# Patient Record
Sex: MALE | Race: BLACK OR AFRICAN AMERICAN | NOT HISPANIC OR LATINO | Employment: OTHER | URBAN - METROPOLITAN AREA
[De-identification: names, ages, dates, MRNs, and addresses within clinical notes are randomized per-mention and may not be internally consistent; named-entity substitution may affect disease eponyms.]

---

## 2017-05-10 ENCOUNTER — OFFICE VISIT (OUTPATIENT)
Dept: FAMILY MEDICINE | Facility: CLINIC | Age: 52
End: 2017-05-10
Payer: MEDICARE

## 2017-05-10 ENCOUNTER — LAB VISIT (OUTPATIENT)
Dept: LAB | Facility: HOSPITAL | Age: 52
End: 2017-05-10
Attending: FAMILY MEDICINE
Payer: MEDICARE

## 2017-05-10 VITALS
DIASTOLIC BLOOD PRESSURE: 74 MMHG | BODY MASS INDEX: 39.17 KG/M2 | HEIGHT: 75 IN | WEIGHT: 315 LBS | SYSTOLIC BLOOD PRESSURE: 108 MMHG | HEART RATE: 65 BPM

## 2017-05-10 DIAGNOSIS — I27.20 PULMONARY HYPERTENSION: ICD-10-CM

## 2017-05-10 DIAGNOSIS — E66.01 MORBID OBESITY DUE TO EXCESS CALORIES: ICD-10-CM

## 2017-05-10 DIAGNOSIS — M75.102 ROTATOR CUFF SYNDROME, LEFT: ICD-10-CM

## 2017-05-10 DIAGNOSIS — G47.30 SLEEP APNEA, UNSPECIFIED TYPE: ICD-10-CM

## 2017-05-10 DIAGNOSIS — I50.42 CHRONIC COMBINED SYSTOLIC AND DIASTOLIC CONGESTIVE HEART FAILURE: ICD-10-CM

## 2017-05-10 DIAGNOSIS — Z00.00 ROUTINE GENERAL MEDICAL EXAMINATION AT A HEALTH CARE FACILITY: ICD-10-CM

## 2017-05-10 DIAGNOSIS — Z12.5 SPECIAL SCREENING, PROSTATE CANCER: ICD-10-CM

## 2017-05-10 DIAGNOSIS — Z11.59 NEED FOR HEPATITIS C SCREENING TEST: ICD-10-CM

## 2017-05-10 DIAGNOSIS — I48.0 PAROXYSMAL ATRIAL FIBRILLATION: ICD-10-CM

## 2017-05-10 DIAGNOSIS — I10 ESSENTIAL HYPERTENSION: ICD-10-CM

## 2017-05-10 DIAGNOSIS — B35.9 TINEA: ICD-10-CM

## 2017-05-10 DIAGNOSIS — Z00.00 ROUTINE GENERAL MEDICAL EXAMINATION AT A HEALTH CARE FACILITY: Primary | ICD-10-CM

## 2017-05-10 LAB
ALBUMIN SERPL BCP-MCNC: 3.8 G/DL
ALP SERPL-CCNC: 46 U/L
ALT SERPL W/O P-5'-P-CCNC: 29 U/L
ANION GAP SERPL CALC-SCNC: 7 MMOL/L
AST SERPL-CCNC: 36 U/L
BASOPHILS # BLD AUTO: 0.03 K/UL
BASOPHILS NFR BLD: 1 %
BILIRUB SERPL-MCNC: 0.6 MG/DL
BNP SERPL-MCNC: 48 PG/ML
BUN SERPL-MCNC: 22 MG/DL
CALCIUM SERPL-MCNC: 9.5 MG/DL
CHLORIDE SERPL-SCNC: 99 MMOL/L
CHOLEST/HDLC SERPL: 5.1 {RATIO}
CO2 SERPL-SCNC: 35 MMOL/L
COMPLEXED PSA SERPL-MCNC: 0.58 NG/ML
CREAT SERPL-MCNC: 1.2 MG/DL
DIFFERENTIAL METHOD: ABNORMAL
EOSINOPHIL # BLD AUTO: 0.1 K/UL
EOSINOPHIL NFR BLD: 1.9 %
ERYTHROCYTE [DISTWIDTH] IN BLOOD BY AUTOMATED COUNT: 13.9 %
EST. GFR  (AFRICAN AMERICAN): >60 ML/MIN/1.73 M^2
EST. GFR  (NON AFRICAN AMERICAN): >60 ML/MIN/1.73 M^2
GLUCOSE SERPL-MCNC: 92 MG/DL
HCT VFR BLD AUTO: 39.7 %
HDL/CHOLESTEROL RATIO: 19.6 %
HDLC SERPL-MCNC: 168 MG/DL
HDLC SERPL-MCNC: 33 MG/DL
HGB BLD-MCNC: 13.4 G/DL
LDLC SERPL CALC-MCNC: 114.6 MG/DL
LYMPHOCYTES # BLD AUTO: 1.3 K/UL
LYMPHOCYTES NFR BLD: 42.4 %
MCH RBC QN AUTO: 29.6 PG
MCHC RBC AUTO-ENTMCNC: 33.8 %
MCV RBC AUTO: 88 FL
MONOCYTES # BLD AUTO: 0.6 K/UL
MONOCYTES NFR BLD: 20.1 %
NEUTROPHILS # BLD AUTO: 1.1 K/UL
NEUTROPHILS NFR BLD: 34.6 %
NONHDLC SERPL-MCNC: 135 MG/DL
PLATELET # BLD AUTO: 111 K/UL
PMV BLD AUTO: 12.6 FL
POTASSIUM SERPL-SCNC: 3.9 MMOL/L
PROT SERPL-MCNC: 8.3 G/DL
RBC # BLD AUTO: 4.53 M/UL
SODIUM SERPL-SCNC: 141 MMOL/L
TRIGL SERPL-MCNC: 102 MG/DL
WBC # BLD AUTO: 3.09 K/UL

## 2017-05-10 PROCEDURE — 3078F DIAST BP <80 MM HG: CPT | Mod: S$GLB,,, | Performed by: FAMILY MEDICINE

## 2017-05-10 PROCEDURE — 99499 UNLISTED E&M SERVICE: CPT | Mod: S$PBB,,, | Performed by: FAMILY MEDICINE

## 2017-05-10 PROCEDURE — 85025 COMPLETE CBC W/AUTO DIFF WBC: CPT

## 2017-05-10 PROCEDURE — 83880 ASSAY OF NATRIURETIC PEPTIDE: CPT

## 2017-05-10 PROCEDURE — 80061 LIPID PANEL: CPT

## 2017-05-10 PROCEDURE — 3074F SYST BP LT 130 MM HG: CPT | Mod: S$GLB,,, | Performed by: FAMILY MEDICINE

## 2017-05-10 PROCEDURE — 84153 ASSAY OF PSA TOTAL: CPT

## 2017-05-10 PROCEDURE — 80053 COMPREHEN METABOLIC PANEL: CPT

## 2017-05-10 PROCEDURE — 36415 COLL VENOUS BLD VENIPUNCTURE: CPT

## 2017-05-10 PROCEDURE — 86803 HEPATITIS C AB TEST: CPT

## 2017-05-10 PROCEDURE — 99386 PREV VISIT NEW AGE 40-64: CPT | Mod: S$GLB,,, | Performed by: FAMILY MEDICINE

## 2017-05-10 PROCEDURE — 99999 PR PBB SHADOW E&M-EST. PATIENT-LVL III: CPT | Mod: PBBFAC,,, | Performed by: FAMILY MEDICINE

## 2017-05-10 RX ORDER — LOSARTAN POTASSIUM AND HYDROCHLOROTHIAZIDE 25; 100 MG/1; MG/1
1 TABLET ORAL DAILY
COMMUNITY
End: 2017-09-27

## 2017-05-10 RX ORDER — CLOTRIMAZOLE AND BETAMETHASONE DIPROPIONATE 10; .5 MG/ML; MG/ML
LOTION TOPICAL 2 TIMES DAILY
Qty: 30 ML | Refills: 2 | Status: SHIPPED | OUTPATIENT
Start: 2017-05-10 | End: 2021-01-27

## 2017-05-10 RX ORDER — AMLODIPINE BESYLATE 5 MG/1
5 TABLET ORAL DAILY
COMMUNITY
End: 2017-12-18 | Stop reason: SDUPTHER

## 2017-05-10 NOTE — PROGRESS NOTES
Subjective:       Patient ID: Caio Ontiveros is a 51 y.o. male.    Chief Complaint: Annual Exam; Cough; Nausea; and Sinusitis    HPI Comments: 51 yr old pleasant black male with AFIB paroxysmal, CHF w/unknown EF, HTN, pulmonary HTN, varicose veins, EVANGELIST, presents today as new patient to me and for establishing primary care and annual wellness check and lab work. He has multiple complaints as well.      AFIB and CHF - diagnosed 3 years ago and placed on medications - follows Cardiology in [place] and he do not remember his last EF - last echo year ago - he is anti coagulated with xarelto for which he is non compliant     HTN - controlled - on diuretics, BB and CCB - c/o leg edema and varicose veins      Allergies and sinus congestion - onset 5 days ago - has not tried any OTC med yet -no fever/chest pain/SOB      Rash and skin discoloration - scalp and groin area - reports started 3 months ago - associated with itching       Arthritis knee - chronic - has not tried OTC med yet    Left shoulder weakness and pain - chronic - onset 3 years ago - sen Ortho in the past and was advised therapy which never helped his mobility - want second opinion      History as below - reviewed         Cough   This is a new problem. The current episode started in the past 7 days. The problem has been unchanged. The problem occurs constantly. The cough is non-productive. Associated symptoms include postnasal drip and a rash. Pertinent negatives include no chest pain, myalgias, rhinorrhea or wheezing. Nothing aggravates the symptoms. The treatment provided no relief. His past medical history is significant for environmental allergies. There is no history of asthma or bronchitis.   Nausea   This is a new problem. The current episode started today. The problem occurs constantly. The problem has been unchanged. Associated symptoms include nausea and a rash. Pertinent negatives include no chest pain, myalgias, vomiting or weakness. Nothing  aggravates the symptoms. He has tried nothing for the symptoms. The treatment provided no relief.   Hypertension   This is a chronic problem. The current episode started more than 1 year ago. The problem has been gradually improving since onset. The problem is controlled. Pertinent negatives include no chest pain or palpitations. There are no associated agents to hypertension. Past treatments include angiotensin blockers, diuretics, beta blockers and calcium channel blockers. The current treatment provides moderate improvement. There are no compliance problems.  Hypertensive end-organ damage includes heart failure and left ventricular hypertrophy. There is no history of angina, CAD/MI, CVA, renovascular disease or a thyroid problem. Identifiable causes of hypertension include sleep apnea. There is no history of coarctation of the aorta, hypercortisolism, hyperparathyroidism or pheochromocytoma.   Congestive Heart Failure   Presents for initial visit. The disease course has been stable. Pertinent negatives include no chest pain, palpitations or unexpected weight change. The symptoms have been stable. Past treatments include angiotensin receptor blockers, beta blockers and salt and fluid restriction (diuretics). The treatment provided moderate relief. Compliance with prior treatments has been good. There is no history of anemia, CAD, CVA, DM, DVT, hyperthyroidism, myocarditis or PE.     Review of Systems   Constitutional: Negative.  Negative for activity change and unexpected weight change.   HENT: Positive for postnasal drip. Negative for mouth sores, rhinorrhea and voice change.    Eyes: Negative.  Negative for pain, discharge and visual disturbance.   Respiratory: Negative.  Negative for apnea and wheezing.    Cardiovascular: Negative.  Negative for chest pain and palpitations.   Gastrointestinal: Positive for nausea. Negative for abdominal distention, anal bleeding, diarrhea and vomiting.   Endocrine: Negative.   Negative for cold intolerance and polyuria.   Genitourinary: Negative.  Negative for decreased urine volume, difficulty urinating, discharge, frequency and scrotal swelling.   Musculoskeletal: Negative.  Negative for back pain, myalgias and neck stiffness.   Skin: Positive for color change and rash.   Allergic/Immunologic: Positive for environmental allergies. Negative for immunocompromised state.   Neurological: Negative.  Negative for dizziness, speech difficulty, weakness and light-headedness.   Hematological: Negative.    Psychiatric/Behavioral: Negative.  Negative for agitation, dysphoric mood and suicidal ideas. The patient is not nervous/anxious.        Active Ambulatory Problems     Diagnosis Date Noted    CHF exacerbation 12/19/2012    Diastolic CHF, acute on chronic 12/20/2012    Sleep apnea 12/20/2012    Morbid obesity 12/20/2012    Obesity hypoventilation syndrome 12/20/2012    Hypertension 12/20/2012    Pulmonary hypertension 12/20/2012    A-fib 12/20/2012    Chronic combined systolic and diastolic congestive heart failure 05/10/2017     Resolved Ambulatory Problems     Diagnosis Date Noted    No Resolved Ambulatory Problems     Past Medical History:   Diagnosis Date    A-fib     CHF (congestive heart failure)     Gout, chronic     Hypertension      Past Surgical History:   Procedure Laterality Date    CHOLECYSTECTOMY      GASTRIC BYPASS  2014     History reviewed. No pertinent family history.  Social History     Social History    Marital status:      Spouse name: N/A    Number of children: 1    Years of education: N/A     Occupational History    Not on file.     Social History Main Topics    Smoking status: Never Smoker    Smokeless tobacco: Not on file    Alcohol use Yes      Comment: sometimes    Drug use: No    Sexual activity: Not Currently     Other Topics Concern    Not on file     Social History Narrative    No narrative on file     Review of patient's allergies  indicates:  No Known Allergies  Current Outpatient Prescriptions on File Prior to Visit   Medication Sig Dispense Refill    aspirin 81 MG Chew Take 1 tablet (81 mg total) by mouth once daily. 30 tablet 0    furosemide (LASIX) 40 MG tablet TAKE 2 TABLETS DAILY AT MID-AFTERNOON 180 tablet 2    metolazone (ZAROXOLYN) 5 MG tablet TAKE 1 TABLET EVERY MORNING 90 tablet 2    metoprolol succinate (TOPROL-XL) 25 MG 24 hr tablet Take 25 mg by mouth once daily.      potassium chloride SA (K-DUR,KLOR-CON) 20 MEQ tablet TAKE 1 TABLET DAILY 90 tablet 2    colchicine 0.6 mg tablet Take 1 tablet (0.6 mg total) by mouth once daily. 1 tablet 0    [DISCONTINUED] allopurinol (ZYLOPRIM) 100 MG tablet TAKE 1 TABLET DAILY 90 tablet 2    [DISCONTINUED] amlodipine (NORVASC) 10 MG tablet TAKE 1 TABLET DAILY 90 tablet 2    [DISCONTINUED] hydrALAZINE (APRESOLINE) 10 MG tablet TAKE 1 TABLET 3 TIMES A  tablet 2    [DISCONTINUED] lisinopril (PRINIVIL,ZESTRIL) 40 MG tablet TAKE 1 TABLET DAILY 90 tablet 2    [DISCONTINUED] warfarin (COUMADIN) 5 MG tablet Take 5 mg by mouth once daily.       No current facility-administered medications on file prior to visit.        Objective:       Vitals:    05/10/17 0840   BP: 108/74   Pulse: 65       Physical Exam   Constitutional: He is oriented to person, place, and time. He appears well-developed and well-nourished.   HENT:   Head: Normocephalic and atraumatic.   Right Ear: External ear normal.   Left Ear: External ear normal.   Nose: Nose normal.   Mouth/Throat: Oropharynx is clear and moist. No oropharyngeal exudate.   Eyes: Conjunctivae and EOM are normal. Pupils are equal, round, and reactive to light. Right eye exhibits no discharge. Left eye exhibits no discharge. No scleral icterus.   Neck: Normal range of motion. Neck supple. No JVD present. No tracheal deviation present. No thyromegaly present.   Cardiovascular: Normal rate, regular rhythm, normal heart sounds and intact distal  pulses.  Exam reveals no gallop and no friction rub.    No murmur heard.  Pulmonary/Chest: Effort normal and breath sounds normal. No stridor. No respiratory distress. He has no wheezes. He has no rales. He exhibits no tenderness.   Abdominal: Soft. Bowel sounds are normal. He exhibits no distension and no mass. There is no tenderness. There is no rebound and no guarding. No hernia.   Musculoskeletal: Normal range of motion. He exhibits edema and tenderness (TTP left shoulder with rotator cuff impingement and neer and alberts+).   Varicose veins B/L LE   Lymphadenopathy:     He has no cervical adenopathy.   Neurological: He is alert and oriented to person, place, and time. He has normal reflexes. He displays normal reflexes. No cranial nerve deficit. He exhibits normal muscle tone. Coordination normal.   Skin: Skin is warm and dry. No rash noted. No erythema. No pallor.   Psychiatric: He has a normal mood and affect. His behavior is normal. Judgment and thought content normal.       Assessment:       1. Routine general medical examination at a health care facility    2. Pulmonary hypertension    3. Sleep apnea, unspecified type    4. Essential hypertension    5. Morbid obesity due to excess calories    6. Paroxysmal atrial fibrillation    7. Chronic combined systolic and diastolic congestive heart failure    8. Need for hepatitis C screening test    9. Special screening, prostate cancer    10. Rotator cuff syndrome, left    11. Tinea        Plan:       Caio was seen today for annual exam, cough, nausea and sinusitis.    Diagnoses and all orders for this visit:    Routine general medical examination at a health care facility  -     CBC auto differential; Future  -     Comprehensive metabolic panel; Future  -     Lipid panel; Future  -     Urinalysis; Future    Pulmonary hypertension  -     2D Echo w/ Color Flow Doppler; Future    Sleep apnea, unspecified type    Essential hypertension  -     CBC auto differential;  Future  -     Comprehensive metabolic panel; Future  -     Lipid panel; Future  -     Urinalysis; Future    Morbid obesity due to excess calories    Paroxysmal atrial fibrillation  -     2D Echo w/ Color Flow Doppler; Future    Chronic combined systolic and diastolic congestive heart failure  -     2D Echo w/ Color Flow Doppler; Future  -     CBC auto differential; Future  -     Comprehensive metabolic panel; Future  -     Lipid panel; Future  -     Brain natriuretic peptide; Future    Need for hepatitis C screening test  -     Hepatitis C antibody; Future    Special screening, prostate cancer  -     PSA, Screening; Future    Rotator cuff syndrome, left  -     Ambulatory referral to Orthopedics    Tinea  -     clotrimazole-betamethasone (LOTRISONE) lotion; Apply topically 2 (two) times daily.      Wellness check  -normal exam  -labs    AFIB  -paroxysmal  -on rate and rhythm control - on BB    CHF  -unknown EF  -echo for surveillance  -follows cardiology at [place]    Left shoulder pain/rotator cuff syndrome  -refer Ortho    HTN  -controlled    Obesity  -diet/exercise  -weight loss    Spent adequate time in obtaining history and explaining differentials    40 minutes spent during this visit of which greater than 50% devoted to face-face counseling and coordination of care regarding diagnosis and management plan    Return in about 3 months (around 8/10/2017), or if symptoms worsen or fail to improve.

## 2017-05-10 NOTE — MR AVS SNAPSHOT
[facility]  [address]  [place] [address]  Phone: [phone]  Fax: [phone]                  Caio Ontiveros   5/10/2017 8:40 AM   Office Visit    Description:  Male : 1965   Provider:  Scottie Alcantar MD   Department:  [facility]           Reason for Visit     Annual Exam     Cough     Nausea     Sinusitis           Diagnoses this Visit        Comments    Routine general medical examination at a health care facility    -  Primary     Pulmonary hypertension         Sleep apnea, unspecified type         Essential hypertension         Morbid obesity due to excess calories         Paroxysmal atrial fibrillation         Chronic combined systolic and diastolic congestive heart failure         Need for hepatitis C screening test         Special screening, prostate cancer         Rotator cuff syndrome, left                To Do List           Future Appointments        Provider Department Dept Phone    5/10/2017 9:30 AM APPOINTMENT LAB, [facility] [phone]    5/10/2017 9:40 AM SPECIMEN, [facility] [phone]    2017 8:00 AM CARDIOLOGY, ECHO [facility] [phone]    2017 9:00 AM José Rodriguez Jr., MD [facility] [phone]      Goals (5 Years of Data)     None      Follow-Up and Disposition     Return in about 3 months (around 8/10/2017), or if symptoms worsen or fail to improve.      [facility]     [facility] Nurse Care Line - 24/7 Assistance  Unless otherwise directed by your provider, please contact [facility], our nurse care line that is available for 24/7 assistance.     Registered nurses in the [facility] provide: appointment scheduling, clinical advisement, health education, and other advisory services.  Call: [phone] (toll free)               Medications           Message regarding Medications     Verify the  "changes and/or additions to your medication regime listed below are the same as discussed with your clinician today.  If any of these changes or additions are incorrect, please notify your healthcare provider.        STOP taking these medications     warfarin (COUMADIN) 5 MG tablet Take 5 mg by mouth once daily.    lisinopril (PRINIVIL,ZESTRIL) 40 MG tablet TAKE 1 TABLET DAILY    hydrALAZINE (APRESOLINE) 10 MG tablet TAKE 1 TABLET 3 TIMES A DAY    allopurinol (ZYLOPRIM) 100 MG tablet TAKE 1 TABLET DAILY           Verify that the below list of medications is an accurate representation of the medications you are currently taking.  If none reported, the list may be blank. If incorrect, please contact your healthcare provider. Carry this list with you in case of emergency.           Current Medications     amlodipine (NORVASC) 5 MG tablet Take 5 mg by mouth once daily.    aspirin 81 MG Chew Take 1 tablet (81 mg total) by mouth once daily.    furosemide (LASIX) 40 MG tablet TAKE 2 TABLETS DAILY AT MID-AFTERNOON    losartan-hydrochlorothiazide 100-25 mg (HYZAAR) 100-25 mg per tablet Take 1 tablet by mouth once daily.    metolazone (ZAROXOLYN) 5 MG tablet TAKE 1 TABLET EVERY MORNING    metoprolol succinate (TOPROL-XL) 25 MG 24 hr tablet Take 25 mg by mouth once daily.    potassium chloride SA (K-DUR,KLOR-CON) 20 MEQ tablet TAKE 1 TABLET DAILY    colchicine 0.6 mg tablet Take 1 tablet (0.6 mg total) by mouth once daily.           Clinical Reference Information           Your Vitals Were     BP Pulse Height Weight BMI    108/74 (BP Location: Left arm, Patient Position: Sitting, BP Method: Automatic) 65 6' 3" (1.905 m) 156.9 kg (345 lb 14.4 oz) 43.23 kg/m2      Blood Pressure          Most Recent Value    BP  108/74      Allergies as of 5/10/2017     No Known Allergies      Immunizations Administered on Date of Encounter - 5/10/2017     None      Orders Placed During Today's Visit      Normal Orders This Visit    Ambulatory " referral to Orthopedics     Future Labs/Procedures Expected by Expires    Brain natriuretic peptide  5/10/2017 7/9/2018    CBC auto differential  5/10/2017 7/9/2018    Comprehensive metabolic panel  5/10/2017 7/9/2018    Hepatitis C antibody  5/10/2017 7/9/2018    Lipid panel  5/10/2017 7/9/2018    PSA, Screening  5/10/2017 7/9/2018    Urinalysis  5/10/2017 7/9/2018    2D Echo w/ Color Flow Doppler  As directed 5/10/2018      [facility] Sign-Up     Activating your [facility] account is as easy as 1-2-3!     1) Visit [email or web address], select Sign Up Now, enter this activation code and your date of birth, then select Next.  [email or web address]  Expires: 6/17/2017  1:05 PM      2) Create a username and password to use when you visit [facility] in the future and select a security question in case you lose your password and select Next.    3) Enter your e-mail address and click Sign Up!    Additional Information  If you have questions, please e-mail [email or web address] or call [phone] to talk to our [facility] staff. Remember, [facility] is NOT to be used for urgent needs. For medical emergencies, dial 911.         Language Assistance Services     ATTENTION: Language assistance services are available, free of charge. Please call [phone].      ATENCIÓN: Si habla español, tiene a chacon disposición servicios gratuitos de asistencia lingüística. Llame al [phone].     CHÚ Ý: N?u b?n nói Ti?ng Vi?t, có các d?ch v? h? tr? ngôn ng? mi?n phí dành cho b?n. G?i s? [phone].         [facility] complies with applicable Federal civil rights laws and does not discriminate on the basis of race, color, national origin, age, disability, or sex.

## 2017-05-11 LAB — HCV AB SERPL QL IA: NEGATIVE

## 2017-05-16 ENCOUNTER — HOSPITAL ENCOUNTER (OUTPATIENT)
Dept: CARDIOLOGY | Facility: HOSPITAL | Age: 52
Discharge: HOME | End: 2017-05-16
Attending: FAMILY MEDICINE
Payer: MEDICARE

## 2017-05-16 DIAGNOSIS — I48.0 PAROXYSMAL ATRIAL FIBRILLATION: ICD-10-CM

## 2017-05-16 DIAGNOSIS — I50.42 CHRONIC COMBINED SYSTOLIC AND DIASTOLIC CONGESTIVE HEART FAILURE: ICD-10-CM

## 2017-05-16 DIAGNOSIS — I27.20 PULMONARY HYPERTENSION: ICD-10-CM

## 2017-05-16 LAB
DIASTOLIC DYSFUNCTION: NO
ESTIMATED PA SYSTOLIC PRESSURE: 23.98
MITRAL VALVE MOBILITY: NORMAL
RETIRED EF AND QEF - SEE NOTES: 55 (ref 55–65)
TRICUSPID VALVE REGURGITATION: NORMAL

## 2017-05-16 PROCEDURE — 93306 TTE W/DOPPLER COMPLETE: CPT

## 2017-05-16 PROCEDURE — 93306 TTE W/DOPPLER COMPLETE: CPT | Mod: 26,,, | Performed by: INTERNAL MEDICINE

## 2017-05-29 ENCOUNTER — PATIENT MESSAGE (OUTPATIENT)
Dept: FAMILY MEDICINE | Facility: CLINIC | Age: 52
End: 2017-05-29

## 2017-05-29 DIAGNOSIS — R60.0 PEDAL EDEMA: ICD-10-CM

## 2017-05-29 DIAGNOSIS — I10 HTN, GOAL BELOW 140/90: Primary | ICD-10-CM

## 2017-05-30 ENCOUNTER — PATIENT MESSAGE (OUTPATIENT)
Dept: FAMILY MEDICINE | Facility: CLINIC | Age: 52
End: 2017-05-30

## 2017-05-30 DIAGNOSIS — I10 HTN, GOAL BELOW 140/90: ICD-10-CM

## 2017-05-30 DIAGNOSIS — R60.0 PEDAL EDEMA: ICD-10-CM

## 2017-05-30 RX ORDER — FUROSEMIDE 80 MG/1
80 TABLET ORAL 2 TIMES DAILY
Qty: 180 TABLET | Refills: 3 | Status: SHIPPED | OUTPATIENT
Start: 2017-05-30 | End: 2017-09-27

## 2017-05-30 RX ORDER — POTASSIUM CHLORIDE 20 MEQ/1
20 TABLET, EXTENDED RELEASE ORAL DAILY
Qty: 90 TABLET | Refills: 1 | Status: SHIPPED | OUTPATIENT
Start: 2017-05-30 | End: 2017-05-30 | Stop reason: SDUPTHER

## 2017-05-30 RX ORDER — FUROSEMIDE 40 MG/1
40 TABLET ORAL 2 TIMES DAILY
Qty: 120 TABLET | Refills: 3 | Status: SHIPPED | OUTPATIENT
Start: 2017-05-30 | End: 2017-05-30 | Stop reason: SDUPTHER

## 2017-05-30 RX ORDER — POTASSIUM CHLORIDE 20 MEQ/1
20 TABLET, EXTENDED RELEASE ORAL DAILY
Qty: 90 TABLET | Refills: 1 | Status: SHIPPED | OUTPATIENT
Start: 2017-05-30 | End: 2017-09-27

## 2017-05-30 NOTE — TELEPHONE ENCOUNTER
Spoke to pt and states that he takes Lasix 40mg BID. Pt states that the 80mg QD isn't working and requesting an increase. Pls advise.

## 2017-06-05 ENCOUNTER — PATIENT MESSAGE (OUTPATIENT)
Dept: ORTHOPEDICS | Facility: CLINIC | Age: 52
End: 2017-06-05

## 2017-07-19 ENCOUNTER — PATIENT MESSAGE (OUTPATIENT)
Dept: FAMILY MEDICINE | Facility: CLINIC | Age: 52
End: 2017-07-19

## 2017-08-22 ENCOUNTER — PATIENT MESSAGE (OUTPATIENT)
Dept: FAMILY MEDICINE | Facility: CLINIC | Age: 52
End: 2017-08-22

## 2017-08-22 ENCOUNTER — HOSPITAL ENCOUNTER (OUTPATIENT)
Dept: RADIOLOGY | Facility: HOSPITAL | Age: 52
Discharge: HOME | End: 2017-08-22
Attending: FAMILY MEDICINE
Payer: MEDICARE

## 2017-08-22 ENCOUNTER — OFFICE VISIT (OUTPATIENT)
Dept: FAMILY MEDICINE | Facility: CLINIC | Age: 52
End: 2017-08-22
Attending: FAMILY MEDICINE
Payer: MEDICARE

## 2017-08-22 VITALS
HEIGHT: 75 IN | WEIGHT: 315 LBS | OXYGEN SATURATION: 94 % | SYSTOLIC BLOOD PRESSURE: 125 MMHG | BODY MASS INDEX: 39.17 KG/M2 | HEART RATE: 75 BPM | DIASTOLIC BLOOD PRESSURE: 83 MMHG

## 2017-08-22 DIAGNOSIS — E66.2 OBESITY HYPOVENTILATION SYNDROME: ICD-10-CM

## 2017-08-22 DIAGNOSIS — I27.20 PULMONARY HYPERTENSION: ICD-10-CM

## 2017-08-22 DIAGNOSIS — M70.61 TROCHANTERIC BURSITIS OF RIGHT HIP: Primary | ICD-10-CM

## 2017-08-22 DIAGNOSIS — M25.551 RIGHT HIP PAIN: ICD-10-CM

## 2017-08-22 DIAGNOSIS — Z12.11 COLON CANCER SCREENING: ICD-10-CM

## 2017-08-22 DIAGNOSIS — I10 ESSENTIAL HYPERTENSION: ICD-10-CM

## 2017-08-22 DIAGNOSIS — E66.01 MORBID OBESITY DUE TO EXCESS CALORIES: ICD-10-CM

## 2017-08-22 DIAGNOSIS — I48.0 PAROXYSMAL ATRIAL FIBRILLATION: ICD-10-CM

## 2017-08-22 DIAGNOSIS — I50.42 CHRONIC COMBINED SYSTOLIC AND DIASTOLIC CONGESTIVE HEART FAILURE: ICD-10-CM

## 2017-08-22 DIAGNOSIS — G47.30 SLEEP APNEA, UNSPECIFIED TYPE: ICD-10-CM

## 2017-08-22 PROCEDURE — 3074F SYST BP LT 130 MM HG: CPT | Mod: S$GLB,,, | Performed by: FAMILY MEDICINE

## 2017-08-22 PROCEDURE — 73502 X-RAY EXAM HIP UNI 2-3 VIEWS: CPT | Mod: TC,RT

## 2017-08-22 PROCEDURE — 99214 OFFICE O/P EST MOD 30 MIN: CPT | Mod: 25,S$GLB,, | Performed by: FAMILY MEDICINE

## 2017-08-22 PROCEDURE — 99499 UNLISTED E&M SERVICE: CPT | Mod: S$PBB,,, | Performed by: FAMILY MEDICINE

## 2017-08-22 PROCEDURE — 3079F DIAST BP 80-89 MM HG: CPT | Mod: S$GLB,,, | Performed by: FAMILY MEDICINE

## 2017-08-22 PROCEDURE — 73502 X-RAY EXAM HIP UNI 2-3 VIEWS: CPT | Mod: 26,RT,, | Performed by: RADIOLOGY

## 2017-08-22 PROCEDURE — 3008F BODY MASS INDEX DOCD: CPT | Mod: S$GLB,,, | Performed by: FAMILY MEDICINE

## 2017-08-22 PROCEDURE — 20610 DRAIN/INJ JOINT/BURSA W/O US: CPT | Mod: RT,S$GLB,, | Performed by: FAMILY MEDICINE

## 2017-08-22 PROCEDURE — 99999 PR PBB SHADOW E&M-EST. PATIENT-LVL III: CPT | Mod: PBBFAC,,, | Performed by: FAMILY MEDICINE

## 2017-08-22 RX ORDER — TRIAMCINOLONE ACETONIDE 40 MG/ML
40 INJECTION, SUSPENSION INTRA-ARTICULAR; INTRAMUSCULAR
Status: COMPLETED | OUTPATIENT
Start: 2017-08-22 | End: 2017-08-22

## 2017-08-22 RX ADMIN — TRIAMCINOLONE ACETONIDE 40 MG: 40 INJECTION, SUSPENSION INTRA-ARTICULAR; INTRAMUSCULAR at 01:08

## 2017-08-22 NOTE — PROGRESS NOTES
Subjective:       Patient ID: Caio Ontiveros is a 51 y.o. male.    Chief Complaint: Hip Pain and Other (Injection for Pain and Hip X-ray)    51 yr old pleasant black male with AFIB paroxysmal, CHF w/unknown EF, HTN, pulmonary HTN, varicose veins, EVANGELIST, presents today for his 3 month follow up and c/o right hip pain. Onset 2 months ago and gradually worsening. No injury or trauma. It is 9/10 ins everity and aggravated by movements/walking and putting pressure on it - detailas as follows -    AFIB and CHF - diagnosed 3 years ago and placed on medications - follows Cardiology in [place] and he do not remember his last EF - last echo year ago - he is anti coagulated with xarelto for which he is non compliant     HTN - controlled - on diuretics, BB and CCB - c/o leg edema and varicose veins      Allergies and sinus congestion - onset 5 days ago - has not tried any OTC med yet -no fever/chest pain/SOB      Rash and skin discoloration - scalp and groin area - reports started 3 months ago - associated with itching       Arthritis knee - chronic - has not tried OTC med yet    Left shoulder weakness and pain - chronic - onset 3 years ago - sen Ortho in the past and was advised therapy which never helped his mobility - want second opinion      History as below - reviewed           Nausea   This is a new problem. The current episode started today. The problem occurs constantly. The problem has been unchanged. Associated symptoms include arthralgias, myalgias and nausea. Pertinent negatives include no chest pain, congestion, coughing, diaphoresis, rash, vomiting or weakness. Nothing aggravates the symptoms. He has tried nothing for the symptoms. The treatment provided no relief.   Hypertension   This is a chronic problem. The current episode started more than 1 year ago. The problem has been gradually improving since onset. The problem is controlled. Pertinent negatives include no chest pain or palpitations. There are no  associated agents to hypertension. Past treatments include angiotensin blockers, diuretics, beta blockers and calcium channel blockers. The current treatment provides moderate improvement. There are no compliance problems.  Hypertensive end-organ damage includes heart failure and left ventricular hypertrophy. There is no history of angina, CAD/MI, CVA, renovascular disease or a thyroid problem. Identifiable causes of hypertension include sleep apnea. There is no history of coarctation of the aorta, hypercortisolism, hyperparathyroidism or pheochromocytoma.   Congestive Heart Failure   Presents for initial visit. The disease course has been stable. Pertinent negatives include no chest pain, palpitations or unexpected weight change. The symptoms have been stable. Past treatments include angiotensin receptor blockers, beta blockers and salt and fluid restriction (diuretics). The treatment provided moderate relief. Compliance with prior treatments has been good. There is no history of anemia, CAD, CVA, DM, DVT, hyperthyroidism, myocarditis or PE.   Hip Pain    There was no injury mechanism. The pain is present in the right hip. The quality of the pain is described as aching. The pain is at a severity of 9/10. The pain is severe. The pain has been constant since onset. Associated symptoms include an inability to bear weight. The symptoms are aggravated by movement, palpation and weight bearing. He has tried NSAIDs, heat and elevation for the symptoms. The treatment provided no relief.     Review of Systems   Constitutional: Negative.  Negative for activity change, diaphoresis and unexpected weight change.   HENT: Negative.  Negative for congestion, ear pain, mouth sores, rhinorrhea and voice change.    Eyes: Negative.  Negative for pain, discharge and visual disturbance.   Respiratory: Negative.  Negative for apnea, cough and wheezing.    Cardiovascular: Negative.  Negative for chest pain and palpitations.    Gastrointestinal: Positive for nausea. Negative for abdominal distention, anal bleeding, diarrhea and vomiting.   Endocrine: Negative.  Negative for cold intolerance and polyuria.   Genitourinary: Negative.  Negative for decreased urine volume, difficulty urinating, discharge, frequency and scrotal swelling.   Musculoskeletal: Positive for arthralgias, back pain and myalgias. Negative for neck stiffness.   Skin: Negative.  Negative for color change and rash.   Allergic/Immunologic: Negative.  Negative for environmental allergies and immunocompromised state.   Neurological: Negative.  Negative for dizziness, speech difficulty, weakness and light-headedness.   Hematological: Negative.    Psychiatric/Behavioral: Negative.  Negative for agitation, dysphoric mood and suicidal ideas. The patient is not nervous/anxious.        PMH/PSH/FH/SH/MED/ALLERGY reviewed    Objective:       Vitals:    08/22/17 1305   BP: 125/83   Pulse: 75       Physical Exam   Constitutional: He is oriented to person, place, and time. He appears well-developed and well-nourished.   HENT:   Head: Normocephalic and atraumatic.   Right Ear: External ear normal.   Left Ear: External ear normal.   Nose: Nose normal.   Mouth/Throat: Oropharynx is clear and moist. No oropharyngeal exudate.   Eyes: Conjunctivae and EOM are normal. Pupils are equal, round, and reactive to light. Right eye exhibits no discharge. Left eye exhibits no discharge. No scleral icterus.   Neck: Normal range of motion. Neck supple. No JVD present. No tracheal deviation present. No thyromegaly present.   Cardiovascular: Normal rate, regular rhythm, normal heart sounds and intact distal pulses.  Exam reveals no gallop and no friction rub.    No murmur heard.  Pulmonary/Chest: Effort normal and breath sounds normal. No stridor. No respiratory distress. He has no wheezes. He has no rales. He exhibits no tenderness.   Abdominal: Soft. Bowel sounds are normal. He exhibits no distension  and no mass. There is no tenderness. There is no rebound and no guarding. No hernia.   Musculoskeletal: He exhibits edema and tenderness (TTP left shoulder with rotator cuff impingement and neer and alberts+).        Right hip: He exhibits decreased range of motion, decreased strength and tenderness.        Legs:  Varicose veins B/L LE   Lymphadenopathy:     He has no cervical adenopathy.   Neurological: He is alert and oriented to person, place, and time. He has normal reflexes. He displays normal reflexes. No cranial nerve deficit. He exhibits normal muscle tone. Coordination normal.   Skin: Skin is warm and dry. No rash noted. No erythema. No pallor.   Psychiatric: He has a normal mood and affect. His behavior is normal. Judgment and thought content normal.       Assessment:       1. Trochanteric bursitis of right hip    2. Right hip pain    3. Chronic combined systolic and diastolic congestive heart failure    4. Morbid obesity due to excess calories    5. Paroxysmal atrial fibrillation    6. Essential hypertension    7. Pulmonary hypertension    8. Obesity hypoventilation syndrome    9. Sleep apnea, unspecified type        Plan:       Caio was seen today for hip pain and other.    Diagnoses and all orders for this visit:    Trochanteric bursitis of right hip  -     triamcinolone acetonide injection 40 mg; 1 mL (40 mg total) by INTRABURSAL route one time.    Right hip pain  -     X-Ray Hip 2 View Right; Future  -     triamcinolone acetonide injection 40 mg; 1 mL (40 mg total) by INTRABURSAL route one time.    Chronic combined systolic and diastolic congestive heart failure    Morbid obesity due to excess calories    Paroxysmal atrial fibrillation    Essential hypertension    Pulmonary hypertension    Obesity hypoventilation syndrome    Sleep apnea, unspecified type      Right trochanteric bursitis/right hip pain  -rest, ice application  -kenalog BURSA injection - after informed consent, area prepped and draped.  After obtaining adequate topical anesthesia, 1 cc kenalog mixed with lidocaine 2% 3 cc injected into right trochanteric bursa. No bleeding or immediate complication. Post procedure precautions given.       AFIB  -paroxysmal  -on rate and rhythm control - on BB    CHF  -EF normal  -follows cardiology at [place]    Left shoulder pain/rotator cuff syndrome  -follows Ortho    HTN  -controlled    Obesity  -diet/exercise  -weight loss    Spent adequate time in obtaining history and explaining differentials    40 minutes spent during this visit of which greater than 50% devoted to face-face counseling and coordination of care regarding diagnosis and management plan        Return if symptoms worsen or fail to improve.

## 2017-08-31 ENCOUNTER — PATIENT MESSAGE (OUTPATIENT)
Dept: FAMILY MEDICINE | Facility: CLINIC | Age: 52
End: 2017-08-31

## 2017-09-05 ENCOUNTER — OFFICE VISIT (OUTPATIENT)
Dept: ORTHOPEDICS | Facility: CLINIC | Age: 52
End: 2017-09-05
Payer: MEDICARE

## 2017-09-05 VITALS — WEIGHT: 315 LBS | HEIGHT: 75 IN | RESPIRATION RATE: 16 BRPM | BODY MASS INDEX: 39.17 KG/M2

## 2017-09-05 DIAGNOSIS — G89.29 CHRONIC LEFT SHOULDER PAIN: Primary | ICD-10-CM

## 2017-09-05 DIAGNOSIS — M16.11 PRIMARY OSTEOARTHRITIS OF RIGHT HIP: ICD-10-CM

## 2017-09-05 DIAGNOSIS — M25.612 DECREASED RANGE OF MOTION OF LEFT SHOULDER: ICD-10-CM

## 2017-09-05 DIAGNOSIS — M25.512 CHRONIC LEFT SHOULDER PAIN: Primary | ICD-10-CM

## 2017-09-05 DIAGNOSIS — M25.551 RIGHT HIP PAIN: ICD-10-CM

## 2017-09-05 PROCEDURE — 99203 OFFICE O/P NEW LOW 30 MIN: CPT | Mod: S$GLB,,, | Performed by: PHYSICIAN ASSISTANT

## 2017-09-05 PROCEDURE — 99499 UNLISTED E&M SERVICE: CPT | Mod: S$PBB,,, | Performed by: PHYSICIAN ASSISTANT

## 2017-09-05 PROCEDURE — 99999 PR PBB SHADOW E&M-EST. PATIENT-LVL III: CPT | Mod: PBBFAC,,, | Performed by: PHYSICIAN ASSISTANT

## 2017-09-05 PROCEDURE — 3008F BODY MASS INDEX DOCD: CPT | Mod: S$GLB,,, | Performed by: PHYSICIAN ASSISTANT

## 2017-09-06 NOTE — PROGRESS NOTES
"  SUBJECTIVE:     Chief Complaint : right hip pain    History of Present Illness:  Caio Ontiveros is a 51 y.o. male on disability seen in clinic today with a chief complaint of right hip pain. Pain has been present for many years.  Pain is in groin and deep in right buttock. He has severe pain with first few steps and pain after walking. Pain is worsening. He has limited ROM and has difficulty lifting leg to get in and out of vehicle as well as when putting on shoes and socks. He slipped several weeks ago and did a split which worsened the pain significantly.  He was seen by his PCP and was given a GT bursa injection which decreased the pain for a day or so. He would like to exercise but cannot walk for more than a block without severe hip pain. Has had occasional back pain. Patient had gastric bypass in 2014; he was > 500 pounds prior to bypass. He has CHF and afib and is on Eliquis. His cardiologist is in [place].     He also complains of left shoulder pain. He is RHD. He was in an accident many years ago and shoulder dislocated. Shoulder was reduced and he never regained motion. He has numbness of deltoid and ROM limited by strength, not by pain. No radiation of pain. No numbness/tingling of arms.     Past Medical History:   Diagnosis Date    A-fib     CHF (congestive heart failure)     Gout, chronic     Hypertension        Review of Systems:  Constitutional: no fever or chills  ENT: no nasal congestion or sore throat  Respiratory: negative for cough  Cardiovascular: no chest pain   Gastrointestinal: no nausea or vomiting, tolerating diet  Genitourinary: no hematuria or dysuria  Integument/Breast: no rash or pruritis  Hematologic/Lymphatic: positive for easy bruising  Musculoskeletal: see HPI  Neurological: no seizures or tremors  Behavioral/Psych: no auditory or visual hallucinations    OBJECTIVE:     PHYSICAL EXAM:  Resp. rate 16, height 6' 3" (1.905 m), weight (!) 157.1 kg (346 lb 5.5 oz).   General " Appearance: WDWN, NAD  Gait: Abnormal  Neuro/Psych: Mood & affect appropriate  Lungs: Respirations equal and unlabored.   CV: 2+ bilateral upper and lower extremity pulses.   Skin: Intact throughout LE  Extremities: bilateral LE edema with significant varicosities     Right Hip Examination:  Skin: intact with no abnormality  Tenderness:None  ROM: severe pain to groin with passive ROM    Flexion:80   Internal Rotation:10    External Rotation:20   Stinchfield positive to groin    Left Hip Examination:  Skin: intact with no abnormality  Tenderness:None  ROM: mild pain to groin with PROM   Flexion:90   Internal Rotation:15    External Rotation:30   Stinchfield negative     Back Exam:   Tenderness:None  Straight Leg Raise:    Right:Negative   Left:Negative    Left Shoulder Exam:   No TTP of shoulder   AROM in FF to 70 degrees   AROM in abduction to 60 degrees   PROM nearly full and with no pain  Lack of sensation to deltoid    RADIOGRAPHS: AP, lateral hip x-rays from 8/22 reviewed revealing advanced degenerative changes of right hip; no fracture     ASSESSMENT/PLAN:   Osteoarthritis right hip  - Discussed degenerative hip and total hip replacement  - Pt would benefit from losing weight. Will try walking in swimming pool and dietary changes  - Tylenol prn  - Patient has upcoming appointment with his cardiologist. He will discuss possibility of total hip surgery  - MRI left shoulder. Patient has severe limitation in ROM that is not due to pain. He likely has large rotator cuff tear. I will call him with results.   - Patient will contact me if he has questions or if pain worsens.

## 2017-09-12 ENCOUNTER — PATIENT MESSAGE (OUTPATIENT)
Dept: FAMILY MEDICINE | Facility: CLINIC | Age: 52
End: 2017-09-12

## 2017-09-12 DIAGNOSIS — M25.559 ARTHRALGIA OF HIP, UNSPECIFIED LATERALITY: Primary | ICD-10-CM

## 2017-09-12 RX ORDER — TRAMADOL HYDROCHLORIDE 50 MG/1
50 TABLET ORAL EVERY 12 HOURS PRN
Qty: 20 TABLET | Refills: 0 | Status: SHIPPED | OUTPATIENT
Start: 2017-09-12 | End: 2017-09-22

## 2017-09-13 ENCOUNTER — PATIENT MESSAGE (OUTPATIENT)
Dept: FAMILY MEDICINE | Facility: CLINIC | Age: 52
End: 2017-09-13

## 2017-09-15 ENCOUNTER — HOSPITAL ENCOUNTER (EMERGENCY)
Facility: HOSPITAL | Age: 52
Discharge: HOME | End: 2017-09-15
Attending: EMERGENCY MEDICINE
Payer: MEDICARE

## 2017-09-15 VITALS
BODY MASS INDEX: 39.17 KG/M2 | SYSTOLIC BLOOD PRESSURE: 128 MMHG | WEIGHT: 315 LBS | HEIGHT: 75 IN | HEART RATE: 81 BPM | OXYGEN SATURATION: 99 % | DIASTOLIC BLOOD PRESSURE: 79 MMHG | RESPIRATION RATE: 15 BRPM

## 2017-09-15 DIAGNOSIS — R00.2 PALPITATIONS: Primary | ICD-10-CM

## 2017-09-15 DIAGNOSIS — M25.551 ACUTE HIP PAIN, RIGHT: ICD-10-CM

## 2017-09-15 LAB
ALBUMIN SERPL BCP-MCNC: 3.7 G/DL
ALP SERPL-CCNC: 57 U/L
ALT SERPL W/O P-5'-P-CCNC: 14 U/L
ANION GAP SERPL CALC-SCNC: 8 MMOL/L
AST SERPL-CCNC: 20 U/L
BASOPHILS # BLD AUTO: 0.02 K/UL
BASOPHILS NFR BLD: 0.4 %
BILIRUB SERPL-MCNC: 0.3 MG/DL
BUN SERPL-MCNC: 22 MG/DL
CALCIUM SERPL-MCNC: 9 MG/DL
CHLORIDE SERPL-SCNC: 102 MMOL/L
CO2 SERPL-SCNC: 30 MMOL/L
CREAT SERPL-MCNC: 1.2 MG/DL
D DIMER PPP IA.FEU-MCNC: 0.25 MG/L FEU
DIFFERENTIAL METHOD: ABNORMAL
EOSINOPHIL # BLD AUTO: 0.1 K/UL
EOSINOPHIL NFR BLD: 2.4 %
ERYTHROCYTE [DISTWIDTH] IN BLOOD BY AUTOMATED COUNT: 14.2 %
EST. GFR  (AFRICAN AMERICAN): >60 ML/MIN/1.73 M^2
EST. GFR  (NON AFRICAN AMERICAN): >60 ML/MIN/1.73 M^2
GLUCOSE SERPL-MCNC: 83 MG/DL
HCT VFR BLD AUTO: 40.3 %
HGB BLD-MCNC: 13.4 G/DL
LYMPHOCYTES # BLD AUTO: 1.5 K/UL
LYMPHOCYTES NFR BLD: 34.1 %
MCH RBC QN AUTO: 29.3 PG
MCHC RBC AUTO-ENTMCNC: 33.3 G/DL
MCV RBC AUTO: 88 FL
MONOCYTES # BLD AUTO: 0.5 K/UL
MONOCYTES NFR BLD: 10.9 %
NEUTROPHILS # BLD AUTO: 2.3 K/UL
NEUTROPHILS NFR BLD: 52 %
PLATELET # BLD AUTO: 137 K/UL
PMV BLD AUTO: 12 FL
POTASSIUM SERPL-SCNC: 3.6 MMOL/L
PROT SERPL-MCNC: 7.9 G/DL
RBC # BLD AUTO: 4.58 M/UL
SODIUM SERPL-SCNC: 140 MMOL/L
TROPONIN I SERPL DL<=0.01 NG/ML-MCNC: 0.02 NG/ML
TSH SERPL DL<=0.005 MIU/L-ACNC: 3.1 UIU/ML
WBC # BLD AUTO: 4.51 K/UL

## 2017-09-15 PROCEDURE — 25000003 PHARM REV CODE 250: Performed by: EMERGENCY MEDICINE

## 2017-09-15 PROCEDURE — 93005 ELECTROCARDIOGRAM TRACING: CPT

## 2017-09-15 PROCEDURE — 63600175 PHARM REV CODE 636 W HCPCS: Performed by: EMERGENCY MEDICINE

## 2017-09-15 PROCEDURE — 96375 TX/PRO/DX INJ NEW DRUG ADDON: CPT

## 2017-09-15 PROCEDURE — 85025 COMPLETE CBC W/AUTO DIFF WBC: CPT

## 2017-09-15 PROCEDURE — 99284 EMERGENCY DEPT VISIT MOD MDM: CPT | Mod: 25

## 2017-09-15 PROCEDURE — 84484 ASSAY OF TROPONIN QUANT: CPT

## 2017-09-15 PROCEDURE — 85379 FIBRIN DEGRADATION QUANT: CPT

## 2017-09-15 PROCEDURE — 80053 COMPREHEN METABOLIC PANEL: CPT

## 2017-09-15 PROCEDURE — 84443 ASSAY THYROID STIM HORMONE: CPT

## 2017-09-15 PROCEDURE — 96374 THER/PROPH/DIAG INJ IV PUSH: CPT

## 2017-09-15 RX ORDER — ORPHENADRINE CITRATE 30 MG/ML
60 INJECTION INTRAMUSCULAR; INTRAVENOUS
Status: COMPLETED | OUTPATIENT
Start: 2017-09-15 | End: 2017-09-15

## 2017-09-15 RX ORDER — CYCLOBENZAPRINE HCL 10 MG
10 TABLET ORAL 3 TIMES DAILY PRN
Qty: 15 TABLET | Refills: 0 | Status: SHIPPED | OUTPATIENT
Start: 2017-09-15 | End: 2017-09-20

## 2017-09-15 RX ORDER — HYDROCODONE BITARTRATE AND ACETAMINOPHEN 5; 325 MG/1; MG/1
1 TABLET ORAL EVERY 6 HOURS PRN
Qty: 12 TABLET | Refills: 0 | Status: SHIPPED | OUTPATIENT
Start: 2017-09-15 | End: 2017-09-28 | Stop reason: SDUPTHER

## 2017-09-15 RX ORDER — CYCLOBENZAPRINE HCL 10 MG
10 TABLET ORAL 3 TIMES DAILY PRN
Qty: 15 TABLET | Refills: 0 | Status: SHIPPED | OUTPATIENT
Start: 2017-09-15 | End: 2017-09-15

## 2017-09-15 RX ORDER — KETOROLAC TROMETHAMINE 30 MG/ML
15 INJECTION, SOLUTION INTRAMUSCULAR; INTRAVENOUS
Status: COMPLETED | OUTPATIENT
Start: 2017-09-15 | End: 2017-09-15

## 2017-09-15 RX ADMIN — ORPHENADRINE CITRATE 60 MG: 30 INJECTION INTRAMUSCULAR; INTRAVENOUS at 10:09

## 2017-09-15 RX ADMIN — KETOROLAC TROMETHAMINE 15 MG: 30 INJECTION, SOLUTION INTRAMUSCULAR at 10:09

## 2017-09-15 RX ADMIN — SODIUM CHLORIDE 1000 ML: 0.9 INJECTION, SOLUTION INTRAVENOUS at 10:09

## 2017-09-16 DIAGNOSIS — R00.2 PALPITATIONS: Primary | ICD-10-CM

## 2017-09-16 NOTE — ED PROVIDER NOTES
Encounter Date: 9/15/2017       History     Chief Complaint   Patient presents with    Palpitations     reports palpitations x several days. denies CP. denies SOB. hx of afib. pt. reports recent emotional stress. denies dizziness and light headedness.     CHIEF COMPLAINT: Patient presents with: palpitations and right hip pain   Palpitations: reports palpitations x several days. denies CP. denies SOB. hx of afib. pt. reports recent emotional stress. denies dizziness and light headedness.    HISTORY OF PRESENT ILLNESS: Caio Ontiveros who is a 51 y.o. presents to the emergency department today with complaint of palpitations and left hip pain.  He reports that he started to experience some right-sided hip pain recently.  He was seen by orthopedic surgery and told that he needed a hip replacement.  He was prescribed Ultram for his pain but he reports that it is not working.  When he has increased pain episodes with his right hip he ends up having sensation of palpitations and fast heart rate.  The pain he reports is a sharp pain.  She stormed different than the pain that he has been experiencing.  He has been seen by orthopedic surgery and received an x-ray which showed no signs of fracture or dislocation but chronic arthritic changes requiring likely hip replacement.  His fast heart rate comes on when his pain is present.  He has no chest pain, no shortness of breath, no nausea and no diaphoresis.  He has a history of atrial fibrillation and is currently taking beta blocker and Eliquis for his atrial fibrillation.  He was worried that he might have a repeat episode of his A. Fib.  No recent long travel.  No unilateral leg swelling.  No hemoptysis.    REVIEW OF SYSTEMS:  Constitutional: No fever, no chills.  Eyes: No discharge. No pain.  HENT: No nasal drainage. No ear ache. No sore throat.   Cardiovascular: No chest pain.  Respiratory: No cough, no shortness of breath.  Gastrointestinal: No abdominal pain, no vomiting. No  diarrhea  Genitourinary: No hematuria, dysuria, urgency.  Musculoskeletal: No back pain.  Skin: No rashes, no lesions.  Neurological: No headache, no focal weakness.    ALLERGIES REVIEWED  MEDICATIONS REVIEWED  PMH/PSH/SOC/FH REVIEWED     The history is provided by the patient.    Nursing/Ancillary staff note reviewed.                  Review of patient's allergies indicates:  No Known Allergies  Past Medical History:   Diagnosis Date    A-fib     CHF (congestive heart failure)     Gout, chronic     Hypertension      Past Surgical History:   Procedure Laterality Date    CHOLECYSTECTOMY      GASTRIC BYPASS  2014     No family history on file.  Social History   Substance Use Topics    Smoking status: Never Smoker    Smokeless tobacco: Not on file    Alcohol use Yes      Comment: sometimes     Review of Systems   All other systems reviewed and are negative.      Physical Exam     Initial Vitals [09/15/17 2056]   BP Pulse Resp Temp SpO2   (!) 145/101 105 20 -- 99 %      MAP       115.67         Physical Exam    Nursing note and vitals reviewed.  Constitutional: He appears well-developed and well-nourished. He is not diaphoretic. No distress.   HENT:   Head: Normocephalic and atraumatic.   Nose: Nose normal.   Mouth/Throat: Oropharynx is clear and moist.   Eyes: Conjunctivae and EOM are normal. Pupils are equal, round, and reactive to light. No scleral icterus.   Neck: Normal range of motion. Neck supple. No JVD present.   Cardiovascular: Normal rate, regular rhythm and normal heart sounds. Exam reveals no gallop and no friction rub.    No murmur heard.  Pulmonary/Chest: Breath sounds normal. No stridor. No respiratory distress. He has no wheezes. He exhibits no tenderness.   Abdominal: Soft. Bowel sounds are normal. He exhibits no distension and no mass. There is no tenderness. There is no rebound and no guarding.   Musculoskeletal: Normal range of motion. He exhibits no edema or tenderness.   No deformity  noted to the hip.  Nontender to palpation.  Full range of motion.   Lymphadenopathy:     He has no cervical adenopathy.   Neurological: He is alert and oriented to person, place, and time. He has normal strength. No cranial nerve deficit.   Skin: Skin is warm and dry. No rash noted. No pallor.   Psychiatric: He has a normal mood and affect. Thought content normal.         ED Course   Procedures  Labs Reviewed   CBC W/ AUTO DIFFERENTIAL - Abnormal; Notable for the following:        Result Value    RBC 4.58 (*)     Hemoglobin 13.4 (*)     Platelets 137 (*)     All other components within normal limits   COMPREHENSIVE METABOLIC PANEL - Abnormal; Notable for the following:     CO2 30 (*)     BUN, Bld 22 (*)     All other components within normal limits   TSH   TROPONIN I   D DIMER, QUANTITATIVE   D DIMER, QUANTITATIVE     EKG Readings: (Independently Interpreted)   Previous EKG: Compared with most recent EKG Previous EKG Date: 12/19/2012.   98 bpm, normal sinus rhythm with sinus arrhythmia, nonspecific ST changes, prolonged QT.        X-Rays: Other Radiology Reports: Images reviewed by myself, read by radiology,     Imaging Results          X-Ray Chest 1 View (Final result)  Result time 09/15/17 22:15:46    Final result by Martita Meier MD (09/15/17 22:15:46)                 Impression:      Mild cardiomegaly with no acute cardiopulmonary process identified.      Electronically signed by: MARTITA MEIER MD  Date:     09/15/17  Time:    22:15              Narrative:    Chest AP single view.  Comparison: 12/2012.    Examination limited by body habitus.  There is mild cardiomegaly.  Lungs are hypoinflated which accentuates pulmonary vascular markings.  No evidence of focal consolidative process, pneumothorax, or large effusion.  Bones appear intact.                              Medical Decision Making:   History:   Old Records Summarized: records from clinic visits.       <> Summary of Records: The patient has been in  contact with his primary care physician regarding the pain in his hip.  He did have some pain medication called in but it is not working.  Differential Diagnosis:   SVT, A. fib, a flutter, electrolyte abnormalities, hyperthyroidism, anxiety.      Independently Interpreted Test(s):   I have ordered and independently interpreted EKG Reading(s) - see prior notes  Clinical Tests:   Lab Tests: Ordered and Reviewed  Medical Tests: Ordered and Reviewed  ED Management:  This is a 51-year-old gentleman who presents to the emergency department stay with complaint of hip pain and palpitations.  He has received a workup today that does not show any signs of arrhythmia, no signs of electrolyte abnormality, d-dimer normal unlikely to be caused by PE.  Likely is experiencing these episodes of tachycardia secondary to pain.  We'll go ahead and write a prescription for some aching control form Norco and he will follow-up with his PCP for further medication and management of this pain.  The patient is comfortable with this plan and comfortable going home at this time. After taking into careful account the historical factors and physical exam findings of the patient's presentation today, in conjunction with the empirical and objective data obtained on ED workup, no acute emergent medical condition has been identified. The patient appears to be low risk for an emergent medical condition and I feel it is safe and appropriate at this time for the patient to be discharged to follow-up as detailed in their discharge instructions for reevaluation and possible continued outpatient workup and management. I have discussed the specifics of the workup with the patient and the patient has verbalized understanding of the details of the workup, the diagnosis, the treatment plan, and the need for outpatient follow-up.  Although the patient has no emergent etiology today this does not preclude the development of an emergent condition so in addition,  I have advised the patient that they can return to the ED and/or activate EMS at any time with worsening of their symptoms, change of their symptoms, or with any other medical complaint.  The patient remained comfortable and stable during their visit in the ED.  Discharge and follow-up instructions discussed with the patient who expressed understanding and willingness to comply with my recommendations.     This medical record was prepared using voice dictation software. There may be phonetic errors.                   ED Course    2300 the patient is feeling improved following his medications.  I discussed results of the workup with the patient.  At this time he does not have any signs of arrhythmia.  Likely his palpitations are occurring due to his pain.  We'll go ahead and treat him with a small amount of pain medication and have him follow-up with his orthopedic surgeon or primary care physician if he needs more.  The patient is comfortable with this plan and comfortable going home at this time. Patient improved with treatment in the emergency department and comfortable going home. Discussed reasons to return and importance of followup.  Patient understands that the emergency visit today is primarily to address immediate concerns and to rule out emergent cause of symptoms and that they may require further workup and evaluation as an outpatient. All questions addressed and patient given discharge instructions and followup information.         Clinical Impression:   The primary encounter diagnosis was Palpitations. A diagnosis of Acute hip pain, right was also pertinent to this visit.                           Juli Carreno MD  09/15/17 1055

## 2017-09-16 NOTE — ED NOTES
Patient has verified the spelling of their name and  on armband  LOC: The patient is awake, alert, and aware of environment with an appropriate affect, the patient is oriented x 4 and speaking appropriately,dizzines in am when he wakes up per pt.   APPEARANCE: Patient resting comfortably and in no acute distress, patient is clean and well groomed, patient's clothing is properly fastened.   SKIN: The skin is warm and dry, color consistent with ethnicity, BLE with old scarring noted  : Voids without difficulty  MUSCULOSKELETAL: Patient moving all extremities spontaneously, no obvious swelling or deformities noted, BLE edema.   RESPIRATORY: Airway is open and patent, respirations are spontaneous, patient has a normal effort and rate, no accessory muscle use noted, bilateral breath sounds clear, denies SOB   ABDOMEN: Soft and non tender to palpation, no distention noted, normoactive bowel sounds present in all four quadrants, constipation pt takes over the counter stool softner, denies nausea or vomiting at this time.   CARDIAC: Normal rate and rhythm, no peripheral edema noted, less then 3 second capillary refill, denies chest pain  COMPLAINT:heart racing

## 2017-09-16 NOTE — DISCHARGE INSTRUCTIONS
Take your medications as prescribed.  Follow up with your primary care physician for further management of your pain. If you continue to have palpitations please follow up with your cardiologist. Please refer to the additional material providing further information including when return to the emergency department.

## 2017-09-26 ENCOUNTER — PATIENT MESSAGE (OUTPATIENT)
Dept: FAMILY MEDICINE | Facility: CLINIC | Age: 52
End: 2017-09-26

## 2017-09-26 NOTE — TELEPHONE ENCOUNTER
Spoke to pt and was requesting Perez to call his pharmacy to authorize a refill on his Colchicine that was prescribed by another physician. Informed pt that he has to call that doctor office to authorize a refill on his medication.

## 2017-09-27 RX ORDER — LOSARTAN POTASSIUM AND HYDROCHLOROTHIAZIDE 25; 100 MG/1; MG/1
1 TABLET ORAL DAILY
COMMUNITY
Start: 2015-09-24 | End: 2020-01-27

## 2017-09-27 RX ORDER — POTASSIUM CHLORIDE 20 MEQ/1
20 TABLET, EXTENDED RELEASE ORAL DAILY
Status: ON HOLD | COMMUNITY
End: 2021-11-25 | Stop reason: HOSPADM

## 2017-09-27 RX ORDER — COLCHICINE 0.6 MG/1
0.6 TABLET ORAL
COMMUNITY
Start: 2016-11-09 | End: 2018-04-26 | Stop reason: SDUPTHER

## 2017-09-27 RX ORDER — FUROSEMIDE 40 MG/1
80 TABLET ORAL 2 TIMES DAILY
COMMUNITY
End: 2018-03-26 | Stop reason: SDUPTHER

## 2017-09-28 ENCOUNTER — HOSPITAL ENCOUNTER (OUTPATIENT)
Dept: RADIOLOGY | Facility: HOSPITAL | Age: 52
Discharge: HOME | End: 2017-09-28
Attending: FAMILY MEDICINE
Payer: MEDICARE

## 2017-09-28 ENCOUNTER — OFFICE VISIT (OUTPATIENT)
Dept: FAMILY MEDICINE | Facility: CLINIC | Age: 52
End: 2017-09-28
Attending: FAMILY MEDICINE
Payer: MEDICARE

## 2017-09-28 VITALS
HEART RATE: 76 BPM | WEIGHT: 315 LBS | BODY MASS INDEX: 39.17 KG/M2 | OXYGEN SATURATION: 98 % | DIASTOLIC BLOOD PRESSURE: 86 MMHG | HEIGHT: 75 IN | SYSTOLIC BLOOD PRESSURE: 126 MMHG

## 2017-09-28 DIAGNOSIS — M79.674 GREAT TOE PAIN, RIGHT: ICD-10-CM

## 2017-09-28 DIAGNOSIS — M10.9 ACUTE GOUT INVOLVING TOE OF RIGHT FOOT, UNSPECIFIED CAUSE: Primary | ICD-10-CM

## 2017-09-28 DIAGNOSIS — M10.9 ACUTE GOUT INVOLVING TOE OF RIGHT FOOT, UNSPECIFIED CAUSE: ICD-10-CM

## 2017-09-28 PROCEDURE — 99214 OFFICE O/P EST MOD 30 MIN: CPT | Mod: S$GLB,,, | Performed by: FAMILY MEDICINE

## 2017-09-28 PROCEDURE — 73630 X-RAY EXAM OF FOOT: CPT | Mod: TC,RT

## 2017-09-28 PROCEDURE — 73630 X-RAY EXAM OF FOOT: CPT | Mod: 26,RT,, | Performed by: RADIOLOGY

## 2017-09-28 PROCEDURE — 3074F SYST BP LT 130 MM HG: CPT | Mod: S$GLB,,, | Performed by: FAMILY MEDICINE

## 2017-09-28 PROCEDURE — 3008F BODY MASS INDEX DOCD: CPT | Mod: S$GLB,,, | Performed by: FAMILY MEDICINE

## 2017-09-28 PROCEDURE — 99999 PR PBB SHADOW E&M-EST. PATIENT-LVL III: CPT | Mod: PBBFAC,,, | Performed by: FAMILY MEDICINE

## 2017-09-28 PROCEDURE — 3079F DIAST BP 80-89 MM HG: CPT | Mod: S$GLB,,, | Performed by: FAMILY MEDICINE

## 2017-09-28 RX ORDER — HYDROCODONE BITARTRATE AND ACETAMINOPHEN 5; 325 MG/1; MG/1
1 TABLET ORAL EVERY 8 HOURS PRN
Qty: 15 TABLET | Refills: 0 | Status: SHIPPED | OUTPATIENT
Start: 2017-09-28 | End: 2018-01-17 | Stop reason: SDUPTHER

## 2017-09-28 RX ORDER — PREDNISONE 50 MG/1
50 TABLET ORAL DAILY
Qty: 5 TABLET | Refills: 0 | Status: SHIPPED | OUTPATIENT
Start: 2017-09-28 | End: 2017-10-03

## 2017-09-28 NOTE — PROGRESS NOTES
Subjective:       Patient ID: Caio Ontiveros is a 51 y.o. male.    Chief Complaint: Gout and Hip Pain (Reoccuring)    51 yr old pleasant black male with AFIB paroxysmal, CHF w/unknown EF, HTN, pulmonary HTN, varicose veins, EVANGELIST, presents today for evaluation of right great toe and right foot pain. Onset 2-3 days ago and rapidly worsening. He has history of gout and had flare ups and usually treats with colchicine however this time it did not get any better. He reports eating pork ribs right before the onset of symptoms. He denies drinking excess alcohol or eating sea food. His last uric acid levels unknown. Details as follows -      History as below - reviewed       Toe Pain    There was no injury mechanism. The pain is present in the right toes. The quality of the pain is described as aching. The pain is at a severity of 9/10. The pain is severe. The pain has been constant since onset. Associated symptoms include an inability to bear weight. The symptoms are aggravated by palpation, movement and weight bearing. He has tried heat and NSAIDs for the symptoms. The treatment provided no relief.     Review of Systems   Constitutional: Positive for activity change. Negative for diaphoresis and unexpected weight change.   HENT: Negative.  Negative for congestion, ear pain, hearing loss, mouth sores, rhinorrhea, trouble swallowing and voice change.    Eyes: Negative.  Negative for pain, discharge and visual disturbance.   Respiratory: Positive for chest tightness. Negative for apnea, cough and wheezing.    Cardiovascular: Positive for palpitations. Negative for chest pain.   Gastrointestinal: Positive for constipation. Negative for abdominal distention, anal bleeding, blood in stool, diarrhea and vomiting.   Endocrine: Negative.  Negative for cold intolerance, polydipsia and polyuria.   Genitourinary: Negative.  Negative for decreased urine volume, difficulty urinating, discharge, frequency, hematuria, scrotal swelling and  urgency.   Musculoskeletal: Positive for arthralgias and joint swelling. Negative for back pain, myalgias, neck pain and neck stiffness.   Skin: Negative.  Negative for color change and rash.   Allergic/Immunologic: Negative.  Negative for environmental allergies and immunocompromised state.   Neurological: Positive for weakness. Negative for dizziness, speech difficulty, light-headedness and headaches.   Hematological: Negative.    Psychiatric/Behavioral: Positive for dysphoric mood. Negative for agitation, confusion and suicidal ideas. The patient is not nervous/anxious.        PMH/PSH/FH/SH/MED/ALLERGY reviewed    Objective:       Vitals:    09/28/17 0954   BP: 126/86   Pulse: 76       Physical Exam   Constitutional: He is oriented to person, place, and time. He appears well-developed and well-nourished.   HENT:   Head: Normocephalic and atraumatic.   Right Ear: External ear normal.   Left Ear: External ear normal.   Nose: Nose normal.   Mouth/Throat: Oropharynx is clear and moist. No oropharyngeal exudate.   Eyes: Conjunctivae and EOM are normal. Pupils are equal, round, and reactive to light. Right eye exhibits no discharge. Left eye exhibits no discharge. No scleral icterus.   Neck: Normal range of motion. Neck supple. No JVD present. No tracheal deviation present. No thyromegaly present.   Cardiovascular: Normal rate, regular rhythm, normal heart sounds and intact distal pulses.  Exam reveals no gallop and no friction rub.    No murmur heard.  Pulmonary/Chest: Effort normal and breath sounds normal. No stridor. No respiratory distress. He has no wheezes. He has no rales. He exhibits no tenderness.   Abdominal: Soft. Bowel sounds are normal. He exhibits no distension and no mass. There is no tenderness. There is no rebound and no guarding. No hernia.   Musculoskeletal: Normal range of motion. He exhibits tenderness (right great toe and 1st MTP is severely tender, swollen and walks with pain). He exhibits no  edema.   Lymphadenopathy:     He has no cervical adenopathy.   Neurological: He is alert and oriented to person, place, and time. He has normal reflexes. He displays normal reflexes. No cranial nerve deficit. He exhibits normal muscle tone. Coordination normal.   Skin: Skin is warm and dry. No rash noted. No erythema. No pallor.   Psychiatric: He has a normal mood and affect. His behavior is normal. Judgment and thought content normal.       Assessment:       1. Acute gout involving toe of right foot, unspecified cause    2. Great toe pain, right        Plan:       Caio was seen today for gout and hip pain.    Diagnoses and all orders for this visit:    Acute gout involving toe of right foot, unspecified cause  -     Uric acid; Future  -     X-Ray Foot Complete Right; Future  -     predniSONE (DELTASONE) 50 MG Tab; Take 1 tablet (50 mg total) by mouth once daily.  -     hydrocodone-acetaminophen 5-325mg (NORCO) 5-325 mg per tablet; Take 1 tablet by mouth every 8 (eight) hours as needed for Pain.    Great toe pain, right  -     Uric acid; Future  -     X-Ray Foot Complete Right; Future  -     predniSONE (DELTASONE) 50 MG Tab; Take 1 tablet (50 mg total) by mouth once daily.  -     hydrocodone-acetaminophen 5-325mg (NORCO) 5-325 mg per tablet; Take 1 tablet by mouth every 8 (eight) hours as needed for Pain.      Acute gout/great toe pain right  -likely acute gout  -ice application, low purine diet  -x ray and uric acid levels  -prednisone x 5 days    Spent adequate time in obtaining history and explaining differentials    40 minutes spent during this visit of which greater than 50% devoted to face-face counseling and coordination of care regarding diagnosis and management plan    Return in about 3 months (around 12/28/2017), or if symptoms worsen or fail to improve.

## 2017-10-04 ENCOUNTER — PATIENT MESSAGE (OUTPATIENT)
Dept: FAMILY MEDICINE | Facility: CLINIC | Age: 52
End: 2017-10-04

## 2017-10-21 ENCOUNTER — PATIENT MESSAGE (OUTPATIENT)
Dept: FAMILY MEDICINE | Facility: CLINIC | Age: 52
End: 2017-10-21

## 2017-10-23 ENCOUNTER — PATIENT MESSAGE (OUTPATIENT)
Dept: FAMILY MEDICINE | Facility: CLINIC | Age: 52
End: 2017-10-23

## 2017-10-24 ENCOUNTER — OFFICE VISIT (OUTPATIENT)
Dept: FAMILY MEDICINE | Facility: CLINIC | Age: 52
End: 2017-10-24
Attending: FAMILY MEDICINE
Payer: MEDICARE

## 2017-10-24 VITALS
OXYGEN SATURATION: 97 % | SYSTOLIC BLOOD PRESSURE: 117 MMHG | HEART RATE: 100 BPM | WEIGHT: 315 LBS | BODY MASS INDEX: 39.17 KG/M2 | HEIGHT: 75 IN | DIASTOLIC BLOOD PRESSURE: 80 MMHG

## 2017-10-24 DIAGNOSIS — M1A.00X0 IDIOPATHIC CHRONIC GOUT WITHOUT TOPHUS, UNSPECIFIED SITE: ICD-10-CM

## 2017-10-24 DIAGNOSIS — I10 ESSENTIAL HYPERTENSION: ICD-10-CM

## 2017-10-24 DIAGNOSIS — E66.01 MORBID OBESITY: ICD-10-CM

## 2017-10-24 DIAGNOSIS — G47.30 SLEEP APNEA, UNSPECIFIED TYPE: ICD-10-CM

## 2017-10-24 DIAGNOSIS — E66.2 OBESITY HYPOVENTILATION SYNDROME: ICD-10-CM

## 2017-10-24 DIAGNOSIS — F33.1 MODERATE EPISODE OF RECURRENT MAJOR DEPRESSIVE DISORDER: Primary | ICD-10-CM

## 2017-10-24 DIAGNOSIS — E79.0 HYPERURICEMIA: ICD-10-CM

## 2017-10-24 DIAGNOSIS — I27.20 PULMONARY HYPERTENSION: ICD-10-CM

## 2017-10-24 DIAGNOSIS — I48.0 PAROXYSMAL ATRIAL FIBRILLATION: ICD-10-CM

## 2017-10-24 PROBLEM — F33.9 DEPRESSION, MAJOR, RECURRENT: Status: ACTIVE | Noted: 2017-10-24

## 2017-10-24 PROBLEM — M1A.9XX0 CHRONIC GOUT: Status: ACTIVE | Noted: 2017-10-24

## 2017-10-24 PROCEDURE — 99499 UNLISTED E&M SERVICE: CPT | Mod: S$PBB,,, | Performed by: FAMILY MEDICINE

## 2017-10-24 PROCEDURE — 99214 OFFICE O/P EST MOD 30 MIN: CPT | Mod: S$GLB,,, | Performed by: FAMILY MEDICINE

## 2017-10-24 PROCEDURE — 99999 PR PBB SHADOW E&M-EST. PATIENT-LVL III: CPT | Mod: PBBFAC,,, | Performed by: FAMILY MEDICINE

## 2017-10-24 RX ORDER — VENLAFAXINE HYDROCHLORIDE 37.5 MG/1
37.5 CAPSULE, EXTENDED RELEASE ORAL DAILY
Qty: 30 CAPSULE | Refills: 2 | Status: SHIPPED | OUTPATIENT
Start: 2017-10-24 | End: 2017-11-20

## 2017-10-24 NOTE — PROGRESS NOTES
Subjective:       Patient ID: Caio Ontiveros is a 52 y.o. male.    Chief Complaint: Gout and Leg Pain (Right leg)    52 yr old pleasant black male with AFIB paroxysmal, CHF w/unknown EF, HTN, pulmonary HTN, varicose veins, EVANGELIST, presents today for evaluation of right great toe and right foot pain and depression.       Depression - worsening - he is angry and depressed and don't want to talk nobody - sleep issues and anhedonia - no SI/HI - want to be on some medicine       Right great toe pain: Onset 2-3 days ago and rapidly worsening. He has history of gout and had flare ups and usually treats with colchicine however this time it did not get any better. He reports eating pork ribs right before the onset of symptoms. He denies drinking excess alcohol or eating sea food. His last uric acid levels unknown. Details as follows -      History as below - reviewed       Toe Pain    There was no injury mechanism. The pain is present in the right toes. The quality of the pain is described as aching. The pain is at a severity of 9/10. The pain is severe. The pain has been constant since onset. Associated symptoms include an inability to bear weight. The symptoms are aggravated by palpation, movement and weight bearing. He has tried heat and NSAIDs for the symptoms. The treatment provided no relief.   Nausea   This is a new problem. The current episode started today. The problem occurs constantly. The problem has been unchanged. Associated symptoms include arthralgias and myalgias. Pertinent negatives include no chest pain, congestion, coughing, diaphoresis, nausea, rash, vomiting or weakness. Nothing aggravates the symptoms. He has tried nothing for the symptoms. The treatment provided no relief.   Hypertension   This is a chronic problem. The current episode started more than 1 year ago. The problem has been gradually improving since onset. The problem is controlled. Pertinent negatives include no chest pain or palpitations. There  are no associated agents to hypertension. Past treatments include angiotensin blockers, diuretics, beta blockers and calcium channel blockers. The current treatment provides moderate improvement. There are no compliance problems.  Hypertensive end-organ damage includes heart failure and left ventricular hypertrophy. There is no history of angina, CAD/MI, CVA, renovascular disease or a thyroid problem. Identifiable causes of hypertension include sleep apnea. There is no history of coarctation of the aorta, hypercortisolism, hyperparathyroidism or pheochromocytoma.   Congestive Heart Failure   Presents for initial visit. The disease course has been stable. Pertinent negatives include no chest pain, palpitations or unexpected weight change. The symptoms have been stable. Past treatments include angiotensin receptor blockers, beta blockers and salt and fluid restriction (diuretics). The treatment provided moderate relief. Compliance with prior treatments has been good. There is no history of anemia, CAD, CVA, DM, DVT, hyperthyroidism, myocarditis or PE.     Review of Systems   Constitutional: Negative.  Negative for activity change, diaphoresis and unexpected weight change.   HENT: Negative.  Negative for congestion, ear pain, mouth sores, rhinorrhea and voice change.    Eyes: Negative.  Negative for pain, discharge and visual disturbance.   Respiratory: Negative.  Negative for apnea, cough and wheezing.    Cardiovascular: Negative.  Negative for chest pain and palpitations.   Gastrointestinal: Negative.  Negative for abdominal distention, anal bleeding, diarrhea, nausea and vomiting.   Endocrine: Negative.  Negative for cold intolerance and polyuria.   Genitourinary: Negative.  Negative for decreased urine volume, difficulty urinating, discharge, frequency and scrotal swelling.   Musculoskeletal: Positive for arthralgias and myalgias. Negative for back pain and neck stiffness.   Skin: Negative.  Negative for color  change and rash.   Allergic/Immunologic: Negative.  Negative for environmental allergies and immunocompromised state.   Neurological: Negative.  Negative for dizziness, speech difficulty, weakness and light-headedness.   Hematological: Negative.    Psychiatric/Behavioral: Positive for decreased concentration, dysphoric mood and sleep disturbance. Negative for agitation and suicidal ideas. The patient is not nervous/anxious.        PMH/PSH/FH/SH/MED/ALLERGY reviewed    Objective:       Vitals:    10/24/17 1118   BP: 117/80   Pulse: 100       Physical Exam   Constitutional: He is oriented to person, place, and time. He appears well-developed and well-nourished.   HENT:   Head: Normocephalic and atraumatic.   Right Ear: External ear normal.   Left Ear: External ear normal.   Nose: Nose normal.   Mouth/Throat: Oropharynx is clear and moist. No oropharyngeal exudate.   Eyes: Conjunctivae and EOM are normal. Pupils are equal, round, and reactive to light. Right eye exhibits no discharge. Left eye exhibits no discharge. No scleral icterus.   Neck: Normal range of motion. Neck supple. No JVD present. No tracheal deviation present. No thyromegaly present.   Cardiovascular: Normal rate, regular rhythm, normal heart sounds and intact distal pulses.  Exam reveals no gallop and no friction rub.    No murmur heard.  Pulmonary/Chest: Effort normal and breath sounds normal. No stridor. No respiratory distress. He has no wheezes. He has no rales. He exhibits no tenderness.   Abdominal: Soft. Bowel sounds are normal. He exhibits no distension and no mass. There is no tenderness. There is no rebound and no guarding. No hernia.   Musculoskeletal: Normal range of motion. He exhibits tenderness (right great toe and 1st MTP is severely tender, swollen and walks with pain). He exhibits no edema.   Lymphadenopathy:     He has no cervical adenopathy.   Neurological: He is alert and oriented to person, place, and time. He has normal reflexes.  He displays normal reflexes. No cranial nerve deficit. He exhibits normal muscle tone. Coordination normal.   Skin: Skin is warm and dry. No rash noted. No erythema. No pallor.   Psychiatric: His speech is normal and behavior is normal. Judgment and thought content normal. Cognition and memory are normal. He exhibits a depressed mood.       Assessment:       1. Moderate episode of recurrent major depressive disorder    2. Paroxysmal atrial fibrillation    3. Essential hypertension    4. Pulmonary hypertension    5. Morbid obesity    6. Sleep apnea, unspecified type    7. Obesity hypoventilation syndrome    8. Idiopathic chronic gout without tophus, unspecified site    9. Hyperuricemia        Plan:       Caio was seen today for gout and leg pain.    Diagnoses and all orders for this visit:    Moderate episode of recurrent major depressive disorder  -     venlafaxine (EFFEXOR-XR) 37.5 MG 24 hr capsule; Take 1 capsule (37.5 mg total) by mouth once daily.    Paroxysmal atrial fibrillation    Essential hypertension    Pulmonary hypertension    Morbid obesity    Sleep apnea, unspecified type    Obesity hypoventilation syndrome    Idiopathic chronic gout without tophus, unspecified site    Hyperuricemia      Major depression  -lifestyle and behavior modification  -psychology and psychiatry options discussed  -trial of Effexor daily. Side effects of medications have been discussed and patient agreed to proceed with treatment and understands the risks and benefits.  -SI/ER precautions given    Acute gout/great toe pain right/hyperuricemia  -likely acute gout  -ice application, low purine diet    AFIB  -paroxysmal  -on rate and rhythm control - on BB    CHF  -EF normal  -follows cardiology at [place]    Left shoulder pain/rotator cuff syndrome  -follows Ortho    HTN  -controlled    Obesity  -diet/exercise  -weight loss    Spent adequate time in obtaining history and explaining differentials    40 minutes spent during this  visit of which greater than 50% devoted to face-face counseling and coordination of care regarding diagnosis and management plan    Return in about 4 weeks (around 11/21/2017), or if symptoms worsen or fail to improve.

## 2017-11-06 ENCOUNTER — PATIENT MESSAGE (OUTPATIENT)
Dept: FAMILY MEDICINE | Facility: CLINIC | Age: 52
End: 2017-11-06

## 2017-11-07 ENCOUNTER — PATIENT MESSAGE (OUTPATIENT)
Dept: FAMILY MEDICINE | Facility: CLINIC | Age: 52
End: 2017-11-07

## 2017-11-08 ENCOUNTER — TELEPHONE (OUTPATIENT)
Dept: FAMILY MEDICINE | Facility: CLINIC | Age: 52
End: 2017-11-08

## 2017-11-08 NOTE — TELEPHONE ENCOUNTER
For back pain - there was no documentation coz he was seen mostly for gout and toe pain - may need a visit for that

## 2017-11-08 NOTE — TELEPHONE ENCOUNTER
----- Message from Leonora Bonilla sent at 11/8/2017  8:33 AM CST -----  José Miguel with [facility] [employer] called.   No. [phone]    Please fax order for back brace and clinical notes.   Fax no. [phone]

## 2017-11-08 NOTE — TELEPHONE ENCOUNTER
Informed pt and José Miguel at [facility] that there is no documentation for back pain and pt needs to schedule an appt with Dr. Alcantar.

## 2017-11-20 ENCOUNTER — OFFICE VISIT (OUTPATIENT)
Dept: FAMILY MEDICINE | Facility: CLINIC | Age: 52
End: 2017-11-20
Attending: FAMILY MEDICINE
Payer: MEDICARE

## 2017-11-20 VITALS
WEIGHT: 315 LBS | SYSTOLIC BLOOD PRESSURE: 116 MMHG | DIASTOLIC BLOOD PRESSURE: 83 MMHG | BODY MASS INDEX: 39.17 KG/M2 | HEIGHT: 75 IN | OXYGEN SATURATION: 98 % | HEART RATE: 78 BPM

## 2017-11-20 DIAGNOSIS — I10 ESSENTIAL HYPERTENSION: ICD-10-CM

## 2017-11-20 DIAGNOSIS — E66.01 MORBID OBESITY: ICD-10-CM

## 2017-11-20 DIAGNOSIS — I48.0 PAROXYSMAL ATRIAL FIBRILLATION: ICD-10-CM

## 2017-11-20 DIAGNOSIS — F33.1 MODERATE EPISODE OF RECURRENT MAJOR DEPRESSIVE DISORDER: Primary | ICD-10-CM

## 2017-11-20 DIAGNOSIS — I50.42 CHRONIC COMBINED SYSTOLIC AND DIASTOLIC CONGESTIVE HEART FAILURE: ICD-10-CM

## 2017-11-20 DIAGNOSIS — I50.33 DIASTOLIC CHF, ACUTE ON CHRONIC: ICD-10-CM

## 2017-11-20 DIAGNOSIS — G47.30 SLEEP APNEA, UNSPECIFIED TYPE: ICD-10-CM

## 2017-11-20 DIAGNOSIS — E79.0 HYPERURICEMIA: ICD-10-CM

## 2017-11-20 DIAGNOSIS — I27.20 PULMONARY HYPERTENSION: ICD-10-CM

## 2017-11-20 PROCEDURE — 99214 OFFICE O/P EST MOD 30 MIN: CPT | Mod: S$GLB,,, | Performed by: FAMILY MEDICINE

## 2017-11-20 PROCEDURE — 99999 PR PBB SHADOW E&M-EST. PATIENT-LVL III: CPT | Mod: PBBFAC,,, | Performed by: FAMILY MEDICINE

## 2017-11-20 PROCEDURE — 99499 UNLISTED E&M SERVICE: CPT | Mod: S$PBB,,, | Performed by: FAMILY MEDICINE

## 2017-11-20 RX ORDER — DULOXETIN HYDROCHLORIDE 60 MG/1
60 CAPSULE, DELAYED RELEASE ORAL DAILY
Qty: 30 CAPSULE | Refills: 2 | Status: SHIPPED | OUTPATIENT
Start: 2017-11-20 | End: 2017-12-18

## 2017-11-20 RX ORDER — CLONAZEPAM 0.5 MG/1
0.5 TABLET ORAL NIGHTLY
Qty: 30 TABLET | Refills: 0 | Status: SHIPPED | OUTPATIENT
Start: 2017-11-20 | End: 2019-04-05

## 2017-11-20 NOTE — PROGRESS NOTES
Subjective:       Patient ID: Caio Ontiveros is a 52 y.o. male.    Chief Complaint: Low-back Pain and Eye Redness (Left Eye)    52 yr old pleasant black male with AFIB paroxysmal, CHF w/unknown EF, HTN, pulmonary HTN, varicose veins, EVANGELIST, presents today for evaluation of LBP, right hip pain and depression.      Depression - worsening - he is angry and depressed and don't want to talk nobody - sleep issues and anhedonia - no SI/HI - started on effexor 75 mg and it did not help    LBP and right hip pain - seen Ortho and they recommend hip replacement - depression is worse and also has low back pain - no neurological symptoms, salddle anesthesia, bladder or bowel problems -       Right great toe pain: Onset 2-3 days ago and rapidly worsening. He has history of gout and had flare ups and usually treats with colchicine however this time it did not get any better. He reports eating pork ribs right before the onset of symptoms. He denies drinking excess alcohol or eating sea food. His last uric acid levels unknown. Details as follows -      History as below - reviewed       Low-back Pain   This is a chronic problem. The current episode started more than 1 month ago. The problem occurs intermittently. The problem has been unchanged. Associated symptoms include arthralgias and myalgias. Pertinent negatives include no chest pain, congestion, coughing, diaphoresis, nausea, rash, vomiting or weakness. Nothing aggravates the symptoms. He has tried nothing for the symptoms. The treatment provided no relief.   Toe Pain    There was no injury mechanism. The pain is present in the right toes. The quality of the pain is described as aching. The pain is at a severity of 9/10. The pain is severe. The pain has been constant since onset. Associated symptoms include an inability to bear weight. The symptoms are aggravated by palpation, movement and weight bearing. He has tried heat and NSAIDs for the symptoms. The treatment provided no  relief.   Nausea   This is a new problem. The current episode started today. The problem occurs constantly. The problem has been unchanged. Associated symptoms include arthralgias and myalgias. Pertinent negatives include no chest pain, congestion, coughing, diaphoresis, nausea, rash, vomiting or weakness. Nothing aggravates the symptoms. He has tried nothing for the symptoms. The treatment provided no relief.   Hypertension   This is a chronic problem. The current episode started more than 1 year ago. The problem has been gradually improving since onset. The problem is controlled. Pertinent negatives include no chest pain or palpitations. There are no associated agents to hypertension. Past treatments include angiotensin blockers, diuretics, beta blockers and calcium channel blockers. The current treatment provides moderate improvement. There are no compliance problems.  Hypertensive end-organ damage includes heart failure and left ventricular hypertrophy. There is no history of angina, CAD/MI, CVA, renovascular disease or a thyroid problem. Identifiable causes of hypertension include sleep apnea. There is no history of coarctation of the aorta, hypercortisolism, hyperparathyroidism or pheochromocytoma.   Congestive Heart Failure   Presents for initial visit. The disease course has been stable. Pertinent negatives include no chest pain, palpitations or unexpected weight change. The symptoms have been stable. Past treatments include angiotensin receptor blockers, beta blockers and salt and fluid restriction (diuretics). The treatment provided moderate relief. Compliance with prior treatments has been good. There is no history of anemia, CAD, CVA, DM, DVT, hyperthyroidism, myocarditis or PE.     Review of Systems   Constitutional: Negative.  Negative for activity change, diaphoresis and unexpected weight change.   HENT: Negative.  Negative for congestion, ear pain, mouth sores, rhinorrhea and voice change.    Eyes:  Negative.  Negative for pain, discharge and visual disturbance.   Respiratory: Negative.  Negative for apnea, cough and wheezing.    Cardiovascular: Negative.  Negative for chest pain and palpitations.   Gastrointestinal: Negative.  Negative for abdominal distention, anal bleeding, diarrhea, nausea and vomiting.   Endocrine: Negative.  Negative for cold intolerance and polyuria.   Genitourinary: Negative.  Negative for decreased urine volume, difficulty urinating, discharge, frequency and scrotal swelling.   Musculoskeletal: Positive for arthralgias, back pain and myalgias. Negative for neck stiffness.   Skin: Negative.  Negative for color change and rash.   Allergic/Immunologic: Negative.  Negative for environmental allergies and immunocompromised state.   Neurological: Negative.  Negative for dizziness, speech difficulty, weakness and light-headedness.   Hematological: Negative.    Psychiatric/Behavioral: Positive for decreased concentration, dysphoric mood and sleep disturbance. Negative for agitation and suicidal ideas. The patient is not nervous/anxious.        PMH/PSH/FH/SH/MED/ALLERGY reviewed    Objective:       Vitals:    11/20/17 1050   BP: 116/83   Pulse: 78       Physical Exam   Constitutional: He is oriented to person, place, and time. He appears well-developed and well-nourished.   HENT:   Head: Normocephalic and atraumatic.   Right Ear: External ear normal.   Left Ear: External ear normal.   Nose: Nose normal.   Mouth/Throat: Oropharynx is clear and moist. No oropharyngeal exudate.   Eyes: Conjunctivae and EOM are normal. Pupils are equal, round, and reactive to light. Right eye exhibits no discharge. Left eye exhibits no discharge. No scleral icterus.   Neck: Normal range of motion. Neck supple. No JVD present. No tracheal deviation present. No thyromegaly present.   Cardiovascular: Normal rate, regular rhythm, normal heart sounds and intact distal pulses.  Exam reveals no gallop and no friction rub.     No murmur heard.  Pulmonary/Chest: Effort normal and breath sounds normal. No stridor. No respiratory distress. He has no wheezes. He has no rales. He exhibits no tenderness.   Abdominal: Soft. Bowel sounds are normal. He exhibits no distension and no mass. There is no tenderness. There is no rebound and no guarding. No hernia.   Musculoskeletal: Normal range of motion. He exhibits tenderness (right great toe and 1st MTP is severely tender, swollen and walks with pain). He exhibits no edema.   TTP L3-L5 and para lumbar area   Lymphadenopathy:     He has no cervical adenopathy.   Neurological: He is alert and oriented to person, place, and time. He has normal reflexes. He displays normal reflexes. No cranial nerve deficit. He exhibits normal muscle tone. Coordination normal.   Skin: Skin is warm and dry. No rash noted. No erythema. No pallor.   Psychiatric: His speech is normal and behavior is normal. Judgment and thought content normal. Cognition and memory are normal. He exhibits a depressed mood.       Assessment:       1. Moderate episode of recurrent major depressive disorder    2. Pulmonary hypertension    3. Diastolic CHF, acute on chronic    4. Essential hypertension    5. Paroxysmal atrial fibrillation    6. Chronic combined systolic and diastolic congestive heart failure    7. Morbid obesity    8. Hyperuricemia    9. Sleep apnea, unspecified type        Plan:       Caio was seen today for low-back pain and eye redness.    Diagnoses and all orders for this visit:    Moderate episode of recurrent major depressive disorder  -     DULoxetine (CYMBALTA) 60 MG capsule; Take 1 capsule (60 mg total) by mouth once daily.    Pulmonary hypertension    Diastolic CHF, acute on chronic    Essential hypertension    Paroxysmal atrial fibrillation    Chronic combined systolic and diastolic congestive heart failure    Morbid obesity    Hyperuricemia    Sleep apnea, unspecified type  -     clonazePAM (KLONOPIN) 0.5 MG  tablet; Take 1 tablet (0.5 mg total) by mouth every evening.    Major depression  -lifestyle and behavior modification  -psychology and psychiatry options discussed  -trial of Cymbalta 60 mg daily. Side effects of medications have been discussed and patient agreed to proceed with treatment and understands the risks and benefits.  -SI/ER precautions given    Great toe pain right/hyperuricemia  -likely acute gout  -ice application, low purine diet    AFIB  -paroxysmal  -on rate and rhythm control - on BB    CHF  -EF normal  -follows cardiology at [place]    Left shoulder pain/rotator cuff syndrome  -follows Ortho    HTN  -controlled    Obesity  -diet/exercise  -weight loss    Spent adequate time in obtaining history and explaining differentials    40 minutes spent during this visit of which greater than 50% devoted to face-face counseling and coordination of care regarding diagnosis and management plan    Return in about 4 weeks (around 12/18/2017), or if symptoms worsen or fail to improve.

## 2017-12-18 ENCOUNTER — OFFICE VISIT (OUTPATIENT)
Dept: FAMILY MEDICINE | Facility: CLINIC | Age: 52
End: 2017-12-18
Attending: FAMILY MEDICINE
Payer: MEDICARE

## 2017-12-18 VITALS
SYSTOLIC BLOOD PRESSURE: 133 MMHG | HEART RATE: 72 BPM | DIASTOLIC BLOOD PRESSURE: 87 MMHG | BODY MASS INDEX: 44.23 KG/M2 | WEIGHT: 315 LBS | OXYGEN SATURATION: 97 %

## 2017-12-18 DIAGNOSIS — I50.33 DIASTOLIC CHF, ACUTE ON CHRONIC: ICD-10-CM

## 2017-12-18 DIAGNOSIS — I27.20 PULMONARY HYPERTENSION: ICD-10-CM

## 2017-12-18 DIAGNOSIS — I10 ESSENTIAL HYPERTENSION: ICD-10-CM

## 2017-12-18 DIAGNOSIS — E79.0 HYPERURICEMIA: ICD-10-CM

## 2017-12-18 DIAGNOSIS — E66.2 OBESITY HYPOVENTILATION SYNDROME: ICD-10-CM

## 2017-12-18 DIAGNOSIS — F33.1 MODERATE EPISODE OF RECURRENT MAJOR DEPRESSIVE DISORDER: Primary | ICD-10-CM

## 2017-12-18 DIAGNOSIS — M1A.00X0 IDIOPATHIC CHRONIC GOUT WITHOUT TOPHUS, UNSPECIFIED SITE: ICD-10-CM

## 2017-12-18 DIAGNOSIS — I50.42 CHRONIC COMBINED SYSTOLIC AND DIASTOLIC CONGESTIVE HEART FAILURE: ICD-10-CM

## 2017-12-18 DIAGNOSIS — G47.30 SLEEP APNEA, UNSPECIFIED TYPE: ICD-10-CM

## 2017-12-18 DIAGNOSIS — I48.0 PAROXYSMAL ATRIAL FIBRILLATION: ICD-10-CM

## 2017-12-18 PROCEDURE — 99499 UNLISTED E&M SERVICE: CPT | Mod: S$PBB,,, | Performed by: FAMILY MEDICINE

## 2017-12-18 PROCEDURE — 99999 PR PBB SHADOW E&M-EST. PATIENT-LVL III: CPT | Mod: PBBFAC,,, | Performed by: FAMILY MEDICINE

## 2017-12-18 PROCEDURE — 99214 OFFICE O/P EST MOD 30 MIN: CPT | Mod: S$GLB,,, | Performed by: FAMILY MEDICINE

## 2017-12-18 RX ORDER — AMLODIPINE BESYLATE 5 MG/1
5 TABLET ORAL DAILY
Qty: 90 TABLET | Refills: 3 | Status: SHIPPED | OUTPATIENT
Start: 2017-12-18 | End: 2019-01-09 | Stop reason: SDUPTHER

## 2017-12-18 RX ORDER — VENLAFAXINE HYDROCHLORIDE 75 MG/1
75 CAPSULE, EXTENDED RELEASE ORAL DAILY
Qty: 30 CAPSULE | Refills: 2 | Status: SHIPPED | OUTPATIENT
Start: 2017-12-18 | End: 2018-01-17 | Stop reason: SDUPTHER

## 2017-12-18 RX ORDER — DICLOFENAC SODIUM 75 MG/1
75 TABLET, DELAYED RELEASE ORAL 3 TIMES DAILY PRN
Qty: 30 TABLET | Refills: 2 | Status: SHIPPED | OUTPATIENT
Start: 2017-12-18 | End: 2018-06-25

## 2017-12-18 RX ORDER — QUETIAPINE FUMARATE 50 MG/1
50 TABLET, FILM COATED ORAL NIGHTLY
Qty: 30 TABLET | Refills: 2 | Status: SHIPPED | OUTPATIENT
Start: 2017-12-18 | End: 2018-01-17 | Stop reason: SDUPTHER

## 2017-12-18 NOTE — PROGRESS NOTES
Subjective:       Patient ID: Caio Ontiveros is a 52 y.o. male.    Chief Complaint: Follow-up (follow up for cymbalta) and Gout (flare up on right foot)    52 yr old pleasant black male with AFIB paroxysmal, CHF w/unknown EF, HTN, pulmonary HTN, varicose veins, EVANGELIST, presents today for evaluation of gout, LBP, right hip pain and depression.      Depression - worsening - he is angry and depressed and don't want to talk nobody - sleep issues and anhedonia - no SI/HI - started on cymbalta 60 and it did not help    LBP and right hip pain - seen Ortho and they recommend hip replacement - depression is worse and also has low back pain - no neurological symptoms, salddle anesthesia, bladder or bowel problems -       Right great toe pain: Onset 2-3 days ago and rapidly worsening. He has history of gout and had flare ups and usually treats with colchicine however this time it did not get any better. He reports eating pork ribs right before the onset of symptoms. He denies drinking excess alcohol or eating sea food. His last uric acid levels unknown. Details as follows -    Gout chronic - gets flares every now and then     History as below - reviewed       Low-back Pain   This is a chronic problem. The current episode started more than 1 month ago. The problem occurs intermittently. The problem has been unchanged. Associated symptoms include arthralgias and myalgias. Pertinent negatives include no chest pain, congestion, coughing, diaphoresis, nausea, rash, vomiting or weakness. Nothing aggravates the symptoms. He has tried nothing for the symptoms. The treatment provided no relief.   Toe Pain    There was no injury mechanism. The pain is present in the right toes. The quality of the pain is described as aching. The pain is at a severity of 9/10. The pain is severe. The pain has been constant since onset. Associated symptoms include an inability to bear weight. The symptoms are aggravated by palpation, movement and weight bearing.  He has tried heat and NSAIDs for the symptoms. The treatment provided no relief.   Nausea   This is a new problem. The current episode started today. The problem occurs constantly. The problem has been unchanged. Associated symptoms include arthralgias and myalgias. Pertinent negatives include no chest pain, congestion, coughing, diaphoresis, nausea, rash, vomiting or weakness. Nothing aggravates the symptoms. He has tried nothing for the symptoms. The treatment provided no relief.   Hypertension   This is a chronic problem. The current episode started more than 1 year ago. The problem has been gradually improving since onset. The problem is controlled. Pertinent negatives include no chest pain or palpitations. There are no associated agents to hypertension. Past treatments include angiotensin blockers, diuretics, beta blockers and calcium channel blockers. The current treatment provides moderate improvement. There are no compliance problems.  Hypertensive end-organ damage includes heart failure and left ventricular hypertrophy. There is no history of angina, CAD/MI, CVA or renovascular disease. Identifiable causes of hypertension include sleep apnea. There is no history of coarctation of the aorta, hypercortisolism, hyperparathyroidism, pheochromocytoma or a thyroid problem.   Congestive Heart Failure   Presents for initial visit. The disease course has been stable. Pertinent negatives include no chest pain, palpitations or unexpected weight change. The symptoms have been stable. Past treatments include angiotensin receptor blockers, beta blockers and salt and fluid restriction (diuretics). The treatment provided moderate relief. Compliance with prior treatments has been good. There is no history of anemia, CAD, CVA, DM, DVT, hyperthyroidism, myocarditis or PE.     Review of Systems   Constitutional: Negative.  Negative for activity change, diaphoresis and unexpected weight change.   HENT: Negative.  Negative for  congestion, ear pain, mouth sores, rhinorrhea and voice change.    Eyes: Negative.  Negative for pain, discharge and visual disturbance.   Respiratory: Negative.  Negative for apnea, cough and wheezing.    Cardiovascular: Negative.  Negative for chest pain and palpitations.   Gastrointestinal: Negative.  Negative for abdominal distention, anal bleeding, diarrhea, nausea and vomiting.   Endocrine: Negative.  Negative for cold intolerance and polyuria.   Genitourinary: Negative.  Negative for decreased urine volume, difficulty urinating, discharge, frequency and scrotal swelling.   Musculoskeletal: Positive for arthralgias, back pain and myalgias. Negative for neck stiffness.   Skin: Negative.  Negative for color change and rash.   Allergic/Immunologic: Negative.  Negative for environmental allergies and immunocompromised state.   Neurological: Negative.  Negative for dizziness, speech difficulty, weakness and light-headedness.   Hematological: Negative.    Psychiatric/Behavioral: Positive for decreased concentration, dysphoric mood and sleep disturbance. Negative for agitation and suicidal ideas. The patient is not nervous/anxious.        PMH/PSH/FH/SH/MED/ALLERGY reviewed    Objective:       Vitals:    12/18/17 1036   BP: 133/87   Pulse: 72       Physical Exam   Constitutional: He is oriented to person, place, and time. He appears well-developed and well-nourished.   HENT:   Head: Normocephalic and atraumatic.   Right Ear: External ear normal.   Left Ear: External ear normal.   Nose: Nose normal.   Mouth/Throat: Oropharynx is clear and moist. No oropharyngeal exudate.   Eyes: Conjunctivae and EOM are normal. Pupils are equal, round, and reactive to light. Right eye exhibits no discharge. Left eye exhibits no discharge. No scleral icterus.   Neck: Normal range of motion. Neck supple. No JVD present. No tracheal deviation present. No thyromegaly present.   Cardiovascular: Normal rate, regular rhythm, normal heart  sounds and intact distal pulses.  Exam reveals no gallop and no friction rub.    No murmur heard.  Pulmonary/Chest: Effort normal and breath sounds normal. No stridor. No respiratory distress. He has no wheezes. He has no rales. He exhibits no tenderness.   Abdominal: Soft. Bowel sounds are normal. He exhibits no distension and no mass. There is no tenderness. There is no rebound and no guarding. No hernia.   Musculoskeletal: Normal range of motion. He exhibits tenderness (right great toe and 1st MTP is severely tender, swollen and walks with pain). He exhibits no edema.   TTP L3-L5 and para lumbar area   Lymphadenopathy:     He has no cervical adenopathy.   Neurological: He is alert and oriented to person, place, and time. He has normal reflexes. He displays normal reflexes. No cranial nerve deficit. He exhibits normal muscle tone. Coordination normal.   Skin: Skin is warm and dry. No rash noted. No erythema. No pallor.   Psychiatric: His speech is normal and behavior is normal. Judgment and thought content normal. Cognition and memory are normal. He exhibits a depressed mood.       Assessment:       1. Moderate episode of recurrent major depressive disorder    2. Essential hypertension    3. Idiopathic chronic gout without tophus, unspecified site    4. Hyperuricemia    5. Chronic combined systolic and diastolic congestive heart failure    6. Paroxysmal atrial fibrillation    7. Diastolic CHF, acute on chronic    8. Pulmonary hypertension    9. Obesity hypoventilation syndrome    10. Sleep apnea, unspecified type        Plan:       Caio was seen today for follow-up and gout.    Diagnoses and all orders for this visit:    Moderate episode of recurrent major depressive disorder  -     venlafaxine (EFFEXOR-XR) 75 MG 24 hr capsule; Take 1 capsule (75 mg total) by mouth once daily.  -     QUEtiapine (SEROQUEL) 50 MG tablet; Take 1 tablet (50 mg total) by mouth every evening.    Essential hypertension  -     amLODIPine  (NORVASC) 5 MG tablet; Take 1 tablet (5 mg total) by mouth once daily.    Idiopathic chronic gout without tophus, unspecified site  -     diclofenac (VOLTAREN) 75 MG EC tablet; Take 1 tablet (75 mg total) by mouth 3 (three) times daily as needed.    Hyperuricemia    Chronic combined systolic and diastolic congestive heart failure    Paroxysmal atrial fibrillation    Diastolic CHF, acute on chronic    Pulmonary hypertension    Obesity hypoventilation syndrome    Sleep apnea, unspecified type       Major depression  -lifestyle and behavior modification  -psychology and psychiatry options discussed  -trial of EFFEXOR 75 with Seroquel nightly. Side effects of medications have been discussed and patient agreed to proceed with treatment and understands the risks and benefits.  -SI/ER precautions given    Great toe pain right/hyperuricemia  -likely acute gout  -ice application, low purine diet    AFIB  -paroxysmal  -on rate and rhythm control - on BB    CHF  -EF normal  -follows cardiology at [place]    Left shoulder pain/rotator cuff syndrome  -follows Ortho    HTN  -controlled    Obesity  -diet/exercise  -weight loss    Gout  -diclofenac for flare up as needed with colchicine    Spent adequate time in obtaining history and explaining differentials    40 minutes spent during this visit of which greater than 50% devoted to face-face counseling and coordination of care regarding diagnosis and management plan    Return in about 4 weeks (around 1/15/2018), or if symptoms worsen or fail to improve.

## 2017-12-26 ENCOUNTER — LAB VISIT (OUTPATIENT)
Dept: LAB | Facility: HOSPITAL | Age: 52
End: 2017-12-26
Attending: FAMILY MEDICINE
Payer: MEDICARE

## 2017-12-26 DIAGNOSIS — Z12.11 COLON CANCER SCREENING: ICD-10-CM

## 2017-12-26 PROCEDURE — 82274 ASSAY TEST FOR BLOOD FECAL: CPT

## 2017-12-27 LAB — HEMOCCULT STL QL IA: NEGATIVE

## 2018-01-09 ENCOUNTER — PES CALL (OUTPATIENT)
Dept: ADMINISTRATIVE | Facility: CLINIC | Age: 53
End: 2018-01-09

## 2018-01-16 ENCOUNTER — PATIENT MESSAGE (OUTPATIENT)
Dept: FAMILY MEDICINE | Facility: CLINIC | Age: 53
End: 2018-01-16

## 2018-01-17 ENCOUNTER — OFFICE VISIT (OUTPATIENT)
Dept: FAMILY MEDICINE | Facility: CLINIC | Age: 53
End: 2018-01-17
Attending: FAMILY MEDICINE
Payer: MEDICARE

## 2018-01-17 VITALS
HEIGHT: 75 IN | OXYGEN SATURATION: 98 % | BODY MASS INDEX: 39.17 KG/M2 | HEART RATE: 65 BPM | DIASTOLIC BLOOD PRESSURE: 89 MMHG | TEMPERATURE: 98 F | SYSTOLIC BLOOD PRESSURE: 137 MMHG | WEIGHT: 315 LBS

## 2018-01-17 DIAGNOSIS — G47.30 SLEEP APNEA, UNSPECIFIED TYPE: ICD-10-CM

## 2018-01-17 DIAGNOSIS — I48.0 PAROXYSMAL ATRIAL FIBRILLATION: ICD-10-CM

## 2018-01-17 DIAGNOSIS — M79.674 GREAT TOE PAIN, RIGHT: ICD-10-CM

## 2018-01-17 DIAGNOSIS — F33.1 MODERATE EPISODE OF RECURRENT MAJOR DEPRESSIVE DISORDER: Primary | ICD-10-CM

## 2018-01-17 DIAGNOSIS — I27.20 PULMONARY HYPERTENSION: ICD-10-CM

## 2018-01-17 DIAGNOSIS — E66.2 OBESITY HYPOVENTILATION SYNDROME: ICD-10-CM

## 2018-01-17 DIAGNOSIS — M10.9 ACUTE GOUT INVOLVING TOE OF RIGHT FOOT, UNSPECIFIED CAUSE: ICD-10-CM

## 2018-01-17 DIAGNOSIS — E66.01 MORBID OBESITY WITH BMI OF 45.0-49.9, ADULT: ICD-10-CM

## 2018-01-17 PROBLEM — I50.42 CHRONIC COMBINED SYSTOLIC AND DIASTOLIC CONGESTIVE HEART FAILURE: Status: RESOLVED | Noted: 2017-05-10 | Resolved: 2018-01-17

## 2018-01-17 PROCEDURE — 99214 OFFICE O/P EST MOD 30 MIN: CPT | Mod: S$GLB,,, | Performed by: FAMILY MEDICINE

## 2018-01-17 PROCEDURE — 99999 PR PBB SHADOW E&M-EST. PATIENT-LVL III: CPT | Mod: PBBFAC,,, | Performed by: FAMILY MEDICINE

## 2018-01-17 RX ORDER — HYDROCODONE BITARTRATE AND ACETAMINOPHEN 5; 325 MG/1; MG/1
1 TABLET ORAL EVERY 8 HOURS PRN
Qty: 15 TABLET | Refills: 0 | Status: SHIPPED | OUTPATIENT
Start: 2018-01-17 | End: 2019-04-05

## 2018-01-17 RX ORDER — QUETIAPINE FUMARATE 100 MG/1
100 TABLET, FILM COATED ORAL NIGHTLY
Qty: 30 TABLET | Refills: 2 | Status: SHIPPED | OUTPATIENT
Start: 2018-01-17 | End: 2018-02-16 | Stop reason: SDUPTHER

## 2018-01-17 RX ORDER — VENLAFAXINE HYDROCHLORIDE 150 MG/1
150 CAPSULE, EXTENDED RELEASE ORAL DAILY
Qty: 30 CAPSULE | Refills: 2 | Status: SHIPPED | OUTPATIENT
Start: 2018-01-17 | End: 2018-02-16 | Stop reason: SDUPTHER

## 2018-01-17 RX ORDER — PHENTERMINE HYDROCHLORIDE 37.5 MG/1
37.5 TABLET ORAL
Qty: 30 TABLET | Refills: 0 | Status: SHIPPED | OUTPATIENT
Start: 2018-01-17 | End: 2018-02-16 | Stop reason: SDUPTHER

## 2018-01-17 NOTE — PROGRESS NOTES
Subjective:       Patient ID: Caio Ontiveros is a 52 y.o. male.    Chief Complaint: Follow-up (4wk f/u, hip pain)    52 yr old pleasant black male with AFIB paroxysmal, HTN, pulmonary HTN, varicose veins, EVANGELIST, presents today for evaluation of gout, LBP, right hip pain and depression. He also gained a lot of weight an need help in losing it.      Depression - worsening - he is angry and depressed and don't want to talk nobody - sleep issues and anhedonia - no SI/HI - on effexor 75 and seroquel 50 - sleep somewhat better however getting up after 3 hr sleep    LBP and right hip pain - seen Ortho and they recommend hip replacement - depression is worse and also has low back pain - no neurological symptoms, salddle anesthesia, bladder or bowel problems -       Right great toe pain: Onset 2-3 days ago and rapidly worsening. He has history of gout and had flare ups and usually treats with colchicine however this time it did not get any better. He reports eating pork ribs right before the onset of symptoms. He denies drinking excess alcohol or eating sea food. His last uric acid levels unknown. Details as follows -    Gout chronic - gets flares every now and then     History as below - reviewed       Low-back Pain   This is a chronic problem. The current episode started more than 1 month ago. The problem occurs intermittently. The problem has been unchanged. Associated symptoms include arthralgias, headaches, myalgias and neck pain. Pertinent negatives include no chest pain, congestion, coughing, diaphoresis, joint swelling, nausea, rash, vomiting or weakness. Nothing aggravates the symptoms. He has tried nothing for the symptoms. The treatment provided no relief.   Toe Pain    There was no injury mechanism. The pain is present in the right toes. The quality of the pain is described as aching. The pain is at a severity of 9/10. The pain is severe. The pain has been constant since onset. Associated symptoms include an inability  to bear weight. The symptoms are aggravated by palpation, movement and weight bearing. He has tried heat and NSAIDs for the symptoms. The treatment provided no relief.   Nausea   This is a new problem. The current episode started today. The problem occurs constantly. The problem has been unchanged. Associated symptoms include arthralgias, headaches, myalgias and neck pain. Pertinent negatives include no chest pain, congestion, coughing, diaphoresis, joint swelling, nausea, rash, vomiting or weakness. Nothing aggravates the symptoms. He has tried nothing for the symptoms. The treatment provided no relief.   Hypertension   This is a chronic problem. The current episode started more than 1 year ago. The problem has been gradually improving since onset. The problem is controlled. Associated symptoms include headaches and neck pain. Pertinent negatives include no chest pain or palpitations. There are no associated agents to hypertension. Past treatments include angiotensin blockers, diuretics, beta blockers and calcium channel blockers. The current treatment provides moderate improvement. There are no compliance problems.  Hypertensive end-organ damage includes heart failure and left ventricular hypertrophy. There is no history of angina, CAD/MI, CVA or renovascular disease. Identifiable causes of hypertension include sleep apnea. There is no history of coarctation of the aorta, hypercortisolism, hyperparathyroidism, pheochromocytoma or a thyroid problem.   Congestive Heart Failure   Presents for initial visit. The disease course has been stable. Pertinent negatives include no chest pain, palpitations or unexpected weight change. The symptoms have been stable. Past treatments include angiotensin receptor blockers, beta blockers and salt and fluid restriction (diuretics). The treatment provided moderate relief. Compliance with prior treatments has been good. There is no history of anemia, CAD, CVA, DM, DVT,  hyperthyroidism, myocarditis or PE.     Review of Systems   Constitutional: Positive for activity change. Negative for diaphoresis and unexpected weight change.   HENT: Negative.  Negative for congestion, ear pain, hearing loss, mouth sores, rhinorrhea, trouble swallowing and voice change.    Eyes: Negative.  Negative for pain, discharge and visual disturbance.   Respiratory: Negative for apnea, cough, chest tightness and wheezing.    Cardiovascular: Negative.  Negative for chest pain and palpitations.   Gastrointestinal: Negative.  Negative for abdominal distention, anal bleeding, blood in stool, constipation, diarrhea, nausea and vomiting.   Endocrine: Negative.  Negative for cold intolerance, polydipsia and polyuria.   Genitourinary: Negative.  Negative for decreased urine volume, difficulty urinating, discharge, frequency, hematuria, scrotal swelling and urgency.   Musculoskeletal: Positive for arthralgias, back pain, myalgias and neck pain. Negative for joint swelling and neck stiffness.   Skin: Negative.  Negative for color change and rash.   Allergic/Immunologic: Negative.  Negative for environmental allergies and immunocompromised state.   Neurological: Positive for headaches. Negative for dizziness, speech difficulty, weakness and light-headedness.   Hematological: Negative.    Psychiatric/Behavioral: Positive for confusion, dysphoric mood and sleep disturbance. Negative for agitation and suicidal ideas. The patient is not nervous/anxious.        PMH/PSH/FH/SH/MED/ALLERGY reviewed    Objective:       Vitals:    01/17/18 1154   BP: 137/89   Pulse: 65   Temp: 98.2 °F (36.8 °C)       Physical Exam   Constitutional: He is oriented to person, place, and time. He appears well-developed and well-nourished.   HENT:   Head: Normocephalic and atraumatic.   Right Ear: External ear normal.   Left Ear: External ear normal.   Nose: Nose normal.   Mouth/Throat: Oropharynx is clear and moist. No oropharyngeal exudate.    Eyes: Conjunctivae and EOM are normal. Pupils are equal, round, and reactive to light. Right eye exhibits no discharge. Left eye exhibits no discharge. No scleral icterus.   Neck: Normal range of motion. Neck supple. No JVD present. No tracheal deviation present. No thyromegaly present.   Cardiovascular: Normal rate, regular rhythm, normal heart sounds and intact distal pulses.  Exam reveals no gallop and no friction rub.    No murmur heard.  Pulmonary/Chest: Effort normal and breath sounds normal. No stridor. No respiratory distress. He has no wheezes. He has no rales. He exhibits no tenderness.   Abdominal: Soft. Bowel sounds are normal. He exhibits no distension and no mass. There is no tenderness. There is no rebound and no guarding. No hernia.   Musculoskeletal: Normal range of motion. He exhibits tenderness (right great toe and 1st MTP is severely tender, swollen and walks with pain). He exhibits no edema.   TTP L3-L5 and para lumbar area   Lymphadenopathy:     He has no cervical adenopathy.   Neurological: He is alert and oriented to person, place, and time. He has normal reflexes. He displays normal reflexes. No cranial nerve deficit. He exhibits normal muscle tone. Coordination normal.   Skin: Skin is warm and dry. No rash noted. No erythema. No pallor.   Psychiatric: His speech is normal and behavior is normal. Judgment and thought content normal. Cognition and memory are normal. He exhibits a depressed mood.       Assessment:       1. Moderate episode of recurrent major depressive disorder    2. Obesity hypoventilation syndrome    3. Pulmonary hypertension    4. Sleep apnea, unspecified type    5. Paroxysmal atrial fibrillation    6. Great toe pain, right    7. Acute gout involving toe of right foot, unspecified cause    8. Morbid obesity with BMI of 45.0-49.9, adult        Plan:       Caio was seen today for follow-up.    Diagnoses and all orders for this visit:    Moderate episode of recurrent major  depressive disorder  -     venlafaxine (EFFEXOR-XR) 150 MG Cp24; Take 1 capsule (150 mg total) by mouth once daily.  -     QUEtiapine (SEROQUEL) 100 MG Tab; Take 1 tablet (100 mg total) by mouth every evening.    Obesity hypoventilation syndrome    Pulmonary hypertension    Sleep apnea, unspecified type    Paroxysmal atrial fibrillation    Great toe pain, right  -     hydrocodone-acetaminophen 5-325mg (NORCO) 5-325 mg per tablet; Take 1 tablet by mouth every 8 (eight) hours as needed for Pain.    Acute gout involving toe of right foot, unspecified cause  -     hydrocodone-acetaminophen 5-325mg (NORCO) 5-325 mg per tablet; Take 1 tablet by mouth every 8 (eight) hours as needed for Pain.    Morbid obesity with BMI of 45.0-49.9, adult  -     phentermine (ADIPEX-P) 37.5 mg tablet; Take 1 tablet (37.5 mg total) by mouth before breakfast.         Major depression  -lifestyle and behavior modification  -psychology and psychiatry options discussed  -increase EFFEXOR 150 with Seroquel to 100 nightly. Side effects of medications have been discussed and patient agreed to proceed with treatment and understands the risks and benefits.  -SI/ER precautions given    Great toe pain right/hyperuricemia  -likely acute gout  -ice application, low purine diet    AFIB  -paroxysmal  -on rate and rhythm control - on BB    CHF  -EF normal  -follows cardiology at [place]    Left shoulder pain/rotator cuff syndrome  -follows Ortho    HTN  -controlled    Obesity  -diet/exercise  -weight loss  -trial of adipex x 1 month    Gout  -diclofenac for flare up as needed with colchicine    Spent adequate time in obtaining history and explaining differentials    40 minutes spent during this visit of which greater than 50% devoted to face-face counseling and coordination of care regarding diagnosis and management plan    Follow-up in about 4 weeks (around 2/14/2018), or if symptoms worsen or fail to improve.

## 2018-02-16 ENCOUNTER — OFFICE VISIT (OUTPATIENT)
Dept: FAMILY MEDICINE | Facility: CLINIC | Age: 53
End: 2018-02-16
Attending: FAMILY MEDICINE
Payer: MEDICARE

## 2018-02-16 VITALS
HEART RATE: 74 BPM | HEIGHT: 75 IN | TEMPERATURE: 98 F | OXYGEN SATURATION: 98 % | SYSTOLIC BLOOD PRESSURE: 130 MMHG | WEIGHT: 315 LBS | DIASTOLIC BLOOD PRESSURE: 80 MMHG | BODY MASS INDEX: 39.17 KG/M2

## 2018-02-16 DIAGNOSIS — I10 ESSENTIAL HYPERTENSION: Primary | ICD-10-CM

## 2018-02-16 DIAGNOSIS — E66.01 MORBID OBESITY: ICD-10-CM

## 2018-02-16 DIAGNOSIS — G47.33 OBSTRUCTIVE SLEEP APNEA SYNDROME: ICD-10-CM

## 2018-02-16 DIAGNOSIS — E79.0 HYPERURICEMIA: ICD-10-CM

## 2018-02-16 DIAGNOSIS — E66.01 MORBID OBESITY WITH BMI OF 45.0-49.9, ADULT: ICD-10-CM

## 2018-02-16 DIAGNOSIS — M1A.00X0 IDIOPATHIC CHRONIC GOUT WITHOUT TOPHUS, UNSPECIFIED SITE: ICD-10-CM

## 2018-02-16 DIAGNOSIS — I48.0 PAROXYSMAL ATRIAL FIBRILLATION: ICD-10-CM

## 2018-02-16 DIAGNOSIS — E66.2 OBESITY HYPOVENTILATION SYNDROME: ICD-10-CM

## 2018-02-16 DIAGNOSIS — I27.20 PULMONARY HYPERTENSION: ICD-10-CM

## 2018-02-16 DIAGNOSIS — F33.1 MODERATE EPISODE OF RECURRENT MAJOR DEPRESSIVE DISORDER: ICD-10-CM

## 2018-02-16 PROCEDURE — 3008F BODY MASS INDEX DOCD: CPT | Mod: S$GLB,,, | Performed by: FAMILY MEDICINE

## 2018-02-16 PROCEDURE — 99214 OFFICE O/P EST MOD 30 MIN: CPT | Mod: S$GLB,,, | Performed by: FAMILY MEDICINE

## 2018-02-16 PROCEDURE — 99999 PR PBB SHADOW E&M-EST. PATIENT-LVL IV: CPT | Mod: PBBFAC,,, | Performed by: FAMILY MEDICINE

## 2018-02-16 RX ORDER — QUETIAPINE FUMARATE 100 MG/1
100 TABLET, FILM COATED ORAL NIGHTLY
Qty: 30 TABLET | Refills: 2 | Status: SHIPPED | OUTPATIENT
Start: 2018-02-16 | End: 2018-03-01 | Stop reason: SDUPTHER

## 2018-02-16 RX ORDER — VENLAFAXINE HYDROCHLORIDE 150 MG/1
150 CAPSULE, EXTENDED RELEASE ORAL DAILY
Qty: 30 CAPSULE | Refills: 2 | Status: SHIPPED | OUTPATIENT
Start: 2018-02-16 | End: 2018-03-01 | Stop reason: SDUPTHER

## 2018-02-16 RX ORDER — PHENTERMINE HYDROCHLORIDE 37.5 MG/1
37.5 TABLET ORAL
Qty: 30 TABLET | Refills: 2 | Status: SHIPPED | OUTPATIENT
Start: 2018-02-16 | End: 2018-03-18

## 2018-02-16 NOTE — PROGRESS NOTES
Subjective:       Patient ID: Caio Ontiveros is a 52 y.o. male.    Chief Complaint: Depression and Hypertension    52 yr old pleasant black male with AFIB paroxysmal, HTN, pulmonary HTN, varicose veins, EVANGELIST, presents today for evaluation of HTN,  gout, LBP, right hip pain and depression. He also gained a lot of weight an need help in losing it.    HTN - much better - on norvasc, lasix, HYZAAR and metoprolol - compliant - no side effects       Depression - worsening - he is angry and depressed and don't want to talk nobody - sleep issues and anhedonia - no SI/HI - on effexor 75 and seroquel 50 - sleep somewhat better however getting up after 3 hr sleep    LBP and right hip pain - seen Ortho and they recommend hip replacement - depression is worse and also has low back pain - no neurological symptoms, salddle anesthesia, bladder or bowel problems -       Right great toe pain: Onset 2-3 days ago and rapidly worsening. He has history of gout and had flare ups and usually treats with colchicine however this time it did not get any better. He reports eating pork ribs right before the onset of symptoms. He denies drinking excess alcohol or eating sea food. His last uric acid levels unknown. Details as follows -    Gout chronic - gets flares every now and then     History as below - reviewed       Hypertension   This is a chronic problem. The current episode started more than 1 year ago. The problem has been gradually improving since onset. The problem is controlled. Associated symptoms include headaches and neck pain. Pertinent negatives include no chest pain or palpitations. There are no associated agents to hypertension. Past treatments include angiotensin blockers, diuretics, beta blockers and calcium channel blockers. The current treatment provides moderate improvement. There are no compliance problems.  Hypertensive end-organ damage includes heart failure and left ventricular hypertrophy. There is no history of angina,  CAD/MI, CVA or renovascular disease. Identifiable causes of hypertension include sleep apnea. There is no history of coarctation of the aorta, hypercortisolism, hyperparathyroidism, pheochromocytoma or a thyroid problem.   Low-back Pain   This is a chronic problem. The current episode started more than 1 month ago. The problem occurs intermittently. The problem has been unchanged. Associated symptoms include arthralgias, headaches, myalgias and neck pain. Pertinent negatives include no chest pain, congestion, coughing, diaphoresis, joint swelling, nausea, rash, vomiting or weakness. Nothing aggravates the symptoms. He has tried nothing for the symptoms. The treatment provided no relief.   Toe Pain    There was no injury mechanism. The pain is present in the right toes. The quality of the pain is described as aching. The pain is at a severity of 9/10. The pain is severe. The pain has been constant since onset. Associated symptoms include an inability to bear weight. The symptoms are aggravated by palpation, movement and weight bearing. He has tried heat and NSAIDs for the symptoms. The treatment provided no relief.   Nausea   This is a new problem. The current episode started today. The problem occurs constantly. The problem has been unchanged. Associated symptoms include arthralgias, headaches, myalgias and neck pain. Pertinent negatives include no chest pain, congestion, coughing, diaphoresis, joint swelling, nausea, rash, vomiting or weakness. Nothing aggravates the symptoms. He has tried nothing for the symptoms. The treatment provided no relief.   Congestive Heart Failure   Presents for initial visit. The disease course has been stable. Pertinent negatives include no chest pain, palpitations or unexpected weight change. The symptoms have been stable. Past treatments include angiotensin receptor blockers, beta blockers and salt and fluid restriction (diuretics). The treatment provided moderate relief. Compliance  with prior treatments has been good. There is no history of anemia, CAD, CVA, DM, DVT, hyperthyroidism, myocarditis or PE.     Review of Systems   Constitutional: Negative.  Negative for activity change, diaphoresis and unexpected weight change.   HENT: Negative.  Negative for congestion, ear pain, mouth sores, rhinorrhea and voice change.    Eyes: Negative.  Negative for pain, discharge and visual disturbance.   Respiratory: Negative.  Negative for apnea, cough and wheezing.    Cardiovascular: Negative.  Negative for chest pain and palpitations.   Gastrointestinal: Negative.  Negative for abdominal distention, anal bleeding, diarrhea, nausea and vomiting.   Endocrine: Negative.  Negative for cold intolerance and polyuria.   Genitourinary: Negative.  Negative for decreased urine volume, difficulty urinating, discharge, frequency and scrotal swelling.   Musculoskeletal: Positive for arthralgias, myalgias and neck pain. Negative for back pain, joint swelling and neck stiffness.   Skin: Negative.  Negative for color change and rash.   Allergic/Immunologic: Negative.  Negative for environmental allergies and immunocompromised state.   Neurological: Positive for headaches. Negative for dizziness, speech difficulty, weakness and light-headedness.   Hematological: Negative.    Psychiatric/Behavioral: Negative.  Negative for agitation, dysphoric mood and suicidal ideas. The patient is not nervous/anxious.        PMH/PSH/FH/SH/MED/ALLERGY reviewed    Objective:       Vitals:    02/16/18 1059   BP: 130/80   Pulse: 74   Temp: 97.7 °F (36.5 °C)       Physical Exam   Constitutional: He is oriented to person, place, and time. He appears well-developed and well-nourished.   HENT:   Head: Normocephalic and atraumatic.   Right Ear: External ear normal.   Left Ear: External ear normal.   Nose: Nose normal.   Mouth/Throat: Oropharynx is clear and moist. No oropharyngeal exudate.   Eyes: Conjunctivae and EOM are normal. Pupils are  equal, round, and reactive to light. Right eye exhibits no discharge. Left eye exhibits no discharge. No scleral icterus.   Neck: Normal range of motion. Neck supple. No JVD present. No tracheal deviation present. No thyromegaly present.   Cardiovascular: Normal rate, regular rhythm, normal heart sounds and intact distal pulses.  Exam reveals no gallop and no friction rub.    No murmur heard.  Pulmonary/Chest: Effort normal and breath sounds normal. No stridor. No respiratory distress. He has no wheezes. He has no rales. He exhibits no tenderness.   Abdominal: Soft. Bowel sounds are normal. He exhibits no distension and no mass. There is no tenderness. There is no rebound and no guarding. No hernia.   Musculoskeletal: Normal range of motion. He exhibits tenderness (right great toe and 1st MTP is severely tender, swollen and walks with pain). He exhibits no edema.   TTP L3-L5 and para lumbar area   Lymphadenopathy:     He has no cervical adenopathy.   Neurological: He is alert and oriented to person, place, and time. He has normal reflexes. He displays normal reflexes. No cranial nerve deficit. He exhibits normal muscle tone. Coordination normal.   Skin: Skin is warm and dry. No rash noted. No erythema. No pallor.   Psychiatric: His speech is normal and behavior is normal. Judgment and thought content normal. Cognition and memory are normal. He exhibits a depressed mood.       Assessment:       1. Essential hypertension    2. Moderate episode of recurrent major depressive disorder    3. Paroxysmal atrial fibrillation    4. Idiopathic chronic gout without tophus, unspecified site    5. Obesity hypoventilation syndrome    6. Pulmonary hypertension    7. Obstructive sleep apnea syndrome    8. Morbid obesity    9. Hyperuricemia    10. Morbid obesity with BMI of 45.0-49.9, adult        Plan:       Caio was seen today for depression and hypertension.    Diagnoses and all orders for this visit:    Essential hypertension  -      Ambulatory Referral to [employer] ([facility])  -     [facility] SERIES ([place])  -     [facility] Patient Entered [employer] game platform ([place])    Moderate episode of recurrent major depressive disorder  -     venlafaxine (EFFEXOR-XR) 150 MG Cp24; Take 1 capsule (150 mg total) by mouth once daily.  -     QUEtiapine (SEROQUEL) 100 MG Tab; Take 1 tablet (100 mg total) by mouth every evening.    Paroxysmal atrial fibrillation    Idiopathic chronic gout without tophus, unspecified site    Obesity hypoventilation syndrome    Pulmonary hypertension    Obstructive sleep apnea syndrome    Morbid obesity  -     phentermine (ADIPEX-P) 37.5 mg tablet; Take 1 tablet (37.5 mg total) by mouth before breakfast.  -     Ambulatory Referral to [facility] ([facility])  -     [facility] ASSIGN QUESTIONNAIRE SERIES ([place])  -     [facility] Patient Entered [employer] game platform ([place])    Hyperuricemia    Morbid obesity with BMI of 45.0-49.9, adult  -     phentermine (ADIPEX-P) 37.5 mg tablet; Take 1 tablet (37.5 mg total) by mouth before breakfast.  -     Ambulatory Referral to [facility] ([facility])  -     [facility] ASSIGN QUESTIONNAIRE SERIES ([place])  -     [facility] Patient Entered [employer] game platform ([place])      Major depression  -lifestyle and behavior modification  -psychology and psychiatry options discussed  -increase EFFEXOR 150 with Seroquel to 100 nightly. Side effects of medications have been discussed and patient agreed to proceed with treatment and understands the risks and benefits.  -SI/ER precautions given    Great toe pain right/hyperuricemia  -likely acute gout  -ice application, low purine diet    AFIB  -paroxysmal  -on rate and rhythm control - on BB    CHF  -EF normal  -follows cardiology at [place]    Left shoulder pain/rotator cuff syndrome  -follows Ortho    HTN  -controlled    Obesity  -diet/exercise  -weight loss  -trial of adipex x 3 month  -refer fitness    Gout  -diclofenac for flare up  as needed with colchicine    Spent adequate time in obtaining history and explaining differentials    40 minutes spent during this visit of which greater than 50% devoted to face-face counseling and coordination of care regarding diagnosis and management plan    Follow-up in about 3 months (around 5/16/2018), or if symptoms worsen or fail to improve.

## 2018-02-28 ENCOUNTER — PATIENT MESSAGE (OUTPATIENT)
Dept: FAMILY MEDICINE | Facility: CLINIC | Age: 53
End: 2018-02-28

## 2018-02-28 RX ORDER — METOPROLOL SUCCINATE 25 MG/1
25 TABLET, EXTENDED RELEASE ORAL 2 TIMES DAILY
Qty: 90 TABLET | Refills: 5 | Status: SHIPPED | OUTPATIENT
Start: 2018-02-28 | End: 2018-11-05 | Stop reason: SDUPTHER

## 2018-03-01 DIAGNOSIS — F33.1 MODERATE EPISODE OF RECURRENT MAJOR DEPRESSIVE DISORDER: ICD-10-CM

## 2018-03-01 RX ORDER — QUETIAPINE FUMARATE 100 MG/1
100 TABLET, FILM COATED ORAL NIGHTLY
Qty: 90 TABLET | Refills: 5 | Status: SHIPPED | OUTPATIENT
Start: 2018-03-01 | End: 2019-03-12 | Stop reason: SDUPTHER

## 2018-03-01 RX ORDER — VENLAFAXINE HYDROCHLORIDE 150 MG/1
150 CAPSULE, EXTENDED RELEASE ORAL DAILY
Qty: 90 CAPSULE | Refills: 5 | Status: SHIPPED | OUTPATIENT
Start: 2018-03-01 | End: 2019-03-12 | Stop reason: SDUPTHER

## 2018-03-26 ENCOUNTER — PATIENT MESSAGE (OUTPATIENT)
Dept: FAMILY MEDICINE | Facility: CLINIC | Age: 53
End: 2018-03-26

## 2018-03-26 RX ORDER — FUROSEMIDE 40 MG/1
80 TABLET ORAL 2 TIMES DAILY
Qty: 180 TABLET | Refills: 3 | Status: SHIPPED | OUTPATIENT
Start: 2018-03-26 | End: 2018-03-27 | Stop reason: SDUPTHER

## 2018-03-26 NOTE — TELEPHONE ENCOUNTER
Spoke to pt and states that he's having a flare-up with his Gout and requesting a refill on his Allopurinol 100mg QD. Pt last fill was 12/2012. Pls advise.

## 2018-03-27 ENCOUNTER — TELEPHONE (OUTPATIENT)
Dept: FAMILY MEDICINE | Facility: CLINIC | Age: 53
End: 2018-03-27

## 2018-03-27 RX ORDER — FUROSEMIDE 80 MG/1
80 TABLET ORAL 2 TIMES DAILY
Qty: 180 TABLET | Refills: 3 | Status: SHIPPED | OUTPATIENT
Start: 2018-03-27 | End: 2019-01-09 | Stop reason: SDUPTHER

## 2018-03-27 NOTE — TELEPHONE ENCOUNTER
----- Message from Cee Lundberg sent at 3/27/2018  3:36 PM CDT -----  Contact: Sallie [phone]  ext [phone] with [facility]  Can he have the 80 mg tablets instead of the 40 mg. Please advise     furosemide (LASIX) 40 MG tablet

## 2018-04-11 ENCOUNTER — PATIENT MESSAGE (OUTPATIENT)
Dept: FAMILY MEDICINE | Facility: CLINIC | Age: 53
End: 2018-04-11

## 2018-04-11 RX ORDER — ALLOPURINOL 100 MG/1
100 TABLET ORAL DAILY
Qty: 90 TABLET | Refills: 3 | Status: SHIPPED | OUTPATIENT
Start: 2018-04-11 | End: 2019-04-05 | Stop reason: SDUPTHER

## 2018-04-23 ENCOUNTER — PATIENT MESSAGE (OUTPATIENT)
Dept: ADMINISTRATIVE | Facility: OTHER | Age: 53
End: 2018-04-23

## 2018-04-23 ENCOUNTER — PATIENT MESSAGE (OUTPATIENT)
Dept: FAMILY MEDICINE | Facility: CLINIC | Age: 53
End: 2018-04-23

## 2018-04-26 ENCOUNTER — TELEPHONE (OUTPATIENT)
Dept: FAMILY MEDICINE | Facility: CLINIC | Age: 53
End: 2018-04-26

## 2018-04-26 RX ORDER — COLCHICINE 0.6 MG/1
0.6 TABLET ORAL DAILY
Qty: 30 TABLET | Refills: 2 | Status: SHIPPED | OUTPATIENT
Start: 2018-04-26 | End: 2018-09-18 | Stop reason: SDUPTHER

## 2018-04-26 NOTE — TELEPHONE ENCOUNTER
----- Message from Cherise Hyman sent at 4/26/2018 10:56 AM CDT -----  Contact: [phone]/ [facility]  Pharmacy would like to speak with you about changing patient Rx to another medication. colchicine 0.6 mg tablet is not covered. Please advise.

## 2018-04-26 NOTE — TELEPHONE ENCOUNTER
Spoke to PharmacistPresley and stated that pt's insurance will cover brand only. Authorized the change to brand. Pls advise.

## 2018-05-14 ENCOUNTER — PATIENT MESSAGE (OUTPATIENT)
Dept: FAMILY MEDICINE | Facility: CLINIC | Age: 53
End: 2018-05-14

## 2018-05-18 ENCOUNTER — OFFICE VISIT (OUTPATIENT)
Dept: ORTHOPEDICS | Facility: CLINIC | Age: 53
End: 2018-05-18
Payer: MEDICARE

## 2018-05-18 VITALS
WEIGHT: 315 LBS | BODY MASS INDEX: 39.17 KG/M2 | SYSTOLIC BLOOD PRESSURE: 138 MMHG | HEIGHT: 75 IN | DIASTOLIC BLOOD PRESSURE: 84 MMHG

## 2018-05-18 DIAGNOSIS — M16.11 PRIMARY OSTEOARTHRITIS OF RIGHT HIP: Primary | ICD-10-CM

## 2018-05-18 DIAGNOSIS — M77.11 LATERAL EPICONDYLITIS OF RIGHT ELBOW: ICD-10-CM

## 2018-05-18 PROCEDURE — 3079F DIAST BP 80-89 MM HG: CPT | Mod: CPTII,S$GLB,, | Performed by: ORTHOPAEDIC SURGERY

## 2018-05-18 PROCEDURE — 3008F BODY MASS INDEX DOCD: CPT | Mod: CPTII,S$GLB,, | Performed by: ORTHOPAEDIC SURGERY

## 2018-05-18 PROCEDURE — 3075F SYST BP GE 130 - 139MM HG: CPT | Mod: CPTII,S$GLB,, | Performed by: ORTHOPAEDIC SURGERY

## 2018-05-18 PROCEDURE — 99204 OFFICE O/P NEW MOD 45 MIN: CPT | Mod: 25,S$GLB,, | Performed by: ORTHOPAEDIC SURGERY

## 2018-05-18 PROCEDURE — 99999 PR PBB SHADOW E&M-EST. PATIENT-LVL III: CPT | Mod: PBBFAC,,, | Performed by: ORTHOPAEDIC SURGERY

## 2018-05-18 PROCEDURE — 20551 NJX 1 TENDON ORIGIN/INSJ: CPT | Mod: RT,S$GLB,, | Performed by: ORTHOPAEDIC SURGERY

## 2018-05-18 RX ORDER — TRIAMCINOLONE ACETONIDE 40 MG/ML
40 INJECTION, SUSPENSION INTRA-ARTICULAR; INTRAMUSCULAR
Status: COMPLETED | OUTPATIENT
Start: 2018-05-18 | End: 2018-05-18

## 2018-05-18 RX ADMIN — TRIAMCINOLONE ACETONIDE 40 MG: 40 INJECTION, SUSPENSION INTRA-ARTICULAR; INTRAMUSCULAR at 11:05

## 2018-05-18 NOTE — PROCEDURES
Procedures     After consent, using aseptic technique, the right elbow common extensor origin was injected with 40 mg of triamcinolone and 1 cc of plain xylocaine

## 2018-05-18 NOTE — PROGRESS NOTES
Subjective:      Patient ID: Caio Ontiveros is a 52 y.o. male.    Chief Complaint: Pain of the Right Hip   And right elbow  HPI  Patient complains of years of progressive right groin pain. No specific injury.  NSAIDs and hydrocodone have not control symptoms. All walking is limited.    Also complains of several weeks right lateral elbow pain without specific injury or recent treatment.  Review of Systems   Constitution: Negative for fever and weight loss.   HENT: Negative for congestion.    Eyes: Negative for visual disturbance.   Cardiovascular: Negative for chest pain.   Respiratory: Negative for shortness of breath.    Hematologic/Lymphatic: Negative for bleeding problem. Does not bruise/bleed easily.   Skin: Negative for poor wound healing.   Musculoskeletal: Positive for gout and joint pain.   Gastrointestinal: Negative for abdominal pain.   Genitourinary: Negative for dysuria.   Neurological: Negative for seizures.   Psychiatric/Behavioral: Negative for altered mental status.   Allergic/Immunologic: Negative for persistent infections.         Objective:            Ortho/SPM Exam  No acute distress  Appears overweight  Walks with a limp  Right upper extremity is normal skin. Tender right common extensor origin, full elbow range of motion, provocative testing for epicondylitis is positive laterally.  Distal neurovascular exam is normal    Right hip is normal skin. No direct tenderness. Range of motion is moderately limited and painful.  Neurovascular exam is normal    I reviewed the relevant radiographic images for the patient's condition:  There is moderate to advanced loss of joint space in the right hip with secondary degenerative changes          Assessment:       Encounter Diagnoses   Name Primary?    Primary osteoarthritis of right hip Yes    Lateral epicondylitis of right elbow           Plan:       Caio was seen today for pain.    Diagnoses and all orders for this visit:    Primary osteoarthritis of right  hip    Lateral epicondylitis of right elbow      I explained my diagnostic impression and the reasoning behind it in detail, using layman's terms.  Models and/or pictures were used to help in the explanation.    With regard to the right hip:  Pain clinic injection recommended for palliation at patient's request.  Weight loss is necessary prior to considering surgical intervention. I explained the potential role of surgery in the treatment of this condition to the patient.  They understand that if nonsurgical measures do not adequately control symptoms, surgery will be considered in the future.    With regard to the elbow:  I explained the nature of the problem.  Injection recommended and consent given.  Activity modification explained

## 2018-06-14 ENCOUNTER — PATIENT MESSAGE (OUTPATIENT)
Dept: FAMILY MEDICINE | Facility: CLINIC | Age: 53
End: 2018-06-14

## 2018-06-14 NOTE — TELEPHONE ENCOUNTER
Informed pt that we have that form already. Just remind Dr. Alcantar to fill out at the time of his appt.

## 2018-06-19 ENCOUNTER — PATIENT MESSAGE (OUTPATIENT)
Dept: FAMILY MEDICINE | Facility: CLINIC | Age: 53
End: 2018-06-19

## 2018-06-25 ENCOUNTER — OFFICE VISIT (OUTPATIENT)
Dept: CARDIOLOGY | Facility: CLINIC | Age: 53
End: 2018-06-25
Payer: MEDICARE

## 2018-06-25 VITALS
WEIGHT: 315 LBS | HEIGHT: 75 IN | BODY MASS INDEX: 39.17 KG/M2 | HEART RATE: 97 BPM | DIASTOLIC BLOOD PRESSURE: 76 MMHG | SYSTOLIC BLOOD PRESSURE: 117 MMHG

## 2018-06-25 DIAGNOSIS — G47.33 OBSTRUCTIVE SLEEP APNEA SYNDROME: ICD-10-CM

## 2018-06-25 DIAGNOSIS — I10 ESSENTIAL HYPERTENSION: ICD-10-CM

## 2018-06-25 DIAGNOSIS — M1A.00X0 IDIOPATHIC CHRONIC GOUT WITHOUT TOPHUS, UNSPECIFIED SITE: ICD-10-CM

## 2018-06-25 DIAGNOSIS — E66.01 MORBID OBESITY: ICD-10-CM

## 2018-06-25 DIAGNOSIS — I48.19 PERSISTENT ATRIAL FIBRILLATION: Primary | ICD-10-CM

## 2018-06-25 PROCEDURE — 3074F SYST BP LT 130 MM HG: CPT | Mod: CPTII,S$GLB,, | Performed by: INTERNAL MEDICINE

## 2018-06-25 PROCEDURE — 99205 OFFICE O/P NEW HI 60 MIN: CPT | Mod: S$GLB,,, | Performed by: INTERNAL MEDICINE

## 2018-06-25 PROCEDURE — 3008F BODY MASS INDEX DOCD: CPT | Mod: CPTII,S$GLB,, | Performed by: INTERNAL MEDICINE

## 2018-06-25 PROCEDURE — 99999 PR PBB SHADOW E&M-EST. PATIENT-LVL II: CPT | Mod: PBBFAC,,, | Performed by: INTERNAL MEDICINE

## 2018-06-25 PROCEDURE — 93000 ELECTROCARDIOGRAM COMPLETE: CPT | Mod: S$GLB,,, | Performed by: INTERNAL MEDICINE

## 2018-06-25 PROCEDURE — 3078F DIAST BP <80 MM HG: CPT | Mod: CPTII,S$GLB,, | Performed by: INTERNAL MEDICINE

## 2018-06-25 NOTE — PROGRESS NOTES
Subjective:      Patient ID: Caio Ontiveros is a 52 y.o. male.    Chief Complaint: Hypertension (Relocating from [place].  Needs to establish care with a new cardiologist.); Atrial Fibrillation; and Pulmonary Hypertension    HPI:  Pt recently moved to [place] from [place]. Pt was first diagnosed with atrial fibrillation about 5 years ago.  Pt has been cardioverted twice. Pt once took sotalol after the first cardioversion but it did not work in maintaining sinus rhythm. The last electrical cardioversion was approximately 2 years ago and pt stayed in sinus rhythm for at least 6 months afterwards.    Review of Systems   Cardiovascular: Positive for chest pain (Occasional sharp pains in chest at rest which last a couple of seconds.), dyspnea on exertion (chronic, mild) and leg swelling. Negative for claudication, irregular heartbeat, near-syncope, orthopnea, palpitations and syncope.      Pt has chronic pain in right hip and knees and chronic gout in ankles and right first toe.    When I am still and quiet I can feel my heart beating irregularly.    Pt can only walk short distances and is primarily limited by pain in his right hip and in his knees.     Pt had gastric bypass in 2014.  Pt went from 547 to 358 lbs.     Pt wears CPAP for [facility]ANGELIST    Pt used to work offshore on oil rigs.    Pt states he had a procedure to extract a vein in the past but that his circulation was Ok.  Past Medical History:   Diagnosis Date    A-fib     CHF (congestive heart failure)     Gout, chronic     Hypertension         Past Surgical History:   Procedure Laterality Date    CHOLECYSTECTOMY      GASTRIC BYPASS  2014       Family History   Problem Relation Age of Onset    Hypertension Mother     Stroke Father     Diabetes Father     Hypertension Father     Diabetes Sister     Diabetes Brother        Social History     Social History    Marital status:      Spouse name: N/A    Number of children: 1    Years of education:  N/A     Social History Main Topics    Smoking status: Never Smoker    Smokeless tobacco: Never Used    Alcohol use Yes      Comment: sometimes    Drug use: No    Sexual activity: Not Currently     Other Topics Concern    None     Social History Narrative    None       Current Outpatient Prescriptions on File Prior to Visit   Medication Sig Dispense Refill    allopurinol (ZYLOPRIM) 100 MG tablet Take 1 tablet (100 mg total) by mouth once daily. 90 tablet 3    amLODIPine (NORVASC) 5 MG tablet Take 1 tablet (5 mg total) by mouth once daily. 90 tablet 3    apixaban 5 mg Tab Take 5 mg by mouth 2 (two) times daily.       aspirin 81 MG Chew Take 1 tablet (81 mg total) by mouth once daily. 30 tablet 0    clonazePAM (KLONOPIN) 0.5 MG tablet Take 1 tablet (0.5 mg total) by mouth every evening. 30 tablet 0    clotrimazole-betamethasone (LOTRISONE) lotion Apply topically 2 (two) times daily. 30 mL 2    colchicine 0.6 mg tablet Take 1 tablet (0.6 mg total) by mouth once daily. 30 tablet 2    furosemide (LASIX) 80 MG tablet Take 1 tablet (80 mg total) by mouth 2 (two) times daily. 180 tablet 3    hydrocodone-acetaminophen 5-325mg (NORCO) 5-325 mg per tablet Take 1 tablet by mouth every 8 (eight) hours as needed for Pain. 15 tablet 0    losartan-hydrochlorothiazide 100-25 mg (HYZAAR) 100-25 mg per tablet Take 1 tablet by mouth once daily.       metoprolol succinate (TOPROL-XL) 25 MG 24 hr tablet Take 1 tablet (25 mg total) by mouth 2 (two) times daily. 90 tablet 5    potassium chloride SA (K-DUR,KLOR-CON) 20 MEQ tablet Take 20 mEq by mouth.      QUEtiapine (SEROQUEL) 100 MG Tab Take 1 tablet (100 mg total) by mouth every evening. 90 tablet 5    venlafaxine (EFFEXOR-XR) 150 MG Cp24 Take 1 capsule (150 mg total) by mouth once daily. 90 capsule 5    [DISCONTINUED] diclofenac (VOLTAREN) 75 MG EC tablet Take 1 tablet (75 mg total) by mouth 3 (three) times daily as needed. 30 tablet 2     No current  "facility-administered medications on file prior to visit.        Review of patient's allergies indicates:  No Known Allergies  Objective:     Vitals:    06/25/18 1509   BP: 117/76   BP Location: Left arm   Patient Position: Sitting   BP Method: Large (Automatic)   Pulse: 97   Weight: (!) 162.5 kg (358 lb 4.8 oz)   Height: 6' 3" (1.905 m)        Physical Exam   Constitutional: He is oriented to person, place, and time. He appears well-developed and well-nourished. No distress.   Eyes: No scleral icterus.   Neck: No JVD present. Carotid bruit is not present.   Cardiovascular: Normal heart sounds.  An irregularly irregular rhythm present. Exam reveals no gallop and no friction rub.    No murmur heard.  Pulmonary/Chest: Effort normal and breath sounds normal. No respiratory distress.   Abdominal: Soft. There is no hepatosplenomegaly. There is no tenderness.   Musculoskeletal: He exhibits edema (trivial; support stockings in place).   Neurological: He is alert and oriented to person, place, and time.   Skin: Skin is warm and dry. He is not diaphoretic.   Psychiatric: He has a normal mood and affect. His behavior is normal. Judgment and thought content normal.   Vitals reviewed.     Note echocardiogram last year was normal except for mildly dilated LA.  ECG last September showed atrial flutter.      ECG today--atrial fibrillation with controlled ventricular response. Otherwise WNL  Assessment:     1. Persistent atrial fibrillation    2. Morbid obesity    3. Essential hypertension    4. Idiopathic chronic gout without tophus, unspecified site    5. Obstructive sleep apnea syndrome      Plan:   Caio was seen today for hypertension, atrial fibrillation and pulmonary hypertension.    Diagnoses and all orders for this visit:    Persistent atrial fibrillation    Morbid obesity    Essential hypertension    Idiopathic chronic gout without tophus, unspecified site    Obstructive sleep apnea syndrome    Pt feels his hear races with " the slightest activity.    Pt's wife reports to pt that his heart beat is fast and irregular    I have explained to pt that rate control with metoprolol and anticoagulation with Eliquis is excellent long-term treatment for atrial fibrillation    I have offered to change the Eliquis to warfarin due to expense albeit with the need for dietary restrictions and frequent lab tests. Pt prefers to stay on Eliquis.    Consider elective Lexiscan Cardiolite stress test    I offered consultation to electrophysiologist to discuss rhythm control and possible RFA but pt declines.    Follow-up in about 3 months (around 9/25/2018).

## 2018-06-27 ENCOUNTER — OFFICE VISIT (OUTPATIENT)
Dept: FAMILY MEDICINE | Facility: CLINIC | Age: 53
End: 2018-06-27
Attending: FAMILY MEDICINE
Payer: MEDICARE

## 2018-06-27 VITALS
BODY MASS INDEX: 39.17 KG/M2 | TEMPERATURE: 99 F | SYSTOLIC BLOOD PRESSURE: 115 MMHG | HEIGHT: 75 IN | WEIGHT: 315 LBS | DIASTOLIC BLOOD PRESSURE: 81 MMHG | OXYGEN SATURATION: 97 % | HEART RATE: 76 BPM

## 2018-06-27 DIAGNOSIS — E66.2 OBESITY HYPOVENTILATION SYNDROME: ICD-10-CM

## 2018-06-27 DIAGNOSIS — E66.01 MORBID OBESITY: ICD-10-CM

## 2018-06-27 DIAGNOSIS — I10 ESSENTIAL HYPERTENSION: ICD-10-CM

## 2018-06-27 DIAGNOSIS — F33.1 MODERATE EPISODE OF RECURRENT MAJOR DEPRESSIVE DISORDER: ICD-10-CM

## 2018-06-27 DIAGNOSIS — I83.93 VARICOSE VEINS OF BOTH LOWER EXTREMITIES: ICD-10-CM

## 2018-06-27 DIAGNOSIS — E79.0 HYPERURICEMIA: ICD-10-CM

## 2018-06-27 DIAGNOSIS — Z00.00 ROUTINE GENERAL MEDICAL EXAMINATION AT A HEALTH CARE FACILITY: Primary | ICD-10-CM

## 2018-06-27 DIAGNOSIS — I48.19 PERSISTENT ATRIAL FIBRILLATION: ICD-10-CM

## 2018-06-27 DIAGNOSIS — Z12.5 ENCOUNTER FOR SCREENING FOR MALIGNANT NEOPLASM OF PROSTATE: ICD-10-CM

## 2018-06-27 DIAGNOSIS — M1A.00X0 IDIOPATHIC CHRONIC GOUT WITHOUT TOPHUS, UNSPECIFIED SITE: ICD-10-CM

## 2018-06-27 DIAGNOSIS — I27.20 PULMONARY HYPERTENSION: ICD-10-CM

## 2018-06-27 DIAGNOSIS — G47.33 OBSTRUCTIVE SLEEP APNEA SYNDROME: ICD-10-CM

## 2018-06-27 PROCEDURE — 3079F DIAST BP 80-89 MM HG: CPT | Mod: CPTII,S$GLB,, | Performed by: FAMILY MEDICINE

## 2018-06-27 PROCEDURE — 99999 PR PBB SHADOW E&M-EST. PATIENT-LVL III: CPT | Mod: PBBFAC,,, | Performed by: FAMILY MEDICINE

## 2018-06-27 PROCEDURE — 99396 PREV VISIT EST AGE 40-64: CPT | Mod: S$GLB,,, | Performed by: FAMILY MEDICINE

## 2018-06-27 PROCEDURE — 3074F SYST BP LT 130 MM HG: CPT | Mod: CPTII,S$GLB,, | Performed by: FAMILY MEDICINE

## 2018-06-27 NOTE — PROGRESS NOTES
Subjective:       Patient ID: Caio Ontiveros is a 52 y.o. male.    Chief Complaint: Annual Exam    52 yr old pleasant black male with AFIB persistent, HTN, pulmonary HTN, varicose veins, EVANGELIST, presents today for his annual wellness check, lab work and  evaluation of HTN,  gout, LBP, right hip pain and depression. He also gained a lot of weight an need help in losing it.    HTN - much better - on norvasc, lasix, HYZAAR and metoprolol - compliant - no side effects       Depression - stable - -  - no SI/HI - on effexor 150 and seroquel 100 - sleep somewhat better however getting up after 3 hr sleep    LBP and right hip pain - seen Ortho and they recommend hip replacement - depression is worse and also has low back pain - no neurological symptoms, salddle anesthesia, bladder or bowel problems -       Right great toe pain: Onset 2-3 days ago and rapidly worsening. He has history of gout and had flare ups and usually treats with colchicine however this time it did not get any better. He reports eating pork ribs right before the onset of symptoms. He denies drinking excess alcohol or eating sea food. His last uric acid levels unknown. Details as follows -    Gout chronic - gets flares every now and then     History as below - reviewed       Hypertension   This is a chronic problem. The current episode started more than 1 year ago. The problem has been gradually improving since onset. The problem is controlled. Associated symptoms include headaches and neck pain. Pertinent negatives include no chest pain or palpitations. There are no associated agents to hypertension. Past treatments include angiotensin blockers, diuretics, beta blockers and calcium channel blockers. The current treatment provides moderate improvement. There are no compliance problems.  Hypertensive end-organ damage includes heart failure and left ventricular hypertrophy. There is no history of angina, CAD/MI or CVA. Identifiable causes of hypertension include  sleep apnea. There is no history of coarctation of the aorta, hypercortisolism, hyperparathyroidism, pheochromocytoma, renovascular disease or a thyroid problem.   Low-back Pain   This is a chronic problem. The current episode started more than 1 month ago. The problem occurs intermittently. The problem has been unchanged. Associated symptoms include arthralgias, headaches, myalgias and neck pain. Pertinent negatives include no chest pain, congestion, coughing, diaphoresis, joint swelling, nausea, rash, vomiting or weakness. Nothing aggravates the symptoms. He has tried nothing for the symptoms. The treatment provided no relief.   Toe Pain    There was no injury mechanism. The pain is present in the right toes. The quality of the pain is described as aching. The pain is at a severity of 9/10. The pain is severe. The pain has been constant since onset. Associated symptoms include an inability to bear weight. The symptoms are aggravated by palpation, movement and weight bearing. He has tried heat and NSAIDs for the symptoms. The treatment provided no relief.   Nausea   This is a new problem. The current episode started today. The problem occurs constantly. The problem has been unchanged. Associated symptoms include arthralgias, headaches, myalgias and neck pain. Pertinent negatives include no chest pain, congestion, coughing, diaphoresis, joint swelling, nausea, rash, vomiting or weakness. Nothing aggravates the symptoms. He has tried nothing for the symptoms. The treatment provided no relief.   Congestive Heart Failure   Presents for follow-up visit. The disease course has been stable. Pertinent negatives include no chest pain, palpitations or unexpected weight change. The symptoms have been stable. Past treatments include angiotensin receptor blockers, beta blockers and salt and fluid restriction (diuretics). The treatment provided moderate relief. Compliance with prior treatments has been good. There is no history  of anemia, CAD, CVA, DM, DVT, hyperthyroidism, myocarditis or PE. Compliance with total regimen is %. Compliance problems include adherence to exercise.  Compliance with diet is %. Compliance with exercise is %. Compliance with medications is %.     Review of Systems   Constitutional: Positive for activity change. Negative for diaphoresis and unexpected weight change.   HENT: Negative.  Negative for congestion, ear pain, hearing loss, mouth sores, rhinorrhea, trouble swallowing and voice change.    Eyes: Negative.  Negative for pain, discharge and visual disturbance.   Respiratory: Negative.  Negative for apnea, cough, chest tightness and wheezing.    Cardiovascular: Negative.  Negative for chest pain and palpitations.   Gastrointestinal: Negative.  Negative for abdominal distention, anal bleeding, blood in stool, constipation, diarrhea, nausea and vomiting.   Endocrine: Negative.  Negative for cold intolerance, polydipsia and polyuria.   Genitourinary: Negative.  Negative for decreased urine volume, difficulty urinating, discharge, frequency, hematuria, scrotal swelling and urgency.   Musculoskeletal: Positive for arthralgias, myalgias and neck pain. Negative for back pain, joint swelling and neck stiffness.   Skin: Negative.  Negative for color change and rash.   Allergic/Immunologic: Negative.  Negative for environmental allergies and immunocompromised state.   Neurological: Positive for headaches. Negative for dizziness, speech difficulty, weakness and light-headedness.   Hematological: Negative.    Psychiatric/Behavioral: Positive for dysphoric mood. Negative for agitation, confusion and suicidal ideas. The patient is not nervous/anxious.        PMH/PSH/FH/SH/MED/ALLERGY reviewed    Objective:       Vitals:    06/27/18 1350   BP: 115/81   Pulse: 76   Temp: 98.6 °F (37 °C)       Physical Exam   Constitutional: He is oriented to person, place, and time. He appears well-developed and  well-nourished.   HENT:   Head: Normocephalic and atraumatic.   Right Ear: External ear normal.   Left Ear: External ear normal.   Nose: Nose normal.   Mouth/Throat: Oropharynx is clear and moist. No oropharyngeal exudate.   Eyes: Conjunctivae and EOM are normal. Pupils are equal, round, and reactive to light. Right eye exhibits no discharge. Left eye exhibits no discharge. No scleral icterus.   Neck: Normal range of motion. Neck supple. No JVD present. No tracheal deviation present. No thyromegaly present.   Cardiovascular: Normal rate, regular rhythm, normal heart sounds and intact distal pulses.  Exam reveals no gallop and no friction rub.    No murmur heard.  Pulmonary/Chest: Effort normal and breath sounds normal. No stridor. No respiratory distress. He has no wheezes. He has no rales. He exhibits no tenderness.   Abdominal: Soft. Bowel sounds are normal. He exhibits no distension and no mass. There is no tenderness. There is no rebound and no guarding. No hernia.   Musculoskeletal: Normal range of motion. He exhibits tenderness (right great toe and 1st MTP is severely tender, swollen and walks with pain). He exhibits no edema.   TTP L3-L5 and para lumbar area   Lymphadenopathy:     He has no cervical adenopathy.   Neurological: He is alert and oriented to person, place, and time. He has normal reflexes. He displays normal reflexes. No cranial nerve deficit. He exhibits normal muscle tone. Coordination normal.   Skin: Skin is warm and dry. No rash noted. No erythema. No pallor.   Psychiatric: His speech is normal and behavior is normal. Judgment and thought content normal. Cognition and memory are normal. He exhibits a depressed mood.       Assessment:       1. Routine general medical examination at a health care facility    2. Persistent atrial fibrillation    3. Idiopathic chronic gout without tophus, unspecified site    4. Moderate episode of recurrent major depressive disorder    5. Hyperuricemia    6.  Essential hypertension    7. Morbid obesity    8. Pulmonary hypertension    9. Obesity hypoventilation syndrome    10. Obstructive sleep apnea syndrome    11. Encounter for screening for malignant neoplasm of prostate        Plan:       Caio was seen today for annual exam.    Diagnoses and all orders for this visit:    Routine general medical examination at a health care facility  -     CBC auto differential; Future  -     Comprehensive metabolic panel; Future  -     Lipid panel; Future  -     PSA, Screening; Future    Persistent atrial fibrillation  -     CBC auto differential; Future  -     Comprehensive metabolic panel; Future  -     Lipid panel; Future    Idiopathic chronic gout without tophus, unspecified site    Moderate episode of recurrent major depressive disorder    Hyperuricemia  -     Uric acid; Future    Essential hypertension  -     CBC auto differential; Future  -     Comprehensive metabolic panel; Future  -     Lipid panel; Future    Morbid obesity    Pulmonary hypertension  -     CBC auto differential; Future  -     Comprehensive metabolic panel; Future  -     Lipid panel; Future    Obesity hypoventilation syndrome    Obstructive sleep apnea syndrome    Encounter for screening for malignant neoplasm of prostate  -     PSA, Screening; Future    Varicose veins of both lower extremities  -     Ambulatory referral to Vascular Surgery      Wellness check  -normal exam  -labs     Major depression  -lifestyle and behavior modification  -psychology and psychiatry options discussed  -continue EFFEXOR 150 with Seroquel to 100 nightly. Side effects of medications have been discussed and patient agreed to proceed with treatment and understands the risks and benefits.  -SI/ER precautions given    Great toe pain right/hyperuricemia  -likely acute gout  -ice application, low purine diet    AFIB  -persistent  -on rate control - on BB  -follows cardiology    CHF  -EF normal  -follows cardiology at [facility]  [place]    Left shoulder pain/rotator cuff syndrome  -follows Ortho    HTN  -controlled    Obesity  -diet/exercise  -weight loss  -refer fitness    Gout  -diclofenac for flare up as needed with colchicine    Spent adequate time in obtaining history and explaining differentials    40 minutes spent during this visit of which greater than 50% devoted to face-face counseling and coordination of care regarding diagnosis and management plan    Follow-up in about 6 months (around 12/27/2018), or if symptoms worsen or fail to improve.

## 2018-06-28 ENCOUNTER — LAB VISIT (OUTPATIENT)
Dept: LAB | Facility: HOSPITAL | Age: 53
End: 2018-06-28
Attending: FAMILY MEDICINE
Payer: MEDICARE

## 2018-06-28 DIAGNOSIS — I10 ESSENTIAL HYPERTENSION: ICD-10-CM

## 2018-06-28 DIAGNOSIS — Z12.5 ENCOUNTER FOR SCREENING FOR MALIGNANT NEOPLASM OF PROSTATE: ICD-10-CM

## 2018-06-28 DIAGNOSIS — E79.0 HYPERURICEMIA: ICD-10-CM

## 2018-06-28 DIAGNOSIS — I48.19 PERSISTENT ATRIAL FIBRILLATION: ICD-10-CM

## 2018-06-28 DIAGNOSIS — I27.20 PULMONARY HYPERTENSION: ICD-10-CM

## 2018-06-28 DIAGNOSIS — Z00.00 ROUTINE GENERAL MEDICAL EXAMINATION AT A HEALTH CARE FACILITY: ICD-10-CM

## 2018-06-28 LAB
ALBUMIN SERPL BCP-MCNC: 3.6 G/DL
ALP SERPL-CCNC: 55 U/L
ALT SERPL W/O P-5'-P-CCNC: 16 U/L
ANION GAP SERPL CALC-SCNC: 8 MMOL/L
AST SERPL-CCNC: 18 U/L
BASOPHILS # BLD AUTO: 0.02 K/UL
BASOPHILS NFR BLD: 0.5 %
BILIRUB SERPL-MCNC: 0.7 MG/DL
BUN SERPL-MCNC: 16 MG/DL
CALCIUM SERPL-MCNC: 9.5 MG/DL
CHLORIDE SERPL-SCNC: 104 MMOL/L
CHOLEST SERPL-MCNC: 178 MG/DL
CHOLEST/HDLC SERPL: 3.9 {RATIO}
CO2 SERPL-SCNC: 30 MMOL/L
COMPLEXED PSA SERPL-MCNC: 0.42 NG/ML
CREAT SERPL-MCNC: 1.1 MG/DL
DIFFERENTIAL METHOD: ABNORMAL
EOSINOPHIL # BLD AUTO: 0.1 K/UL
EOSINOPHIL NFR BLD: 2.3 %
ERYTHROCYTE [DISTWIDTH] IN BLOOD BY AUTOMATED COUNT: 13.7 %
EST. GFR  (AFRICAN AMERICAN): >60 ML/MIN/1.73 M^2
EST. GFR  (NON AFRICAN AMERICAN): >60 ML/MIN/1.73 M^2
GLUCOSE SERPL-MCNC: 90 MG/DL
HCT VFR BLD AUTO: 40.5 %
HDLC SERPL-MCNC: 46 MG/DL
HDLC SERPL: 25.8 %
HGB BLD-MCNC: 13.2 G/DL
LDLC SERPL CALC-MCNC: 117.8 MG/DL
LYMPHOCYTES # BLD AUTO: 1.4 K/UL
LYMPHOCYTES NFR BLD: 34.2 %
MCH RBC QN AUTO: 28.4 PG
MCHC RBC AUTO-ENTMCNC: 32.6 G/DL
MCV RBC AUTO: 87 FL
MONOCYTES # BLD AUTO: 0.6 K/UL
MONOCYTES NFR BLD: 15.2 %
NEUTROPHILS # BLD AUTO: 1.9 K/UL
NEUTROPHILS NFR BLD: 47.5 %
NONHDLC SERPL-MCNC: 132 MG/DL
PLATELET # BLD AUTO: 148 K/UL
PMV BLD AUTO: 12.5 FL
POTASSIUM SERPL-SCNC: 3.4 MMOL/L
PROT SERPL-MCNC: 7.6 G/DL
RBC # BLD AUTO: 4.65 M/UL
SODIUM SERPL-SCNC: 142 MMOL/L
TRIGL SERPL-MCNC: 71 MG/DL
URATE SERPL-MCNC: 7.8 MG/DL
WBC # BLD AUTO: 3.95 K/UL

## 2018-06-28 PROCEDURE — 80053 COMPREHEN METABOLIC PANEL: CPT

## 2018-06-28 PROCEDURE — 85025 COMPLETE CBC W/AUTO DIFF WBC: CPT

## 2018-06-28 PROCEDURE — 80061 LIPID PANEL: CPT

## 2018-06-28 PROCEDURE — 36415 COLL VENOUS BLD VENIPUNCTURE: CPT

## 2018-06-28 PROCEDURE — 84153 ASSAY OF PSA TOTAL: CPT

## 2018-06-28 PROCEDURE — 84550 ASSAY OF BLOOD/URIC ACID: CPT

## 2018-07-03 ENCOUNTER — PATIENT MESSAGE (OUTPATIENT)
Dept: FAMILY MEDICINE | Facility: CLINIC | Age: 53
End: 2018-07-03

## 2018-07-05 DIAGNOSIS — I87.2 VENOUS INSUFFICIENCY: Primary | ICD-10-CM

## 2018-07-12 ENCOUNTER — INITIAL CONSULT (OUTPATIENT)
Dept: VASCULAR SURGERY | Facility: CLINIC | Age: 53
End: 2018-07-12
Payer: MEDICARE

## 2018-07-12 ENCOUNTER — HOSPITAL ENCOUNTER (OUTPATIENT)
Dept: RADIOLOGY | Facility: HOSPITAL | Age: 53
Discharge: HOME | End: 2018-07-12
Attending: SURGERY
Payer: MEDICARE

## 2018-07-12 VITALS
HEIGHT: 75 IN | HEART RATE: 70 BPM | SYSTOLIC BLOOD PRESSURE: 117 MMHG | BODY MASS INDEX: 39.17 KG/M2 | TEMPERATURE: 97 F | WEIGHT: 315 LBS | DIASTOLIC BLOOD PRESSURE: 82 MMHG

## 2018-07-12 DIAGNOSIS — I87.2 VENOUS INSUFFICIENCY: ICD-10-CM

## 2018-07-12 DIAGNOSIS — I48.19 PERSISTENT ATRIAL FIBRILLATION: ICD-10-CM

## 2018-07-12 DIAGNOSIS — I83.11 VARICOSE VEINS OF BOTH LOWER EXTREMITIES WITH INFLAMMATION: ICD-10-CM

## 2018-07-12 DIAGNOSIS — I83.12 VARICOSE VEINS OF BOTH LOWER EXTREMITIES WITH INFLAMMATION: ICD-10-CM

## 2018-07-12 DIAGNOSIS — E66.01 MORBID OBESITY: Primary | ICD-10-CM

## 2018-07-12 PROBLEM — I83.10 VARICOSE VEINS OF LOWER EXTREMITIES WITH INFLAMMATION: Status: ACTIVE | Noted: 2018-07-12

## 2018-07-12 PROCEDURE — 3079F DIAST BP 80-89 MM HG: CPT | Mod: CPTII,S$GLB,, | Performed by: SURGERY

## 2018-07-12 PROCEDURE — 3008F BODY MASS INDEX DOCD: CPT | Mod: CPTII,S$GLB,, | Performed by: SURGERY

## 2018-07-12 PROCEDURE — 99203 OFFICE O/P NEW LOW 30 MIN: CPT | Mod: S$GLB,,, | Performed by: SURGERY

## 2018-07-12 PROCEDURE — 93970 EXTREMITY STUDY: CPT | Mod: TC

## 2018-07-12 PROCEDURE — 3074F SYST BP LT 130 MM HG: CPT | Mod: CPTII,S$GLB,, | Performed by: SURGERY

## 2018-07-12 PROCEDURE — 93970 EXTREMITY STUDY: CPT | Mod: 26,,, | Performed by: RADIOLOGY

## 2018-07-12 PROCEDURE — 99999 PR PBB SHADOW E&M-EST. PATIENT-LVL III: CPT | Mod: PBBFAC,,, | Performed by: SURGERY

## 2018-07-12 RX ORDER — CARTILAGE/COLLAGEN/BOR/HYALUR 40-10-5 MG
TABLET ORAL
COMMUNITY
Start: 2018-06-28 | End: 2022-12-13

## 2018-07-12 RX ORDER — DICLOFENAC SODIUM 75 MG/1
TABLET, DELAYED RELEASE ORAL
COMMUNITY
Start: 2018-06-29 | End: 2018-11-27

## 2018-07-12 NOTE — LETTER
July 12, 2018      Scottie Alcantar MD  [address]  [address]  [place] [address]           [place] - Vascular Surgery  [address]  [facility] [address]  Phone: [phone]  Fax: [phone]          Patient: Caio Ontiveros   MR Number: [id number]   YOB: 1965   Date of Visit: 7/12/2018       Dear Dr. Scottie Alcantar:    Thank you for referring Caio Ontiveros to me for evaluation. Attached you will find relevant portions of my assessment and plan of care.    If you have questions, please do not hesitate to call me. I look forward to following Caio Ontiveros along with you.    Sincerely,    Melania German MD    Enclosure  CC:  No Recipients    If you would like to receive this communication electronically, please contact [email or web address] or [phone] to request more information on [employer] access.    For providers and/or their staff who would like to refer a patient to [facility], please contact us through our one-stop-shop provider referral line, [facility], at [phone].    If you feel you have received this communication in error or would no longer like to receive these types of communications, please e-mail [email or web address]

## 2018-07-12 NOTE — PROGRESS NOTES
Patient ID: Caio Ontiveros is a 52 y.o. male.    I. HISTORY     Chief Complaint: No chief complaint on file.      HPI Caio Ontiveros is a 52 y.o. male, referred from Dr Alcantar, for evaluation of persistent bilateral leg discomfort, swelling and bleeding varicose veins secondary to superficial venous insufficiency. Symptoms interfere with daily activities including exercise; has attempted elevation and analgesics with minimal relief a persists. No history of DVT or prior saphenous vein ablation procedures. He has undergone sclerotherapy in the past which was not successful. Wears knee high compression socks daily.    He has had recurrent bleeding from his left lower leg and one episode of bleeding from the right. Controled with pressure.    No history of venous ulceration. Denies claudication or rest pain.     Review of Systems   Constitution: Negative.   Cardiovascular: Positive for leg swelling. Negative for chest pain and claudication.   Respiratory: Negative.    Endocrine: Negative.    Hematologic/Lymphatic: Positive for bleeding problem.   Skin: Positive for color change, itching and poor wound healing.   Musculoskeletal: Positive for arthritis and stiffness.   Gastrointestinal: Negative.    Neurological: Negative.        II. PHYSICAL EXAM       Vitals:    07/12/18 1151   BP: 117/82   Pulse:    Temp:        Pulse: 70  Temp: 97 °F (36.1 °C)  Body mass index is 45.54 kg/m².      Physical Exam   Constitutional: He is oriented to person, place, and time. He appears well-developed and well-nourished.   HENT:   Head: Normocephalic and atraumatic.   Cardiovascular: Normal rate.  Exam reveals no decreased pulses.    Pulses:       Posterior tibial pulses are 2+ on the right side, and 2+ on the left side.   Pulmonary/Chest: Effort normal. No respiratory distress.   Musculoskeletal: Normal range of motion. He exhibits edema. He exhibits no tenderness.   Neurological: He is alert and oriented to person, place, and time.   Skin: Skin  is warm and dry. No erythema.   Psychiatric: He has a normal mood and affect.   Vitals reviewed.    Diffuse superficial varicose veins on bilateral lower legs and ankles  Skin inflammation and lipodermatosclerosis of bilateral gaitor region    III. ASSESSMENT & PLAN (MEDICAL DECISION MAKING)     1. Morbid obesity    2. Persistent atrial fibrillation    3. Varicose veins of both lower extremities with inflammation    4. Venous insufficiency        Imaging Results:  Venous duplex  - to be done today    Assessment/Diagnosis and Plan:    Caio Ontiveros is a 52 y.o. male with significant venous reflux, bilateral leg inflammation and bleeding varicose veins. Continue compression therapy. Rx for new stockings given.  Duplex today to determine anatomy.  Will review and discuss treatment options.    Melania German MD FACS [facility]  Vascular & Endovascular Surgery

## 2018-07-26 ENCOUNTER — PATIENT MESSAGE (OUTPATIENT)
Dept: VASCULAR SURGERY | Facility: CLINIC | Age: 53
End: 2018-07-26

## 2018-08-02 ENCOUNTER — PATIENT MESSAGE (OUTPATIENT)
Dept: ORTHOPEDICS | Facility: CLINIC | Age: 53
End: 2018-08-02

## 2018-08-02 ENCOUNTER — PATIENT MESSAGE (OUTPATIENT)
Dept: VASCULAR SURGERY | Facility: CLINIC | Age: 53
End: 2018-08-02

## 2018-08-02 NOTE — TELEPHONE ENCOUNTER
Spoke with pt about coming in the office to receive his handicap paperwork for the [employer] . Explained date ( 8-)  time , and location . Verbalized understanding .

## 2018-08-16 ENCOUNTER — PATIENT MESSAGE (OUTPATIENT)
Dept: ORTHOPEDICS | Facility: CLINIC | Age: 53
End: 2018-08-16

## 2018-08-17 ENCOUNTER — TELEPHONE (OUTPATIENT)
Dept: PAIN MEDICINE | Facility: CLINIC | Age: 53
End: 2018-08-17

## 2018-09-18 RX ORDER — COLCHICINE 0.6 MG/1
TABLET, FILM COATED ORAL
Qty: 60 TABLET | Refills: 1 | Status: SHIPPED | OUTPATIENT
Start: 2018-09-18 | End: 2018-09-25 | Stop reason: SDUPTHER

## 2018-09-25 ENCOUNTER — PATIENT MESSAGE (OUTPATIENT)
Dept: FAMILY MEDICINE | Facility: CLINIC | Age: 53
End: 2018-09-25

## 2018-09-25 RX ORDER — COLCHICINE 0.6 MG/1
0.6 TABLET ORAL DAILY
Qty: 60 TABLET | Refills: 0 | Status: SHIPPED | OUTPATIENT
Start: 2018-09-25 | End: 2018-09-27 | Stop reason: SDUPTHER

## 2018-09-26 ENCOUNTER — TELEPHONE (OUTPATIENT)
Dept: FAMILY MEDICINE | Facility: CLINIC | Age: 53
End: 2018-09-26

## 2018-09-26 NOTE — TELEPHONE ENCOUNTER
----- Message from Lola Connolly sent at 9/26/2018 11:47 AM CDT -----  Contact: [facility] [phone]  Pharmacy is calling to speak with you regarding a change to the patients medication. Please advsie.

## 2018-09-27 RX ORDER — COLCHICINE 0.6 MG/1
0.6 TABLET ORAL DAILY
Qty: 60 TABLET | Refills: 0 | Status: SHIPPED | OUTPATIENT
Start: 2018-09-27 | End: 2018-11-27 | Stop reason: SDUPTHER

## 2018-09-27 NOTE — TELEPHONE ENCOUNTER
Spoke to pharmacistOnelia and states that for pt's insurance to cover Colchicine it would have to be Brand Only. Pls advise.

## 2018-09-27 NOTE — TELEPHONE ENCOUNTER
----- Message from Cee Lundberg sent at 9/27/2018 10:15 AM CDT -----  Contact: Onelia - with [facility] [phone]  Onelia - with [facility] [phone], needs a call back about his medications. Please advise

## 2018-10-04 ENCOUNTER — TELEPHONE (OUTPATIENT)
Dept: FAMILY MEDICINE | Facility: CLINIC | Age: 53
End: 2018-10-04

## 2018-10-04 NOTE — TELEPHONE ENCOUNTER
----- Message from Irlanda Ruiz sent at 10/4/2018  1:36 PM CDT -----  Contact: Leonora Calling [employer] [phone]  Calling to get clarification on the patients medication.   1. colchicine (COLCRYS) 0.6 mg tablet 60 tablet

## 2018-10-04 NOTE — TELEPHONE ENCOUNTER
Spoke to [employer] rep and her that pt's medication was refilled on 9/27 and requested that refill be sent to [facility].

## 2018-11-05 RX ORDER — METOPROLOL SUCCINATE 25 MG/1
TABLET, EXTENDED RELEASE ORAL
Qty: 180 TABLET | Refills: 5 | Status: SHIPPED | OUTPATIENT
Start: 2018-11-05 | End: 2018-11-27

## 2018-11-27 ENCOUNTER — OFFICE VISIT (OUTPATIENT)
Dept: PAIN MEDICINE | Facility: CLINIC | Age: 53
End: 2018-11-27
Payer: MEDICARE

## 2018-11-27 ENCOUNTER — OFFICE VISIT (OUTPATIENT)
Dept: CARDIOLOGY | Facility: CLINIC | Age: 53
End: 2018-11-27
Payer: MEDICARE

## 2018-11-27 VITALS
WEIGHT: 315 LBS | SYSTOLIC BLOOD PRESSURE: 129 MMHG | BODY MASS INDEX: 39.17 KG/M2 | DIASTOLIC BLOOD PRESSURE: 88 MMHG | HEIGHT: 75 IN | HEART RATE: 87 BPM

## 2018-11-27 VITALS
WEIGHT: 315 LBS | HEIGHT: 75 IN | DIASTOLIC BLOOD PRESSURE: 83 MMHG | HEART RATE: 51 BPM | SYSTOLIC BLOOD PRESSURE: 123 MMHG | BODY MASS INDEX: 39.17 KG/M2

## 2018-11-27 DIAGNOSIS — I10 ESSENTIAL HYPERTENSION: Primary | ICD-10-CM

## 2018-11-27 DIAGNOSIS — E66.2 OBESITY HYPOVENTILATION SYNDROME: ICD-10-CM

## 2018-11-27 DIAGNOSIS — I48.91 ATRIAL FIBRILLATION: ICD-10-CM

## 2018-11-27 DIAGNOSIS — M70.71 ISCHIAL BURSITIS OF RIGHT SIDE: ICD-10-CM

## 2018-11-27 DIAGNOSIS — M79.18 MYOFASCIAL PAIN: Primary | ICD-10-CM

## 2018-11-27 DIAGNOSIS — E66.01 MORBID OBESITY: ICD-10-CM

## 2018-11-27 DIAGNOSIS — I48.20 CHRONIC ATRIAL FIBRILLATION: ICD-10-CM

## 2018-11-27 DIAGNOSIS — R26.9 GAIT ABNORMALITY: ICD-10-CM

## 2018-11-27 DIAGNOSIS — M10.00 IDIOPATHIC GOUT, UNSPECIFIED CHRONICITY, UNSPECIFIED SITE: ICD-10-CM

## 2018-11-27 PROCEDURE — 99204 OFFICE O/P NEW MOD 45 MIN: CPT | Mod: HCNC,S$GLB,, | Performed by: PAIN MEDICINE

## 2018-11-27 PROCEDURE — 99999 PR PBB SHADOW E&M-EST. PATIENT-LVL III: CPT | Mod: PBBFAC,HCNC,, | Performed by: PAIN MEDICINE

## 2018-11-27 PROCEDURE — 93000 ELECTROCARDIOGRAM COMPLETE: CPT | Mod: HCNC,S$GLB,, | Performed by: INTERNAL MEDICINE

## 2018-11-27 PROCEDURE — 99999 PR PBB SHADOW E&M-EST. PATIENT-LVL III: CPT | Mod: PBBFAC,HCNC,, | Performed by: INTERNAL MEDICINE

## 2018-11-27 PROCEDURE — 99214 OFFICE O/P EST MOD 30 MIN: CPT | Mod: HCNC,S$GLB,, | Performed by: INTERNAL MEDICINE

## 2018-11-27 PROCEDURE — 3074F SYST BP LT 130 MM HG: CPT | Mod: CPTII,HCNC,S$GLB, | Performed by: PAIN MEDICINE

## 2018-11-27 PROCEDURE — 3074F SYST BP LT 130 MM HG: CPT | Mod: CPTII,HCNC,S$GLB, | Performed by: INTERNAL MEDICINE

## 2018-11-27 PROCEDURE — 3079F DIAST BP 80-89 MM HG: CPT | Mod: CPTII,HCNC,S$GLB, | Performed by: INTERNAL MEDICINE

## 2018-11-27 PROCEDURE — 3079F DIAST BP 80-89 MM HG: CPT | Mod: CPTII,HCNC,S$GLB, | Performed by: PAIN MEDICINE

## 2018-11-27 PROCEDURE — 3008F BODY MASS INDEX DOCD: CPT | Mod: CPTII,HCNC,S$GLB, | Performed by: INTERNAL MEDICINE

## 2018-11-27 PROCEDURE — 3008F BODY MASS INDEX DOCD: CPT | Mod: CPTII,HCNC,S$GLB, | Performed by: PAIN MEDICINE

## 2018-11-27 RX ORDER — COLCHICINE 0.6 MG/1
0.6 TABLET ORAL DAILY
Qty: 90 TABLET | Refills: 3 | Status: SHIPPED | OUTPATIENT
Start: 2018-11-27 | End: 2019-04-05

## 2018-11-27 RX ORDER — METOPROLOL SUCCINATE 25 MG/1
25 TABLET, EXTENDED RELEASE ORAL 3 TIMES DAILY
Qty: 270 TABLET | Refills: 3 | Status: SHIPPED | OUTPATIENT
Start: 2018-11-27 | End: 2020-01-27

## 2018-11-27 NOTE — PROGRESS NOTES
[facility] New Patient Evaluation    Referred by: self  Reason for referral: neck pain, joint pain    CC:   Chief Complaint   Patient presents with    Pain     right buttock     HPI:   Caio Ontiveros is a 53 y.o. male who complains of right buttock pain that radiates into the proximal posterior thigh.    Onset: several months ago  Current Pain Score: 9/10  Daily Pain Range: 8-9/10  Quality: Sharp deep  Radiation: into proximal posterior thigh  Worsened by: standing or prolonged driving  Improved by: heat and medication.     Previous Therapies:  PT/OT: Denies  HEP:   Interventions: Denies  Surgery: Denies hip surgery  Medications:   - NSAIDS:   - MSK Relaxants:   - TCAs:   - SNRIs:   - Topicals:   - Anticonvulsants:  - Opioids:     Current Pain Medications:  1. Norco 5-325    2. Diclofenac 75 mg     Review of Systems:  Review of Systems   Constitutional: Negative for chills and fever.   HENT: Negative for nosebleeds.    Eyes: Negative for pain.   Respiratory: Negative for hemoptysis.    Cardiovascular: Negative for chest pain.   Gastrointestinal: Negative for nausea and vomiting.   Genitourinary: Negative for dysuria.   Musculoskeletal: Positive for myalgias.   Skin: Negative for rash.   Neurological: Negative for dizziness, tingling and sensory change.   Endo/Heme/Allergies: Bruises/bleeds easily.   Psychiatric/Behavioral: Negative for depression.       History:    Current Outpatient Medications:     allopurinol (ZYLOPRIM) 100 MG tablet, Take 1 tablet (100 mg total) by mouth once daily., Disp: 90 tablet, Rfl: 3    amLODIPine (NORVASC) 5 MG tablet, Take 1 tablet (5 mg total) by mouth once daily., Disp: 90 tablet, Rfl: 3    calcium phosphate-vitamin D3 250-400 mg-unit Chew, , Disp: , Rfl:     clotrimazole-betamethasone (LOTRISONE) lotion, Apply topically 2 (two) times daily., Disp: 30 mL, Rfl: 2    colchicine (COLCRYS) 0.6 mg tablet, Take 1 tablet (0.6 mg total) by mouth once daily., Disp: 60 tablet, Rfl:  0    diclofenac (VOLTAREN) 75 MG EC tablet, , Disp: , Rfl:     furosemide (LASIX) 80 MG tablet, Take 1 tablet (80 mg total) by mouth 2 (two) times daily., Disp: 180 tablet, Rfl: 3    losartan-hydrochlorothiazide 100-25 mg (HYZAAR) 100-25 mg per tablet, Take 1 tablet by mouth once daily. , Disp: , Rfl:     metoprolol succinate (TOPROL-XL) 25 MG 24 hr tablet, TAKE 1 TABLET TWICE DAILY, Disp: 180 tablet, Rfl: 5    potassium chloride SA (K-DUR,KLOR-CON) 20 MEQ tablet, Take 20 mEq by mouth., Disp: , Rfl:     QUEtiapine (SEROQUEL) 100 MG Tab, Take 1 tablet (100 mg total) by mouth every evening., Disp: 90 tablet, Rfl: 5    venlafaxine (EFFEXOR-XR) 150 MG Cp24, Take 1 capsule (150 mg total) by mouth once daily., Disp: 90 capsule, Rfl: 5    apixaban (ELIQUIS) 5 mg Tab, Take 1 tablet (5 mg total) by mouth 2 (two) times daily., Disp: 180 tablet, Rfl: 3    aspirin 81 MG Chew, Take 1 tablet (81 mg total) by mouth once daily., Disp: 30 tablet, Rfl: 0    clonazePAM (KLONOPIN) 0.5 MG tablet, Take 1 tablet (0.5 mg total) by mouth every evening., Disp: 30 tablet, Rfl: 0    hydrocodone-acetaminophen 5-325mg (NORCO) 5-325 mg per tablet, Take 1 tablet by mouth every 8 (eight) hours as needed for Pain., Disp: 15 tablet, Rfl: 0    OPW TEST CLAIM - DO NOT FILL, OPW test claim. Do not fill., Disp: 1 tablet, Rfl: 0    Past Medical History:   Diagnosis Date    A-fib     CHF (congestive heart failure)     Gout, chronic     Hypertension        Past Surgical History:   Procedure Laterality Date    CHOLECYSTECTOMY      GASTRIC BYPASS  2014       Family History   Problem Relation Age of Onset    Hypertension Mother     Stroke Father     Diabetes Father     Hypertension Father     Diabetes Sister     Diabetes Brother        Social History     Socioeconomic History    Marital status: Legally      Spouse name: None    Number of children: 1    Years of education: None    Highest education level: None   Social Needs  "   Financial resource strain: None    Food insecurity - worry: None    Food insecurity - inability: None    Transportation needs - medical: None    Transportation needs - non-medical: None   Occupational History    None   Tobacco Use    Smoking status: Never Smoker    Smokeless tobacco: Never Used   Substance and Sexual Activity    Alcohol use: Yes     Comment: sometimes    Drug use: No    Sexual activity: Not Currently   Other Topics Concern    None   Social History Narrative    None       Review of patient's allergies indicates:  No Known Allergies    Physical Exam:  Vitals:    11/27/18 1433   BP: 129/88   Pulse: 87   Weight: (!) 171.8 kg (378 lb 12 oz)   Height: 6' 3" (1.905 m)   PainSc:   9   PainLoc: Hip     General    Nursing note and vitals reviewed.  Constitutional: He is oriented to person, place, and time. He appears well-developed and well-nourished. No distress.   HENT:   Head: Normocephalic and atraumatic.   Nose: Nose normal.   Eyes: Conjunctivae and EOM are normal. Pupils are equal, round, and reactive to light. Right eye exhibits no discharge. Left eye exhibits no discharge. No scleral icterus.   Neck: No JVD present.   Cardiovascular: Intact distal pulses.    Pulmonary/Chest: Effort normal. No respiratory distress.   Abdominal: He exhibits no distension.   Neurological: He is alert and oriented to person, place, and time. Coordination normal.   Psychiatric: He has a normal mood and affect. His behavior is normal. Judgment and thought content normal.             Right Hip Exam     Comments:  Tenderness present in the right ischial bursa.        Imaging:  None     Labs:  BMP  Lab Results   Component Value Date     06/28/2018    K 3.4 (L) 06/28/2018     06/28/2018    CO2 30 (H) 06/28/2018    BUN 16 06/28/2018    CREATININE 1.1 06/28/2018    CALCIUM 9.5 06/28/2018    ANIONGAP 8 06/28/2018    ESTGFRAFRICA >60 06/28/2018    EGFRNONAA >60 06/28/2018     Lab Results   Component " Value Date    ALT 16 06/28/2018    AST 18 06/28/2018    ALKPHOS 55 06/28/2018    BILITOT 0.7 06/28/2018       Assessment:    54 yo M with the following diagnoses...    Problem List Items Addressed This Visit     Ischial bursitis of right side    Myofascial pain - Primary    Gait abnormality          No SI or articular hip pain.  Patient is suffering from ischial bursitis.    : Not applicable    Treatment Plan:   Procedures: Right Ischial Bursa Injection under fluoroscopic guidance due to body habitus   PT/OT/HEP: Referral placed for establishing HEP focus on stretching legs/hips/buttocks  Medications: No changes recommended  Labs: reviewed and medications are appropriately dosed for current hepatorenal function.  Imaging: No additional recommended at this time.    Follow Up: RTC p injection    Julián Oliver Jr, MD  Interventional Pain Medicine / Anesthesiology    Disclaimer: This note was partly generated using dictation software which may occasionally result in transcription errors.

## 2018-11-27 NOTE — PROGRESS NOTES
Subjective:      Patient ID: Caio Ontiveros is a 53 y.o. male.    Chief Complaint: No chief complaint on file.    HPI:  Worried about weight gain  Has pain in knees and hips      Review of Systems   Cardiovascular: Positive for palpitations (Sometimes it feels like my heart races). Negative for chest pain, claudication, dyspnea on exertion, irregular heartbeat, leg swelling, near-syncope, orthopnea and syncope.      Heart rate at home goes from 80 to 118 bpm    Pt states he is taking Eliquis 5 mg one tablet twice a day    Contemplating an injection for chronic hip pain    Past Medical History:   Diagnosis Date    A-fib     CHF (congestive heart failure)     Gout, chronic     Hypertension         Past Surgical History:   Procedure Laterality Date    CHOLECYSTECTOMY      GASTRIC BYPASS  2014       Family History   Problem Relation Age of Onset    Hypertension Mother     Stroke Father     Diabetes Father     Hypertension Father     Diabetes Sister     Diabetes Brother        Social History     Socioeconomic History    Marital status: Legally      Spouse name: Not on file    Number of children: 1    Years of education: Not on file    Highest education level: Not on file   Social Needs    Financial resource strain: Not on file    Food insecurity - worry: Not on file    Food insecurity - inability: Not on file    Transportation needs - medical: Not on file    Transportation needs - non-medical: Not on file   Occupational History    Not on file   Tobacco Use    Smoking status: Never Smoker    Smokeless tobacco: Never Used   Substance and Sexual Activity    Alcohol use: Yes     Comment: sometimes    Drug use: No    Sexual activity: Not Currently   Other Topics Concern    Not on file   Social History Narrative    Not on file       Current Outpatient Medications on File Prior to Visit   Medication Sig Dispense Refill    allopurinol (ZYLOPRIM) 100 MG tablet Take 1 tablet (100 mg total) by  "mouth once daily. 90 tablet 3    amLODIPine (NORVASC) 5 MG tablet Take 1 tablet (5 mg total) by mouth once daily. 90 tablet 3    aspirin 81 MG Chew Take 1 tablet (81 mg total) by mouth once daily. 30 tablet 0    calcium phosphate-vitamin D3 250-400 mg-unit Chew       clotrimazole-betamethasone (LOTRISONE) lotion Apply topically 2 (two) times daily. 30 mL 2    furosemide (LASIX) 80 MG tablet Take 1 tablet (80 mg total) by mouth 2 (two) times daily. 180 tablet 3    hydrocodone-acetaminophen 5-325mg (NORCO) 5-325 mg per tablet Take 1 tablet by mouth every 8 (eight) hours as needed for Pain. 15 tablet 0    losartan-hydrochlorothiazide 100-25 mg (HYZAAR) 100-25 mg per tablet Take 1 tablet by mouth once daily.       metoprolol succinate (TOPROL-XL) 25 MG 24 hr tablet TAKE 1 TABLET TWICE DAILY 180 tablet 5    OPW TEST CLAIM - DO NOT FILL OPW test claim. Do not fill. 1 tablet 0    potassium chloride SA (K-DUR,KLOR-CON) 20 MEQ tablet Take 20 mEq by mouth.      QUEtiapine (SEROQUEL) 100 MG Tab Take 1 tablet (100 mg total) by mouth every evening. 90 tablet 5    venlafaxine (EFFEXOR-XR) 150 MG Cp24 Take 1 capsule (150 mg total) by mouth once daily. 90 capsule 5    [DISCONTINUED] apixaban 5 mg Tab Take 2 tablets (10 mg total) by mouth 2 (two) times daily. 180 tablet 3    [DISCONTINUED] colchicine (COLCRYS) 0.6 mg tablet Take 1 tablet (0.6 mg total) by mouth once daily. 60 tablet 0    [DISCONTINUED] diclofenac (VOLTAREN) 75 MG EC tablet       clonazePAM (KLONOPIN) 0.5 MG tablet Take 1 tablet (0.5 mg total) by mouth every evening. 30 tablet 0     No current facility-administered medications on file prior to visit.        Review of patient's allergies indicates:  No Known Allergies  Objective:     Vitals:    11/27/18 1605   BP: 123/83   BP Location: Left arm   Patient Position: Sitting   BP Method: Large (Automatic)   Pulse: (!) 51   Weight: (!) 171.9 kg (379 lb)   Height: 6' 3" (1.905 m)        Physical Exam "   Constitutional: He is oriented to person, place, and time. He appears well-developed and well-nourished. No distress.   Eyes: No scleral icterus.   Neck: No JVD present. Carotid bruit is not present.   Cardiovascular: Normal heart sounds. An irregularly irregular rhythm present. Exam reveals no gallop and no friction rub.   No murmur heard.  Pulmonary/Chest: Effort normal and breath sounds normal. No respiratory distress.   Musculoskeletal: He exhibits no edema.   Neurological: He is alert and oriented to person, place, and time.   Skin: Skin is warm and dry. He is not diaphoretic.   Psychiatric: He has a normal mood and affect. His behavior is normal. Judgment and thought content normal.   Vitals reviewed.   Pt is obese  Wt up 32 lbs over past year.    ECG: atrial fibrillation with controlled ventricular response    CBC and CMP and lipid profile in June [place]  Assessment:     1. Essential hypertension    2. Chronic atrial fibrillation    3. Morbid obesity    4. Obesity hypoventilation syndrome    5. Atrial fibrillation    6. Idiopathic gout, unspecified chronicity, unspecified site      Plan:   Diagnoses and all orders for this visit:    Essential hypertension    Chronic atrial fibrillation  -     IN OFFICE EKG 12-LEAD (to Muse)    Morbid obesity    Obesity hypoventilation syndrome    Atrial fibrillation  -     IN OFFICE EKG 12-LEAD (to Muse)    Idiopathic gout, unspecified chronicity, unspecified site  -     colchicine (COLCRYS) 0.6 mg tablet; Take 1 tablet (0.6 mg total) by mouth once daily.    Other orders  -     apixaban (ELIQUIS) 5 mg Tab; Take 1 tablet (5 mg total) by mouth 2 (two) times daily.       Low carb diet discussed in detail.  Weight Watchers  exercise    Discussed small risk of stroke from holding the Eliquis for 2 days for a steroid injection which has to be weighed against the potential benefit of pain relief.  Pt reports that his father had a stroke when holding his anticoagulation for a  procedure.    Same meds except increase the metoprolol to tid and use colchicine instead of diclofenac.  Pt takes colchicine prn gout.  Pt instructed to take one colchicine and allopurinol once a day as a preventive measure to prevent gout.  OK to take Tylenol.    Holter monitor in a week or two  No Follow-up on file.

## 2018-11-27 NOTE — LETTER
November 30, 2018      No Recipients           [facility]  [address]  [place] [address]  Phone: [phone]          Patient: Caio Ontiveros   MR Number: [id number]   YOB: 1965   Date of Visit: 11/27/2018       Dear :    Thank you for referring Caio Ontiveros to me for evaluation. Attached you will find relevant portions of my assessment and plan of care.    If you have questions, please do not hesitate to call me. I look forward to following Caio Ontiveros along with you.    Sincerely,    Julián Oliver Jr., MD    Enclosure  CC:  No Recipients    If you would like to receive this communication electronically, please contact [email or web address] or [phone] to request more information on "[employer] Link access.    For providers and/or their staff who would like to refer a patient to [facility], please contact us through our one-stop-shop provider referral line, [facility], at [phone].    If you feel you have received this communication in error or would no longer like to receive these types of communications, please e-mail [email or web address]

## 2018-11-28 ENCOUNTER — TELEPHONE (OUTPATIENT)
Dept: PAIN MEDICINE | Facility: CLINIC | Age: 53
End: 2018-11-28

## 2018-11-30 PROBLEM — M79.18 MYOFASCIAL PAIN: Status: ACTIVE | Noted: 2018-11-30

## 2018-11-30 PROBLEM — R26.9 GAIT ABNORMALITY: Status: ACTIVE | Noted: 2018-11-30

## 2018-12-02 ENCOUNTER — PATIENT MESSAGE (OUTPATIENT)
Dept: PAIN MEDICINE | Facility: CLINIC | Age: 53
End: 2018-12-02

## 2018-12-04 ENCOUNTER — TELEPHONE (OUTPATIENT)
Dept: CARDIOLOGY | Facility: CLINIC | Age: 53
End: 2018-12-04

## 2018-12-04 ENCOUNTER — TELEPHONE (OUTPATIENT)
Dept: PAIN MEDICINE | Facility: CLINIC | Age: 53
End: 2018-12-04

## 2018-12-04 ENCOUNTER — HOSPITAL ENCOUNTER (OUTPATIENT)
Dept: CARDIOLOGY | Facility: HOSPITAL | Age: 53
Discharge: HOME | End: 2018-12-04
Attending: INTERNAL MEDICINE
Payer: MEDICARE

## 2018-12-04 DIAGNOSIS — I48.20 CHRONIC ATRIAL FIBRILLATION: ICD-10-CM

## 2018-12-04 PROCEDURE — 93227 XTRNL ECG REC<48 HR R&I: CPT | Mod: HCNC,,, | Performed by: INTERNAL MEDICINE

## 2018-12-04 PROCEDURE — 93225 XTRNL ECG REC<48 HRS REC: CPT | Mod: HCNC

## 2018-12-04 NOTE — TELEPHONE ENCOUNTER
Spoke to patient regarding discussion about Eliquis with Dr Hernandez at last visit. Will send office note to Dr Munguia and his MA.  ----- Message from Mary Jane Mccall MA sent at 12/4/2018  2:27 PM CST -----  Pt is scheduled to have a Right Ischial Bursa Injection with Dr. Oliver on 12/13/18 and pt needs med clearance to hold Eliquis 5 days prior to procedure. Please advise.

## 2018-12-05 ENCOUNTER — TELEPHONE (OUTPATIENT)
Dept: PAIN MEDICINE | Facility: CLINIC | Age: 53
End: 2018-12-05

## 2018-12-05 NOTE — TELEPHONE ENCOUNTER
Spoke with pt regarding his procedure with Dr. Oliver. Pt stated he wants to cancel his procedure with Dr. Oliver because he fears he will have a stroke if he stop taking Eliquis 5 days prior to procedure. Pt stated he would like to keep his follow up appointment with Fabián in January to discuss other treatment options. Pt verbalized understanding.

## 2018-12-11 LAB
OHS CV EVENT MONITOR DAY: 0
OHS CV HOLTER LENGTH DECIMAL HOURS: 24
OHS CV HOLTER LENGTH HOURS: 24
OHS CV HOLTER LENGTH MINUTES: 0

## 2018-12-12 ENCOUNTER — TELEPHONE (OUTPATIENT)
Dept: CARDIOLOGY | Facility: CLINIC | Age: 53
End: 2018-12-12

## 2018-12-12 NOTE — TELEPHONE ENCOUNTER
holter shows a fib with adequately controlled ventricular response.  Message left on pt's voicemail.

## 2018-12-31 ENCOUNTER — PATIENT MESSAGE (OUTPATIENT)
Dept: FAMILY MEDICINE | Facility: CLINIC | Age: 53
End: 2018-12-31

## 2018-12-31 ENCOUNTER — TELEPHONE (OUTPATIENT)
Dept: FAMILY MEDICINE | Facility: CLINIC | Age: 53
End: 2018-12-31

## 2018-12-31 NOTE — TELEPHONE ENCOUNTER
Spoke to pt and states that he has an appt on 1/4 with Dr. Alcantar. Pt will address the letter he needs at his appt for reimbursement of a hotel reservation due to a Gout flare-up.

## 2018-12-31 NOTE — TELEPHONE ENCOUNTER
----- Message from Ani Romero sent at 12/31/2018 12:21 PM CST -----  Contact: self, [phone] (M)  Patient called in returning your call. Please advise.

## 2019-01-04 ENCOUNTER — OFFICE VISIT (OUTPATIENT)
Dept: FAMILY MEDICINE | Facility: CLINIC | Age: 54
End: 2019-01-04
Attending: FAMILY MEDICINE
Payer: MEDICARE

## 2019-01-04 VITALS
OXYGEN SATURATION: 97 % | HEIGHT: 75 IN | TEMPERATURE: 98 F | WEIGHT: 211.19 LBS | HEART RATE: 71 BPM | SYSTOLIC BLOOD PRESSURE: 127 MMHG | DIASTOLIC BLOOD PRESSURE: 84 MMHG | BODY MASS INDEX: 26.26 KG/M2

## 2019-01-04 DIAGNOSIS — E66.01 MORBID OBESITY: ICD-10-CM

## 2019-01-04 DIAGNOSIS — M1A.00X0 IDIOPATHIC CHRONIC GOUT WITHOUT TOPHUS, UNSPECIFIED SITE: ICD-10-CM

## 2019-01-04 DIAGNOSIS — I48.20 CHRONIC ATRIAL FIBRILLATION: ICD-10-CM

## 2019-01-04 DIAGNOSIS — F33.1 MODERATE EPISODE OF RECURRENT MAJOR DEPRESSIVE DISORDER: Primary | ICD-10-CM

## 2019-01-04 DIAGNOSIS — I27.20 PULMONARY HYPERTENSION: ICD-10-CM

## 2019-01-04 DIAGNOSIS — Z23 IMMUNIZATION DUE: ICD-10-CM

## 2019-01-04 PROCEDURE — 90732 PPSV23 VACC 2 YRS+ SUBQ/IM: CPT | Mod: HCNC,S$GLB,, | Performed by: FAMILY MEDICINE

## 2019-01-04 PROCEDURE — 3074F SYST BP LT 130 MM HG: CPT | Mod: CPTII,HCNC,S$GLB, | Performed by: FAMILY MEDICINE

## 2019-01-04 PROCEDURE — 3079F PR MOST RECENT DIASTOLIC BLOOD PRESSURE 80-89 MM HG: ICD-10-PCS | Mod: CPTII,HCNC,S$GLB, | Performed by: FAMILY MEDICINE

## 2019-01-04 PROCEDURE — 90732 PNEUMOCOCCAL POLYSACCHARIDE VACCINE 23-VALENT =>2YO SQ IM: ICD-10-PCS | Mod: HCNC,S$GLB,, | Performed by: FAMILY MEDICINE

## 2019-01-04 PROCEDURE — 90686 FLU VACCINE (QUAD) GREATER THAN OR EQUAL TO 3YO PRESERVATIVE FREE IM: ICD-10-PCS | Mod: HCNC,S$GLB,, | Performed by: FAMILY MEDICINE

## 2019-01-04 PROCEDURE — 99999 PR PBB SHADOW E&M-EST. PATIENT-LVL III: CPT | Mod: PBBFAC,HCNC,, | Performed by: FAMILY MEDICINE

## 2019-01-04 PROCEDURE — 99214 OFFICE O/P EST MOD 30 MIN: CPT | Mod: 25,HCNC,S$GLB, | Performed by: FAMILY MEDICINE

## 2019-01-04 PROCEDURE — 99999 PR PBB SHADOW E&M-EST. PATIENT-LVL III: ICD-10-PCS | Mod: PBBFAC,HCNC,, | Performed by: FAMILY MEDICINE

## 2019-01-04 PROCEDURE — 3008F BODY MASS INDEX DOCD: CPT | Mod: CPTII,HCNC,S$GLB, | Performed by: FAMILY MEDICINE

## 2019-01-04 PROCEDURE — 3008F PR BODY MASS INDEX (BMI) DOCUMENTED: ICD-10-PCS | Mod: CPTII,HCNC,S$GLB, | Performed by: FAMILY MEDICINE

## 2019-01-04 PROCEDURE — 3074F PR MOST RECENT SYSTOLIC BLOOD PRESSURE < 130 MM HG: ICD-10-PCS | Mod: CPTII,HCNC,S$GLB, | Performed by: FAMILY MEDICINE

## 2019-01-04 PROCEDURE — 3079F DIAST BP 80-89 MM HG: CPT | Mod: CPTII,HCNC,S$GLB, | Performed by: FAMILY MEDICINE

## 2019-01-04 PROCEDURE — 99214 PR OFFICE/OUTPT VISIT, EST, LEVL IV, 30-39 MIN: ICD-10-PCS | Mod: 25,HCNC,S$GLB, | Performed by: FAMILY MEDICINE

## 2019-01-04 PROCEDURE — G0008 FLU VACCINE (QUAD) GREATER THAN OR EQUAL TO 3YO PRESERVATIVE FREE IM: ICD-10-PCS | Mod: HCNC,S$GLB,, | Performed by: FAMILY MEDICINE

## 2019-01-04 PROCEDURE — G0008 ADMIN INFLUENZA VIRUS VAC: HCPCS | Mod: HCNC,S$GLB,, | Performed by: FAMILY MEDICINE

## 2019-01-04 PROCEDURE — G0009 PNEUMOCOCCAL POLYSACCHARIDE VACCINE 23-VALENT =>2YO SQ IM: ICD-10-PCS | Mod: HCNC,S$GLB,, | Performed by: FAMILY MEDICINE

## 2019-01-04 PROCEDURE — G0009 ADMIN PNEUMOCOCCAL VACCINE: HCPCS | Mod: HCNC,S$GLB,, | Performed by: FAMILY MEDICINE

## 2019-01-04 PROCEDURE — 90686 IIV4 VACC NO PRSV 0.5 ML IM: CPT | Mod: HCNC,S$GLB,, | Performed by: FAMILY MEDICINE

## 2019-01-04 NOTE — LETTER
January 4, 2019                 [facility]  Family Medicine  [address] [place] [address] [address]  [place] [address]  Phone: [phone]  Fax: [phone]   January 4, 2019     Patient: Caio Ontiveros   YOB: 1965   Date of Visit: 1/4/2019       To Whom it May Concern:    Mr. Kearney is my patient and has following diagnoses:    1. Chronic Gout  2. Atrial Fibrillation    He suffered a gout flare from 12/3/2018 - 01/02/2019. It is my medical opinion that he is excused from any travel or work during that time.    Caio Ontiveros was seen in my clinic on 1/4/2019.     If you have any questions or concerns, please don't hesitate to call.    Sincerely,         Scottie Alcantar MD

## 2019-01-04 NOTE — PROGRESS NOTES
Subjective:       Patient ID: Caio Ontiveros is a 53 y.o. male.    Chief Complaint: Gout    53 yr old pleasant black male with AFIB persistent, HTN, pulmonary HTN, varicose veins, EVANGELIST, presents today for his annual wellness check, lab work and  evaluation of HTN,  gout, LBP, right hip pain and depression. He also c/o gout flare during New Years's Sandra.    HTN - much better - on norvasc, lasix, HYZAAR and metoprolol - compliant - no side effects       Depression - stable - -  - no SI/HI - on effexor 150 and seroquel 100 - sleep somewhat better however getting up after 3 hr sleep    LBP and right hip pain - seen Ortho and they recommend hip replacement - depression is worse and also has low back pain - no neurological symptoms, salddle anesthesia, bladder or bowel problems -       Right great toe pain: Onset 2-3 days ago and rapidly worsening. He has history of gout and had flare ups and usually treats with colchicine however this time it did not get any better. He reports eating pork ribs right before the onset of symptoms. He denies drinking excess alcohol or eating sea food. His last uric acid levels unknown. Details as follows -    Gout chronic - gets flares every now and then     History as below - reviewed       Hypertension   This is a chronic problem. The current episode started more than 1 year ago. The problem has been gradually improving since onset. The problem is controlled. Associated symptoms include headaches and neck pain. Pertinent negatives include no chest pain or palpitations. There are no associated agents to hypertension. Past treatments include angiotensin blockers, diuretics, beta blockers and calcium channel blockers. The current treatment provides moderate improvement. There are no compliance problems.  Hypertensive end-organ damage includes heart failure and left ventricular hypertrophy. There is no history of angina, CAD/MI or CVA. Identifiable causes of hypertension include sleep apnea. There  is no history of coarctation of the aorta, hypercortisolism, hyperparathyroidism, pheochromocytoma, renovascular disease or a thyroid problem.   Low-back Pain   This is a chronic problem. The current episode started more than 1 month ago. The problem occurs intermittently. The problem has been unchanged. Associated symptoms include arthralgias, headaches, myalgias and neck pain. Pertinent negatives include no chest pain, congestion, coughing, diaphoresis, joint swelling, nausea, rash, vomiting or weakness. Nothing aggravates the symptoms. He has tried nothing for the symptoms. The treatment provided no relief.   Toe Pain    There was no injury mechanism. The pain is present in the right toes. The quality of the pain is described as aching. The pain is at a severity of 9/10. The pain is severe. The pain has been constant since onset. Associated symptoms include an inability to bear weight. The symptoms are aggravated by palpation, movement and weight bearing. He has tried heat and NSAIDs for the symptoms. The treatment provided no relief.   Nausea   This is a new problem. The current episode started today. The problem occurs constantly. The problem has been unchanged. Associated symptoms include arthralgias, headaches, myalgias and neck pain. Pertinent negatives include no chest pain, congestion, coughing, diaphoresis, joint swelling, nausea, rash, vomiting or weakness. Nothing aggravates the symptoms. He has tried nothing for the symptoms. The treatment provided no relief.   Congestive Heart Failure   Presents for follow-up visit. The disease course has been stable. Pertinent negatives include no chest pain, palpitations or unexpected weight change. The symptoms have been stable. Past treatments include angiotensin receptor blockers, beta blockers and salt and fluid restriction (diuretics). The treatment provided moderate relief. Compliance with prior treatments has been good. There is no history of anemia, CAD,  CVA, DM, DVT, hyperthyroidism, myocarditis or PE. Compliance with total regimen is %. Compliance problems include adherence to exercise.  Compliance with diet is %. Compliance with exercise is %. Compliance with medications is %.     Review of Systems   Constitutional: Negative.  Negative for activity change, diaphoresis and unexpected weight change.   HENT: Negative.  Negative for congestion, ear pain, mouth sores, rhinorrhea and voice change.    Eyes: Negative.  Negative for pain, discharge and visual disturbance.   Respiratory: Negative.  Negative for apnea, cough and wheezing.    Cardiovascular: Negative.  Negative for chest pain and palpitations.   Gastrointestinal: Negative.  Negative for abdominal distention, anal bleeding, diarrhea, nausea and vomiting.   Endocrine: Negative.  Negative for cold intolerance and polyuria.   Genitourinary: Negative.  Negative for decreased urine volume, difficulty urinating, discharge, frequency and scrotal swelling.   Musculoskeletal: Positive for arthralgias, myalgias and neck pain. Negative for back pain, joint swelling and neck stiffness.   Skin: Negative.  Negative for color change and rash.   Allergic/Immunologic: Negative.  Negative for environmental allergies and immunocompromised state.   Neurological: Positive for headaches. Negative for dizziness, speech difficulty, weakness and light-headedness.   Hematological: Negative.    Psychiatric/Behavioral: Negative.  Negative for agitation, dysphoric mood and suicidal ideas. The patient is not nervous/anxious.        PMH/PSH/FH/SH/MED/ALLERGY reviewed    Objective:       Vitals:    01/04/19 1150   BP: 127/84   Pulse: 71   Temp: 98.2 °F (36.8 °C)       Physical Exam   Constitutional: He is oriented to person, place, and time. He appears well-developed and well-nourished.   HENT:   Head: Normocephalic and atraumatic.   Right Ear: External ear normal.   Left Ear: External ear normal.   Nose: Nose  normal.   Mouth/Throat: Oropharynx is clear and moist. No oropharyngeal exudate.   Eyes: Conjunctivae and EOM are normal. Pupils are equal, round, and reactive to light. Right eye exhibits no discharge. Left eye exhibits no discharge. No scleral icterus.   Neck: Normal range of motion. Neck supple. No JVD present. No tracheal deviation present. No thyromegaly present.   Cardiovascular: Normal rate, regular rhythm, normal heart sounds and intact distal pulses. Exam reveals no gallop and no friction rub.   No murmur heard.  Pulmonary/Chest: Effort normal and breath sounds normal. No stridor. No respiratory distress. He has no wheezes. He has no rales. He exhibits no tenderness.   Abdominal: Soft. Bowel sounds are normal. He exhibits no distension and no mass. There is no tenderness. There is no rebound and no guarding. No hernia.   Musculoskeletal: Normal range of motion. He exhibits tenderness (right great toe and 1st MTP is severely tender, swollen and walks with pain). He exhibits no edema.   TTP L3-L5 and para lumbar area   Lymphadenopathy:     He has no cervical adenopathy.   Neurological: He is alert and oriented to person, place, and time. He has normal reflexes. He displays normal reflexes. No cranial nerve deficit. He exhibits normal muscle tone. Coordination normal.   Skin: Skin is warm and dry. No rash noted. No erythema. No pallor.   Psychiatric: His speech is normal and behavior is normal. Judgment and thought content normal. Cognition and memory are normal. He exhibits a depressed mood.       Assessment:       1. Moderate episode of recurrent major depressive disorder    2. Pulmonary hypertension    3. Chronic atrial fibrillation    4. Morbid obesity    5. Immunization due    6. Idiopathic chronic gout without tophus, unspecified site        Plan:     Caio was seen today for gout.    Diagnoses and all orders for this visit:    Moderate episode of recurrent major depressive disorder    Pulmonary  hypertension    Chronic atrial fibrillation    Morbid obesity    Immunization due  -     (In Office Administered) Pneumococcal Polysaccharide Vaccine (23 Valent) (SQ/IM)  -     Influenza - Quadrivalent (3 years & older) (PF)    Idiopathic chronic gout without tophus, unspecified site       Major depression  -lifestyle and behavior modification  -psychology and psychiatry options discussed  -continue EFFEXOR 150 with Seroquel to 100 nightly. Side effects of medications have been discussed and patient agreed to proceed with treatment and understands the risks and benefits.  -SI/ER precautions given    Great toe pain right/hyperuricemia  -likely acute gout  -ice application, low purine diet    AFIB  -persistent  -on rate control - on BB  -follows cardiology    CHF  -EF normal  -follows cardiology at [facility]    Left shoulder pain/rotator cuff syndrome  -follows Ortho    HTN  -controlled    Obesity  -diet/exercise  -weight loss  -refer fitness    Gout  -diclofenac for flare up as needed with colchicine    Spent adequate time in obtaining history and explaining differentials    40 minutes spent during this visit of which greater than 50% devoted to face-face counseling and coordination of care regarding diagnosis and management plan    Follow-up in about 3 months (around 4/4/2019), or if symptoms worsen or fail to improve.

## 2019-01-07 ENCOUNTER — PATIENT MESSAGE (OUTPATIENT)
Dept: CARDIOLOGY | Facility: CLINIC | Age: 54
End: 2019-01-07

## 2019-01-09 DIAGNOSIS — I10 ESSENTIAL HYPERTENSION: ICD-10-CM

## 2019-01-09 RX ORDER — FUROSEMIDE 80 MG/1
80 TABLET ORAL 2 TIMES DAILY
Qty: 180 TABLET | Refills: 3 | Status: SHIPPED | OUTPATIENT
Start: 2019-01-09 | End: 2020-01-27

## 2019-01-09 RX ORDER — AMLODIPINE BESYLATE 5 MG/1
5 TABLET ORAL DAILY
Qty: 90 TABLET | Refills: 3 | Status: SHIPPED | OUTPATIENT
Start: 2019-01-09 | End: 2020-01-27

## 2019-01-14 ENCOUNTER — PATIENT MESSAGE (OUTPATIENT)
Dept: CARDIOLOGY | Facility: CLINIC | Age: 54
End: 2019-01-14

## 2019-01-20 RX ORDER — COLCHICINE 0.6 MG/1
TABLET, FILM COATED ORAL
Qty: 60 TABLET | Refills: 0 | Status: SHIPPED | OUTPATIENT
Start: 2019-01-20 | End: 2019-11-02 | Stop reason: SDUPTHER

## 2019-01-22 NOTE — TELEPHONE ENCOUNTER
----- Message from Imelda Velázquez sent at 1/22/2019  4:28 PM CST -----  Contact: [phone] /self  Patient is requesting his rx of     apixaban (ELIQUIS) 5 mg Tab . Take 1 tablet (5 mg total) by mouth 2 (two) times daily. - Oral    be sent to   [address] [address] [place] [place] [address]

## 2019-01-25 DIAGNOSIS — Z12.11 COLON CANCER SCREENING: ICD-10-CM

## 2019-03-12 DIAGNOSIS — F33.1 MODERATE EPISODE OF RECURRENT MAJOR DEPRESSIVE DISORDER: Primary | ICD-10-CM

## 2019-03-12 RX ORDER — VENLAFAXINE HYDROCHLORIDE 150 MG/1
150 CAPSULE, EXTENDED RELEASE ORAL DAILY
Qty: 90 CAPSULE | Refills: 3 | Status: SHIPPED | OUTPATIENT
Start: 2019-03-12 | End: 2020-01-27

## 2019-03-12 RX ORDER — QUETIAPINE FUMARATE 100 MG/1
100 TABLET, FILM COATED ORAL NIGHTLY
Qty: 90 TABLET | Refills: 3 | Status: SHIPPED | OUTPATIENT
Start: 2019-03-12 | End: 2020-01-27

## 2019-04-01 ENCOUNTER — PATIENT MESSAGE (OUTPATIENT)
Dept: FAMILY MEDICINE | Facility: CLINIC | Age: 54
End: 2019-04-01

## 2019-04-05 ENCOUNTER — OFFICE VISIT (OUTPATIENT)
Dept: FAMILY MEDICINE | Facility: CLINIC | Age: 54
End: 2019-04-05
Attending: FAMILY MEDICINE
Payer: MEDICARE

## 2019-04-05 VITALS
HEIGHT: 75 IN | BODY MASS INDEX: 39.17 KG/M2 | HEART RATE: 87 BPM | OXYGEN SATURATION: 96 % | WEIGHT: 315 LBS | DIASTOLIC BLOOD PRESSURE: 82 MMHG | SYSTOLIC BLOOD PRESSURE: 136 MMHG

## 2019-04-05 DIAGNOSIS — I10 ESSENTIAL HYPERTENSION: Primary | ICD-10-CM

## 2019-04-05 DIAGNOSIS — I27.20 PULMONARY HYPERTENSION: ICD-10-CM

## 2019-04-05 DIAGNOSIS — I48.20 CHRONIC ATRIAL FIBRILLATION: ICD-10-CM

## 2019-04-05 DIAGNOSIS — E66.01 MORBID OBESITY: ICD-10-CM

## 2019-04-05 DIAGNOSIS — E66.2 OBESITY HYPOVENTILATION SYNDROME: ICD-10-CM

## 2019-04-05 DIAGNOSIS — I83.813 VARICOSE VEINS OF BOTH LOWER EXTREMITIES WITH PAIN: ICD-10-CM

## 2019-04-05 DIAGNOSIS — M1A.00X0 IDIOPATHIC CHRONIC GOUT WITHOUT TOPHUS, UNSPECIFIED SITE: ICD-10-CM

## 2019-04-05 DIAGNOSIS — F33.1 MODERATE EPISODE OF RECURRENT MAJOR DEPRESSIVE DISORDER: ICD-10-CM

## 2019-04-05 PROCEDURE — 3008F PR BODY MASS INDEX (BMI) DOCUMENTED: ICD-10-PCS | Mod: HCNC,CPTII,S$GLB, | Performed by: FAMILY MEDICINE

## 2019-04-05 PROCEDURE — 3008F BODY MASS INDEX DOCD: CPT | Mod: HCNC,CPTII,S$GLB, | Performed by: FAMILY MEDICINE

## 2019-04-05 PROCEDURE — 99499 UNLISTED E&M SERVICE: CPT | Mod: S$GLB,,, | Performed by: FAMILY MEDICINE

## 2019-04-05 PROCEDURE — 99999 PR PBB SHADOW E&M-EST. PATIENT-LVL III: ICD-10-PCS | Mod: PBBFAC,HCNC,, | Performed by: FAMILY MEDICINE

## 2019-04-05 PROCEDURE — 3075F PR MOST RECENT SYSTOLIC BLOOD PRESS GE 130-139MM HG: ICD-10-PCS | Mod: HCNC,CPTII,S$GLB, | Performed by: FAMILY MEDICINE

## 2019-04-05 PROCEDURE — 3079F DIAST BP 80-89 MM HG: CPT | Mod: HCNC,CPTII,S$GLB, | Performed by: FAMILY MEDICINE

## 2019-04-05 PROCEDURE — 99214 PR OFFICE/OUTPT VISIT, EST, LEVL IV, 30-39 MIN: ICD-10-PCS | Mod: HCNC,S$GLB,, | Performed by: FAMILY MEDICINE

## 2019-04-05 PROCEDURE — 99499 RISK ADDL DX/OHS AUDIT: ICD-10-PCS | Mod: S$GLB,,, | Performed by: FAMILY MEDICINE

## 2019-04-05 PROCEDURE — 3075F SYST BP GE 130 - 139MM HG: CPT | Mod: HCNC,CPTII,S$GLB, | Performed by: FAMILY MEDICINE

## 2019-04-05 PROCEDURE — 99214 OFFICE O/P EST MOD 30 MIN: CPT | Mod: HCNC,S$GLB,, | Performed by: FAMILY MEDICINE

## 2019-04-05 PROCEDURE — 99999 PR PBB SHADOW E&M-EST. PATIENT-LVL III: CPT | Mod: PBBFAC,HCNC,, | Performed by: FAMILY MEDICINE

## 2019-04-05 PROCEDURE — 3079F PR MOST RECENT DIASTOLIC BLOOD PRESSURE 80-89 MM HG: ICD-10-PCS | Mod: HCNC,CPTII,S$GLB, | Performed by: FAMILY MEDICINE

## 2019-04-05 RX ORDER — ALLOPURINOL 100 MG/1
100 TABLET ORAL DAILY
Qty: 90 TABLET | Refills: 3 | Status: SHIPPED | OUTPATIENT
Start: 2019-04-05 | End: 2020-01-27

## 2019-04-05 NOTE — PROGRESS NOTES
Patient, Caio Ontiveros (MRN #[id number]), presented with a recent Platelet count less than 150 K/uL consistent with the definition of thrombocytopenia (ICD10 - D69.6).    Platelets   Date Value Ref Range Status   06/28/2018 148 (L) 150 - 350 K/uL Final     The patient's thrombocytopenia was monitored, evaluated, addressed and/or treated. This addendum to the medical record is made on 04/05/2019.

## 2019-04-05 NOTE — PROGRESS NOTES
Subjective:       Patient ID: Caio Ontiveros is a 53 y.o. male.    Chief Complaint: Follow-up (3 month ) and Hearing Problem (left ear. )    53 yr old pleasant black male with AFIB persistent, HTN, pulmonary HTN, varicose veins, EVANGELIST, presents today for his annual wellness check, lab work and  evaluation of HTN,  gout, LBP, right hip pain and depression. No new concerns today.    afib - follwos cardiology - on eliquis and med management    HTN - much better - on norvasc, lasix, HYZAAR and metoprolol - compliant - no side effects       Depression - stable - -  - no SI/HI - on effexor 150 and seroquel 100 - sleep somewhat better however getting up after 3 hr sleep    LBP and right hip pain - seen Ortho and they recommend hip replacement - depression is worse and also has low back pain - no neurological symptoms, salddle anesthesia, bladder or bowel problems -       Right great toe pain: Onset 2-3 days ago and rapidly worsening. He has history of gout and had flare ups and usually treats with colchicine however this time it did not get any better. He reports eating pork ribs right before the onset of symptoms. He denies drinking excess alcohol or eating sea food. His last uric acid levels unknown. Details as follows -    Gout chronic - gets flares every now and then     History as below - reviewed     Hypertension   This is a chronic problem. The current episode started more than 1 year ago. The problem has been gradually improving since onset. The problem is controlled. Associated symptoms include headaches and neck pain. Pertinent negatives include no chest pain or palpitations. There are no associated agents to hypertension. Past treatments include angiotensin blockers, diuretics, beta blockers and calcium channel blockers. The current treatment provides moderate improvement. There are no compliance problems.  Hypertensive end-organ damage includes heart failure and left ventricular hypertrophy. There is no history of  angina, CAD/MI or CVA. Identifiable causes of hypertension include sleep apnea. There is no history of coarctation of the aorta, hypercortisolism, hyperparathyroidism, pheochromocytoma, renovascular disease or a thyroid problem.   Low-back Pain   This is a chronic problem. The current episode started more than 1 month ago. The problem occurs intermittently. The problem has been unchanged. Associated symptoms include arthralgias, headaches, myalgias and neck pain. Pertinent negatives include no chest pain, congestion, coughing, diaphoresis, joint swelling, nausea, rash, vomiting or weakness. Nothing aggravates the symptoms. He has tried nothing for the symptoms. The treatment provided no relief.   Toe Pain    There was no injury mechanism. The pain is present in the right toes. The quality of the pain is described as aching. The pain is at a severity of 9/10. The pain is severe. The pain has been constant since onset. Associated symptoms include an inability to bear weight. The symptoms are aggravated by palpation, movement and weight bearing. He has tried heat and NSAIDs for the symptoms. The treatment provided no relief.   Nausea   This is a new problem. The current episode started today. The problem occurs constantly. The problem has been unchanged. Associated symptoms include arthralgias, headaches, myalgias and neck pain. Pertinent negatives include no chest pain, congestion, coughing, diaphoresis, joint swelling, nausea, rash, vomiting or weakness. Nothing aggravates the symptoms. He has tried nothing for the symptoms. The treatment provided no relief.   Congestive Heart Failure   Presents for follow-up visit. The disease course has been stable. Pertinent negatives include no chest pain, palpitations or unexpected weight change. The symptoms have been stable. Past treatments include angiotensin receptor blockers, beta blockers and salt and fluid restriction (diuretics). The treatment provided moderate relief.  Compliance with prior treatments has been good. There is no history of anemia, CAD, CVA, DM, DVT, hyperthyroidism, myocarditis or PE. Compliance with total regimen is %. Compliance problems include adherence to exercise.  Compliance with diet is %. Compliance with exercise is %. Compliance with medications is %.     Review of Systems   Constitutional: Positive for activity change. Negative for diaphoresis and unexpected weight change.   HENT: Positive for hearing loss. Negative for congestion, ear pain, mouth sores, rhinorrhea, trouble swallowing and voice change.    Eyes: Negative for pain, discharge and visual disturbance.   Respiratory: Negative.  Negative for apnea, cough, chest tightness and wheezing.    Cardiovascular: Negative.  Negative for chest pain and palpitations.   Gastrointestinal: Negative.  Negative for abdominal distention, anal bleeding, blood in stool, constipation, diarrhea, nausea and vomiting.   Endocrine: Negative.  Negative for cold intolerance, polydipsia and polyuria.   Genitourinary: Negative.  Negative for decreased urine volume, difficulty urinating, discharge, frequency, hematuria, scrotal swelling and urgency.   Musculoskeletal: Positive for arthralgias, myalgias and neck pain. Negative for back pain, joint swelling and neck stiffness.   Skin: Negative.  Negative for color change and rash.   Allergic/Immunologic: Negative.  Negative for environmental allergies and immunocompromised state.   Neurological: Positive for headaches. Negative for dizziness, speech difficulty, weakness and light-headedness.   Hematological: Negative.    Psychiatric/Behavioral: Positive for dysphoric mood. Negative for agitation, confusion and suicidal ideas. The patient is not nervous/anxious.        Objective:      Physical Exam   Constitutional: He is oriented to person, place, and time. He appears well-developed and well-nourished.   HENT:   Head: Normocephalic and atraumatic.    Right Ear: External ear normal.   Left Ear: External ear normal.   Nose: Nose normal.   Mouth/Throat: Oropharynx is clear and moist. No oropharyngeal exudate.   Eyes: Pupils are equal, round, and reactive to light. Conjunctivae and EOM are normal. Right eye exhibits no discharge. Left eye exhibits no discharge. No scleral icterus.   Neck: Normal range of motion. Neck supple. No JVD present. No tracheal deviation present. No thyromegaly present.   Cardiovascular: Normal rate, regular rhythm, normal heart sounds and intact distal pulses. Exam reveals no gallop and no friction rub.   No murmur heard.  Pulmonary/Chest: Effort normal and breath sounds normal. No stridor. No respiratory distress. He has no wheezes. He has no rales. He exhibits no tenderness.   Abdominal: Soft. Bowel sounds are normal. He exhibits no distension and no mass. There is no tenderness. There is no rebound and no guarding. No hernia.   Musculoskeletal: Normal range of motion. He exhibits tenderness (right great toe and 1st MTP is severely tender, swollen and walks with pain). He exhibits no edema.   TTP L3-L5 and para lumbar area   Lymphadenopathy:     He has no cervical adenopathy.   Neurological: He is alert and oriented to person, place, and time. He has normal reflexes. He displays normal reflexes. No cranial nerve deficit. He exhibits normal muscle tone. Coordination normal.   Skin: Skin is warm and dry. No rash noted. No erythema. No pallor.   Psychiatric: His speech is normal and behavior is normal. Judgment and thought content normal. Cognition and memory are normal. He exhibits a depressed mood.       Assessment:       1. Moderate episode of recurrent major depressive disorder    2. Pulmonary hypertension    3. Essential hypertension    4. Chronic atrial fibrillation    5. Morbid obesity    6. Idiopathic chronic gout without tophus, unspecified site    7. Obesity hypoventilation syndrome        Plan:           Caio was seen today for  follow-up and hearing problem.    Diagnoses and all orders for this visit:    Essential hypertension  -     CBC auto differential; Future  -     Comprehensive metabolic panel; Future  -     Lipid panel; Future    Moderate episode of recurrent major depressive disorder    Pulmonary hypertension    Chronic atrial fibrillation  -     CBC auto differential; Future  -     Comprehensive metabolic panel; Future  -     Lipid panel; Future    Morbid obesity    Idiopathic chronic gout without tophus, unspecified site  -     allopurinol (ZYLOPRIM) 100 MG tablet; Take 1 tablet (100 mg total) by mouth once daily.  -     Uric acid; Future    Obesity hypoventilation syndrome    Varicose veins of both lower extremities with pain      Major depression  -lifestyle and behavior modification  -psychology and psychiatry options discussed  -continue EFFEXOR 150 with Seroquel to 100 nightly. Side effects of medications have been discussed and patient agreed to proceed with treatment and understands the risks and benefits.  -SI/ER precautions given    Great toe pain right/hyperuricemia  -likely acute gout  -ice application, low purine diet    AFIB  -persistent  -on rate control - on BB  -follows cardiology    CHF  -EF normal  -follows cardiology at [facility]    Left shoulder pain/rotator cuff syndrome  -follows Ortho    HTN  -controlled    Obesity  -diet/exercise  -weight loss  -refer fitness    Gout  -diclofenac/colcrys for flare up as needed     Spent adequate time in obtaining history and explaining differentials    40 minutes spent during this visit of which greater than 50% devoted to face-face counseling and coordination of care regarding diagnosis and management plan      Follow up in about 3 months (around 7/5/2019), or if symptoms worsen or fail to improve.

## 2019-05-20 ENCOUNTER — PATIENT MESSAGE (OUTPATIENT)
Dept: PAIN MEDICINE | Facility: CLINIC | Age: 54
End: 2019-05-20

## 2019-06-10 LAB — HEMOCCULT STL QL IA: NEGATIVE

## 2019-06-11 ENCOUNTER — OFFICE VISIT (OUTPATIENT)
Dept: CARDIOLOGY | Facility: CLINIC | Age: 54
End: 2019-06-11
Payer: MEDICARE

## 2019-06-11 ENCOUNTER — LAB VISIT (OUTPATIENT)
Dept: LAB | Facility: HOSPITAL | Age: 54
End: 2019-06-11
Attending: FAMILY MEDICINE
Payer: MEDICARE

## 2019-06-11 VITALS
WEIGHT: 315 LBS | HEART RATE: 70 BPM | SYSTOLIC BLOOD PRESSURE: 126 MMHG | DIASTOLIC BLOOD PRESSURE: 88 MMHG | HEIGHT: 75 IN | BODY MASS INDEX: 39.17 KG/M2

## 2019-06-11 DIAGNOSIS — I83.813 VARICOSE VEINS OF BOTH LOWER EXTREMITIES WITH PAIN: ICD-10-CM

## 2019-06-11 DIAGNOSIS — I27.20 PULMONARY HYPERTENSION: ICD-10-CM

## 2019-06-11 DIAGNOSIS — I10 ESSENTIAL HYPERTENSION: ICD-10-CM

## 2019-06-11 DIAGNOSIS — I83.10 VARICOSE VEINS OF LOWER EXTREMITY WITH INFLAMMATION, UNSPECIFIED LATERALITY: ICD-10-CM

## 2019-06-11 DIAGNOSIS — M1A.00X0 IDIOPATHIC CHRONIC GOUT WITHOUT TOPHUS, UNSPECIFIED SITE: ICD-10-CM

## 2019-06-11 DIAGNOSIS — I48.20 CHRONIC ATRIAL FIBRILLATION: Primary | ICD-10-CM

## 2019-06-11 DIAGNOSIS — I48.20 CHRONIC ATRIAL FIBRILLATION: ICD-10-CM

## 2019-06-11 DIAGNOSIS — E66.2 OBESITY HYPOVENTILATION SYNDROME: ICD-10-CM

## 2019-06-11 DIAGNOSIS — E66.01 MORBID OBESITY: ICD-10-CM

## 2019-06-11 DIAGNOSIS — I87.2 VENOUS INSUFFICIENCY: ICD-10-CM

## 2019-06-11 LAB
ALBUMIN SERPL BCP-MCNC: 4 G/DL (ref 3.5–5.2)
ALP SERPL-CCNC: 66 U/L (ref 55–135)
ALT SERPL W/O P-5'-P-CCNC: 22 U/L (ref 10–44)
ANION GAP SERPL CALC-SCNC: 11 MMOL/L (ref 8–16)
AST SERPL-CCNC: 24 U/L (ref 10–40)
BASOPHILS # BLD AUTO: 0.02 K/UL (ref 0–0.2)
BASOPHILS NFR BLD: 0.5 % (ref 0–1.9)
BILIRUB SERPL-MCNC: 0.5 MG/DL (ref 0.1–1)
BUN SERPL-MCNC: 14 MG/DL (ref 6–20)
CALCIUM SERPL-MCNC: 10.1 MG/DL (ref 8.7–10.5)
CHLORIDE SERPL-SCNC: 100 MMOL/L (ref 95–110)
CHOLEST SERPL-MCNC: 203 MG/DL (ref 120–199)
CHOLEST/HDLC SERPL: 3.8 {RATIO} (ref 2–5)
CO2 SERPL-SCNC: 32 MMOL/L (ref 23–29)
CREAT SERPL-MCNC: 1.1 MG/DL (ref 0.5–1.4)
DIFFERENTIAL METHOD: ABNORMAL
EOSINOPHIL # BLD AUTO: 0.1 K/UL (ref 0–0.5)
EOSINOPHIL NFR BLD: 3.3 % (ref 0–8)
ERYTHROCYTE [DISTWIDTH] IN BLOOD BY AUTOMATED COUNT: 14.6 % (ref 11.5–14.5)
EST. GFR  (AFRICAN AMERICAN): >60 ML/MIN/1.73 M^2
EST. GFR  (NON AFRICAN AMERICAN): >60 ML/MIN/1.73 M^2
GLUCOSE SERPL-MCNC: 91 MG/DL (ref 70–110)
HCT VFR BLD AUTO: 45 % (ref 40–54)
HDLC SERPL-MCNC: 54 MG/DL (ref 40–75)
HDLC SERPL: 26.6 % (ref 20–50)
HGB BLD-MCNC: 14.6 G/DL (ref 14–18)
LDLC SERPL CALC-MCNC: 135.2 MG/DL (ref 63–159)
LYMPHOCYTES # BLD AUTO: 1.2 K/UL (ref 1–4.8)
LYMPHOCYTES NFR BLD: 29.8 % (ref 18–48)
MCH RBC QN AUTO: 28.3 PG (ref 27–31)
MCHC RBC AUTO-ENTMCNC: 32.4 G/DL (ref 32–36)
MCV RBC AUTO: 87 FL (ref 82–98)
MONOCYTES # BLD AUTO: 0.6 K/UL (ref 0.3–1)
MONOCYTES NFR BLD: 14.8 % (ref 4–15)
NEUTROPHILS # BLD AUTO: 2.1 K/UL (ref 1.8–7.7)
NEUTROPHILS NFR BLD: 51.6 % (ref 38–73)
NONHDLC SERPL-MCNC: 149 MG/DL
PLATELET # BLD AUTO: 154 K/UL (ref 150–350)
PLATELET BLD QL SMEAR: ABNORMAL
PMV BLD AUTO: 13 FL (ref 9.2–12.9)
POTASSIUM SERPL-SCNC: 3.6 MMOL/L (ref 3.5–5.1)
PROT SERPL-MCNC: 8.4 G/DL (ref 6–8.4)
RBC # BLD AUTO: 5.16 M/UL (ref 4.6–6.2)
SODIUM SERPL-SCNC: 143 MMOL/L (ref 136–145)
TRIGL SERPL-MCNC: 69 MG/DL (ref 30–150)
URATE SERPL-MCNC: 11.4 MG/DL (ref 3.4–7)
WBC # BLD AUTO: 4 K/UL (ref 3.9–12.7)

## 2019-06-11 PROCEDURE — 99214 PR OFFICE/OUTPT VISIT, EST, LEVL IV, 30-39 MIN: ICD-10-PCS | Mod: HCNC,S$GLB,, | Performed by: INTERNAL MEDICINE

## 2019-06-11 PROCEDURE — 80053 COMPREHEN METABOLIC PANEL: CPT | Mod: HCNC

## 2019-06-11 PROCEDURE — 93000 EKG 12-LEAD: ICD-10-PCS | Mod: HCNC,S$GLB,, | Performed by: INTERNAL MEDICINE

## 2019-06-11 PROCEDURE — 3074F PR MOST RECENT SYSTOLIC BLOOD PRESSURE < 130 MM HG: ICD-10-PCS | Mod: HCNC,CPTII,S$GLB, | Performed by: INTERNAL MEDICINE

## 2019-06-11 PROCEDURE — 3008F BODY MASS INDEX DOCD: CPT | Mod: HCNC,CPTII,S$GLB, | Performed by: INTERNAL MEDICINE

## 2019-06-11 PROCEDURE — 99999 PR PBB SHADOW E&M-EST. PATIENT-LVL III: CPT | Mod: PBBFAC,HCNC,, | Performed by: INTERNAL MEDICINE

## 2019-06-11 PROCEDURE — 3079F DIAST BP 80-89 MM HG: CPT | Mod: HCNC,CPTII,S$GLB, | Performed by: INTERNAL MEDICINE

## 2019-06-11 PROCEDURE — 80061 LIPID PANEL: CPT | Mod: HCNC

## 2019-06-11 PROCEDURE — 85025 COMPLETE CBC W/AUTO DIFF WBC: CPT | Mod: HCNC

## 2019-06-11 PROCEDURE — 3079F PR MOST RECENT DIASTOLIC BLOOD PRESSURE 80-89 MM HG: ICD-10-PCS | Mod: HCNC,CPTII,S$GLB, | Performed by: INTERNAL MEDICINE

## 2019-06-11 PROCEDURE — 3008F PR BODY MASS INDEX (BMI) DOCUMENTED: ICD-10-PCS | Mod: HCNC,CPTII,S$GLB, | Performed by: INTERNAL MEDICINE

## 2019-06-11 PROCEDURE — 99999 PR PBB SHADOW E&M-EST. PATIENT-LVL III: ICD-10-PCS | Mod: PBBFAC,HCNC,, | Performed by: INTERNAL MEDICINE

## 2019-06-11 PROCEDURE — 99214 OFFICE O/P EST MOD 30 MIN: CPT | Mod: HCNC,S$GLB,, | Performed by: INTERNAL MEDICINE

## 2019-06-11 PROCEDURE — 3074F SYST BP LT 130 MM HG: CPT | Mod: HCNC,CPTII,S$GLB, | Performed by: INTERNAL MEDICINE

## 2019-06-11 PROCEDURE — 93000 ELECTROCARDIOGRAM COMPLETE: CPT | Mod: HCNC,S$GLB,, | Performed by: INTERNAL MEDICINE

## 2019-06-11 PROCEDURE — 36415 COLL VENOUS BLD VENIPUNCTURE: CPT | Mod: HCNC

## 2019-06-11 PROCEDURE — 84550 ASSAY OF BLOOD/URIC ACID: CPT | Mod: HCNC

## 2019-06-11 NOTE — PROGRESS NOTES
"  Subjective:      Patient ID: Caio Ontiveros is a 53 y.o. male.    Chief Complaint: Follow-up (Afib)    HPI:  Has gout left ankle and left foot.  Pt never had injection left hip.  Limited by aches and pains.  "I have no flexibility"  No bleeding on Eliquis    Review of Systems   Cardiovascular: Positive for dyspnea on exertion (chronic, especially climbing stairs) and leg swelling (chronic\). Negative for chest pain, claudication, irregular heartbeat, near-syncope, orthopnea, palpitations and syncope.        Past Medical History:   Diagnosis Date    A-fib     CHF (congestive heart failure)     Gout, chronic     Hypertension         Past Surgical History:   Procedure Laterality Date    CHOLECYSTECTOMY      GASTRIC BYPASS  2014       Family History   Problem Relation Age of Onset    Hypertension Mother     Stroke Father     Diabetes Father     Hypertension Father     Diabetes Sister     Diabetes Brother        Social History     Socioeconomic History    Marital status: Legally      Spouse name: Not on file    Number of children: 1    Years of education: Not on file    Highest education level: Not on file   Occupational History    Not on file   Social Needs    Financial resource strain: Not on file    Food insecurity:     Worry: Not on file     Inability: Not on file    Transportation needs:     Medical: Not on file     Non-medical: Not on file   Tobacco Use    Smoking status: Never Smoker    Smokeless tobacco: Never Used   Substance and Sexual Activity    Alcohol use: Yes     Comment: sometimes    Drug use: No    Sexual activity: Not Currently   Lifestyle    Physical activity:     Days per week: Not on file     Minutes per session: Not on file    Stress: Not on file   Relationships    Social connections:     Talks on phone: Not on file     Gets together: Not on file     Attends [employer] service: Not on file     Active member of club or organization: Not on file     Attends meetings " "of clubs or organizations: Not on file     Relationship status: Not on file   Other Topics Concern    Not on file   Social History Narrative    Not on file       Current Outpatient Medications on File Prior to Visit   Medication Sig Dispense Refill    allopurinol (ZYLOPRIM) 100 MG tablet Take 1 tablet (100 mg total) by mouth once daily. 90 tablet 3    amLODIPine (NORVASC) 5 MG tablet Take 1 tablet (5 mg total) by mouth once daily. 90 tablet 3    apixaban (ELIQUIS) 5 mg Tab Take 1 tablet (5 mg total) by mouth 2 (two) times daily. 180 tablet 3    calcium phosphate-vitamin D3 250-400 mg-unit Chew       clotrimazole-betamethasone (LOTRISONE) lotion Apply topically 2 (two) times daily. 30 mL 2    COLCRYS 0.6 mg tablet TAKE 1 TABLET BY MOUTH ONCE DAILY 60 tablet 0    furosemide (LASIX) 80 MG tablet Take 1 tablet (80 mg total) by mouth 2 (two) times daily. 180 tablet 3    losartan-hydrochlorothiazide 100-25 mg (HYZAAR) 100-25 mg per tablet Take 1 tablet by mouth once daily.       metoprolol succinate (TOPROL-XL) 25 MG 24 hr tablet Take 1 tablet (25 mg total) by mouth 3 (three) times daily. 270 tablet 3    potassium chloride SA (K-DUR,KLOR-CON) 20 MEQ tablet Take 20 mEq by mouth.      QUEtiapine (SEROQUEL) 100 MG Tab TAKE 1 TABLET (100 MG TOTAL) BY MOUTH EVERY EVENING. 90 tablet 3    venlafaxine (EFFEXOR-XR) 150 MG Cp24 TAKE 1 CAPSULE (150 MG TOTAL) BY MOUTH ONCE DAILY. 90 capsule 3     No current facility-administered medications on file prior to visit.        Review of patient's allergies indicates:  No Known Allergies  Objective:     Vitals:    06/11/19 1018   BP: (!) 130/92   BP Location: Left arm   Patient Position: Sitting   BP Method: Large (Automatic)   Pulse: 70   Weight: (!) 176.6 kg (389 lb 5.3 oz)   Height: 6' 3" (1.905 m)        Physical Exam   Constitutional: He is oriented to person, place, and time. He appears well-developed and well-nourished. No distress.   Eyes: No scleral icterus.   Neck: " No JVD present. Carotid bruit is not present.   Cardiovascular: Normal heart sounds. An irregularly irregular rhythm present. Exam reveals no gallop and no friction rub.   No murmur heard.  Pulmonary/Chest: Effort normal and breath sounds normal. No respiratory distress.   Musculoskeletal: He exhibits no edema.   Neurological: He is alert and oriented to person, place, and time.   Skin: Skin is warm and dry. He is not diaphoretic.   Psychiatric: He has a normal mood and affect. His behavior is normal. Judgment and thought content normal.   Vitals reviewed.     ECG: a fib with controlled vent response    Assessment:     1. Chronic atrial fibrillation    2. Essential hypertension    3. Pulmonary hypertension    4. Varicose veins of lower extremity with inflammation, unspecified laterality    5. Venous insufficiency    6. Varicose veins of both lower extremities with pain    7. Morbid obesity    8. Idiopathic chronic gout without tophus, unspecified site    9. Obesity hypoventilation syndrome      Plan:   Caio was seen today for follow-up.    Diagnoses and all orders for this visit:    Chronic atrial fibrillation  -     IN OFFICE EKG 12-LEAD (to Muse)    Essential hypertension    Pulmonary hypertension    Varicose veins of lower extremity with inflammation, unspecified laterality    Venous insufficiency    Varicose veins of both lower extremities with pain    Morbid obesity    Idiopathic chronic gout without tophus, unspecified site    Obesity hypoventilation syndrome     Swim exercises    Wt reducing diet    Get labs ordered by Dr Aclantar done today    Rheumatology consult since pt's joint pains are contributing to his obesity since it is difficult for pt to ambulate and exercise    Pt knows to avoid NSAID's due to concurrent treatment with Eliquis.  OK to take Tylenol.    Follow up in about 6 months (around 12/11/2019).

## 2019-07-09 ENCOUNTER — PATIENT MESSAGE (OUTPATIENT)
Dept: CARDIOLOGY | Facility: CLINIC | Age: 54
End: 2019-07-09

## 2019-07-09 ENCOUNTER — TELEPHONE (OUTPATIENT)
Dept: CARDIOLOGY | Facility: CLINIC | Age: 54
End: 2019-07-09

## 2019-07-09 NOTE — TELEPHONE ENCOUNTER
I spoke with pt who had called to request diclofenac for gout. I explained that I think the risk of bleeding stomach ulcers is too great from taking NSAID's along with Eliquis.  Tylenol is OK.  Pt has appt with rheumatology clinic.

## 2019-07-10 ENCOUNTER — TELEPHONE (OUTPATIENT)
Dept: ADMINISTRATIVE | Facility: HOSPITAL | Age: 54
End: 2019-07-10

## 2019-08-28 ENCOUNTER — PATIENT MESSAGE (OUTPATIENT)
Dept: FAMILY MEDICINE | Facility: CLINIC | Age: 54
End: 2019-08-28

## 2019-08-30 ENCOUNTER — TELEPHONE (OUTPATIENT)
Dept: FAMILY MEDICINE | Facility: CLINIC | Age: 54
End: 2019-08-30

## 2019-10-10 ENCOUNTER — PATIENT MESSAGE (OUTPATIENT)
Dept: CARDIOLOGY | Facility: CLINIC | Age: 54
End: 2019-10-10

## 2019-10-11 ENCOUNTER — PATIENT MESSAGE (OUTPATIENT)
Dept: CARDIOLOGY | Facility: CLINIC | Age: 54
End: 2019-10-11

## 2019-10-29 ENCOUNTER — PATIENT OUTREACH (OUTPATIENT)
Dept: ADMINISTRATIVE | Facility: OTHER | Age: 54
End: 2019-10-29

## 2019-11-02 RX ORDER — COLCHICINE 0.6 MG/1
TABLET, FILM COATED ORAL
Qty: 15 TABLET | Refills: 2 | Status: SHIPPED | OUTPATIENT
Start: 2019-11-02 | End: 2019-11-04 | Stop reason: SDUPTHER

## 2019-11-04 ENCOUNTER — PATIENT MESSAGE (OUTPATIENT)
Dept: FAMILY MEDICINE | Facility: CLINIC | Age: 54
End: 2019-11-04

## 2019-11-04 RX ORDER — COLCHICINE 0.6 MG/1
0.6 TABLET, FILM COATED ORAL DAILY
Qty: 15 TABLET | Refills: 0 | Status: SHIPPED | OUTPATIENT
Start: 2019-11-04 | End: 2019-12-30

## 2019-12-16 ENCOUNTER — PATIENT OUTREACH (OUTPATIENT)
Dept: ADMINISTRATIVE | Facility: OTHER | Age: 54
End: 2019-12-16

## 2019-12-28 ENCOUNTER — PATIENT MESSAGE (OUTPATIENT)
Dept: CARDIOLOGY | Facility: CLINIC | Age: 54
End: 2019-12-28

## 2019-12-30 ENCOUNTER — PATIENT MESSAGE (OUTPATIENT)
Dept: FAMILY MEDICINE | Facility: CLINIC | Age: 54
End: 2019-12-30

## 2019-12-30 RX ORDER — COLCHICINE 0.6 MG/1
TABLET, FILM COATED ORAL
Qty: 15 TABLET | Refills: 0 | Status: SHIPPED | OUTPATIENT
Start: 2019-12-30 | End: 2019-12-31 | Stop reason: SDUPTHER

## 2019-12-30 NOTE — PROGRESS NOTES
"Subjective:       Patient ID: Caio Ontiveros is a 54 y.o. male.    Chief Complaint: Gout (left foot)      Foot pain     Onset; started one week prior to presentation . Reports having crawfish prior to start of symptoms  Location; Left foot /ankle  Duration; daily, constant mild improvement   Description; swollen, redness  Alleviation; takes losartan -hctz 100-25, allopurinol 100mg , colchidine 0.6 prn   Denies; no fevers, chills, sweats , recent trauma   Previous epidsoes-6/2019 - referred to rheum never seen   Prior imaging- 9/2017- negative foot xray           HPI  Review of Systems   Constitutional: Positive for activity change. Negative for unexpected weight change.   HENT: Negative for hearing loss, rhinorrhea and trouble swallowing.    Eyes: Negative for discharge and visual disturbance.   Respiratory: Negative for chest tightness and wheezing.    Cardiovascular: Negative for chest pain and palpitations.   Gastrointestinal: Negative for blood in stool, constipation, diarrhea and vomiting.   Endocrine: Negative for polydipsia and polyuria.   Genitourinary: Negative for difficulty urinating, hematuria and urgency.   Musculoskeletal: Positive for arthralgias and joint swelling. Negative for neck pain.   Neurological: Negative for weakness and headaches.   Psychiatric/Behavioral: Positive for dysphoric mood. Negative for confusion.       Objective:       /84 (BP Location: Right arm, Patient Position: Sitting, BP Method: Large (Manual))   Pulse 71   Ht 6' 3" (1.905 m)   Wt (!) 180 kg (396 lb 13.3 oz)   SpO2 99%   BMI 49.60 kg/m²     Physical Exam   Constitutional: He appears well-developed and well-nourished. No distress.   Cardiovascular: Normal rate, regular rhythm and normal heart sounds. Exam reveals no gallop and no friction rub.   No murmur heard.  Pulmonary/Chest: Effort normal. No stridor. No respiratory distress. He has no wheezes. He has no rales. He exhibits no tenderness.   Abdominal: Soft. He " exhibits no distension.   Musculoskeletal:   Swelling of right foot with warmth, No erythemia  Bilateral varicose vein  Dryness   Neurological: He is alert. No sensory deficit. He exhibits normal muscle tone.   Skin: Skin is warm. He is not diaphoretic.   Psychiatric: He has a normal mood and affect.   Nursing note and vitals reviewed.        Lab Results   Component Value Date    URICACID 11.4 (H) 06/11/2019     BMP  Lab Results   Component Value Date     06/11/2019    K 3.6 06/11/2019     06/11/2019    CO2 32 (H) 06/11/2019    BUN 14 06/11/2019    CREATININE 1.1 06/11/2019    CALCIUM 10.1 06/11/2019    ANIONGAP 11 06/11/2019    ESTGFRAFRICA >60 06/11/2019    EGFRNONAA >60 06/11/2019       Assessment:       1. Idiopathic chronic gout without tophus, unspecified site        Plan:       Caio was seen today for gout.    Diagnoses and all orders for this visit:    Idiopathic chronic gout without tophus, unspecified site  --Signs, symptoms consistent with acute gouty flare   --Low suspicion given  history or pyhsical exam for  bacterial infection like celllutitis ro septic joint  --I provided instruction on supportive care measures   --Reviewed dietary changes to avoid flares  --May benefit from increase in allopurinol    --reviewed signs and symptoms that should prompt return to provider or evaluation in the ED  -     COLCRYS 0.6 mg tablet; Take 1 tablet (0.6 mg total) by mouth 2 (two) times daily. for 14 days

## 2019-12-31 ENCOUNTER — OFFICE VISIT (OUTPATIENT)
Dept: INTERNAL MEDICINE | Facility: CLINIC | Age: 54
End: 2019-12-31
Payer: MEDICARE

## 2019-12-31 VITALS
OXYGEN SATURATION: 99 % | BODY MASS INDEX: 39.17 KG/M2 | SYSTOLIC BLOOD PRESSURE: 134 MMHG | DIASTOLIC BLOOD PRESSURE: 84 MMHG | WEIGHT: 315 LBS | HEIGHT: 75 IN | HEART RATE: 71 BPM

## 2019-12-31 DIAGNOSIS — M1A.00X0 IDIOPATHIC CHRONIC GOUT WITHOUT TOPHUS, UNSPECIFIED SITE: Primary | ICD-10-CM

## 2019-12-31 PROCEDURE — 3079F PR MOST RECENT DIASTOLIC BLOOD PRESSURE 80-89 MM HG: ICD-10-PCS | Mod: HCNC,CPTII,S$GLB, | Performed by: INTERNAL MEDICINE

## 2019-12-31 PROCEDURE — 99999 PR PBB SHADOW E&M-EST. PATIENT-LVL IV: CPT | Mod: PBBFAC,HCNC,, | Performed by: INTERNAL MEDICINE

## 2019-12-31 PROCEDURE — 3008F BODY MASS INDEX DOCD: CPT | Mod: HCNC,CPTII,S$GLB, | Performed by: INTERNAL MEDICINE

## 2019-12-31 PROCEDURE — 99999 PR PBB SHADOW E&M-EST. PATIENT-LVL IV: ICD-10-PCS | Mod: PBBFAC,HCNC,, | Performed by: INTERNAL MEDICINE

## 2019-12-31 PROCEDURE — 3075F PR MOST RECENT SYSTOLIC BLOOD PRESS GE 130-139MM HG: ICD-10-PCS | Mod: HCNC,CPTII,S$GLB, | Performed by: INTERNAL MEDICINE

## 2019-12-31 PROCEDURE — 99213 OFFICE O/P EST LOW 20 MIN: CPT | Mod: HCNC,S$GLB,, | Performed by: INTERNAL MEDICINE

## 2019-12-31 PROCEDURE — 3075F SYST BP GE 130 - 139MM HG: CPT | Mod: HCNC,CPTII,S$GLB, | Performed by: INTERNAL MEDICINE

## 2019-12-31 PROCEDURE — 3008F PR BODY MASS INDEX (BMI) DOCUMENTED: ICD-10-PCS | Mod: HCNC,CPTII,S$GLB, | Performed by: INTERNAL MEDICINE

## 2019-12-31 PROCEDURE — 3079F DIAST BP 80-89 MM HG: CPT | Mod: HCNC,CPTII,S$GLB, | Performed by: INTERNAL MEDICINE

## 2019-12-31 PROCEDURE — 99213 PR OFFICE/OUTPT VISIT, EST, LEVL III, 20-29 MIN: ICD-10-PCS | Mod: HCNC,S$GLB,, | Performed by: INTERNAL MEDICINE

## 2019-12-31 RX ORDER — COLCHICINE 0.6 MG/1
0.6 TABLET, FILM COATED ORAL 2 TIMES DAILY
Qty: 28 TABLET | Refills: 0 | Status: SHIPPED | OUTPATIENT
Start: 2019-12-31 | End: 2020-02-12 | Stop reason: SDUPTHER

## 2019-12-31 NOTE — PATIENT INSTRUCTIONS
Treating Gout Attacks     Raising the joint above the level of your heart can help reduce gout symptoms.     Gout is a disease that affects the joints. It is caused by excess uric acid in your blood stream that may lead to crystals forming in your joints. Left untreated, it can lead to painful foot and joint deformities and even kidney problems. But, by treating gout early, you can relieve pain and help prevent future problems. Gout can usually be treated with medication and proper diet. In severe cases, surgery may be needed.  Gout attacks are painful and often happen more than once. Taking medications may reduce pain and prevent attacks in the future. There are also some things you can do at home to relieve symptoms.  Medications for gout  Your healthcare provider may prescribe a daily medication to reduce levels of uric acid. Reducing your uric acid levels may help prevent gout attacks. Allopurinol is one commonly used medication taken daily to reduce uric acid levels. Other medications can help relieve pain and swelling during an acute attack. Medicines such as NSAIDs (nonsteroidal anti-inflammatory medicines), steroids, and colchicine may be prescribed for intermittent use to relieve an acute gout attack. Be sure to take your medication as directed.  What you can do  Below are some things you can do at home to relieve gout symptoms. Your healthcare provider may have other tips.  · Rest the painful joint as much as you can.  · Raise the painful joint so it is at a level higher than your heart.  · Use ice for 10 minutes every 1-2 hours as possible.  How can I prevent gout?  With a little effort, you may be able to prevent gout attacks in the future. Here are some things you can do:  · Avoid foods high in purines  ¨ Certain meats (red meat, processed meat, turkey)  ¨ Organ meats (kidney, liver, sweetbread)  ¨ Shellfish (lobster, crab, shrimp, scallop, mussel)  ¨ Certain fish (anchovy, sardine, herring,  mackerel)  · Take any medications prescribed by your healthcare provider.  · Lose weight if you need to.  · Reduce high fructose corn syrup in meals and drinks.  · Reduce or eliminate consumption of alcohol, particularly beer, but also red wine and spirits.  · Control blood pressure, diabetes, and cholesterol.  · Drink plenty of water to help flush uric acid from your body.  Date Last Reviewed: 2/1/2016  © 2428-7160 [employer]. [address] [place] [address], [place] [address]. All rights reserved. This information is not intended as a substitute for professional medical care. Always follow your healthcare professional's instructions.

## 2020-01-09 ENCOUNTER — LAB VISIT (OUTPATIENT)
Dept: LAB | Facility: HOSPITAL | Age: 55
End: 2020-01-09
Attending: INTERNAL MEDICINE
Payer: MEDICARE

## 2020-01-09 ENCOUNTER — OFFICE VISIT (OUTPATIENT)
Dept: RHEUMATOLOGY | Facility: CLINIC | Age: 55
End: 2020-01-09
Payer: MEDICARE

## 2020-01-09 VITALS
DIASTOLIC BLOOD PRESSURE: 92 MMHG | WEIGHT: 315 LBS | HEIGHT: 75 IN | HEART RATE: 94 BPM | SYSTOLIC BLOOD PRESSURE: 143 MMHG | BODY MASS INDEX: 39.17 KG/M2

## 2020-01-09 DIAGNOSIS — R53.82 CHRONIC FATIGUE: ICD-10-CM

## 2020-01-09 DIAGNOSIS — M1A.9XX1 CHRONIC GOUT WITH TOPHUS, UNSPECIFIED CAUSE, UNSPECIFIED SITE: ICD-10-CM

## 2020-01-09 DIAGNOSIS — L40.9 PSORIASIS: ICD-10-CM

## 2020-01-09 DIAGNOSIS — M1A.9XX1 CHRONIC GOUT WITH TOPHUS, UNSPECIFIED CAUSE, UNSPECIFIED SITE: Primary | ICD-10-CM

## 2020-01-09 LAB
ALBUMIN SERPL BCP-MCNC: 3.6 G/DL (ref 3.5–5.2)
ALP SERPL-CCNC: 66 U/L (ref 55–135)
ALT SERPL W/O P-5'-P-CCNC: 10 U/L (ref 10–44)
ANION GAP SERPL CALC-SCNC: 12 MMOL/L (ref 8–16)
AST SERPL-CCNC: 15 U/L (ref 10–40)
BILIRUB SERPL-MCNC: 0.8 MG/DL (ref 0.1–1)
BUN SERPL-MCNC: 17 MG/DL (ref 6–20)
CALCIUM SERPL-MCNC: 9.6 MG/DL (ref 8.7–10.5)
CHLORIDE SERPL-SCNC: 101 MMOL/L (ref 95–110)
CO2 SERPL-SCNC: 27 MMOL/L (ref 23–29)
CREAT SERPL-MCNC: 1.2 MG/DL (ref 0.5–1.4)
CRP SERPL-MCNC: 83.8 MG/L (ref 0–8.2)
ERYTHROCYTE [SEDIMENTATION RATE] IN BLOOD BY WESTERGREN METHOD: 34 MM/HR (ref 0–10)
EST. GFR  (AFRICAN AMERICAN): >60 ML/MIN/1.73 M^2
EST. GFR  (NON AFRICAN AMERICAN): >60 ML/MIN/1.73 M^2
GLUCOSE SERPL-MCNC: 112 MG/DL (ref 70–110)
POTASSIUM SERPL-SCNC: 3.7 MMOL/L (ref 3.5–5.1)
PROT SERPL-MCNC: 8.7 G/DL (ref 6–8.4)
SODIUM SERPL-SCNC: 140 MMOL/L (ref 136–145)
URATE SERPL-MCNC: 9.4 MG/DL (ref 3.4–7)

## 2020-01-09 PROCEDURE — 3077F SYST BP >= 140 MM HG: CPT | Mod: HCNC,CPTII,S$GLB, | Performed by: INTERNAL MEDICINE

## 2020-01-09 PROCEDURE — 80053 COMPREHEN METABOLIC PANEL: CPT | Mod: HCNC

## 2020-01-09 PROCEDURE — 3080F DIAST BP >= 90 MM HG: CPT | Mod: HCNC,CPTII,S$GLB, | Performed by: INTERNAL MEDICINE

## 2020-01-09 PROCEDURE — 36415 COLL VENOUS BLD VENIPUNCTURE: CPT | Mod: HCNC

## 2020-01-09 PROCEDURE — 85651 RBC SED RATE NONAUTOMATED: CPT | Mod: HCNC

## 2020-01-09 PROCEDURE — 86200 CCP ANTIBODY: CPT | Mod: HCNC

## 2020-01-09 PROCEDURE — 3080F PR MOST RECENT DIASTOLIC BLOOD PRESSURE >= 90 MM HG: ICD-10-PCS | Mod: HCNC,CPTII,S$GLB, | Performed by: INTERNAL MEDICINE

## 2020-01-09 PROCEDURE — 99205 OFFICE O/P NEW HI 60 MIN: CPT | Mod: HCNC,S$GLB,, | Performed by: INTERNAL MEDICINE

## 2020-01-09 PROCEDURE — 99999 PR PBB SHADOW E&M-EST. PATIENT-LVL III: CPT | Mod: PBBFAC,HCNC,, | Performed by: INTERNAL MEDICINE

## 2020-01-09 PROCEDURE — 99499 RISK ADDL DX/OHS AUDIT: ICD-10-PCS | Mod: S$PBB,,, | Performed by: INTERNAL MEDICINE

## 2020-01-09 PROCEDURE — 99499 UNLISTED E&M SERVICE: CPT | Mod: S$PBB,,, | Performed by: INTERNAL MEDICINE

## 2020-01-09 PROCEDURE — 3008F BODY MASS INDEX DOCD: CPT | Mod: HCNC,CPTII,S$GLB, | Performed by: INTERNAL MEDICINE

## 2020-01-09 PROCEDURE — 99999 PR PBB SHADOW E&M-EST. PATIENT-LVL III: ICD-10-PCS | Mod: PBBFAC,HCNC,, | Performed by: INTERNAL MEDICINE

## 2020-01-09 PROCEDURE — 80074 ACUTE HEPATITIS PANEL: CPT | Mod: HCNC

## 2020-01-09 PROCEDURE — 86431 RHEUMATOID FACTOR QUANT: CPT | Mod: HCNC

## 2020-01-09 PROCEDURE — 86140 C-REACTIVE PROTEIN: CPT | Mod: HCNC

## 2020-01-09 PROCEDURE — 3077F PR MOST RECENT SYSTOLIC BLOOD PRESSURE >= 140 MM HG: ICD-10-PCS | Mod: HCNC,CPTII,S$GLB, | Performed by: INTERNAL MEDICINE

## 2020-01-09 PROCEDURE — 3008F PR BODY MASS INDEX (BMI) DOCUMENTED: ICD-10-PCS | Mod: HCNC,CPTII,S$GLB, | Performed by: INTERNAL MEDICINE

## 2020-01-09 PROCEDURE — 84550 ASSAY OF BLOOD/URIC ACID: CPT | Mod: HCNC

## 2020-01-09 PROCEDURE — 81374 HLA I TYPING 1 ANTIGEN LR: CPT | Mod: HCNC

## 2020-01-09 PROCEDURE — 99205 PR OFFICE/OUTPT VISIT, NEW, LEVL V, 60-74 MIN: ICD-10-PCS | Mod: HCNC,S$GLB,, | Performed by: INTERNAL MEDICINE

## 2020-01-09 RX ORDER — PREDNISONE 50 MG/1
50 TABLET ORAL DAILY
Qty: 10 TABLET | Refills: 0 | Status: SHIPPED | OUTPATIENT
Start: 2020-01-09 | End: 2020-01-19

## 2020-01-09 RX ORDER — PREDNISONE 5 MG/1
TABLET ORAL
Qty: 56 TABLET | Refills: 0 | Status: SHIPPED | OUTPATIENT
Start: 2020-01-09 | End: 2020-02-08

## 2020-01-09 NOTE — PATIENT INSTRUCTIONS
Gout    Gout is an inflammation of a joint due to a build-up of gout crystals in the joint fluid. This occurs when there is an excess of uric acid (a normal waste product) in the body. Uric acid builds up in the body when the kidneys are unable to filter enough of it from the blood. This may occur with age. It is also associated with kidney disease. Gout occurs more often in people with obesity, diabetes, high blood pressure, or high levels of fats in the blood. It may run in families. Gout tends to come and go. A flare up of gout is called an attack. Drinking alcohol or eating certain foods (such as shellfish or foods with additives such as high-fructose corn syrup) may increase uric acid levels in the blood and cause a gout attack.  During a gout attack, the affected joint may become a hot, red, swollen and painful. If you have had one attack of gout, you are likely to have another. An attack of gout can be treated with medicine. If these attacks become frequent, a daily medicine may be prescribed to help the kidneys remove uric acid from the body.  Home care  During a gout attack:  · Rest painful joints. If gout affects the joints of your foot or leg, you may want to use crutches for the first few days to keep from bearing weight on the affected joint.  · When sitting or lying down, raise the painful joint to a level higher than your heart.  · Apply an ice pack (ice cubes in a plastic bag wrapped in a thin towel) over the injured area for 20 minutes every 1 to 2 hours the first day for pain relief. Continue this 3 to 4 times a day for swelling and pain.  · Avoid alcohol and foods listed below (see Preventing attacks) during a gout attack. Drink extra fluid to help flush the uric acid through your kidneys.  · If you were prescribed a medicine to treat gout, take it as your healthcare provider has instructed. Don't skip doses.  · Take anti-inflammatory medicine as directed.   · If pain medicines have been  prescribed, take them exactly as directed.    Preventing attacks  · Minimize or avoid alcohol use. Excess alcohol intake can cause a gout attack.  · Limit these foods and beverages:  ¨ Organ meats, such as kidneys and liver  ¨ Certain seafoods (anchovies, sardines, shrimp, scallops, herring, mackerel)  ¨ Wild game, meat extracts and meat gravies  ¨ Foods and beverages sweetened with high-fructose corn syrup, such as sodas  · Eat a healthy diet including low-fat and nonfat dairy, whole grains, and vegetables.  · If you are overweight, talk to your healthcare provider about a weight reduction plan. Avoid fasting or extreme low calorie diets (less than 900 calories per day). This will increase uric acid levels in the body.  · If you have diabetes or high blood pressure, work with your doctor to manage these conditions.  · Protect the joint from injury. Trauma can trigger a gout attack.  Follow-up care  Follow up with your healthcare provider, or as advised.   When to seek medical advice  Call your healthcare provider if you have any of the following:  · Fever over 100.4°F (38.ºC) with worsening joint pain  · Increasing redness around the joint  · Pain developing in another joint  · Repeated vomiting, abdominal pain, or blood in the vomit or stool (black or red color)  Date Last Reviewed: 3/1/2017  © 2014-3471 [employer]. [address] [place] [address], [place] [address]. All rights reserved. This information is not intended as a substitute for professional medical care. Always follow your healthcare professional's instructions.        Treating Gout Attacks     Raising the joint above the level of your heart can help reduce gout symptoms.     Gout is a disease that affects the joints. It is caused by excess uric acid in your blood stream that may lead to crystals forming in your joints. Left untreated, it can lead to painful foot and joint deformities and even kidney problems. But, by treating gout early, you can  relieve pain and help prevent future problems. Gout can usually be treated with medication and proper diet. In severe cases, surgery may be needed.  Gout attacks are painful and often happen more than once. Taking medications may reduce pain and prevent attacks in the future. There are also some things you can do at home to relieve symptoms.  Medications for gout  Your healthcare provider may prescribe a daily medication to reduce levels of uric acid. Reducing your uric acid levels may help prevent gout attacks. Allopurinol is one commonly used medication taken daily to reduce uric acid levels. Other medications can help relieve pain and swelling during an acute attack. Medicines such as NSAIDs (nonsteroidal anti-inflammatory medicines), steroids, and colchicine may be prescribed for intermittent use to relieve an acute gout attack. Be sure to take your medication as directed.  What you can do  Below are some things you can do at home to relieve gout symptoms. Your healthcare provider may have other tips.  · Rest the painful joint as much as you can.  · Raise the painful joint so it is at a level higher than your heart.  · Use ice for 10 minutes every 1-2 hours as possible.  How can I prevent gout?  With a little effort, you may be able to prevent gout attacks in the future. Here are some things you can do:  · Avoid foods high in purines  ¨ Certain meats (red meat, processed meat, turkey)  ¨ Organ meats (kidney, liver, sweetbread)  ¨ Shellfish (lobster, crab, shrimp, scallop, mussel)  ¨ Certain fish (anchovy, sardine, herring, mackerel)  · Take any medications prescribed by your healthcare provider.  · Lose weight if you need to.  · Reduce high fructose corn syrup in meals and drinks.  · Reduce or eliminate consumption of alcohol, particularly beer, but also red wine and spirits.  · Control blood pressure, diabetes, and cholesterol.  · Drink plenty of water to help flush uric acid from your body.  Date Last  Reviewed: 2/1/2016  © 3938-6082 [employer]. [address] [place] [address], [place] [address]. All rights reserved. This information is not intended as a substitute for professional medical care. Always follow your healthcare professional's instructions.        Gout Diet  Gout is a painful condition caused by an excess of uric acid, a waste product made by the body. Uric acid forms crystals that collect in the joints. The immune response to these crystals brings on symptoms of joint pain and swelling. This is called a gout attack. Often, medications and diet changes are combined to manage gout. Below are some guidelines for changing your diet to help you manage gout and prevent attacks. Your health care provider will help you determine the best eating plan for you.     Eating to manage gout  Weight loss for those who are overweight may help reduce gout attacks.  Eat less of these foods  Eating too many foods containing purines may raise the levels of uric acid in your body. This raises your risk for a gout attack. Try to limit these foods and drinks:  · Alcohol, such as beer and red wine. You may be told to avoid alcohol completely.  · Soft drinks that contain sugar or high fructose corn syrup  · Certain fish, including anchovies, sardines, fish eggs, and herring  · Shellfish  · Certain meats, such as red meat, hot dogs, luncheon meats, and turkey  · Organ meats, such as liver, kidneys, and sweetbreads  · Legumes, such as dried beans and peas  · Other high fat foods such as gravy, whole milk, and high fat cheeses  · Vegetables such as asparagus, cauliflower, spinach, and mushrooms used to be thought to contribute to an increased risk for a gout attack, but recent studies show that high purine vegetables don't increase the risk for a gout attack.  Eat more of these foods  Other foods may be helpful for people with gout. Add some of these foods to your diet:  · Cherries contain chemicals that may lower uric  acid.  · Omega fatty acids. These are found in some fatty fish such as salmon, certain oils (flax, olive, or nut), and nuts themselves. Omega fatty acids may help prevent inflammation due to gout.  · Dairy products that are low-fat or fat-free, such as cheese and yogurt  · Complex carbohydrate foods, including whole grains, brown rice, oats, and beans  · Coffee, in moderation  · Water, approximately 64 ounces per day  Follow-up care  Follow up with your healthcare provider as advised.  When to seek medical advice  Call your healthcare provider right away if any of these occur:  · Return of gout symptoms, usually at night:  · Severe pain, swelling, and heat in a joint, especially the base of the big toe  · Affected joint is hard to move  · Skin of the affected joint is purple or red  · Fever of 100.4°F (38°C) or higher  · Pain that doesn't get better even with prescribed medicine   Date Last Reviewed: 1/12/2016  © 4263-7295 [employer]. [address] [place] [address]. All rights reserved. This information is not intended as a substitute for professional medical care. Always follow your healthcare professional's instructions.        Eating to Prevent Gout  Gout is a painful form of arthritis caused by an excess of uric acid. This is a waste product made by the body. It builds up in the body and forms crystals that collect in the joints, bringing on a gout attack. Alcohol and certain foods can trigger a gout attack. Below are some guidelines for changing your diet to help you manage gout. Your healthcare provider can work with you to determine the best eating plan for you. Know that diet is only one part of managing gout. Take your medicines as prescribed and follow the other guidelines your healthcare provider has given you.  Foods to limit  Eating too many foods containing purines may increase the levels of uric acid in your body and increase your risk for a gout attack. It may be best to  limit these high-purine foods:  · Alcohol (beer, red wine). You may be told to avoid alcohol completely.  · Certain fish (anchovies, sardines, fish roes, herring, tuna, mussels, codfish, scallops, trout, and wm)  · Certain meats (red meat, processed meat, barone, turkey, wild game, and goose)  · Sauces and gravies made with meat  · Organ meats (such as liver, kidneys, sweetbreads, and tripe)  · Legumes (such as dried beans, peas)  · Mushrooms, spinach, asparagus, and cauliflower  · Yeast and yeast extract supplements  Foods to try  Some foods may be helpful for people with gout. You may want to try adding some of the following foods to your diet:  · Dark berries: These include blueberries, blackberries, and cherries. These berries contain chemicals that may lower uric acid.  · Tofu: Tofu, which is made from soy, is a good source of protein. Studies have shown that it may be a better choice than meat for people with gout.  · Omega fatty acids: These acids are found in fatty fish (such as salmon), certain oils (such as flax, olive, or nut oils), or nuts. They may help prevent inflammation due to gout.  The following guidelines are recommended by the American Medical Association for people with gout. Your diet should be:  · High in fiber, whole grains, fruits, and vegetables.  · Low in protein (15% of calories should come from protein. Choose lean sources such as soy, lean meats, and poultry).  · Low in fat (no more than 30% of calories should come from fat, with only 10% coming from animal fat).   Date Last Reviewed: 6/17/2015  © 0861-3825 [employer]. [address] [place] [address], [place] [address]. All rights reserved. This information is not intended as a substitute for professional medical care. Always follow your healthcare professional's instructions.        What Is Gout?  Gout is a disease that affects the joints. Left untreated, it can lead to painful foot and joint deformities and even kidney  "problems. But, by treating gout early, you can relieve pain and help prevent future problems. Gout can usually be treated with medication and proper diet. In severe cases, surgery may be needed.  What causes gout?  Gout is caused by an excess of uric acid (a waste product made by the body). Uric acid is excreted by the kidneys. If the uric acid level in your blood rises too high, the uric acid may form crystals that collect in the joints, bringing on a gout attack. If you have many gout attacks, crystals may form large deposits called tophi. Tophi can damage joints and cause deformity.  Who is at risk for gout?  Men are more likely to have gout than women. But women can also be affected, mostly after menopause. Some health problems, such as obesity and high cholesterol, make gout more likely. And some medications, such as diuretics (water pills), can trigger a gout attack. People who drink a lot of alcohol are at high risk for gout. Certain foods can also trigger a gout attack.  Substances that may trigger a gout attack  To help prevent a gout attack, avoid these foods:  · Alcohol (particularly beer, but also red wine and spirits)  · Certain meats (red meat, processed meat, turkey)  · Organ meats (kidney, liver, sweetbread)  · Shellfish (lobster, crab, shrimp, scallop, mussel)  · Certain fish (anchovy, sardine, herring, mackerel)   Treatment  · Lifestyle changes, including weight loss, exercise, and quitting tobacco use  · Reducing consumption of the food groups above as well as high fructose corn syrup, found in many foods including sodas and energy drinks  · Changing non-essential medications that may contribute to gout (such as thiazide diuretics--"water pills")  · Medications to reduce the amount of uric acid in the blood, such as allopurinol or others  · Medications to treat acute gout attacks, including NSAIDs (nonsteroidal anti-inflammatory medicines), steroids, and colchicine  Date Last Reviewed: " 2/1/2016 © 2000-2017 [employer]. [address] [place] [address], [place] [address]. All rights reserved. This information is not intended as a substitute for professional medical care. Always follow your healthcare professional's instructions.        Allopurinol tablets  What is this medicine?  ALLOPURINOL (al oh PURE i nole) reduces the amount of uric acid the body makes. It is used to treat the symptoms of gout. It is also used to treat or prevent high uric acid levels that occur as a result of certain types of chemotherapy. This medicine may also help patients who frequently have kidney stones.  How should I use this medicine?  Take this medicine by mouth with a glass of water. Follow the directions on the prescription label. If this medicine upsets your stomach, take it with food or milk. Take your doses at regular intervals. Do not take your medicine more often than directed.  Talk to your pediatrician regarding the use of this medicine in children. Special care may be needed. While this drug may be prescribed for children as young as 6 years for selected conditions, precautions do apply.  What side effects may I notice from receiving this medicine?  Side effects that you should report to your doctor or health care professional as soon as possible:  · allergic reactions like skin rash, itching or hives, swelling of the face, lips, or tongue  · breathing problems  · muscle aches or pains  · redness, blistering, peeling or loosening of the skin, including inside the mouth  Side effects that usually do not require medical attention (report to your doctor or health care professional if they continue or are bothersome):  · changes in taste  · diarrhea  · indigestion  · stomach pain or cramps  What may interact with this medicine?  Do not take this medicine with the following medication:  · didanosine, ddI  This medicine may also interact with the following medications:  · amoxicillin or  ampicillin  · azathioprine  · certain medicines used to treat gout  · certain types of diuretics  · chlorpropamide  · cyclosporine  · dicumarol  · mercaptopurine  · tolbutamide  · warfarin  What if I miss a dose?  If you miss a dose, take it as soon as you can. If it is almost time for your next dose, take only that dose. Do not take double or extra doses.  Where should I keep my medicine?  Keep out of the reach of children.  Store at room temperature between 15 and 25 degrees C (59 and 77 degrees F). Protect from light and moisture. Throw away any unused medicine after the expiration date.  What should I tell my health care provider before I take this medicine?  They need to know if you have any of these conditions:  · kidney or liver disease  · an unusual or allergic reaction to allopurinol, other medicines, foods, dyes, or preservatives  · pregnant or trying to get pregnant  · breast feeding  What should I watch for while using this medicine?  Visit your doctor or health care professional for regular checks on your progress. If you are taking this medicine to treat gout, you may not have less frequent attacks at first. Keep taking your medicine regularly and the attacks should get better within 2 to 6 weeks. Drink plenty of water (10 to 12 full glasses a day) while you are taking this medicine. This will help to reduce stomach upset and reduce the risk of getting gout or kidney stones.  Call your doctor or health care professional at once if you get a skin rash together with chills, fever, sore throat, or nausea and vomiting, if you have blood in your urine, or difficulty passing urine.  Do not take vitamin C without asking your doctor or health care professional. Too much vitamin C can increase the chance of getting kidney stones.  You may get drowsy or dizzy. Do not drive, use machinery, or do anything that needs mental alertness until you know how this drug affects you. Do not stand or sit up quickly,  especially if you are an older patient. This reduces the risk of dizzy or fainting spells. Alcohol can make you more drowsy and dizzy. Alcohol can also increase the chance of stomach problems and increase the amount of uric acid in your blood. Avoid alcoholic drinks.  NOTE:This sheet is a summary. It may not cover all possible information. If you have questions about this medicine, talk to your doctor, pharmacist, or health care provider. Copyright© 2017 [employer]        Colchicine; Probenecid oral tablet  What is this medicine?  COLCHICINE; PROBENECID (KOL chi seen; proe PARUL e rossy) is for joint pain and swelling due to gouty arthritis.  How should I use this medicine?  Take this medicine by mouth with a full glass of water. Follow the directions on the prescription label. Take your medicine at regular intervals. Do not take your medicine more often than directed. Do not stop taking except on your doctor's advice.  Talk to your pediatrician regarding the use of this medicine in children. Special care may be needed.  What side effects may I notice from receiving this medicine?  Side effects that you should report to your doctor or health care professional as soon as possible:  · allergic reactions like skin rash, itching or hives, swelling of the face, lips, or tongue  · back or kidney pain  · breathing problems  · dark urine  · fever, chills, or sore throat  · numbness or tingling in hands or feet  · trouble passing urine or change in the amount of urine  · unusual bleeding or bruising  · unusually weak or tired  Side effects that usually do not require medical attention (report to your doctor or health care professional if they continue or are bothersome):  · diarrhea  · hair loss  · headache  · loss of appetite  · nausea, vomiting  · stomach upset  What may interact with this medicine?  Do not take this medicine with any of the following medications:  · aspirin and aspirin-like drugs  · certain medicines for  fungal infections like itraconazole or ketoconazole  · clarithromycin  · erythromycin  · ketorolac  · methotrexate  · topiramate  This medicine may also interact with the following medications:  · acetaminophen  · alcohol  · antibiotics including penicillins, sulfonamides  · antiviral medicines such as acyclovir, famciclovir, ganciclovir  · cyclosporine  · epinephrine  · lorazepam  · meclofenamate  · medicines for diabetes  · medicines for sleep during surgery  · methenamine  · methotrexate  · NSAIDs, medicines for pain and inflammation, like ibuprofen or naproxen  · pyrazinamide  · rifampin  · sodium bicarbonate  What if I miss a dose?  If you miss a dose, take it as soon as you can. If it is almost time for your next dose, take only that dose. Do not take double or extra doses.  Where should I keep my medicine?  Keep out of the reach of children.  Store at room temperature between 20 and 25 degrees C (68 and 77 degrees F). Keep container tightly closed. Throw away any unused medicine after the expiration date.  What should I tell my health care provider before I take this medicine?  They need to know if you have any of these conditions:  · anemia  · blood disorders like leukemia or lymphoma  · having an acute gouty attack  · heart disease  · immune system problems  · intestinal disease  · kidney disease, stones  · liver disease  · low platelet counts  · stomach problems  · an unusual or allergic reaction to colchicine, probenecid, other medicines, lactose, foods, dyes, or preservatives  · pregnant or trying to get pregnant  · breast-feeding  What should I watch for while using this medicine?  Visit your doctor or health care professional for regular checks on your progress. You may need periodic blood checks.  Aspirin and non-steroidal antiinflammatory drugs like ibuprofen can make this medicine less effective. Do not treat yourself for headaches or pain. Ask your doctor or health care professional for  advice.  Alcohol can increase the chance of getting stomach problems and gout attacks. Do not drink alcohol.  You may need to be on a special diet while taking this medicine. Check with your doctor. Also, ask how many glasses of fluid you need to drink a day. You must not get dehydrated.  This medicine can interfere with some urine glucose tests. If you use such tests talk with your health care professional.  NOTE:This sheet is a summary. It may not cover all possible information. If you have questions about this medicine, talk to your doctor, pharmacist, or health care provider. Copyright© 2017 [employer]

## 2020-01-09 NOTE — PROGRESS NOTES
RHEUMATOLOGY OUTPATIENT CLINIC NOTE    1/9/2020    Attending Rheumatologist: Constantine Raphael  Primary Care Provider: Scottie Alcantar MD   Physician Requesting Consultation: Aaareferral Self  No address on file  Chief Complaint/Reason For Consultation:  Gout    Subjective:       PREM Ontiveros is a 54 y.o. Black or  male with history of gout and chronic joint pain referred for rheumatic evaluation.  Main complaint is generalized joint pain.  Worst on right foot, associated with extreme tenderness and unable to bear weight.  Has clinical diagnosis of gout being managed by primary care provider.  Describes episodes of podagra several years ago, initiated allopurinol April 2019.  Good adherence with therapeutic referred.  Denies any precipitation of joint pain with any particular food/beverage intake.  Recently developed psoriasis.  Reports frequent flares on several joints, which are lasting longer each time.  Last fair occurred approximately 2 weeks ago without any particular precipitating event.  No relieved to colchicine this time.  Denies any fever,  or GI complaints.    Review of Systems   Constitutional: Negative for chills, fever and malaise/fatigue.   Eyes: Negative for pain and redness.   Respiratory: Negative for cough, hemoptysis and shortness of breath.    Cardiovascular: Negative for chest pain and leg swelling.   Gastrointestinal: Negative for abdominal pain, blood in stool and melena.   Genitourinary: Negative for dysuria and hematuria.   Musculoskeletal: Positive for joint pain (Right elbow, knee, foot.  Inflammatory pattern.  Denies precipitation with any particular food/beverage intake.). Negative for falls.   Skin: Positive for rash (Psoriasis).        Family history of psoriasis, father.   Neurological: Negative for tingling and focal weakness.   Psychiatric/Behavioral: Negative for memory loss. The patient does not have insomnia.        Chronic comorbid conditions affecting  medical decision making today:  Past Medical History:   Diagnosis Date    A-fib     CHF (congestive heart failure)     Gout, chronic     Hypertension      Past Surgical History:   Procedure Laterality Date    CHOLECYSTECTOMY      GASTRIC BYPASS  2014     Family History   Problem Relation Age of Onset    Hypertension Mother     Stroke Father     Diabetes Father     Hypertension Father     Diabetes Sister     Diabetes Brother      Social History     Substance and Sexual Activity   Alcohol Use Yes    Frequency: Monthly or less    Binge frequency: Never    Comment: sometimes     Social History     Tobacco Use   Smoking Status Never Smoker   Smokeless Tobacco Never Used     Social History     Substance and Sexual Activity   Drug Use No       Current Outpatient Medications:     allopurinol (ZYLOPRIM) 100 MG tablet, Take 1 tablet (100 mg total) by mouth once daily., Disp: 90 tablet, Rfl: 3    amLODIPine (NORVASC) 5 MG tablet, Take 1 tablet (5 mg total) by mouth once daily., Disp: 90 tablet, Rfl: 3    apixaban (ELIQUIS) 5 mg Tab, Take 1 tablet (5 mg total) by mouth 2 (two) times daily., Disp: 180 tablet, Rfl: 3    calcium phosphate-vitamin D3 250-400 mg-unit Chew, , Disp: , Rfl:     clotrimazole-betamethasone (LOTRISONE) lotion, Apply topically 2 (two) times daily., Disp: 30 mL, Rfl: 2    COLCRYS 0.6 mg tablet, Take 1 tablet (0.6 mg total) by mouth 2 (two) times daily. for 14 days, Disp: 28 tablet, Rfl: 0    furosemide (LASIX) 80 MG tablet, Take 1 tablet (80 mg total) by mouth 2 (two) times daily., Disp: 180 tablet, Rfl: 3    losartan-hydrochlorothiazide 100-25 mg (HYZAAR) 100-25 mg per tablet, Take 1 tablet by mouth once daily. , Disp: , Rfl:     metoprolol succinate (TOPROL-XL) 25 MG 24 hr tablet, Take 1 tablet (25 mg total) by mouth 3 (three) times daily., Disp: 270 tablet, Rfl: 3    potassium chloride SA (K-DUR,KLOR-CON) 20 MEQ tablet, Take 20 mEq by mouth., Disp: , Rfl:     QUEtiapine  "(SEROQUEL) 100 MG Tab, TAKE 1 TABLET (100 MG TOTAL) BY MOUTH EVERY EVENING., Disp: 90 tablet, Rfl: 3    venlafaxine (EFFEXOR-XR) 150 MG Cp24, TAKE 1 CAPSULE (150 MG TOTAL) BY MOUTH ONCE DAILY., Disp: 90 capsule, Rfl: 3    predniSONE (DELTASONE) 5 MG tablet, Take 4 tablets (20 mg total) by mouth once daily for 4 days, THEN 3 tablets (15 mg total) once daily for 4 days, THEN 2.5 tablets (12.5 mg total) once daily for 4 days, THEN 2 tablets (10 mg total) once daily for 4 days, THEN 1 tablet (5 mg total) once daily for 7 days, THEN 1 tablet (5 mg total) every other day for 7 days., Disp: 56 tablet, Rfl: 0    predniSONE (DELTASONE) 50 MG Tab, Take 1 tablet (50 mg total) by mouth once daily. for 10 days, Disp: 10 tablet, Rfl: 0     Objective:         Vitals:    01/09/20 1357   BP: (!) 143/92   Pulse: 94     Physical Exam   Constitutional: No distress.   Estimated body mass index is 47.78 kg/m² as calculated from the following:    Height as of this encounter: 6' 3" (1.905 m).    Weight as of this encounter: 173.4 kg (382 lb 4.4 oz).    Wt Readings from Last 1 Encounters:  01/09/20 1357 : (!) 173.4 kg (382 lb 4.4 oz)     HENT:   Head: Normocephalic and atraumatic.   Eyes: Conjunctivae are normal. Pupils are equal, round, and reactive to light.   Neck: Normal range of motion.   Cardiovascular: Normal rate and intact distal pulses.    Pulmonary/Chest: Effort normal. No respiratory distress.   Abdominal: Soft. He exhibits no distension.   Neurological: He is alert.   Wide-based, antalgic gait.   Skin: Rash (thick, red, bumpy patches covered with silvery scales lower abdomen anteriorly and posteriorly.) noted. No erythema.     Musculoskeletal: Normal range of motion. He exhibits edema and tenderness (Right elbow, olecranon bursa, ankle joint bilaterally).   : strong  Enthesitis right triceps insertion, Achilles bilaterally.  Warmth present, swelling++    AROM:  Limited due to pain  PROM:  As above    Devices used by " patient:  Kp       Reviewed old and all outside pertinent medical records available.    All lab results personally reviewed and interpreted by me.  Lab Results   Component Value Date    WBC 4.00 06/11/2019    HGB 14.6 06/11/2019    HCT 45.0 06/11/2019    MCV 87 06/11/2019    MCH 28.3 06/11/2019    MCHC 32.4 06/11/2019    RDW 14.6 (H) 06/11/2019     06/11/2019    MPV 13.0 (H) 06/11/2019    PLTEST Appears normal 06/11/2019       Lab Results   Component Value Date     01/09/2020    K 3.7 01/09/2020     01/09/2020    CO2 27 01/09/2020     (H) 01/09/2020    BUN 17 01/09/2020    CALCIUM 9.6 01/09/2020    PROT 8.7 (H) 01/09/2020    ALBUMIN 3.6 01/09/2020    BILITOT 0.8 01/09/2020    AST 15 01/09/2020    ALKPHOS 66 01/09/2020    ALT 10 01/09/2020       Lab Results   Component Value Date    COLORU Brown (A) 05/10/2017    APPEARANCEUA Clear 05/10/2017    SPECGRAV 1.010 05/10/2017    PHUR 7.0 05/10/2017    PROTEINUA Negative 05/10/2017    KETONESU Negative 05/10/2017    LEUKOCYTESUR Negative 05/10/2017    NITRITE Negative 05/10/2017    UROBILINOGEN 4.0-6.0 (A) 05/10/2017       Lab Results   Component Value Date    CRP 83.8 (H) 01/09/2020       Lab Results   Component Value Date    SEDRATE 34 (H) 01/09/2020       Lab Results   Component Value Date    SEDRATE 34 (H) 01/09/2020       No components found for: 25OHVITDTOT, 83WIRLFL6, 20RNHKPY8, METHODNOTE    Lab Results   Component Value Date    URICACID 9.4 (H) 01/09/2020       No components found for: TSPOTTB    Rheum Labs:   n/a     Infectious Labs:   HCV NR     Imaging:  All imaging reviewed and independently  interpreted by me.    X-ray right hip August 2017  There is joint space narrowing with adjacent sclerosis. There is no evidence of fracture.    Chest x-ray September 2017  Examination limited by body habitus.  There is mild cardiomegaly.  Lungs are hypoinflated which accentuates pulmonary vascular markings.  No evidence of focal consolidative  process, pneumothorax, or large effusion.  Bones appear intact.    X-ray foot September 2017  No evidence of acute fracture or dislocation. No significant tophi or periarticular erosion. No asymmetric soft tissue swelling. No radiopaque foreign body.     ASSESSMENT / PLAN:     Caio Ontiveros is a 54 y.o. Black or  male with:    1. Chronic gout with tophus, unspecified cause, unspecified site  - typical clinical presentation, high uric acid level.  Presenting with acute prolonged flare.  - no erosive changes on available imaging.  Of note, patient with psoriasis and enthesitis on exam.  - spondyloarthritis is a possibility, however hard to assess during acute gouty flare.  - recommend prednisone for rescue therapy and continue with colchicine for prophylaxis  - will optimize allopurinol therapy once acute flare results, uric acid goal of <6.0 mg/dL  - Dietary and lifestyle modifications  - clinical significance side effects of therapy were discussed.  - C-reactive protein; Standing  - Sedimentation rate; Standing  - Rheumatoid factor; Future  - Cyclic citrul peptide antibody, IgG; Future  - Uric acid; Future  - Comprehensive metabolic panel; Future  - Quantiferon Gold TB; Future  - HLA B27 Antigen; Future  - predniSONE (DELTASONE) 5 MG tablet; Take 4 tablets (20 mg total) by mouth once daily for 4 days, THEN 3 tablets (15 mg total) once daily for 4 days, THEN 2.5 tablets (12.5 mg total) once daily for 4 days, THEN 2 tablets (10 mg total) once daily for 4 days, THEN 1 tablet (5 mg total) once daily for 7 days, THEN 1 tablet (5 mg total) every other day for 7 days.  Dispense: 56 tablet; Refill: 0  - predniSONE (DELTASONE) 50 MG Tab; Take 1 tablet (50 mg total) by mouth once daily. for 10 days  Dispense: 10 tablet; Refill: 0  - Hepatitis panel, acute; Future    2. Other specified counseling  - over 10 minutes spent regarding below topics:  - Immunization counseling done.  - Weight loss counseling done.  -  Nutrition and exercise counseling.  - Limitation of alcohol consumption.  - Regular exercise:  Aerobic and resistance.  - Medication counseling provided.    No follow-ups on file.  RTC 6 weeks    Method of contact with patient concerns: [facility]    Disclaimer:  This note is prepared using voice recognition software and as such is likely to have errors and has not been proof read. Please contact me for questions.     Time spent: 60 minutes in face to face discussion concerning diagnosis, prognosis, review of lab and test results, benefits of treatment as well as management of disease, counseling of patient and coordination of care between various health care providers.  Greater than half the time spent was used for coordination of care and counseling of patient.    Constantine Raphael M.D.  Rheumatology Department   [facility]

## 2020-01-10 ENCOUNTER — PATIENT MESSAGE (OUTPATIENT)
Dept: RHEUMATOLOGY | Facility: CLINIC | Age: 55
End: 2020-01-10

## 2020-01-10 LAB
CCP AB SER IA-ACNC: <0.5 U/ML
HAV IGM SERPL QL IA: NEGATIVE
HBV CORE IGM SERPL QL IA: NEGATIVE
HBV SURFACE AG SERPL QL IA: NEGATIVE
HCV AB SERPL QL IA: NEGATIVE
RHEUMATOID FACT SERPL-ACNC: <10 IU/ML (ref 0–15)

## 2020-01-13 ENCOUNTER — TELEPHONE (OUTPATIENT)
Dept: RHEUMATOLOGY | Facility: CLINIC | Age: 55
End: 2020-01-13

## 2020-01-13 LAB
HLA-B27 RELATED AG QL: NEGATIVE
HLA-B27 RELATED AG QL: NORMAL

## 2020-01-13 NOTE — TELEPHONE ENCOUNTER
----- Message from Constantine Raphael MD sent at 1/13/2020 10:22 AM CST -----  Please contact the patient and inform I personally reviewed the lab results.  I am happy to report that the patient's results are within acceptable range.  The ESR and CRP may be elevated during gout flares.  We would like those values lower after treatment ideally.  We can continue with the plan discussed on our previous encounter.  For any questions or concerns, do not hesitate to contact me; and have the patient call the office or send a message through the patient portal for any concerns.    Thank you very much!    Sincerely,    Constantine Raphael  Rheumatology Department   [facility] [place]

## 2020-01-14 NOTE — PROGRESS NOTES
Patient, Caio Ontiveros (MRN #[id number]), presented with a recorded BMI of 47.78 kg/m^2 consistent with the definition of morbid obesity (ICD-10 E66.01). The patient's morbid obesity was monitored, evaluated, addressed and/or treated. This addendum to the medical record is made on 01/14/2020.

## 2020-01-27 DIAGNOSIS — I10 ESSENTIAL HYPERTENSION: ICD-10-CM

## 2020-01-27 DIAGNOSIS — M1A.00X0 IDIOPATHIC CHRONIC GOUT WITHOUT TOPHUS, UNSPECIFIED SITE: ICD-10-CM

## 2020-01-27 DIAGNOSIS — F33.1 MODERATE EPISODE OF RECURRENT MAJOR DEPRESSIVE DISORDER: ICD-10-CM

## 2020-01-27 RX ORDER — QUETIAPINE FUMARATE 100 MG/1
TABLET, FILM COATED ORAL
Qty: 90 TABLET | Refills: 3 | Status: SHIPPED | OUTPATIENT
Start: 2020-01-27 | End: 2020-01-30 | Stop reason: SDUPTHER

## 2020-01-27 RX ORDER — AMLODIPINE BESYLATE 5 MG/1
TABLET ORAL
Qty: 90 TABLET | Refills: 3 | Status: SHIPPED | OUTPATIENT
Start: 2020-01-27 | End: 2020-09-12

## 2020-01-27 RX ORDER — FUROSEMIDE 80 MG/1
TABLET ORAL
Qty: 180 TABLET | Refills: 3 | Status: SHIPPED | OUTPATIENT
Start: 2020-01-27 | End: 2020-09-12

## 2020-01-27 RX ORDER — ALLOPURINOL 100 MG/1
TABLET ORAL
Qty: 90 TABLET | Refills: 3 | Status: SHIPPED | OUTPATIENT
Start: 2020-01-27 | End: 2020-02-20 | Stop reason: SDUPTHER

## 2020-01-27 RX ORDER — METOPROLOL SUCCINATE 25 MG/1
75 TABLET, EXTENDED RELEASE ORAL DAILY
Qty: 270 TABLET | Refills: 3 | Status: SHIPPED | OUTPATIENT
Start: 2020-01-27 | End: 2020-04-08

## 2020-01-27 RX ORDER — VENLAFAXINE HYDROCHLORIDE 150 MG/1
CAPSULE, EXTENDED RELEASE ORAL
Qty: 90 CAPSULE | Refills: 3 | Status: SHIPPED | OUTPATIENT
Start: 2020-01-27 | End: 2020-01-30 | Stop reason: SDUPTHER

## 2020-01-27 RX ORDER — LOSARTAN POTASSIUM AND HYDROCHLOROTHIAZIDE 25; 100 MG/1; MG/1
TABLET ORAL
Qty: 90 TABLET | Refills: 3 | Status: SHIPPED | OUTPATIENT
Start: 2020-01-27 | End: 2021-05-06 | Stop reason: SDUPTHER

## 2020-01-30 ENCOUNTER — PATIENT MESSAGE (OUTPATIENT)
Dept: CARDIOLOGY | Facility: CLINIC | Age: 55
End: 2020-01-30

## 2020-01-30 ENCOUNTER — PATIENT MESSAGE (OUTPATIENT)
Dept: FAMILY MEDICINE | Facility: CLINIC | Age: 55
End: 2020-01-30

## 2020-01-30 DIAGNOSIS — F33.1 MODERATE EPISODE OF RECURRENT MAJOR DEPRESSIVE DISORDER: ICD-10-CM

## 2020-01-30 RX ORDER — QUETIAPINE FUMARATE 100 MG/1
100 TABLET, FILM COATED ORAL NIGHTLY
Qty: 90 TABLET | Refills: 1 | Status: SHIPPED | OUTPATIENT
Start: 2020-01-30 | End: 2021-01-27

## 2020-01-30 RX ORDER — VENLAFAXINE HYDROCHLORIDE 150 MG/1
150 CAPSULE, EXTENDED RELEASE ORAL DAILY
Qty: 90 CAPSULE | Refills: 1 | Status: SHIPPED | OUTPATIENT
Start: 2020-01-30 | End: 2021-01-27 | Stop reason: SDUPTHER

## 2020-02-12 ENCOUNTER — PATIENT MESSAGE (OUTPATIENT)
Dept: RHEUMATOLOGY | Facility: CLINIC | Age: 55
End: 2020-02-12

## 2020-02-12 DIAGNOSIS — M1A.00X0 IDIOPATHIC CHRONIC GOUT WITHOUT TOPHUS, UNSPECIFIED SITE: ICD-10-CM

## 2020-02-12 RX ORDER — COLCHICINE 0.6 MG/1
0.6 TABLET, FILM COATED ORAL 2 TIMES DAILY
Qty: 28 TABLET | Refills: 0 | Status: SHIPPED | OUTPATIENT
Start: 2020-02-12 | End: 2020-02-20 | Stop reason: SDUPTHER

## 2020-02-17 ENCOUNTER — PATIENT OUTREACH (OUTPATIENT)
Dept: ADMINISTRATIVE | Facility: OTHER | Age: 55
End: 2020-02-17

## 2020-02-19 ENCOUNTER — TELEPHONE (OUTPATIENT)
Dept: CARDIOLOGY | Facility: CLINIC | Age: 55
End: 2020-02-19

## 2020-02-20 ENCOUNTER — LAB VISIT (OUTPATIENT)
Dept: LAB | Facility: HOSPITAL | Age: 55
End: 2020-02-20
Payer: MEDICARE

## 2020-02-20 ENCOUNTER — OFFICE VISIT (OUTPATIENT)
Dept: RHEUMATOLOGY | Facility: CLINIC | Age: 55
End: 2020-02-20
Payer: MEDICARE

## 2020-02-20 VITALS
HEIGHT: 75 IN | WEIGHT: 315 LBS | BODY MASS INDEX: 39.17 KG/M2 | DIASTOLIC BLOOD PRESSURE: 94 MMHG | SYSTOLIC BLOOD PRESSURE: 133 MMHG | HEART RATE: 71 BPM

## 2020-02-20 DIAGNOSIS — Z71.89 COUNSELING ON HEALTH PROMOTION AND DISEASE PREVENTION: ICD-10-CM

## 2020-02-20 DIAGNOSIS — M1A.9XX1 CHRONIC GOUT WITH TOPHUS, UNSPECIFIED CAUSE, UNSPECIFIED SITE: ICD-10-CM

## 2020-02-20 DIAGNOSIS — M1A.00X0 IDIOPATHIC CHRONIC GOUT WITHOUT TOPHUS, UNSPECIFIED SITE: Primary | ICD-10-CM

## 2020-02-20 DIAGNOSIS — L40.9 PSORIASIS: ICD-10-CM

## 2020-02-20 LAB — ERYTHROCYTE [SEDIMENTATION RATE] IN BLOOD BY WESTERGREN METHOD: 4 MM/HR (ref 0–10)

## 2020-02-20 PROCEDURE — 3075F PR MOST RECENT SYSTOLIC BLOOD PRESS GE 130-139MM HG: ICD-10-PCS | Mod: HCNC,CPTII,S$GLB, | Performed by: INTERNAL MEDICINE

## 2020-02-20 PROCEDURE — 86140 C-REACTIVE PROTEIN: CPT | Mod: HCNC

## 2020-02-20 PROCEDURE — 99999 PR PBB SHADOW E&M-EST. PATIENT-LVL III: ICD-10-PCS | Mod: PBBFAC,HCNC,, | Performed by: INTERNAL MEDICINE

## 2020-02-20 PROCEDURE — 3075F SYST BP GE 130 - 139MM HG: CPT | Mod: HCNC,CPTII,S$GLB, | Performed by: INTERNAL MEDICINE

## 2020-02-20 PROCEDURE — 99999 PR PBB SHADOW E&M-EST. PATIENT-LVL III: CPT | Mod: PBBFAC,HCNC,, | Performed by: INTERNAL MEDICINE

## 2020-02-20 PROCEDURE — 36415 COLL VENOUS BLD VENIPUNCTURE: CPT | Mod: HCNC

## 2020-02-20 PROCEDURE — 3080F PR MOST RECENT DIASTOLIC BLOOD PRESSURE >= 90 MM HG: ICD-10-PCS | Mod: HCNC,CPTII,S$GLB, | Performed by: INTERNAL MEDICINE

## 2020-02-20 PROCEDURE — 85651 RBC SED RATE NONAUTOMATED: CPT | Mod: HCNC

## 2020-02-20 PROCEDURE — 3008F BODY MASS INDEX DOCD: CPT | Mod: HCNC,CPTII,S$GLB, | Performed by: INTERNAL MEDICINE

## 2020-02-20 PROCEDURE — 3008F PR BODY MASS INDEX (BMI) DOCUMENTED: ICD-10-PCS | Mod: HCNC,CPTII,S$GLB, | Performed by: INTERNAL MEDICINE

## 2020-02-20 PROCEDURE — 99214 OFFICE O/P EST MOD 30 MIN: CPT | Mod: HCNC,S$GLB,, | Performed by: INTERNAL MEDICINE

## 2020-02-20 PROCEDURE — 3080F DIAST BP >= 90 MM HG: CPT | Mod: HCNC,CPTII,S$GLB, | Performed by: INTERNAL MEDICINE

## 2020-02-20 PROCEDURE — 99214 PR OFFICE/OUTPT VISIT, EST, LEVL IV, 30-39 MIN: ICD-10-PCS | Mod: HCNC,S$GLB,, | Performed by: INTERNAL MEDICINE

## 2020-02-20 RX ORDER — ALLOPURINOL 100 MG/1
100 TABLET ORAL DAILY
Qty: 90 TABLET | Refills: 3 | Status: SHIPPED | OUTPATIENT
Start: 2020-02-20 | End: 2020-06-26 | Stop reason: SDUPTHER

## 2020-02-20 RX ORDER — PREDNISONE 50 MG/1
50 TABLET ORAL DAILY
Qty: 20 TABLET | Refills: 0 | Status: SHIPPED | OUTPATIENT
Start: 2020-02-20 | End: 2020-03-01

## 2020-02-20 RX ORDER — COLCHICINE 0.6 MG/1
0.6 TABLET, FILM COATED ORAL DAILY
Qty: 60 TABLET | Refills: 2 | Status: SHIPPED | OUTPATIENT
Start: 2020-02-20 | End: 2020-09-14

## 2020-02-20 RX ORDER — ALLOPURINOL 300 MG/1
300 TABLET ORAL DAILY
Qty: 30 TABLET | Refills: 2 | Status: SHIPPED | OUTPATIENT
Start: 2020-02-20 | End: 2020-05-07

## 2020-02-20 NOTE — PROGRESS NOTES
RHEUMATOLOGY OUTPATIENT CLINIC NOTE    2/20/2020    Attending Rheumatologist: Constantine Raphael  Primary Care Provider: Scottie Alcantar MD   Physician Requesting Consultation: No referring provider defined for this encounter.  Chief Complaint/Reason For Consultation:  Gout    Subjective:       PREM Ontiveros is a 54 y.o. Black or  male with gout comes for follow-up.    Today  Last seen on early January.  Multi articular gout flare, recommended colchicine and prednisone for rescue therapy.  Excellent response, currently only on daily colchicine and tolerating well.  Still reports generalized arthralgias, mostly mechanical pattern.  Worst area of concern is right trochanteric bursa area.  Aggravated by prolonged standing/weight-bearing.  Relieved somewhat by rest.  Denies further gout flares since last visit.  Reports worsening off psoriatic rash since of prednisone.  Denies going joint swelling,  or GI complaints.    Review of Systems   Constitutional: Negative for chills, fever and malaise/fatigue.   Eyes: Negative for pain and redness.   Respiratory: Negative for cough, hemoptysis and shortness of breath.    Cardiovascular: Negative for chest pain and leg swelling.   Gastrointestinal: Negative for abdominal pain, blood in stool and melena.   Genitourinary: Negative for dysuria and hematuria.   Musculoskeletal: Positive for joint pain (Right trochanteric bursa area, mechanical pattern.). Negative for falls.   Skin: Positive for rash.   Neurological: Negative for tingling and focal weakness.   Psychiatric/Behavioral: Negative for memory loss. The patient does not have insomnia.      Chronic comorbid conditions affecting medical decision making today:  Past Medical History:   Diagnosis Date    A-fib     CHF (congestive heart failure)     Gout, chronic     Hypertension      Past Surgical History:   Procedure Laterality Date    CHOLECYSTECTOMY      GASTRIC BYPASS  2014     Family History    Problem Relation Age of Onset    Hypertension Mother     Stroke Father     Diabetes Father     Hypertension Father     Diabetes Sister     Diabetes Brother      Social History     Substance and Sexual Activity   Alcohol Use Yes    Frequency: Monthly or less    Binge frequency: Never    Comment: sometimes     Social History     Tobacco Use   Smoking Status Never Smoker   Smokeless Tobacco Never Used     Social History     Substance and Sexual Activity   Drug Use No       Current Outpatient Medications:     allopurinoL (ZYLOPRIM) 100 MG tablet, Take 1 tablet (100 mg total) by mouth once daily., Disp: 90 tablet, Rfl: 3    amLODIPine (NORVASC) 5 MG tablet, TAKE 1 TABLET EVERY DAY, Disp: 90 tablet, Rfl: 3    apixaban (ELIQUIS) 5 mg Tab, Take 1 tablet (5 mg total) by mouth 2 (two) times daily., Disp: 180 tablet, Rfl: 3    calcium phosphate-vitamin D3 250-400 mg-unit Chew, , Disp: , Rfl:     clotrimazole-betamethasone (LOTRISONE) lotion, Apply topically 2 (two) times daily., Disp: 30 mL, Rfl: 2    COLCRYS 0.6 mg tablet, Take 1 tablet (0.6 mg total) by mouth once daily., Disp: 60 tablet, Rfl: 2    furosemide (LASIX) 80 MG tablet, TAKE 1 TABLET TWICE DAILY, Disp: 180 tablet, Rfl: 3    losartan-hydrochlorothiazide 100-25 mg (HYZAAR) 100-25 mg per tablet, TAKE 1 TABLET EVERY DAY, Disp: 90 tablet, Rfl: 3    metoprolol succinate (TOPROL-XL) 25 MG 24 hr tablet, Take 3 tablets (75 mg total) by mouth once daily., Disp: 270 tablet, Rfl: 3    potassium chloride SA (K-DUR,KLOR-CON) 20 MEQ tablet, Take 20 mEq by mouth., Disp: , Rfl:     QUEtiapine (SEROQUEL) 100 MG Tab, Take 1 tablet (100 mg total) by mouth every evening., Disp: 90 tablet, Rfl: 1    venlafaxine (EFFEXOR-XR) 150 MG Cp24, Take 1 capsule (150 mg total) by mouth once daily., Disp: 90 capsule, Rfl: 1    allopurinoL (ZYLOPRIM) 300 MG tablet, Take 1 tablet (300 mg total) by mouth once daily., Disp: 30 tablet, Rfl: 2    predniSONE (DELTASONE) 50 MG Tab,  "Take 1 tablet (50 mg total) by mouth once daily. As needed for gout flares for 10 days, Disp: 20 tablet, Rfl: 0     Objective:         Vitals:    02/20/20 1347   BP: (!) 133/94   Pulse: 71     Physical Exam   Constitutional: No distress.   Estimated body mass index is 51.28 kg/m² as calculated from the following:    Height as of this encounter: 6' 3" (1.905 m).    Weight as of this encounter: 186.1 kg (410 lb 4.4 oz).    Wt Readings from Last 1 Encounters:  02/20/20 1347 : (!) 186.1 kg (410 lb 4.4 oz)     HENT:   Head: Normocephalic and atraumatic.   Eyes: Conjunctivae are normal. Pupils are equal, round, and reactive to light.   Neck: Normal range of motion.   Cardiovascular: Normal rate and intact distal pulses.    Pulmonary/Chest: Effort normal. No respiratory distress.   Abdominal: Soft. He exhibits no distension.   Neurological: He is alert. Gait normal.   Skin: Rash noted. No erythema.     Musculoskeletal: Normal range of motion. He exhibits tenderness (Right trochanteric bursa area.).   : strong  No synovitis       Reviewed old and all outside pertinent medical records available.    All lab results personally reviewed and interpreted by me.  Lab Results   Component Value Date    WBC 4.00 06/11/2019    HGB 14.6 06/11/2019    HCT 45.0 06/11/2019    MCV 87 06/11/2019    MCH 28.3 06/11/2019    MCHC 32.4 06/11/2019    RDW 14.6 (H) 06/11/2019     06/11/2019    MPV 13.0 (H) 06/11/2019    PLTEST Appears normal 06/11/2019       Lab Results   Component Value Date     01/09/2020    K 3.7 01/09/2020     01/09/2020    CO2 27 01/09/2020     (H) 01/09/2020    BUN 17 01/09/2020    CALCIUM 9.6 01/09/2020    PROT 8.7 (H) 01/09/2020    ALBUMIN 3.6 01/09/2020    BILITOT 0.8 01/09/2020    AST 15 01/09/2020    ALKPHOS 66 01/09/2020    ALT 10 01/09/2020       Lab Results   Component Value Date    COLORU Brown (A) 05/10/2017    APPEARANCEUA Clear 05/10/2017    SPECGRAV 1.010 05/10/2017    PHUR 7.0 " 05/10/2017    PROTEINUA Negative 05/10/2017    KETONESU Negative 05/10/2017    LEUKOCYTESUR Negative 05/10/2017    NITRITE Negative 05/10/2017    UROBILINOGEN 4.0-6.0 (A) 05/10/2017       Lab Results   Component Value Date    CRP 83.8 (H) 01/09/2020       Lab Results   Component Value Date    SEDRATE 4 02/20/2020       Lab Results   Component Value Date    RF <10.0 01/09/2020    SEDRATE 4 02/20/2020       No components found for: 25OHVITDTOT, 17MGVUPF5, 19KOLXVT8, METHODNOTE    Lab Results   Component Value Date    URICACID 9.4 (H) 01/09/2020       No components found for: TSPOTTB    Rheum Labs:    HLA B27 negative   Rheumatoid factor, CCP negative    Infectious Labs:   HCV NR     Imaging:  All imaging reviewed and independently  interpreted by me.    X-ray right hip August 2017  There is joint space narrowing with adjacent sclerosis. There is no evidence of fracture.    Chest x-ray September 2017  Examination limited by body habitus.  There is mild cardiomegaly.  Lungs are hypoinflated which accentuates pulmonary vascular markings.  No evidence of focal consolidative process, pneumothorax, or large effusion.  Bones appear intact.    X-ray foot September 2017  No evidence of acute fracture or dislocation. No significant tophi or periarticular erosion. No asymmetric soft tissue swelling. No radiopaque foreign body.     ASSESSMENT / PLAN:     Caio Ontiveros is a 54 y.o. Black or  male with:    1. Chronic gout  - flare resolved after prednisone therapy, currently on daily colchicine and tolerating well.  - will initiate urate lowering therapy with allopurinol and increase slowly to goal of 300 mg daily  - clinical significant side effects of therapy discussed in detail.  - will continue daily colchicine for now.  - will optimize allopurinol to uric acid goal of <6.0 mg/dL  - maintain systemic corticosteroids for rescue therapy versus local corticosteroid injections.  - CMP, and uric acid in 2  months.    2. Psoriasis  - currently without synovitis on exam.  Prior enthesitis during gout flare was probably bursitis  - recommend dermatology evaluation for management of skin disease.  - may continue with NSAIDs p.r.n. for joint pain short course  - ROM and flexibility exercises for trochanteric bursa syndrome.  Will inject next visit if refractory.    3. Other specified counseling  - over 10 minutes spent regarding below topics:  - Weight loss counseling done.  - Nutrition and exercise counseling.  - Limitation of alcohol consumption.  - Regular exercise:  Aerobic and resistance.  - Medication counseling provided.    Follow up in about 3 months (around 5/20/2020).    Method of contact with patient concerns: [facility]    Disclaimer:  This note is prepared using voice recognition software and as such is likely to have errors and has not been proof read. Please contact me for questions.     Time spent: 25 minutes in face to face discussion concerning diagnosis, prognosis, review of lab and test results, benefits of treatment as well as management of disease, counseling of patient and coordination of care between various health care providers.  Greater than half the time spent was used for coordination of care and counseling of patient.    Constantine Raphael M.D.  Rheumatology Department   [facility] [place] [facility]

## 2020-02-20 NOTE — PATIENT INSTRUCTIONS
Rheumatology medications:    · Colchicine 0.6mg tablet/cap (for flare and preventing flare)  Treatment should be initiated within 24 hours of onset   - Day 1: Oral: 1.2 mg at the first sign of flare, followed in 1 hour with a single dose of 0.6 mg then,   - Day 2 and thereafter: Oral: 0.6 mg daily    · Allopurinol 100mg and 300mg tablets (decrease uric acid)   - Day 1-7: oral: 100mg daily   - Day 8-15: oral: 200mg daily   - Day 16 and thereafter: oral: 300mg daily    · Prednisone (50mg tablet) For acute flare  - Prednisone 50 mg daily for 10 days then stop    Understanding Trochanteric Bursitis    A bursa is a thin, slippery, sac-like film. It contains a small amount of fluid. This structure is found between bones and soft tissues in and around joints. A bursa cushions and protects a joint. It keeps parts of a joint from rubbing against each other. If a bursa becomes inflamed and irritated, it is known as bursitis.  The trochanteric bursa is found on the hip joint. It lies on top of the bump at the top of the thighbone called the greater trochanter. Inflammation of this bursa is called trochanteric bursitis.     How to say it  [id number]   Causes of trochanteric bursitis  Causes may include:  · Overuse of the hip during running or other sports, dance, or work  · Falling on or irritation to the side of the hip  This condition may occur along with other problems, such as osteoarthritis of the hip or knee, or low back problems. In rare cases, it may occur after hip surgery.  Symptoms of trochanteric bursitis  · Pain or aching on the side of the hip. The pain may travel down the leg.  · Swelling, tenderness, or warmth on the side of the hip at the bony bump at the top of the thigh  Treatment for trochanteric bursitis  These may include:  · Resting the hip. This allows the bursa to heal.  · Prescription or over-the-counter pain medicines. These help reduce inflammation, swelling, and pain.  · Cold packs and heat  packs. These help reduce pain and swelling.  · Stretching and strengthening exercises. These improve flexibility and strength around the hip.  · Physical therapy. This includes exercises or other treatments.  · Injections of medicine into the bursa. This may help reduce inflammation and relieve symptoms.  Possible complications  If you dont give your hip time to heal, the problem may not go away, may return, or may get worse. Rest and treat your hip as directed.     When to call your healthcare provider  Call your healthcare provider right away if you have any of these:  · Fever of 100.4°F (38°C) or higher, or as directed  · Redness, swelling, or warmth that gets worse  · Symptoms that dont get better with prescribed medicines, or get worse  · New symptoms   Date Last Reviewed: 3/29/2016  © 0926-3193 [employer]. [address] [place] [address]. All rights reserved. This information is not intended as a substitute for professional medical care. Always follow your healthcare professional's instructions.        Iliotibial Band Stretch (Flexibility)    2. Stand next to a chair. Hold onto the chair with your right hand for support. Cross your right leg behind your left leg.  3. Lean your right hip toward the right. Feel the stretch at the outside of your hip.  4. Hold for 30 to 60 seconds. Then relax.  5. Repeat 2 times, or as instructed.  6. Switch sides and repeat.  7. Do this 3 times a day, or as instructed.     Tip: Dont bend forward or twist at the waist.   Date Last Reviewed: 3/29/2016  © 9592-5376 [employer]. [address] [place] [address]. All rights reserved. This information is not intended as a substitute for professional medical care. Always follow your healthcare professional's instructions.        Side Lying Hip Abduction (Strength)    8. Lie down on the floor on your side. Rest your head on your arm. Bend your legs at the knees.  9. Keep your feet  together and lift your top leg up so that your knees are . Keep your hips steady.     10. Slowly lower your leg back down.  11. Repeat 10 times, or as instructed.  12. Switch sides if instructed.     Challenge yourself  Put an elastic band or tubing around your thighs. Raise and lower your top leg slowly and steadily.      Date Last Reviewed: 3/29/2016  © 6874-6692 [employer]. [address] [place] [address], [place] [address]. All rights reserved. This information is not intended as a substitute for professional medical care. Always follow your healthcare professional's instructions.

## 2020-02-21 LAB — CRP SERPL-MCNC: 4.6 MG/L (ref 0–8.2)

## 2020-02-28 ENCOUNTER — PATIENT OUTREACH (OUTPATIENT)
Dept: ADMINISTRATIVE | Facility: OTHER | Age: 55
End: 2020-02-28

## 2020-03-06 ENCOUNTER — PATIENT OUTREACH (OUTPATIENT)
Dept: ADMINISTRATIVE | Facility: OTHER | Age: 55
End: 2020-03-06

## 2020-03-10 ENCOUNTER — OFFICE VISIT (OUTPATIENT)
Dept: OPHTHALMOLOGY | Facility: CLINIC | Age: 55
End: 2020-03-10
Payer: MEDICARE

## 2020-03-10 DIAGNOSIS — H52.223 REGULAR ASTIGMATISM OF BOTH EYES: ICD-10-CM

## 2020-03-10 DIAGNOSIS — H52.4 PRESBYOPIA: ICD-10-CM

## 2020-03-10 DIAGNOSIS — H25.13 NUCLEAR SCLEROSIS, BILATERAL: Primary | ICD-10-CM

## 2020-03-10 DIAGNOSIS — H25.012 CORTICAL AGE-RELATED CATARACT OF LEFT EYE: ICD-10-CM

## 2020-03-10 PROCEDURE — 92015 PR REFRACTION: ICD-10-PCS | Mod: HCNC,S$GLB,, | Performed by: OPTOMETRIST

## 2020-03-10 PROCEDURE — 99999 PR PBB SHADOW E&M-EST. PATIENT-LVL II: CPT | Mod: PBBFAC,HCNC,, | Performed by: OPTOMETRIST

## 2020-03-10 PROCEDURE — 92004 PR EYE EXAM, NEW PATIENT,COMPREHESV: ICD-10-PCS | Mod: HCNC,S$GLB,, | Performed by: OPTOMETRIST

## 2020-03-10 PROCEDURE — 92015 DETERMINE REFRACTIVE STATE: CPT | Mod: HCNC,S$GLB,, | Performed by: OPTOMETRIST

## 2020-03-10 PROCEDURE — 92004 COMPRE OPH EXAM NEW PT 1/>: CPT | Mod: HCNC,S$GLB,, | Performed by: OPTOMETRIST

## 2020-03-10 PROCEDURE — 99999 PR PBB SHADOW E&M-EST. PATIENT-LVL II: ICD-10-PCS | Mod: PBBFAC,HCNC,, | Performed by: OPTOMETRIST

## 2020-03-10 NOTE — PROGRESS NOTES
HPI     Blurred Vision      Additional comments: Yearly              Comments     New Patient  Last Eye Exam 2018  HPI    Any vision changes since last exam: Yes, trouble with near vision   Eye pain: No  Other ocular symptoms: No    Do you wear currently wear glasses or contacts? OTC Readers    Interested in contacts today? No    Do you plan on getting new glasses today? If Needed              Last edited by Candice Guillen on 3/10/2020  2:15 PM. (History)            Assessment /Plan     For exam results, see Encounter Report.    Nuclear sclerosis, bilateral  Cortical age-related cataract of left eye  Surgery is not indicated at this time.   Monitor 12 months.    Regular astigmatism of both eyes  Presbyopia  Eyeglass Final Rx     Eyeglass Final Rx       Sphere Cylinder Axis Add    Right -1.75 +2.25 094 +2.00    Left -1.25 +1.50 090 +2.00    Expiration Date:  3/11/2021    Comments:  SVL distance ok                RTC 1 yr for dilated eye exam or PRN if any problems.   Discussed above and answered questions.

## 2020-03-16 ENCOUNTER — PATIENT MESSAGE (OUTPATIENT)
Dept: DERMATOLOGY | Facility: CLINIC | Age: 55
End: 2020-03-16

## 2020-03-17 ENCOUNTER — PATIENT OUTREACH (OUTPATIENT)
Dept: ADMINISTRATIVE | Facility: OTHER | Age: 55
End: 2020-03-17

## 2020-04-08 RX ORDER — METOPROLOL SUCCINATE 25 MG/1
TABLET, EXTENDED RELEASE ORAL
Qty: 180 TABLET | Refills: 2 | Status: SHIPPED | OUTPATIENT
Start: 2020-04-08 | End: 2020-07-22 | Stop reason: SDUPTHER

## 2020-05-01 ENCOUNTER — PATIENT MESSAGE (OUTPATIENT)
Dept: DERMATOLOGY | Facility: CLINIC | Age: 55
End: 2020-05-01

## 2020-05-05 DIAGNOSIS — M1A.00X0 IDIOPATHIC CHRONIC GOUT WITHOUT TOPHUS, UNSPECIFIED SITE: ICD-10-CM

## 2020-05-07 RX ORDER — ALLOPURINOL 300 MG/1
TABLET ORAL
Qty: 90 TABLET | Refills: 0 | Status: SHIPPED | OUTPATIENT
Start: 2020-05-07 | End: 2020-08-27 | Stop reason: SDUPTHER

## 2020-05-21 ENCOUNTER — TELEPHONE (OUTPATIENT)
Dept: RHEUMATOLOGY | Facility: CLINIC | Age: 55
End: 2020-05-21

## 2020-05-21 NOTE — TELEPHONE ENCOUNTER
Patient request appointment be changed to a Virtual video appointment for 5-22 . Appointment changed .

## 2020-05-22 ENCOUNTER — OFFICE VISIT (OUTPATIENT)
Dept: RHEUMATOLOGY | Facility: CLINIC | Age: 55
End: 2020-05-22
Payer: MEDICARE

## 2020-05-22 DIAGNOSIS — M1A.9XX1 CHRONIC GOUT WITH TOPHUS, UNSPECIFIED CAUSE, UNSPECIFIED SITE: Primary | ICD-10-CM

## 2020-05-22 DIAGNOSIS — Z71.89 COUNSELING ON HEALTH PROMOTION AND DISEASE PREVENTION: ICD-10-CM

## 2020-05-22 DIAGNOSIS — L40.9 PSORIASIS: ICD-10-CM

## 2020-05-22 PROCEDURE — 99499 RISK ADDL DX/OHS AUDIT: ICD-10-PCS | Mod: HCNC,95,, | Performed by: INTERNAL MEDICINE

## 2020-05-22 PROCEDURE — 99214 PR OFFICE/OUTPT VISIT, EST, LEVL IV, 30-39 MIN: ICD-10-PCS | Mod: HCNC,95,, | Performed by: INTERNAL MEDICINE

## 2020-05-22 PROCEDURE — 99214 OFFICE O/P EST MOD 30 MIN: CPT | Mod: HCNC,95,, | Performed by: INTERNAL MEDICINE

## 2020-05-22 PROCEDURE — 99499 UNLISTED E&M SERVICE: CPT | Mod: HCNC,95,, | Performed by: INTERNAL MEDICINE

## 2020-05-22 NOTE — PROGRESS NOTES
RHEUMATOLOGY OUTPATIENT CLINIC NOTE    The patient location is: [place]  The chief complaint leading to consultation is: Gout  Visit type: audiovisual  Face to Face time with patient: 11 mins  20 minutes of total time spent on the encounter, which includes face to face time and non-face to face time preparing to see the patient (eg, review of tests), Obtaining and/or reviewing separately obtained history, Documenting clinical information in the electronic or other health record, Independently interpreting results (not separately reported) and communicating results to the patient/family/caregiver, or Care coordination (not separately reported).     Each patient to whom he or she provides medical services by telemedicine is:  (1) informed of the relationship between the physician and patient and the respective role of any other health care provider with respect to management of the patient; and (2) notified that he or she may decline to receive medical services by telemedicine and may withdraw from such care at any time.    5/22/2020    Attending Rheumatologist: Constantine Raphael  Primary Care Provider: Scottie Alcantar MD   Physician Requesting Consultation: No referring provider defined for this encounter.  Chief Complaint/Reason For Consultation:  Chronic gout    Subjective:       PREM Ontiveros is a 54 y.o. Black or  male with gout comes for follow-up.    Today  Last seen on February.  Recommendations provided for gout, recommended to follow with Dermatology for psoriasis.  Tolerating 300 mg of allopurinol daily along with colchicine without side effects.  Good adherence referred.  Reports mild flare since last visit, no present today.  Voices no joint pain or swelling.  Refers following dietary restrictions.    Review of Systems   Constitutional: Negative for chills, fever and malaise/fatigue.   Eyes: Negative for pain and redness.   Respiratory: Negative for cough, hemoptysis and shortness of breath.     Cardiovascular: Negative for chest pain and leg swelling.   Gastrointestinal: Negative for abdominal pain, blood in stool and melena.   Genitourinary: Negative for dysuria and hematuria.   Musculoskeletal: Negative for falls and joint pain.   Neurological: Negative for tingling and focal weakness.   Psychiatric/Behavioral: Negative for memory loss. The patient does not have insomnia.      Chronic comorbid conditions affecting medical decision making today:  Past Medical History:   Diagnosis Date    A-fib     CHF (congestive heart failure)     Gout, chronic     Hypertension      Past Surgical History:   Procedure Laterality Date    CHOLECYSTECTOMY      GASTRIC BYPASS  2014     Family History   Problem Relation Age of Onset    Hypertension Mother     Stroke Father     Diabetes Father     Hypertension Father     Diabetes Sister     Diabetes Brother      Social History     Substance and Sexual Activity   Alcohol Use Yes    Frequency: Monthly or less    Binge frequency: Never    Comment: sometimes     Social History     Tobacco Use   Smoking Status Never Smoker   Smokeless Tobacco Never Used     Social History     Substance and Sexual Activity   Drug Use No       Current Outpatient Medications:     allopurinoL (ZYLOPRIM) 100 MG tablet, Take 1 tablet (100 mg total) by mouth once daily., Disp: 90 tablet, Rfl: 3    allopurinoL (ZYLOPRIM) 300 MG tablet, Take 1 tablet by mouth once daily, Disp: 90 tablet, Rfl: 0    amLODIPine (NORVASC) 5 MG tablet, TAKE 1 TABLET EVERY DAY, Disp: 90 tablet, Rfl: 3    apixaban (ELIQUIS) 5 mg Tab, Take 1 tablet (5 mg total) by mouth 2 (two) times daily., Disp: 180 tablet, Rfl: 3    calcium phosphate-vitamin D3 250-400 mg-unit Chew, , Disp: , Rfl:     clotrimazole-betamethasone (LOTRISONE) lotion, Apply topically 2 (two) times daily., Disp: 30 mL, Rfl: 2    COLCRYS 0.6 mg tablet, Take 1 tablet (0.6 mg total) by mouth once daily., Disp: 60 tablet, Rfl: 2    furosemide  "(LASIX) 80 MG tablet, TAKE 1 TABLET TWICE DAILY, Disp: 180 tablet, Rfl: 3    losartan-hydrochlorothiazide 100-25 mg (HYZAAR) 100-25 mg per tablet, TAKE 1 TABLET EVERY DAY, Disp: 90 tablet, Rfl: 3    metoprolol succinate (TOPROL-XL) 25 MG 24 hr tablet, TAKE 1 TABLET TWICE DAILY, Disp: 180 tablet, Rfl: 2    potassium chloride SA (K-DUR,KLOR-CON) 20 MEQ tablet, Take 20 mEq by mouth., Disp: , Rfl:     QUEtiapine (SEROQUEL) 100 MG Tab, Take 1 tablet (100 mg total) by mouth every evening., Disp: 90 tablet, Rfl: 1    venlafaxine (EFFEXOR-XR) 150 MG Cp24, Take 1 capsule (150 mg total) by mouth once daily., Disp: 90 capsule, Rfl: 1     Objective:         There were no vitals filed for this visit.  Physical Exam   Constitutional: He is oriented to person, place, and time. No distress.   Estimated body mass index is 51.28 kg/m² as calculated from the following:    Height as of 2/20/20: 6' 3" (1.905 m).    Weight as of 2/20/20: 186.1 kg (410 lb 4.4 oz).    Wt Readings from Last 1 Encounters:  02/20/20 1347 : (!) 186.1 kg (410 lb 4.4 oz)     HENT:   Head: Normocephalic and atraumatic.   Eyes: Conjunctivae are normal. Pupils are equal, round, and reactive to light.   Neck: Normal range of motion.   Pulmonary/Chest: Effort normal. No respiratory distress.   Neurological: He is alert and oriented to person, place, and time.   Psychiatric: Mood and affect normal.   Musculoskeletal: Normal range of motion.   :  Able to make full fist without difficulty     Reviewed old and all outside pertinent medical records available.    All lab results personally reviewed and interpreted by me.  Lab Results   Component Value Date    WBC 4.00 06/11/2019    HGB 14.6 06/11/2019    HCT 45.0 06/11/2019    MCV 87 06/11/2019    MCH 28.3 06/11/2019    MCHC 32.4 06/11/2019    RDW 14.6 (H) 06/11/2019     06/11/2019    MPV 13.0 (H) 06/11/2019    PLTEST Appears normal 06/11/2019       Lab Results   Component Value Date     01/09/2020    " K 3.7 01/09/2020     01/09/2020    CO2 27 01/09/2020     (H) 01/09/2020    BUN 17 01/09/2020    CALCIUM 9.6 01/09/2020    PROT 8.7 (H) 01/09/2020    ALBUMIN 3.6 01/09/2020    BILITOT 0.8 01/09/2020    AST 15 01/09/2020    ALKPHOS 66 01/09/2020    ALT 10 01/09/2020       Lab Results   Component Value Date    COLORU Brown (A) 05/10/2017    APPEARANCEUA Clear 05/10/2017    SPECGRAV 1.010 05/10/2017    PHUR 7.0 05/10/2017    PROTEINUA Negative 05/10/2017    KETONESU Negative 05/10/2017    LEUKOCYTESUR Negative 05/10/2017    NITRITE Negative 05/10/2017    UROBILINOGEN 4.0-6.0 (A) 05/10/2017       Lab Results   Component Value Date    CRP 4.6 02/20/2020       Lab Results   Component Value Date    SEDRATE 4 02/20/2020       Lab Results   Component Value Date    RF <10.0 01/09/2020    SEDRATE 4 02/20/2020       No components found for: 25OHVITDTOT, 57UBVQET2, 95DXRRVJ3, METHODNOTE    Lab Results   Component Value Date    URICACID 9.4 (H) 01/09/2020     No components found for: TSPOTTB    Rheum Labs:    HLA B27 negative   Rheumatoid factor, CCP negative    Infectious Labs:   HCV NR     Imaging:  All imaging reviewed and independently  interpreted by me.    X-ray right hip August 2017  There is joint space narrowing with adjacent sclerosis. There is no evidence of fracture.    Chest x-ray September 2017  Examination limited by body habitus.  There is mild cardiomegaly.  Lungs are hypoinflated which accentuates pulmonary vascular markings.  No evidence of focal consolidative process, pneumothorax, or large effusion.  Bones appear intact.    X-ray foot September 2017  No evidence of acute fracture or dislocation. No significant tophi or periarticular erosion. No asymmetric soft tissue swelling. No radiopaque foreign body.     ASSESSMENT / PLAN:     Caio Ontiveros is a 54 y.o. Black or  male with:    1. Chronic gout  - currently on 300 mg of allopurinol and colchicine daily, tolerating well.  - refers  slight flare since last visit, good adherence with therapy reported by patient.  - recommend repeat uric acid in CMP, will adjust therapy to goal uric acid less than 6 mg/dL  - clinical significant side effects of therapy discussed in detail.  - may come to clinic for local corticosteroid injection or may use 10 day course of prednisone for acute flares.    2. Psoriasis  - recommend dermatology evaluation for management of skin disease.  - may continue with NSAIDs p.r.n. for joint pain short course    3. Other specified counseling  - Nutrition and exercise counseling.  - Limitation of alcohol consumption.  - Medication counseling provided.    Follow up in about 4 months (around 9/22/2020).    Method of contact with patient concerns: Holli attn Rheumatology    Disclaimer:  This note is prepared using voice recognition software and as such is likely to have errors and has not been proof read. Please contact me for questions.     Constantine Raphael M.D.  Rheumatology Department   [facility]

## 2020-06-08 ENCOUNTER — PATIENT MESSAGE (OUTPATIENT)
Dept: RHEUMATOLOGY | Facility: CLINIC | Age: 55
End: 2020-06-08

## 2020-06-11 ENCOUNTER — TELEPHONE (OUTPATIENT)
Dept: DERMATOLOGY | Facility: CLINIC | Age: 55
End: 2020-06-11

## 2020-06-11 ENCOUNTER — TELEPHONE (OUTPATIENT)
Dept: RHEUMATOLOGY | Facility: CLINIC | Age: 55
End: 2020-06-11

## 2020-06-11 NOTE — TELEPHONE ENCOUNTER
----- Message from Felicia Powers sent at 6/11/2020 11:10 AM CDT -----  Contact: Pt  Appointment Access:    Pt called to speak to the nurse to schedule a virtual visit with the provider for hip pain and would like a call back today at [phone]

## 2020-06-11 NOTE — TELEPHONE ENCOUNTER
Spoke with pt, states with everything going on with COVID, he forgot.  Will call to reschedule as he is busy at the moment.

## 2020-06-12 ENCOUNTER — PATIENT MESSAGE (OUTPATIENT)
Dept: RHEUMATOLOGY | Facility: CLINIC | Age: 55
End: 2020-06-12

## 2020-06-12 NOTE — TELEPHONE ENCOUNTER
Attempt to contact patient. Per recording can not be contact at this time . Call back later. Message sent through [employer]

## 2020-06-16 ENCOUNTER — PATIENT OUTREACH (OUTPATIENT)
Dept: ADMINISTRATIVE | Facility: OTHER | Age: 55
End: 2020-06-16

## 2020-06-26 ENCOUNTER — LAB VISIT (OUTPATIENT)
Dept: LAB | Facility: HOSPITAL | Age: 55
End: 2020-06-26
Attending: INTERNAL MEDICINE
Payer: MEDICARE

## 2020-06-26 ENCOUNTER — OFFICE VISIT (OUTPATIENT)
Dept: RHEUMATOLOGY | Facility: CLINIC | Age: 55
End: 2020-06-26
Payer: MEDICARE

## 2020-06-26 DIAGNOSIS — M54.50 CHRONIC LOW BACK PAIN, UNSPECIFIED BACK PAIN LATERALITY, UNSPECIFIED WHETHER SCIATICA PRESENT: ICD-10-CM

## 2020-06-26 DIAGNOSIS — M1A.9XX1 CHRONIC GOUT WITH TOPHUS, UNSPECIFIED CAUSE, UNSPECIFIED SITE: ICD-10-CM

## 2020-06-26 DIAGNOSIS — M1A.00X0 IDIOPATHIC CHRONIC GOUT WITHOUT TOPHUS, UNSPECIFIED SITE: Primary | ICD-10-CM

## 2020-06-26 DIAGNOSIS — Z71.89 COUNSELING ON HEALTH PROMOTION AND DISEASE PREVENTION: ICD-10-CM

## 2020-06-26 DIAGNOSIS — G89.29 CHRONIC LOW BACK PAIN, UNSPECIFIED BACK PAIN LATERALITY, UNSPECIFIED WHETHER SCIATICA PRESENT: ICD-10-CM

## 2020-06-26 LAB
ALBUMIN SERPL BCP-MCNC: 3.9 G/DL (ref 3.5–5.2)
ALP SERPL-CCNC: 56 U/L (ref 55–135)
ALT SERPL W/O P-5'-P-CCNC: 23 U/L (ref 10–44)
ANION GAP SERPL CALC-SCNC: 7 MMOL/L (ref 8–16)
AST SERPL-CCNC: 30 U/L (ref 10–40)
BILIRUB SERPL-MCNC: 0.6 MG/DL (ref 0.1–1)
BUN SERPL-MCNC: 16 MG/DL (ref 6–20)
CALCIUM SERPL-MCNC: 9 MG/DL (ref 8.7–10.5)
CHLORIDE SERPL-SCNC: 101 MMOL/L (ref 95–110)
CO2 SERPL-SCNC: 34 MMOL/L (ref 23–29)
CREAT SERPL-MCNC: 1 MG/DL (ref 0.5–1.4)
EST. GFR  (AFRICAN AMERICAN): >60 ML/MIN/1.73 M^2
EST. GFR  (NON AFRICAN AMERICAN): >60 ML/MIN/1.73 M^2
GLUCOSE SERPL-MCNC: 107 MG/DL (ref 70–110)
POTASSIUM SERPL-SCNC: 3.9 MMOL/L (ref 3.5–5.1)
PROT SERPL-MCNC: 7.6 G/DL (ref 6–8.4)
SODIUM SERPL-SCNC: 142 MMOL/L (ref 136–145)
URATE SERPL-MCNC: 6.8 MG/DL (ref 3.4–7)

## 2020-06-26 PROCEDURE — 36415 COLL VENOUS BLD VENIPUNCTURE: CPT | Mod: HCNC,PO

## 2020-06-26 PROCEDURE — 84550 ASSAY OF BLOOD/URIC ACID: CPT | Mod: HCNC,PO

## 2020-06-26 PROCEDURE — 80053 COMPREHEN METABOLIC PANEL: CPT | Mod: HCNC,PO

## 2020-06-26 PROCEDURE — 99214 OFFICE O/P EST MOD 30 MIN: CPT | Mod: HCNC,95,, | Performed by: INTERNAL MEDICINE

## 2020-06-26 PROCEDURE — 99214 PR OFFICE/OUTPT VISIT, EST, LEVL IV, 30-39 MIN: ICD-10-PCS | Mod: HCNC,95,, | Performed by: INTERNAL MEDICINE

## 2020-06-26 RX ORDER — ALLOPURINOL 100 MG/1
100 TABLET ORAL DAILY
Qty: 90 TABLET | Refills: 0 | Status: SHIPPED | OUTPATIENT
Start: 2020-06-26 | End: 2020-08-27 | Stop reason: SDUPTHER

## 2020-06-26 RX ORDER — GABAPENTIN 300 MG/1
300 CAPSULE ORAL NIGHTLY
Qty: 30 CAPSULE | Refills: 3 | Status: SHIPPED | OUTPATIENT
Start: 2020-06-26 | End: 2020-08-04 | Stop reason: SDUPTHER

## 2020-06-26 NOTE — PROGRESS NOTES
RHEUMATOLOGY OUTPATIENT CLINIC NOTE    The patient location is: [place]  The chief complaint leading to consultation is: Gout  Visit type: audiovisual  Face to Face time with patient: 16 mins  23 minutes of total time spent on the encounter, which includes face to face time and non-face to face time preparing to see the patient (eg, review of tests), Obtaining and/or reviewing separately obtained history, Documenting clinical information in the electronic or other health record, Independently interpreting results (not separately reported) and communicating results to the patient/family/caregiver, or Care coordination (not separately reported).     Each patient to whom he or she provides medical services by telemedicine is:  (1) informed of the relationship between the physician and patient and the respective role of any other health care provider with respect to management of the patient; and (2) notified that he or she may decline to receive medical services by telemedicine and may withdraw from such care at any time.    6/26/2020    Attending Rheumatologist: Constantine Raphael  Primary Care Provider: Scottie Alcantar MD   Physician Requesting Consultation: No referring provider defined for this encounter.  Chief Complaint/Reason For Consultation:  Chronic gout    Subjective:       PREM Ontiveros is a 54 y.o. Black or  male with gout comes for follow-up.    Today  Last seen on May.  Recommended repeat labs to assess if uric acid at goal.  Denies gout flare since last visit.  Refers episodic arthralgias and lower back pain.  Worse in the evening, aggravated with activity/weight-bearing.  Denies new rash or having current joint swelling.  Does not have focal neurological symptoms.  Tolerating allopurinol without side effects.  Denies fever,  or GI complaints.    Review of Systems   Constitutional: Negative for chills, fever and malaise/fatigue.   Eyes: Negative for pain and redness.   Respiratory: Negative  for cough, hemoptysis and shortness of breath.    Cardiovascular: Negative for chest pain and leg swelling.   Gastrointestinal: Negative for abdominal pain, blood in stool and melena.   Genitourinary: Negative for dysuria and hematuria.   Musculoskeletal: Positive for back pain (Mechanical features, no alarm signs or symptoms.). Negative for falls and joint pain (Generalized, mechanical features.).   Neurological: Negative for tingling and focal weakness.   Psychiatric/Behavioral: Negative for memory loss. The patient does not have insomnia.      Chronic comorbid conditions affecting medical decision making today:  Past Medical History:   Diagnosis Date    A-fib     CHF (congestive heart failure)     Gout, chronic     Hypertension      Past Surgical History:   Procedure Laterality Date    CHOLECYSTECTOMY      GASTRIC BYPASS  2014     Family History   Problem Relation Age of Onset    Hypertension Mother     Stroke Father     Diabetes Father     Hypertension Father     Diabetes Sister     Diabetes Brother      Social History     Substance and Sexual Activity   Alcohol Use Yes    Frequency: Monthly or less    Binge frequency: Never    Comment: sometimes     Social History     Tobacco Use   Smoking Status Never Smoker   Smokeless Tobacco Never Used     Social History     Substance and Sexual Activity   Drug Use No       Current Outpatient Medications:     allopurinoL (ZYLOPRIM) 100 MG tablet, Take 1 tablet (100 mg total) by mouth once daily. Total of 400mg daily, Disp: 90 tablet, Rfl: 0    allopurinoL (ZYLOPRIM) 300 MG tablet, Take 1 tablet by mouth once daily, Disp: 90 tablet, Rfl: 0    amLODIPine (NORVASC) 5 MG tablet, TAKE 1 TABLET EVERY DAY, Disp: 90 tablet, Rfl: 3    apixaban (ELIQUIS) 5 mg Tab, Take 1 tablet (5 mg total) by mouth 2 (two) times daily., Disp: 180 tablet, Rfl: 3    calcium phosphate-vitamin D3 250-400 mg-unit Chew, , Disp: , Rfl:     clotrimazole-betamethasone (LOTRISONE) lotion,  "Apply topically 2 (two) times daily., Disp: 30 mL, Rfl: 2    COLCRYS 0.6 mg tablet, Take 1 tablet (0.6 mg total) by mouth once daily., Disp: 60 tablet, Rfl: 2    furosemide (LASIX) 80 MG tablet, TAKE 1 TABLET TWICE DAILY, Disp: 180 tablet, Rfl: 3    gabapentin (NEURONTIN) 300 MG capsule, Take 1 capsule (300 mg total) by mouth every evening., Disp: 30 capsule, Rfl: 3    losartan-hydrochlorothiazide 100-25 mg (HYZAAR) 100-25 mg per tablet, TAKE 1 TABLET EVERY DAY, Disp: 90 tablet, Rfl: 3    metoprolol succinate (TOPROL-XL) 25 MG 24 hr tablet, TAKE 1 TABLET TWICE DAILY, Disp: 180 tablet, Rfl: 2    potassium chloride SA (K-DUR,KLOR-CON) 20 MEQ tablet, Take 20 mEq by mouth., Disp: , Rfl:     QUEtiapine (SEROQUEL) 100 MG Tab, Take 1 tablet (100 mg total) by mouth every evening., Disp: 90 tablet, Rfl: 1    venlafaxine (EFFEXOR-XR) 150 MG Cp24, Take 1 capsule (150 mg total) by mouth once daily., Disp: 90 capsule, Rfl: 1     Objective:         There were no vitals filed for this visit.  Physical Exam   Constitutional: He is oriented to person, place, and time. No distress.   Estimated body mass index is 51.28 kg/m² as calculated from the following:    Height as of 2/20/20: 6' 3" (1.905 m).    Weight as of 2/20/20: 186.1 kg (410 lb 4.4 oz).    Wt Readings from Last 1 Encounters:  02/20/20 1347 : (!) 186.1 kg (410 lb 4.4 oz)     HENT:   Head: Normocephalic and atraumatic.   Eyes: Conjunctivae are normal. Pupils are equal, round, and reactive to light.   Neck: Normal range of motion.   Pulmonary/Chest: Effort normal. No respiratory distress.   Neurological: He is alert and oriented to person, place, and time.   Psychiatric: Mood and affect normal.   Musculoskeletal: Normal range of motion.      Comments: :  Able to make full fist without difficulty  No synovitis noted     Reviewed old and all outside pertinent medical records available.    All lab results personally reviewed and interpreted by me.  Lab Results "   Component Value Date    WBC 4.00 06/11/2019    HGB 14.6 06/11/2019    HCT 45.0 06/11/2019    MCV 87 06/11/2019    MCH 28.3 06/11/2019    MCHC 32.4 06/11/2019    RDW 14.6 (H) 06/11/2019     06/11/2019    MPV 13.0 (H) 06/11/2019    PLTEST Appears normal 06/11/2019       Lab Results   Component Value Date     06/26/2020    K 3.9 06/26/2020     06/26/2020    CO2 34 (H) 06/26/2020     06/26/2020    BUN 16 06/26/2020    CALCIUM 9.0 06/26/2020    PROT 7.6 06/26/2020    ALBUMIN 3.9 06/26/2020    BILITOT 0.6 06/26/2020    AST 30 06/26/2020    ALKPHOS 56 06/26/2020    ALT 23 06/26/2020       Lab Results   Component Value Date    COLORU Brown (A) 05/10/2017    APPEARANCEUA Clear 05/10/2017    SPECGRAV 1.010 05/10/2017    PHUR 7.0 05/10/2017    PROTEINUA Negative 05/10/2017    KETONESU Negative 05/10/2017    LEUKOCYTESUR Negative 05/10/2017    NITRITE Negative 05/10/2017    UROBILINOGEN 4.0-6.0 (A) 05/10/2017       Lab Results   Component Value Date    CRP 4.6 02/20/2020       Lab Results   Component Value Date    SEDRATE 4 02/20/2020       Lab Results   Component Value Date    RF <10.0 01/09/2020    SEDRATE 4 02/20/2020       No components found for: 25OHVITDTOT, 52WPVLNP6, 61BSHGKY7, METHODNOTE    Lab Results   Component Value Date    URICACID 6.8 06/26/2020     No components found for: TSPOTTB    Rheum Labs:   HLA B27 negative   Rheumatoid factor, CCP negative    Infectious Labs:   HCV NR     Imaging:  All imaging reviewed and independently  interpreted by me.    X-ray right hip August 2017  There is joint space narrowing with adjacent sclerosis. There is no evidence of fracture.    Chest x-ray September 2017  Examination limited by body habitus.  There is mild cardiomegaly.  Lungs are hypoinflated which accentuates pulmonary vascular markings.  No evidence of focal consolidative process, pneumothorax, or large effusion.  Bones appear intact.    X-ray foot September 2017  No evidence of acute  fracture or dislocation. No significant tophi or periarticular erosion. No asymmetric soft tissue swelling. No radiopaque foreign body.     ASSESSMENT / PLAN:     Caio Ontiveros is a 54 y.o. Black or  male with:    1. Chronic gout  - currently on 300 mg of allopurinol and colchicine daily, tolerating well.  - last uric acid not at goal.  Will increase to 400 mg to goal of less than 6 mg/dL, ideally less than 5.  - continue daily colchicine for prophylaxis  - recommend repeat uric acid in CMP in 2 months  - clinical significant side effects of therapy discussed in detail.    2. Psoriasis  - recommend dermatology evaluation for management of skin disease.  - may continue with NSAIDs p.r.n. for joint pain short course  - baseline osteoarthritis.  Recommend x-ray lumbar spine and sacroiliac joints to assess for ankylosis  - gabapentin trial, provided 300 mg q.h.s.    3. Other specified counseling  - Nutrition and exercise counseling.  - Limitation of alcohol consumption.  - Medication counseling provided.    Follow up in about 3 months (around 9/26/2020).    Method of contact with patient concerns: [facility]    Disclaimer:  This note is prepared using voice recognition software and as such is likely to have errors and has not been proof read. Please contact me for questions.     Constantine Raphael M.D.  Rheumatology Department   [facility]

## 2020-08-13 LAB — HEMOCCULT STL QL IA: NEGATIVE

## 2020-08-25 ENCOUNTER — HOSPITAL ENCOUNTER (OUTPATIENT)
Dept: RADIOLOGY | Facility: HOSPITAL | Age: 55
Discharge: HOME | End: 2020-08-25
Attending: INTERNAL MEDICINE
Payer: MEDICARE

## 2020-08-25 DIAGNOSIS — G89.29 CHRONIC LOW BACK PAIN, UNSPECIFIED BACK PAIN LATERALITY, UNSPECIFIED WHETHER SCIATICA PRESENT: ICD-10-CM

## 2020-08-25 DIAGNOSIS — M54.50 CHRONIC LOW BACK PAIN, UNSPECIFIED BACK PAIN LATERALITY, UNSPECIFIED WHETHER SCIATICA PRESENT: ICD-10-CM

## 2020-08-25 PROCEDURE — 72100 X-RAY EXAM L-S SPINE 2/3 VWS: CPT | Mod: 26,HCNC,, | Performed by: RADIOLOGY

## 2020-08-25 PROCEDURE — 72200 X-RAY EXAM SI JOINTS: CPT | Mod: 26,HCNC,, | Performed by: RADIOLOGY

## 2020-08-25 PROCEDURE — 72100 XR LUMBAR SPINE AP AND LATERAL: ICD-10-PCS | Mod: 26,HCNC,, | Performed by: RADIOLOGY

## 2020-08-25 PROCEDURE — 72100 X-RAY EXAM L-S SPINE 2/3 VWS: CPT | Mod: TC,HCNC,PO

## 2020-08-25 PROCEDURE — 72200 X-RAY EXAM SI JOINTS: CPT | Mod: TC,HCNC,PO

## 2020-08-25 PROCEDURE — 72200 XR SACROILIAC JOINTS 3 VIEWS: ICD-10-PCS | Mod: 26,HCNC,, | Performed by: RADIOLOGY

## 2020-08-27 ENCOUNTER — PATIENT OUTREACH (OUTPATIENT)
Dept: ADMINISTRATIVE | Facility: OTHER | Age: 55
End: 2020-08-27

## 2020-08-27 ENCOUNTER — TELEPHONE (OUTPATIENT)
Dept: RHEUMATOLOGY | Facility: CLINIC | Age: 55
End: 2020-08-27

## 2020-08-27 DIAGNOSIS — Z12.11 ENCOUNTER FOR FIT (FECAL IMMUNOCHEMICAL TEST) SCREENING: Primary | ICD-10-CM

## 2020-08-27 DIAGNOSIS — I48.20 CHRONIC ATRIAL FIBRILLATION: Primary | ICD-10-CM

## 2020-08-27 DIAGNOSIS — M1A.00X0 IDIOPATHIC CHRONIC GOUT WITHOUT TOPHUS, UNSPECIFIED SITE: ICD-10-CM

## 2020-08-27 RX ORDER — ALLOPURINOL 300 MG/1
300 TABLET ORAL DAILY
Qty: 90 TABLET | Refills: 1 | Status: SHIPPED | OUTPATIENT
Start: 2020-08-27 | End: 2021-03-23 | Stop reason: SDUPTHER

## 2020-08-27 RX ORDER — ALLOPURINOL 100 MG/1
100 TABLET ORAL DAILY
Qty: 90 TABLET | Refills: 1 | Status: ON HOLD | OUTPATIENT
Start: 2020-08-27 | End: 2020-10-26 | Stop reason: HOSPADM

## 2020-08-27 NOTE — PROGRESS NOTES
Patient is in the possess of picking a new PCP in [facility] from [place].  He will make it through the Patient Portal.

## 2020-09-06 ENCOUNTER — HOSPITAL ENCOUNTER (EMERGENCY)
Facility: HOSPITAL | Age: 55
Discharge: HOME | End: 2020-09-06
Attending: EMERGENCY MEDICINE
Payer: MEDICARE

## 2020-09-06 VITALS
HEIGHT: 75 IN | BODY MASS INDEX: 39.17 KG/M2 | RESPIRATION RATE: 18 BRPM | TEMPERATURE: 98 F | DIASTOLIC BLOOD PRESSURE: 77 MMHG | WEIGHT: 315 LBS | HEART RATE: 87 BPM | SYSTOLIC BLOOD PRESSURE: 127 MMHG | OXYGEN SATURATION: 95 %

## 2020-09-06 DIAGNOSIS — E87.29 CO2 RETENTION: ICD-10-CM

## 2020-09-06 DIAGNOSIS — R09.02 HYPOXEMIA: Primary | ICD-10-CM

## 2020-09-06 DIAGNOSIS — I50.23 ACUTE ON CHRONIC SYSTOLIC CONGESTIVE HEART FAILURE: ICD-10-CM

## 2020-09-06 DIAGNOSIS — R06.02 SHORTNESS OF BREATH: ICD-10-CM

## 2020-09-06 DIAGNOSIS — J98.01 BRONCHOSPASM: ICD-10-CM

## 2020-09-06 LAB
ALBUMIN SERPL BCP-MCNC: 3.6 G/DL (ref 3.5–5.2)
ALLENS TEST: ABNORMAL
ALP SERPL-CCNC: 51 U/L (ref 55–135)
ALT SERPL W/O P-5'-P-CCNC: 31 U/L (ref 10–44)
ANION GAP SERPL CALC-SCNC: 12 MMOL/L (ref 8–16)
AST SERPL-CCNC: 27 U/L (ref 10–40)
BASOPHILS # BLD AUTO: 0.04 K/UL (ref 0–0.2)
BASOPHILS NFR BLD: 1 % (ref 0–1.9)
BILIRUB SERPL-MCNC: 0.5 MG/DL (ref 0.1–1)
BNP SERPL-MCNC: 1108 PG/ML (ref 0–99)
BUN SERPL-MCNC: 33 MG/DL (ref 6–20)
CALCIUM SERPL-MCNC: 8.7 MG/DL (ref 8.7–10.5)
CHLORIDE SERPL-SCNC: 99 MMOL/L (ref 95–110)
CK MB SERPL-MCNC: 3.3 NG/ML (ref 0.1–6.5)
CK MB SERPL-RTO: 1.9 % (ref 0–5)
CK SERPL-CCNC: 178 U/L (ref 20–200)
CK SERPL-CCNC: 178 U/L (ref 20–200)
CO2 SERPL-SCNC: 33 MMOL/L (ref 23–29)
CREAT SERPL-MCNC: 1.4 MG/DL (ref 0.5–1.4)
D DIMER PPP IA.FEU-MCNC: 0.6 MG/L FEU
DELSYS: ABNORMAL
DIFFERENTIAL METHOD: ABNORMAL
EOSINOPHIL # BLD AUTO: 0.1 K/UL (ref 0–0.5)
EOSINOPHIL NFR BLD: 1.8 % (ref 0–8)
ERYTHROCYTE [DISTWIDTH] IN BLOOD BY AUTOMATED COUNT: 15 % (ref 11.5–14.5)
EST. GFR  (AFRICAN AMERICAN): >60 ML/MIN/1.73 M^2
EST. GFR  (NON AFRICAN AMERICAN): 56.2 ML/MIN/1.73 M^2
FIO2: 21
GLUCOSE SERPL-MCNC: 106 MG/DL (ref 70–110)
HCO3 UR-SCNC: 37.1 MMOL/L (ref 24–28)
HCT VFR BLD AUTO: 42.3 % (ref 40–54)
HGB BLD-MCNC: 12.9 G/DL (ref 14–18)
IMM GRANULOCYTES # BLD AUTO: 0.01 K/UL (ref 0–0.04)
IMM GRANULOCYTES NFR BLD AUTO: 0.3 % (ref 0–0.5)
INR PPP: 1.2 (ref 0.8–1.2)
LYMPHOCYTES # BLD AUTO: 0.9 K/UL (ref 1–4.8)
LYMPHOCYTES NFR BLD: 22.2 % (ref 18–48)
MCH RBC QN AUTO: 29.1 PG (ref 27–31)
MCHC RBC AUTO-ENTMCNC: 30.5 G/DL (ref 32–36)
MCV RBC AUTO: 95 FL (ref 82–98)
MODE: ABNORMAL
MONOCYTES # BLD AUTO: 0.6 K/UL (ref 0.3–1)
MONOCYTES NFR BLD: 14.2 % (ref 4–15)
NEUTROPHILS # BLD AUTO: 2.3 K/UL (ref 1.8–7.7)
NEUTROPHILS NFR BLD: 60.5 % (ref 38–73)
NRBC BLD-RTO: 0 /100 WBC
PCO2 BLDA: 66.2 MMHG (ref 35–45)
PH SMN: 7.36 [PH] (ref 7.35–7.45)
PLATELET # BLD AUTO: 116 K/UL (ref 150–350)
PMV BLD AUTO: 12.3 FL (ref 9.2–12.9)
PO2 BLDA: 48 MMHG (ref 80–100)
POC BE: 12 MMOL/L
POC SATURATED O2: 80 % (ref 95–100)
POTASSIUM SERPL-SCNC: 4.2 MMOL/L (ref 3.5–5.1)
PROT SERPL-MCNC: 6.8 G/DL (ref 6–8.4)
PROTHROMBIN TIME: 12.1 SEC (ref 9–12.5)
RBC # BLD AUTO: 4.44 M/UL (ref 4.6–6.2)
SAMPLE: ABNORMAL
SARS-COV-2 RDRP RESP QL NAA+PROBE: NEGATIVE
SITE: ABNORMAL
SODIUM SERPL-SCNC: 144 MMOL/L (ref 136–145)
TROPONIN I SERPL DL<=0.01 NG/ML-MCNC: 0.04 NG/ML (ref 0–0.03)
WBC # BLD AUTO: 3.87 K/UL (ref 3.9–12.7)

## 2020-09-06 PROCEDURE — 84484 ASSAY OF TROPONIN QUANT: CPT | Mod: HCNC,ER

## 2020-09-06 PROCEDURE — 82550 ASSAY OF CK (CPK): CPT | Mod: HCNC,ER

## 2020-09-06 PROCEDURE — 63600175 PHARM REV CODE 636 W HCPCS: Mod: HCNC,ER | Performed by: EMERGENCY MEDICINE

## 2020-09-06 PROCEDURE — 85610 PROTHROMBIN TIME: CPT | Mod: HCNC,ER

## 2020-09-06 PROCEDURE — 94640 AIRWAY INHALATION TREATMENT: CPT | Mod: HCNC,ER

## 2020-09-06 PROCEDURE — 82553 CREATINE MB FRACTION: CPT | Mod: HCNC,ER

## 2020-09-06 PROCEDURE — 99900035 HC TECH TIME PER 15 MIN (STAT): Mod: HCNC,ER

## 2020-09-06 PROCEDURE — 36600 WITHDRAWAL OF ARTERIAL BLOOD: CPT | Mod: HCNC,ER

## 2020-09-06 PROCEDURE — 83880 ASSAY OF NATRIURETIC PEPTIDE: CPT | Mod: HCNC,ER

## 2020-09-06 PROCEDURE — 96374 THER/PROPH/DIAG INJ IV PUSH: CPT | Mod: HCNC,ER

## 2020-09-06 PROCEDURE — 99291 CRITICAL CARE FIRST HOUR: CPT | Mod: 25,HCNC,ER

## 2020-09-06 PROCEDURE — U0002 COVID-19 LAB TEST NON-CDC: HCPCS | Mod: HCNC,ER

## 2020-09-06 PROCEDURE — 93010 EKG 12-LEAD: ICD-10-PCS | Mod: HCNC,,, | Performed by: INTERNAL MEDICINE

## 2020-09-06 PROCEDURE — 93005 ELECTROCARDIOGRAM TRACING: CPT | Mod: HCNC,ER

## 2020-09-06 PROCEDURE — 93010 ELECTROCARDIOGRAM REPORT: CPT | Mod: HCNC,,, | Performed by: INTERNAL MEDICINE

## 2020-09-06 PROCEDURE — 85379 FIBRIN DEGRADATION QUANT: CPT | Mod: HCNC,ER

## 2020-09-06 PROCEDURE — 80053 COMPREHEN METABOLIC PANEL: CPT | Mod: HCNC,ER

## 2020-09-06 PROCEDURE — 82803 BLOOD GASES ANY COMBINATION: CPT | Mod: HCNC,ER

## 2020-09-06 PROCEDURE — 25000242 PHARM REV CODE 250 ALT 637 W/ HCPCS: Mod: HCNC,ER | Performed by: EMERGENCY MEDICINE

## 2020-09-06 PROCEDURE — 85025 COMPLETE CBC W/AUTO DIFF WBC: CPT | Mod: HCNC,ER

## 2020-09-06 RX ORDER — IPRATROPIUM BROMIDE AND ALBUTEROL SULFATE 2.5; .5 MG/3ML; MG/3ML
3 SOLUTION RESPIRATORY (INHALATION)
Status: COMPLETED | OUTPATIENT
Start: 2020-09-06 | End: 2020-09-06

## 2020-09-06 RX ORDER — FUROSEMIDE 10 MG/ML
80 INJECTION INTRAMUSCULAR; INTRAVENOUS
Status: COMPLETED | OUTPATIENT
Start: 2020-09-06 | End: 2020-09-06

## 2020-09-06 RX ADMIN — FUROSEMIDE 80 MG: 10 INJECTION, SOLUTION INTRAMUSCULAR; INTRAVENOUS at 05:09

## 2020-09-06 RX ADMIN — IPRATROPIUM BROMIDE AND ALBUTEROL SULFATE 3 ML: .5; 3 SOLUTION RESPIRATORY (INHALATION) at 04:09

## 2020-09-06 NOTE — ED NOTES
Patient given a urinal at this time to measure I&O's.     Pt in NAD, VSS, Resp e/u. Stretcher locked in lowest position. Side rails up x2. Call bell within reach. Will continue to monitor.

## 2020-09-06 NOTE — ED PROVIDER NOTES
Encounter Date: 9/6/2020       History     Chief Complaint   Patient presents with    Shortness of Breath     with fatigue    Nasal Congestion     He has a history of massive obesity, atrial fibrillation on chronic Eliquis, CHF, gout, hypertension, obstructive sleep apnea, previous cholecystectomy, previous gastric bypass, others as listed.  He believes he has not had a diagnosis of obstructive airways disease or venous thromboembolic disease.  He has not been compliant with his CPAP mask but reports he has been compliant with medications.  Never previously tested for coronavirus and has had no known coronavirus exposure.  He now has symptoms for a few days starting with fatigue and progressing to mild cough, dyspnea, nasal congestion, and mild diffuse chest discomfort.  No fever, chills sweats nausea, vomiting diarrhea, pleurisy, hemoptysis syncope, palpitations, or orthostatic symptoms.  He has noted that he seems to be urinating less than usual.  Noted significantly hypoxemic on arrival.    The history is provided by the patient. No  was used.     Review of patient's allergies indicates:  No Known Allergies  Past Medical History:   Diagnosis Date    A-fib     CHF (congestive heart failure)     Gout, chronic     Hypertension      Past Surgical History:   Procedure Laterality Date    CHOLECYSTECTOMY      GASTRIC BYPASS  2014     Family History   Problem Relation Age of Onset    Hypertension Mother     Stroke Father     Diabetes Father     Hypertension Father     Diabetes Sister     Diabetes Brother      Social History     Tobacco Use    Smoking status: Never Smoker    Smokeless tobacco: Never Used   Substance Use Topics    Alcohol use: Yes     Frequency: Monthly or less     Binge frequency: Never     Comment: sometimes    Drug use: No     Review of Systems   Constitutional: Positive for fatigue. Negative for chills and fever.   HENT: Negative for congestion, facial swelling,  nosebleeds and sinus pressure.    Eyes: Negative for pain and redness.   Respiratory: Positive for cough and shortness of breath. Negative for chest tightness and wheezing.    Cardiovascular: Negative for chest pain, palpitations and leg swelling.   Gastrointestinal: Negative for abdominal distention, abdominal pain, diarrhea, nausea and vomiting.   Endocrine: Negative for cold intolerance, polydipsia and polyphagia.   Genitourinary: Negative for difficulty urinating, dysuria, frequency and hematuria.   Musculoskeletal: Negative for arthralgias, back pain, myalgias and neck pain.   Skin: Negative for color change and rash.   Neurological: Negative for dizziness, weakness, numbness and headaches.   Hematological: Negative for adenopathy. Does not bruise/bleed easily.   Psychiatric/Behavioral: Negative for agitation and behavioral problems.   All other systems reviewed and are negative.      Physical Exam     Initial Vitals [09/06/20 1515]   BP Pulse Resp Temp SpO2   134/77 106 (!) 22 98.2 °F (36.8 °C) (!) 82 %      MAP       --         Physical Exam    Nursing note and vitals reviewed.  Constitutional: He appears well-developed and well-nourished. He is not diaphoretic. He appears distressed.   Morbidly obese/ mildly dyspneic   HENT:   Head: Normocephalic and atraumatic.   Mouth/Throat: Oropharynx is clear and moist. No oropharyngeal exudate.   Eyes: Conjunctivae and EOM are normal. Pupils are equal, round, and reactive to light. Right eye exhibits no discharge. Left eye exhibits no discharge. No scleral icterus.   Neck: Normal range of motion. Neck supple. No thyromegaly present. No tracheal deviation present. No JVD present.   Cardiovascular: Normal heart sounds. Exam reveals no gallop and no friction rub.    No murmur heard.  Atrial fibrillation with very mildly elevated ventricular response rate, distant heart tones.  Chronic appearing bilateral pitting edema to mid shins with prominent bilateral lower extremity  venous varicosities   Pulmonary/Chest: No respiratory distress. He has wheezes. He has no rhonchi. He has no rales. He exhibits no tenderness.   Moderate bilateral symmetric expiratory wheezing throughout with decreased air entry   Abdominal: Soft. Bowel sounds are normal. He exhibits no distension and no mass. There is no abdominal tenderness. There is no rebound and no guarding.   Musculoskeletal: Normal range of motion. No tenderness or edema.   Lymphadenopathy:     He has no cervical adenopathy.   Neurological: He is alert and oriented to person, place, and time. He has normal strength. No cranial nerve deficit.   Skin: Skin is warm and dry. No rash noted. No erythema.   Psychiatric: He has a normal mood and affect. His behavior is normal. Judgment and thought content normal.         ED Course   Critical Care    Date/Time: 9/8/2020 2:16 AM  Performed by: Jasper Ramirez MD  Authorized by: Jasper Ramirez MD   Direct patient critical care time: 10 minutes  Additional history critical care time: 5 minutes  Ordering / reviewing critical care time: 10 minutes  Documentation critical care time: 10 minutes  Total critical care time (exclusive of procedural time) : 35 minutes  Critical care was necessary to treat or prevent imminent or life-threatening deterioration of the following conditions: cardiac failure and respiratory failure.  Critical care was time spent personally by me on the following activities: evaluation of patient's response to treatment, obtaining history from patient or surrogate, ordering and review of laboratory studies, pulse oximetry, review of old charts, examination of patient, ordering and performing treatments and interventions, ordering and review of radiographic studies, re-evaluation of patient's condition and development of treatment plan with patient or surrogate.        Labs Reviewed   CBC W/ AUTO DIFFERENTIAL - Abnormal; Notable for the following components:       Result Value     WBC 3.87 (*)     RBC 4.44 (*)     Hemoglobin 12.9 (*)     Mean Corpuscular Hemoglobin Conc 30.5 (*)     RDW 15.0 (*)     Platelets 116 (*)     Lymph # 0.9 (*)     All other components within normal limits   COMPREHENSIVE METABOLIC PANEL - Abnormal; Notable for the following components:    CO2 33 (*)     BUN, Bld 33 (*)     Alkaline Phosphatase 51 (*)     eGFR if non  56.2 (*)     All other components within normal limits   TROPONIN I - Abnormal; Notable for the following components:    Troponin I 0.036 (*)     All other components within normal limits   B-TYPE NATRIURETIC PEPTIDE - Abnormal; Notable for the following components:    BNP 1,108 (*)     All other components within normal limits   D DIMER, QUANTITATIVE - Abnormal; Notable for the following components:    D-Dimer 0.60 (*)     All other components within normal limits   ISTAT PROCEDURE - Abnormal; Notable for the following components:    POC PCO2 66.2 (*)     POC PO2 48 (*)     POC HCO3 37.1 (*)     POC SATURATED O2 80 (*)     All other components within normal limits   SARS-COV-2 RNA AMPLIFICATION, QUAL   PROTIME-INR   CK   CK-MB   D DIMER, QUANTITATIVE     EKG Readings: (Independently Interpreted)   Rhythm: Atrial Fibrillation. Heart Rate: 101. Ectopy: No Ectopy.   Atrial fibrillation with rapid ventricular response, nonspecific ST and T-wave abnormality, no acute ischemic change     ECG Results          EKG 12-lead (In process)  Result time 09/06/20 16:27:56    In process by Interface, Lab In [facility] (09/06/20 16:27:56)                 Narrative:    Test Reason : R06.02,    Vent. Rate : 101 BPM     Atrial Rate : 178 BPM     P-R Int : 000 ms          QRS Dur : 086 ms      QT Int : 312 ms       P-R-T Axes : 000 026 -34 degrees     QTc Int : 404 ms    Atrial fibrillation with rapid ventricular response  Nonspecific ST and T wave abnormality  Abnormal ECG  When compared with ECG of 11-JUN-2019 10:21,  Nonspecific T wave abnormality, worse in  Inferior leads  Nonspecific T wave abnormality now evident in Anterior-lateral leads    Referred By: [email or web address]   SELF           Confirmed By:                             Imaging Results          X-Ray Chest AP Portable (Final result)  Result time 09/06/20 16:48:18    Final result by Macario Diaz III, MD (09/06/20 16:48:18)                 Impression:      Cardiomegaly with clear lungs.  Slightly prominent vascular pattern without overt failure.      Electronically signed by: Macario Diaz MD  Date:    09/06/2020  Time:    16:48             Narrative:    EXAMINATION:  XR CHEST AP PORTABLE    CLINICAL HISTORY:  CHF;    COMPARISON:  Two thousand seventeen    FINDINGS:  Cardiomegaly.  Clear lungs.  Slight vascular prominence but no overt failure.                                5:09 PM Stable, oxygen saturation improved on supplemental O2.  Given negative COVID and relatively unremarkable chest film, will proceed with D-dimer and possible CTA chest to look for possible PE as a cause of hypoxemia.  Proceed also with increased diuresis. Significantly improved bronchospasm. Patient wants to try to avoid hospitalization if possible.            Medical Decision Making:   ED Management:  1845:  Reassessment:  Patient was re-evaluated stating that he is feeling much better.  Has put out over 900 mL of urine.  I removed his oxygen and after 10 min his oxygen saturations remained between 92 and 96% on room air.  And he is comfortable going home understanding he can return if his shortness of breath worse this.  He does however need to resume his CPAP and will check with his primary care physician in 2-3 days to facilitate this.                                 Clinical Impression:     1. Hypoxemia    2. Shortness of breath    3. CO2 retention    4. Bronchospasm         Disposition:   Condition: Stable                        Jasper Ramirez MD  09/08/20 0217

## 2020-09-08 ENCOUNTER — NURSE TRIAGE (OUTPATIENT)
Dept: ADMINISTRATIVE | Facility: CLINIC | Age: 55
End: 2020-09-08

## 2020-09-08 NOTE — TELEPHONE ENCOUNTER
"For almost two weeks has been having cough, congestion and some sob. Went to ED on Sunday to be evaluated. Tested negative for covid. He says he used to be on cpap and has a history of afib. He says at one point he was also on oxygen. They had him on oxygen in ED and also gave him some iv lasix while in ED. Still taking oral lasix at home. He says he needs an order for a cpap machine and oxygen to where at home again. Advised he would need to follow up. Scheduled appt for today with NP.    Reason for Disposition   MILD difficulty breathing (e.g., minimal/no SOB at rest, SOB with walking, pulse < 100) of new onset or worse than normal    Additional Information   Negative: Breathing stopped and hasn't returned   Negative: Choking on something   Negative: SEVERE difficulty breathing (e.g., struggling for each breath, speaks in single words, pulse > 120)   Negative: Bluish (or gray) lips or face   Negative: Difficult to awaken or acting confused (e.g., disoriented, slurred speech)   Negative: Passed out (i.e., fainted, collapsed and was not responding)   Negative: Wheezing started suddenly after medicine, an allergic food, or bee sting   Negative: Stridor   Negative: Slow, shallow and weak breathing   Negative: Sounds like a life-threatening emergency to the triager   Negative: MODERATE difficulty breathing (e.g., speaks in phrases, SOB even at rest, pulse 100-120) of new onset or worse than normal   Negative: Wheezing can be heard across the room   Negative: Drooling or spitting out saliva (because can't swallow)   Negative: Any history of prior "blood clot" in leg or lungs   Negative: Illness requiring prolonged bedrest in past month (e.g., immobilization, long hospital stay)   Negative: Hip or leg fracture (broken bone) in past month (or had cast on leg or ankle in past month)   Negative: Major surgery in the past month   Negative: Long-distance travel in past month (e.g., car, bus, train, plane; " "with trip lasting 6 or more hours)   Negative: Extra heart beats OR irregular heart beating   (i.e., "palpitations")   Negative: Fever > 103 F (39.4 C)   Negative: Fever > 101 F (38.3 C) and over 60 years of age   Negative: Fever > 100.0 F (37.8 C) and bedridden (e.g., nursing home patient, stroke, chronic illness, recovering from surgery)   Negative: Fever > 100.0 F (37.8 C) and diabetes mellitus or weak immune system (e.g., HIV positive, cancer chemo, splenectomy, organ transplant, chronic steroids)   Negative: Periods where breathing stops and then resumes normally and bedridden (e.g., nursing home patient, CVA)   Negative: Pregnant or postpartum (from 0 to 6 weeks after delivery)   Negative: Patient sounds very sick or weak to the triager    Protocols used: BREATHING DIFFICULTY-A-OH      "

## 2020-09-11 ENCOUNTER — OFFICE VISIT (OUTPATIENT)
Dept: PULMONOLOGY | Facility: CLINIC | Age: 55
End: 2020-09-11
Payer: MEDICARE

## 2020-09-11 DIAGNOSIS — R06.89 DYSPNEA AND RESPIRATORY ABNORMALITIES: Primary | Chronic | ICD-10-CM

## 2020-09-11 DIAGNOSIS — E66.01 MORBID OBESITY: Chronic | ICD-10-CM

## 2020-09-11 DIAGNOSIS — G47.33 OBSTRUCTIVE SLEEP APNEA SYNDROME: Chronic | ICD-10-CM

## 2020-09-11 DIAGNOSIS — I50.43 ACUTE ON CHRONIC COMBINED SYSTOLIC AND DIASTOLIC CONGESTIVE HEART FAILURE: Chronic | ICD-10-CM

## 2020-09-11 DIAGNOSIS — R06.00 DYSPNEA AND RESPIRATORY ABNORMALITIES: Primary | Chronic | ICD-10-CM

## 2020-09-11 PROCEDURE — 99204 OFFICE O/P NEW MOD 45 MIN: CPT | Mod: HCNC,95,, | Performed by: INTERNAL MEDICINE

## 2020-09-11 PROCEDURE — 99204 PR OFFICE/OUTPT VISIT, NEW, LEVL IV, 45-59 MIN: ICD-10-PCS | Mod: HCNC,95,, | Performed by: INTERNAL MEDICINE

## 2020-09-11 NOTE — ASSESSMENT & PLAN NOTE
Etiology unclear.  Multifactorial etiology suspected.  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  [x] Cardiac illess  [x]         Sleep disorder    EVALUATION:  [x]        Complete PFT with bronchodilator.  []        Bronchial challenge with methacholine.   [x]        Stress test, pulmonary.  [x]        PULM - Arterial Blood Gases  []        Chest X Ray  [x]        CT scan of chest.   [x]        TANYA  []        Sedimentation rate  [x]        C-reactive protein  [x]        Anti-neutrophilic cytoplasmic antibody          PLAN:  Discussed diagnosis, its evaluation, treatment and usual course.  All questions answered.        Call if shortness of breath worsens, blood in sputum, change in character of cough, development of fever or chills, inability to maintain nutrition and hydration.     Re evaluate after test complete.

## 2020-09-11 NOTE — PROGRESS NOTES
The patient location is: Home   The chief complaint leading to consultation is: EVANGELIST. Shortness of breath.     Visit type: audiovisual    Face to Face time with patient: 20 minutes of total time spent on the encounter, which includes face to face time and non-face to face time preparing to see the patient (eg, review of tests), Obtaining and/or reviewing separately obtained history, Documenting clinical information in the electronic or other health record, Independently interpreting results (not separately reported) and communicating results to the patient/family/caregiver, or Care coordination (not separately reported).         Each patient to whom he or she provides medical services by telemedicine is:  (1) informed of the relationship between the physician and patient and the respective role of any other health care provider with respect to management of the patient; and (2) notified that he or she may decline to receive medical services by telemedicine and may withdraw from such care at any time.    Notes:     SUBJECTIVE:   Seen at the ED on 09/06/20: He was  Diagnosed with CHF and given IV Lasix and discharged home.     He was scheduled to see Dex NP at 11:20 AM today.    He cancelled that appointment for unclear reasons.      I informed the patein that he need an in person visit.     We will schedule him for pulmonary evaluation followed by a face to face visit ASAP.        He was diagnosed with EVANGELIST 5 year ago. He was on CPAP of ? 6CMWP.   He underwent weight loss surgery  - gastric bypass surgery - lost over 200lbs. He stopped using his CPAP after his significant weight loss. He has gained approximately 100 lbs back.    .   He snores, and complains of excessive daytime sleepiness and fatigue.   Patient reports non restful' sleep.  he reports morning headache.   hereports impairment of activity during wakefulness.  he reports day time napping ; duration 1 Hour  [place] sleepiness score was 13.  Neck  circumference is 21.  he reports recent weight gain.  Cardiovascular risk factors: hypertension, CHF and obesity  Bed time is 2200  Wake time is 0900  Sleep onset is within  15 Minutes.  Sleep maintenance difficulties related to frequent night time awakening and non-restful sleep  Wake after sleep onset occurs four times a night.  Nocturia occurs four times a night,   Sleep aids : No  Dry mouth : Yes,   Sleep walking: No,   Sleep talking : No,   Sleep eating:No  Vivid Dreams : Yes,   Cataplexy : No,   Hypnogogic hallucinations:  No    COMORBIDITIES:  BP Readings from Last 3 Encounters:   09/06/20 127/77   02/20/20 (!) 133/94   01/09/20 (!) 143/92             [place] Questionnaire (validated EVANGELIST screening questionnaire)  Positive -- Snoring/apnea  Positve -- Fatigue    There is no height or weight on file to calculate BMI.  (>25 is overweight, >30 is obese)  Blood Pressure = stage 1    (PreHTN 120-139/80-89, Stg1 140-159/90-99, Stg2 >160/>100)  [place] = Three of three EVANGELIST categories are positive (high risk is 2-3 positive categories)     [place] Sleepiness Scale   EPWORTH SLEEPINESS SCALE 9/11/2020   Sitting and reading 3   Watching TV 3   Sitting, inactive in a public place (e.g. a theatre or a meeting) 3   As a passenger in a car for an hour without a break 3   Lying down to rest in the afternoon when circumstances permit 3   Sitting and talking to someone 2   Sitting quietly after a lunch without alcohol 3   In a car, while stopped for a few minutes in traffic 0   Total score 20       Reference: Cat VILLASENOR. A new method for measuring daytime sleepiness: The [place]  Sleepiness Scale. Sleep 1991; 14(6):540-5.    STOP-Bang Questionnaire (validated EVANGELIST screening questionnaire)  Negative unless checked off.  [x] Snoring    [x]  Tired/Fatigued/Sleepy  [x] Obstruction (apneas/choking)  [x] Pressure (HTN)  [x] BMI >35  [x] Age >50  [x] Neck >40 cm  [x] Gender male   STOP-Bang = 8 (low risk 0-2,high risk 3-8)    References:    STOP Questionnaire   A Tool to Screen Patients for Obstructive Sleep Apnea: KERRY Storey.P.C., DAYANARA Brewer.B.B.S., Regino Shafer M.D.,Roxana Fuentes, Ph.D., JONATHON CesarB.B.S.,_ Sarai Clarke.,_ Jolly Palacio M.D., NAOMIE BooC.P.C.; Anesthesiology 2008; 108:812-21 Copyright © 2008, the American Society of Anesthesiologists, Inc. [employer]          Sleep position Supine = occasional    Non-supine = occasional  Mouth breathing during sleep - possibly       OBJECTIVE:    Physical exam:    General: no distress, morbidly obese  Neurologic: alert, oriented, thought content appropriate         Laboratory Data:    BNP 0 - 99 pg/mL 1,108     Troponin I 0.000 - 0.026 ng/mL 0.036High          Lab Results   Component Value Date    CHOL 203 (H) 06/11/2019    CHOL 178 06/28/2018    CHOL 168 05/10/2017     Lab Results   Component Value Date    HDL 54 06/11/2019    HDL 46 06/28/2018    HDL 33 (L) 05/10/2017     Lab Results   Component Value Date    LDLCALC 135.2 06/11/2019    LDLCALC 117.8 06/28/2018    LDLCALC 114.6 05/10/2017     Lab Results   Component Value Date    TRIG 69 06/11/2019    TRIG 71 06/28/2018    TRIG 102 05/10/2017     Lab Results   Component Value Date    CHOLHDL 26.6 06/11/2019    CHOLHDL 25.8 06/28/2018    CHOLHDL 19.6 (L) 05/10/2017       Chemistry        Component Value Date/Time     09/06/2020 1623    K 4.2 09/06/2020 1623    CL 99 09/06/2020 1623    CO2 33 (H) 09/06/2020 1623    BUN 33 (H) 09/06/2020 1623    CREATININE 1.4 09/06/2020 1623     09/06/2020 1623        Component Value Date/Time    CALCIUM 8.7 09/06/2020 1623    ALKPHOS 51 (L) 09/06/2020 1623    AST 27 09/06/2020 1623    ALT 31 09/06/2020 1623    BILITOT 0.5 09/06/2020 1623    ESTGFRAFRICA >60.0 09/06/2020 1623    EGFRNONAA 56.2 (A) 09/06/2020 1623          Lab Results   Component Value Date    TSH 3.104 09/15/2017     Lab Results   Component Value Date     INR 1.2 09/06/2020    INR 1.0 02/27/2010    INR 1.0 02/26/2010     Lab Results   Component Value Date    WBC 3.87 (L) 09/06/2020    HGB 12.9 (L) 09/06/2020    HCT 42.3 09/06/2020    MCV 95 09/06/2020     (L) 09/06/2020       ASSESSMENT AND PLAN:      Problem List Items Addressed This Visit        Cardiac/Vascular    Acute on chronic congestive heart failure    Current Assessment & Plan     Optimize CHF regimen.  Recommend referral to CHF clinic [ LASIX, METOPROLOL, HYZAAR]].  Preload and afterload reduction.  Daily weighing.  Dietary salt restriction.  1500 ml fluid restriction   Alcohol and tobacco avoidance.            Endocrine    Morbid obesity    Current Assessment & Plan     General weight loss/lifestyle modification strategies discussed (elicit support from others; identify saboteurs; non-food rewards, etc).  Diet interventions: low calorie (1000 kCal/d) deficit diet.  Informal exercise measures discussed, e.g. taking stairs instead of elevator.  Regular aerobic exercise program discussed.            Other    Sleep apnea    Current Assessment & Plan     My recommendation at this point would be to set up a [facility].  We have discussed weight loss and how this may improve his situation.  We have discussed behavioral modifications, as well.  After his study, he  could certainly try a CPAP.          Relevant Orders    Polysomnogram (CPAP will be added if patient meets diagnostic criteria.)    Dyspnea and respiratory abnormalities - Primary    Current Assessment & Plan     Etiology unclear.  Multifactorial etiology suspected.  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  [x] Cardiac illess  [x]         Sleep  disorder    EVALUATION:  [x]        Complete PFT with bronchodilator.  []        Bronchial challenge with methacholine.   [x]        Stress test, pulmonary.  [x]        PULM - Arterial Blood Gases  []        Chest X Ray  [x]        CT scan of chest.   [x]        TANYA  []        Sedimentation rate  [x]        C-reactive protein  [x]        Anti-neutrophilic cytoplasmic antibody          PLAN:  Discussed diagnosis, its evaluation, treatment and usual course.  All questions answered.        Call if shortness of breath worsens, blood in sputum, change in character of cough, development of fever or chills, inability to maintain nutrition and hydration.     Re evaluate after test complete.           Relevant Orders    TANYA Screen w/Reflex    Anti-neutrophilic cytoplasmic antibody    Complete PFT with bronchodilator    PULM - Arterial Blood Gases--in addition to PFT only    Pulmonary stress test    CT Chest Without Contrast          Follow up in about 1 month (around 10/11/2020) for EVANGELIST, Morbid Obesity, Shortness of breath.

## 2020-09-11 NOTE — PATIENT INSTRUCTIONS
Obstructive Sleep Apnea  Obstructive sleep apnea is a condition that causes your air passages to become narrowed or blocked during sleep. As a result, breathing stops for short periods. Your body wakes up enough for breathing to begin again, though you don't remember it. The cycle of stopped breathing and brief awakenings can repeat dozens of times a night. This prevents the body from getting to the deeper stages of sleep that are needed for good rest and may cause your body's oxygen level to fall.  Signs of sleep apnea include loud snoring, noisy breathing, and gasping sounds during sleep. Daytime symptoms include waking up tired after a full night's sleep, waking up with headaches, feeling very sleepy or falling asleep during the day, and having problems with memory or concentration.  Risk factors for sleep apnea include:  · Being overweight  · Being a man, or a woman in menopause  · Smoking  · Using alcohol or sedating medicines  · Having enlarged structures in the nose or throat  Home care  Lifestyle changes that can help treat snoring and sleep apnea include the following:  · If you are overweight, lose weight. Talk to your healthcare provider about a weight-loss plan for you.  · Avoid alcohol for 3 to 4 hours before bedtime. Avoid sedating medications. Ask your healthcare provider about the medicines you take.  · If you smoke, talk to your healthcare provider about ways to quit.  · Sleep on your side. This can help prevent gravity from pulling relaxed throat tissues into your breathing passages.  · If you have allergies or sinus problems that block your nose, ask your healthcare provider for help.  Follow-up care  Follow up with your healthcare provider, or as advised. A diagnosis of sleep apnea is made with a sleep study. Your healthcare provider can tell you more about this test.  When to seek medical advice  Sleep apnea can make you more likely to have certain health problems. These include high blood  pressure, heart attack, stroke, and sexual dysfunction. If you have sleep apnea, talk to your healthcare provider about the best treatments for you.  Date Last Reviewed: 4/1/2017  © 5146-5638 [employer]. [address] [place] [address], [place] [address]. All rights reserved. This information is not intended as a substitute for professional medical care. Always follow your healthcare professional's instructions.

## 2020-09-11 NOTE — ASSESSMENT & PLAN NOTE
Optimize CHF regimen.  Recommend referral to CHF clinic [ LASIX, METOPROLOL, HYZAAR]].  Preload and afterload reduction.  Daily weighing.  Dietary salt restriction.  1500 ml fluid restriction   Alcohol and tobacco avoidance.

## 2020-09-12 ENCOUNTER — OFFICE VISIT (OUTPATIENT)
Dept: CARDIOLOGY | Facility: CLINIC | Age: 55
End: 2020-09-12
Payer: MEDICARE

## 2020-09-12 VITALS — HEART RATE: 96 BPM | DIASTOLIC BLOOD PRESSURE: 60 MMHG | SYSTOLIC BLOOD PRESSURE: 104 MMHG | OXYGEN SATURATION: 86 %

## 2020-09-12 DIAGNOSIS — R60.1 ANASARCA: ICD-10-CM

## 2020-09-12 DIAGNOSIS — G47.33 OBSTRUCTIVE SLEEP APNEA SYNDROME: ICD-10-CM

## 2020-09-12 DIAGNOSIS — I50.33 ACUTE ON CHRONIC DIASTOLIC CONGESTIVE HEART FAILURE: Primary | ICD-10-CM

## 2020-09-12 DIAGNOSIS — I27.20 PULMONARY HYPERTENSION: ICD-10-CM

## 2020-09-12 DIAGNOSIS — I83.813 VARICOSE VEINS OF BOTH LOWER EXTREMITIES WITH PAIN: ICD-10-CM

## 2020-09-12 DIAGNOSIS — I48.20 CHRONIC ATRIAL FIBRILLATION: ICD-10-CM

## 2020-09-12 DIAGNOSIS — I10 ESSENTIAL HYPERTENSION: ICD-10-CM

## 2020-09-12 DIAGNOSIS — R79.89 TROPONIN LEVEL ELEVATED: ICD-10-CM

## 2020-09-12 PROCEDURE — 99999 PR PBB SHADOW E&M-EST. PATIENT-LVL III: CPT | Mod: PBBFAC,HCNC,, | Performed by: INTERNAL MEDICINE

## 2020-09-12 PROCEDURE — 3074F SYST BP LT 130 MM HG: CPT | Mod: HCNC,CPTII,S$GLB, | Performed by: INTERNAL MEDICINE

## 2020-09-12 PROCEDURE — 99999 PR PBB SHADOW E&M-EST. PATIENT-LVL III: ICD-10-PCS | Mod: PBBFAC,HCNC,, | Performed by: INTERNAL MEDICINE

## 2020-09-12 PROCEDURE — 3074F PR MOST RECENT SYSTOLIC BLOOD PRESSURE < 130 MM HG: ICD-10-PCS | Mod: HCNC,CPTII,S$GLB, | Performed by: INTERNAL MEDICINE

## 2020-09-12 PROCEDURE — 99499 UNLISTED E&M SERVICE: CPT | Mod: HCNC,S$GLB,, | Performed by: INTERNAL MEDICINE

## 2020-09-12 PROCEDURE — 99215 OFFICE O/P EST HI 40 MIN: CPT | Mod: HCNC,S$GLB,, | Performed by: INTERNAL MEDICINE

## 2020-09-12 PROCEDURE — 3078F PR MOST RECENT DIASTOLIC BLOOD PRESSURE < 80 MM HG: ICD-10-PCS | Mod: HCNC,CPTII,S$GLB, | Performed by: INTERNAL MEDICINE

## 2020-09-12 PROCEDURE — 99215 PR OFFICE/OUTPT VISIT, EST, LEVL V, 40-54 MIN: ICD-10-PCS | Mod: HCNC,S$GLB,, | Performed by: INTERNAL MEDICINE

## 2020-09-12 PROCEDURE — 99499 RISK ADDL DX/OHS AUDIT: ICD-10-PCS | Mod: HCNC,S$GLB,, | Performed by: INTERNAL MEDICINE

## 2020-09-12 PROCEDURE — 3078F DIAST BP <80 MM HG: CPT | Mod: HCNC,CPTII,S$GLB, | Performed by: INTERNAL MEDICINE

## 2020-09-12 RX ORDER — BUMETANIDE 1 MG/1
1 TABLET ORAL 3 TIMES DAILY
Qty: 90 TABLET | Refills: 11 | Status: SHIPPED | OUTPATIENT
Start: 2020-09-12 | End: 2020-09-17 | Stop reason: SDUPTHER

## 2020-09-12 NOTE — PROGRESS NOTES
Subjective:   Patient ID:  Caio Ontiveros is a 54 y.o. male who presents for follow up of Shortness of Breath, Fatigue, and Sleeping Problem      55 yo male, came in for ER visit f/u  F/u with Dr. Hernandez cardiologist at [facility] in 2019  PMH chroic Afib for 7 yrs CHFpEF, obesity s/p gastric bypass in 2014. EVANGELIST Off cpap for 5 yrs   ECHO in 2017 EF 55%  09/06/2020 went to ER at [facility] due to worsening dyspnea. BNP 1100, troponin 0.036, CXR mild edema, ekg showed afib. Had IV lasix  And good diuresis.   Today, states that sleeps on 2 pillows, + PND snores,  SOb, palpitation  No chest pain, dizziness  Did drink a lot of water  Lasix oral not working well  His wife cooks at home      Past Medical History:   Diagnosis Date    A-fib     CHF (congestive heart failure)     Gout, chronic     Hypertension        Past Surgical History:   Procedure Laterality Date    CHOLECYSTECTOMY      GASTRIC BYPASS  2014       Social History     Tobacco Use    Smoking status: Never Smoker    Smokeless tobacco: Never Used   Substance Use Topics    Alcohol use: Yes     Frequency: Monthly or less     Binge frequency: Never     Comment: sometimes    Drug use: No       Family History   Problem Relation Age of Onset    Hypertension Mother     Stroke Father     Diabetes Father     Hypertension Father     Diabetes Sister     Diabetes Brother          Review of Systems   Constitution: Positive for malaise/fatigue. Negative for decreased appetite, diaphoresis, fever and night sweats.   HENT: Negative for nosebleeds.    Eyes: Negative for blurred vision and double vision.   Cardiovascular: Positive for dyspnea on exertion, leg swelling and palpitations. Negative for chest pain, claudication, irregular heartbeat, near-syncope, orthopnea, paroxysmal nocturnal dyspnea and syncope.   Respiratory: Positive for shortness of breath and snoring. Negative for cough, sleep disturbances due to breathing, sputum production and  wheezing.    Endocrine: Negative for cold intolerance and polyuria.   Hematologic/Lymphatic: Does not bruise/bleed easily.   Skin: Negative for rash.   Musculoskeletal: Negative for back pain, falls, joint pain, joint swelling and neck pain.   Gastrointestinal: Negative for abdominal pain, heartburn, nausea and vomiting.   Genitourinary: Negative for dysuria, frequency and hematuria.   Neurological: Positive for dizziness. Negative for difficulty with concentration, focal weakness, headaches, light-headedness, numbness, seizures and weakness.   Psychiatric/Behavioral: Negative for depression, memory loss and substance abuse. The patient does not have insomnia.    Allergic/Immunologic: Negative for HIV exposure and hives.       Objective:   Physical Exam   Constitutional: He is oriented to person, place, and time. He appears well-nourished.   HENT:   Head: Normocephalic.   Eyes: Pupils are equal, round, and reactive to light.   Neck: Normal carotid pulses and no JVD present. Carotid bruit is not present. No thyromegaly present.   Cardiovascular: Normal heart sounds and normal pulses. An irregularly irregular rhythm present.  No extrasystoles are present. Tachycardia present. PMI is not displaced. Exam reveals no gallop and no S3.   No murmur heard.  Pulmonary/Chest: Breath sounds normal. No stridor. No respiratory distress.   Abdominal: Soft. Bowel sounds are normal. There is no abdominal tenderness. There is no rebound.   Musculoskeletal: Normal range of motion.         General: Edema (edema up to lower abd and scrotum) present.   Neurological: He is alert and oriented to person, place, and time.   Skin: Skin is intact. No rash noted.   Psychiatric: His behavior is normal.       Lab Results   Component Value Date    CHOL 203 (H) 06/11/2019    CHOL 178 06/28/2018    CHOL 168 05/10/2017     Lab Results   Component Value Date    HDL 54 06/11/2019    HDL 46 06/28/2018    HDL 33 (L) 05/10/2017     Lab Results   Component  Value Date    LDLCALC 135.2 06/11/2019    LDLCALC 117.8 06/28/2018    LDLCALC 114.6 05/10/2017     Lab Results   Component Value Date    TRIG 69 06/11/2019    TRIG 71 06/28/2018    TRIG 102 05/10/2017     Lab Results   Component Value Date    CHOLHDL 26.6 06/11/2019    CHOLHDL 25.8 06/28/2018    CHOLHDL 19.6 (L) 05/10/2017       Chemistry        Component Value Date/Time     09/06/2020 1623    K 4.2 09/06/2020 1623    CL 99 09/06/2020 1623    CO2 33 (H) 09/06/2020 1623    BUN 33 (H) 09/06/2020 1623    CREATININE 1.4 09/06/2020 1623     09/06/2020 1623        Component Value Date/Time    CALCIUM 8.7 09/06/2020 1623    ALKPHOS 51 (L) 09/06/2020 1623    AST 27 09/06/2020 1623    ALT 31 09/06/2020 1623    BILITOT 0.5 09/06/2020 1623    ESTGFRAFRICA >60.0 09/06/2020 1623    EGFRNONAA 56.2 (A) 09/06/2020 1623          No results found for: LABA1C, HGBA1C  Lab Results   Component Value Date    TSH 3.104 09/15/2017     Lab Results   Component Value Date    INR 1.2 09/06/2020    INR 1.0 02/27/2010    INR 1.0 02/26/2010     Lab Results   Component Value Date    WBC 3.87 (L) 09/06/2020    HGB 12.9 (L) 09/06/2020    HCT 42.3 09/06/2020    MCV 95 09/06/2020     (L) 09/06/2020     BMP  Sodium   Date Value Ref Range Status   09/06/2020 144 136 - 145 mmol/L Final     Potassium   Date Value Ref Range Status   09/06/2020 4.2 3.5 - 5.1 mmol/L Final     Chloride   Date Value Ref Range Status   09/06/2020 99 95 - 110 mmol/L Final     CO2   Date Value Ref Range Status   09/06/2020 33 (H) 23 - 29 mmol/L Final     BUN, Bld   Date Value Ref Range Status   09/06/2020 33 (H) 6 - 20 mg/dL Final     Creatinine   Date Value Ref Range Status   09/06/2020 1.4 0.5 - 1.4 mg/dL Final     Calcium   Date Value Ref Range Status   09/06/2020 8.7 8.7 - 10.5 mg/dL Final     Anion Gap   Date Value Ref Range Status   09/06/2020 12 8 - 16 mmol/L Final     eGFR if    Date Value Ref Range Status   09/06/2020 >60.0 >60  mL/min/1.73 m^2 Final     eGFR if non    Date Value Ref Range Status   09/06/2020 56.2 (A) >60 mL/min/1.73 m^2 Final     Comment:     Calculation used to obtain the estimated glomerular filtration  rate (eGFR) is the CKD-EPI equation.        BNP  @LABRCNTIP(BNP,BNPTRIAGEBLO)@  @LABRCNTIP(troponini)@  CrCl cannot be calculated (Unknown ideal weight.).  No results found in the last 24 hours.  No results found in the last 24 hours.  No results found in the last 24 hours.    Assessment:      1. Acute on chronic diastolic congestive heart failure    2. Chronic atrial fibrillation    3. Essential hypertension    4. Varicose veins of both lower extremities with pain    5. Pulmonary hypertension    6. Obstructive sleep apnea syndrome    7. Anasarca    8. Troponin level elevated        Plan:   D/c Lasix 80 mg bid  Add Bumex 2 mg tid  Repeat BMP and BNP in 5 days  Daily weight. Please call the office if gain 3 pounds in 1 day or 5 pounds in 1 week.  Fluid restriction 1.5 liters a day  Na< 2 gm  Echo and MPI ordered for CHF decompensation and elevated troponin  D/c amlodipine due to soft BP  Continue Hyzaar, metoprolol eliquis 5mg bid and KCL  RTC in 1 week

## 2020-09-14 DIAGNOSIS — G47.30 SLEEP APNEA: Primary | ICD-10-CM

## 2020-09-15 ENCOUNTER — HOSPITAL ENCOUNTER (EMERGENCY)
Facility: HOSPITAL | Age: 55
Discharge: HOME | End: 2020-09-15
Attending: EMERGENCY MEDICINE
Payer: MEDICARE

## 2020-09-15 VITALS
OXYGEN SATURATION: 96 % | DIASTOLIC BLOOD PRESSURE: 62 MMHG | WEIGHT: 315 LBS | RESPIRATION RATE: 12 BRPM | BODY MASS INDEX: 38.36 KG/M2 | TEMPERATURE: 98 F | HEIGHT: 76 IN | HEART RATE: 74 BPM | SYSTOLIC BLOOD PRESSURE: 129 MMHG

## 2020-09-15 DIAGNOSIS — I50.9 ACUTE ON CHRONIC CONGESTIVE HEART FAILURE, UNSPECIFIED HEART FAILURE TYPE: Primary | ICD-10-CM

## 2020-09-15 LAB
ALBUMIN SERPL BCP-MCNC: 3.6 G/DL (ref 3.5–5.2)
ALP SERPL-CCNC: 53 U/L (ref 55–135)
ALT SERPL W/O P-5'-P-CCNC: 40 U/L (ref 10–44)
ANION GAP SERPL CALC-SCNC: 12 MMOL/L (ref 8–16)
AST SERPL-CCNC: 30 U/L (ref 10–40)
BASOPHILS # BLD AUTO: 0.04 K/UL (ref 0–0.2)
BASOPHILS NFR BLD: 1 % (ref 0–1.9)
BILIRUB SERPL-MCNC: 0.6 MG/DL (ref 0.1–1)
BNP SERPL-MCNC: 896 PG/ML (ref 0–99)
BUN SERPL-MCNC: 28 MG/DL (ref 6–20)
CALCIUM SERPL-MCNC: 8.6 MG/DL (ref 8.7–10.5)
CHLORIDE SERPL-SCNC: 96 MMOL/L (ref 95–110)
CO2 SERPL-SCNC: 36 MMOL/L (ref 23–29)
CREAT SERPL-MCNC: 1.2 MG/DL (ref 0.5–1.4)
DIFFERENTIAL METHOD: ABNORMAL
EOSINOPHIL # BLD AUTO: 0.1 K/UL (ref 0–0.5)
EOSINOPHIL NFR BLD: 2.9 % (ref 0–8)
ERYTHROCYTE [DISTWIDTH] IN BLOOD BY AUTOMATED COUNT: 14.8 % (ref 11.5–14.5)
EST. GFR  (AFRICAN AMERICAN): >60 ML/MIN/1.73 M^2
EST. GFR  (NON AFRICAN AMERICAN): >60 ML/MIN/1.73 M^2
GLUCOSE SERPL-MCNC: 141 MG/DL (ref 70–110)
HCT VFR BLD AUTO: 43.9 % (ref 40–54)
HCV AB SERPL QL IA: NEGATIVE
HGB BLD-MCNC: 13.2 G/DL (ref 14–18)
HIV 1+2 AB+HIV1 P24 AG SERPL QL IA: NEGATIVE
IMM GRANULOCYTES # BLD AUTO: 0.01 K/UL (ref 0–0.04)
IMM GRANULOCYTES NFR BLD AUTO: 0.3 % (ref 0–0.5)
LYMPHOCYTES # BLD AUTO: 0.8 K/UL (ref 1–4.8)
LYMPHOCYTES NFR BLD: 21.9 % (ref 18–48)
MCH RBC QN AUTO: 28.3 PG (ref 27–31)
MCHC RBC AUTO-ENTMCNC: 30.1 G/DL (ref 32–36)
MCV RBC AUTO: 94 FL (ref 82–98)
MONOCYTES # BLD AUTO: 0.4 K/UL (ref 0.3–1)
MONOCYTES NFR BLD: 11.5 % (ref 4–15)
NEUTROPHILS # BLD AUTO: 2.4 K/UL (ref 1.8–7.7)
NEUTROPHILS NFR BLD: 62.4 % (ref 38–73)
NRBC BLD-RTO: 0 /100 WBC
PLATELET # BLD AUTO: 116 K/UL (ref 150–350)
PMV BLD AUTO: 11.9 FL (ref 9.2–12.9)
POTASSIUM SERPL-SCNC: 3.6 MMOL/L (ref 3.5–5.1)
PROT SERPL-MCNC: 7.2 G/DL (ref 6–8.4)
RBC # BLD AUTO: 4.67 M/UL (ref 4.6–6.2)
SODIUM SERPL-SCNC: 144 MMOL/L (ref 136–145)
TROPONIN I SERPL DL<=0.01 NG/ML-MCNC: 0.03 NG/ML (ref 0–0.03)
TROPONIN I SERPL DL<=0.01 NG/ML-MCNC: 0.03 NG/ML (ref 0–0.03)
WBC # BLD AUTO: 3.84 K/UL (ref 3.9–12.7)

## 2020-09-15 PROCEDURE — 94640 AIRWAY INHALATION TREATMENT: CPT | Mod: HCNC,ER

## 2020-09-15 PROCEDURE — 84484 ASSAY OF TROPONIN QUANT: CPT | Mod: 91,HCNC,ER

## 2020-09-15 PROCEDURE — 86803 HEPATITIS C AB TEST: CPT | Mod: HCNC

## 2020-09-15 PROCEDURE — 96374 THER/PROPH/DIAG INJ IV PUSH: CPT | Mod: HCNC,ER

## 2020-09-15 PROCEDURE — 99284 EMERGENCY DEPT VISIT MOD MDM: CPT | Mod: 25,HCNC,ER

## 2020-09-15 PROCEDURE — 83880 ASSAY OF NATRIURETIC PEPTIDE: CPT | Mod: HCNC,ER

## 2020-09-15 PROCEDURE — 85025 COMPLETE CBC W/AUTO DIFF WBC: CPT | Mod: HCNC,ER

## 2020-09-15 PROCEDURE — 96376 TX/PRO/DX INJ SAME DRUG ADON: CPT | Mod: HCNC,ER

## 2020-09-15 PROCEDURE — 63600175 PHARM REV CODE 636 W HCPCS: Mod: HCNC,ER | Performed by: EMERGENCY MEDICINE

## 2020-09-15 PROCEDURE — 80053 COMPREHEN METABOLIC PANEL: CPT | Mod: HCNC,ER

## 2020-09-15 PROCEDURE — 25000003 PHARM REV CODE 250: Mod: HCNC,ER | Performed by: EMERGENCY MEDICINE

## 2020-09-15 PROCEDURE — 86703 HIV-1/HIV-2 1 RESULT ANTBDY: CPT | Mod: HCNC

## 2020-09-15 PROCEDURE — 25000242 PHARM REV CODE 250 ALT 637 W/ HCPCS: Mod: HCNC,ER | Performed by: EMERGENCY MEDICINE

## 2020-09-15 RX ORDER — NAPROXEN SODIUM 220 MG/1
162 TABLET, FILM COATED ORAL
Status: COMPLETED | OUTPATIENT
Start: 2020-09-15 | End: 2020-09-15

## 2020-09-15 RX ORDER — FUROSEMIDE 10 MG/ML
60 INJECTION INTRAMUSCULAR; INTRAVENOUS
Status: COMPLETED | OUTPATIENT
Start: 2020-09-15 | End: 2020-09-15

## 2020-09-15 RX ORDER — IPRATROPIUM BROMIDE AND ALBUTEROL SULFATE 2.5; .5 MG/3ML; MG/3ML
3 SOLUTION RESPIRATORY (INHALATION)
Status: COMPLETED | OUTPATIENT
Start: 2020-09-15 | End: 2020-09-15

## 2020-09-15 RX ORDER — ASPIRIN 325 MG
325 TABLET ORAL
Status: DISCONTINUED | OUTPATIENT
Start: 2020-09-15 | End: 2020-09-15

## 2020-09-15 RX ADMIN — FUROSEMIDE 60 MG: 10 INJECTION, SOLUTION INTRAMUSCULAR; INTRAVENOUS at 10:09

## 2020-09-15 RX ADMIN — ASPIRIN 81 MG 162 MG: 81 TABLET ORAL at 10:09

## 2020-09-15 RX ADMIN — FUROSEMIDE 60 MG: 10 INJECTION, SOLUTION INTRAMUSCULAR; INTRAVENOUS at 12:09

## 2020-09-15 RX ADMIN — IPRATROPIUM BROMIDE AND ALBUTEROL SULFATE 3 ML: .5; 3 SOLUTION RESPIRATORY (INHALATION) at 02:09

## 2020-09-15 NOTE — ED PROVIDER NOTES
Encounter Date: 9/15/2020       History     Chief Complaint   Patient presents with    Shortness of Breath     The history is provided by the patient.   Shortness of Breath  This is a recurrent problem. The average episode lasts 2 weeks. The problem occurs intermittently.The current episode started more than 1 week ago. The problem has been gradually worsening. Associated symptoms include PND and leg swelling (bilateral, gradually worsening). Pertinent negatives include no fever, no headaches, no coryza, no rhinorrhea, no sore throat, no swollen glands, no ear pain, no neck pain, no cough, no sputum production, no hemoptysis, no wheezing, no chest pain, no syncope, no vomiting, no abdominal pain, no rash and no claudication. The problem's precipitants include medical treatment (pt states recently he had his lasix changed to bumex). He has tried nothing for the symptoms. The treatment provided no relief. He has had prior ED visits. Associated medical issues include heart failure.     Review of patient's allergies indicates:  No Known Allergies  Past Medical History:   Diagnosis Date    A-fib     CHF (congestive heart failure)     Gout, chronic     Hypertension      Past Surgical History:   Procedure Laterality Date    CHOLECYSTECTOMY      GASTRIC BYPASS  2014     Family History   Problem Relation Age of Onset    Hypertension Mother     Stroke Father     Diabetes Father     Hypertension Father     Diabetes Sister     Diabetes Brother      Social History     Tobacco Use    Smoking status: Never Smoker    Smokeless tobacco: Never Used   Substance Use Topics    Alcohol use: Yes     Frequency: Monthly or less     Binge frequency: Never     Comment: sometimes    Drug use: No     Review of Systems   Constitutional: Negative for fever.   HENT: Negative for ear pain, rhinorrhea and sore throat.    Respiratory: Negative for cough, hemoptysis, sputum production, shortness of breath and wheezing.     Cardiovascular: Positive for leg swelling (bilateral, gradually worsening) and PND. Negative for chest pain, claudication and syncope.   Gastrointestinal: Negative for abdominal pain, nausea and vomiting.   Genitourinary: Negative for dysuria.   Musculoskeletal: Negative for back pain and neck pain.   Skin: Negative for rash.   Neurological: Negative for weakness and headaches.   Hematological: Does not bruise/bleed easily.   All other systems reviewed and are negative.      Physical Exam     Initial Vitals   BP Pulse Resp Temp SpO2   09/15/20 0932 09/15/20 0933 09/15/20 0934 09/15/20 0935 09/15/20 0934   132/83 67 18 97.8 °F (36.6 °C) (!) 83 %      MAP       --                Physical Exam    Nursing note and vitals reviewed.  Constitutional: He appears well-developed and well-nourished. He is not diaphoretic. No distress.   HENT:   Head: Normocephalic and atraumatic.   Eyes: EOM are normal. Pupils are equal, round, and reactive to light. No scleral icterus.   Neck: Normal range of motion. Neck supple. No thyromegaly present.   Cardiovascular: Normal rate, regular rhythm, normal heart sounds and intact distal pulses. Exam reveals no gallop and no friction rub.    No murmur heard.  Bilateral pitting edema 2+ to knee   Pulmonary/Chest: No respiratory distress. He has no wheezes. He has rhonchi in the right lower field and the left lower field. He exhibits no tenderness.   Abdominal: Soft. Bowel sounds are normal. He exhibits no distension. There is no abdominal tenderness. There is no rigidity, no rebound, no guarding, no CVA tenderness, no tenderness at McBurney's point and negative Mojica's sign. No hernia.   Musculoskeletal: Normal range of motion. No tenderness or edema.      Cervical back: Normal.      Thoracic back: Normal.      Lumbar back: Normal.        Right hand: Normal. He exhibits normal capillary refill. Normal sensation noted. Normal strength noted.        Left hand: Normal. He exhibits normal  capillary refill. Normal sensation noted. Normal strength noted.   Lymphadenopathy:     He has no cervical adenopathy.   Neurological: He is alert and oriented to person, place, and time. He has normal strength. No cranial nerve deficit or sensory deficit.   Skin: Skin is warm and dry.   Psychiatric: He has a normal mood and affect. His behavior is normal. Judgment and thought content normal.         ED Course   Procedures  Labs Reviewed   CBC W/ AUTO DIFFERENTIAL - Abnormal; Notable for the following components:       Result Value    WBC 3.84 (*)     Hemoglobin 13.2 (*)     Mean Corpuscular Hemoglobin Conc 30.1 (*)     RDW 14.8 (*)     Platelets 116 (*)     Lymph # 0.8 (*)     All other components within normal limits   COMPREHENSIVE METABOLIC PANEL - Abnormal; Notable for the following components:    CO2 36 (*)     Glucose 141 (*)     BUN, Bld 28 (*)     Calcium 8.6 (*)     Alkaline Phosphatase 53 (*)     All other components within normal limits   B-TYPE NATRIURETIC PEPTIDE - Abnormal; Notable for the following components:     (*)     All other components within normal limits   TROPONIN I - Abnormal; Notable for the following components:    Troponin I 0.030 (*)     All other components within normal limits   TROPONIN I   HIV 1 / 2 ANTIBODY   HEPATITIS C ANTIBODY     EKG Readings: (Independently Interpreted)   Initial Reading: No STEMI. Rhythm: Atrial Fibrillation. Heart Rate: 91. Ectopy: No Ectopy. Conduction: Normal. ST Segments: Normal ST Segments. T Waves: Normal. Axis: Normal. Clinical Impression: Atrial Fibrillation       Imaging Results          X-Ray Chest AP Portable (Final result)  Result time 09/15/20 10:17:03    Final result by Walter Valle MD (09/15/20 10:17:03)                 Impression:      No acute findings.      Electronically signed by: Walter Valle MD  Date:    09/15/2020  Time:    10:17             Narrative:    EXAMINATION:  XR CHEST AP PORTABLE    CLINICAL  HISTORY:  Shortness of breath., Sob;    COMPARISON:  09/06/2020    FINDINGS:  Large patient.  Mild cardiomegaly.  The lung fields appear clear allowing for the limited portable technique.                                    Vitals:    09/15/20 1032 09/15/20 1046 09/15/20 1101 09/15/20 1104   BP: 106/65 115/65 106/61    Pulse: 92 78 74    Resp:  19  16   Temp:       TempSrc:       SpO2: (!) 94% 98% 95%    Weight:       Height:        09/15/20 1123 09/15/20 1132 09/15/20 1146 09/15/20 1301   BP: 131/66 116/74 117/74 131/66   Pulse: 85 71 72    Resp:   17    Temp:       TempSrc:       SpO2: (!) 94% 96% 97%    Weight:       Height:        09/15/20 1308 09/15/20 1317 09/15/20 1331 09/15/20 1346   BP:  137/67 128/63 134/65   Pulse:  76 75 71   Resp:  16 19 18   Temp:       TempSrc:       SpO2: 95% 95% 96% 95%   Weight:       Height:        09/15/20 1402 09/15/20 1409 09/15/20 1424   BP: 129/62     Pulse: 97 77 74   Resp:  20 12   Temp:      TempSrc:      SpO2:  96% 96%   Weight:      Height:          Results for orders placed or performed during the hospital encounter of 09/15/20   CBC auto differential   Result Value Ref Range    WBC 3.84 (L) 3.90 - 12.70 K/uL    RBC 4.67 4.60 - 6.20 M/uL    Hemoglobin 13.2 (L) 14.0 - 18.0 g/dL    Hematocrit 43.9 40.0 - 54.0 %    Mean Corpuscular Volume 94 82 - 98 fL    Mean Corpuscular Hemoglobin 28.3 27.0 - 31.0 pg    Mean Corpuscular Hemoglobin Conc 30.1 (L) 32.0 - 36.0 g/dL    RDW 14.8 (H) 11.5 - 14.5 %    Platelets 116 (L) 150 - 350 K/uL    MPV 11.9 9.2 - 12.9 fL    Immature Granulocytes 0.3 0.0 - 0.5 %    Gran # (ANC) 2.4 1.8 - 7.7 K/uL    Immature Grans (Abs) 0.01 0.00 - 0.04 K/uL    Lymph # 0.8 (L) 1.0 - 4.8 K/uL    Mono # 0.4 0.3 - 1.0 K/uL    Eos # 0.1 0.0 - 0.5 K/uL    Baso # 0.04 0.00 - 0.20 K/uL    nRBC 0 0 /100 WBC    Gran% 62.4 38.0 - 73.0 %    Lymph% 21.9 18.0 - 48.0 %    Mono% 11.5 4.0 - 15.0 %    Eosinophil% 2.9 0.0 - 8.0 %    Basophil% 1.0 0.0 - 1.9 %    Differential  Method Automated    Comprehensive metabolic panel   Result Value Ref Range    Sodium 144 136 - 145 mmol/L    Potassium 3.6 3.5 - 5.1 mmol/L    Chloride 96 95 - 110 mmol/L    CO2 36 (H) 23 - 29 mmol/L    Glucose 141 (H) 70 - 110 mg/dL    BUN, Bld 28 (H) 6 - 20 mg/dL    Creatinine 1.2 0.5 - 1.4 mg/dL    Calcium 8.6 (L) 8.7 - 10.5 mg/dL    Total Protein 7.2 6.0 - 8.4 g/dL    Albumin 3.6 3.5 - 5.2 g/dL    Total Bilirubin 0.6 0.1 - 1.0 mg/dL    Alkaline Phosphatase 53 (L) 55 - 135 U/L    AST 30 10 - 40 U/L    ALT 40 10 - 44 U/L    Anion Gap 12 8 - 16 mmol/L    eGFR if African American >60.0 >60 mL/min/1.73 m^2    eGFR if non African American >60.0 >60 mL/min/1.73 m^2   Troponin I #1   Result Value Ref Range    Troponin I 0.026 0.000 - 0.026 ng/mL   B-Type natriuretic peptide (BNP)   Result Value Ref Range     (H) 0 - 99 pg/mL   Troponin I #2   Result Value Ref Range    Troponin I 0.030 (H) 0.000 - 0.026 ng/mL         Imaging Results          X-Ray Chest AP Portable (Final result)  Result time 09/15/20 10:17:03    Final result by Walter Valle MD (09/15/20 10:17:03)                 Impression:      No acute findings.      Electronically signed by: Walter Valle MD  Date:    09/15/2020  Time:    10:17             Narrative:    EXAMINATION:  XR CHEST AP PORTABLE    CLINICAL HISTORY:  Shortness of breath., Sob;    COMPARISON:  09/06/2020    FINDINGS:  Large patient.  Mild cardiomegaly.  The lung fields appear clear allowing for the limited portable technique.                                Medications   aspirin chewable tablet 162 mg (162 mg Oral Given 9/15/20 1003)   furosemide injection 60 mg (60 mg Intravenous Given 9/15/20 1017)   furosemide injection 60 mg (60 mg Intravenous Given 9/15/20 1216)   albuterol-ipratropium 2.5 mg-0.5 mg/3 mL nebulizer solution 3 mL (3 mLs Nebulization Given 9/15/20 1424)         12:31 PM  - Re-evaluation:  The patient is resting comfortably and is in no acute distress.  Discussed test results and notified of pending labs. Answered questions at this time.     2:21 PM - Re-evaluation: The patient is resting comfortably and is in no acute distress. He states that his symptoms have improved after treatment within ER.  He has had more than 2L urine output while here in ER.  Pt states he has apt c his cardiologist (Dr. Jeffrey) in 2 days. advised to keep that apt. Discussed test results, shared treatment plan, specific conditions for return, and importance of follow up with patient and family.  Crackles in lungs have improved significantly, mild end expiratory wheezing, will order another neb tx prior to discharge.  Advised close f/u c pcp/cardiology within one week and to return if symptoms persist or worsen.  He understands and agrees with the plan as discussed. Answered  his questions at this time. He has remained hemodynamically stable throughout the ED course and is appropriate for discharge home.     Pre-hypertension/Hypertension: The pt has been informed that they may have pre-hypertension or hypertension based on a blood pressure reading in the ED. I recommend that the pt call the PCP listed on their discharge instructions or a physician of their choice this week to arrange f/u for further evaluation of possible pre-hypertension or hypertension.     Caio Ontiveros was given a handout which discussed their disease process, precautions, and instructions for follow-up and therapy.    Follow-up Information     Scottie Alcantar MD. Schedule an appointment as soon as possible for a visit in 1 week.    Specialty: Internal Medicine  Contact information:  [address] [facility]  [address]  [facility] [address]  [phone]             [facility] - Cardiology In 2 days.    Specialty: Cardiology  Contact information:  [address] [id number] [phone]           [facility].    Specialty: Emergency Medicine  Why: As needed, If symptoms worsen  Contact  information:  [address]  [place] [address] [phone]                    Medication List      ASK your doctor about these medications    * allopurinoL 300 MG tablet  Commonly known as: ZYLOPRIM  Take 1 tablet (300 mg total) by mouth once daily. Total of 400 mg daily     * allopurinoL 100 MG tablet  Commonly known as: ZYLOPRIM  Take 1 tablet (100 mg total) by mouth once daily. Total of 400mg daily     apixaban 5 mg Tab  Commonly known as: ELIQUIS  Take 1 tablet (5 mg total) by mouth 2 (two) times daily.     bumetanide 1 MG tablet  Commonly known as: BUMEX  Take 1 tablet (1 mg total) by mouth 3 (three) times daily.     calcium phosphate-vitamin D3 250-400 mg-unit Chew     clotrimazole-betamethasone lotion  Commonly known as: LOTRISONE  Apply topically 2 (two) times daily.     COLCRYS 0.6 mg tablet  Generic drug: colchicine  Take 1 tablet by mouth twice daily for 14 days     gabapentin 300 MG capsule  Commonly known as: NEURONTIN  Take 1 capsule (300 mg total) by mouth every evening.     losartan-hydrochlorothiazide 100-25 mg 100-25 mg per tablet  Commonly known as: HYZAAR  TAKE 1 TABLET EVERY DAY     * metoprolol succinate 25 MG 24 hr tablet  Commonly known as: TOPROL-XL  Take 1 tablet (25 mg total) by mouth 2 (two) times daily.     * metoprolol succinate 25 MG 24 hr tablet  Commonly known as: TOPROL-XL  TAKE 1 TABLET TWICE DAILY     potassium chloride SA 20 MEQ tablet  Commonly known as: K-DUR,KLOR-CON     QUEtiapine 100 MG Tab  Commonly known as: SEROQUEL  Take 1 tablet (100 mg total) by mouth every evening.     venlafaxine 150 MG Cp24  Commonly known as: EFFEXOR-XR  Take 1 capsule (150 mg total) by mouth once daily.     VITAMIN C 100 MG tablet  Generic drug: ascorbic acid (vitamin C)         * This list has 4 medication(s) that are the same as other medications prescribed for you. Read the directions carefully, and ask your doctor or other care provider to review them with you.                Current Discharge Medication List            ED Diagnosis  1. Acute on chronic congestive heart failure, unspecified heart failure type                                    Clinical Impression:       ICD-10-CM ICD-9-CM   1. Acute on chronic congestive heart failure, unspecified heart failure type  I50.9 428.0         Disposition:   Disposition: Discharged  Condition: Stable     ED Disposition Condition    Discharge Stable        ED Prescriptions     None        Follow-up Information     Follow up With Specialties Details Why Contact Info    Scottie Alcantar MD Internal Medicine Schedule an appointment as soon as possible for a visit in 1 week  [address] [facility]  [address]  [facility] [id number] [phone]      [facility] - Cardiology Cardiology In 2 days  [address]  [facility] [id number] [phone]    [facility] Emergency Medicine  As needed, If symptoms worsen [address]  [place] [id number] [phone]                                       Narendra Nguyen Jr., MD  09/15/20 8002

## 2020-09-16 ENCOUNTER — LAB VISIT (OUTPATIENT)
Dept: LAB | Facility: HOSPITAL | Age: 55
End: 2020-09-16
Attending: INTERNAL MEDICINE
Payer: MEDICARE

## 2020-09-16 DIAGNOSIS — R06.89 DYSPNEA AND RESPIRATORY ABNORMALITIES: Chronic | ICD-10-CM

## 2020-09-16 DIAGNOSIS — R06.00 DYSPNEA AND RESPIRATORY ABNORMALITIES: Chronic | ICD-10-CM

## 2020-09-16 DIAGNOSIS — I50.33 ACUTE ON CHRONIC DIASTOLIC CONGESTIVE HEART FAILURE: ICD-10-CM

## 2020-09-16 LAB
ANION GAP SERPL CALC-SCNC: 12 MMOL/L (ref 8–16)
BNP SERPL-MCNC: 1050 PG/ML (ref 0–99)
BUN SERPL-MCNC: 27 MG/DL (ref 6–20)
CALCIUM SERPL-MCNC: 8.6 MG/DL (ref 8.7–10.5)
CHLORIDE SERPL-SCNC: 99 MMOL/L (ref 95–110)
CO2 SERPL-SCNC: 36 MMOL/L (ref 23–29)
CREAT SERPL-MCNC: 1.3 MG/DL (ref 0.5–1.4)
EST. GFR  (AFRICAN AMERICAN): >60 ML/MIN/1.73 M^2
EST. GFR  (NON AFRICAN AMERICAN): >60 ML/MIN/1.73 M^2
GLUCOSE SERPL-MCNC: 98 MG/DL (ref 70–110)
POTASSIUM SERPL-SCNC: 3.6 MMOL/L (ref 3.5–5.1)
SODIUM SERPL-SCNC: 147 MMOL/L (ref 136–145)

## 2020-09-16 PROCEDURE — 86255 FLUORESCENT ANTIBODY SCREEN: CPT | Mod: 91,HCNC

## 2020-09-16 PROCEDURE — 86235 NUCLEAR ANTIGEN ANTIBODY: CPT | Mod: 59,HCNC

## 2020-09-16 PROCEDURE — 80048 BASIC METABOLIC PNL TOTAL CA: CPT | Mod: HCNC,PO

## 2020-09-16 PROCEDURE — 83880 ASSAY OF NATRIURETIC PEPTIDE: CPT | Mod: HCNC,PO

## 2020-09-16 PROCEDURE — 86038 ANTINUCLEAR ANTIBODIES: CPT | Mod: HCNC

## 2020-09-16 PROCEDURE — 36415 COLL VENOUS BLD VENIPUNCTURE: CPT | Mod: HCNC,PO

## 2020-09-16 PROCEDURE — 86039 ANTINUCLEAR ANTIBODIES (ANA): CPT | Mod: HCNC

## 2020-09-17 ENCOUNTER — OFFICE VISIT (OUTPATIENT)
Dept: CARDIOLOGY | Facility: CLINIC | Age: 55
End: 2020-09-17
Payer: MEDICARE

## 2020-09-17 VITALS
HEART RATE: 112 BPM | OXYGEN SATURATION: 91 % | BODY MASS INDEX: 54.74 KG/M2 | DIASTOLIC BLOOD PRESSURE: 80 MMHG | HEIGHT: 76 IN | SYSTOLIC BLOOD PRESSURE: 110 MMHG

## 2020-09-17 DIAGNOSIS — I48.20 CHRONIC ATRIAL FIBRILLATION: ICD-10-CM

## 2020-09-17 DIAGNOSIS — R60.1 ANASARCA: ICD-10-CM

## 2020-09-17 DIAGNOSIS — I50.33 ACUTE ON CHRONIC DIASTOLIC CONGESTIVE HEART FAILURE: Primary | ICD-10-CM

## 2020-09-17 DIAGNOSIS — I87.2 VENOUS INSUFFICIENCY: ICD-10-CM

## 2020-09-17 DIAGNOSIS — G47.33 OBSTRUCTIVE SLEEP APNEA SYNDROME: ICD-10-CM

## 2020-09-17 DIAGNOSIS — I10 ESSENTIAL HYPERTENSION: ICD-10-CM

## 2020-09-17 DIAGNOSIS — E66.01 MORBID OBESITY: ICD-10-CM

## 2020-09-17 LAB
ANA PATTERN 1: NORMAL
ANA SER QL IF: POSITIVE
ANA TITR SER IF: NORMAL {TITER}

## 2020-09-17 PROCEDURE — 3079F DIAST BP 80-89 MM HG: CPT | Mod: HCNC,CPTII,S$GLB, | Performed by: INTERNAL MEDICINE

## 2020-09-17 PROCEDURE — 99999 PR PBB SHADOW E&M-EST. PATIENT-LVL IV: CPT | Mod: PBBFAC,HCNC,, | Performed by: INTERNAL MEDICINE

## 2020-09-17 PROCEDURE — 3008F BODY MASS INDEX DOCD: CPT | Mod: HCNC,CPTII,S$GLB, | Performed by: INTERNAL MEDICINE

## 2020-09-17 PROCEDURE — 3079F PR MOST RECENT DIASTOLIC BLOOD PRESSURE 80-89 MM HG: ICD-10-PCS | Mod: HCNC,CPTII,S$GLB, | Performed by: INTERNAL MEDICINE

## 2020-09-17 PROCEDURE — 3074F SYST BP LT 130 MM HG: CPT | Mod: HCNC,CPTII,S$GLB, | Performed by: INTERNAL MEDICINE

## 2020-09-17 PROCEDURE — 3074F PR MOST RECENT SYSTOLIC BLOOD PRESSURE < 130 MM HG: ICD-10-PCS | Mod: HCNC,CPTII,S$GLB, | Performed by: INTERNAL MEDICINE

## 2020-09-17 PROCEDURE — 99215 PR OFFICE/OUTPT VISIT, EST, LEVL V, 40-54 MIN: ICD-10-PCS | Mod: HCNC,S$GLB,, | Performed by: INTERNAL MEDICINE

## 2020-09-17 PROCEDURE — 99215 OFFICE O/P EST HI 40 MIN: CPT | Mod: HCNC,S$GLB,, | Performed by: INTERNAL MEDICINE

## 2020-09-17 PROCEDURE — 99999 PR PBB SHADOW E&M-EST. PATIENT-LVL IV: ICD-10-PCS | Mod: PBBFAC,HCNC,, | Performed by: INTERNAL MEDICINE

## 2020-09-17 PROCEDURE — 3008F PR BODY MASS INDEX (BMI) DOCUMENTED: ICD-10-PCS | Mod: HCNC,CPTII,S$GLB, | Performed by: INTERNAL MEDICINE

## 2020-09-17 RX ORDER — BUMETANIDE 1 MG/1
2 TABLET ORAL 3 TIMES DAILY
Qty: 180 TABLET | Refills: 11 | Status: SHIPPED | OUTPATIENT
Start: 2020-09-17 | End: 2020-10-09

## 2020-09-17 NOTE — PROGRESS NOTES
Subjective:   Patient ID:  Caio Ontiveros is a 54 y.o. male who presents for follow up of Follow-up      55 yo male, came in for ER visit f/u  PMH CHFpEF, chroic Afib for 7 yrs CHFpEF, obesity s/p gastric bypass in 2014. EVANGELIST Off cpap for 5 yrs   ECHO in 2017 EF 55%  09/06/2020 went to ER at [facility] due to worsening dyspnea. BNP 1100, troponin 0.036, CXR mild edema, ekg showed afib. Had IV lasix  And good diuresis.   Today, states that sleeps on 2 pillows, + PND snores,  SOb, palpitation  No chest pain, dizziness  Did drink a lot of water  His wife cooks at home and f/u low sodium protocol   Lasix was d/c and Bumex 2mg tid added  Pharmacist only gave him 1 mg tid. Went to ER 2 days ago. EKG AFIB and HR 91 bpm. BNP and BMP no change  CXR no acute change. Had Lasix IV and d/c  Lost 6 pounds in 1 week      Past Medical History:   Diagnosis Date    A-fib     CHF (congestive heart failure)     Gout, chronic     Hypertension        Past Surgical History:   Procedure Laterality Date    CHOLECYSTECTOMY      GASTRIC BYPASS  2014       Social History     Tobacco Use    Smoking status: Never Smoker    Smokeless tobacco: Never Used   Substance Use Topics    Alcohol use: Yes     Frequency: Monthly or less     Binge frequency: Never     Comment: sometimes    Drug use: No       Family History   Problem Relation Age of Onset    Hypertension Mother     Stroke Father     Diabetes Father     Hypertension Father     Diabetes Sister     Diabetes Brother          Review of Systems   Constitution: Positive for malaise/fatigue. Negative for decreased appetite, diaphoresis, fever and night sweats.   HENT: Negative for nosebleeds.    Eyes: Negative for blurred vision and double vision.   Cardiovascular: Positive for dyspnea on exertion, leg swelling and palpitations. Negative for chest pain, claudication, irregular heartbeat, near-syncope, orthopnea, paroxysmal nocturnal dyspnea and syncope.   Respiratory: Positive for  shortness of breath and snoring. Negative for cough, sleep disturbances due to breathing, sputum production and wheezing.    Endocrine: Negative for cold intolerance and polyuria.   Hematologic/Lymphatic: Does not bruise/bleed easily.   Skin: Negative for rash.   Musculoskeletal: Negative for back pain, falls, joint pain, joint swelling and neck pain.   Gastrointestinal: Negative for abdominal pain, heartburn, nausea and vomiting.   Genitourinary: Negative for dysuria, frequency and hematuria.   Neurological: Positive for dizziness. Negative for difficulty with concentration, focal weakness, headaches, light-headedness, numbness, seizures and weakness.   Psychiatric/Behavioral: Negative for depression, memory loss and substance abuse. The patient does not have insomnia.    Allergic/Immunologic: Negative for HIV exposure and hives.       Objective:   Physical Exam   Constitutional: He is oriented to person, place, and time. He appears well-nourished.   HENT:   Head: Normocephalic.   Eyes: Pupils are equal, round, and reactive to light.   Neck: Normal carotid pulses and no JVD present. Carotid bruit is not present. No thyromegaly present.   Cardiovascular: Normal heart sounds and normal pulses. An irregularly irregular rhythm present.  No extrasystoles are present. Tachycardia present. PMI is not displaced. Exam reveals no gallop and no S3.   No murmur heard.  Pulmonary/Chest: Breath sounds normal. No stridor. No respiratory distress.   Abdominal: Soft. Bowel sounds are normal. There is no abdominal tenderness. There is no rebound.   Musculoskeletal: Normal range of motion.         General: Edema (edema up to lower abd and scrotum) present.   Neurological: He is alert and oriented to person, place, and time.   Skin: Skin is intact. No rash noted.   Psychiatric: His behavior is normal.       Lab Results   Component Value Date    CHOL 203 (H) 06/11/2019    CHOL 178 06/28/2018    CHOL 168 05/10/2017     Lab Results    Component Value Date    HDL 54 06/11/2019    HDL 46 06/28/2018    HDL 33 (L) 05/10/2017     Lab Results   Component Value Date    LDLCALC 135.2 06/11/2019    LDLCALC 117.8 06/28/2018    LDLCALC 114.6 05/10/2017     Lab Results   Component Value Date    TRIG 69 06/11/2019    TRIG 71 06/28/2018    TRIG 102 05/10/2017     Lab Results   Component Value Date    CHOLHDL 26.6 06/11/2019    CHOLHDL 25.8 06/28/2018    CHOLHDL 19.6 (L) 05/10/2017       Chemistry        Component Value Date/Time     (H) 09/16/2020 1258    K 3.6 09/16/2020 1258    CL 99 09/16/2020 1258    CO2 36 (H) 09/16/2020 1258    BUN 27 (H) 09/16/2020 1258    CREATININE 1.3 09/16/2020 1258    GLU 98 09/16/2020 1258        Component Value Date/Time    CALCIUM 8.6 (L) 09/16/2020 1258    ALKPHOS 53 (L) 09/15/2020 0957    AST 30 09/15/2020 0957    ALT 40 09/15/2020 0957    BILITOT 0.6 09/15/2020 0957    ESTGFRAFRICA >60.0 09/16/2020 1258    EGFRNONAA >60.0 09/16/2020 1258          No results found for: LABA1C, HGBA1C  Lab Results   Component Value Date    TSH 3.104 09/15/2017     Lab Results   Component Value Date    INR 1.2 09/06/2020    INR 1.0 02/27/2010    INR 1.0 02/26/2010     Lab Results   Component Value Date    WBC 3.84 (L) 09/15/2020    HGB 13.2 (L) 09/15/2020    HCT 43.9 09/15/2020    MCV 94 09/15/2020     (L) 09/15/2020     BMP  Sodium   Date Value Ref Range Status   09/16/2020 147 (H) 136 - 145 mmol/L Final     Potassium   Date Value Ref Range Status   09/16/2020 3.6 3.5 - 5.1 mmol/L Final     Chloride   Date Value Ref Range Status   09/16/2020 99 95 - 110 mmol/L Final     CO2   Date Value Ref Range Status   09/16/2020 36 (H) 23 - 29 mmol/L Final     BUN, Bld   Date Value Ref Range Status   09/16/2020 27 (H) 6 - 20 mg/dL Final     Creatinine   Date Value Ref Range Status   09/16/2020 1.3 0.5 - 1.4 mg/dL Final     Calcium   Date Value Ref Range Status   09/16/2020 8.6 (L) 8.7 - 10.5 mg/dL Final     Anion Gap   Date Value Ref Range  Status   09/16/2020 12 8 - 16 mmol/L Final     eGFR if    Date Value Ref Range Status   09/16/2020 >60.0 >60 mL/min/1.73 m^2 Final     eGFR if non    Date Value Ref Range Status   09/16/2020 >60.0 >60 mL/min/1.73 m^2 Final     Comment:     Calculation used to obtain the estimated glomerular filtration  rate (eGFR) is the CKD-EPI equation.        BNP  @LABRCNTIP(BNP,BNPTRIAGEBLO)@  @LABRCNTIP(troponini)@  CrCl cannot be calculated (Unknown ideal weight.).  No results found in the last 24 hours.  No results found in the last 24 hours.  No results found in the last 24 hours.    Assessment:      1. Acute on chronic diastolic congestive heart failure    2. Chronic atrial fibrillation    3. Essential hypertension    4. Venous insufficiency    5. Morbid obesity    6. Anasarca    7. Obstructive sleep apnea syndrome        Plan:   Increase Bumex 2 mg tid  continue Losartan /HCTZ   Continue ToprolXL and eliquis 5 mg bid  Daily weight. Please call the office if gain 3 pounds in 1 day or 5 pounds in 1 week.  Fluid restriction 1.5 liters a day  Na< 2 gm  RTC in 2 weeks

## 2020-09-20 ENCOUNTER — APPOINTMENT (OUTPATIENT)
Dept: OTOLARYNGOLOGY | Facility: CLINIC | Age: 55
End: 2020-09-20
Payer: MEDICARE

## 2020-09-20 DIAGNOSIS — Z01.818 PRE-OP TESTING: Primary | ICD-10-CM

## 2020-09-20 PROCEDURE — U0003 INFECTIOUS AGENT DETECTION BY NUCLEIC ACID (DNA OR RNA); SEVERE ACUTE RESPIRATORY SYNDROME CORONAVIRUS 2 (SARS-COV-2) (CORONAVIRUS DISEASE [COVID-19]), AMPLIFIED PROBE TECHNIQUE, MAKING USE OF HIGH THROUGHPUT TECHNOLOGIES AS DESCRIBED BY CMS-2020-01-R: HCPCS | Mod: HCNC

## 2020-09-21 LAB
ANCA AB TITR SER IF: NORMAL TITER
P-ANCA TITR SER IF: NORMAL TITER
SARS-COV-2 RNA RESP QL NAA+PROBE: NOT DETECTED

## 2020-09-23 ENCOUNTER — CLINICAL SUPPORT (OUTPATIENT)
Dept: PULMONOLOGY | Facility: CLINIC | Age: 55
End: 2020-09-23
Payer: MEDICARE

## 2020-09-23 ENCOUNTER — HOSPITAL ENCOUNTER (OUTPATIENT)
Dept: RADIOLOGY | Facility: HOSPITAL | Age: 55
Discharge: HOME | End: 2020-09-23
Attending: INTERNAL MEDICINE
Payer: MEDICARE

## 2020-09-23 VITALS — WEIGHT: 315 LBS | HEIGHT: 76 IN | BODY MASS INDEX: 38.36 KG/M2

## 2020-09-23 DIAGNOSIS — R06.89 DYSPNEA AND RESPIRATORY ABNORMALITIES: Chronic | ICD-10-CM

## 2020-09-23 DIAGNOSIS — R06.00 DYSPNEA AND RESPIRATORY ABNORMALITIES: Chronic | ICD-10-CM

## 2020-09-23 DIAGNOSIS — R06.89 DYSPNEA AND RESPIRATORY ABNORMALITIES: ICD-10-CM

## 2020-09-23 DIAGNOSIS — R06.00 DYSPNEA AND RESPIRATORY ABNORMALITIES: ICD-10-CM

## 2020-09-23 LAB
ALLENS TEST: ABNORMAL
DELSYS: ABNORMAL
HCO3 UR-SCNC: 38.7 MMOL/L (ref 24–28)
MODE: ABNORMAL
PCO2 BLDA: 65.1 MMHG (ref 35–45)
PH SMN: 7.38 [PH] (ref 7.35–7.45)
PO2 BLDA: 47 MMHG (ref 80–100)
POC BE: 14 MMOL/L
POC SATURATED O2: 80 % (ref 95–100)
SAMPLE: ABNORMAL
SITE: ABNORMAL

## 2020-09-23 PROCEDURE — 94727 GAS DIL/WSHOT DETER LNG VOL: CPT | Mod: HCNC,S$GLB,, | Performed by: INTERNAL MEDICINE

## 2020-09-23 PROCEDURE — 36600 WITHDRAWAL OF ARTERIAL BLOOD: CPT | Mod: HCNC,S$GLB,, | Performed by: INTERNAL MEDICINE

## 2020-09-23 PROCEDURE — 71250 CT THORAX DX C-: CPT | Mod: TC,HCNC

## 2020-09-23 PROCEDURE — 71250 CT CHEST WITHOUT CONTRAST: ICD-10-PCS | Mod: 26,HCNC,, | Performed by: RADIOLOGY

## 2020-09-23 PROCEDURE — 94727 PR PULM FUNCTION TEST BY GAS: ICD-10-PCS | Mod: HCNC,S$GLB,, | Performed by: INTERNAL MEDICINE

## 2020-09-23 PROCEDURE — 94618 PULMONARY STRESS TESTING: CPT | Mod: HCNC,S$GLB,, | Performed by: INTERNAL MEDICINE

## 2020-09-23 PROCEDURE — 94010 BREATHING CAPACITY TEST: ICD-10-PCS | Mod: HCNC,S$GLB,, | Performed by: INTERNAL MEDICINE

## 2020-09-23 PROCEDURE — 94010 BREATHING CAPACITY TEST: CPT | Mod: HCNC,S$GLB,, | Performed by: INTERNAL MEDICINE

## 2020-09-23 PROCEDURE — 82803 BLOOD GASES ANY COMBINATION: CPT | Mod: HCNC,S$GLB,, | Performed by: INTERNAL MEDICINE

## 2020-09-23 PROCEDURE — 71250 CT THORAX DX C-: CPT | Mod: 26,HCNC,, | Performed by: RADIOLOGY

## 2020-09-23 PROCEDURE — 94729 PR C02/MEMBANE DIFFUSE CAPACITY: ICD-10-PCS | Mod: HCNC,S$GLB,, | Performed by: INTERNAL MEDICINE

## 2020-09-23 PROCEDURE — 36600 PR WITHDRAWAL OF ARTERIAL BLOOD: ICD-10-PCS | Mod: HCNC,S$GLB,, | Performed by: INTERNAL MEDICINE

## 2020-09-23 PROCEDURE — 94618 PULMONARY STRESS TESTING: ICD-10-PCS | Mod: HCNC,S$GLB,, | Performed by: INTERNAL MEDICINE

## 2020-09-23 PROCEDURE — 82803 PR  BLOOD GASES: PH, PO2 & PCO2: ICD-10-PCS | Mod: HCNC,S$GLB,, | Performed by: INTERNAL MEDICINE

## 2020-09-23 PROCEDURE — 94729 DIFFUSING CAPACITY: CPT | Mod: HCNC,S$GLB,, | Performed by: INTERNAL MEDICINE

## 2020-09-23 NOTE — PROCEDURES
"[facility] - Pulmonary Function Sv  Six Minute Walk     SUMMARY     Ordering Provider: Dr. Alaniz   Interpreting Provider: Dr. Alaniz  Performing nurse/tech/RT: YAW Steele, RT and RED Bales, RT  Diagnosis: Shortness of Breath(Respiratory Abnormalities)  Height: 6' 4" (193 cm)  Weight: (!) 204 kg (449 lb 11.8 oz)  BMI (Calculated): 54.8   Patient Race:             Phase Oxygen Assessment Supplemental O2 Heart   Rate Blood Pressure Coleen Dyspnea Scale Rating   Resting 87 % Room Air 81 bpm 131/80 2   Exercise        Minute        1 93 % 2 L/M 104 bpm     2 90 % 2 L/M 117 bpm     3 93 % 2 L/M 100 bpm     4 94 % 2 L/M 109 bpm     5 90 % 2 L/M 125 bpm     6  89 % 2 L/M 120 bpm (!) 147/95 7-8   Recovery        Minute        1 90 % 2 L/M 104 bpm     2 91 % 2 L/M 93 bpm     3 90 % 2 L/M 92 bpm     4 94 % 2 L/M 90 bpm (!) 142/97 2     Six Minute Walk Summary  6MWT Status: completed with stops  Patient Reported: Dyspnea, Other (Comment)(Abdominal pain)     Interpretation:  Did the patient stop or pause?: Yes  How many times did the patient stop or pause?: 3  Stop Time 1: 180  Restart Time 1: 192  Pause Time 1: 12 seconds  Stop Time 2: 225  Restart Time 2: 245  Pause Time 2: 20 seconds  Stop Time 3: 290 seconds  Restart Time 3: 324 seconds  Pause Time 3: 34 seconds           Total Time Walked (Calculated): 294 seconds  Final Partial Lap Distance (feet): 100 feet  Total Distance Meters (Calculated): 91.44 meters  Predicted Distance Meters (Calculated): 516.87 meters  Percentage of Predicted (Calculated): 17.69  Peak VO2 (Calculated): 6.72  Mets: 1.92  Has The Patient Had a Previous Six Minute Walk Test?: No       Previous 6MWT Results  Has The Patient Had a Previous Six Minute Walk Test?: No         PHYSICIAN INTERPRETATION AND COMMENTS:      Six minute walk distance is 91.44 / 516.87 meters ( 17.69 % % predicted) with very heavy dyspnea.     Peak VO2 during walking was 6.72 ml/min/kg [ 1.92 METS].     Baseline " oxygen saturation (at rest) was 87 %.  Lowest oxygen saturation during walking was 87 %.     Patient was placed on supplemental oxygen by nasal canula during walking.  Oxygen saturation improved to 89% with oxygen supplementation at 2  L /min.   During exercise, there was no significant desaturation while breathing supplemental oxygen at 2 L /min    A desaturation event was defined as a decrease of saturation by 4 or more.    Both blood pressure and heart rate increased significantly with walking.  Normotension was present prior to exercise.  This may represent a normal cardiovascular response to exercise.  The patient reported non-pulmonary symptoms during exercise  - Abdominal pain   There was no Severe exercise impairment during walking.  Severe exercise impairment is likely due to desaturation.  The patient did complete the study, walking 294 seconds of the 360 second test.  The patient  required oxygen supplementation during walking.  The patient may benefit from using supplemental oxygen.  No previous study performed.  Based upon age and body mass index, exercise capacity is less than predicted.

## 2020-09-23 NOTE — PROCEDURES
PHYSICIAN INTERPRETATION AND COMMENTS:    Recent Labs     09/23/20  1418   PH 7.383   PCO2 65.1*   PO2 47*   HCO3 38.7*   POCSATURATED 80*   BE 14       Severe hypoxemia. A-a gradient (alveolar-arterial gradient; AaG) elevated: [ 25 mmHg ]  Patient is acidemic.   Primary respiratory acidosis  Secondary metabolic alalosis

## 2020-09-24 ENCOUNTER — PATIENT MESSAGE (OUTPATIENT)
Dept: PULMONOLOGY | Facility: CLINIC | Age: 55
End: 2020-09-24

## 2020-09-24 DIAGNOSIS — J96.11 CHRONIC HYPOXEMIC RESPIRATORY FAILURE: Primary | ICD-10-CM

## 2020-09-24 LAB
ANTI SM ANTIBODY: 0.08 RATIO (ref 0–0.99)
ANTI SM/RNP ANTIBODY: 0.11 RATIO (ref 0–0.99)
ANTI-SM INTERPRETATION: NEGATIVE
ANTI-SM/RNP INTERPRETATION: NEGATIVE
ANTI-SSA ANTIBODY: 0.05 RATIO (ref 0–0.99)
ANTI-SSA INTERPRETATION: NEGATIVE
ANTI-SSB ANTIBODY: 0.12 RATIO (ref 0–0.99)
ANTI-SSB INTERPRETATION: NEGATIVE
BRPFT: ABNORMAL
DLCO ADJ PRE: 20.64 ML/(MIN*MMHG) (ref 28.25–42.11)
DLCO SINGLE BREATH LLN: 28.25
DLCO SINGLE BREATH PRE REF: 56.2 %
DLCO SINGLE BREATH REF: 35.18
DLCOC SBVA LLN: 3.2
DLCOC SBVA PRE REF: 145.2 %
DLCOC SBVA REF: 4.22
DLCOC SINGLE BREATH LLN: 28.25
DLCOC SINGLE BREATH PRE REF: 58.7 %
DLCOC SINGLE BREATH REF: 35.18
DLCOVA LLN: 3.2
DLCOVA PRE REF: 139.1 %
DLCOVA PRE: 5.87 ML/(MIN*MMHG*L) (ref 3.2–5.23)
DLCOVA REF: 4.22
DLVAADJ PRE: 6.12 ML/(MIN*MMHG*L) (ref 3.2–5.23)
DSDNA AB SER-ACNC: NORMAL [IU]/ML
ERVN2 LLN: -16448.59
ERVN2 PRE REF: 13.4 %
ERVN2 PRE: 0.19 L (ref -16448.59–16451.41)
ERVN2 REF: 1.41
FEF 25 75 LLN: 1.44
FEF 25 75 PRE REF: 45.1 %
FEF 25 75 REF: 3.24
FEV1 FVC LLN: 67
FEV1 FVC PRE REF: 99.2 %
FEV1 FVC REF: 78
FEV1 LLN: 2.82
FEV1 PRE REF: 46 %
FEV1 REF: 3.82
FRCN2 LLN: 2.93
FRCN2 PRE REF: 37.5 %
FRCN2 REF: 3.92
FVC LLN: 3.71
FVC PRE REF: 46.2 %
FVC REF: 4.94
IVC PRE: 1.98 L (ref 3.71–6.16)
IVC SINGLE BREATH LLN: 3.71
IVC SINGLE BREATH PRE REF: 40 %
IVC SINGLE BREATH REF: 4.94
MVV LLN: 143
MVV PRE REF: 30.3 %
MVV REF: 169
PEF LLN: 7.03
PEF PRE REF: 57.7 %
PEF REF: 10.16
PRE DLCO: 19.78 ML/(MIN*MMHG) (ref 28.25–42.11)
PRE FEF 25 75: 1.46 L/S (ref 1.44–5.04)
PRE FET 100: 6.98 SEC
PRE FEV1 FVC: 77.15 % (ref 66.76–88.72)
PRE FEV1: 1.76 L (ref 2.82–4.83)
PRE FRC N2: 1.47 L
PRE FVC: 2.28 L (ref 3.71–6.16)
PRE MVV: 51 L/MIN (ref 143.28–193.84)
PRE PEF: 5.86 L/S (ref 7.03–13.29)
RVN2 LLN: 1.83
RVN2 PRE REF: 47.4 %
RVN2 PRE: 1.19 L (ref 1.83–3.18)
RVN2 REF: 2.51
RVN2TLCN2 LLN: 26.43
RVN2TLCN2 PRE REF: 85.7 %
RVN2TLCN2 PRE: 30.36 % (ref 26.43–44.39)
RVN2TLCN2 REF: 35.41
TLCN2 LLN: 7.19
TLCN2 PRE REF: 47 %
TLCN2 PRE: 3.92 L (ref 7.19–9.49)
TLCN2 REF: 8.34
VA PRE: 3.38 L (ref 8.19–8.19)
VA SINGLE BREATH LLN: 8.19
VA SINGLE BREATH PRE REF: 41.2 %
VA SINGLE BREATH REF: 8.19
VCMAXN2 LLN: 3.71
VCMAXN2 PRE REF: 55.3 %
VCMAXN2 PRE: 2.73 L (ref 3.71–6.16)
VCMAXN2 REF: 4.94

## 2020-09-24 NOTE — PROGRESS NOTES
SIX MINUTE WALK TEST :    Six minute walk distance is 91.44 / 516.87 meters ( 17.69 % % predicted) with very heavy dyspnea.      Peak VO2 during walking was 6.72 ml/min/kg [ 1.92 METS].      Baseline oxygen saturation (at rest) was 87 %.  Lowest oxygen saturation during walking was 87 %.      Patient was placed on supplemental oxygen by nasal canula during walking.  Oxygen saturation improved to 89% with oxygen supplementation at 2  L /min.   During exercise, there was no significant desaturation while breathing supplemental oxygen at 2 L /min     A desaturation event was defined as a decrease of saturation by 4 or more.     Both blood pressure and heart rate increased significantly with walking.  Normotension was present prior to exercise.  This may represent a normal cardiovascular response to exercise.  The patient reported non-pulmonary symptoms during exercise  - Abdominal pain   There was no Severe exercise impairment during walking.  Severe exercise impairment is likely due to desaturation.  The patient did complete the study, walking 294 seconds of the 360 second test.  The patient  required oxygen supplementation during walking.  The patient may benefit from using supplemental oxygen.  No previous study performed.  Based upon age and body mass index, exercise capacity is less than predicted.        Problem List Items Addressed This Visit     None      Visit Diagnoses     Chronic hypoxemic respiratory failure    -  Primary    Relevant Orders    OXYGEN FOR HOME USE

## 2020-09-25 ENCOUNTER — PATIENT OUTREACH (OUTPATIENT)
Dept: ADMINISTRATIVE | Facility: HOSPITAL | Age: 55
End: 2020-09-25

## 2020-09-26 ENCOUNTER — LAB VISIT (OUTPATIENT)
Dept: OTOLARYNGOLOGY | Facility: CLINIC | Age: 55
End: 2020-09-26
Payer: MEDICARE

## 2020-09-26 DIAGNOSIS — G47.30 SLEEP APNEA: ICD-10-CM

## 2020-09-26 PROCEDURE — U0003 INFECTIOUS AGENT DETECTION BY NUCLEIC ACID (DNA OR RNA); SEVERE ACUTE RESPIRATORY SYNDROME CORONAVIRUS 2 (SARS-COV-2) (CORONAVIRUS DISEASE [COVID-19]), AMPLIFIED PROBE TECHNIQUE, MAKING USE OF HIGH THROUGHPUT TECHNOLOGIES AS DESCRIBED BY CMS-2020-01-R: HCPCS | Mod: HCNC

## 2020-09-27 LAB — SARS-COV-2 RNA RESP QL NAA+PROBE: NOT DETECTED

## 2020-09-29 ENCOUNTER — HOSPITAL ENCOUNTER (OUTPATIENT)
Dept: SLEEP MEDICINE | Facility: HOSPITAL | Age: 55
Discharge: HOME | End: 2020-09-29
Attending: INTERNAL MEDICINE
Payer: MEDICARE

## 2020-09-29 ENCOUNTER — PATIENT MESSAGE (OUTPATIENT)
Dept: OTHER | Facility: OTHER | Age: 55
End: 2020-09-29

## 2020-09-29 DIAGNOSIS — F51.04 PSYCHOPHYSIOLOGICAL INSOMNIA: ICD-10-CM

## 2020-09-29 DIAGNOSIS — Z72.821 INADEQUATE SLEEP HYGIENE: ICD-10-CM

## 2020-09-29 DIAGNOSIS — G47.63 SLEEP-RELATED BRUXISM: ICD-10-CM

## 2020-09-29 DIAGNOSIS — G47.33 OBSTRUCTIVE SLEEP APNEA: Primary | ICD-10-CM

## 2020-09-29 PROCEDURE — 95811 POLYSOM 6/>YRS CPAP 4/> PARM: CPT | Mod: HCNC

## 2020-09-29 PROCEDURE — 95811 PR POLYSOMNOGRAPHY W/CPAP: ICD-10-PCS | Mod: 26,HCNC,, | Performed by: PSYCHOLOGIST

## 2020-09-29 PROCEDURE — 95811 POLYSOM 6/>YRS CPAP 4/> PARM: CPT | Mod: 26,HCNC,, | Performed by: PSYCHOLOGIST

## 2020-10-05 NOTE — PROCEDURES
Patient Name: Caio Ontiveros   Date of Report: 10-5-20     Date of PS2020   [facility] [id number]   : 1965                        Time of PSG: 10:15:29 PM - 5:01:08 AM  Sex:  Male   Age:  54   Weight:  347.0 lbs Height:  6  3             Type of PSG:  Split night    REASONS FOR REFERRAL: Mr. Ontiveros is a 54 year old male, referred for diagnostic polysomnography by Dr. Josh Alaniz,  based on the patients prior diagnosis of obstructive sleep apnea about 5 years ago, after which he had gastric bypass surgery, lost 200 lbs, stopped using CPAP and then regained 100 lbs. Currently, he has reported snoring, observed respiratory pauses in sleep (SLEEP-BANG), frequent nocturnal awakenings, dry mouth and headaches in the morning, unrefreshing sleep and daytime hypersomnolence.  His [place] Sleepiness Scale score was 20, clinically significant, and his STOP - BANG score was 8, high risk of EVANGELIST.  Dr. Scottie Alcantar is the patients primary care physician.    STUDY PARAMETERS: This study involved analysis of the patient's sleep pattern while breathing unassisted, and, if laboratory  criteria are met, while breathing with assistance from PAP. The study was performed with a sleep technologist in attendance for the entire test period, with video monitoring throughout the study, and routine laboratory clinical parameters recorded:  NOTE: The polysomnography electrophysiological record for the patient has been reviewed in its entirety by Dr. Nevarez.    SUMMARY STATEMENTS  DIAGNOSTIC IMPRESSIONS  G47.33  /  327.23  Severe Obstructive Sleep Apnea, Adult (OSAHS)  F51.04  /  307.42  Psychophysiological Insomnia (stress - related and conditioned; with depression)    G47.63   /  327.53  Sleep Related Bruxism   Z72.821 /  V69.4  Inadequate Sleep Hygiene  G47.01  / 327.01  r/o Insomnia due to Medical Condition (pain, discomfort)  G47.21  /   327.31  r/o Delayed Sleep - Wake Phase Disorder    PRIMARY TREATMENT RECOMMENDATIONS   Treat, or refer to Sleep Disorders Center.  1. The diagnostic polysomnography revealed a severe  obstructive sleep apnea / hypopnea syndrome (A + H Index = 104.6 events / hr asleep with 17.4 - 0.0 respiratory event - arousals / hr asleep, and no RERAs (respiratory effort - related arousals) for the study. The mean SpO2 value was 75.3 %, severe, minimum oxygen saturation during sleep was 74.0 %, and waking baseline SpO2 was 96.0 %.    Persistent, moderately loud snoring was noted.     2. CPAP was initiated at  1:11 am.  The PAP titration polysomnography revealed that BiPAP (Bi - Flex 3, humidity 2) of 22 cm IPAP  /  18 cm EPAP, the most effective pressure most completely tested, was optimal, as it reduced the rate of respiratory events 96 % to A + H Index = 4.6 events / hr asleep in 1.1 hrs sleep (0.7 hrs REM sleep).  Snoring was eliminated at 22 cm / 18 cm.  Lower pressure also could be successful (20 cm / 16 cm A + H I = 0.0,  but  in < 0.5 hrs,  only 0.35 hrs sleep) with only  0.05 hrs (3 min) REM sleep.  The overall A + H Index was 22.8 / hr asleep, with only 0.3 respiratory event - related arousals / hr asleep (and no RERAs) for the titration trial. The mean SpO2 value was 87.4 % throughout the PAP study, significant, with a minimum oxygen saturation during sleep of 78.0 % (waking baseline SpO2 was 90 %).    A medium ResMed Air Fit  F20  full -  face  CPAP mask was used and was well - tolerated.  Please see PAP trial  outcomes table, below.      3. Weight loss to the normal range is recommended as it can decrease respiratory events and snoring in overweight patients.  4. The following changes in sleep hygiene / sleep - related behavior are recommended after medical treatments are successful   Limit time for sleep to number of hours of sleep needed for adequate daytime functioning (7.5 to 9.0 hrs / night).   Regular bedtimes and wake times, including weekends: Total sleep time / night should not be more than             one hour more than usual, and bedtime or wake time should not be more than one hour earlier or later than        usual.     Do not attempt to make up lost sleep by extending sleep periods.     Avoid naps; none longer than 20 min or later than mid - afternoon.   Avoid caffeinated beverages after 6:00 pm or within 4 hours of bedtime.   Avoid meals or large snacks within 3 hours of bedtime.    SECONDARY TREATMENT RECOMMENDATIONS  Treat, or refer to [facility] if problems are not satisfactorily resolved by the above.  1. If  insomnia persists after treatments for medical sleep disorders have proven effective, recommend  follow - up inquiry regarding frequency, duration and nature of reported sleep loss, delayed sleep onset, poor sleep maintenance and involuntary early awakening (stress - related and / or conditioned psychophysiological insomnia,  r/o  delayed sleep - wake phase disorder) and referral for behavioral treatment of insomnia, as indicated.     2. Consider behavioral and cognitive / behavioral treatments for stress and depression to complement the medication and to increase the probability of long - term adaptive change.  Sleep bruxism might be expected to improve.  3. Consider a referral for a dental examination and possible dental splint for sleep bruxism.    4. Follow - up inquiry regarding sleep disruption secondary to pain or other physical discomfort (please see SDI).    See below for a complete interpretation of data from the polysomnography and Sleep Disorders Inventory.     Thank you for referring this patient to the [facility].      Walter Nevarez, Ph.D., ABPP; Diplomate, American Board of Sleep Medicine

## 2020-10-06 ENCOUNTER — TELEPHONE (OUTPATIENT)
Dept: PULMONOLOGY | Facility: CLINIC | Age: 55
End: 2020-10-06

## 2020-10-06 DIAGNOSIS — G47.33 OBSTRUCTIVE SLEEP APNEA: Primary | ICD-10-CM

## 2020-10-06 NOTE — TELEPHONE ENCOUNTER
Sleep study Results reviewed:        The diagnostic polysomnography revealed a severe obstructive sleep apnea / hypopnea syndrome (A + H Index = 104.6 events / hr asleep with 17.4 - 0.0 respiratory event - arousals / hr asleep, and no RERAs (respiratory effort - related arousals) for the study. The mean SpO2 value was 75.3 %, severe, minimum oxygen saturation during sleep was 74.0 %, and waking baseline SpO2 was 96.0 %. Persistent, moderately loud snoring was noted.   CPAP was initiated at 1:11 am. The PAP titration polysomnography revealed that BiPAP (Bi - Flex 3, humidity 2) of 22 cm IPAP / 18 cm EPAP, the most effective pressure most completely tested, was optimal, as it reduced the rate of respiratory events 96 % to A + H Index = 4.6 events / hr asleep in 1.1 hrs sleep (0.7 hrs REM sleep). Snoring was eliminated at 22 cm / 18 cm. Lower pressure also could be successful (20 cm / 16 cm A + H I = 0.0, but in < 0.5 hrs, only 0.35 hrs sleep) with only 0.05 hrs (3 min) REM sleep. The overall A + H Index was 22.8 / hr asleep, with only 0.3 respiratory event - related arousals / hr asleep (and no RERAs) for the titration trial. The mean SpO2 value was 87.4 % throughout the PAP study, significant, with a minimum oxygen saturation during sleep of 78.0 % (waking baseline SpO2 was 90 %). A medium ResMed Air Fit F20 full - face CPAP mask was used and was well - tolerated     Assessment:  1. Severe EVANGELIST  2. Optimal PAP titration.    Plan:  1. Start BIPAP 22 / 18 CMWP  2. Schedule 6 weeks  follow up for compliance evaluation.  3. ONSAT on BIPAP and room air prior to next visit.      Please inform patient.

## 2020-10-09 ENCOUNTER — PATIENT MESSAGE (OUTPATIENT)
Dept: CARDIOLOGY | Facility: CLINIC | Age: 55
End: 2020-10-09

## 2020-10-09 ENCOUNTER — PATIENT OUTREACH (OUTPATIENT)
Dept: ADMINISTRATIVE | Facility: OTHER | Age: 55
End: 2020-10-09

## 2020-10-09 ENCOUNTER — OFFICE VISIT (OUTPATIENT)
Dept: RHEUMATOLOGY | Facility: CLINIC | Age: 55
End: 2020-10-09
Payer: MEDICARE

## 2020-10-09 DIAGNOSIS — Z71.89 COUNSELING ON HEALTH PROMOTION AND DISEASE PREVENTION: ICD-10-CM

## 2020-10-09 DIAGNOSIS — M1A.00X0 IDIOPATHIC CHRONIC GOUT WITHOUT TOPHUS, UNSPECIFIED SITE: Primary | ICD-10-CM

## 2020-10-09 DIAGNOSIS — L40.9 PSORIASIS: ICD-10-CM

## 2020-10-09 DIAGNOSIS — M54.50 CHRONIC LOW BACK PAIN, UNSPECIFIED BACK PAIN LATERALITY, UNSPECIFIED WHETHER SCIATICA PRESENT: ICD-10-CM

## 2020-10-09 DIAGNOSIS — G89.29 CHRONIC LOW BACK PAIN, UNSPECIFIED BACK PAIN LATERALITY, UNSPECIFIED WHETHER SCIATICA PRESENT: ICD-10-CM

## 2020-10-09 PROCEDURE — 99214 PR OFFICE/OUTPT VISIT, EST, LEVL IV, 30-39 MIN: ICD-10-PCS | Mod: HCNC,95,, | Performed by: INTERNAL MEDICINE

## 2020-10-09 PROCEDURE — 99214 OFFICE O/P EST MOD 30 MIN: CPT | Mod: HCNC,95,, | Performed by: INTERNAL MEDICINE

## 2020-10-09 RX ORDER — FUROSEMIDE 80 MG/1
80 TABLET ORAL 2 TIMES DAILY
Qty: 60 TABLET | Refills: 9 | Status: ON HOLD | OUTPATIENT
Start: 2020-10-09 | End: 2020-10-26 | Stop reason: SDUPTHER

## 2020-10-09 NOTE — PROGRESS NOTES
RHEUMATOLOGY OUTPATIENT CLINIC NOTE    The patient location is: [place]  The chief complaint leading to consultation is: Gout  Visit type: audiovisual  Face to Face time with patient: 15 mins  25 minutes of total time spent on the encounter, which includes face to face time and non-face to face time preparing to see the patient (eg, review of tests), Obtaining and/or reviewing separately obtained history, Documenting clinical information in the electronic or other health record, Independently interpreting results (not separately reported) and communicating results to the patient/family/caregiver, or Care coordination (not separately reported).     Each patient to whom he or she provides medical services by telemedicine is:  (1) informed of the relationship between the physician and patient and the respective role of any other health care provider with respect to management of the patient; and (2) notified that he or she may decline to receive medical services by telemedicine and may withdraw from such care at any time.    10/9/2020    Attending Rheumatologist: Constantine Raphael  Primary Care Provider: Scottie Alcantar MD   Physician Requesting Consultation: No referring provider defined for this encounter.  Chief Complaint/Reason For Consultation:  Chronic gout    Subjective:       PREM Ontiveros is a 54 y.o. Black or  male with gout comes for follow-up.    Today  Last visit via telemedicine on 6/2020.  Allopurinol increased to 400mg daily, recommended for axial skeleton imaging.  Tolerating ULT and colchicine w/o side effects.  No gout flares since last visit.  No significant joint pain at this time.  PsO rash stable.  Denies joint swelling.  Main complaint is persistent dyspnea and episodic dry cough and leg swelling.  Denies fever, chest pain, /GI complaints.    Review of Systems   Constitutional: Negative for chills, fever and malaise/fatigue.   Eyes: Negative for pain and redness.   Respiratory:  Negative for cough, hemoptysis and shortness of breath.    Cardiovascular: Negative for chest pain and leg swelling.   Gastrointestinal: Negative for abdominal pain, blood in stool and melena.   Genitourinary: Negative for dysuria and hematuria.   Musculoskeletal: Positive for back pain (Mechanical features, no alarm signs or symptoms.). Negative for falls and joint pain (Generalized, mechanical features.).   Neurological: Negative for tingling and focal weakness.   Psychiatric/Behavioral: Negative for memory loss. The patient does not have insomnia.      Chronic comorbid conditions affecting medical decision making today:  Past Medical History:   Diagnosis Date    A-fib     CHF (congestive heart failure)     Gout, chronic     Hypertension      Past Surgical History:   Procedure Laterality Date    CHOLECYSTECTOMY      GASTRIC BYPASS  2014     Family History   Problem Relation Age of Onset    Hypertension Mother     Stroke Father     Diabetes Father     Hypertension Father     Diabetes Sister     Diabetes Brother      Social History     Substance and Sexual Activity   Alcohol Use Yes    Frequency: Monthly or less    Binge frequency: Never    Comment: sometimes     Social History     Tobacco Use   Smoking Status Never Smoker   Smokeless Tobacco Never Used     Social History     Substance and Sexual Activity   Drug Use No       Current Outpatient Medications:     allopurinoL (ZYLOPRIM) 100 MG tablet, Take 1 tablet (100 mg total) by mouth once daily. Total of 400mg daily, Disp: 90 tablet, Rfl: 1    allopurinoL (ZYLOPRIM) 300 MG tablet, Take 1 tablet (300 mg total) by mouth once daily. Total of 400 mg daily, Disp: 90 tablet, Rfl: 1    apixaban (ELIQUIS) 5 mg Tab, Take 1 tablet (5 mg total) by mouth 2 (two) times daily., Disp: 180 tablet, Rfl: 3    ascorbic acid, vitamin C, (VITAMIN C) 100 MG tablet, Take 100 mg by mouth once daily., Disp: , Rfl:     calcium phosphate-vitamin D3 250-400 mg-unit Chew, ,  "Disp: , Rfl:     clotrimazole-betamethasone (LOTRISONE) lotion, Apply topically 2 (two) times daily., Disp: 30 mL, Rfl: 2    COLCRYS 0.6 mg tablet, Take 1 tablet by mouth twice daily for 14 days, Disp: 28 tablet, Rfl: 0    furosemide (LASIX) 80 MG tablet, Take 1 tablet (80 mg total) by mouth 2 (two) times a day., Disp: 60 tablet, Rfl: 9    gabapentin (NEURONTIN) 300 MG capsule, Take 1 capsule (300 mg total) by mouth every evening., Disp: 30 capsule, Rfl: 3    losartan-hydrochlorothiazide 100-25 mg (HYZAAR) 100-25 mg per tablet, TAKE 1 TABLET EVERY DAY, Disp: 90 tablet, Rfl: 3    metoprolol succinate (TOPROL-XL) 25 MG 24 hr tablet, Take 1 tablet (25 mg total) by mouth 2 (two) times daily., Disp: 60 tablet, Rfl: 0    metoprolol succinate (TOPROL-XL) 25 MG 24 hr tablet, TAKE 1 TABLET TWICE DAILY, Disp: 180 tablet, Rfl: 2    potassium chloride SA (K-DUR,KLOR-CON) 20 MEQ tablet, Take 20 mEq by mouth., Disp: , Rfl:     QUEtiapine (SEROQUEL) 100 MG Tab, Take 1 tablet (100 mg total) by mouth every evening., Disp: 90 tablet, Rfl: 1    venlafaxine (EFFEXOR-XR) 150 MG Cp24, Take 1 capsule (150 mg total) by mouth once daily., Disp: 90 capsule, Rfl: 1     Objective:         There were no vitals filed for this visit.  Physical Exam   Constitutional: He is oriented to person, place, and time. No distress.   Estimated body mass index is 51.28 kg/m² as calculated from the following:    Height as of 2/20/20: 6' 3" (1.905 m).    Weight as of 2/20/20: 186.1 kg (410 lb 4.4 oz).    Wt Readings from Last 1 Encounters:  02/20/20 1347 : (!) 186.1 kg (410 lb 4.4 oz)     HENT:   Head: Normocephalic and atraumatic.   Eyes: Conjunctivae are normal. Pupils are equal, round, and reactive to light.   Neck: Normal range of motion.   Pulmonary/Chest: Effort normal. No respiratory distress.   Neurological: He is alert and oriented to person, place, and time.   Psychiatric: Mood and affect normal.   Musculoskeletal: Normal range of motion. "      Comments: :  Able to make full fist without difficulty  No synovitis noted     Reviewed old and all outside pertinent medical records available.    All lab results personally reviewed and interpreted by me.  Lab Results   Component Value Date    WBC 3.84 (L) 09/15/2020    HGB 13.2 (L) 09/15/2020    HCT 43.9 09/15/2020    MCV 94 09/15/2020    MCH 28.3 09/15/2020    MCHC 30.1 (L) 09/15/2020    RDW 14.8 (H) 09/15/2020     (L) 09/15/2020    MPV 11.9 09/15/2020    PLTEST Appears normal 06/11/2019       Lab Results   Component Value Date     (H) 09/16/2020    K 3.6 09/16/2020    CL 99 09/16/2020    CO2 36 (H) 09/16/2020    GLU 98 09/16/2020    BUN 27 (H) 09/16/2020    CALCIUM 8.6 (L) 09/16/2020    PROT 7.2 09/15/2020    ALBUMIN 3.6 09/15/2020    BILITOT 0.6 09/15/2020    AST 30 09/15/2020    ALKPHOS 53 (L) 09/15/2020    ALT 40 09/15/2020       Lab Results   Component Value Date    COLORU Brown (A) 05/10/2017    APPEARANCEUA Clear 05/10/2017    SPECGRAV 1.010 05/10/2017    PHUR 7.0 05/10/2017    PROTEINUA Negative 05/10/2017    KETONESU Negative 05/10/2017    LEUKOCYTESUR Negative 05/10/2017    NITRITE Negative 05/10/2017    UROBILINOGEN 4.0-6.0 (A) 05/10/2017       Lab Results   Component Value Date    CRP 4.6 02/20/2020       Lab Results   Component Value Date    SEDRATE 4 02/20/2020       Lab Results   Component Value Date    RF <10.0 01/09/2020    SEDRATE 4 02/20/2020       No components found for: 25OHVITDTOT, 86EZLSQC9, 94PPQCVR5, METHODNOTE    Lab Results   Component Value Date    URICACID 5.6 08/25/2020     No components found for: TSPOTTB    Rheum Labs:   TANYA 1:80   NADINE negative   ANCA screen negative   HLA B27 negative   Rheumatoid factor, CCP negative    Infectious Labs:   HCV NR     Imaging:  All imaging reviewed and independently  interpreted by me.    XR SI joints 8/2020  SI joints within normal limits. Degenerative changes lumbosacral spine and hips    XR LS 8/2020  Multilevel  spondylosis without acute fracture or subluxation. Anterior bridging syndesmophytes at the L2-3, L3-4 and L5-S1 levels. Pedicles and SI joints intact.     ASSESSMENT / PLAN:     Caio Ontiveros is a 54 y.o. Black or  male with:    1. Chronic gout  - currently on 400 mg of allopurinol and colchicine daily, tolerating well.  - Last uric acid at goal, no flares since last visit.  - continue allopurinol 400mg and colchicine only PRN  - dietary and lifestyle restrictions    2. Psoriasis  - refers skin PsO stable  - persistent LBP with prominent mechanical features with trochanteric bursa pain.  - syndesmophytes on LS imaging. SI joints intact.  - currently undergoing w/u for dyspnea with multifactorial: EVANGELIST, Obesity, Acute on chronic diastolic congestive heart failure   - consider Otezla for skin disease and may increase gabapentin to BID  - once CHF is table, consider TNFi vs different biologic for skin disease and probable remote axial involvement vs MRI to assess for active spondyloarthritis  - limit/avoid NSAIDs for now    3. Other specified counseling  - Nutrition and exercise counseling.  - Limitation of alcohol consumption.  - Medication counseling provided.    No follow-ups on file.   RTC 3 months    Method of contact with patient concerns: Ammyhart attn Rheumatology    Disclaimer:  This note is prepared using voice recognition software and as such is likely to have errors and has not been proof read. Please contact me for questions.     Constantine Raphael M.D.  Rheumatology Department   [facility]

## 2020-10-20 ENCOUNTER — PATIENT MESSAGE (OUTPATIENT)
Dept: CARDIOLOGY | Facility: CLINIC | Age: 55
End: 2020-10-20

## 2020-10-20 DIAGNOSIS — I27.20 PULMONARY HYPERTENSION: Primary | ICD-10-CM

## 2020-10-20 NOTE — TELEPHONE ENCOUNTER
Talked to pt and told him when medicine was sent in to pharmacy someone would contact him,  Pt anson              Pt needs refill on Eliquis 5mg 2x a day

## 2020-10-21 ENCOUNTER — PATIENT MESSAGE (OUTPATIENT)
Dept: RHEUMATOLOGY | Facility: CLINIC | Age: 55
End: 2020-10-21

## 2020-10-22 DIAGNOSIS — I27.20 PULMONARY HYPERTENSION: ICD-10-CM

## 2020-10-23 ENCOUNTER — HOSPITAL ENCOUNTER (OUTPATIENT)
Facility: HOSPITAL | Age: 55
Discharge: HOME HEALTH | End: 2020-10-26
Attending: FAMILY MEDICINE | Admitting: INTERNAL MEDICINE
Payer: MEDICARE

## 2020-10-23 DIAGNOSIS — E66.01 MORBID OBESITY: ICD-10-CM

## 2020-10-23 DIAGNOSIS — I50.9 CHF (CONGESTIVE HEART FAILURE): ICD-10-CM

## 2020-10-23 DIAGNOSIS — I50.33 ACUTE ON CHRONIC DIASTOLIC CONGESTIVE HEART FAILURE: ICD-10-CM

## 2020-10-23 DIAGNOSIS — R60.9 EDEMA: ICD-10-CM

## 2020-10-23 DIAGNOSIS — I10 ESSENTIAL HYPERTENSION: ICD-10-CM

## 2020-10-23 DIAGNOSIS — I27.20 PULMONARY HYPERTENSION: ICD-10-CM

## 2020-10-23 DIAGNOSIS — I50.9 ACUTE ON CHRONIC CONGESTIVE HEART FAILURE, UNSPECIFIED HEART FAILURE TYPE: ICD-10-CM

## 2020-10-23 DIAGNOSIS — I50.9 ACUTE ON CHRONIC CONGESTIVE HEART FAILURE: Primary | ICD-10-CM

## 2020-10-23 PROBLEM — R21 RASH: Status: ACTIVE | Noted: 2020-10-23

## 2020-10-23 LAB
ALBUMIN SERPL BCP-MCNC: 3.3 G/DL (ref 3.5–5.2)
ALP SERPL-CCNC: 58 U/L (ref 55–135)
ALT SERPL W/O P-5'-P-CCNC: 17 U/L (ref 10–44)
ANION GAP SERPL CALC-SCNC: 7 MMOL/L (ref 8–16)
AST SERPL-CCNC: 23 U/L (ref 10–40)
BACTERIA #/AREA URNS HPF: NORMAL /HPF
BASOPHILS # BLD AUTO: 0.03 K/UL (ref 0–0.2)
BASOPHILS NFR BLD: 0.9 % (ref 0–1.9)
BILIRUB SERPL-MCNC: 0.6 MG/DL (ref 0.1–1)
BILIRUB UR QL STRIP: NEGATIVE
BNP SERPL-MCNC: 926 PG/ML (ref 0–99)
BUN SERPL-MCNC: 20 MG/DL (ref 6–20)
CALCIUM SERPL-MCNC: 9 MG/DL (ref 8.7–10.5)
CHLORIDE SERPL-SCNC: 101 MMOL/L (ref 95–110)
CLARITY UR: CLEAR
CO2 SERPL-SCNC: 32 MMOL/L (ref 23–29)
COLOR UR: YELLOW
CREAT SERPL-MCNC: 1.3 MG/DL (ref 0.5–1.4)
DIFFERENTIAL METHOD: ABNORMAL
EOSINOPHIL # BLD AUTO: 0.1 K/UL (ref 0–0.5)
EOSINOPHIL NFR BLD: 3.5 % (ref 0–8)
ERYTHROCYTE [DISTWIDTH] IN BLOOD BY AUTOMATED COUNT: 16.3 % (ref 11.5–14.5)
EST. GFR  (AFRICAN AMERICAN): >60 ML/MIN/1.73 M^2
EST. GFR  (NON AFRICAN AMERICAN): >60 ML/MIN/1.73 M^2
GLUCOSE SERPL-MCNC: 170 MG/DL (ref 70–110)
GLUCOSE UR QL STRIP: NEGATIVE
HCT VFR BLD AUTO: 42.6 % (ref 40–54)
HGB BLD-MCNC: 12.7 G/DL (ref 14–18)
HGB UR QL STRIP: NEGATIVE
HYALINE CASTS #/AREA URNS LPF: 0 /LPF
IMM GRANULOCYTES # BLD AUTO: 0.01 K/UL (ref 0–0.04)
IMM GRANULOCYTES NFR BLD AUTO: 0.3 % (ref 0–0.5)
KETONES UR QL STRIP: NEGATIVE
LEUKOCYTE ESTERASE UR QL STRIP: NEGATIVE
LYMPHOCYTES # BLD AUTO: 0.7 K/UL (ref 1–4.8)
LYMPHOCYTES NFR BLD: 21.2 % (ref 18–48)
MCH RBC QN AUTO: 26.7 PG (ref 27–31)
MCHC RBC AUTO-ENTMCNC: 29.8 G/DL (ref 32–36)
MCV RBC AUTO: 90 FL (ref 82–98)
MICROSCOPIC COMMENT: NORMAL
MONOCYTES # BLD AUTO: 0.3 K/UL (ref 0.3–1)
MONOCYTES NFR BLD: 9.4 % (ref 4–15)
NEUTROPHILS # BLD AUTO: 2.2 K/UL (ref 1.8–7.7)
NEUTROPHILS NFR BLD: 64.7 % (ref 38–73)
NITRITE UR QL STRIP: NEGATIVE
NRBC BLD-RTO: 0 /100 WBC
PH UR STRIP: 6 [PH] (ref 5–8)
PLATELET # BLD AUTO: 111 K/UL (ref 150–350)
PMV BLD AUTO: 11.2 FL (ref 9.2–12.9)
POTASSIUM SERPL-SCNC: 3.5 MMOL/L (ref 3.5–5.1)
PROT SERPL-MCNC: 7.2 G/DL (ref 6–8.4)
PROT UR QL STRIP: ABNORMAL
RBC # BLD AUTO: 4.75 M/UL (ref 4.6–6.2)
RBC #/AREA URNS HPF: 0 /HPF (ref 0–4)
SARS-COV-2 RDRP RESP QL NAA+PROBE: NEGATIVE
SODIUM SERPL-SCNC: 140 MMOL/L (ref 136–145)
SP GR UR STRIP: >=1.03 (ref 1–1.03)
TROPONIN I SERPL DL<=0.01 NG/ML-MCNC: 0.01 NG/ML (ref 0–0.03)
URN SPEC COLLECT METH UR: ABNORMAL
UROBILINOGEN UR STRIP-ACNC: ABNORMAL EU/DL
WBC # BLD AUTO: 3.4 K/UL (ref 3.9–12.7)
WBC #/AREA URNS HPF: 1 /HPF (ref 0–5)

## 2020-10-23 PROCEDURE — 80053 COMPREHEN METABOLIC PANEL: CPT | Mod: HCNC

## 2020-10-23 PROCEDURE — 85025 COMPLETE CBC W/AUTO DIFF WBC: CPT | Mod: HCNC

## 2020-10-23 PROCEDURE — 84484 ASSAY OF TROPONIN QUANT: CPT | Mod: HCNC

## 2020-10-23 PROCEDURE — 83880 ASSAY OF NATRIURETIC PEPTIDE: CPT | Mod: HCNC

## 2020-10-23 PROCEDURE — 93010 EKG 12-LEAD: ICD-10-PCS | Mod: HCNC,,, | Performed by: INTERNAL MEDICINE

## 2020-10-23 PROCEDURE — 36415 COLL VENOUS BLD VENIPUNCTURE: CPT | Mod: HCNC

## 2020-10-23 PROCEDURE — 81000 URINALYSIS NONAUTO W/SCOPE: CPT | Mod: HCNC

## 2020-10-23 PROCEDURE — 96374 THER/PROPH/DIAG INJ IV PUSH: CPT | Mod: HCNC

## 2020-10-23 PROCEDURE — 93005 ELECTROCARDIOGRAM TRACING: CPT | Mod: HCNC,59

## 2020-10-23 PROCEDURE — 99291 CRITICAL CARE FIRST HOUR: CPT | Mod: 25,HCNC

## 2020-10-23 PROCEDURE — G0378 HOSPITAL OBSERVATION PER HR: HCPCS | Mod: HCNC

## 2020-10-23 PROCEDURE — 25000003 PHARM REV CODE 250: Mod: HCNC | Performed by: INTERNAL MEDICINE

## 2020-10-23 PROCEDURE — 51702 INSERT TEMP BLADDER CATH: CPT | Mod: HCNC

## 2020-10-23 PROCEDURE — 93010 ELECTROCARDIOGRAM REPORT: CPT | Mod: HCNC,,, | Performed by: INTERNAL MEDICINE

## 2020-10-23 PROCEDURE — U0002 COVID-19 LAB TEST NON-CDC: HCPCS | Mod: HCNC

## 2020-10-23 PROCEDURE — 63600175 PHARM REV CODE 636 W HCPCS: Mod: HCNC | Performed by: FAMILY MEDICINE

## 2020-10-23 RX ORDER — ACETAMINOPHEN 325 MG/1
650 TABLET ORAL EVERY 6 HOURS PRN
Status: DISCONTINUED | OUTPATIENT
Start: 2020-10-23 | End: 2020-10-26 | Stop reason: HOSPADM

## 2020-10-23 RX ORDER — POTASSIUM CHLORIDE 20 MEQ/1
20 TABLET, EXTENDED RELEASE ORAL DAILY
Status: DISCONTINUED | OUTPATIENT
Start: 2020-10-24 | End: 2020-10-25

## 2020-10-23 RX ORDER — ONDANSETRON 2 MG/ML
4 INJECTION INTRAMUSCULAR; INTRAVENOUS EVERY 8 HOURS PRN
Status: DISCONTINUED | OUTPATIENT
Start: 2020-10-23 | End: 2020-10-26 | Stop reason: HOSPADM

## 2020-10-23 RX ORDER — FUROSEMIDE 10 MG/ML
80 INJECTION INTRAMUSCULAR; INTRAVENOUS EVERY 8 HOURS
Status: DISCONTINUED | OUTPATIENT
Start: 2020-10-24 | End: 2020-10-24 | Stop reason: SDUPTHER

## 2020-10-23 RX ORDER — ALLOPURINOL 100 MG/1
100 TABLET ORAL DAILY
Status: DISCONTINUED | OUTPATIENT
Start: 2020-10-24 | End: 2020-10-26 | Stop reason: HOSPADM

## 2020-10-23 RX ORDER — IPRATROPIUM BROMIDE AND ALBUTEROL SULFATE 2.5; .5 MG/3ML; MG/3ML
3 SOLUTION RESPIRATORY (INHALATION) EVERY 4 HOURS PRN
Status: DISCONTINUED | OUTPATIENT
Start: 2020-10-23 | End: 2020-10-26 | Stop reason: HOSPADM

## 2020-10-23 RX ORDER — DIPHENHYDRAMINE HCL 25 MG
25 CAPSULE ORAL EVERY 6 HOURS PRN
Status: DISCONTINUED | OUTPATIENT
Start: 2020-10-23 | End: 2020-10-26 | Stop reason: HOSPADM

## 2020-10-23 RX ORDER — ENOXAPARIN SODIUM 100 MG/ML
40 INJECTION SUBCUTANEOUS EVERY 24 HOURS
Status: DISCONTINUED | OUTPATIENT
Start: 2020-10-23 | End: 2020-10-23

## 2020-10-23 RX ORDER — QUETIAPINE FUMARATE 100 MG/1
100 TABLET, FILM COATED ORAL NIGHTLY
Status: DISCONTINUED | OUTPATIENT
Start: 2020-10-23 | End: 2020-10-26 | Stop reason: HOSPADM

## 2020-10-23 RX ORDER — MAG HYDROX/ALUMINUM HYD/SIMETH 200-200-20
30 SUSPENSION, ORAL (FINAL DOSE FORM) ORAL EVERY 6 HOURS PRN
Status: DISCONTINUED | OUTPATIENT
Start: 2020-10-23 | End: 2020-10-26 | Stop reason: HOSPADM

## 2020-10-23 RX ORDER — VENLAFAXINE HYDROCHLORIDE 75 MG/1
150 CAPSULE, EXTENDED RELEASE ORAL DAILY
Status: DISCONTINUED | OUTPATIENT
Start: 2020-10-24 | End: 2020-10-26 | Stop reason: HOSPADM

## 2020-10-23 RX ORDER — FUROSEMIDE 10 MG/ML
80 INJECTION INTRAMUSCULAR; INTRAVENOUS
Status: COMPLETED | OUTPATIENT
Start: 2020-10-23 | End: 2020-10-23

## 2020-10-23 RX ORDER — GUAIFENESIN 100 MG/5ML
200 SOLUTION ORAL EVERY 4 HOURS PRN
Status: DISCONTINUED | OUTPATIENT
Start: 2020-10-23 | End: 2020-10-26 | Stop reason: HOSPADM

## 2020-10-23 RX ORDER — ASCORBIC ACID 500 MG
100 TABLET ORAL DAILY
Status: DISCONTINUED | OUTPATIENT
Start: 2020-10-24 | End: 2020-10-24 | Stop reason: DRUGHIGH

## 2020-10-23 RX ORDER — METOPROLOL SUCCINATE 25 MG/1
25 TABLET, EXTENDED RELEASE ORAL 2 TIMES DAILY
Status: DISCONTINUED | OUTPATIENT
Start: 2020-10-23 | End: 2020-10-26 | Stop reason: HOSPADM

## 2020-10-23 RX ADMIN — FUROSEMIDE 80 MG: 10 INJECTION, SOLUTION INTRAMUSCULAR; INTRAVENOUS at 08:10

## 2020-10-23 RX ADMIN — APIXABAN 5 MG: 2.5 TABLET, FILM COATED ORAL at 10:10

## 2020-10-23 RX ADMIN — QUETIAPINE 100 MG: 100 TABLET ORAL at 10:10

## 2020-10-23 RX ADMIN — METOPROLOL SUCCINATE 25 MG: 25 TABLET, EXTENDED RELEASE ORAL at 10:10

## 2020-10-24 LAB
ANION GAP SERPL CALC-SCNC: 9 MMOL/L (ref 8–16)
BASOPHILS # BLD AUTO: 0.03 K/UL (ref 0–0.2)
BASOPHILS NFR BLD: 1.1 % (ref 0–1.9)
BUN SERPL-MCNC: 16 MG/DL (ref 6–20)
CALCIUM SERPL-MCNC: 8.5 MG/DL (ref 8.7–10.5)
CHLORIDE SERPL-SCNC: 102 MMOL/L (ref 95–110)
CO2 SERPL-SCNC: 34 MMOL/L (ref 23–29)
CREAT SERPL-MCNC: 1 MG/DL (ref 0.5–1.4)
DIFFERENTIAL METHOD: ABNORMAL
EOSINOPHIL # BLD AUTO: 0.1 K/UL (ref 0–0.5)
EOSINOPHIL NFR BLD: 4.9 % (ref 0–8)
ERYTHROCYTE [DISTWIDTH] IN BLOOD BY AUTOMATED COUNT: 16.4 % (ref 11.5–14.5)
EST. GFR  (AFRICAN AMERICAN): >60 ML/MIN/1.73 M^2
EST. GFR  (NON AFRICAN AMERICAN): >60 ML/MIN/1.73 M^2
GLUCOSE SERPL-MCNC: 83 MG/DL (ref 70–110)
HCT VFR BLD AUTO: 40.1 % (ref 40–54)
HGB BLD-MCNC: 12 G/DL (ref 14–18)
IMM GRANULOCYTES # BLD AUTO: 0.01 K/UL (ref 0–0.04)
IMM GRANULOCYTES NFR BLD AUTO: 0.4 % (ref 0–0.5)
LYMPHOCYTES # BLD AUTO: 0.8 K/UL (ref 1–4.8)
LYMPHOCYTES NFR BLD: 30.3 % (ref 18–48)
MAGNESIUM SERPL-MCNC: 1.9 MG/DL (ref 1.6–2.6)
MCH RBC QN AUTO: 26.7 PG (ref 27–31)
MCHC RBC AUTO-ENTMCNC: 29.9 G/DL (ref 32–36)
MCV RBC AUTO: 89 FL (ref 82–98)
MONOCYTES # BLD AUTO: 0.4 K/UL (ref 0.3–1)
MONOCYTES NFR BLD: 13.1 % (ref 4–15)
NEUTROPHILS # BLD AUTO: 1.3 K/UL (ref 1.8–7.7)
NEUTROPHILS NFR BLD: 50.2 % (ref 38–73)
NRBC BLD-RTO: 0 /100 WBC
PHOSPHATE SERPL-MCNC: 3.5 MG/DL (ref 2.7–4.5)
PLATELET # BLD AUTO: 107 K/UL (ref 150–350)
PMV BLD AUTO: 12.8 FL (ref 9.2–12.9)
POTASSIUM SERPL-SCNC: 3.3 MMOL/L (ref 3.5–5.1)
RBC # BLD AUTO: 4.5 M/UL (ref 4.6–6.2)
SODIUM SERPL-SCNC: 145 MMOL/L (ref 136–145)
WBC # BLD AUTO: 2.67 K/UL (ref 3.9–12.7)

## 2020-10-24 PROCEDURE — 36415 COLL VENOUS BLD VENIPUNCTURE: CPT | Mod: HCNC

## 2020-10-24 PROCEDURE — 27000190 HC CPAP FULL FACE MASK W/VALVE: Mod: HCNC

## 2020-10-24 PROCEDURE — G0378 HOSPITAL OBSERVATION PER HR: HCPCS | Mod: HCNC

## 2020-10-24 PROCEDURE — 83735 ASSAY OF MAGNESIUM: CPT | Mod: HCNC

## 2020-10-24 PROCEDURE — 94660 CPAP INITIATION&MGMT: CPT | Mod: HCNC

## 2020-10-24 PROCEDURE — 96376 TX/PRO/DX INJ SAME DRUG ADON: CPT

## 2020-10-24 PROCEDURE — 25000003 PHARM REV CODE 250: Mod: HCNC | Performed by: INTERNAL MEDICINE

## 2020-10-24 PROCEDURE — 99220 PR INITIAL OBSERVATION CARE,LEVL III: CPT | Mod: 25,HCNC,, | Performed by: INTERNAL MEDICINE

## 2020-10-24 PROCEDURE — 99220 PR INITIAL OBSERVATION CARE,LEVL III: ICD-10-PCS | Mod: 25,HCNC,, | Performed by: INTERNAL MEDICINE

## 2020-10-24 PROCEDURE — 94761 N-INVAS EAR/PLS OXIMETRY MLT: CPT | Mod: HCNC

## 2020-10-24 PROCEDURE — 84100 ASSAY OF PHOSPHORUS: CPT | Mod: HCNC

## 2020-10-24 PROCEDURE — 99900035 HC TECH TIME PER 15 MIN (STAT): Mod: HCNC

## 2020-10-24 PROCEDURE — 25000003 PHARM REV CODE 250: Mod: HCNC | Performed by: NURSE PRACTITIONER

## 2020-10-24 PROCEDURE — 85025 COMPLETE CBC W/AUTO DIFF WBC: CPT | Mod: HCNC

## 2020-10-24 PROCEDURE — 80048 BASIC METABOLIC PNL TOTAL CA: CPT | Mod: HCNC

## 2020-10-24 PROCEDURE — 63600175 PHARM REV CODE 636 W HCPCS: Mod: HCNC | Performed by: EMERGENCY MEDICINE

## 2020-10-24 PROCEDURE — 63600175 PHARM REV CODE 636 W HCPCS: Mod: HCNC | Performed by: INTERNAL MEDICINE

## 2020-10-24 PROCEDURE — 25000003 PHARM REV CODE 250: Mod: HCNC | Performed by: EMERGENCY MEDICINE

## 2020-10-24 RX ORDER — LOSARTAN POTASSIUM 25 MG/1
25 TABLET ORAL DAILY
Status: DISCONTINUED | OUTPATIENT
Start: 2020-10-24 | End: 2020-10-26 | Stop reason: HOSPADM

## 2020-10-24 RX ORDER — FUROSEMIDE 10 MG/ML
80 INJECTION INTRAMUSCULAR; INTRAVENOUS EVERY 8 HOURS
Status: DISCONTINUED | OUTPATIENT
Start: 2020-10-24 | End: 2020-10-25

## 2020-10-24 RX ORDER — METOLAZONE 5 MG/1
5 TABLET ORAL DAILY
Status: DISCONTINUED | OUTPATIENT
Start: 2020-10-24 | End: 2020-10-26

## 2020-10-24 RX ORDER — SODIUM CHLORIDE 0.9 % (FLUSH) 0.9 %
10 SYRINGE (ML) INJECTION
Status: DISCONTINUED | OUTPATIENT
Start: 2020-10-24 | End: 2020-10-26 | Stop reason: HOSPADM

## 2020-10-24 RX ORDER — ASCORBIC ACID 500 MG
500 TABLET ORAL DAILY
Status: DISCONTINUED | OUTPATIENT
Start: 2020-10-24 | End: 2020-10-26 | Stop reason: HOSPADM

## 2020-10-24 RX ADMIN — POTASSIUM CHLORIDE 20 MEQ: 1500 TABLET, EXTENDED RELEASE ORAL at 09:10

## 2020-10-24 RX ADMIN — METOLAZONE 5 MG: 5 TABLET ORAL at 04:10

## 2020-10-24 RX ADMIN — FUROSEMIDE 80 MG: 10 INJECTION, SOLUTION INTRAMUSCULAR; INTRAVENOUS at 09:10

## 2020-10-24 RX ADMIN — OXYCODONE HYDROCHLORIDE AND ACETAMINOPHEN 500 MG: 500 TABLET ORAL at 09:10

## 2020-10-24 RX ADMIN — FUROSEMIDE 80 MG: 10 INJECTION, SOLUTION INTRAMUSCULAR; INTRAVENOUS at 06:10

## 2020-10-24 RX ADMIN — METOPROLOL SUCCINATE 25 MG: 25 TABLET, EXTENDED RELEASE ORAL at 09:10

## 2020-10-24 RX ADMIN — FUROSEMIDE 80 MG: 10 INJECTION, SOLUTION INTRAMUSCULAR; INTRAVENOUS at 04:10

## 2020-10-24 RX ADMIN — QUETIAPINE 100 MG: 100 TABLET ORAL at 08:10

## 2020-10-24 RX ADMIN — APIXABAN 5 MG: 2.5 TABLET, FILM COATED ORAL at 08:10

## 2020-10-24 RX ADMIN — APIXABAN 5 MG: 2.5 TABLET, FILM COATED ORAL at 09:10

## 2020-10-24 RX ADMIN — FUROSEMIDE 80 MG: 10 INJECTION, SOLUTION INTRAMUSCULAR; INTRAVENOUS at 02:10

## 2020-10-24 RX ADMIN — ALLOPURINOL 100 MG: 100 TABLET ORAL at 09:10

## 2020-10-24 RX ADMIN — METOPROLOL SUCCINATE 25 MG: 25 TABLET, EXTENDED RELEASE ORAL at 08:10

## 2020-10-24 RX ADMIN — LOSARTAN POTASSIUM 25 MG: 25 TABLET, FILM COATED ORAL at 09:10

## 2020-10-24 RX ADMIN — VENLAFAXINE HYDROCHLORIDE 150 MG: 75 CAPSULE, EXTENDED RELEASE ORAL at 09:10

## 2020-10-24 NOTE — SUBJECTIVE & OBJECTIVE
Past Medical History:   Diagnosis Date    A-fib     CHF (congestive heart failure)     Gout, chronic     Hypertension        Past Surgical History:   Procedure Laterality Date    CHOLECYSTECTOMY      GASTRIC BYPASS  2014       Review of patient's allergies indicates:  No Known Allergies    No current facility-administered medications on file prior to encounter.      Current Outpatient Medications on File Prior to Encounter   Medication Sig    allopurinoL (ZYLOPRIM) 100 MG tablet Take 1 tablet (100 mg total) by mouth once daily. Total of 400mg daily    allopurinoL (ZYLOPRIM) 300 MG tablet Take 1 tablet (300 mg total) by mouth once daily. Total of 400 mg daily    apixaban (ELIQUIS) 5 mg Tab Take 1 tablet (5 mg total) by mouth 2 (two) times daily.    ascorbic acid, vitamin C, (VITAMIN C) 100 MG tablet Take 100 mg by mouth once daily.    calcium phosphate-vitamin D3 250-400 mg-unit Chew     clotrimazole-betamethasone (LOTRISONE) lotion Apply topically 2 (two) times daily.    COLCRYS 0.6 mg tablet Take 1 tablet by mouth twice daily for 14 days    furosemide (LASIX) 80 MG tablet Take 1 tablet (80 mg total) by mouth 2 (two) times a day.    gabapentin (NEURONTIN) 300 MG capsule Take 1 capsule (300 mg total) by mouth every evening.    losartan-hydrochlorothiazide 100-25 mg (HYZAAR) 100-25 mg per tablet TAKE 1 TABLET EVERY DAY    metoprolol succinate (TOPROL-XL) 25 MG 24 hr tablet Take 1 tablet (25 mg total) by mouth 2 (two) times daily.    potassium chloride SA (K-DUR,KLOR-CON) 20 MEQ tablet Take 20 mEq by mouth.    QUEtiapine (SEROQUEL) 100 MG Tab Take 1 tablet (100 mg total) by mouth every evening.    venlafaxine (EFFEXOR-XR) 150 MG Cp24 Take 1 capsule (150 mg total) by mouth once daily.     Family History     Problem Relation (Age of Onset)    Diabetes Father, Sister, Brother    Hypertension Mother, Father    Stroke Father        Tobacco Use    Smoking status: Never Smoker    Smokeless tobacco: Never  Used   Substance and Sexual Activity    Alcohol use: Yes     Frequency: Monthly or less     Binge frequency: Never     Comment: sometimes    Drug use: No    Sexual activity: Not Currently     Review of Systems   Constitution: Positive for weight gain. Negative for chills, diaphoresis, night sweats and weight loss.   HENT: Negative for congestion, hoarse voice, sore throat and stridor.    Eyes: Negative for double vision and pain.   Cardiovascular: Positive for leg swelling, orthopnea and paroxysmal nocturnal dyspnea. Negative for chest pain, claudication, cyanosis, dyspnea on exertion, irregular heartbeat, near-syncope, palpitations and syncope.   Respiratory: Positive for shortness of breath and snoring. Negative for cough, hemoptysis, sleep disturbances due to breathing, sputum production and wheezing.    Endocrine: Negative for cold intolerance, heat intolerance and polydipsia.   Hematologic/Lymphatic: Negative for bleeding problem. Does not bruise/bleed easily.   Skin: Negative for color change, dry skin and rash.   Musculoskeletal: Negative for joint swelling and muscle cramps.   Gastrointestinal: Negative for bloating, abdominal pain, constipation, diarrhea, dysphagia, melena, nausea and vomiting.   Genitourinary: Negative for flank pain and urgency.   Neurological: Negative for dizziness, focal weakness, headaches, light-headedness, loss of balance, seizures and weakness.   Psychiatric/Behavioral: Negative for altered mental status and memory loss. The patient is not nervous/anxious.      Objective:     Vital Signs (Most Recent):  Temp: 97.5 °F (36.4 °C) (10/24/20 0727)  Pulse: 75 (10/24/20 0727)  Resp: 18 (10/24/20 0727)  BP: 129/68 (10/24/20 0727)  SpO2: 97 % (10/24/20 0727) Vital Signs (24h Range):  Temp:  [97.5 °F (36.4 °C)-98.2 °F (36.8 °C)] 97.5 °F (36.4 °C)  Pulse:  [] 75  Resp:  [18-22] 18  SpO2:  [90 %-97 %] 97 %  BP: ()/(59-89) 129/68     Weight: (!) 217.7 kg (479 lb 15.1 oz)  Body  mass index is 59.99 kg/m².    SpO2: 97 %  O2 Device (Oxygen Therapy): BiPAP      Intake/Output Summary (Last 24 hours) at 10/24/2020 1022  Last data filed at 10/24/2020 0547  Gross per 24 hour   Intake --   Output 3500 ml   Net -3500 ml       Lines/Drains/Airways     Drain                 Urethral Catheter 10/23/20 2240 Coude;Non-latex 12 Fr. less than 1 day          Peripheral Intravenous Line                 Peripheral IV - Single Lumen 10/23/20 1912 20 G Left Forearm less than 1 day                Physical Exam   Constitutional: He is oriented to person, place, and time.   Eyes: Pupils are equal, round, and reactive to light.   Neck: Normal range of motion. No tracheal deviation present.   Cardiovascular: Normal rate, regular rhythm and intact distal pulses. Exam reveals gallop and S3. Exam reveals no friction rub.   No murmur heard.  Pulses:       Carotid pulses are 2+ on the right side and 2+ on the left side.       Radial pulses are 2+ on the right side and 2+ on the left side.        Femoral pulses are 2+ on the right side and 2+ on the left side.       Popliteal pulses are 1+ on the right side and 1+ on the left side.        Dorsalis pedis pulses are 1+ on the right side and 1+ on the left side.        Posterior tibial pulses are 1+ on the right side and 1+ on the left side.   Pulmonary/Chest: Effort normal and breath sounds normal. No stridor. No respiratory distress. He has no wheezes. He has no rales. He exhibits no tenderness.   Abdominal: He exhibits no distension. There is no abdominal tenderness. There is no rebound.   Musculoskeletal:         General: Edema present. No tenderness.   Neurological: He is alert and oriented to person, place, and time.   Skin: Skin is warm and dry.       Significant Labs:   BMP:   Recent Labs   Lab 10/23/20  1912 10/24/20  0559   * 83    145   K 3.5 3.3*    102   CO2 32* 34*   BUN 20 16   CREATININE 1.3 1.0   CALCIUM 9.0 8.5*   MG  --  1.9    and  Troponin   Recent Labs   Lab 10/23/20  1912   TROPONINI 0.009       Significant Imaging: X-Ray: CXR: X-Ray Chest 1 View (CXR): No results found for this visit on 10/23/20.

## 2020-10-24 NOTE — ED PROVIDER NOTES
SCRIBE #1 NOTE: I, Brandt Luke, am scribing for, and in the presence of, Ananya Sampson MD. I have scribed the entire note.       History     Chief Complaint   Patient presents with    Leg Swelling     hx CHF, pt reports worsening swelling to BLE and abdomen.      Review of patient's allergies indicates:  No Known Allergies      History of Present Illness     HPI    10/23/2020, 7:04 PM   History obtained from the patient      History of Present Illness: Caio Ontiveros is a 55 y.o. male patient with a PMHx of HTN, CHF, and Afib who presents to the Emergency Department for evaluation of BLE swelling which onset gradually x 3 months ago. On 9/17 pt's bumex prescription was increased by Dr. Jeffrey and on 10/9 the prescription was changed from bumex 1 mg TID to lasix 80 mg BID. Pt notes he has gained approximately 40-45 pounds in the past 3 months. Pt notes BLE swelling extends into abdominal area. Symptoms are constant and moderate in severity. No mitigating factors reported. Pt reports SOB is exacerbated with walking.  Associated sxs include SOB. Patient denies any fever, CP, palpitations, abd pain, N/V, back pain, HA, weakness, and all other sxs at this time. Prior Tx includes lasix approximately 11 hours ago. Pt is on 2L home O2 as needed. No further complaints or concerns at this time.        Arrival mode: Personal vehicle     PCP: Scottie Alcantar MD        Past Medical History:  Past Medical History:   Diagnosis Date    A-fib     CHF (congestive heart failure)     Gout, chronic     Hypertension        Past Surgical History:  Past Surgical History:   Procedure Laterality Date    CHOLECYSTECTOMY      GASTRIC BYPASS  2014         Family History:  Family History   Problem Relation Age of Onset    Hypertension Mother     Stroke Father     Diabetes Father     Hypertension Father     Diabetes Sister     Diabetes Brother        Social History:  Social History     Tobacco Use    Smoking status: Never Smoker     Smokeless tobacco: Never Used   Substance and Sexual Activity    Alcohol use: Yes     Frequency: Monthly or less     Binge frequency: Never     Comment: sometimes    Drug use: No    Sexual activity: Not Currently        Review of Systems     Review of Systems   Constitutional: Negative for fever.   HENT: Negative for sore throat.    Respiratory: Positive for shortness of breath.    Cardiovascular: Positive for leg swelling (BLE that extends to abdomen  ). Negative for chest pain and palpitations.   Gastrointestinal: Negative for abdominal pain, nausea and vomiting.   Genitourinary: Negative for dysuria.   Musculoskeletal: Negative for back pain.   Skin: Negative for rash.   Neurological: Negative for weakness and headaches.   Hematological: Does not bruise/bleed easily.   All other systems reviewed and are negative.       Physical Exam     Initial Vitals [10/23/20 1845]   BP Pulse Resp Temp SpO2   135/89 107 20 98.2 °F (36.8 °C) (!) 90 %      MAP       --          Physical Exam  Nursing Notes and Vital Signs Reviewed.  Constitutional: Patient is in no acute distress. Well-developed and well-nourished.  Head: Atraumatic. Normocephalic.  Eyes: PERRL. EOM intact. Conjunctivae are not pale. No scleral icterus.  ENT: Mucous membranes are moist. Oropharynx is clear and symmetric.    Neck: Supple. Full ROM. No lymphadenopathy. Positive JVD to neck.   Cardiovascular: Irregularly irregular rhythm. Regular rhythm. No murmurs, rubs, or gallops. Distal pulses are 2+ and symmetric.  Pulmonary/Chest: Conversational dyspnea. Diminished breath sounds secondary to body habitus. No wheezing or rales.  Abdominal: Soft and non-distended.  There is no tenderness.  No rebound, guarding, or rigidity. Good bowel sounds.  Genitourinary: No CVA tenderness  Musculoskeletal: Moves all extremities. No obvious deformities. +3-4 pitting edema to BLE that extends to abdominal area. No calf tenderness. Scrotum is edematous.   Skin: Warm and  "dry.  Neurological:  Alert, awake, and appropriate.  Normal speech.  No acute focal neurological deficits are appreciated.  Psychiatric: Normal affect. Good eye contact. Appropriate in content.     ED Course   Critical Care    Date/Time: 10/23/2020 8:04 PM  Performed by: Ananya Sampson MD  Authorized by: Ananya Sampson MD   Direct patient critical care time: 15 minutes  Additional history critical care time: 10 minutes  Ordering / reviewing critical care time: 10 minutes  Documentation critical care time: 5 minutes  Consulting other physicians critical care time: 5 minutes  Total critical care time (exclusive of procedural time) : 45 minutes  Critical care time was exclusive of separately billable procedures and treating other patients and teaching time.  Critical care was necessary to treat or prevent imminent or life-threatening deterioration of the following conditions: CHF exacerbation.  Critical care was time spent personally by me on the following activities: blood draw for specimens, development of treatment plan with patient or surrogate, discussions with consultants, interpretation of cardiac output measurements, evaluation of patient's response to treatment, examination of patient, obtaining history from patient or surrogate, ordering and performing treatments and interventions, ordering and review of laboratory studies, ordering and review of radiographic studies, pulse oximetry, re-evaluation of patient's condition and review of old charts.        ED Vital Signs:  Vitals:    10/23/20 1845 10/23/20 1909   BP: 135/89    Pulse: 107 94   Resp: 20    Temp: 98.2 °F (36.8 °C)    TempSrc: Oral    SpO2: (!) 90%    Weight: (!) 217.7 kg (479 lb 15.1 oz)    Height: 6' 3" (1.905 m)        Abnormal Lab Results:  Labs Reviewed   COMPREHENSIVE METABOLIC PANEL - Abnormal; Notable for the following components:       Result Value    CO2 32 (*)     Glucose 170 (*)     Albumin 3.3 (*)     Anion Gap 7 (*)     All other " components within normal limits   CBC W/ AUTO DIFFERENTIAL - Abnormal; Notable for the following components:    WBC 3.40 (*)     Hemoglobin 12.7 (*)     Mean Corpuscular Hemoglobin 26.7 (*)     Mean Corpuscular Hemoglobin Conc 29.8 (*)     RDW 16.3 (*)     Platelets 111 (*)     Lymph # 0.7 (*)     All other components within normal limits   B-TYPE NATRIURETIC PEPTIDE - Abnormal; Notable for the following components:     (*)     All other components within normal limits   TROPONIN I   URINALYSIS, REFLEX TO URINE CULTURE    Narrative:     Specimen Source->Urine   SARS-COV-2 RNA AMPLIFICATION, QUAL        All Lab Results:  Results for orders placed or performed during the hospital encounter of 10/23/20   Comprehensive metabolic panel   Result Value Ref Range    Sodium 140 136 - 145 mmol/L    Potassium 3.5 3.5 - 5.1 mmol/L    Chloride 101 95 - 110 mmol/L    CO2 32 (H) 23 - 29 mmol/L    Glucose 170 (H) 70 - 110 mg/dL    BUN, Bld 20 6 - 20 mg/dL    Creatinine 1.3 0.5 - 1.4 mg/dL    Calcium 9.0 8.7 - 10.5 mg/dL    Total Protein 7.2 6.0 - 8.4 g/dL    Albumin 3.3 (L) 3.5 - 5.2 g/dL    Total Bilirubin 0.6 0.1 - 1.0 mg/dL    Alkaline Phosphatase 58 55 - 135 U/L    AST 23 10 - 40 U/L    ALT 17 10 - 44 U/L    Anion Gap 7 (L) 8 - 16 mmol/L    eGFR if African American >60 >60 mL/min/1.73 m^2    eGFR if non African American >60 >60 mL/min/1.73 m^2   CBC auto differential   Result Value Ref Range    WBC 3.40 (L) 3.90 - 12.70 K/uL    RBC 4.75 4.60 - 6.20 M/uL    Hemoglobin 12.7 (L) 14.0 - 18.0 g/dL    Hematocrit 42.6 40.0 - 54.0 %    Mean Corpuscular Volume 90 82 - 98 fL    Mean Corpuscular Hemoglobin 26.7 (L) 27.0 - 31.0 pg    Mean Corpuscular Hemoglobin Conc 29.8 (L) 32.0 - 36.0 g/dL    RDW 16.3 (H) 11.5 - 14.5 %    Platelets 111 (L) 150 - 350 K/uL    MPV 11.2 9.2 - 12.9 fL    Immature Granulocytes 0.3 0.0 - 0.5 %    Gran # (ANC) 2.2 1.8 - 7.7 K/uL    Immature Grans (Abs) 0.01 0.00 - 0.04 K/uL    Lymph # 0.7 (L) 1.0 - 4.8  K/uL    Mono # 0.3 0.3 - 1.0 K/uL    Eos # 0.1 0.0 - 0.5 K/uL    Baso # 0.03 0.00 - 0.20 K/uL    nRBC 0 0 /100 WBC    Gran% 64.7 38.0 - 73.0 %    Lymph% 21.2 18.0 - 48.0 %    Mono% 9.4 4.0 - 15.0 %    Eosinophil% 3.5 0.0 - 8.0 %    Basophil% 0.9 0.0 - 1.9 %    Differential Method Automated    Brain natriuretic peptide   Result Value Ref Range     (H) 0 - 99 pg/mL   Troponin I   Result Value Ref Range    Troponin I 0.009 0.000 - 0.026 ng/mL   Urinalysis, Reflex to Urine Culture Urine, Clean Catch    Specimen: Urine   Result Value Ref Range    Specimen UA Urine, Clean Catch          Imaging Results:  Imaging Results          X-Ray Chest AP Portable (Final result)  Result time 10/23/20 19:48:39    Final result by Josh Barahona MD (10/23/20 19:48:39)                 Impression:      Findings concerning for CHF with pulmonary edema.      Electronically signed by: Josh Barahona  Date:    10/23/2020  Time:    19:48             Narrative:    EXAMINATION:  XR CHEST AP PORTABLE    CLINICAL HISTORY:  edema/chf;    TECHNIQUE:  Single frontal view of the chest was performed.    COMPARISON:  09/15/2020.    FINDINGS:  Slight increased interstitial attenuation in the lung bases.  This may reflect pulmonary edema.    The cardiac silhouette is enlarged.  The hilar and mediastinal contours are unremarkable.    Bones are intact.                                 The EKG was ordered, reviewed, and independently interpreted by the ED provider.  Interpretation time: 1915  Rate: 102 BPM  Rhythm: Atrial fibrillation with rapid ventricular response with premature ventricular or aberrantly conducted complexes  Interpretation: Nonspecific ST and T wave abnormality. No STEMI.         The Emergency Provider reviewed the vital signs and test results, which are outlined above.          ED Discussion     8:15 PM: Discussed case with Indra Quijano MD (Hospital Medicine). Dr. Quijano agrees with current care and management of pt and accepts  "admission.   Admitting Service: Hospital medicine  Admitting Physician: Dr. Quijano  Admit to: Obs tele    8:18 PM: Re-evaluated pt. I have discussed test results, shared treatment plan, and the need for admission with patient and family at bedside. Pt and family express understanding at this time and agree with all information. All questions answered. Pt and family have no further questions or concerns at this time. Pt is ready for admit.        ED Medication(s):  Medications   furosemide injection 80 mg (80 mg Intravenous Given 10/23/20 2000)       New Prescriptions    No medications on file                 Medical Decision Making:   Clinical Tests:   Lab Tests: Ordered and Reviewed  Radiological Study: Ordered and Reviewed  Medical Tests: Ordered and Reviewed             Scribe Attestation:   Scribe #1: I performed the above scribed service and the documentation accurately describes the services I performed. I attest to the accuracy of the note.     Attending:   Physician Attestation Statement for Scribe #1: I, Ananya Sampson MD, personally performed the services described in this documentation, as scribed by Brandt Luke, in my presence, and it is both accurate and complete.           Clinical Impression       ICD-10-CM ICD-9-CM   1. Acute on chronic congestive heart failure, unspecified heart failure type  I50.9 428.0   2. Edema  R60.9 782.3       Disposition:   Disposition: Placed in Observation  Condition: Fair  Portions of this note may have been created with voice recognition software. Occasional "wrong-word" or "sound-a-like" substitutions may have occurred due to the inherent limitations of voice recognition software. Please, read the note carefully and recognize, using context, where substitutions have occurred.          Ananya Sampson MD  10/24/20 0003    "

## 2020-10-24 NOTE — ASSESSMENT & PLAN NOTE
Echocardiogram done two years ago reveals preserved ejection fraction.  Repeat echocardiogram in a.m..  Patient's oral diuretics have been changed recently from Bumex to Lasix.  Received Lasix 80 mg IV x1 in the ED.  Continue Lasix 80 mg IV every 8 hr x3 doses.  Cardiology consult.  Monitor urine output and renal function.

## 2020-10-24 NOTE — SUBJECTIVE & OBJECTIVE
Past Medical History:   Diagnosis Date    A-fib     CHF (congestive heart failure)     Gout, chronic     Hypertension        Past Surgical History:   Procedure Laterality Date    CHOLECYSTECTOMY      GASTRIC BYPASS  2014       Review of patient's allergies indicates:  No Known Allergies    No current facility-administered medications on file prior to encounter.      Current Outpatient Medications on File Prior to Encounter   Medication Sig    allopurinoL (ZYLOPRIM) 100 MG tablet Take 1 tablet (100 mg total) by mouth once daily. Total of 400mg daily    allopurinoL (ZYLOPRIM) 300 MG tablet Take 1 tablet (300 mg total) by mouth once daily. Total of 400 mg daily    apixaban (ELIQUIS) 5 mg Tab Take 1 tablet (5 mg total) by mouth 2 (two) times daily.    ascorbic acid, vitamin C, (VITAMIN C) 100 MG tablet Take 100 mg by mouth once daily.    calcium phosphate-vitamin D3 250-400 mg-unit Chew     clotrimazole-betamethasone (LOTRISONE) lotion Apply topically 2 (two) times daily.    COLCRYS 0.6 mg tablet Take 1 tablet by mouth twice daily for 14 days    furosemide (LASIX) 80 MG tablet Take 1 tablet (80 mg total) by mouth 2 (two) times a day.    gabapentin (NEURONTIN) 300 MG capsule Take 1 capsule (300 mg total) by mouth every evening.    losartan-hydrochlorothiazide 100-25 mg (HYZAAR) 100-25 mg per tablet TAKE 1 TABLET EVERY DAY    metoprolol succinate (TOPROL-XL) 25 MG 24 hr tablet Take 1 tablet (25 mg total) by mouth 2 (two) times daily.    metoprolol succinate (TOPROL-XL) 25 MG 24 hr tablet TAKE 1 TABLET TWICE DAILY    potassium chloride SA (K-DUR,KLOR-CON) 20 MEQ tablet Take 20 mEq by mouth.    QUEtiapine (SEROQUEL) 100 MG Tab Take 1 tablet (100 mg total) by mouth every evening.    venlafaxine (EFFEXOR-XR) 150 MG Cp24 Take 1 capsule (150 mg total) by mouth once daily.     Family History     Problem Relation (Age of Onset)    Diabetes Father, Sister, Brother    Hypertension Mother, Father    Stroke  Father        Tobacco Use    Smoking status: Never Smoker    Smokeless tobacco: Never Used   Substance and Sexual Activity    Alcohol use: Yes     Frequency: Monthly or less     Binge frequency: Never     Comment: sometimes    Drug use: No    Sexual activity: Not Currently     Review of Systems   Constitutional: Negative.  Negative for appetite change, fatigue and fever.   HENT: Negative.  Negative for congestion, nosebleeds and sore throat.    Eyes: Negative.  Negative for visual disturbance.   Respiratory: Positive for shortness of breath. Negative for cough and wheezing.    Cardiovascular: Positive for leg swelling. Negative for chest pain and palpitations.   Gastrointestinal: Negative.  Negative for abdominal pain, constipation, diarrhea, nausea and vomiting.   Endocrine: Negative.  Negative for polyuria.   Genitourinary: Negative.  Negative for dysuria, flank pain, frequency and urgency.   Musculoskeletal: Negative.  Negative for arthralgias, back pain and joint swelling.   Skin: Positive for rash. Negative for color change.   Allergic/Immunologic: Negative.  Negative for immunocompromised state.   Neurological: Negative.  Negative for dizziness, syncope, weakness and headaches.   Hematological: Negative.    Psychiatric/Behavioral: Negative.  Negative for confusion and hallucinations. The patient is not nervous/anxious.    All other systems reviewed and are negative.    Objective:     Vital Signs (Most Recent):  Temp: 98.2 °F (36.8 °C) (10/23/20 1845)  Pulse: 94 (10/23/20 1909)  Resp: 20 (10/23/20 1845)  BP: 135/89 (10/23/20 1845)  SpO2: (!) 90 % (10/23/20 1845) Vital Signs (24h Range):  Temp:  [98.2 °F (36.8 °C)] 98.2 °F (36.8 °C)  Pulse:  [] 94  Resp:  [20] 20  SpO2:  [90 %] 90 %  BP: (135)/(89) 135/89     Weight: (!) 217.7 kg (479 lb 15.1 oz)  Body mass index is 59.99 kg/m².    Physical Exam  Vitals signs and nursing note reviewed.   Constitutional:       General: He is not in acute distress.      Appearance: He is well-developed. He is obese. He is not diaphoretic.   HENT:      Head: Normocephalic and atraumatic.      Mouth/Throat:      Mouth: Mucous membranes are moist.   Eyes:      General: No scleral icterus.     Conjunctiva/sclera: Conjunctivae normal.   Neck:      Musculoskeletal: Normal range of motion and neck supple.      Thyroid: No thyromegaly.   Cardiovascular:      Rate and Rhythm: Normal rate. Rhythm irregular.      Heart sounds: Normal heart sounds. No murmur.   Pulmonary:      Effort: Pulmonary effort is normal.      Breath sounds: Rales present.   Chest:      Chest wall: No tenderness.   Abdominal:      Palpations: Abdomen is soft.      Tenderness: There is no abdominal tenderness.      Comments: Obese   Musculoskeletal: Normal range of motion.         General: Swelling (2-3+ bilateral pretibial pitting edema) present. No tenderness.   Lymphadenopathy:      Cervical: No cervical adenopathy.   Skin:     General: Skin is warm and dry.      Findings: Rash (Psoriatic appearing rash noted in the lower abdominal region, bilateral lower extremities, and in the upper right shoulder region.) present.   Neurological:      General: No focal deficit present.      Mental Status: He is alert and oriented to person, place, and time.      Cranial Nerves: No cranial nerve deficit.      Motor: No abnormal muscle tone.      Coordination: Coordination normal.   Psychiatric:         Mood and Affect: Mood normal.         Behavior: Behavior normal.             Significant Labs:   Results for orders placed or performed during the hospital encounter of 10/23/20   Comprehensive metabolic panel   Result Value Ref Range    Sodium 140 136 - 145 mmol/L    Potassium 3.5 3.5 - 5.1 mmol/L    Chloride 101 95 - 110 mmol/L    CO2 32 (H) 23 - 29 mmol/L    Glucose 170 (H) 70 - 110 mg/dL    BUN, Bld 20 6 - 20 mg/dL    Creatinine 1.3 0.5 - 1.4 mg/dL    Calcium 9.0 8.7 - 10.5 mg/dL    Total Protein 7.2 6.0 - 8.4 g/dL    Albumin 3.3  (L) 3.5 - 5.2 g/dL    Total Bilirubin 0.6 0.1 - 1.0 mg/dL    Alkaline Phosphatase 58 55 - 135 U/L    AST 23 10 - 40 U/L    ALT 17 10 - 44 U/L    Anion Gap 7 (L) 8 - 16 mmol/L    eGFR if African American >60 >60 mL/min/1.73 m^2    eGFR if non African American >60 >60 mL/min/1.73 m^2   CBC auto differential   Result Value Ref Range    WBC 3.40 (L) 3.90 - 12.70 K/uL    RBC 4.75 4.60 - 6.20 M/uL    Hemoglobin 12.7 (L) 14.0 - 18.0 g/dL    Hematocrit 42.6 40.0 - 54.0 %    Mean Corpuscular Volume 90 82 - 98 fL    Mean Corpuscular Hemoglobin 26.7 (L) 27.0 - 31.0 pg    Mean Corpuscular Hemoglobin Conc 29.8 (L) 32.0 - 36.0 g/dL    RDW 16.3 (H) 11.5 - 14.5 %    Platelets 111 (L) 150 - 350 K/uL    MPV 11.2 9.2 - 12.9 fL    Immature Granulocytes 0.3 0.0 - 0.5 %    Gran # (ANC) 2.2 1.8 - 7.7 K/uL    Immature Grans (Abs) 0.01 0.00 - 0.04 K/uL    Lymph # 0.7 (L) 1.0 - 4.8 K/uL    Mono # 0.3 0.3 - 1.0 K/uL    Eos # 0.1 0.0 - 0.5 K/uL    Baso # 0.03 0.00 - 0.20 K/uL    nRBC 0 0 /100 WBC    Gran% 64.7 38.0 - 73.0 %    Lymph% 21.2 18.0 - 48.0 %    Mono% 9.4 4.0 - 15.0 %    Eosinophil% 3.5 0.0 - 8.0 %    Basophil% 0.9 0.0 - 1.9 %    Differential Method Automated    Brain natriuretic peptide   Result Value Ref Range     (H) 0 - 99 pg/mL   Troponin I   Result Value Ref Range    Troponin I 0.009 0.000 - 0.026 ng/mL   Urinalysis, Reflex to Urine Culture Urine, Clean Catch    Specimen: Urine   Result Value Ref Range    Specimen UA Urine, Clean Catch          Significant Imaging: I have reviewed and interpreted all pertinent imaging results/findings within the past 24 hours.     Imaging Results          X-Ray Chest AP Portable (Final result)  Result time 10/23/20 19:48:39    Final result by Josh Barahona MD (10/23/20 19:48:39)                 Impression:      Findings concerning for CHF with pulmonary edema.      Electronically signed by: Josh Barahona  Date:    10/23/2020  Time:    19:48             Narrative:    EXAMINATION:  XR  CHEST AP PORTABLE    CLINICAL HISTORY:  edema/chf;    TECHNIQUE:  Single frontal view of the chest was performed.    COMPARISON:  09/15/2020.    FINDINGS:  Slight increased interstitial attenuation in the lung bases.  This may reflect pulmonary edema.    The cardiac silhouette is enlarged.  The hilar and mediastinal contours are unremarkable.    Bones are intact.                                I have independently reviewed and interpreted the EKG.     I have independently reviewed all pertinent labs within the past 24 hours.    I have independently reviewed, visualized and interpreted all pertinent imaging results within the past 24 hours and discussed the findings with the ED physician, Dr. Sampson

## 2020-10-24 NOTE — PROGRESS NOTES
[facility] Hospital Medicine  Progress Note    Patient Name: Caio Ontiveros  MRN: [id number]  Patient Class: OP- Observation   Admission Date: 10/23/2020  Length of Stay: 0 days  Attending Physician: Pebbles Tenroio MD  Primary Care Provider: Scottie Alcantar MD        Subjective:     Principal Problem:Acute on chronic congestive heart failure        HPI:  Mr. Ontiveros is a morbidly obese 55-year-old male with PMH significant for CHF with preserved ejection fraction, chronic atrial fibrillation, anticoagulated on apixaban, morbid obesity, BMI 59.9, presented to the ED complaining of worsening shortness of breath, and worsening bilateral lower extremity edema, in spite of taking his diuretics at home.  Reports compliance with diet.  Patient recently saw Dr. Jeffrey, who switched his Bumex to Lasix (according to the patient).In the ED he was found to have significant anasarca, with 3+ bilateral pretibial pitting edema, extending into the upper thighs including scrotal swelling.  Chest x-ray revealed vascular congestion, consistent with acute pulmonary edema.  Received Lasix 80 mg IV x1.    Admitting diagnosis acute on chronic CHF exacerbation, with preserved EF.    Overview/Hospital Course:  55 year old black male, disabled due to mental illness, CHFpEF with echo in 2017 showing LVEF55%. Morbid obesity with EVANGELIST on CPAP, Afib x7 years, s/p several cardioversions in the past, hx HTN, gout and s/p gastric bypass in 2014, admitted w progressive peripheral edema without CP. Recently started on Bumex by Dr. Jeffrey. The patient has significant salt and dietary indiscretion. He complains of weight gain and edema, 2 pillow orthopnea.Patient treated with IV lasix overnight with good effect.  10/24- resting comfortably in bed, no orthopnea. Has put out 3500 cc urine so far. Labs stable, BP under control. Pt counseled about salt and fluid restrictions.        Interval History: resting comfortably in bed, no orthopnea. Has put  out 3500 cc urine so far. Labs stable, BP under control. Pt counseled about salt and fluid restrictions.           Review of Systems   Constitutional: Negative.  Negative for appetite change, fatigue and fever.   HENT: Negative.  Negative for congestion, nosebleeds and sore throat.    Eyes: Negative.  Negative for visual disturbance.   Respiratory: Positive for shortness of breath. Negative for cough and wheezing.    Cardiovascular: Positive for leg swelling. Negative for chest pain and palpitations.   Gastrointestinal: Negative.  Negative for abdominal pain, constipation, diarrhea, nausea and vomiting.   Endocrine: Negative.  Negative for polyuria.   Genitourinary: Negative.  Negative for dysuria, flank pain, frequency and urgency.   Musculoskeletal: Negative.  Negative for arthralgias, back pain and joint swelling.   Skin: Positive for rash. Negative for color change.   Allergic/Immunologic: Negative.  Negative for immunocompromised state.   Neurological: Negative.  Negative for dizziness, syncope, weakness and headaches.   Hematological: Negative.    Psychiatric/Behavioral: Negative.  Negative for confusion and hallucinations. The patient is not nervous/anxious.    All other systems reviewed and are negative.    Objective:     Vital Signs (Most Recent):  Temp: 98.1 °F (36.7 °C) (10/24/20 1132)  Pulse: 83 (10/24/20 1500)  Resp: 19 (10/24/20 1132)  BP: 120/75 (10/24/20 1132)  SpO2: 96 % (10/24/20 1132) Vital Signs (24h Range):  Temp:  [97.5 °F (36.4 °C)-98.2 °F (36.8 °C)] 98.1 °F (36.7 °C)  Pulse:  [] 83  Resp:  [18-22] 19  SpO2:  [90 %-97 %] 96 %  BP: ()/(59-89) 120/75     Weight: (!) 217.7 kg (479 lb 15.1 oz)  Body mass index is 59.99 kg/m².    Intake/Output Summary (Last 24 hours) at 10/24/2020 1508  Last data filed at 10/24/2020 0515  Gross per 24 hour   Intake --   Output 3500 ml   Net -3500 ml      Physical Exam  Vitals signs and nursing note reviewed.   Constitutional:       General: He is not in  acute distress.     Appearance: He is well-developed. He is obese. He is not diaphoretic.   HENT:      Head: Normocephalic and atraumatic.      Mouth/Throat:      Mouth: Mucous membranes are moist.   Eyes:      General: No scleral icterus.     Conjunctiva/sclera: Conjunctivae normal.   Neck:      Musculoskeletal: Normal range of motion and neck supple.      Thyroid: No thyromegaly.   Cardiovascular:      Rate and Rhythm: Normal rate. Rhythm irregular.      Heart sounds: Normal heart sounds. No murmur.   Pulmonary:      Effort: Pulmonary effort is normal.      Breath sounds: Rales present.   Chest:      Chest wall: No tenderness.   Abdominal:      Palpations: Abdomen is soft.      Tenderness: There is no abdominal tenderness.      Comments: Obese   Musculoskeletal: Normal range of motion.         General: Swelling (2-3+ bilateral pretibial pitting edema) present. No tenderness.   Lymphadenopathy:      Cervical: No cervical adenopathy.   Skin:     General: Skin is warm and dry.      Findings: Rash (Psoriatic appearing rash noted in the lower abdominal region, bilateral lower extremities, and in the upper right shoulder region.) present.   Neurological:      General: No focal deficit present.      Mental Status: He is alert and oriented to person, place, and time.      Cranial Nerves: No cranial nerve deficit.      Motor: No abnormal muscle tone.      Coordination: Coordination normal.   Psychiatric:         Mood and Affect: Mood normal.         Behavior: Behavior normal.         Significant Labs:   BMP:   Recent Labs   Lab 10/24/20  0559   GLU 83      K 3.3*      CO2 34*   BUN 16   CREATININE 1.0   CALCIUM 8.5*   MG 1.9     CBC:   Recent Labs   Lab 10/23/20  1912 10/24/20  0559   WBC 3.40* 2.67*   HGB 12.7* 12.0*   HCT 42.6 40.1   * 107*     CMP:   Recent Labs   Lab 10/23/20  1912 10/24/20  0559    145   K 3.5 3.3*    102   CO2 32* 34*   * 83   BUN 20 16   CREATININE 1.3 1.0    CALCIUM 9.0 8.5*   PROT 7.2  --    ALBUMIN 3.3*  --    BILITOT 0.6  --    ALKPHOS 58  --    AST 23  --    ALT 17  --    ANIONGAP 7* 9   EGFRNONAA >60 >60     Magnesium:   Recent Labs   Lab 10/24/20  0559   MG 1.9     All pertinent labs within the past 24 hours have been reviewed.    Significant Imaging: I have reviewed all pertinent imaging results/findings within the past 24 hours.      Assessment/Plan:      * Acute on chronic congestive heart failure  Echocardiogram done two years ago reveals preserved ejection fraction.  Repeat echocardiogram in a.m..  Patient's oral diuretics have been changed recently from Bumex to Lasix.  Received Lasix 80 mg IV x1 in the ED.  Continue Lasix 80 mg IV every 8 hr x3 doses.  Cardiology consult.  Monitor urine output and renal function.    Continue IV Lasix with fluid and salt restrictions  Add Zaroxolyn      Anasarca  Significant anasarca secondary to CHF exacerbation.  Continue IV diuretics.    See above CHF    Pulmonary hypertension  Continue IV diuretics as mentioned above.    Continue CPAP qhs      Chronic atrial fibrillation  Continue home dose metoprolol, apixaban.  Currently rate controlled.  Monitor closely on telemetry.  Cardiology consulted.    Continue home meds    Chronic gout  Continue home dose allopurinol.      Rash  Appears to be chronic, psoriatic appearing.  Follow-up with dermatology as outpatient.      Hypertension  Resume home dose losartan, but hold HCTZ.  Continue metoprolol.      Morbid obesity  BMI 59.9        VTE Risk Mitigation (From admission, onward)         Ordered     IP VTE HIGH RISK PATIENT  Once      10/24/20 0744     apixaban tablet 5 mg  2 times daily      10/23/20 2151     Place sequential compression device  Until discontinued      10/23/20 2022                Discharge Planning   TREVON:      Code Status: Full Code   Is the patient medically ready for discharge?:     Reason for patient still in hospital (select all that apply): Patient  trending condition, Treatment and Consult recommendations                     Pebbles Tenorio MD  Department of Hospital Medicine   [facility]

## 2020-10-24 NOTE — ASSESSMENT & PLAN NOTE
Echocardiogram done two years ago reveals preserved ejection fraction.  Repeat echocardiogram in a.m..  Patient's oral diuretics have been changed recently from Bumex to Lasix.  Received Lasix 80 mg IV x1 in the ED.  Continue Lasix 80 mg IV every 8 hr x3 doses.  Cardiology consult.  Monitor urine output and renal function.    Continue IV Lasix with fluid and salt restrictions  Add Zaroxolyn

## 2020-10-24 NOTE — PLAN OF CARE
Pt AAOx4 .POC reviewed with pt and wife. Pt verbalized understanding   Pt remains free of injuries and falls; fall precaution in place   Afib on tele monitor. HR 70's -80's  IV saline locked; intact  IV lasix  Dwyer clean and intact   No C/O of pain  Assist x1  Bipap at night   Bed low, side rails up x2, non slip socks in use, call light in reach   Reminded to call for assistance  Hourly rounding complete will continue to monitor

## 2020-10-24 NOTE — ED NOTES
56 YO M presents to ED with c/o worsening BLE edema that extends into his thighs and to his scrotum. Progressive worsening over the last 3 months. Recent medication change to diuretics by his cardiologist.     LOC: The patient is awake, alert and aware of environment with an appropriate affect, the patient is oriented x 3 and speaking appropriately.  APPEARANCE: Patient resting comfortably and in no acute distress, patient is clean and well groomed, patient's clothing is properly fastened.  HEENT: Brief WNL  SKIN: Brief WNL except pt has rash to lower abdomen, bilateral lower extremities and right shoulder region. Generalized anasarca noted.   MUSCULOSKELETAL: Brief WNL  RESPIRATORY: Brief WNL except pt is dyspneic, worsening on exertion. Wears O2 at home PRN at 2LPM via NC. Rales auscultated.   CARDIAC: Brief WNL except pt in Atrial Fib on cardiac monitor. Not new finding. Rate controlled. Denies chest pain.   GASTRO: Brief WNL  : Brief WNL  Peripheral Vasc: Brief WNL except BLE +3 pitting edema that extends into thighs and scrotum.   NEURO: Brief WNL  PSYCH: Brief WNL

## 2020-10-24 NOTE — HOSPITAL COURSE
Patient seen and examined this AM. Treated for edema and pulmonary edema. Diuresis of 3500 cc overnight. Denies chest pain and shortness of breath is improved. Peripheral edema is improved and will continue with IV lasix today.  Hypertension is improved and will continue metoprolol and increase losartan.  10/25/20: PATIENT SEEN AND EXAMINED THIS AM. HE HAS HAD EXCELLENT DIURESIS OVERNIGHT OF ALMOST 8 LITERS OF FLUID. PATIENT FEELS IMPROVED AND LESS EDEMA AND HE CAN LIE FLAT AND HAS NORMAL BREATHING. WILL CONTINUE IV DIURESIS TODAY AND POSSIBLE CHANGE TO ORAL AGENTS AND DISCHARGE TOMORROW.  PLAN CARDIAC ECHO TODAY IF FEASIBLE.    10/26/2020-Patient seen and examined today, sitting up in bed. Feels well, nearly back to his baseline. Excellent diuresis again overnight. No chest pain. Labs reviewed, BMP shows contraction alkalosis. Echo reviewed, EF 50%, reduced RV function, pulmonary HTN.

## 2020-10-24 NOTE — HOSPITAL COURSE
55 year old black male, disabled due to mental illness, CHFpEF with echo in 2017 showing LVEF55%. Morbid obesity with EVANGELIST on CPAP, Afib x7 years, s/p several cardioversions in the past, hx HTN, gout and s/p gastric bypass in 2014, admitted w progressive peripheral edema without CP. Recently started on Bumex by Dr. Jeffrey. The patient has significant salt and dietary indiscretion. He complains of weight gain and edema, 2 pillow orthopnea.Patient treated with IV lasix overnight with good effect.  10/24- resting comfortably in bed, no orthopnea. Has put out 3500 cc urine so far. Labs stable, BP under control. Pt counseled about salt and fluid restrictions.   10/25- looks and much better, has diuresed about 9 L so far, wt down from 479 lbs to 459, SOB, orthopnea much better, leg swelling improving. Echo results pending. Using CPAP at night, will continue present care with Lasix and Zaroxolyn.   10/26- looks and feels much better, had a great diuresis and put out over 7 L of urine again since yesterday. Overall has lost 17 L of Fluid, wt has gone down from 218 Kg( 479 lbs) to 202 kg ( 445 lbs). He is eating drinking well, walking around well and all his Sx have significantly improved or resolved. His labs today showed Contraction Alkalosis with HCO3 44- hence Lasix/ Zaroxolyn held for 3 days and replaced by Diamox 250 tid. Pt will be seen by Cards and labs repeated and may resume Lasix. His Echo showed low normal EF 50% with mod Pulm HTN of 55 mm Hg.   He and his wife have been counseled about fluid and salt restrictions which they understand and accept. Cards have also cleared him. He was seen and examined and deemed stable for discharge home today. He already has home O2 and has home CPAP which he uses regularly.

## 2020-10-24 NOTE — ASSESSMENT & PLAN NOTE
Continue home dose metoprolol, apixaban.  Currently rate controlled.  Monitor closely on telemetry.  Cardiology consulted.    Continue home meds

## 2020-10-24 NOTE — H&P
[facility] Hospital Medicine  History & Physical    Patient Name: Caio Ontiveros  MRN: [id number]  Admission Date: 10/23/2020  Attending Physician: Ananya Sampson MD   Primary Care Provider: Scottie Alcantar MD         Patient information was obtained from patient, spouse/SO, past medical records and ER records.     Subjective:     Principal Problem:Acute on chronic congestive heart failure    Chief Complaint:   Chief Complaint   Patient presents with    Leg Swelling     hx CHF, pt reports worsening swelling to BLE and abdomen.         HPI: Mr. Ontiveros is a morbidly obese 55-year-old male with PMH significant for CHF with preserved ejection fraction, chronic atrial fibrillation, anticoagulated on apixaban, morbid obesity, BMI 59.9, presented to the ED complaining of worsening shortness of breath, and worsening bilateral lower extremity edema, in spite of taking his diuretics at home.  Reports compliance with diet.  Patient recently saw Dr. Jeffrey, who switched his Bumex to Lasix (according to the patient).In the ED he was found to have significant anasarca, with 3+ bilateral pretibial pitting edema, extending into the upper thighs including scrotal swelling.  Chest x-ray revealed vascular congestion, consistent with acute pulmonary edema.  Received Lasix 80 mg IV x1.    Admitting diagnosis acute on chronic CHF exacerbation, with preserved EF.    Past Medical History:   Diagnosis Date    A-fib     CHF (congestive heart failure)     Gout, chronic     Hypertension        Past Surgical History:   Procedure Laterality Date    CHOLECYSTECTOMY      GASTRIC BYPASS  2014       Review of patient's allergies indicates:  No Known Allergies    No current facility-administered medications on file prior to encounter.      Current Outpatient Medications on File Prior to Encounter   Medication Sig    allopurinoL (ZYLOPRIM) 100 MG tablet Take 1 tablet (100 mg total) by mouth once daily. Total of 400mg daily     allopurinoL (ZYLOPRIM) 300 MG tablet Take 1 tablet (300 mg total) by mouth once daily. Total of 400 mg daily    apixaban (ELIQUIS) 5 mg Tab Take 1 tablet (5 mg total) by mouth 2 (two) times daily.    ascorbic acid, vitamin C, (VITAMIN C) 100 MG tablet Take 100 mg by mouth once daily.    calcium phosphate-vitamin D3 250-400 mg-unit Chew     clotrimazole-betamethasone (LOTRISONE) lotion Apply topically 2 (two) times daily.    COLCRYS 0.6 mg tablet Take 1 tablet by mouth twice daily for 14 days    furosemide (LASIX) 80 MG tablet Take 1 tablet (80 mg total) by mouth 2 (two) times a day.    gabapentin (NEURONTIN) 300 MG capsule Take 1 capsule (300 mg total) by mouth every evening.    losartan-hydrochlorothiazide 100-25 mg (HYZAAR) 100-25 mg per tablet TAKE 1 TABLET EVERY DAY    metoprolol succinate (TOPROL-XL) 25 MG 24 hr tablet Take 1 tablet (25 mg total) by mouth 2 (two) times daily.    metoprolol succinate (TOPROL-XL) 25 MG 24 hr tablet TAKE 1 TABLET TWICE DAILY    potassium chloride SA (K-DUR,KLOR-CON) 20 MEQ tablet Take 20 mEq by mouth.    QUEtiapine (SEROQUEL) 100 MG Tab Take 1 tablet (100 mg total) by mouth every evening.    venlafaxine (EFFEXOR-XR) 150 MG Cp24 Take 1 capsule (150 mg total) by mouth once daily.     Family History     Problem Relation (Age of Onset)    Diabetes Father, Sister, Brother    Hypertension Mother, Father    Stroke Father        Tobacco Use    Smoking status: Never Smoker    Smokeless tobacco: Never Used   Substance and Sexual Activity    Alcohol use: Yes     Frequency: Monthly or less     Binge frequency: Never     Comment: sometimes    Drug use: No    Sexual activity: Not Currently     Review of Systems   Constitutional: Negative.  Negative for appetite change, fatigue and fever.   HENT: Negative.  Negative for congestion, nosebleeds and sore throat.    Eyes: Negative.  Negative for visual disturbance.   Respiratory: Positive for shortness of breath. Negative for cough  and wheezing.    Cardiovascular: Positive for leg swelling. Negative for chest pain and palpitations.   Gastrointestinal: Negative.  Negative for abdominal pain, constipation, diarrhea, nausea and vomiting.   Endocrine: Negative.  Negative for polyuria.   Genitourinary: Negative.  Negative for dysuria, flank pain, frequency and urgency.   Musculoskeletal: Negative.  Negative for arthralgias, back pain and joint swelling.   Skin: Positive for rash. Negative for color change.   Allergic/Immunologic: Negative.  Negative for immunocompromised state.   Neurological: Negative.  Negative for dizziness, syncope, weakness and headaches.   Hematological: Negative.    Psychiatric/Behavioral: Negative.  Negative for confusion and hallucinations. The patient is not nervous/anxious.    All other systems reviewed and are negative.    Objective:     Vital Signs (Most Recent):  Temp: 98.2 °F (36.8 °C) (10/23/20 1845)  Pulse: 94 (10/23/20 1909)  Resp: 20 (10/23/20 1845)  BP: 135/89 (10/23/20 1845)  SpO2: (!) 90 % (10/23/20 1845) Vital Signs (24h Range):  Temp:  [98.2 °F (36.8 °C)] 98.2 °F (36.8 °C)  Pulse:  [] 94  Resp:  [20] 20  SpO2:  [90 %] 90 %  BP: (135)/(89) 135/89     Weight: (!) 217.7 kg (479 lb 15.1 oz)  Body mass index is 59.99 kg/m².    Physical Exam  Vitals signs and nursing note reviewed.   Constitutional:       General: He is not in acute distress.     Appearance: He is well-developed. He is obese. He is not diaphoretic.   HENT:      Head: Normocephalic and atraumatic.      Mouth/Throat:      Mouth: Mucous membranes are moist.   Eyes:      General: No scleral icterus.     Conjunctiva/sclera: Conjunctivae normal.   Neck:      Musculoskeletal: Normal range of motion and neck supple.      Thyroid: No thyromegaly.   Cardiovascular:      Rate and Rhythm: Normal rate. Rhythm irregular.      Heart sounds: Normal heart sounds. No murmur.   Pulmonary:      Effort: Pulmonary effort is normal.      Breath sounds: Rales  present.   Chest:      Chest wall: No tenderness.   Abdominal:      Palpations: Abdomen is soft.      Tenderness: There is no abdominal tenderness.      Comments: Obese   Musculoskeletal: Normal range of motion.         General: Swelling (2-3+ bilateral pretibial pitting edema) present. No tenderness.   Lymphadenopathy:      Cervical: No cervical adenopathy.   Skin:     General: Skin is warm and dry.      Findings: Rash (Psoriatic appearing rash noted in the lower abdominal region, bilateral lower extremities, and in the upper right shoulder region.) present.   Neurological:      General: No focal deficit present.      Mental Status: He is alert and oriented to person, place, and time.      Cranial Nerves: No cranial nerve deficit.      Motor: No abnormal muscle tone.      Coordination: Coordination normal.   Psychiatric:         Mood and Affect: Mood normal.         Behavior: Behavior normal.             Significant Labs:   Results for orders placed or performed during the hospital encounter of 10/23/20   Comprehensive metabolic panel   Result Value Ref Range    Sodium 140 136 - 145 mmol/L    Potassium 3.5 3.5 - 5.1 mmol/L    Chloride 101 95 - 110 mmol/L    CO2 32 (H) 23 - 29 mmol/L    Glucose 170 (H) 70 - 110 mg/dL    BUN, Bld 20 6 - 20 mg/dL    Creatinine 1.3 0.5 - 1.4 mg/dL    Calcium 9.0 8.7 - 10.5 mg/dL    Total Protein 7.2 6.0 - 8.4 g/dL    Albumin 3.3 (L) 3.5 - 5.2 g/dL    Total Bilirubin 0.6 0.1 - 1.0 mg/dL    Alkaline Phosphatase 58 55 - 135 U/L    AST 23 10 - 40 U/L    ALT 17 10 - 44 U/L    Anion Gap 7 (L) 8 - 16 mmol/L    eGFR if African American >60 >60 mL/min/1.73 m^2    eGFR if non African American >60 >60 mL/min/1.73 m^2   CBC auto differential   Result Value Ref Range    WBC 3.40 (L) 3.90 - 12.70 K/uL    RBC 4.75 4.60 - 6.20 M/uL    Hemoglobin 12.7 (L) 14.0 - 18.0 g/dL    Hematocrit 42.6 40.0 - 54.0 %    Mean Corpuscular Volume 90 82 - 98 fL    Mean Corpuscular Hemoglobin 26.7 (L) 27.0 - 31.0 pg     Mean Corpuscular Hemoglobin Conc 29.8 (L) 32.0 - 36.0 g/dL    RDW 16.3 (H) 11.5 - 14.5 %    Platelets 111 (L) 150 - 350 K/uL    MPV 11.2 9.2 - 12.9 fL    Immature Granulocytes 0.3 0.0 - 0.5 %    Gran # (ANC) 2.2 1.8 - 7.7 K/uL    Immature Grans (Abs) 0.01 0.00 - 0.04 K/uL    Lymph # 0.7 (L) 1.0 - 4.8 K/uL    Mono # 0.3 0.3 - 1.0 K/uL    Eos # 0.1 0.0 - 0.5 K/uL    Baso # 0.03 0.00 - 0.20 K/uL    nRBC 0 0 /100 WBC    Gran% 64.7 38.0 - 73.0 %    Lymph% 21.2 18.0 - 48.0 %    Mono% 9.4 4.0 - 15.0 %    Eosinophil% 3.5 0.0 - 8.0 %    Basophil% 0.9 0.0 - 1.9 %    Differential Method Automated    Brain natriuretic peptide   Result Value Ref Range     (H) 0 - 99 pg/mL   Troponin I   Result Value Ref Range    Troponin I 0.009 0.000 - 0.026 ng/mL   Urinalysis, Reflex to Urine Culture Urine, Clean Catch    Specimen: Urine   Result Value Ref Range    Specimen UA Urine, Clean Catch          Significant Imaging: I have reviewed and interpreted all pertinent imaging results/findings within the past 24 hours.     Imaging Results          X-Ray Chest AP Portable (Final result)  Result time 10/23/20 19:48:39    Final result by Josh Barahona MD (10/23/20 19:48:39)                 Impression:      Findings concerning for CHF with pulmonary edema.      Electronically signed by: Josh Barahona  Date:    10/23/2020  Time:    19:48             Narrative:    EXAMINATION:  XR CHEST AP PORTABLE    CLINICAL HISTORY:  edema/chf;    TECHNIQUE:  Single frontal view of the chest was performed.    COMPARISON:  09/15/2020.    FINDINGS:  Slight increased interstitial attenuation in the lung bases.  This may reflect pulmonary edema.    The cardiac silhouette is enlarged.  The hilar and mediastinal contours are unremarkable.    Bones are intact.                                I have independently reviewed and interpreted the EKG.     I have independently reviewed all pertinent labs within the past 24 hours.    I have independently reviewed,  visualized and interpreted all pertinent imaging results within the past 24 hours and discussed the findings with the ED physician, Dr. Sampson            Assessment/Plan:     * Acute on chronic congestive heart failure  Echocardiogram done two years ago reveals preserved ejection fraction.  Repeat echocardiogram in a.m..  Patient's oral diuretics have been changed recently from Bumex to Lasix.  Received Lasix 80 mg IV x1 in the ED.  Continue Lasix 80 mg IV every 8 hr x3 doses.  Cardiology consult.  Monitor urine output and renal function.      Anasarca  Significant anasarca secondary to CHF exacerbation.  Continue IV diuretics.      Pulmonary hypertension  Continue IV diuretics as mentioned above.      Chronic atrial fibrillation  Continue home dose metoprolol, apixaban.  Currently rate controlled.  Monitor closely on telemetry.  Cardiology consulted.      Rash  Appears to be chronic, psoriatic appearing.  Follow-up with dermatology as outpatient.      Chronic gout  Continue home dose allopurinol.      Hypertension  Resume home dose losartan, but hold HCTZ.  Continue metoprolol.      Morbid obesity  BMI 59.9        VTE Risk Mitigation (From admission, onward)         Ordered     Place sequential compression device  Until discontinued      10/23/20 2022                 The patient is placed in OBSERVATION status.      Indra Quijano MD  Department of Hospital Medicine   [facility]

## 2020-10-24 NOTE — HPI
Pleasant 55 year old black male presents with progressive peripheral edema. Denies chest pain. Recently seen by Dr Jeffrey and initiated Bumex as new diuretic. The patient has significant salt and dietary indiscretion. He complains of weight gain and edema.  PMH significant for CHFpEF with echo in 2017 showing LVEF55%. Morbid obesity, Afib x7 years. Several cardioversions in the past which did not last very long in NSR according to the patient. History of OAS CPAP. 2 pillow orthopnea. hisory of hypertension , gout and s/p gastric bypass in 2014,  Patient treated with IV lasix overnight with good effect.

## 2020-10-24 NOTE — PLAN OF CARE
Pt AAO x4.  Pt remains free of falls throughout shift.  Pt denies pain throughout shift.  Plan of care reviewed. Pt verbalizes understanding.  Pt moving and turning independently. Frequent weight shifting encouraged.  A fib (rate controlled) on monitor.  Hourly rounding completed.  Will continue to monitor.

## 2020-10-24 NOTE — HPI
Mr. Ontiveros is a morbidly obese 55-year-old male with PMH significant for CHF with preserved ejection fraction, chronic atrial fibrillation, anticoagulated on apixaban, morbid obesity, BMI 59.9, presented to the ED complaining of worsening shortness of breath, and worsening bilateral lower extremity edema, in spite of taking his diuretics at home.  Reports compliance with diet.  Patient recently saw Dr. Jeffrey, who switched his Bumex to Lasix (according to the patient).In the ED he was found to have significant anasarca, with 3+ bilateral pretibial pitting edema, extending into the upper thighs including scrotal swelling.  Chest x-ray revealed vascular congestion, consistent with acute pulmonary edema.  Received Lasix 80 mg IV x1.    Admitting diagnosis acute on chronic CHF exacerbation, with preserved EF.

## 2020-10-24 NOTE — ASSESSMENT & PLAN NOTE
Continue home dose metoprolol, apixaban.  Currently rate controlled.  Monitor closely on telemetry.  Cardiology consulted.

## 2020-10-24 NOTE — ED NOTES
Dwyer catheterization attempted x 1 and unable to advance into bladder. Requesting Coude catheter to re-attempt bladder catheterization.

## 2020-10-24 NOTE — ED NOTES
Dwyer Catheter was attempted could not get it to go in. Called and got a coude and still could not get it to go in. Cherise lomeli was present  Informed RN.

## 2020-10-24 NOTE — CONSULTS
[facility]  Cardiology  Consult Note    Patient Name: Caio Ontiveros  MRN: [id number]  Admission Date: 10/23/2020  Hospital Length of Stay: 0 days  Code Status: Full Code   Attending Provider: Pebbles Tenorio MD   Consulting Provider: Umesh Santillan MD  Primary Care Physician: Scottie Alcantar MD  Principal Problem:Acute on chronic congestive heart failure    Patient information was obtained from patient and ER records.     Inpatient consult to Cardiology  Consult performed by: Umesh Santillan MD  Consult ordered by: Indra Quijano MD        Subjective:     Chief Complaint:  Shortness of breath and edema.     HPI:   Pleasant 55 year old black male presents with progressive peripheral edema. Denies chest pain. Recently seen by Dr Jeffrey and initiated Bumex as new diuretic. The patient has significant salt and dietary indiscretion. He complains of weight gain and edema.  PMH significant for CHFpEF with echo in 2017 showing LVEF55%. Morbid obesity, Afib x7 years. Several cardioversions in the past which did not last very long in NSR according to the patient. History of OAS CPAP. 2 pillow orthopnea. hisory of hypertension , gout and s/p gastric bypass in 2014,  Patient treated with IV lasix overnight with good effect.    Past Medical History:   Diagnosis Date    A-fib     CHF (congestive heart failure)     Gout, chronic     Hypertension        Past Surgical History:   Procedure Laterality Date    CHOLECYSTECTOMY      GASTRIC BYPASS  2014       Review of patient's allergies indicates:  No Known Allergies    No current facility-administered medications on file prior to encounter.      Current Outpatient Medications on File Prior to Encounter   Medication Sig    allopurinoL (ZYLOPRIM) 100 MG tablet Take 1 tablet (100 mg total) by mouth once daily. Total of 400mg daily    allopurinoL (ZYLOPRIM) 300 MG tablet Take 1 tablet (300 mg total) by mouth once daily. Total of 400 mg daily    apixaban (ELIQUIS) 5  mg Tab Take 1 tablet (5 mg total) by mouth 2 (two) times daily.    ascorbic acid, vitamin C, (VITAMIN C) 100 MG tablet Take 100 mg by mouth once daily.    calcium phosphate-vitamin D3 250-400 mg-unit Chew     clotrimazole-betamethasone (LOTRISONE) lotion Apply topically 2 (two) times daily.    COLCRYS 0.6 mg tablet Take 1 tablet by mouth twice daily for 14 days    furosemide (LASIX) 80 MG tablet Take 1 tablet (80 mg total) by mouth 2 (two) times a day.    gabapentin (NEURONTIN) 300 MG capsule Take 1 capsule (300 mg total) by mouth every evening.    losartan-hydrochlorothiazide 100-25 mg (HYZAAR) 100-25 mg per tablet TAKE 1 TABLET EVERY DAY    metoprolol succinate (TOPROL-XL) 25 MG 24 hr tablet Take 1 tablet (25 mg total) by mouth 2 (two) times daily.    potassium chloride SA (K-DUR,KLOR-CON) 20 MEQ tablet Take 20 mEq by mouth.    QUEtiapine (SEROQUEL) 100 MG Tab Take 1 tablet (100 mg total) by mouth every evening.    venlafaxine (EFFEXOR-XR) 150 MG Cp24 Take 1 capsule (150 mg total) by mouth once daily.     Family History     Problem Relation (Age of Onset)    Diabetes Father, Sister, Brother    Hypertension Mother, Father    Stroke Father        Tobacco Use    Smoking status: Never Smoker    Smokeless tobacco: Never Used   Substance and Sexual Activity    Alcohol use: Yes     Frequency: Monthly or less     Binge frequency: Never     Comment: sometimes    Drug use: No    Sexual activity: Not Currently     Review of Systems   Constitution: Positive for weight gain. Negative for chills, diaphoresis, night sweats and weight loss.   HENT: Negative for congestion, hoarse voice, sore throat and stridor.    Eyes: Negative for double vision and pain.   Cardiovascular: Positive for leg swelling, orthopnea and paroxysmal nocturnal dyspnea. Negative for chest pain, claudication, cyanosis, dyspnea on exertion, irregular heartbeat, near-syncope, palpitations and syncope.   Respiratory: Positive for shortness of  breath and snoring. Negative for cough, hemoptysis, sleep disturbances due to breathing, sputum production and wheezing.    Endocrine: Negative for cold intolerance, heat intolerance and polydipsia.   Hematologic/Lymphatic: Negative for bleeding problem. Does not bruise/bleed easily.   Skin: Negative for color change, dry skin and rash.   Musculoskeletal: Negative for joint swelling and muscle cramps.   Gastrointestinal: Negative for bloating, abdominal pain, constipation, diarrhea, dysphagia, melena, nausea and vomiting.   Genitourinary: Negative for flank pain and urgency.   Neurological: Negative for dizziness, focal weakness, headaches, light-headedness, loss of balance, seizures and weakness.   Psychiatric/Behavioral: Negative for altered mental status and memory loss. The patient is not nervous/anxious.      Objective:     Vital Signs (Most Recent):  Temp: 97.5 °F (36.4 °C) (10/24/20 0727)  Pulse: 75 (10/24/20 0727)  Resp: 18 (10/24/20 0727)  BP: 129/68 (10/24/20 0727)  SpO2: 97 % (10/24/20 0727) Vital Signs (24h Range):  Temp:  [97.5 °F (36.4 °C)-98.2 °F (36.8 °C)] 97.5 °F (36.4 °C)  Pulse:  [] 75  Resp:  [18-22] 18  SpO2:  [90 %-97 %] 97 %  BP: ()/(59-89) 129/68     Weight: (!) 217.7 kg (479 lb 15.1 oz)  Body mass index is 59.99 kg/m².    SpO2: 97 %  O2 Device (Oxygen Therapy): BiPAP      Intake/Output Summary (Last 24 hours) at 10/24/2020 1022  Last data filed at 10/24/2020 0547  Gross per 24 hour   Intake --   Output 3500 ml   Net -3500 ml       Lines/Drains/Airways     Drain                 Urethral Catheter 10/23/20 2240 Coude;Non-latex 12 Fr. less than 1 day          Peripheral Intravenous Line                 Peripheral IV - Single Lumen 10/23/20 1912 20 G Left Forearm less than 1 day                Physical Exam   Constitutional: He is oriented to person, place, and time.   Eyes: Pupils are equal, round, and reactive to light.   Neck: Normal range of motion. No tracheal deviation present.    Cardiovascular: Normal rate, regular rhythm and intact distal pulses. Exam reveals gallop and S3. Exam reveals no friction rub.   No murmur heard.  Pulses:       Carotid pulses are 2+ on the right side and 2+ on the left side.       Radial pulses are 2+ on the right side and 2+ on the left side.        Femoral pulses are 2+ on the right side and 2+ on the left side.       Popliteal pulses are 1+ on the right side and 1+ on the left side.        Dorsalis pedis pulses are 1+ on the right side and 1+ on the left side.        Posterior tibial pulses are 1+ on the right side and 1+ on the left side.   Pulmonary/Chest: Effort normal and breath sounds normal. No stridor. No respiratory distress. He has no wheezes. He has no rales. He exhibits no tenderness.   Abdominal: He exhibits no distension. There is no abdominal tenderness. There is no rebound.   Musculoskeletal:         General: Edema present. No tenderness.   Neurological: He is alert and oriented to person, place, and time.   Skin: Skin is warm and dry.       Significant Labs:   BMP:   Recent Labs   Lab 10/23/20  1912 10/24/20  0559   * 83    145   K 3.5 3.3*    102   CO2 32* 34*   BUN 20 16   CREATININE 1.3 1.0   CALCIUM 9.0 8.5*   MG  --  1.9    and Troponin   Recent Labs   Lab 10/23/20  1912   TROPONINI 0.009       Significant Imaging: X-Ray: CXR: X-Ray Chest 1 View (CXR): No results found for this visit on 10/23/20.    Assessment and Plan:     * Acute on chronic congestive heart failure  Plan to continue IV lasix for next 24 to 48 hours.  Cardiac echo today to assess LV function.    Anasarca   Continue diuretics and wt management. Salt and fluid restrictions.    Chronic atrial fibrillation  Stable heart rate and continue lopressor.  Chronic anticoagulation with eliquis.    Pulmonary hypertension  Continue fluid management.    Hypertension  Will increase losartan today and titrate for stable systolic blood pressure.    Morbid obesity  Needs  counseling for wt loss        VTE Risk Mitigation (From admission, onward)         Ordered     IP VTE HIGH RISK PATIENT  Once      10/24/20 0744     apixaban tablet 5 mg  2 times daily      10/23/20 2151     Place sequential compression device  Until discontinued      10/23/20 2022                Thank you for your consult. I will follow-up with patient. Please contact us if you have any additional questions.    Umesh Santillan MD  Cardiology   [facility]

## 2020-10-25 LAB
ASCENDING AORTA: 3.23 CM
AV INDEX (PROSTH): 0.48
AV MEAN GRADIENT: 3 MMHG
AV PEAK GRADIENT: 5 MMHG
AV VALVE AREA: 2.74 CM2
AV VELOCITY RATIO: 0.53
BSA FOR ECHO PROCEDURE: 3.29 M2
CV ECHO LV RWT: 0.63 CM
DOP CALC AO PEAK VEL: 1.15 M/S
DOP CALC AO VTI: 22.82 CM
DOP CALC LVOT AREA: 5.8 CM2
DOP CALC LVOT DIAMETER: 2.71 CM
DOP CALC LVOT PEAK VEL: 0.61 M/S
DOP CALC LVOT STROKE VOLUME: 62.61 CM3
DOP CALC RVOT PEAK VEL: 0.88 M/S
DOP CALC RVOT VTI: 16.34 CM
DOP CALCLVOT PEAK VEL VTI: 10.86 CM
E WAVE DECELERATION TIME: 179.3 MSEC
E/A RATIO: 1.81
E/E' RATIO: 4.19 M/S
ECHO LV POSTERIOR WALL: 1.51 CM (ref 0.6–1.1)
FRACTIONAL SHORTENING: 32 % (ref 28–44)
INTERVENTRICULAR SEPTUM: 1.33 CM (ref 0.6–1.1)
IVRT: 88.49 MSEC
LA MAJOR: 8.05 CM
LA MINOR: 5.92 CM
LA WIDTH: 4.5 CM
LEFT ATRIUM SIZE: 4.91 CM
LEFT ATRIUM VOLUME INDEX: 41.4 ML/M2
LEFT ATRIUM VOLUME: 128.13 CM3
LEFT INTERNAL DIMENSION IN SYSTOLE: 3.24 CM (ref 2.1–4)
LEFT VENTRICLE DIASTOLIC VOLUME INDEX: 34.15 ML/M2
LEFT VENTRICLE DIASTOLIC VOLUME: 105.76 ML
LEFT VENTRICLE MASS INDEX: 89 G/M2
LEFT VENTRICLE SYSTOLIC VOLUME INDEX: 13.7 ML/M2
LEFT VENTRICLE SYSTOLIC VOLUME: 42.32 ML
LEFT VENTRICULAR INTERNAL DIMENSION IN DIASTOLE: 4.77 CM (ref 3.5–6)
LEFT VENTRICULAR MASS: 276.97 G
LV LATERAL E/E' RATIO: 3.25 M/S
LV SEPTAL E/E' RATIO: 5.91 M/S
MV PEAK A VEL: 0.36 M/S
MV PEAK E VEL: 0.65 M/S
MV STENOSIS PRESSURE HALF TIME: 52 MS
MV VALVE AREA P 1/2 METHOD: 4.23 CM2
PISA TR MAX VEL: 3.42 M/S
PV MEAN GRADIENT: 2.01 MMHG
RA MAJOR: 9.56 CM
RA PRESSURE: 8 MMHG
RA WIDTH: 8.63 CM
RIGHT VENTRICULAR END-DIASTOLIC DIMENSION: 5.22 CM
SINUS: 2.72 CM
STJ: 3.41 CM
TDI LATERAL: 0.2 M/S
TDI SEPTAL: 0.11 M/S
TDI: 0.16 M/S
TR MAX PG: 47 MMHG
TV REST PULMONARY ARTERY PRESSURE: 55 MMHG

## 2020-10-25 PROCEDURE — 96376 TX/PRO/DX INJ SAME DRUG ADON: CPT

## 2020-10-25 PROCEDURE — 25000003 PHARM REV CODE 250: Mod: HCNC | Performed by: EMERGENCY MEDICINE

## 2020-10-25 PROCEDURE — 99226 PR SUBSEQUENT OBSERVATION CARE,LEVEL III: ICD-10-PCS | Mod: 25,HCNC,, | Performed by: INTERNAL MEDICINE

## 2020-10-25 PROCEDURE — 63600175 PHARM REV CODE 636 W HCPCS: Mod: HCNC | Performed by: EMERGENCY MEDICINE

## 2020-10-25 PROCEDURE — 94761 N-INVAS EAR/PLS OXIMETRY MLT: CPT | Mod: HCNC

## 2020-10-25 PROCEDURE — 25000003 PHARM REV CODE 250: Mod: HCNC | Performed by: NURSE PRACTITIONER

## 2020-10-25 PROCEDURE — 63600175 PHARM REV CODE 636 W HCPCS: Mod: HCNC | Performed by: INTERNAL MEDICINE

## 2020-10-25 PROCEDURE — 99226 PR SUBSEQUENT OBSERVATION CARE,LEVEL III: CPT | Mod: 25,HCNC,, | Performed by: INTERNAL MEDICINE

## 2020-10-25 PROCEDURE — 94660 CPAP INITIATION&MGMT: CPT | Mod: HCNC

## 2020-10-25 PROCEDURE — 25000003 PHARM REV CODE 250: Mod: HCNC | Performed by: INTERNAL MEDICINE

## 2020-10-25 PROCEDURE — G0378 HOSPITAL OBSERVATION PER HR: HCPCS | Mod: HCNC

## 2020-10-25 PROCEDURE — 99900035 HC TECH TIME PER 15 MIN (STAT): Mod: HCNC

## 2020-10-25 RX ORDER — FUROSEMIDE 10 MG/ML
80 INJECTION INTRAMUSCULAR; INTRAVENOUS
Status: DISCONTINUED | OUTPATIENT
Start: 2020-10-25 | End: 2020-10-26

## 2020-10-25 RX ORDER — POTASSIUM CHLORIDE 20 MEQ/1
40 TABLET, EXTENDED RELEASE ORAL DAILY
Status: DISCONTINUED | OUTPATIENT
Start: 2020-10-25 | End: 2020-10-26 | Stop reason: HOSPADM

## 2020-10-25 RX ORDER — POTASSIUM CHLORIDE 20 MEQ/1
40 TABLET, EXTENDED RELEASE ORAL DAILY
Status: DISCONTINUED | OUTPATIENT
Start: 2020-10-26 | End: 2020-10-25

## 2020-10-25 RX ADMIN — METOPROLOL SUCCINATE 25 MG: 25 TABLET, EXTENDED RELEASE ORAL at 08:10

## 2020-10-25 RX ADMIN — APIXABAN 5 MG: 2.5 TABLET, FILM COATED ORAL at 08:10

## 2020-10-25 RX ADMIN — POTASSIUM CHLORIDE 20 MEQ: 1500 TABLET, EXTENDED RELEASE ORAL at 08:10

## 2020-10-25 RX ADMIN — VENLAFAXINE HYDROCHLORIDE 150 MG: 75 CAPSULE, EXTENDED RELEASE ORAL at 08:10

## 2020-10-25 RX ADMIN — LOSARTAN POTASSIUM 25 MG: 25 TABLET, FILM COATED ORAL at 08:10

## 2020-10-25 RX ADMIN — OXYCODONE HYDROCHLORIDE AND ACETAMINOPHEN 500 MG: 500 TABLET ORAL at 08:10

## 2020-10-25 RX ADMIN — FUROSEMIDE 80 MG: 10 INJECTION, SOLUTION INTRAMUSCULAR; INTRAVENOUS at 05:10

## 2020-10-25 RX ADMIN — QUETIAPINE 100 MG: 100 TABLET ORAL at 08:10

## 2020-10-25 RX ADMIN — METOLAZONE 5 MG: 5 TABLET ORAL at 08:10

## 2020-10-25 RX ADMIN — ALLOPURINOL 100 MG: 100 TABLET ORAL at 08:10

## 2020-10-25 RX ADMIN — POTASSIUM CHLORIDE 40 MEQ: 1500 TABLET, EXTENDED RELEASE ORAL at 10:10

## 2020-10-25 NOTE — ASSESSMENT & PLAN NOTE
Echocardiogram done two years ago reveals preserved ejection fraction.  Repeat echocardiogram in a.m..  Patient's oral diuretics have been changed recently from Bumex to Lasix.  Received Lasix 80 mg IV x1 in the ED.  Continue Lasix 80 mg IV every 8 hr x3 doses.  Cardiology consult.  Monitor urine output and renal function.    Continue IV Lasix with fluid and salt restrictions  Add Zaroxolyn    10/25- improving- continue same

## 2020-10-25 NOTE — PROGRESS NOTES
[facility] -   Cardiology  Progress Note    Patient Name: Caio Ontiveros  MRN: [id number]  Admission Date: 10/23/2020  Hospital Length of Stay: 0 days  Code Status: Full Code   Attending Physician: Pebbles Tenorio MD   Primary Care Physician: Scottie Alcantar MD  Expected Discharge Date:   Principal Problem:Acute on chronic congestive heart failure    Subjective:     Hospital Course:   Patient seen and examined this AM. Treated for edema and pulmonary edema. Diuresis of 3500 cc overnight. Denies chest pain and shortness of breath is improved. Peripheral edema is improved and will continue with IV lasix today.  Hypertension is improved and will continue metoprolol and increase losartan.  10/25/20: PATIENT SEEN AND EXAMINED THIS AM. HE HAS HAD EXCELLENT DIURESIS OVERNIGHT OF ALMOST 8 LITERS OF FLUID. PATIENT FEELS IMPROVED AND LESS EDEMA AND HE CAN LIE FLAT AND HAS NORMAL BREATHING. WILL CONTINUE IV DIURESIS TODAY AND POSSIBLE CHANGE TO ORAL AGENTS AND DISCHARGE TOMORROW.  PLAN CARDIAC ECHO TODAY IF FEASIBLE.      Interval History: Excellent diuresis overnight and will continue with lasix with BID dosing today and possible change to oral agents tomorrow.    Review of Systems   Constitution: Positive for malaise/fatigue. Negative for chills, diaphoresis, night sweats, weight gain and weight loss.   HENT: Negative for congestion, hoarse voice, sore throat and stridor.    Eyes: Negative for double vision and pain.   Cardiovascular: Negative for chest pain, claudication, cyanosis, dyspnea on exertion, irregular heartbeat, leg swelling, near-syncope, orthopnea, palpitations, paroxysmal nocturnal dyspnea and syncope.   Respiratory: Negative for cough, hemoptysis, shortness of breath, sleep disturbances due to breathing, snoring, sputum production and wheezing.    Endocrine: Negative for cold intolerance, heat intolerance and polydipsia.   Hematologic/Lymphatic: Negative for bleeding problem. Does not bruise/bleed easily.    Skin: Negative for color change, dry skin and rash.   Musculoskeletal: Negative for joint swelling and muscle cramps.   Gastrointestinal: Negative for bloating, abdominal pain, constipation, diarrhea, dysphagia, melena, nausea and vomiting.   Genitourinary: Negative for flank pain and urgency.   Neurological: Negative for dizziness, focal weakness, headaches, light-headedness, loss of balance, seizures and weakness.   Psychiatric/Behavioral: Negative for altered mental status and memory loss. The patient is not nervous/anxious.      Objective:     Vital Signs (Most Recent):  Temp: 98 °F (36.7 °C) (10/25/20 0340)  Pulse: 64 (10/25/20 0414)  Resp: 16 (10/25/20 0414)  BP: 122/71 (10/25/20 0340)  SpO2: 98 % (10/25/20 0414) Vital Signs (24h Range):  Temp:  [96.4 °F (35.8 °C)-98.1 °F (36.7 °C)] 98 °F (36.7 °C)  Pulse:  [58-91] 64  Resp:  [16-20] 16  SpO2:  [92 %-98 %] 98 %  BP: (110-122)/(60-75) 122/71     Weight: (!) 208.2 kg (459 lb)  Body mass index is 57.37 kg/m².     SpO2: 98 %  O2 Device (Oxygen Therapy): BiPAP      Intake/Output Summary (Last 24 hours) at 10/25/2020 1015  Last data filed at 10/25/2020 0300  Gross per 24 hour   Intake 596 ml   Output 8300 ml   Net -7704 ml       Lines/Drains/Airways     Drain                 Urethral Catheter 10/23/20 2240 Coude;Non-latex 12 Fr. 1 day          Peripheral Intravenous Line                 Peripheral IV - Single Lumen 10/23/20 1912 20 G Left Forearm 1 day                Physical Exam   Constitutional: He is oriented to person, place, and time.   Eyes: Pupils are equal, round, and reactive to light.   Neck: Normal range of motion. No tracheal deviation present.   Cardiovascular: Normal rate, normal heart sounds and intact distal pulses. Exam reveals no gallop and no friction rub.   No murmur heard.  Pulses:       Carotid pulses are 2+ on the right side and 2+ on the left side.       Radial pulses are 2+ on the right side and 2+ on the left side.        Femoral pulses  are 2+ on the right side and 2+ on the left side.       Popliteal pulses are 2+ on the right side and 2+ on the left side.        Dorsalis pedis pulses are 2+ on the right side and 2+ on the left side.        Posterior tibial pulses are 2+ on the right side and 2+ on the left side.   Pulmonary/Chest: Effort normal and breath sounds normal. No stridor. No respiratory distress. He has no wheezes. He has no rales. He exhibits no tenderness.   Abdominal: He exhibits no distension. There is no abdominal tenderness. There is no rebound.   Musculoskeletal:         General: Edema present. No tenderness.   Neurological: He is alert and oriented to person, place, and time.   Skin: Skin is warm and dry.       Significant Labs:   BMP:   Recent Labs   Lab 10/23/20  1912 10/24/20  0559   * 83    145   K 3.5 3.3*    102   CO2 32* 34*   BUN 20 16   CREATININE 1.3 1.0   CALCIUM 9.0 8.5*   MG  --  1.9       Significant Imaging: cardiac echo is pending today.    Assessment and Plan:     Brief HPI: Stable and improved with significantly less edema. Stable heart rate and blood pressure on current medications.    * Acute on chronic congestive heart failure  Plan to continue IV lasix for next 24 to 48 hours.  Cardiac echo today to assess LV function.    Anasarca   Continue diuretics and wt management. Salt and fluid restrictions.    Chronic atrial fibrillation  Stable heart rate and continue lopressor.  Chronic anticoagulation with eliquis.    Pulmonary hypertension  Continue fluid management. Continued blood pressure control.    Hypertension  Will increase losartan today and titrate for stable systolic blood pressure.  Stable blood pressure 122/76 today.    Morbid obesity  Needs counseling for wt loss        VTE Risk Mitigation (From admission, onward)         Ordered     IP VTE HIGH RISK PATIENT  Once      10/24/20 4990     apixaban tablet 5 mg  2 times daily      10/23/20 4945     Place sequential compression device   Until discontinued      10/23/20 2022                Umesh Santillan MD  Cardiology  [facility]

## 2020-10-25 NOTE — ASSESSMENT & PLAN NOTE
Will increase losartan today and titrate for stable systolic blood pressure.  Stable blood pressure 122/76 today.

## 2020-10-25 NOTE — SUBJECTIVE & OBJECTIVE
Interval History: Excellent diuresis overnight and will continue with lasix with BID dosing today and possible change to oral agents tomorrow.    Review of Systems   Constitution: Positive for malaise/fatigue. Negative for chills, diaphoresis, night sweats, weight gain and weight loss.   HENT: Negative for congestion, hoarse voice, sore throat and stridor.    Eyes: Negative for double vision and pain.   Cardiovascular: Negative for chest pain, claudication, cyanosis, dyspnea on exertion, irregular heartbeat, leg swelling, near-syncope, orthopnea, palpitations, paroxysmal nocturnal dyspnea and syncope.   Respiratory: Negative for cough, hemoptysis, shortness of breath, sleep disturbances due to breathing, snoring, sputum production and wheezing.    Endocrine: Negative for cold intolerance, heat intolerance and polydipsia.   Hematologic/Lymphatic: Negative for bleeding problem. Does not bruise/bleed easily.   Skin: Negative for color change, dry skin and rash.   Musculoskeletal: Negative for joint swelling and muscle cramps.   Gastrointestinal: Negative for bloating, abdominal pain, constipation, diarrhea, dysphagia, melena, nausea and vomiting.   Genitourinary: Negative for flank pain and urgency.   Neurological: Negative for dizziness, focal weakness, headaches, light-headedness, loss of balance, seizures and weakness.   Psychiatric/Behavioral: Negative for altered mental status and memory loss. The patient is not nervous/anxious.      Objective:     Vital Signs (Most Recent):  Temp: 98 °F (36.7 °C) (10/25/20 0340)  Pulse: 64 (10/25/20 0414)  Resp: 16 (10/25/20 0414)  BP: 122/71 (10/25/20 0340)  SpO2: 98 % (10/25/20 0414) Vital Signs (24h Range):  Temp:  [96.4 °F (35.8 °C)-98.1 °F (36.7 °C)] 98 °F (36.7 °C)  Pulse:  [58-91] 64  Resp:  [16-20] 16  SpO2:  [92 %-98 %] 98 %  BP: (110-122)/(60-75) 122/71     Weight: (!) 208.2 kg (459 lb)  Body mass index is 57.37 kg/m².     SpO2: 98 %  O2 Device (Oxygen Therapy):  BiPAP      Intake/Output Summary (Last 24 hours) at 10/25/2020 1015  Last data filed at 10/25/2020 0300  Gross per 24 hour   Intake 596 ml   Output 8300 ml   Net -7704 ml       Lines/Drains/Airways     Drain                 Urethral Catheter 10/23/20 2240 Coude;Non-latex 12 Fr. 1 day          Peripheral Intravenous Line                 Peripheral IV - Single Lumen 10/23/20 1912 20 G Left Forearm 1 day                Physical Exam   Constitutional: He is oriented to person, place, and time.   Eyes: Pupils are equal, round, and reactive to light.   Neck: Normal range of motion. No tracheal deviation present.   Cardiovascular: Normal rate, normal heart sounds and intact distal pulses. Exam reveals no gallop and no friction rub.   No murmur heard.  Pulses:       Carotid pulses are 2+ on the right side and 2+ on the left side.       Radial pulses are 2+ on the right side and 2+ on the left side.        Femoral pulses are 2+ on the right side and 2+ on the left side.       Popliteal pulses are 2+ on the right side and 2+ on the left side.        Dorsalis pedis pulses are 2+ on the right side and 2+ on the left side.        Posterior tibial pulses are 2+ on the right side and 2+ on the left side.   Pulmonary/Chest: Effort normal and breath sounds normal. No stridor. No respiratory distress. He has no wheezes. He has no rales. He exhibits no tenderness.   Abdominal: He exhibits no distension. There is no abdominal tenderness. There is no rebound.   Musculoskeletal:         General: Edema present. No tenderness.   Neurological: He is alert and oriented to person, place, and time.   Skin: Skin is warm and dry.       Significant Labs:   BMP:   Recent Labs   Lab 10/23/20  1912 10/24/20  0559   * 83    145   K 3.5 3.3*    102   CO2 32* 34*   BUN 20 16   CREATININE 1.3 1.0   CALCIUM 9.0 8.5*   MG  --  1.9       Significant Imaging: cardiac echo is pending today.

## 2020-10-25 NOTE — SUBJECTIVE & OBJECTIVE
Interval History: looks and much better, has diuresed about 9 L so far, wt down from 479 lbs to 459, SOB, orthopnea much better, leg swelling improving. Echo results pending. Using CPAP at night, will continue present care with Lasix and Zaroxolyn.       Review of Systems   Constitutional: Negative.  Negative for appetite change, fatigue and fever.   HENT: Negative.  Negative for congestion, nosebleeds and sore throat.    Eyes: Negative.  Negative for visual disturbance.   Respiratory: Positive for shortness of breath. Negative for cough and wheezing.    Cardiovascular: Positive for leg swelling. Negative for chest pain and palpitations.   Gastrointestinal: Negative.  Negative for abdominal pain, constipation, diarrhea, nausea and vomiting.   Endocrine: Negative.  Negative for polyuria.   Genitourinary: Negative.  Negative for dysuria, flank pain, frequency and urgency.   Musculoskeletal: Negative.  Negative for arthralgias, back pain and joint swelling.   Skin: Positive for rash. Negative for color change.   Allergic/Immunologic: Negative.  Negative for immunocompromised state.   Neurological: Negative.  Negative for dizziness, syncope, weakness and headaches.   Hematological: Negative.    Psychiatric/Behavioral: Negative.  Negative for confusion and hallucinations. The patient is not nervous/anxious.    All other systems reviewed and are negative.    Objective:     Vital Signs (Most Recent):  Temp: 97.5 °F (36.4 °C) (10/25/20 1135)  Pulse: 72 (10/25/20 1135)  Resp: 18 (10/25/20 1135)  BP: 129/68 (10/25/20 1135)  SpO2: 96 % (10/25/20 1135) Vital Signs (24h Range):  Temp:  [96.4 °F (35.8 °C)-98 °F (36.7 °C)] 97.5 °F (36.4 °C)  Pulse:  [58-91] 72  Resp:  [16-20] 18  SpO2:  [92 %-98 %] 96 %  BP: (110-129)/(60-72) 129/68     Weight: (!) 204.1 kg (450 lb)  Body mass index is 56.25 kg/m².    Intake/Output Summary (Last 24 hours) at 10/25/2020 1532  Last data filed at 10/25/2020 0300  Gross per 24 hour   Intake 476 ml    Output 8300 ml   Net -7824 ml      Physical Exam  Vitals signs and nursing note reviewed.   Constitutional:       General: He is not in acute distress.     Appearance: He is well-developed. He is obese. He is not diaphoretic.   HENT:      Head: Normocephalic and atraumatic.      Mouth/Throat:      Mouth: Mucous membranes are moist.   Eyes:      General: No scleral icterus.     Conjunctiva/sclera: Conjunctivae normal.   Neck:      Musculoskeletal: Normal range of motion and neck supple.      Thyroid: No thyromegaly.   Cardiovascular:      Rate and Rhythm: Normal rate. Rhythm irregular.      Heart sounds: Normal heart sounds. No murmur.   Pulmonary:      Effort: Pulmonary effort is normal.      Breath sounds: Rales present.   Chest:      Chest wall: No tenderness.   Abdominal:      Palpations: Abdomen is soft.      Tenderness: There is no abdominal tenderness.      Comments: Obese   Musculoskeletal: Normal range of motion.         General: Swelling (2-3+ bilateral pretibial pitting edema) present. No tenderness.   Lymphadenopathy:      Cervical: No cervical adenopathy.   Skin:     General: Skin is warm and dry.      Findings: Rash (Psoriatic appearing rash noted in the lower abdominal region, bilateral lower extremities, and in the upper right shoulder region.) present.   Neurological:      General: No focal deficit present.      Mental Status: He is alert and oriented to person, place, and time.      Cranial Nerves: No cranial nerve deficit.      Motor: No abnormal muscle tone.      Coordination: Coordination normal.   Psychiatric:         Mood and Affect: Mood normal.         Behavior: Behavior normal.         Significant Labs:   BMP:   Recent Labs   Lab 10/24/20  0559   GLU 83      K 3.3*      CO2 34*   BUN 16   CREATININE 1.0   CALCIUM 8.5*   MG 1.9     CBC:   Recent Labs   Lab 10/23/20  1912 10/24/20  0559   WBC 3.40* 2.67*   HGB 12.7* 12.0*   HCT 42.6 40.1   * 107*     Magnesium:   Recent  Labs   Lab 10/24/20  0559   MG 1.9     All pertinent labs within the past 24 hours have been reviewed.    Significant Imaging: I have reviewed all pertinent imaging results/findings within the past 24 hours.

## 2020-10-25 NOTE — PROGRESS NOTES
[facility] Hospital Medicine  Progress Note    Patient Name: Caio Ontiveros  MRN: [id number]  Patient Class: OP- Observation   Admission Date: 10/23/2020  Length of Stay: 0 days  Attending Physician: Pebbles Tenorio MD  Primary Care Provider: Scottie Alcantar MD        Subjective:     Principal Problem:Acute on chronic congestive heart failure        HPI:  Mr. Ontiveros is a morbidly obese 55-year-old male with PMH significant for CHF with preserved ejection fraction, chronic atrial fibrillation, anticoagulated on apixaban, morbid obesity, BMI 59.9, presented to the ED complaining of worsening shortness of breath, and worsening bilateral lower extremity edema, in spite of taking his diuretics at home.  Reports compliance with diet.  Patient recently saw Dr. Jeffrey, who switched his Bumex to Lasix (according to the patient).In the ED he was found to have significant anasarca, with 3+ bilateral pretibial pitting edema, extending into the upper thighs including scrotal swelling.  Chest x-ray revealed vascular congestion, consistent with acute pulmonary edema.  Received Lasix 80 mg IV x1.    Admitting diagnosis acute on chronic CHF exacerbation, with preserved EF.    Overview/Hospital Course:  55 year old black male, disabled due to mental illness, CHFpEF with echo in 2017 showing LVEF55%. Morbid obesity with EVANGELIST on CPAP, Afib x7 years, s/p several cardioversions in the past, hx HTN, gout and s/p gastric bypass in 2014, admitted w progressive peripheral edema without CP. Recently started on Bumex by Dr. Jeffrey. The patient has significant salt and dietary indiscretion. He complains of weight gain and edema, 2 pillow orthopnea.Patient treated with IV lasix overnight with good effect.  10/24- resting comfortably in bed, no orthopnea. Has put out 3500 cc urine so far. Labs stable, BP under control. Pt counseled about salt and fluid restrictions.    10/25- looks and much better, has diuresed about 9 L so far, wt down from  479 lbs to 459, SOB, orthopnea much better, leg swelling improving. Echo results pending. Using CPAP at night, will continue present care with Lasix and Zaroxolyn.     Interval History: looks and much better, has diuresed about 9 L so far, wt down from 479 lbs to 459, SOB, orthopnea much better, leg swelling improving. Echo results pending. Using CPAP at night, will continue present care with Lasix and Zaroxolyn.       Review of Systems   Constitutional: Negative.  Negative for appetite change, fatigue and fever.   HENT: Negative.  Negative for congestion, nosebleeds and sore throat.    Eyes: Negative.  Negative for visual disturbance.   Respiratory: Positive for shortness of breath. Negative for cough and wheezing.    Cardiovascular: Positive for leg swelling. Negative for chest pain and palpitations.   Gastrointestinal: Negative.  Negative for abdominal pain, constipation, diarrhea, nausea and vomiting.   Endocrine: Negative.  Negative for polyuria.   Genitourinary: Negative.  Negative for dysuria, flank pain, frequency and urgency.   Musculoskeletal: Negative.  Negative for arthralgias, back pain and joint swelling.   Skin: Positive for rash. Negative for color change.   Allergic/Immunologic: Negative.  Negative for immunocompromised state.   Neurological: Negative.  Negative for dizziness, syncope, weakness and headaches.   Hematological: Negative.    Psychiatric/Behavioral: Negative.  Negative for confusion and hallucinations. The patient is not nervous/anxious.    All other systems reviewed and are negative.    Objective:     Vital Signs (Most Recent):  Temp: 97.5 °F (36.4 °C) (10/25/20 1135)  Pulse: 72 (10/25/20 1135)  Resp: 18 (10/25/20 1135)  BP: 129/68 (10/25/20 1135)  SpO2: 96 % (10/25/20 1135) Vital Signs (24h Range):  Temp:  [96.4 °F (35.8 °C)-98 °F (36.7 °C)] 97.5 °F (36.4 °C)  Pulse:  [58-91] 72  Resp:  [16-20] 18  SpO2:  [92 %-98 %] 96 %  BP: (110-129)/(60-72) 129/68     Weight: (!) 204.1 kg (450  lb)  Body mass index is 56.25 kg/m².    Intake/Output Summary (Last 24 hours) at 10/25/2020 1532  Last data filed at 10/25/2020 0300  Gross per 24 hour   Intake 476 ml   Output 8300 ml   Net -7824 ml      Physical Exam  Vitals signs and nursing note reviewed.   Constitutional:       General: He is not in acute distress.     Appearance: He is well-developed. He is obese. He is not diaphoretic.   HENT:      Head: Normocephalic and atraumatic.      Mouth/Throat:      Mouth: Mucous membranes are moist.   Eyes:      General: No scleral icterus.     Conjunctiva/sclera: Conjunctivae normal.   Neck:      Musculoskeletal: Normal range of motion and neck supple.      Thyroid: No thyromegaly.   Cardiovascular:      Rate and Rhythm: Normal rate. Rhythm irregular.      Heart sounds: Normal heart sounds. No murmur.   Pulmonary:      Effort: Pulmonary effort is normal.      Breath sounds: Rales present.   Chest:      Chest wall: No tenderness.   Abdominal:      Palpations: Abdomen is soft.      Tenderness: There is no abdominal tenderness.      Comments: Obese   Musculoskeletal: Normal range of motion.         General: Swelling (2-3+ bilateral pretibial pitting edema) present. No tenderness.   Lymphadenopathy:      Cervical: No cervical adenopathy.   Skin:     General: Skin is warm and dry.      Findings: Rash (Psoriatic appearing rash noted in the lower abdominal region, bilateral lower extremities, and in the upper right shoulder region.) present.   Neurological:      General: No focal deficit present.      Mental Status: He is alert and oriented to person, place, and time.      Cranial Nerves: No cranial nerve deficit.      Motor: No abnormal muscle tone.      Coordination: Coordination normal.   Psychiatric:         Mood and Affect: Mood normal.         Behavior: Behavior normal.         Significant Labs:   BMP:   Recent Labs   Lab 10/24/20  0559   GLU 83      K 3.3*      CO2 34*   BUN 16   CREATININE 1.0   CALCIUM  8.5*   MG 1.9     CBC:   Recent Labs   Lab 10/23/20  1912 10/24/20  0559   WBC 3.40* 2.67*   HGB 12.7* 12.0*   HCT 42.6 40.1   * 107*     Magnesium:   Recent Labs   Lab 10/24/20  0559   MG 1.9     All pertinent labs within the past 24 hours have been reviewed.    Significant Imaging: I have reviewed all pertinent imaging results/findings within the past 24 hours.      Assessment/Plan:      * Acute on chronic congestive heart failure  Echocardiogram done two years ago reveals preserved ejection fraction.  Repeat echocardiogram in a.m..  Patient's oral diuretics have been changed recently from Bumex to Lasix.  Received Lasix 80 mg IV x1 in the ED.  Continue Lasix 80 mg IV every 8 hr x3 doses.  Cardiology consult.  Monitor urine output and renal function.    Continue IV Lasix with fluid and salt restrictions  Add Zaroxolyn    10/25- improving- continue same    Anasarca  Significant anasarca secondary to CHF exacerbation.  Continue IV diuretics.    See above CHF    Pulmonary hypertension  Continue IV diuretics as mentioned above.    Continue CPAP qhs      Chronic atrial fibrillation  Continue home dose metoprolol, apixaban.  Currently rate controlled.  Monitor closely on telemetry.  Cardiology consulted.    Continue home meds    Chronic gout  Continue home dose allopurinol.      Rash  Appears to be chronic, psoriatic appearing.  Follow-up with dermatology as outpatient.      Hypertension  Resume home dose losartan, but hold HCTZ.  Continue metoprolol.      Morbid obesity  BMI 59.9        VTE Risk Mitigation (From admission, onward)         Ordered     IP VTE HIGH RISK PATIENT  Once      10/24/20 0744     apixaban tablet 5 mg  2 times daily      10/23/20 2151     Place sequential compression device  Until discontinued      10/23/20 2022                Discharge Planning   TREVON:      Code Status: Full Code   Is the patient medically ready for discharge?:     Reason for patient still in hospital (select all that  apply): Patient trending condition, Treatment and Consult recommendations                     Pebbles Tenorio MD  Department of Hospital Medicine   [facility]

## 2020-10-25 NOTE — PLAN OF CARE
Pt AAOx4 .POC reviewed with pt and spouse. Pt verbalized understanding   Pt remains free of injuries and falls; fall precaution in place   Afib on tele monitor. HR 50's- 60's  IV saline locked; intact  IV lasix to diuresis   Dwyer clean intact  No C/O of pain  Bed low, side rails up x2, non slip socks in use, call light in reach   Reminded to call for assistance  Hourly rounding complete will continue to monitor

## 2020-10-26 ENCOUNTER — TELEPHONE (OUTPATIENT)
Dept: CARDIOLOGY | Facility: HOSPITAL | Age: 55
End: 2020-10-26

## 2020-10-26 VITALS
HEIGHT: 75 IN | RESPIRATION RATE: 18 BRPM | HEART RATE: 85 BPM | TEMPERATURE: 98 F | DIASTOLIC BLOOD PRESSURE: 60 MMHG | SYSTOLIC BLOOD PRESSURE: 101 MMHG | WEIGHT: 315 LBS | OXYGEN SATURATION: 92 % | BODY MASS INDEX: 39.17 KG/M2

## 2020-10-26 DIAGNOSIS — I50.9 ACUTE ON CHRONIC CONGESTIVE HEART FAILURE, UNSPECIFIED HEART FAILURE TYPE: Primary | ICD-10-CM

## 2020-10-26 PROBLEM — R60.1 ANASARCA: Status: RESOLVED | Noted: 2020-09-12 | Resolved: 2020-10-26

## 2020-10-26 PROBLEM — R21 RASH: Status: RESOLVED | Noted: 2020-10-23 | Resolved: 2020-10-26

## 2020-10-26 LAB
ANION GAP SERPL CALC-SCNC: 7 MMOL/L (ref 8–16)
BUN SERPL-MCNC: 19 MG/DL (ref 6–20)
CALCIUM SERPL-MCNC: 9.6 MG/DL (ref 8.7–10.5)
CHLORIDE SERPL-SCNC: 91 MMOL/L (ref 95–110)
CO2 SERPL-SCNC: 44 MMOL/L (ref 23–29)
CREAT SERPL-MCNC: 1.2 MG/DL (ref 0.5–1.4)
EST. GFR  (AFRICAN AMERICAN): >60 ML/MIN/1.73 M^2
EST. GFR  (NON AFRICAN AMERICAN): >60 ML/MIN/1.73 M^2
GLUCOSE SERPL-MCNC: 79 MG/DL (ref 70–110)
POTASSIUM SERPL-SCNC: 3.2 MMOL/L (ref 3.5–5.1)
SODIUM SERPL-SCNC: 142 MMOL/L (ref 136–145)

## 2020-10-26 PROCEDURE — 94660 CPAP INITIATION&MGMT: CPT | Mod: HCNC

## 2020-10-26 PROCEDURE — 80048 BASIC METABOLIC PNL TOTAL CA: CPT | Mod: HCNC

## 2020-10-26 PROCEDURE — 99900035 HC TECH TIME PER 15 MIN (STAT): Mod: HCNC

## 2020-10-26 PROCEDURE — 63600175 PHARM REV CODE 636 W HCPCS: Mod: HCNC | Performed by: INTERNAL MEDICINE

## 2020-10-26 PROCEDURE — 25000003 PHARM REV CODE 250: Mod: HCNC | Performed by: NURSE PRACTITIONER

## 2020-10-26 PROCEDURE — 96376 TX/PRO/DX INJ SAME DRUG ADON: CPT

## 2020-10-26 PROCEDURE — 27000221 HC OXYGEN, UP TO 24 HOURS: Mod: HCNC

## 2020-10-26 PROCEDURE — 94761 N-INVAS EAR/PLS OXIMETRY MLT: CPT | Mod: HCNC

## 2020-10-26 PROCEDURE — 25000003 PHARM REV CODE 250: Mod: HCNC | Performed by: EMERGENCY MEDICINE

## 2020-10-26 PROCEDURE — G0378 HOSPITAL OBSERVATION PER HR: HCPCS | Mod: HCNC

## 2020-10-26 PROCEDURE — 25000003 PHARM REV CODE 250: Mod: HCNC | Performed by: INTERNAL MEDICINE

## 2020-10-26 PROCEDURE — 99214 PR OFFICE/OUTPT VISIT, EST, LEVL IV, 30-39 MIN: ICD-10-PCS | Mod: HCNC,,, | Performed by: PHYSICIAN ASSISTANT

## 2020-10-26 PROCEDURE — 99214 OFFICE O/P EST MOD 30 MIN: CPT | Mod: HCNC,,, | Performed by: PHYSICIAN ASSISTANT

## 2020-10-26 PROCEDURE — 36415 COLL VENOUS BLD VENIPUNCTURE: CPT | Mod: HCNC

## 2020-10-26 RX ORDER — FUROSEMIDE 80 MG/1
80 TABLET ORAL 2 TIMES DAILY
Qty: 60 TABLET | Refills: 9 | Status: SHIPPED | OUTPATIENT
Start: 2020-10-29 | End: 2021-03-08 | Stop reason: SDUPTHER

## 2020-10-26 RX ORDER — ACETAZOLAMIDE 250 MG/1
250 TABLET ORAL 3 TIMES DAILY
Qty: 9 TABLET | Refills: 0 | Status: SHIPPED | OUTPATIENT
Start: 2020-10-26 | End: 2021-01-27

## 2020-10-26 RX ORDER — ACETAZOLAMIDE 250 MG/1
250 TABLET ORAL 3 TIMES DAILY
Status: DISCONTINUED | OUTPATIENT
Start: 2020-10-26 | End: 2020-10-26 | Stop reason: HOSPADM

## 2020-10-26 RX ORDER — KETOCONAZOLE 20 MG/G
CREAM TOPICAL 2 TIMES DAILY
Qty: 60 G | Refills: 2 | Status: SHIPPED | OUTPATIENT
Start: 2020-10-26 | End: 2021-01-27

## 2020-10-26 RX ORDER — ACETAZOLAMIDE 250 MG/1
250 TABLET ORAL 3 TIMES DAILY
Qty: 20 TABLET | Refills: 1 | Status: SHIPPED | OUTPATIENT
Start: 2020-10-26 | End: 2020-10-26

## 2020-10-26 RX ADMIN — ACETAZOLAMIDE 250 MG: 250 TABLET ORAL at 10:10

## 2020-10-26 RX ADMIN — ALLOPURINOL 100 MG: 100 TABLET ORAL at 09:10

## 2020-10-26 RX ADMIN — FUROSEMIDE 80 MG: 10 INJECTION, SOLUTION INTRAMUSCULAR; INTRAVENOUS at 05:10

## 2020-10-26 RX ADMIN — VENLAFAXINE HYDROCHLORIDE 150 MG: 75 CAPSULE, EXTENDED RELEASE ORAL at 09:10

## 2020-10-26 RX ADMIN — POTASSIUM CHLORIDE 40 MEQ: 1500 TABLET, EXTENDED RELEASE ORAL at 09:10

## 2020-10-26 RX ADMIN — APIXABAN 5 MG: 2.5 TABLET, FILM COATED ORAL at 09:10

## 2020-10-26 RX ADMIN — OXYCODONE HYDROCHLORIDE AND ACETAMINOPHEN 500 MG: 500 TABLET ORAL at 09:10

## 2020-10-26 NOTE — SUBJECTIVE & OBJECTIVE
Review of Systems   Constitution: Positive for malaise/fatigue.   HENT: Negative.    Eyes: Negative.    Cardiovascular: Positive for dyspnea on exertion (much improved) and leg swelling.   Respiratory: Positive for shortness of breath (much improved).    Endocrine: Negative.    Hematologic/Lymphatic: Negative.    Skin: Negative.    Musculoskeletal: Negative.    Gastrointestinal: Negative.    Genitourinary: Negative.    Neurological: Negative.    Psychiatric/Behavioral: Negative.    Allergic/Immunologic: Negative.      Objective:     Vital Signs (Most Recent):  Temp: 97.6 °F (36.4 °C) (10/26/20 1137)  Pulse: 75 (10/26/20 1137)  Resp: 18 (10/26/20 1137)  BP: 101/60 (10/26/20 1137)  SpO2: (!) 92 % (10/26/20 1137) Vital Signs (24h Range):  Temp:  [96.5 °F (35.8 °C)-98.7 °F (37.1 °C)] 97.6 °F (36.4 °C)  Pulse:  [63-85] 75  Resp:  [16-20] 18  SpO2:  [91 %-99 %] 92 %  BP: ()/(50-91) 101/60     Weight: (!) 202.1 kg (445 lb 8.8 oz)  Body mass index is 55.69 kg/m².     SpO2: (!) 92 %  O2 Device (Oxygen Therapy): nasal cannula      Intake/Output Summary (Last 24 hours) at 10/26/2020 1340  Last data filed at 10/26/2020 0400  Gross per 24 hour   Intake 840 ml   Output 7400 ml   Net -6560 ml       Lines/Drains/Airways     Peripheral Intravenous Line                 Peripheral IV - Single Lumen 10/23/20 1912 20 G Left Forearm 2 days                Physical Exam   Constitutional: He is oriented to person, place, and time. He appears well-developed and well-nourished. No distress.   HENT:   Head: Normocephalic and atraumatic.   Eyes: Pupils are equal, round, and reactive to light. Right eye exhibits no discharge. Left eye exhibits no discharge.   Neck: Neck supple. No JVD present.   Cardiovascular: Normal rate, regular rhythm, S1 normal, S2 normal and normal heart sounds.   No murmur heard.  Pulmonary/Chest: Effort normal and breath sounds normal. No respiratory distress. He has no wheezes. He has no rales.   Abdominal:  Soft. He exhibits no distension.   Obese   Musculoskeletal:         General: Edema (BLE) present.   Neurological: He is alert and oriented to person, place, and time.   Skin: Skin is warm and dry. He is not diaphoretic. No erythema.   Psychiatric: He has a normal mood and affect. His behavior is normal. Thought content normal.   Nursing note and vitals reviewed.      Significant Labs:   CMP   Recent Labs   Lab 10/26/20  0711      K 3.2*   CL 91*   CO2 44*   GLU 79   BUN 19   CREATININE 1.2   CALCIUM 9.6   ANIONGAP 7*   ESTGFRAFRICA >60   EGFRNONAA >60   , CBC No results for input(s): WBC, HGB, HCT, PLT in the last 48 hours., INR No results for input(s): INR, PROTIME in the last 48 hours., Troponin No results for input(s): TROPONINI in the last 48 hours. and All pertinent lab results from the last 24 hours have been reviewed.    Significant Imaging: Echocardiogram:   Transthoracic echo (TTE) complete (Cupid Only):   Results for orders placed or performed during the hospital encounter of 10/23/20   Echo Color Flow Doppler? Yes   Result Value Ref Range    BSA 3.29 m2    TDI SEPTAL 0.11 m/s    LV LATERAL E/E' RATIO 3.25 m/s    LV SEPTAL E/E' RATIO 5.91 m/s    LA WIDTH 4.50 cm    TDI LATERAL 0.20 m/s    LVIDd 4.77 3.5 - 6.0 cm    IVS 1.33 (A) 0.6 - 1.1 cm    Posterior Wall 1.51 (A) 0.6 - 1.1 cm    LVIDs 3.24 2.1 - 4.0 cm    FS 32 28 - 44 %    LA volume 128.13 cm3    Sinus 2.72 cm    STJ 3.41 cm    Ascending aorta 3.23 cm    LV mass 276.97 g    LA size 4.91 cm    RVDD 5.22 cm    Left Ventricle Relative Wall Thickness 0.63 cm    AV mean gradient 3 mmHg    AV valve area 2.74 cm2    AV Velocity Ratio 0.53     AV index (prosthetic) 0.48     MV valve area p 1/2 method 4.23 cm2    E/A ratio 1.81     Mean e' 0.16 m/s    E wave decelartion time 179.30 msec    IVRT 88.49 msec    LVOT diameter 2.71 cm    LVOT area 5.8 cm2    LVOT peak anil 0.61 m/s    LVOT peak VTI 10.86 cm    Ao peak anil 1.15 m/s    Ao VTI 22.82 cm    RVOT  peak jw 0.88 m/s    RVOT peak VTI 16.34 cm    LVOT stroke volume 62.61 cm3    AV peak gradient 5 mmHg    PV mean gradient 2.01 mmHg    E/E' ratio 4.19 m/s    MV Peak E Jw 0.65 m/s    TR Max Jw 3.42 m/s    MV stenosis pressure 1/2 time 52.00 ms    MV Peak A Jw 0.36 m/s    LV Systolic Volume 42.32 mL    LV Systolic Volume Index 13.7 mL/m2    LV Diastolic Volume 105.76 mL    LV Diastolic Volume Index 34.15 mL/m2    LA Volume Index 41.4 mL/m2    LV Mass Index 89 g/m2    RA Major Axis 9.56 cm    Left Atrium Minor Axis 5.92 cm    Left Atrium Major Axis 8.05 cm    Triscuspid Valve Regurgitation Peak Gradient 47 mmHg    RA Width 8.63 cm    Right Atrial Pressure (from IVC) 8 mmHg    TV rest pulmonary artery pressure 55 mmHg    Narrative    · Severe right ventricular enlargement.  · Severe right atrial enlargement.  · There is mild left ventricular concentric hypertrophy.  · With low normal systolic function. The estimated ejection fraction is   50%.  · Indeterminate diastolic function.  · Mild left atrial enlargement.  · Moderately reduced right ventricular systolic function.  · Interatrial septum bows to left, consider elevated right atrial   pressure.  · Moderate to severe tricuspid regurgitation.  · Mild pulmonic regurgitation.  · Intermediate central venous pressure (8 mmHg).  · The estimated PA systolic pressure is 55 mmHg.  · There is pulmonary hypertension.      , EKG: Reviewed and X-Ray: CXR: X-Ray Chest 1 View (CXR): No results found for this visit on 10/23/20. and X-Ray Chest PA and Lateral (CXR): No results found for this visit on 10/23/20.

## 2020-10-26 NOTE — DISCHARGE SUMMARY
[facility] Hospital Medicine  Discharge Summary      Patient Name: Caio Ontiveros  MRN: [id number]  Admission Date: 10/23/2020  Hospital Length of Stay: 0 days  Discharge Date and Time:  10/26/2020 2:25 PM  Attending Physician: Pebbles Tenorio MD   Discharging Provider: Pebbles Tenorio MD  Primary Care Provider: Scottie Alcantar MD      HPI:   Mr. Ontiveros is a morbidly obese 55-year-old male with PMH significant for CHF with preserved ejection fraction, chronic atrial fibrillation, anticoagulated on apixaban, morbid obesity, BMI 59.9, presented to the ED complaining of worsening shortness of breath, and worsening bilateral lower extremity edema, in spite of taking his diuretics at home.  Reports compliance with diet.  Patient recently saw Dr. Jeffrey, who switched his Bumex to Lasix (according to the patient).In the ED he was found to have significant anasarca, with 3+ bilateral pretibial pitting edema, extending into the upper thighs including scrotal swelling.  Chest x-ray revealed vascular congestion, consistent with acute pulmonary edema.  Received Lasix 80 mg IV x1.    Admitting diagnosis acute on chronic CHF exacerbation, with preserved EF.    * No surgery found *      Hospital Course:   55 year old black male, disabled due to mental illness, CHFpEF with echo in 2017 showing LVEF55%. Morbid obesity with EVANGELIST on CPAP, Afib x7 years, s/p several cardioversions in the past, hx HTN, gout and s/p gastric bypass in 2014, admitted w progressive peripheral edema without CP. Recently started on Bumex by Dr. Jeffrey. The patient has significant salt and dietary indiscretion. He complains of weight gain and edema, 2 pillow orthopnea.Patient treated with IV lasix overnight with good effect.  10/24- resting comfortably in bed, no orthopnea. Has put out 3500 cc urine so far. Labs stable, BP under control. Pt counseled about salt and fluid restrictions.   10/25- looks and much better, has diuresed about 9 L so far, wt down from  479 lbs to 459, SOB, orthopnea much better, leg swelling improving. Echo results pending. Using CPAP at night, will continue present care with Lasix and Zaroxolyn.   10/26- looks and feels much better, had a great diuresis and put out over 7 L of urine again since yesterday. Overall has lost 17 L of Fluid, wt has gone down from 218 Kg( 479 lbs) to 202 kg ( 445 lbs). He is eating drinking well, walking around well and all his Sx have significantly improved or resolved. His labs today showed Contraction Alkalosis with HCO3 44- hence Lasix/ Zaroxolyn held for 3 days and replaced by Diamox 250 tid. Pt will be seen by Cards and labs repeated and may resume Lasix. His Echo showed low normal EF 50% with mod Pulm HTN of 55 mm Hg.   He and his wife have been counseled about fluid and salt restrictions which they understand and accept. Cards have also cleared him. He was seen and examined and deemed stable for discharge home today. He already has home O2 and has home CPAP which he uses regularly.      Consults:   Consults (From admission, onward)        Status Ordering Provider     Inpatient consult to Cardiology  Once     Provider:  Umesh Santillan MD    Completed KRISTI CHOU          No new Assessment & Plan notes have been filed under this hospital service since the last note was generated.  Service: Hospital Medicine    Final Active Diagnoses:    Diagnosis Date Noted POA    Pulmonary hypertension [I27.20] 12/20/2012 Yes    Chronic atrial fibrillation [I48.20] 12/20/2012 Yes    Chronic gout [M1A.9XX0] 10/24/2017 Yes    Morbid obesity [E66.01] 12/20/2012 Yes    Hypertension [I10] 12/20/2012 Yes    Acute on chronic diastolic congestive heart failure [I50.33] 12/20/2012 Unknown      Problems Resolved During this Admission:    Diagnosis Date Noted Date Resolved POA    PRINCIPAL PROBLEM:  Acute on chronic congestive heart failure [I50.9] 12/19/2012 10/26/2020 Yes    Anasarca [R60.1] 09/12/2020 10/26/2020 Yes     La [R21] 10/23/2020 10/26/2020 Yes       Discharged Condition: stable    Disposition: Home-Health Care Svc    Follow Up:  Follow-up Information     HERIBERTO Crouch Schedule an appointment as soon as possible for a visit in 3 days.    Specialty: Cardiology  Why: Hospital follow up, please repeat BMP as pt is on high dose lasix and Diamox  Contact information:  [address] [place] [address] [address] [address] [phone]                 Patient Instructions:      Ambulatory referral/consult to Home Health   Referral Priority: Routine Referral Type: Home Health   Referral Reason: Specialty Services Required   Requested Specialty: Home Health Services   Number of Visits Requested: 1     Diet Cardiac     Activity as tolerated       Significant Diagnostic Studies: Labs:   BMP:   Recent Labs   Lab 10/26/20  0711   GLU 79      K 3.2*   CL 91*   CO2 44*   BUN 19   CREATININE 1.2   CALCIUM 9.6   , CMP   Recent Labs   Lab 10/26/20  0711      K 3.2*   CL 91*   CO2 44*   GLU 79   BUN 19   CREATININE 1.2   CALCIUM 9.6   ANIONGAP 7*   ESTGFRAFRICA >60   EGFRNONAA >60   All labs within the past 24 hours have been reviewed    Cardiac Graphics: Echocardiogram:   2D echo with color flow doppler:   Results for orders placed or performed during the hospital encounter of 05/16/17   2D Echo w/ Color Flow Doppler   Result Value Ref Range    QEF 55 55 - 65    Diastolic Dysfunction No     Est. PA Systolic Pressure 23.98     Mitral Valve Mobility NORMAL     Tricuspid Valve Regurgitation TRIVIAL     Narrative    Date of Procedure: 05/16/2017        TEST DESCRIPTION   Technical Quality: This is a technically adequate study.     Aorta: The aortic root is normal in size, measuring 3.7 cm at sinotubular junction.     Left Atrium: The left atrial volume index is mildly enlarged, measuring 37.23 cc/m2.     Left Ventricle: The left ventricle is normal in size, with an end-diastolic diameter of 6.4 cm, and an end-systolic diameter of  4.1 cm. LV wall thickness is normal, with the septum measuring 1.0 cm and the posterior wall measuring 0.8 cm across. Relative   wall thickness was normal at 0.25, and the LV mass index was 103.7 g/m2 consistent with normal left ventricular mass. There are no regional wall motion abnormalities. Left ventricular systolic function appears normal. Visually estimated ejection   fraction is 55-60%.     Diastolic indices: E wave velocity 0.8 m/s, E/A ratio 0.6,  msec., E/e' ratio(lat) 7. Diastolic function is normal.     Right Atrium: The right atrium is normal in size.     Right Ventricle: The right ventricle is normal in size measuring 5.1 cm at the base in the apical right ventricle-focused view. Global right ventricular systolic function appears normal. The estimated PA systolic pressure is 24 mmHg.     Aortic Valve:  The aortic valve is normal in structure with normal leaflet mobility. The aortic valve is tri-leaflet in structure.     Mitral Valve:  The mitral valve is normal in structure with normal leaflet mobility. The pressure half time is 79 msec. The calculated mitral valve area is 2.78 cm2.     Tricuspid Valve:  The tricuspid valve is normal in structure with normal leaflet mobility. There is trivial tricuspid regurgitation.     Pulmonary Valve:  The pulmonic valve is normal in structure with normal leaflet mobility.     IVC: IVC is normal in size and collapses > 50% with a sniff, suggesting normal right atrial pressure of 3 mmHg.     Atrial Septum: The atrial septum is intact.     Intracavitary: There is no evidence of pericardial effusion, intracavity mass, thrombi, or vegetation.         CONCLUSIONS     1 - Normal left ventricular systolic function (EF 55-60%).     2 - Normal left ventricular diastolic function.     3 - Normal right ventricular systolic function .     4 - The estimated PA systolic pressure is 24 mmHg.     5 - No wall motion abnormalities.             This document has been  electronically    SIGNED BY: Mercedes Nicolas MD On: 05/16/2017 14:46       Pending Diagnostic Studies:     None         Medications:  Reconciled Home Medications:      Medication List      START taking these medications    acetaZOLAMIDE 250 MG tablet  Commonly known as: DIAMOX  Take 1 tablet (250 mg total) by mouth 3 (three) times daily. for 3 days        CHANGE how you take these medications    allopurinoL 300 MG tablet  Commonly known as: ZYLOPRIM  Take 1 tablet (300 mg total) by mouth once daily. Total of 400 mg daily  What changed: Another medication with the same name was removed. Continue taking this medication, and follow the directions you see here.     furosemide 80 MG tablet  Commonly known as: LASIX  Take 1 tablet (80 mg total) by mouth 2 (two) times a day. Please hold for 3 days and start  Start taking on: October 29, 2020  What changed:   · additional instructions  · These instructions start on October 29, 2020. If you are unsure what to do until then, ask your doctor or other care provider.        CONTINUE taking these medications    apixaban 5 mg Tab  Commonly known as: ELIQUIS  Take 1 tablet (5 mg total) by mouth 2 (two) times daily.     calcium phosphate-vitamin D3 250-400 mg-unit Chew     clotrimazole-betamethasone lotion  Commonly known as: LOTRISONE  Apply topically 2 (two) times daily.     COLCRYS 0.6 mg tablet  Generic drug: colchicine  Take 1 tablet by mouth twice daily for 14 days     gabapentin 300 MG capsule  Commonly known as: NEURONTIN  Take 1 capsule (300 mg total) by mouth every evening.     losartan-hydrochlorothiazide 100-25 mg 100-25 mg per tablet  Commonly known as: HYZAAR  TAKE 1 TABLET EVERY DAY     metoprolol succinate 25 MG 24 hr tablet  Commonly known as: TOPROL-XL  Take 1 tablet (25 mg total) by mouth 2 (two) times daily.     potassium chloride SA 20 MEQ tablet  Commonly known as: K-DUR,KLOR-CON  Take 20 mEq by mouth.     QUEtiapine 100 MG Tab  Commonly known as: SEROQUEL  Take  1 tablet (100 mg total) by mouth every evening.     venlafaxine 150 MG Cp24  Commonly known as: EFFEXOR-XR  Take 1 capsule (150 mg total) by mouth once daily.     VITAMIN C 100 MG tablet  Generic drug: ascorbic acid (vitamin C)  Take 100 mg by mouth once daily.            Indwelling Lines/Drains at time of discharge:   Lines/Drains/Airways     None                 Time spent on the discharge of patient: 57 minutes  Patient was seen and examined on the date of discharge and determined to be suitable for discharge.         Pebbles Tenorio MD  Department of Hospital Medicine  [facility]

## 2020-10-26 NOTE — PROGRESS NOTES
Discharge instructions given to patient and spouse, verbalized understanding - IV removed without difficulty, pressure dressing applied to site - cardiac monitor removed and returned to monitor tech - patient to car via wheelchair wearing face mask, without distress noted.

## 2020-10-26 NOTE — PROGRESS NOTES
Home Oxygen Evaluation    Date Performed: 10/26/2020    1) Patient's Home O2 Sat on room air, while at rest: 85        If O2 sats on room air at rest are 88% or below, patient qualifies. No additional testing needed. Document N/A in steps 2 and 3. If 89% or above, complete steps 2.      2) Patient's O2 Sat on room air while exercising: NA        If O2 sats on room air while exercising remain 89% or above patient does not qualify, no further testing needed Document N/A in step 3. If O2 sats on room air while exercising are 88% or below, continue to step 3.      3) Patient's O2 Sat while exercising on O2: NA at NA LPM         (Must show improvement from #2 for patients to qualify)    If O2 sats improve on oxygen, patient qualifies for portable oxygen. If not, the patient does not qualify.

## 2020-10-26 NOTE — PLAN OF CARE
Swer met with pt and pts spouse at bedside for initial assessment. Pt lives with spouse and was independent with ADLs prior to admission. Pt's spouse will provide transportation at discharge. Pt denied having home health in the past. Pt uses a cane and oxygen. Pt does not have a problem obtaining medication and drives self to medical appointments. Pt does not have an advanced directive at this time. SWer provided a transitional care folder, information on advanced directives, information on pharmacy bedside delivery, and discharge planning begins on admission with contact information for any needs/questions.    PCP; Scottie Alcantar MD  Pharm;   [facility] - [place], [place] - [address]  [address] [address]  Phone: [phone] Fax: [phone]    [facility] - [place], [place] - [address]  [address] [address] [address]  Phone: [phone] Fax: [phone]  My Chart; Active  Bedside Delivery; Yes          10/26/20 1031   Discharge Assessment   Assessment Type Discharge Planning Assessment   Confirmed/corrected address and phone number on facesheet? Yes   Assessment information obtained from? Patient   Communicated expected length of stay with patient/caregiver yes   Prior to hospitilization cognitive status: Alert/Oriented   Prior to hospitalization functional status: Needs Assistance   Current cognitive status: Alert/Oriented   Current Functional Status: Needs Assistance   Facility Arrived From: home   Lives With spouse   Is patient able to care for self after discharge? Yes   Who are your caregiver(s) and their phone number(s)? Rc Ontiveros (Spouse) [phone]   Patient's perception of discharge disposition home or selfcare   Readmission Within the Last 30 Days no previous admission in last 30 days   Patient currently being followed by outpatient case management? No   Patient currently receives any other outside agency services? No    Equipment Currently Used at Home cane, straight;oxygen   Do you have any problems affording any of your prescribed medications? No   Is the patient taking medications as prescribed? yes   Does the patient have transportation home? Yes   Transportation Anticipated family or friend will provide   Discharge Plan A Home with family   DME Needed Upon Discharge  none   Patient/Family in Agreement with Plan yes

## 2020-10-26 NOTE — PROGRESS NOTES
[facility] -   Cardiology  Progress Note    Patient Name: Caio Ontiveros  MRN: [id number]  Admission Date: 10/23/2020  Hospital Length of Stay: 0 days  Code Status: Full Code   Attending Physician: Pebbles Tenorio MD   Primary Care Physician: Scottie Alcantar MD  Expected Discharge Date: 10/26/2020  Principal Problem:Acute on chronic congestive heart failure    Subjective:   HPI:  Pleasant 55 year old black male presents with progressive peripheral edema. Denies chest pain. Recently seen by Dr Jeffrey and initiated Bumex as new diuretic. The patient has significant salt and dietary indiscretion. He complains of weight gain and edema.  PMH significant for CHFpEF with echo in 2017 showing LVEF55%. Morbid obesity, Afib x7 years. Several cardioversions in the past which did not last very long in NSR according to the patient. History of OAS CPAP. 2 pillow orthopnea. hisory of hypertension , gout and s/p gastric bypass in 2014,  Patient treated with IV lasix overnight with good effect.    Hospital Course:   Patient seen and examined this AM. Treated for edema and pulmonary edema. Diuresis of 3500 cc overnight. Denies chest pain and shortness of breath is improved. Peripheral edema is improved and will continue with IV lasix today.  Hypertension is improved and will continue metoprolol and increase losartan.  10/25/20: PATIENT SEEN AND EXAMINED THIS AM. HE HAS HAD EXCELLENT DIURESIS OVERNIGHT OF ALMOST 8 LITERS OF FLUID. PATIENT FEELS IMPROVED AND LESS EDEMA AND HE CAN LIE FLAT AND HAS NORMAL BREATHING. WILL CONTINUE IV DIURESIS TODAY AND POSSIBLE CHANGE TO ORAL AGENTS AND DISCHARGE TOMORROW.  PLAN CARDIAC ECHO TODAY IF FEASIBLE.    10/26/2020-Patient seen and examined today, sitting up in bed. Feels well, nearly back to his baseline. Excellent diuresis again overnight. No chest pain. Labs reviewed, BMP shows contraction alkalosis. Echo reviewed, EF 50%, reduced RV function, pulmonary HTN.         Review of Systems    Constitution: Positive for malaise/fatigue.   HENT: Negative.    Eyes: Negative.    Cardiovascular: Positive for dyspnea on exertion (much improved) and leg swelling.   Respiratory: Positive for shortness of breath (much improved).    Endocrine: Negative.    Hematologic/Lymphatic: Negative.    Skin: Negative.    Musculoskeletal: Negative.    Gastrointestinal: Negative.    Genitourinary: Negative.    Neurological: Negative.    Psychiatric/Behavioral: Negative.    Allergic/Immunologic: Negative.      Objective:     Vital Signs (Most Recent):  Temp: 97.6 °F (36.4 °C) (10/26/20 1137)  Pulse: 75 (10/26/20 1137)  Resp: 18 (10/26/20 1137)  BP: 101/60 (10/26/20 1137)  SpO2: (!) 92 % (10/26/20 1137) Vital Signs (24h Range):  Temp:  [96.5 °F (35.8 °C)-98.7 °F (37.1 °C)] 97.6 °F (36.4 °C)  Pulse:  [63-85] 75  Resp:  [16-20] 18  SpO2:  [91 %-99 %] 92 %  BP: ()/(50-91) 101/60     Weight: (!) 202.1 kg (445 lb 8.8 oz)  Body mass index is 55.69 kg/m².     SpO2: (!) 92 %  O2 Device (Oxygen Therapy): nasal cannula      Intake/Output Summary (Last 24 hours) at 10/26/2020 1340  Last data filed at 10/26/2020 0400  Gross per 24 hour   Intake 840 ml   Output 7400 ml   Net -6560 ml       Lines/Drains/Airways     Peripheral Intravenous Line                 Peripheral IV - Single Lumen 10/23/20 1912 20 G Left Forearm 2 days                Physical Exam   Constitutional: He is oriented to person, place, and time. He appears well-developed and well-nourished. No distress.   HENT:   Head: Normocephalic and atraumatic.   Eyes: Pupils are equal, round, and reactive to light. Right eye exhibits no discharge. Left eye exhibits no discharge.   Neck: Neck supple. No JVD present.   Cardiovascular: IRRR, S1 normal, S2 normal and normal heart sounds.   No murmur heard.  Pulmonary/Chest: Effort normal and breath sounds normal. No respiratory distress. He has no wheezes. He has no rales.   Abdominal: Soft. He exhibits no distension.   Obese    Musculoskeletal:         General: Edema (BLE) present.   Neurological: He is alert and oriented to person, place, and time.   Skin: Skin is warm and dry. He is not diaphoretic. No erythema.   Psychiatric: He has a normal mood and affect. His behavior is normal. Thought content normal.   Nursing note and vitals reviewed.      Significant Labs:   CMP   Recent Labs   Lab 10/26/20  0711      K 3.2*   CL 91*   CO2 44*   GLU 79   BUN 19   CREATININE 1.2   CALCIUM 9.6   ANIONGAP 7*   ESTGFRAFRICA >60   EGFRNONAA >60   , CBC No results for input(s): WBC, HGB, HCT, PLT in the last 48 hours., INR No results for input(s): INR, PROTIME in the last 48 hours., Troponin No results for input(s): TROPONINI in the last 48 hours. and All pertinent lab results from the last 24 hours have been reviewed.    Significant Imaging: Echocardiogram:   Transthoracic echo (TTE) complete (Cupid Only):   Results for orders placed or performed during the hospital encounter of 10/23/20   Echo Color Flow Doppler? Yes   Result Value Ref Range    BSA 3.29 m2    TDI SEPTAL 0.11 m/s    LV LATERAL E/E' RATIO 3.25 m/s    LV SEPTAL E/E' RATIO 5.91 m/s    LA WIDTH 4.50 cm    TDI LATERAL 0.20 m/s    LVIDd 4.77 3.5 - 6.0 cm    IVS 1.33 (A) 0.6 - 1.1 cm    Posterior Wall 1.51 (A) 0.6 - 1.1 cm    LVIDs 3.24 2.1 - 4.0 cm    FS 32 28 - 44 %    LA volume 128.13 cm3    Sinus 2.72 cm    STJ 3.41 cm    Ascending aorta 3.23 cm    LV mass 276.97 g    LA size 4.91 cm    RVDD 5.22 cm    Left Ventricle Relative Wall Thickness 0.63 cm    AV mean gradient 3 mmHg    AV valve area 2.74 cm2    AV Velocity Ratio 0.53     AV index (prosthetic) 0.48     MV valve area p 1/2 method 4.23 cm2    E/A ratio 1.81     Mean e' 0.16 m/s    E wave decelartion time 179.30 msec    IVRT 88.49 msec    LVOT diameter 2.71 cm    LVOT area 5.8 cm2    LVOT peak anil 0.61 m/s    LVOT peak VTI 10.86 cm    Ao peak anil 1.15 m/s    Ao VTI 22.82 cm    RVOT peak anil 0.88 m/s    RVOT peak VTI 16.34 cm     LVOT stroke volume 62.61 cm3    AV peak gradient 5 mmHg    PV mean gradient 2.01 mmHg    E/E' ratio 4.19 m/s    MV Peak E Jw 0.65 m/s    TR Max Jw 3.42 m/s    MV stenosis pressure 1/2 time 52.00 ms    MV Peak A Jw 0.36 m/s    LV Systolic Volume 42.32 mL    LV Systolic Volume Index 13.7 mL/m2    LV Diastolic Volume 105.76 mL    LV Diastolic Volume Index 34.15 mL/m2    LA Volume Index 41.4 mL/m2    LV Mass Index 89 g/m2    RA Major Axis 9.56 cm    Left Atrium Minor Axis 5.92 cm    Left Atrium Major Axis 8.05 cm    Triscuspid Valve Regurgitation Peak Gradient 47 mmHg    RA Width 8.63 cm    Right Atrial Pressure (from IVC) 8 mmHg    TV rest pulmonary artery pressure 55 mmHg    Narrative    · Severe right ventricular enlargement.  · Severe right atrial enlargement.  · There is mild left ventricular concentric hypertrophy.  · With low normal systolic function. The estimated ejection fraction is   50%.  · Indeterminate diastolic function.  · Mild left atrial enlargement.  · Moderately reduced right ventricular systolic function.  · Interatrial septum bows to left, consider elevated right atrial   pressure.  · Moderate to severe tricuspid regurgitation.  · Mild pulmonic regurgitation.  · Intermediate central venous pressure (8 mmHg).  · The estimated PA systolic pressure is 55 mmHg.  · There is pulmonary hypertension.      , EKG: Reviewed and X-Ray: CXR: X-Ray Chest 1 View (CXR): No results found for this visit on 10/23/20. and X-Ray Chest PA and Lateral (CXR): No results found for this visit on 10/23/20.    Assessment and Plan:   Patient who presents with decompensated diastolic CHF/pulmonary HTN. Clinically improved. Close f/u in clinic.    Chronic atrial fibrillation  -Stable heart rate and continue lopressor.  -Chronic anticoagulation with eliquis.    Pulmonary hypertension  -Continue fluid management. Continued blood pressure control.    Hypertension  -Controlled  -Continue ARB, BB    Morbid obesity  Needs  counseling for wt loss    Acute on chronic diastolic congestive heart failure  -Presents with decompensated diastolic CHF  -Much improved since admission  -Ok to switch to po diuretics  -Continue BB, ARB  -Patient extensively counseled on dietary restrictions  -Close f/u in clinic        VTE Risk Mitigation (From admission, onward)         Ordered     IP VTE HIGH RISK PATIENT  Once      10/24/20 0744     apixaban tablet 5 mg  2 times daily      10/23/20 2151     Place sequential compression device  Until discontinued      10/23/20 2022                Elizabeth Price PA-C  Cardiology  [facility]

## 2020-10-26 NOTE — PLAN OF CARE
Pt AAOx4 .POC reviewed with pt and wife. Pt verbalized understanding   Pt remains free of injuries and falls; fall precaution in place   SR on tele monitor. HR 60's -70's  IV saline locked; intact  Dwyer intact  IV lasix to diuresis   No C/O of pain  Room air; Bipap at night   Bed low, side rails up x2, non slip socks in use, call light in reach   Reminded to call for assistance  Hourly rounding complete will continue to monitor

## 2020-10-26 NOTE — PROGRESS NOTES
Dwyer catheter removed at this time  Urinal provided and with in his reach    Pt instructed to call when he has urinated   Educated he has 6 hours to void   Bladder scanning protocol explained should the need arise for scanning   He verbalized understanding

## 2020-10-26 NOTE — ASSESSMENT & PLAN NOTE
-Presents with decompensated diastolic CHF  -Much improved since admission  -Ok to switch to po diuretics  -Continue BB, ARB  -Patient extensively counseled on dietary restrictions  -Close f/u in clinic

## 2020-10-27 NOTE — PLAN OF CARE
10/27/20 0833   Final Note   Assessment Type Final Discharge Note   Anticipated Discharge Disposition Home   Right Care Referral Info   Post Acute Recommendation No Care

## 2020-10-29 ENCOUNTER — TELEPHONE (OUTPATIENT)
Dept: CARDIOLOGY | Facility: CLINIC | Age: 55
End: 2020-10-29

## 2020-10-29 ENCOUNTER — TELEPHONE (OUTPATIENT)
Dept: RHEUMATOLOGY | Facility: CLINIC | Age: 55
End: 2020-10-29

## 2020-10-29 DIAGNOSIS — I50.9 ACUTE ON CHRONIC CONGESTIVE HEART FAILURE, UNSPECIFIED HEART FAILURE TYPE: Primary | ICD-10-CM

## 2020-10-29 DIAGNOSIS — M1A.00X0 IDIOPATHIC CHRONIC GOUT WITHOUT TOPHUS, UNSPECIFIED SITE: ICD-10-CM

## 2020-10-29 DIAGNOSIS — I50.33 ACUTE ON CHRONIC DIASTOLIC CONGESTIVE HEART FAILURE: ICD-10-CM

## 2020-10-29 RX ORDER — COLCHICINE 0.6 MG/1
0.6 TABLET, FILM COATED ORAL DAILY
Qty: 60 TABLET | Refills: 2 | Status: SHIPPED | OUTPATIENT
Start: 2020-10-29 | End: 2021-01-27

## 2020-10-29 NOTE — TELEPHONE ENCOUNTER
The patient has been notified of this information and all questions answered.  Labs have been scheduled prior to upcoming appt.

## 2020-11-05 ENCOUNTER — OFFICE VISIT (OUTPATIENT)
Dept: CARDIOLOGY | Facility: CLINIC | Age: 55
End: 2020-11-05
Payer: MEDICARE

## 2020-11-05 VITALS
DIASTOLIC BLOOD PRESSURE: 70 MMHG | WEIGHT: 315 LBS | SYSTOLIC BLOOD PRESSURE: 122 MMHG | OXYGEN SATURATION: 95 % | BODY MASS INDEX: 53.04 KG/M2 | HEART RATE: 81 BPM

## 2020-11-05 DIAGNOSIS — I27.20 PULMONARY HTN: ICD-10-CM

## 2020-11-05 DIAGNOSIS — R79.89 TROPONIN LEVEL ELEVATED: ICD-10-CM

## 2020-11-05 DIAGNOSIS — G47.33 OBSTRUCTIVE SLEEP APNEA: ICD-10-CM

## 2020-11-05 DIAGNOSIS — R06.02 SOB (SHORTNESS OF BREATH): Primary | ICD-10-CM

## 2020-11-05 DIAGNOSIS — I48.20 CHRONIC ATRIAL FIBRILLATION: ICD-10-CM

## 2020-11-05 DIAGNOSIS — I50.32 CHRONIC DIASTOLIC CONGESTIVE HEART FAILURE: ICD-10-CM

## 2020-11-05 DIAGNOSIS — I87.2 VENOUS INSUFFICIENCY: ICD-10-CM

## 2020-11-05 DIAGNOSIS — I50.810 RVF (RIGHT VENTRICULAR FAILURE): ICD-10-CM

## 2020-11-05 DIAGNOSIS — E66.01 MORBID OBESITY: ICD-10-CM

## 2020-11-05 PROCEDURE — 3078F PR MOST RECENT DIASTOLIC BLOOD PRESSURE < 80 MM HG: ICD-10-PCS | Mod: HCNC,CPTII,S$GLB, | Performed by: INTERNAL MEDICINE

## 2020-11-05 PROCEDURE — 99999 PR PBB SHADOW E&M-EST. PATIENT-LVL III: ICD-10-PCS | Mod: PBBFAC,HCNC,, | Performed by: INTERNAL MEDICINE

## 2020-11-05 PROCEDURE — 99215 PR OFFICE/OUTPT VISIT, EST, LEVL V, 40-54 MIN: ICD-10-PCS | Mod: HCNC,S$GLB,, | Performed by: INTERNAL MEDICINE

## 2020-11-05 PROCEDURE — 3078F DIAST BP <80 MM HG: CPT | Mod: HCNC,CPTII,S$GLB, | Performed by: INTERNAL MEDICINE

## 2020-11-05 PROCEDURE — 3074F SYST BP LT 130 MM HG: CPT | Mod: HCNC,CPTII,S$GLB, | Performed by: INTERNAL MEDICINE

## 2020-11-05 PROCEDURE — 3008F PR BODY MASS INDEX (BMI) DOCUMENTED: ICD-10-PCS | Mod: HCNC,CPTII,S$GLB, | Performed by: INTERNAL MEDICINE

## 2020-11-05 PROCEDURE — 3074F PR MOST RECENT SYSTOLIC BLOOD PRESSURE < 130 MM HG: ICD-10-PCS | Mod: HCNC,CPTII,S$GLB, | Performed by: INTERNAL MEDICINE

## 2020-11-05 PROCEDURE — 99215 OFFICE O/P EST HI 40 MIN: CPT | Mod: HCNC,S$GLB,, | Performed by: INTERNAL MEDICINE

## 2020-11-05 PROCEDURE — 99999 PR PBB SHADOW E&M-EST. PATIENT-LVL III: CPT | Mod: PBBFAC,HCNC,, | Performed by: INTERNAL MEDICINE

## 2020-11-05 PROCEDURE — 3008F BODY MASS INDEX DOCD: CPT | Mod: HCNC,CPTII,S$GLB, | Performed by: INTERNAL MEDICINE

## 2020-11-05 RX ORDER — METOPROLOL SUCCINATE 25 MG/1
25 TABLET, EXTENDED RELEASE ORAL 2 TIMES DAILY
Qty: 180 TABLET | Refills: 3 | Status: SHIPPED | OUTPATIENT
Start: 2020-11-05 | End: 2021-05-06 | Stop reason: SDUPTHER

## 2020-11-05 NOTE — PROGRESS NOTES
Subjective:   Patient ID:  Caio Ontiveros is a 55 y.o. male who presents for follow up of Shortness of Breath      53 yo male, came in for discahrge f/u  PMH CHFpEF, chroic Afib for 7 yrs CHFpEF, obesity s/p gastric bypass in 2014. EVANGELIST Off cpap for 5 yrs   ECHO in 2017 EF 55%  09/06/2020 went to ER at [facility] due to worsening dyspnea. BNP 1100, troponin 0.036, CXR mild edema, ekg showed afib. Had IV lasix  And good diuresis.    ChestCT w/o multiple nodule     visit states that sleeps on 2 pillows, + PND snores,  SOb, palpitation  No chest pain, dizziness  Did drink a lot of water  His wife cooks at home and f/u low sodium protocol   Lasix was d/c and Bumex 2mg tid added  Pharmacist only gave him 1 mg tid. Went to ER 2 days ago. EKG AFIB and HR 91 bpm. BNP and BMP no change  CXR no acute change. Had Lasix IV and d/c  Lost 6 pounds in 1 week     admitted for CHF exacerbation and had IV diuretics.  ECHo showed RV moderatelt dilated and RV function decreased. PAP 55 mmHG  Slight elevation of troponin to 0.036  BNPelevatde    Today weight lost for 20 pounds  GAYTAN improved.  sleepsed with 2 to 3 pillows  Home O2 PRN  CPAP pending              Past Medical History:   Diagnosis Date    A-fib     CHF (congestive heart failure)     Gout, chronic     Hypertension     Sleep apnea        Past Surgical History:   Procedure Laterality Date    CHOLECYSTECTOMY      GASTRIC BYPASS  2014       Social History     Tobacco Use    Smoking status: Never Smoker    Smokeless tobacco: Never Used   Substance Use Topics    Alcohol use: Yes     Frequency: Monthly or less     Binge frequency: Never     Comment: sometimes    Drug use: No       Family History   Problem Relation Age of Onset    Hypertension Mother     Stroke Father     Diabetes Father     Hypertension Father     Diabetes Sister     Diabetes Brother          Review of Systems   Constitution: Positive for malaise/fatigue. Negative for  decreased appetite, diaphoresis, fever and night sweats.   HENT: Negative for nosebleeds.    Eyes: Negative for blurred vision and double vision.   Cardiovascular: Positive for dyspnea on exertion, leg swelling and palpitations. Negative for chest pain, claudication, irregular heartbeat, near-syncope, orthopnea, paroxysmal nocturnal dyspnea and syncope.   Respiratory: Positive for shortness of breath and snoring. Negative for cough, sleep disturbances due to breathing, sputum production and wheezing.    Endocrine: Negative for cold intolerance and polyuria.   Hematologic/Lymphatic: Does not bruise/bleed easily.   Skin: Negative for rash.   Musculoskeletal: Negative for back pain, falls, joint pain, joint swelling and neck pain.   Gastrointestinal: Negative for abdominal pain, heartburn, nausea and vomiting.   Genitourinary: Negative for dysuria, frequency and hematuria.   Neurological: Positive for dizziness. Negative for difficulty with concentration, focal weakness, headaches, light-headedness, numbness, seizures and weakness.   Psychiatric/Behavioral: Negative for depression, memory loss and substance abuse. The patient does not have insomnia.    Allergic/Immunologic: Negative for HIV exposure and hives.       Objective:   Physical Exam   Constitutional: He is oriented to person, place, and time. He appears well-nourished.   HENT:   Head: Normocephalic.   Eyes: Pupils are equal, round, and reactive to light.   Neck: Normal carotid pulses and no JVD present. Carotid bruit is not present. No thyromegaly present.   Cardiovascular: Normal heart sounds and normal pulses. An irregularly irregular rhythm present.  No extrasystoles are present. Tachycardia present. PMI is not displaced. Exam reveals no gallop and no S3.   No murmur heard.  Pulmonary/Chest: Breath sounds normal. No stridor. No respiratory distress.   Abdominal: Soft. Bowel sounds are normal. There is no abdominal tenderness. There is no rebound.    Musculoskeletal: Normal range of motion.         General: Edema (edema up to knee) present.   Neurological: He is alert and oriented to person, place, and time.   Skin: Skin is intact. No rash noted.   Psychiatric: His behavior is normal.       Lab Results   Component Value Date    CHOL 203 (H) 06/11/2019    CHOL 178 06/28/2018    CHOL 168 05/10/2017     Lab Results   Component Value Date    HDL 54 06/11/2019    HDL 46 06/28/2018    HDL 33 (L) 05/10/2017     Lab Results   Component Value Date    LDLCALC 135.2 06/11/2019    LDLCALC 117.8 06/28/2018    LDLCALC 114.6 05/10/2017     Lab Results   Component Value Date    TRIG 69 06/11/2019    TRIG 71 06/28/2018    TRIG 102 05/10/2017     Lab Results   Component Value Date    CHOLHDL 26.6 06/11/2019    CHOLHDL 25.8 06/28/2018    CHOLHDL 19.6 (L) 05/10/2017       Chemistry        Component Value Date/Time     10/26/2020 0711    K 3.2 (L) 10/26/2020 0711    CL 91 (L) 10/26/2020 0711    CO2 44 (HH) 10/26/2020 0711    BUN 19 10/26/2020 0711    CREATININE 1.2 10/26/2020 0711    GLU 79 10/26/2020 0711        Component Value Date/Time    CALCIUM 9.6 10/26/2020 0711    ALKPHOS 58 10/23/2020 1912    AST 23 10/23/2020 1912    ALT 17 10/23/2020 1912    BILITOT 0.6 10/23/2020 1912    ESTGFRAFRICA >60 10/26/2020 0711    EGFRNONAA >60 10/26/2020 0711          No results found for: LABA1C, HGBA1C  Lab Results   Component Value Date    TSH 3.104 09/15/2017     Lab Results   Component Value Date    INR 1.2 09/06/2020    INR 1.0 02/27/2010    INR 1.0 02/26/2010     Lab Results   Component Value Date    WBC 2.67 (L) 10/24/2020    HGB 12.0 (L) 10/24/2020    HCT 40.1 10/24/2020    MCV 89 10/24/2020     (L) 10/24/2020     BMP  Sodium   Date Value Ref Range Status   10/26/2020 142 136 - 145 mmol/L Final     Potassium   Date Value Ref Range Status   10/26/2020 3.2 (L) 3.5 - 5.1 mmol/L Final     Chloride   Date Value Ref Range Status   10/26/2020 91 (L) 95 - 110 mmol/L Final      CO2   Date Value Ref Range Status   10/26/2020 44 (HH) 23 - 29 mmol/L Final     Comment:     CO2  critical result(s) called and verbal readback obtained from   POP SEGURA RN by AES 10/26/2020 08:49       BUN   Date Value Ref Range Status   10/26/2020 19 6 - 20 mg/dL Final     Creatinine   Date Value Ref Range Status   10/26/2020 1.2 0.5 - 1.4 mg/dL Final     Calcium   Date Value Ref Range Status   10/26/2020 9.6 8.7 - 10.5 mg/dL Final     Anion Gap   Date Value Ref Range Status   10/26/2020 7 (L) 8 - 16 mmol/L Final     eGFR if    Date Value Ref Range Status   10/26/2020 >60 >60 mL/min/1.73 m^2 Final     eGFR if non    Date Value Ref Range Status   10/26/2020 >60 >60 mL/min/1.73 m^2 Final     Comment:     Calculation used to obtain the estimated glomerular filtration  rate (eGFR) is the CKD-EPI equation.        BNP  @LABRCNTIP(BNP,BNPTRIAGEBLO)@  @LABRCNTIP(troponini)@  CrCl cannot be calculated (Patient's most recent lab result is older than the maximum 7 days allowed.).  No results found in the last 24 hours.  No results found in the last 24 hours.  No results found in the last 24 hours.    Assessment:      1. SOB (shortness of breath)    2. Chronic atrial fibrillation    3. Chronic diastolic congestive heart failure    4. RVF (right ventricular failure)    5. Pulmonary HTN    6. Troponin level elevated    7. Venous insufficiency    8. Morbid obesity    9. Obstructive sleep apnea      Severer RV dilation,. Mod RC failure  CHFpEF 50% fluid overloaded  pulm HTN  EVANGELIST and CPAP pending  Elevated troponin    Plan:   Arrange LHC and RHC for coronary anatomy and the etiology of pulm HTN and RV failure  Continue lasix 80 mg bid, Hyzaar Metoprolol and eliquis 5 mg bid  I have explained the risks, benefits, and alternatives of the procedure in detail with patient and family. The patient voices understanding and all questions have been addressed.  The patient agrees to proceed as  planned.  Daily weight. Please call the office if gain 3 pounds in 1 day or 5 pounds in 1 week.  Fluid restriction 1.5 liters a day  Na< 2 gm  RTC after the procedure

## 2020-11-12 ENCOUNTER — TELEPHONE (OUTPATIENT)
Dept: CARDIOLOGY | Facility: CLINIC | Age: 55
End: 2020-11-12

## 2020-11-12 NOTE — TELEPHONE ENCOUNTER
Called patient to verify cancelling his procedure. Patient states he would like a 2nd opinion before proceeding    ----- Message from Faustina Durant sent at 11/12/2020  1:30 PM CST -----  Regarding: pt  Pt would like a call to cancel his procedure on the 16th . please call back at to confirm at [phone] (home)         Thank You ,   Faustina Durant

## 2020-12-11 ENCOUNTER — PATIENT MESSAGE (OUTPATIENT)
Dept: OTHER | Facility: OTHER | Age: 55
End: 2020-12-11

## 2021-01-04 ENCOUNTER — PATIENT MESSAGE (OUTPATIENT)
Dept: ADMINISTRATIVE | Facility: HOSPITAL | Age: 56
End: 2021-01-04

## 2021-01-11 ENCOUNTER — TELEPHONE (OUTPATIENT)
Dept: PULMONOLOGY | Facility: CLINIC | Age: 56
End: 2021-01-11

## 2021-01-20 ENCOUNTER — PATIENT MESSAGE (OUTPATIENT)
Dept: RHEUMATOLOGY | Facility: CLINIC | Age: 56
End: 2021-01-20

## 2021-01-20 DIAGNOSIS — G89.29 CHRONIC LOW BACK PAIN, UNSPECIFIED BACK PAIN LATERALITY, UNSPECIFIED WHETHER SCIATICA PRESENT: ICD-10-CM

## 2021-01-20 DIAGNOSIS — M54.50 CHRONIC LOW BACK PAIN, UNSPECIFIED BACK PAIN LATERALITY, UNSPECIFIED WHETHER SCIATICA PRESENT: ICD-10-CM

## 2021-01-20 RX ORDER — GABAPENTIN 300 MG/1
300 CAPSULE ORAL NIGHTLY
Qty: 30 CAPSULE | Refills: 3 | Status: SHIPPED | OUTPATIENT
Start: 2021-01-20 | End: 2021-03-02 | Stop reason: SDUPTHER

## 2021-01-27 ENCOUNTER — OFFICE VISIT (OUTPATIENT)
Dept: INTERNAL MEDICINE | Facility: CLINIC | Age: 56
End: 2021-01-27
Payer: MEDICARE

## 2021-01-27 VITALS
TEMPERATURE: 99 F | HEIGHT: 75 IN | BODY MASS INDEX: 39.17 KG/M2 | HEART RATE: 94 BPM | WEIGHT: 315 LBS | OXYGEN SATURATION: 95 % | SYSTOLIC BLOOD PRESSURE: 130 MMHG | DIASTOLIC BLOOD PRESSURE: 90 MMHG

## 2021-01-27 DIAGNOSIS — R21 RASH: Primary | ICD-10-CM

## 2021-01-27 DIAGNOSIS — R45.4 IRRITABILITY AND ANGER: ICD-10-CM

## 2021-01-27 DIAGNOSIS — F33.1 MODERATE EPISODE OF RECURRENT MAJOR DEPRESSIVE DISORDER: ICD-10-CM

## 2021-01-27 PROCEDURE — 3080F DIAST BP >= 90 MM HG: CPT | Mod: CPTII,S$GLB,, | Performed by: FAMILY MEDICINE

## 2021-01-27 PROCEDURE — 99999 PR PBB SHADOW E&M-EST. PATIENT-LVL V: ICD-10-PCS | Mod: PBBFAC,,, | Performed by: FAMILY MEDICINE

## 2021-01-27 PROCEDURE — 1126F AMNT PAIN NOTED NONE PRSNT: CPT | Mod: S$GLB,,, | Performed by: FAMILY MEDICINE

## 2021-01-27 PROCEDURE — 3080F PR MOST RECENT DIASTOLIC BLOOD PRESSURE >= 90 MM HG: ICD-10-PCS | Mod: CPTII,S$GLB,, | Performed by: FAMILY MEDICINE

## 2021-01-27 PROCEDURE — 99499 RISK ADDL DX/OHS AUDIT: ICD-10-PCS | Mod: S$GLB,,, | Performed by: FAMILY MEDICINE

## 2021-01-27 PROCEDURE — 99214 PR OFFICE/OUTPT VISIT, EST, LEVL IV, 30-39 MIN: ICD-10-PCS | Mod: S$GLB,,, | Performed by: FAMILY MEDICINE

## 2021-01-27 PROCEDURE — 3075F SYST BP GE 130 - 139MM HG: CPT | Mod: CPTII,S$GLB,, | Performed by: FAMILY MEDICINE

## 2021-01-27 PROCEDURE — 3008F BODY MASS INDEX DOCD: CPT | Mod: CPTII,S$GLB,, | Performed by: FAMILY MEDICINE

## 2021-01-27 PROCEDURE — 99999 PR PBB SHADOW E&M-EST. PATIENT-LVL V: CPT | Mod: PBBFAC,,, | Performed by: FAMILY MEDICINE

## 2021-01-27 PROCEDURE — 99214 OFFICE O/P EST MOD 30 MIN: CPT | Mod: S$GLB,,, | Performed by: FAMILY MEDICINE

## 2021-01-27 PROCEDURE — 3008F PR BODY MASS INDEX (BMI) DOCUMENTED: ICD-10-PCS | Mod: CPTII,S$GLB,, | Performed by: FAMILY MEDICINE

## 2021-01-27 PROCEDURE — 99499 UNLISTED E&M SERVICE: CPT | Mod: S$GLB,,, | Performed by: FAMILY MEDICINE

## 2021-01-27 PROCEDURE — 1126F PR PAIN SEVERITY QUANTIFIED, NO PAIN PRESENT: ICD-10-PCS | Mod: S$GLB,,, | Performed by: FAMILY MEDICINE

## 2021-01-27 PROCEDURE — 3075F PR MOST RECENT SYSTOLIC BLOOD PRESS GE 130-139MM HG: ICD-10-PCS | Mod: CPTII,S$GLB,, | Performed by: FAMILY MEDICINE

## 2021-01-27 RX ORDER — VENLAFAXINE HYDROCHLORIDE 75 MG/1
75 CAPSULE, EXTENDED RELEASE ORAL DAILY
Qty: 90 CAPSULE | Refills: 0 | Status: SHIPPED | OUTPATIENT
Start: 2021-01-27 | End: 2021-01-29 | Stop reason: SDUPTHER

## 2021-01-27 RX ORDER — VENLAFAXINE HYDROCHLORIDE 150 MG/1
150 CAPSULE, EXTENDED RELEASE ORAL DAILY
Qty: 90 CAPSULE | Refills: 0 | Status: SHIPPED | OUTPATIENT
Start: 2021-01-27 | End: 2021-01-29 | Stop reason: SDUPTHER

## 2021-01-28 DIAGNOSIS — F33.1 MODERATE EPISODE OF RECURRENT MAJOR DEPRESSIVE DISORDER: ICD-10-CM

## 2021-01-28 DIAGNOSIS — R45.4 IRRITABILITY AND ANGER: ICD-10-CM

## 2021-01-28 RX ORDER — VENLAFAXINE HYDROCHLORIDE 150 MG/1
CAPSULE, EXTENDED RELEASE ORAL
Qty: 90 CAPSULE | Refills: 0 | OUTPATIENT
Start: 2021-01-28

## 2021-01-29 ENCOUNTER — PATIENT MESSAGE (OUTPATIENT)
Dept: INTERNAL MEDICINE | Facility: CLINIC | Age: 56
End: 2021-01-29

## 2021-01-29 DIAGNOSIS — R45.4 IRRITABILITY AND ANGER: ICD-10-CM

## 2021-01-29 DIAGNOSIS — F33.1 MODERATE EPISODE OF RECURRENT MAJOR DEPRESSIVE DISORDER: ICD-10-CM

## 2021-01-29 RX ORDER — VENLAFAXINE HYDROCHLORIDE 75 MG/1
75 CAPSULE, EXTENDED RELEASE ORAL DAILY
Qty: 90 CAPSULE | Refills: 0 | Status: SHIPPED | OUTPATIENT
Start: 2021-01-29 | End: 2021-03-10 | Stop reason: SDUPTHER

## 2021-01-29 RX ORDER — VENLAFAXINE HYDROCHLORIDE 150 MG/1
150 CAPSULE, EXTENDED RELEASE ORAL DAILY
Qty: 90 CAPSULE | Refills: 0 | Status: SHIPPED | OUTPATIENT
Start: 2021-01-29 | End: 2021-03-10 | Stop reason: SDUPTHER

## 2021-02-02 ENCOUNTER — PATIENT OUTREACH (OUTPATIENT)
Dept: ADMINISTRATIVE | Facility: OTHER | Age: 56
End: 2021-02-02

## 2021-02-03 ENCOUNTER — LAB VISIT (OUTPATIENT)
Dept: LAB | Facility: HOSPITAL | Age: 56
End: 2021-02-03
Attending: STUDENT IN AN ORGANIZED HEALTH CARE EDUCATION/TRAINING PROGRAM
Payer: MEDICARE

## 2021-02-03 ENCOUNTER — OFFICE VISIT (OUTPATIENT)
Dept: DERMATOLOGY | Facility: CLINIC | Age: 56
End: 2021-02-03
Payer: MEDICARE

## 2021-02-03 DIAGNOSIS — L40.0 PLAQUE PSORIASIS: ICD-10-CM

## 2021-02-03 LAB
BASOPHILS # BLD AUTO: 0.03 K/UL (ref 0–0.2)
BASOPHILS NFR BLD: 0.8 % (ref 0–1.9)
DIFFERENTIAL METHOD: ABNORMAL
EOSINOPHIL # BLD AUTO: 0.2 K/UL (ref 0–0.5)
EOSINOPHIL NFR BLD: 5 % (ref 0–8)
ERYTHROCYTE [DISTWIDTH] IN BLOOD BY AUTOMATED COUNT: 19.6 % (ref 11.5–14.5)
HCT VFR BLD AUTO: 46.9 % (ref 40–54)
HGB BLD-MCNC: 14.8 G/DL (ref 14–18)
IMM GRANULOCYTES # BLD AUTO: 0.02 K/UL (ref 0–0.04)
IMM GRANULOCYTES NFR BLD AUTO: 0.6 % (ref 0–0.5)
LYMPHOCYTES # BLD AUTO: 0.8 K/UL (ref 1–4.8)
LYMPHOCYTES NFR BLD: 22 % (ref 18–48)
MCH RBC QN AUTO: 28.2 PG (ref 27–31)
MCHC RBC AUTO-ENTMCNC: 31.6 G/DL (ref 32–36)
MCV RBC AUTO: 89 FL (ref 82–98)
MONOCYTES # BLD AUTO: 0.5 K/UL (ref 0.3–1)
MONOCYTES NFR BLD: 13.5 % (ref 4–15)
NEUTROPHILS # BLD AUTO: 2.1 K/UL (ref 1.8–7.7)
NEUTROPHILS NFR BLD: 58.1 % (ref 38–73)
NRBC BLD-RTO: 0 /100 WBC
PLATELET # BLD AUTO: 119 K/UL (ref 150–350)
PMV BLD AUTO: ABNORMAL FL (ref 9.2–12.9)
RBC # BLD AUTO: 5.25 M/UL (ref 4.6–6.2)
WBC # BLD AUTO: 3.63 K/UL (ref 3.9–12.7)

## 2021-02-03 PROCEDURE — 86704 HEP B CORE ANTIBODY TOTAL: CPT

## 2021-02-03 PROCEDURE — 99499 RISK ADDL DX/OHS AUDIT: ICD-10-PCS | Mod: S$GLB,,, | Performed by: STUDENT IN AN ORGANIZED HEALTH CARE EDUCATION/TRAINING PROGRAM

## 2021-02-03 PROCEDURE — 99499 UNLISTED E&M SERVICE: CPT | Mod: S$GLB,,, | Performed by: STUDENT IN AN ORGANIZED HEALTH CARE EDUCATION/TRAINING PROGRAM

## 2021-02-03 PROCEDURE — 36415 COLL VENOUS BLD VENIPUNCTURE: CPT

## 2021-02-03 PROCEDURE — 99204 OFFICE O/P NEW MOD 45 MIN: CPT | Mod: S$GLB,,, | Performed by: STUDENT IN AN ORGANIZED HEALTH CARE EDUCATION/TRAINING PROGRAM

## 2021-02-03 PROCEDURE — 99999 PR PBB SHADOW E&M-EST. PATIENT-LVL III: CPT | Mod: PBBFAC,,, | Performed by: STUDENT IN AN ORGANIZED HEALTH CARE EDUCATION/TRAINING PROGRAM

## 2021-02-03 PROCEDURE — 87340 HEPATITIS B SURFACE AG IA: CPT

## 2021-02-03 PROCEDURE — 85025 COMPLETE CBC W/AUTO DIFF WBC: CPT

## 2021-02-03 PROCEDURE — 80053 COMPREHEN METABOLIC PANEL: CPT

## 2021-02-03 PROCEDURE — 99204 PR OFFICE/OUTPT VISIT, NEW, LEVL IV, 45-59 MIN: ICD-10-PCS | Mod: S$GLB,,, | Performed by: STUDENT IN AN ORGANIZED HEALTH CARE EDUCATION/TRAINING PROGRAM

## 2021-02-03 PROCEDURE — 99999 PR PBB SHADOW E&M-EST. PATIENT-LVL III: ICD-10-PCS | Mod: PBBFAC,,, | Performed by: STUDENT IN AN ORGANIZED HEALTH CARE EDUCATION/TRAINING PROGRAM

## 2021-02-03 PROCEDURE — 86703 HIV-1/HIV-2 1 RESULT ANTBDY: CPT

## 2021-02-03 RX ORDER — TRIAMCINOLONE ACETONIDE 1 MG/G
OINTMENT TOPICAL 2 TIMES DAILY
Qty: 454 G | Refills: 2 | Status: SHIPPED | OUTPATIENT
Start: 2021-02-03 | End: 2021-05-18 | Stop reason: SDUPTHER

## 2021-02-04 LAB
ALBUMIN SERPL BCP-MCNC: 3.7 G/DL (ref 3.5–5.2)
ALP SERPL-CCNC: 67 U/L (ref 55–135)
ALT SERPL W/O P-5'-P-CCNC: 10 U/L (ref 10–44)
ANION GAP SERPL CALC-SCNC: 12 MMOL/L (ref 8–16)
AST SERPL-CCNC: 21 U/L (ref 10–40)
BILIRUB SERPL-MCNC: 1 MG/DL (ref 0.1–1)
BUN SERPL-MCNC: 15 MG/DL (ref 6–20)
CALCIUM SERPL-MCNC: 9.1 MG/DL (ref 8.7–10.5)
CHLORIDE SERPL-SCNC: 103 MMOL/L (ref 95–110)
CO2 SERPL-SCNC: 27 MMOL/L (ref 23–29)
CREAT SERPL-MCNC: 0.9 MG/DL (ref 0.5–1.4)
EST. GFR  (AFRICAN AMERICAN): >60 ML/MIN/1.73 M^2
EST. GFR  (NON AFRICAN AMERICAN): >60 ML/MIN/1.73 M^2
GLUCOSE SERPL-MCNC: 91 MG/DL (ref 70–110)
HBV CORE AB SERPL QL IA: NEGATIVE
HBV SURFACE AG SERPL QL IA: NEGATIVE
POTASSIUM SERPL-SCNC: 3.7 MMOL/L (ref 3.5–5.1)
PROT SERPL-MCNC: 7.9 G/DL (ref 6–8.4)
SODIUM SERPL-SCNC: 142 MMOL/L (ref 136–145)

## 2021-02-05 DIAGNOSIS — L40.0 PLAQUE PSORIASIS: Primary | ICD-10-CM

## 2021-02-05 LAB — HIV 1+2 AB+HIV1 P24 AG SERPL QL IA: NEGATIVE

## 2021-02-05 RX ORDER — IXEKIZUMAB 80 MG/ML
INJECTION, SOLUTION SUBCUTANEOUS
Qty: 3 ML | Refills: 1 | Status: SHIPPED | OUTPATIENT
Start: 2021-02-05 | End: 2021-09-10

## 2021-02-05 RX ORDER — IXEKIZUMAB 80 MG/ML
INJECTION, SOLUTION SUBCUTANEOUS
Qty: 8 ML | Refills: 0 | Status: SHIPPED | OUTPATIENT
Start: 2021-02-05 | End: 2021-09-10

## 2021-02-23 ENCOUNTER — TELEPHONE (OUTPATIENT)
Dept: DERMATOLOGY | Facility: CLINIC | Age: 56
End: 2021-02-23

## 2021-02-26 ENCOUNTER — TELEPHONE (OUTPATIENT)
Dept: CARDIOLOGY | Facility: CLINIC | Age: 56
End: 2021-02-26

## 2021-03-02 ENCOUNTER — PATIENT MESSAGE (OUTPATIENT)
Dept: RHEUMATOLOGY | Facility: CLINIC | Age: 56
End: 2021-03-02

## 2021-03-02 ENCOUNTER — PATIENT MESSAGE (OUTPATIENT)
Dept: INTERNAL MEDICINE | Facility: CLINIC | Age: 56
End: 2021-03-02

## 2021-03-02 DIAGNOSIS — G89.29 CHRONIC LOW BACK PAIN, UNSPECIFIED BACK PAIN LATERALITY, UNSPECIFIED WHETHER SCIATICA PRESENT: ICD-10-CM

## 2021-03-02 DIAGNOSIS — M54.50 CHRONIC LOW BACK PAIN, UNSPECIFIED BACK PAIN LATERALITY, UNSPECIFIED WHETHER SCIATICA PRESENT: ICD-10-CM

## 2021-03-02 RX ORDER — GABAPENTIN 300 MG/1
300 CAPSULE ORAL NIGHTLY
Qty: 90 CAPSULE | Refills: 1 | Status: SHIPPED | OUTPATIENT
Start: 2021-03-02 | End: 2021-08-03

## 2021-03-04 ENCOUNTER — PATIENT MESSAGE (OUTPATIENT)
Dept: DERMATOLOGY | Facility: CLINIC | Age: 56
End: 2021-03-04

## 2021-03-08 RX ORDER — FUROSEMIDE 80 MG/1
80 TABLET ORAL 2 TIMES DAILY
Qty: 60 TABLET | Refills: 9 | Status: SHIPPED | OUTPATIENT
Start: 2021-03-08 | End: 2021-10-27

## 2021-03-10 ENCOUNTER — PES CALL (OUTPATIENT)
Dept: ADMINISTRATIVE | Facility: CLINIC | Age: 56
End: 2021-03-10

## 2021-03-10 ENCOUNTER — OFFICE VISIT (OUTPATIENT)
Dept: INTERNAL MEDICINE | Facility: CLINIC | Age: 56
End: 2021-03-10
Payer: MEDICARE

## 2021-03-10 VITALS
BODY MASS INDEX: 39.17 KG/M2 | WEIGHT: 315 LBS | TEMPERATURE: 98 F | HEART RATE: 81 BPM | SYSTOLIC BLOOD PRESSURE: 134 MMHG | HEIGHT: 75 IN | OXYGEN SATURATION: 96 % | DIASTOLIC BLOOD PRESSURE: 86 MMHG

## 2021-03-10 DIAGNOSIS — F33.1 MODERATE EPISODE OF RECURRENT MAJOR DEPRESSIVE DISORDER: Primary | ICD-10-CM

## 2021-03-10 DIAGNOSIS — L40.9 PSORIASIS: ICD-10-CM

## 2021-03-10 DIAGNOSIS — R45.4 IRRITABILITY AND ANGER: ICD-10-CM

## 2021-03-10 PROCEDURE — 99999 PR PBB SHADOW E&M-EST. PATIENT-LVL V: ICD-10-PCS | Mod: PBBFAC,,, | Performed by: FAMILY MEDICINE

## 2021-03-10 PROCEDURE — 3079F PR MOST RECENT DIASTOLIC BLOOD PRESSURE 80-89 MM HG: ICD-10-PCS | Mod: CPTII,S$GLB,, | Performed by: FAMILY MEDICINE

## 2021-03-10 PROCEDURE — 99214 PR OFFICE/OUTPT VISIT, EST, LEVL IV, 30-39 MIN: ICD-10-PCS | Mod: S$GLB,,, | Performed by: FAMILY MEDICINE

## 2021-03-10 PROCEDURE — 3075F SYST BP GE 130 - 139MM HG: CPT | Mod: CPTII,S$GLB,, | Performed by: FAMILY MEDICINE

## 2021-03-10 PROCEDURE — 3008F PR BODY MASS INDEX (BMI) DOCUMENTED: ICD-10-PCS | Mod: CPTII,S$GLB,, | Performed by: FAMILY MEDICINE

## 2021-03-10 PROCEDURE — 3008F BODY MASS INDEX DOCD: CPT | Mod: CPTII,S$GLB,, | Performed by: FAMILY MEDICINE

## 2021-03-10 PROCEDURE — 1125F AMNT PAIN NOTED PAIN PRSNT: CPT | Mod: S$GLB,,, | Performed by: FAMILY MEDICINE

## 2021-03-10 PROCEDURE — 99214 OFFICE O/P EST MOD 30 MIN: CPT | Mod: S$GLB,,, | Performed by: FAMILY MEDICINE

## 2021-03-10 PROCEDURE — 1125F PR PAIN SEVERITY QUANTIFIED, PAIN PRESENT: ICD-10-PCS | Mod: S$GLB,,, | Performed by: FAMILY MEDICINE

## 2021-03-10 PROCEDURE — 3079F DIAST BP 80-89 MM HG: CPT | Mod: CPTII,S$GLB,, | Performed by: FAMILY MEDICINE

## 2021-03-10 PROCEDURE — 3075F PR MOST RECENT SYSTOLIC BLOOD PRESS GE 130-139MM HG: ICD-10-PCS | Mod: CPTII,S$GLB,, | Performed by: FAMILY MEDICINE

## 2021-03-10 PROCEDURE — 99999 PR PBB SHADOW E&M-EST. PATIENT-LVL V: CPT | Mod: PBBFAC,,, | Performed by: FAMILY MEDICINE

## 2021-03-10 RX ORDER — ARIPIPRAZOLE 2 MG/1
2 TABLET ORAL DAILY
Qty: 90 TABLET | Refills: 0 | Status: SHIPPED | OUTPATIENT
Start: 2021-03-10 | End: 2021-09-29 | Stop reason: SDUPTHER

## 2021-03-10 RX ORDER — VENLAFAXINE HYDROCHLORIDE 75 MG/1
75 CAPSULE, EXTENDED RELEASE ORAL DAILY
Qty: 90 CAPSULE | Refills: 0 | Status: SHIPPED | OUTPATIENT
Start: 2021-03-10 | End: 2021-06-20

## 2021-03-10 RX ORDER — VENLAFAXINE HYDROCHLORIDE 150 MG/1
150 CAPSULE, EXTENDED RELEASE ORAL DAILY
Qty: 90 CAPSULE | Refills: 0 | Status: SHIPPED | OUTPATIENT
Start: 2021-03-10 | End: 2021-06-02

## 2021-03-16 ENCOUNTER — TELEPHONE (OUTPATIENT)
Dept: RHEUMATOLOGY | Facility: CLINIC | Age: 56
End: 2021-03-16

## 2021-03-16 ENCOUNTER — PATIENT MESSAGE (OUTPATIENT)
Dept: RHEUMATOLOGY | Facility: CLINIC | Age: 56
End: 2021-03-16

## 2021-03-16 RX ORDER — COLCHICINE 0.6 MG/1
0.6 CAPSULE ORAL DAILY
COMMUNITY
End: 2021-05-24 | Stop reason: SDUPTHER

## 2021-03-17 ENCOUNTER — PATIENT OUTREACH (OUTPATIENT)
Dept: ADMINISTRATIVE | Facility: OTHER | Age: 56
End: 2021-03-17

## 2021-03-18 ENCOUNTER — TELEPHONE (OUTPATIENT)
Dept: RHEUMATOLOGY | Facility: CLINIC | Age: 56
End: 2021-03-18

## 2021-03-23 DIAGNOSIS — M1A.00X0 IDIOPATHIC CHRONIC GOUT WITHOUT TOPHUS, UNSPECIFIED SITE: ICD-10-CM

## 2021-03-23 RX ORDER — ALLOPURINOL 300 MG/1
300 TABLET ORAL DAILY
Qty: 90 TABLET | Refills: 1 | Status: SHIPPED | OUTPATIENT
Start: 2021-03-23 | End: 2021-10-26

## 2021-03-31 ENCOUNTER — PATIENT MESSAGE (OUTPATIENT)
Dept: DERMATOLOGY | Facility: CLINIC | Age: 56
End: 2021-03-31

## 2021-04-07 ENCOUNTER — TELEPHONE (OUTPATIENT)
Dept: INTERNAL MEDICINE | Facility: CLINIC | Age: 56
End: 2021-04-07

## 2021-04-16 ENCOUNTER — PATIENT MESSAGE (OUTPATIENT)
Dept: DERMATOLOGY | Facility: CLINIC | Age: 56
End: 2021-04-16

## 2021-04-30 ENCOUNTER — PATIENT MESSAGE (OUTPATIENT)
Dept: DERMATOLOGY | Facility: CLINIC | Age: 56
End: 2021-04-30

## 2021-05-06 ENCOUNTER — PATIENT MESSAGE (OUTPATIENT)
Dept: CARDIOLOGY | Facility: CLINIC | Age: 56
End: 2021-05-06

## 2021-05-07 RX ORDER — LOSARTAN POTASSIUM AND HYDROCHLOROTHIAZIDE 25; 100 MG/1; MG/1
1 TABLET ORAL DAILY
Qty: 90 TABLET | Refills: 3 | Status: ON HOLD | OUTPATIENT
Start: 2021-05-07 | End: 2021-11-25 | Stop reason: HOSPADM

## 2021-05-07 RX ORDER — METOPROLOL SUCCINATE 25 MG/1
25 TABLET, EXTENDED RELEASE ORAL 2 TIMES DAILY
Qty: 180 TABLET | Refills: 3 | Status: SHIPPED | OUTPATIENT
Start: 2021-05-07 | End: 2022-02-22 | Stop reason: SDUPTHER

## 2021-05-17 ENCOUNTER — PATIENT MESSAGE (OUTPATIENT)
Dept: DERMATOLOGY | Facility: CLINIC | Age: 56
End: 2021-05-17

## 2021-05-18 DIAGNOSIS — L40.0 PLAQUE PSORIASIS: ICD-10-CM

## 2021-05-18 RX ORDER — TRIAMCINOLONE ACETONIDE 1 MG/G
OINTMENT TOPICAL 2 TIMES DAILY
Qty: 454 G | Refills: 2 | Status: CANCELLED | OUTPATIENT
Start: 2021-05-18

## 2021-05-18 RX ORDER — TRIAMCINOLONE ACETONIDE 1 MG/G
OINTMENT TOPICAL 2 TIMES DAILY
Qty: 454 G | Refills: 2 | Status: SHIPPED | OUTPATIENT
Start: 2021-05-18 | End: 2021-07-28

## 2021-05-23 ENCOUNTER — PATIENT MESSAGE (OUTPATIENT)
Dept: RHEUMATOLOGY | Facility: CLINIC | Age: 56
End: 2021-05-23

## 2021-05-26 RX ORDER — COLCHICINE 0.6 MG/1
0.6 CAPSULE ORAL DAILY
Qty: 90 CAPSULE | Refills: 1 | Status: SHIPPED | OUTPATIENT
Start: 2021-05-26 | End: 2021-05-28

## 2021-06-02 ENCOUNTER — PATIENT MESSAGE (OUTPATIENT)
Dept: ADMINISTRATIVE | Facility: HOSPITAL | Age: 56
End: 2021-06-02

## 2021-06-02 DIAGNOSIS — R45.4 IRRITABILITY AND ANGER: ICD-10-CM

## 2021-06-02 DIAGNOSIS — F33.1 MODERATE EPISODE OF RECURRENT MAJOR DEPRESSIVE DISORDER: ICD-10-CM

## 2021-06-02 RX ORDER — VENLAFAXINE HYDROCHLORIDE 150 MG/1
150 CAPSULE, EXTENDED RELEASE ORAL DAILY
Qty: 90 CAPSULE | Refills: 0 | Status: SHIPPED | OUTPATIENT
Start: 2021-06-02 | End: 2021-08-31

## 2021-06-10 ENCOUNTER — TELEPHONE (OUTPATIENT)
Dept: RHEUMATOLOGY | Facility: CLINIC | Age: 56
End: 2021-06-10

## 2021-06-20 DIAGNOSIS — R45.4 IRRITABILITY AND ANGER: ICD-10-CM

## 2021-06-20 DIAGNOSIS — F33.1 MODERATE EPISODE OF RECURRENT MAJOR DEPRESSIVE DISORDER: ICD-10-CM

## 2021-06-20 RX ORDER — VENLAFAXINE HYDROCHLORIDE 75 MG/1
75 CAPSULE, EXTENDED RELEASE ORAL DAILY
COMMUNITY
Start: 2021-04-07 | End: 2021-06-20

## 2021-06-20 RX ORDER — VENLAFAXINE HYDROCHLORIDE 75 MG/1
75 CAPSULE, EXTENDED RELEASE ORAL DAILY
Qty: 90 CAPSULE | Refills: 0 | Status: SHIPPED | OUTPATIENT
Start: 2021-06-20 | End: 2021-09-29

## 2021-06-21 ENCOUNTER — TELEPHONE (OUTPATIENT)
Dept: INTERNAL MEDICINE | Facility: CLINIC | Age: 56
End: 2021-06-21

## 2021-06-25 LAB — HEMOCCULT STL QL IA: NEGATIVE

## 2021-07-25 DIAGNOSIS — L40.0 PLAQUE PSORIASIS: ICD-10-CM

## 2021-07-28 RX ORDER — TRIAMCINOLONE ACETONIDE 1 MG/G
OINTMENT TOPICAL
Qty: 454 G | Refills: 0 | Status: SHIPPED | OUTPATIENT
Start: 2021-07-28 | End: 2022-12-13

## 2021-07-30 ENCOUNTER — PATIENT MESSAGE (OUTPATIENT)
Dept: DERMATOLOGY | Facility: CLINIC | Age: 56
End: 2021-07-30

## 2021-07-30 ENCOUNTER — PATIENT MESSAGE (OUTPATIENT)
Dept: CARDIOLOGY | Facility: CLINIC | Age: 56
End: 2021-07-30

## 2021-08-06 RX ORDER — COLCHICINE 0.6 MG/1
CAPSULE ORAL
Qty: 90 CAPSULE | Refills: 1 | Status: SHIPPED | OUTPATIENT
Start: 2021-08-06 | End: 2021-12-08

## 2021-08-20 ENCOUNTER — PATIENT MESSAGE (OUTPATIENT)
Dept: CARDIOLOGY | Facility: CLINIC | Age: 56
End: 2021-08-20

## 2021-08-27 ENCOUNTER — PATIENT MESSAGE (OUTPATIENT)
Dept: CARDIOLOGY | Facility: CLINIC | Age: 56
End: 2021-08-27

## 2021-08-31 ENCOUNTER — PATIENT MESSAGE (OUTPATIENT)
Dept: CARDIOLOGY | Facility: CLINIC | Age: 56
End: 2021-08-31

## 2021-09-02 ENCOUNTER — TELEPHONE (OUTPATIENT)
Dept: CARDIOLOGY | Facility: CLINIC | Age: 56
End: 2021-09-02

## 2021-09-06 ENCOUNTER — PATIENT MESSAGE (OUTPATIENT)
Dept: DERMATOLOGY | Facility: CLINIC | Age: 56
End: 2021-09-06

## 2021-09-08 ENCOUNTER — PATIENT OUTREACH (OUTPATIENT)
Dept: ADMINISTRATIVE | Facility: HOSPITAL | Age: 56
End: 2021-09-08

## 2021-09-08 ENCOUNTER — PATIENT MESSAGE (OUTPATIENT)
Dept: OPHTHALMOLOGY | Facility: CLINIC | Age: 56
End: 2021-09-08

## 2021-09-10 ENCOUNTER — OFFICE VISIT (OUTPATIENT)
Dept: DERMATOLOGY | Facility: CLINIC | Age: 56
End: 2021-09-10
Payer: MEDICARE

## 2021-09-10 DIAGNOSIS — L40.0 PLAQUE PSORIASIS: Primary | ICD-10-CM

## 2021-09-10 DIAGNOSIS — Z51.81 MEDICATION MONITORING ENCOUNTER: ICD-10-CM

## 2021-09-10 PROCEDURE — 99213 PR OFFICE/OUTPT VISIT, EST, LEVL III, 20-29 MIN: ICD-10-PCS | Mod: 95,,, | Performed by: STUDENT IN AN ORGANIZED HEALTH CARE EDUCATION/TRAINING PROGRAM

## 2021-09-10 PROCEDURE — 1160F PR REVIEW ALL MEDS BY PRESCRIBER/CLIN PHARMACIST DOCUMENTED: ICD-10-PCS | Mod: CPTII,95,, | Performed by: STUDENT IN AN ORGANIZED HEALTH CARE EDUCATION/TRAINING PROGRAM

## 2021-09-10 PROCEDURE — 1159F PR MEDICATION LIST DOCUMENTED IN MEDICAL RECORD: ICD-10-PCS | Mod: CPTII,95,, | Performed by: STUDENT IN AN ORGANIZED HEALTH CARE EDUCATION/TRAINING PROGRAM

## 2021-09-10 PROCEDURE — 1159F MED LIST DOCD IN RCRD: CPT | Mod: CPTII,95,, | Performed by: STUDENT IN AN ORGANIZED HEALTH CARE EDUCATION/TRAINING PROGRAM

## 2021-09-10 PROCEDURE — 1160F RVW MEDS BY RX/DR IN RCRD: CPT | Mod: CPTII,95,, | Performed by: STUDENT IN AN ORGANIZED HEALTH CARE EDUCATION/TRAINING PROGRAM

## 2021-09-10 PROCEDURE — 99213 OFFICE O/P EST LOW 20 MIN: CPT | Mod: 95,,, | Performed by: STUDENT IN AN ORGANIZED HEALTH CARE EDUCATION/TRAINING PROGRAM

## 2021-09-10 RX ORDER — IXEKIZUMAB 80 MG/ML
INJECTION, SOLUTION SUBCUTANEOUS
Qty: 3 ML | Refills: 1 | Status: SHIPPED | OUTPATIENT
Start: 2021-09-10 | End: 2021-12-08

## 2021-09-22 ENCOUNTER — PATIENT OUTREACH (OUTPATIENT)
Dept: ADMINISTRATIVE | Facility: HOSPITAL | Age: 56
End: 2021-09-22

## 2021-09-27 ENCOUNTER — PATIENT MESSAGE (OUTPATIENT)
Dept: INTERNAL MEDICINE | Facility: CLINIC | Age: 56
End: 2021-09-27

## 2021-09-29 ENCOUNTER — OFFICE VISIT (OUTPATIENT)
Dept: INTERNAL MEDICINE | Facility: CLINIC | Age: 56
End: 2021-09-29
Payer: MEDICARE

## 2021-09-29 ENCOUNTER — TELEPHONE (OUTPATIENT)
Dept: INTERNAL MEDICINE | Facility: CLINIC | Age: 56
End: 2021-09-29

## 2021-09-29 DIAGNOSIS — R45.4 IRRITABILITY AND ANGER: ICD-10-CM

## 2021-09-29 DIAGNOSIS — F33.1 MODERATE EPISODE OF RECURRENT MAJOR DEPRESSIVE DISORDER: Primary | ICD-10-CM

## 2021-09-29 DIAGNOSIS — F51.04 PSYCHOPHYSIOLOGICAL INSOMNIA: ICD-10-CM

## 2021-09-29 PROBLEM — M70.71 ISCHIAL BURSITIS OF RIGHT SIDE: Status: RESOLVED | Noted: 2018-11-27 | Resolved: 2021-09-29

## 2021-09-29 PROBLEM — R06.00 DYSPNEA AND RESPIRATORY ABNORMALITIES: Status: RESOLVED | Noted: 2020-09-11 | Resolved: 2021-09-29

## 2021-09-29 PROBLEM — R06.89 DYSPNEA AND RESPIRATORY ABNORMALITIES: Status: RESOLVED | Noted: 2020-09-11 | Resolved: 2021-09-29

## 2021-09-29 PROBLEM — R79.89 TROPONIN LEVEL ELEVATED: Status: RESOLVED | Noted: 2020-09-12 | Resolved: 2021-09-29

## 2021-09-29 PROBLEM — F33.9 DEPRESSION, MAJOR, RECURRENT: Status: RESOLVED | Noted: 2017-10-24 | Resolved: 2021-09-29

## 2021-09-29 PROCEDURE — 99214 PR OFFICE/OUTPT VISIT, EST, LEVL IV, 30-39 MIN: ICD-10-PCS | Mod: 95,,, | Performed by: FAMILY MEDICINE

## 2021-09-29 PROCEDURE — 99214 OFFICE O/P EST MOD 30 MIN: CPT | Mod: 95,,, | Performed by: FAMILY MEDICINE

## 2021-09-29 PROCEDURE — 1159F PR MEDICATION LIST DOCUMENTED IN MEDICAL RECORD: ICD-10-PCS | Mod: CPTII,95,, | Performed by: FAMILY MEDICINE

## 2021-09-29 PROCEDURE — 1160F PR REVIEW ALL MEDS BY PRESCRIBER/CLIN PHARMACIST DOCUMENTED: ICD-10-PCS | Mod: CPTII,95,, | Performed by: FAMILY MEDICINE

## 2021-09-29 PROCEDURE — 99499 UNLISTED E&M SERVICE: CPT | Mod: HCNC,95,, | Performed by: FAMILY MEDICINE

## 2021-09-29 PROCEDURE — 1160F RVW MEDS BY RX/DR IN RCRD: CPT | Mod: CPTII,95,, | Performed by: FAMILY MEDICINE

## 2021-09-29 PROCEDURE — 99499 RISK ADDL DX/OHS AUDIT: ICD-10-PCS | Mod: HCNC,95,, | Performed by: FAMILY MEDICINE

## 2021-09-29 PROCEDURE — 1159F MED LIST DOCD IN RCRD: CPT | Mod: CPTII,95,, | Performed by: FAMILY MEDICINE

## 2021-09-29 RX ORDER — ARIPIPRAZOLE 5 MG/1
5 TABLET ORAL DAILY
Qty: 90 TABLET | Refills: 0 | Status: SHIPPED | OUTPATIENT
Start: 2021-09-29 | End: 2022-02-16 | Stop reason: SDUPTHER

## 2021-09-29 RX ORDER — VENLAFAXINE HYDROCHLORIDE 150 MG/1
150 CAPSULE, EXTENDED RELEASE ORAL DAILY
COMMUNITY
Start: 2021-06-03 | End: 2021-09-29

## 2021-09-29 RX ORDER — VENLAFAXINE HYDROCHLORIDE 75 MG/1
75 CAPSULE, EXTENDED RELEASE ORAL DAILY
Qty: 90 CAPSULE | Refills: 0 | Status: ON HOLD | OUTPATIENT
Start: 2021-09-29 | End: 2021-11-25 | Stop reason: HOSPADM

## 2021-09-29 RX ORDER — VENLAFAXINE HYDROCHLORIDE 75 MG/1
75 CAPSULE, EXTENDED RELEASE ORAL DAILY
COMMUNITY
Start: 2021-06-21 | End: 2021-09-29

## 2021-09-29 RX ORDER — VENLAFAXINE HYDROCHLORIDE 150 MG/1
150 CAPSULE, EXTENDED RELEASE ORAL DAILY
Qty: 90 CAPSULE | Refills: 0 | Status: SHIPPED | OUTPATIENT
Start: 2021-09-29 | End: 2022-02-16 | Stop reason: SDUPTHER

## 2021-10-26 ENCOUNTER — HOSPITAL ENCOUNTER (EMERGENCY)
Facility: HOSPITAL | Age: 56
Discharge: HOME | End: 2021-10-26
Attending: EMERGENCY MEDICINE
Payer: MEDICARE

## 2021-10-26 VITALS
TEMPERATURE: 98 F | BODY MASS INDEX: 56.49 KG/M2 | OXYGEN SATURATION: 92 % | HEART RATE: 80 BPM | RESPIRATION RATE: 22 BRPM | SYSTOLIC BLOOD PRESSURE: 113 MMHG | DIASTOLIC BLOOD PRESSURE: 65 MMHG | WEIGHT: 315 LBS

## 2021-10-26 DIAGNOSIS — R06.02 SHORTNESS OF BREATH: ICD-10-CM

## 2021-10-26 DIAGNOSIS — I50.9 ACUTE ON CHRONIC CONGESTIVE HEART FAILURE, UNSPECIFIED HEART FAILURE TYPE: Primary | ICD-10-CM

## 2021-10-26 LAB
ALBUMIN SERPL BCP-MCNC: 3.7 G/DL (ref 3.5–5.2)
ALP SERPL-CCNC: 50 U/L (ref 55–135)
ALT SERPL W/O P-5'-P-CCNC: 20 U/L (ref 10–44)
ANION GAP SERPL CALC-SCNC: 13 MMOL/L (ref 8–16)
AST SERPL-CCNC: 23 U/L (ref 10–40)
BASOPHILS # BLD AUTO: 0.02 K/UL (ref 0–0.2)
BASOPHILS NFR BLD: 0.6 % (ref 0–1.9)
BILIRUB SERPL-MCNC: 0.6 MG/DL (ref 0.1–1)
BNP SERPL-MCNC: 800 PG/ML (ref 0–99)
BUN SERPL-MCNC: 22 MG/DL (ref 6–20)
CALCIUM SERPL-MCNC: 8.3 MG/DL (ref 8.7–10.5)
CHLORIDE SERPL-SCNC: 98 MMOL/L (ref 95–110)
CO2 SERPL-SCNC: 34 MMOL/L (ref 23–29)
CREAT SERPL-MCNC: 1.2 MG/DL (ref 0.5–1.4)
CTP QC/QA: YES
CTP QC/QA: YES
DIFFERENTIAL METHOD: ABNORMAL
EOSINOPHIL # BLD AUTO: 0.1 K/UL (ref 0–0.5)
EOSINOPHIL NFR BLD: 2.3 % (ref 0–8)
ERYTHROCYTE [DISTWIDTH] IN BLOOD BY AUTOMATED COUNT: 16.1 % (ref 11.5–14.5)
EST. GFR  (AFRICAN AMERICAN): >60 ML/MIN/1.73 M^2
EST. GFR  (NON AFRICAN AMERICAN): >60 ML/MIN/1.73 M^2
GLUCOSE SERPL-MCNC: 124 MG/DL (ref 70–110)
HCT VFR BLD AUTO: 46.7 % (ref 40–54)
HGB BLD-MCNC: 14.6 G/DL (ref 14–18)
IMM GRANULOCYTES # BLD AUTO: 0.01 K/UL (ref 0–0.04)
IMM GRANULOCYTES NFR BLD AUTO: 0.3 % (ref 0–0.5)
LYMPHOCYTES # BLD AUTO: 0.8 K/UL (ref 1–4.8)
LYMPHOCYTES NFR BLD: 21.8 % (ref 18–48)
MCH RBC QN AUTO: 30.2 PG (ref 27–31)
MCHC RBC AUTO-ENTMCNC: 31.3 G/DL (ref 32–36)
MCV RBC AUTO: 97 FL (ref 82–98)
MONOCYTES # BLD AUTO: 0.4 K/UL (ref 0.3–1)
MONOCYTES NFR BLD: 12.2 % (ref 4–15)
NEUTROPHILS # BLD AUTO: 2.2 K/UL (ref 1.8–7.7)
NEUTROPHILS NFR BLD: 62.8 % (ref 38–73)
NRBC BLD-RTO: 0 /100 WBC
PLATELET # BLD AUTO: 105 K/UL (ref 150–450)
PMV BLD AUTO: 12.4 FL (ref 9.2–12.9)
POC MOLECULAR INFLUENZA A AGN: NEGATIVE
POC MOLECULAR INFLUENZA B AGN: NEGATIVE
POTASSIUM SERPL-SCNC: 3.3 MMOL/L (ref 3.5–5.1)
PROT SERPL-MCNC: 7.6 G/DL (ref 6–8.4)
RBC # BLD AUTO: 4.83 M/UL (ref 4.6–6.2)
SARS-COV-2 RDRP RESP QL NAA+PROBE: NEGATIVE
SODIUM SERPL-SCNC: 145 MMOL/L (ref 136–145)
TROPONIN I SERPL DL<=0.01 NG/ML-MCNC: 0.02 NG/ML (ref 0–0.03)
WBC # BLD AUTO: 3.53 K/UL (ref 3.9–12.7)

## 2021-10-26 PROCEDURE — 93010 ELECTROCARDIOGRAM REPORT: CPT | Mod: HCNC,,, | Performed by: INTERNAL MEDICINE

## 2021-10-26 PROCEDURE — 63600175 PHARM REV CODE 636 W HCPCS: Mod: HCNC,ER | Performed by: EMERGENCY MEDICINE

## 2021-10-26 PROCEDURE — 85025 COMPLETE CBC W/AUTO DIFF WBC: CPT | Mod: HCNC,ER | Performed by: EMERGENCY MEDICINE

## 2021-10-26 PROCEDURE — 99291 CRITICAL CARE FIRST HOUR: CPT | Mod: 25,HCNC,ER

## 2021-10-26 PROCEDURE — 80053 COMPREHEN METABOLIC PANEL: CPT | Mod: HCNC,ER | Performed by: EMERGENCY MEDICINE

## 2021-10-26 PROCEDURE — U0002 COVID-19 LAB TEST NON-CDC: HCPCS | Mod: HCNC,ER | Performed by: EMERGENCY MEDICINE

## 2021-10-26 PROCEDURE — 96374 THER/PROPH/DIAG INJ IV PUSH: CPT | Mod: HCNC,ER

## 2021-10-26 PROCEDURE — 93005 ELECTROCARDIOGRAM TRACING: CPT | Mod: HCNC,ER

## 2021-10-26 PROCEDURE — 84484 ASSAY OF TROPONIN QUANT: CPT | Mod: HCNC,ER | Performed by: EMERGENCY MEDICINE

## 2021-10-26 PROCEDURE — 93010 EKG 12-LEAD: ICD-10-PCS | Mod: HCNC,,, | Performed by: INTERNAL MEDICINE

## 2021-10-26 PROCEDURE — 83880 ASSAY OF NATRIURETIC PEPTIDE: CPT | Mod: HCNC,ER | Performed by: EMERGENCY MEDICINE

## 2021-10-26 PROCEDURE — 27000221 HC OXYGEN, UP TO 24 HOURS: Mod: HCNC,ER

## 2021-10-26 RX ORDER — FUROSEMIDE 10 MG/ML
80 INJECTION INTRAMUSCULAR; INTRAVENOUS
Status: COMPLETED | OUTPATIENT
Start: 2021-10-26 | End: 2021-10-26

## 2021-10-26 RX ADMIN — FUROSEMIDE 80 MG: 10 INJECTION, SOLUTION INTRAMUSCULAR; INTRAVENOUS at 01:10

## 2021-10-27 ENCOUNTER — PATIENT MESSAGE (OUTPATIENT)
Dept: INTERNAL MEDICINE | Facility: CLINIC | Age: 56
End: 2021-10-27
Payer: MEDICARE

## 2021-11-20 ENCOUNTER — HOSPITAL ENCOUNTER (INPATIENT)
Facility: HOSPITAL | Age: 56
LOS: 5 days | Discharge: HOME HEALTH | DRG: 291 | End: 2021-11-25
Attending: EMERGENCY MEDICINE | Admitting: INTERNAL MEDICINE
Payer: MEDICARE

## 2021-11-20 DIAGNOSIS — J96.01 ACUTE RESPIRATORY FAILURE WITH HYPOXIA AND HYPERCAPNIA: Primary | ICD-10-CM

## 2021-11-20 DIAGNOSIS — I50.31 ACUTE DIASTOLIC CONGESTIVE HEART FAILURE: ICD-10-CM

## 2021-11-20 DIAGNOSIS — J96.01 ACUTE RESPIRATORY FAILURE WITH HYPOXIA AND HYPERCARBIA: ICD-10-CM

## 2021-11-20 DIAGNOSIS — E83.42 HYPOMAGNESEMIA: ICD-10-CM

## 2021-11-20 DIAGNOSIS — R06.00 DYSPNEA, UNSPECIFIED TYPE: ICD-10-CM

## 2021-11-20 DIAGNOSIS — N17.9 AKI (ACUTE KIDNEY INJURY): ICD-10-CM

## 2021-11-20 DIAGNOSIS — R06.00 DYSPNEA: ICD-10-CM

## 2021-11-20 DIAGNOSIS — R06.89 CO2 NARCOSIS: ICD-10-CM

## 2021-11-20 DIAGNOSIS — R60.0 PEDAL EDEMA: ICD-10-CM

## 2021-11-20 DIAGNOSIS — I50.33 ACUTE ON CHRONIC DIASTOLIC CONGESTIVE HEART FAILURE: ICD-10-CM

## 2021-11-20 DIAGNOSIS — G47.33 OSA (OBSTRUCTIVE SLEEP APNEA): ICD-10-CM

## 2021-11-20 DIAGNOSIS — J96.02 ACUTE RESPIRATORY FAILURE WITH HYPOXIA AND HYPERCARBIA: ICD-10-CM

## 2021-11-20 DIAGNOSIS — I50.9 CHF (CONGESTIVE HEART FAILURE): ICD-10-CM

## 2021-11-20 DIAGNOSIS — I50.813 ACUTE ON CHRONIC RIGHT-SIDED HEART FAILURE: ICD-10-CM

## 2021-11-20 DIAGNOSIS — J96.00 ACUTE RESPIRATORY FAILURE: ICD-10-CM

## 2021-11-20 DIAGNOSIS — E66.01 MORBID OBESITY: ICD-10-CM

## 2021-11-20 DIAGNOSIS — N17.9 ACUTE RENAL FAILURE, UNSPECIFIED ACUTE RENAL FAILURE TYPE: ICD-10-CM

## 2021-11-20 DIAGNOSIS — Z45.2 ENCOUNTER FOR CENTRAL LINE PLACEMENT: ICD-10-CM

## 2021-11-20 DIAGNOSIS — J96.02 ACUTE RESPIRATORY FAILURE WITH HYPOXIA AND HYPERCAPNIA: Primary | ICD-10-CM

## 2021-11-20 PROBLEM — I95.9 HYPOTENSION: Status: RESOLVED | Noted: 2021-11-20 | Resolved: 2021-11-20

## 2021-11-20 PROBLEM — I50.30 DIASTOLIC CHF: Status: RESOLVED | Noted: 2021-11-20 | Resolved: 2021-11-20

## 2021-11-20 PROBLEM — I50.30 DIASTOLIC CHF: Status: ACTIVE | Noted: 2021-11-20

## 2021-11-20 PROBLEM — I95.9 HYPOTENSION: Status: ACTIVE | Noted: 2021-11-20

## 2021-11-20 LAB
ALBUMIN SERPL BCP-MCNC: 3.8 G/DL (ref 3.5–5.2)
ALLENS TEST: ABNORMAL
ALP SERPL-CCNC: 54 U/L (ref 55–135)
ALT SERPL W/O P-5'-P-CCNC: 20 U/L (ref 10–44)
ANION GAP SERPL CALC-SCNC: 19 MMOL/L (ref 8–16)
AST SERPL-CCNC: 54 U/L (ref 10–40)
BACTERIA #/AREA URNS HPF: NORMAL /HPF
BASOPHILS # BLD AUTO: 0.04 K/UL (ref 0–0.2)
BASOPHILS NFR BLD: 1.1 % (ref 0–1.9)
BILIRUB SERPL-MCNC: 1.2 MG/DL (ref 0.1–1)
BILIRUB UR QL STRIP: ABNORMAL
BNP SERPL-MCNC: 2242 PG/ML (ref 0–99)
BUN SERPL-MCNC: 41 MG/DL (ref 6–20)
CALCIUM SERPL-MCNC: 9.2 MG/DL (ref 8.7–10.5)
CHLORIDE SERPL-SCNC: 92 MMOL/L (ref 95–110)
CK SERPL-CCNC: 126 U/L (ref 20–200)
CLARITY UR: CLEAR
CO2 SERPL-SCNC: 31 MMOL/L (ref 23–29)
COLOR UR: YELLOW
CREAT SERPL-MCNC: 3.9 MG/DL (ref 0.5–1.4)
CTP QC/QA: YES
DELSYS: ABNORMAL
DIFFERENTIAL METHOD: ABNORMAL
EOSINOPHIL # BLD AUTO: 0 K/UL (ref 0–0.5)
EOSINOPHIL NFR BLD: 0.5 % (ref 0–8)
EP: 8
ERYTHROCYTE [DISTWIDTH] IN BLOOD BY AUTOMATED COUNT: 16.6 % (ref 11.5–14.5)
ERYTHROCYTE [SEDIMENTATION RATE] IN BLOOD BY WESTERGREN METHOD: 20 MM/H
ERYTHROCYTE [SEDIMENTATION RATE] IN BLOOD BY WESTERGREN METHOD: 20 MM/H
EST. GFR  (AFRICAN AMERICAN): 19 ML/MIN/1.73 M^2
EST. GFR  (NON AFRICAN AMERICAN): 16 ML/MIN/1.73 M^2
FIO2: 36
FIO2: 40
FIO2: 40
FLOW: 4
GLUCOSE SERPL-MCNC: 101 MG/DL (ref 70–110)
GLUCOSE UR QL STRIP: NEGATIVE
HCO3 UR-SCNC: 40.1 MMOL/L (ref 24–28)
HCO3 UR-SCNC: 41.6 MMOL/L (ref 24–28)
HCO3 UR-SCNC: 42.8 MMOL/L (ref 24–28)
HCT VFR BLD AUTO: 49.2 % (ref 40–54)
HGB BLD-MCNC: 14.8 G/DL (ref 14–18)
HGB UR QL STRIP: NEGATIVE
HYALINE CASTS #/AREA URNS LPF: 0 /LPF
IMM GRANULOCYTES # BLD AUTO: 0.01 K/UL (ref 0–0.04)
IMM GRANULOCYTES NFR BLD AUTO: 0.3 % (ref 0–0.5)
IP: 20
KETONES UR QL STRIP: ABNORMAL
LEUKOCYTE ESTERASE UR QL STRIP: NEGATIVE
LYMPHOCYTES # BLD AUTO: 1 K/UL (ref 1–4.8)
LYMPHOCYTES NFR BLD: 26.4 % (ref 18–48)
MCH RBC QN AUTO: 28.8 PG (ref 27–31)
MCHC RBC AUTO-ENTMCNC: 30.1 G/DL (ref 32–36)
MCV RBC AUTO: 96 FL (ref 82–98)
MICROSCOPIC COMMENT: NORMAL
MODE: ABNORMAL
MONOCYTES # BLD AUTO: 0.7 K/UL (ref 0.3–1)
MONOCYTES NFR BLD: 20.3 % (ref 4–15)
NEUTROPHILS # BLD AUTO: 1.9 K/UL (ref 1.8–7.7)
NEUTROPHILS NFR BLD: 51.4 % (ref 38–73)
NITRITE UR QL STRIP: NEGATIVE
NRBC BLD-RTO: 0 /100 WBC
PCO2 BLDA: 110.4 MMHG (ref 35–45)
PCO2 BLDA: 77.8 MMHG (ref 35–45)
PCO2 BLDA: 96.6 MMHG (ref 35–45)
PH SMN: 7.2 [PH] (ref 7.35–7.45)
PH SMN: 7.24 [PH] (ref 7.35–7.45)
PH SMN: 7.32 [PH] (ref 7.35–7.45)
PH UR STRIP: 5 [PH] (ref 5–8)
PLATELET # BLD AUTO: 118 K/UL (ref 150–450)
PMV BLD AUTO: 12.9 FL (ref 9.2–12.9)
PO2 BLDA: 148 MMHG (ref 80–100)
PO2 BLDA: 59 MMHG (ref 80–100)
PO2 BLDA: 74 MMHG (ref 80–100)
POC BE: 14 MMOL/L
POC BE: 14 MMOL/L
POC BE: 15 MMOL/L
POC SATURATED O2: 86 % (ref 95–100)
POC SATURATED O2: 90 % (ref 95–100)
POC SATURATED O2: 98 % (ref 95–100)
POCT GLUCOSE: 106 MG/DL (ref 70–110)
POCT GLUCOSE: 93 MG/DL (ref 70–110)
POTASSIUM SERPL-SCNC: 4.8 MMOL/L (ref 3.5–5.1)
PROT SERPL-MCNC: 8.7 G/DL (ref 6–8.4)
PROT UR QL STRIP: ABNORMAL
RBC # BLD AUTO: 5.13 M/UL (ref 4.6–6.2)
RBC #/AREA URNS HPF: 0 /HPF (ref 0–4)
SAMPLE: ABNORMAL
SARS-COV-2 RDRP RESP QL NAA+PROBE: NEGATIVE
SITE: ABNORMAL
SODIUM SERPL-SCNC: 142 MMOL/L (ref 136–145)
SP GR UR STRIP: >=1.03 (ref 1–1.03)
TROPONIN I SERPL DL<=0.01 NG/ML-MCNC: 0.05 NG/ML (ref 0–0.03)
TROPONIN I SERPL DL<=0.01 NG/ML-MCNC: 0.05 NG/ML (ref 0–0.03)
TROPONIN I SERPL DL<=0.01 NG/ML-MCNC: 0.07 NG/ML (ref 0–0.03)
URN SPEC COLLECT METH UR: ABNORMAL
UROBILINOGEN UR STRIP-ACNC: ABNORMAL EU/DL
VT: 550
WBC # BLD AUTO: 3.64 K/UL (ref 3.9–12.7)
WBC #/AREA URNS HPF: 0 /HPF (ref 0–5)

## 2021-11-20 PROCEDURE — 99291 PR CRITICAL CARE, E/M 30-74 MINUTES: ICD-10-PCS | Mod: HCNC,,, | Performed by: INTERNAL MEDICINE

## 2021-11-20 PROCEDURE — 63600175 PHARM REV CODE 636 W HCPCS: Mod: HCNC | Performed by: INTERNAL MEDICINE

## 2021-11-20 PROCEDURE — 20000000 HC ICU ROOM: Mod: HCNC

## 2021-11-20 PROCEDURE — 81000 URINALYSIS NONAUTO W/SCOPE: CPT | Mod: HCNC | Performed by: INTERNAL MEDICINE

## 2021-11-20 PROCEDURE — 84484 ASSAY OF TROPONIN QUANT: CPT | Mod: 91,HCNC | Performed by: INTERNAL MEDICINE

## 2021-11-20 PROCEDURE — 83880 ASSAY OF NATRIURETIC PEPTIDE: CPT | Mod: HCNC | Performed by: EMERGENCY MEDICINE

## 2021-11-20 PROCEDURE — 99291 CRITICAL CARE FIRST HOUR: CPT | Mod: 25,HCNC

## 2021-11-20 PROCEDURE — 36556 INSERT NON-TUNNEL CV CATH: CPT | Mod: HCNC

## 2021-11-20 PROCEDURE — 93010 ELECTROCARDIOGRAM REPORT: CPT | Mod: HCNC,,, | Performed by: INTERNAL MEDICINE

## 2021-11-20 PROCEDURE — 80053 COMPREHEN METABOLIC PANEL: CPT | Mod: HCNC | Performed by: EMERGENCY MEDICINE

## 2021-11-20 PROCEDURE — 82803 BLOOD GASES ANY COMBINATION: CPT | Mod: HCNC

## 2021-11-20 PROCEDURE — 82550 ASSAY OF CK (CPK): CPT | Mod: HCNC | Performed by: INTERNAL MEDICINE

## 2021-11-20 PROCEDURE — 25000003 PHARM REV CODE 250: Mod: HCNC | Performed by: INTERNAL MEDICINE

## 2021-11-20 PROCEDURE — 27000190 HC CPAP FULL FACE MASK W/VALVE: Mod: HCNC

## 2021-11-20 PROCEDURE — 84484 ASSAY OF TROPONIN QUANT: CPT | Mod: HCNC | Performed by: EMERGENCY MEDICINE

## 2021-11-20 PROCEDURE — 99900035 HC TECH TIME PER 15 MIN (STAT): Mod: HCNC

## 2021-11-20 PROCEDURE — 93005 ELECTROCARDIOGRAM TRACING: CPT | Mod: HCNC

## 2021-11-20 PROCEDURE — 36600 WITHDRAWAL OF ARTERIAL BLOOD: CPT | Mod: HCNC

## 2021-11-20 PROCEDURE — 36415 COLL VENOUS BLD VENIPUNCTURE: CPT | Mod: HCNC | Performed by: INTERNAL MEDICINE

## 2021-11-20 PROCEDURE — 93010 EKG 12-LEAD: ICD-10-PCS | Mod: HCNC,,, | Performed by: INTERNAL MEDICINE

## 2021-11-20 PROCEDURE — 27000221 HC OXYGEN, UP TO 24 HOURS: Mod: HCNC

## 2021-11-20 PROCEDURE — 94660 CPAP INITIATION&MGMT: CPT | Mod: HCNC

## 2021-11-20 PROCEDURE — 85025 COMPLETE CBC W/AUTO DIFF WBC: CPT | Mod: HCNC | Performed by: EMERGENCY MEDICINE

## 2021-11-20 PROCEDURE — 99291 CRITICAL CARE FIRST HOUR: CPT | Mod: HCNC,,, | Performed by: INTERNAL MEDICINE

## 2021-11-20 PROCEDURE — U0002 COVID-19 LAB TEST NON-CDC: HCPCS | Mod: HCNC | Performed by: EMERGENCY MEDICINE

## 2021-11-20 RX ORDER — DOPAMINE HCL IN DEXTROSE 5 % 400MG/.25L
5 INFUSION BOTTLE (ML) INTRAVENOUS CONTINUOUS
Status: DISCONTINUED | OUTPATIENT
Start: 2021-11-20 | End: 2021-11-21

## 2021-11-20 RX ORDER — GABAPENTIN 300 MG/1
300 CAPSULE ORAL NIGHTLY
Status: DISCONTINUED | OUTPATIENT
Start: 2021-11-20 | End: 2021-11-25 | Stop reason: HOSPADM

## 2021-11-20 RX ORDER — VENLAFAXINE HYDROCHLORIDE 75 MG/1
150 CAPSULE, EXTENDED RELEASE ORAL DAILY
Status: DISCONTINUED | OUTPATIENT
Start: 2021-11-20 | End: 2021-11-25 | Stop reason: HOSPADM

## 2021-11-20 RX ORDER — MULTIVITAMIN
1 TABLET ORAL DAILY
COMMUNITY
End: 2022-12-13

## 2021-11-20 RX ORDER — MUPIROCIN 20 MG/G
OINTMENT TOPICAL 2 TIMES DAILY
Status: DISCONTINUED | OUTPATIENT
Start: 2021-11-20 | End: 2021-11-25 | Stop reason: HOSPADM

## 2021-11-20 RX ORDER — METOLAZONE 5 MG/1
5 TABLET ORAL DAILY
Status: DISCONTINUED | OUTPATIENT
Start: 2021-11-20 | End: 2021-11-22

## 2021-11-20 RX ORDER — PROCHLORPERAZINE EDISYLATE 5 MG/ML
5 INJECTION INTRAMUSCULAR; INTRAVENOUS EVERY 6 HOURS PRN
Status: DISCONTINUED | OUTPATIENT
Start: 2021-11-20 | End: 2021-11-25 | Stop reason: HOSPADM

## 2021-11-20 RX ORDER — DOBUTAMINE HYDROCHLORIDE 400 MG/100ML
2.5 INJECTION INTRAVENOUS CONTINUOUS
Status: DISCONTINUED | OUTPATIENT
Start: 2021-11-20 | End: 2021-11-22

## 2021-11-20 RX ORDER — ARIPIPRAZOLE 5 MG/1
5 TABLET ORAL DAILY
Status: DISCONTINUED | OUTPATIENT
Start: 2021-11-20 | End: 2021-11-25 | Stop reason: HOSPADM

## 2021-11-20 RX ORDER — ACETAMINOPHEN 325 MG/1
650 TABLET ORAL EVERY 4 HOURS PRN
Status: DISCONTINUED | OUTPATIENT
Start: 2021-11-20 | End: 2021-11-25 | Stop reason: HOSPADM

## 2021-11-20 RX ORDER — SODIUM CHLORIDE 0.9 % (FLUSH) 0.9 %
10 SYRINGE (ML) INJECTION
Status: DISCONTINUED | OUTPATIENT
Start: 2021-11-20 | End: 2021-11-25 | Stop reason: HOSPADM

## 2021-11-20 RX ORDER — NALOXONE HCL 0.4 MG/ML
0.02 VIAL (ML) INJECTION
Status: DISCONTINUED | OUTPATIENT
Start: 2021-11-20 | End: 2021-11-25 | Stop reason: HOSPADM

## 2021-11-20 RX ORDER — ACETAMINOPHEN 325 MG/1
650 TABLET ORAL EVERY 8 HOURS PRN
Status: DISCONTINUED | OUTPATIENT
Start: 2021-11-20 | End: 2021-11-25 | Stop reason: HOSPADM

## 2021-11-20 RX ORDER — ONDANSETRON 2 MG/ML
4 INJECTION INTRAMUSCULAR; INTRAVENOUS EVERY 8 HOURS PRN
Status: DISCONTINUED | OUTPATIENT
Start: 2021-11-20 | End: 2021-11-25 | Stop reason: HOSPADM

## 2021-11-20 RX ADMIN — DOBUTAMINE IN DEXTROSE 5 MCG/KG/MIN: 400 INJECTION, SOLUTION INTRAVENOUS at 06:11

## 2021-11-20 RX ADMIN — DOPAMINE HYDROCHLORIDE 5 MCG/KG/MIN: 160 INJECTION, SOLUTION INTRAVENOUS at 03:11

## 2021-11-20 RX ADMIN — FUROSEMIDE 2 MG/HR: 10 INJECTION, SOLUTION INTRAMUSCULAR; INTRAVENOUS at 03:11

## 2021-11-21 PROBLEM — I50.33 DIASTOLIC CHF, ACUTE ON CHRONIC: Status: RESOLVED | Noted: 2017-05-10 | Resolved: 2021-11-21

## 2021-11-21 PROBLEM — I07.1 MODERATE TRICUSPID VALVE REGURGITATION: Status: ACTIVE | Noted: 2021-11-21

## 2021-11-21 LAB
ANION GAP SERPL CALC-SCNC: 15 MMOL/L (ref 8–16)
ANION GAP SERPL CALC-SCNC: 16 MMOL/L (ref 8–16)
AV INDEX (PROSTH): 0.39
AV MEAN GRADIENT: 6 MMHG
AV PEAK GRADIENT: 11 MMHG
AV VALVE AREA: 1.63 CM2
AV VELOCITY RATIO: 0.55
BASOPHILS # BLD AUTO: 0.03 K/UL (ref 0–0.2)
BASOPHILS NFR BLD: 0.7 % (ref 0–1.9)
BSA FOR ECHO PROCEDURE: 3.29 M2
BUN SERPL-MCNC: 33 MG/DL (ref 6–20)
BUN SERPL-MCNC: 38 MG/DL (ref 6–20)
CALCIUM SERPL-MCNC: 8.5 MG/DL (ref 8.7–10.5)
CALCIUM SERPL-MCNC: 8.7 MG/DL (ref 8.7–10.5)
CHLORIDE SERPL-SCNC: 91 MMOL/L (ref 95–110)
CHLORIDE SERPL-SCNC: 94 MMOL/L (ref 95–110)
CO2 SERPL-SCNC: 39 MMOL/L (ref 23–29)
CO2 SERPL-SCNC: 40 MMOL/L (ref 23–29)
CREAT SERPL-MCNC: 1.5 MG/DL (ref 0.5–1.4)
CREAT SERPL-MCNC: 2.2 MG/DL (ref 0.5–1.4)
CV ECHO LV RWT: 0.57 CM
DIFFERENTIAL METHOD: ABNORMAL
DOP CALC AO PEAK VEL: 1.63 M/S
DOP CALC AO VTI: 24.9 CM
DOP CALC LVOT AREA: 4.2 CM2
DOP CALC LVOT DIAMETER: 2.3 CM
DOP CALC LVOT PEAK VEL: 0.89 M/S
DOP CALC LVOT STROKE VOLUME: 40.7 CM3
DOP CALCLVOT PEAK VEL VTI: 9.8 CM
E WAVE DECELERATION TIME: 144.57 MSEC
E/A RATIO: 0.54
E/E' RATIO: 3.5 M/S
ECHO EF ESTIMATED: 68 %
ECHO LV POSTERIOR WALL: 1.39 CM (ref 0.6–1.1)
EJECTION FRACTION: 60 %
EOSINOPHIL # BLD AUTO: 0 K/UL (ref 0–0.5)
EOSINOPHIL NFR BLD: 0.5 % (ref 0–8)
ERYTHROCYTE [DISTWIDTH] IN BLOOD BY AUTOMATED COUNT: 16.7 % (ref 11.5–14.5)
EST. GFR  (AFRICAN AMERICAN): 37 ML/MIN/1.73 M^2
EST. GFR  (AFRICAN AMERICAN): 59 ML/MIN/1.73 M^2
EST. GFR  (NON AFRICAN AMERICAN): 32 ML/MIN/1.73 M^2
EST. GFR  (NON AFRICAN AMERICAN): 51 ML/MIN/1.73 M^2
FRACTIONAL SHORTENING: 38 % (ref 28–44)
GLUCOSE SERPL-MCNC: 101 MG/DL (ref 70–110)
GLUCOSE SERPL-MCNC: 71 MG/DL (ref 70–110)
HCT VFR BLD AUTO: 44.2 % (ref 40–54)
HGB BLD-MCNC: 12.9 G/DL (ref 14–18)
IMM GRANULOCYTES # BLD AUTO: 0.01 K/UL (ref 0–0.04)
IMM GRANULOCYTES NFR BLD AUTO: 0.2 % (ref 0–0.5)
INTERVENTRICULAR SEPTUM: 1.37 CM (ref 0.6–1.1)
IVRT: 62.8 MSEC
LEFT ATRIUM SIZE: 4.77 CM
LEFT INTERNAL DIMENSION IN SYSTOLE: 3 CM (ref 2.1–4)
LEFT VENTRICLE DIASTOLIC VOLUME INDEX: 35.39 ML/M2
LEFT VENTRICLE DIASTOLIC VOLUME: 109.71 ML
LEFT VENTRICLE MASS INDEX: 88 G/M2
LEFT VENTRICLE SYSTOLIC VOLUME INDEX: 11.3 ML/M2
LEFT VENTRICLE SYSTOLIC VOLUME: 34.98 ML
LEFT VENTRICULAR INTERNAL DIMENSION IN DIASTOLE: 4.84 CM (ref 3.5–6)
LEFT VENTRICULAR MASS: 271.5 G
LV LATERAL E/E' RATIO: 3.5 M/S
LV SEPTAL E/E' RATIO: 3.5 M/S
LVOT MG: 1.68 MMHG
LVOT MV: 0.59 CM/S
LYMPHOCYTES # BLD AUTO: 0.6 K/UL (ref 1–4.8)
LYMPHOCYTES NFR BLD: 14.3 % (ref 18–48)
MAGNESIUM SERPL-MCNC: 1.9 MG/DL (ref 1.6–2.6)
MAGNESIUM SERPL-MCNC: 2.1 MG/DL (ref 1.6–2.6)
MCH RBC QN AUTO: 28 PG (ref 27–31)
MCHC RBC AUTO-ENTMCNC: 29.2 G/DL (ref 32–36)
MCV RBC AUTO: 96 FL (ref 82–98)
MONOCYTES # BLD AUTO: 0.9 K/UL (ref 0.3–1)
MONOCYTES NFR BLD: 20.4 % (ref 4–15)
MV PEAK A VEL: 0.65 M/S
MV PEAK E VEL: 0.35 M/S
MV STENOSIS PRESSURE HALF TIME: 41.93 MS
MV VALVE AREA P 1/2 METHOD: 5.25 CM2
NEUTROPHILS # BLD AUTO: 2.7 K/UL (ref 1.8–7.7)
NEUTROPHILS NFR BLD: 63.9 % (ref 38–73)
NRBC BLD-RTO: 0 /100 WBC
PHOSPHATE SERPL-MCNC: 3.4 MG/DL (ref 2.7–4.5)
PISA TR MAX VEL: 2.74 M/S
PLATELET # BLD AUTO: 104 K/UL (ref 150–450)
PMV BLD AUTO: 12 FL (ref 9.2–12.9)
POTASSIUM SERPL-SCNC: 3.1 MMOL/L (ref 3.5–5.1)
POTASSIUM SERPL-SCNC: 3.4 MMOL/L (ref 3.5–5.1)
PV MV: 1.13 M/S
PV PEAK VELOCITY: 1.53 CM/S
RBC # BLD AUTO: 4.61 M/UL (ref 4.6–6.2)
SINUS: 3.1 CM
SODIUM SERPL-SCNC: 147 MMOL/L (ref 136–145)
SODIUM SERPL-SCNC: 148 MMOL/L (ref 136–145)
STJ: 2.51 CM
TDI LATERAL: 0.1 M/S
TDI SEPTAL: 0.1 M/S
TDI: 0.1 M/S
TR MAX PG: 30 MMHG
TRICUSPID ANNULAR PLANE SYSTOLIC EXCURSION: 1.95 CM
WBC # BLD AUTO: 4.21 K/UL (ref 3.9–12.7)

## 2021-11-21 PROCEDURE — 99900035 HC TECH TIME PER 15 MIN (STAT): Mod: HCNC

## 2021-11-21 PROCEDURE — 25000003 PHARM REV CODE 250: Mod: HCNC | Performed by: INTERNAL MEDICINE

## 2021-11-21 PROCEDURE — 94660 CPAP INITIATION&MGMT: CPT | Mod: HCNC

## 2021-11-21 PROCEDURE — 84100 ASSAY OF PHOSPHORUS: CPT | Mod: HCNC | Performed by: INTERNAL MEDICINE

## 2021-11-21 PROCEDURE — 80048 BASIC METABOLIC PNL TOTAL CA: CPT | Mod: HCNC | Performed by: INTERNAL MEDICINE

## 2021-11-21 PROCEDURE — 99291 CRITICAL CARE FIRST HOUR: CPT | Mod: HCNC,,, | Performed by: INTERNAL MEDICINE

## 2021-11-21 PROCEDURE — 25000003 PHARM REV CODE 250: Mod: HCNC | Performed by: NURSE PRACTITIONER

## 2021-11-21 PROCEDURE — 99291 PR CRITICAL CARE, E/M 30-74 MINUTES: ICD-10-PCS | Mod: HCNC,,, | Performed by: INTERNAL MEDICINE

## 2021-11-21 PROCEDURE — 63600175 PHARM REV CODE 636 W HCPCS: Mod: HCNC | Performed by: INTERNAL MEDICINE

## 2021-11-21 PROCEDURE — 99233 SBSQ HOSP IP/OBS HIGH 50: CPT | Mod: HCNC,,, | Performed by: INTERNAL MEDICINE

## 2021-11-21 PROCEDURE — 99233 PR SUBSEQUENT HOSPITAL CARE,LEVL III: ICD-10-PCS | Mod: HCNC,,, | Performed by: INTERNAL MEDICINE

## 2021-11-21 PROCEDURE — 27000221 HC OXYGEN, UP TO 24 HOURS: Mod: HCNC

## 2021-11-21 PROCEDURE — 85025 COMPLETE CBC W/AUTO DIFF WBC: CPT | Mod: HCNC | Performed by: INTERNAL MEDICINE

## 2021-11-21 PROCEDURE — 80048 BASIC METABOLIC PNL TOTAL CA: CPT | Mod: 91,HCNC | Performed by: INTERNAL MEDICINE

## 2021-11-21 PROCEDURE — 83735 ASSAY OF MAGNESIUM: CPT | Mod: 91,HCNC | Performed by: INTERNAL MEDICINE

## 2021-11-21 PROCEDURE — 20000000 HC ICU ROOM: Mod: HCNC

## 2021-11-21 PROCEDURE — 83735 ASSAY OF MAGNESIUM: CPT | Mod: HCNC | Performed by: INTERNAL MEDICINE

## 2021-11-21 RX ORDER — POTASSIUM CHLORIDE 29.8 MG/ML
40 INJECTION INTRAVENOUS ONCE
Status: COMPLETED | OUTPATIENT
Start: 2021-11-21 | End: 2021-11-21

## 2021-11-21 RX ORDER — ALLOPURINOL 100 MG/1
100 TABLET ORAL DAILY
Status: DISCONTINUED | OUTPATIENT
Start: 2021-11-21 | End: 2021-11-22

## 2021-11-21 RX ORDER — METOPROLOL SUCCINATE 25 MG/1
25 TABLET, EXTENDED RELEASE ORAL 2 TIMES DAILY
Status: DISCONTINUED | OUTPATIENT
Start: 2021-11-21 | End: 2021-11-25 | Stop reason: HOSPADM

## 2021-11-21 RX ORDER — FAMOTIDINE 20 MG/1
20 TABLET, FILM COATED ORAL 2 TIMES DAILY
Status: DISCONTINUED | OUTPATIENT
Start: 2021-11-21 | End: 2021-11-25 | Stop reason: HOSPADM

## 2021-11-21 RX ORDER — POTASSIUM CHLORIDE 20 MEQ/1
20 TABLET, EXTENDED RELEASE ORAL DAILY
Status: DISCONTINUED | OUTPATIENT
Start: 2021-11-21 | End: 2021-11-24

## 2021-11-21 RX ADMIN — MUPIROCIN: 20 OINTMENT TOPICAL at 08:11

## 2021-11-21 RX ADMIN — ALLOPURINOL 100 MG: 100 TABLET ORAL at 08:11

## 2021-11-21 RX ADMIN — VENLAFAXINE HYDROCHLORIDE 150 MG: 75 CAPSULE, EXTENDED RELEASE ORAL at 08:11

## 2021-11-21 RX ADMIN — APIXABAN 5 MG: 2.5 TABLET, FILM COATED ORAL at 08:11

## 2021-11-21 RX ADMIN — METOPROLOL SUCCINATE 25 MG: 25 TABLET, EXTENDED RELEASE ORAL at 08:11

## 2021-11-21 RX ADMIN — DOBUTAMINE IN DEXTROSE 2.5 MCG/KG/MIN: 400 INJECTION, SOLUTION INTRAVENOUS at 08:11

## 2021-11-21 RX ADMIN — POTASSIUM CHLORIDE 40 MEQ: 29.8 INJECTION, SOLUTION INTRAVENOUS at 06:11

## 2021-11-21 RX ADMIN — METOLAZONE 5 MG: 5 TABLET ORAL at 08:11

## 2021-11-21 RX ADMIN — FAMOTIDINE 20 MG: 20 TABLET ORAL at 08:11

## 2021-11-21 RX ADMIN — GABAPENTIN 300 MG: 300 CAPSULE ORAL at 08:11

## 2021-11-21 RX ADMIN — FUROSEMIDE 2.5 MG/HR: 10 INJECTION, SOLUTION INTRAMUSCULAR; INTRAVENOUS at 04:11

## 2021-11-21 RX ADMIN — POTASSIUM CHLORIDE 40 MEQ: 29.8 INJECTION, SOLUTION INTRAVENOUS at 08:11

## 2021-11-21 RX ADMIN — ARIPIPRAZOLE 5 MG: 5 TABLET ORAL at 08:11

## 2021-11-21 RX ADMIN — POTASSIUM CHLORIDE 20 MEQ: 1500 TABLET, EXTENDED RELEASE ORAL at 08:11

## 2021-11-21 RX ADMIN — FAMOTIDINE 20 MG: 20 TABLET ORAL at 11:11

## 2021-11-22 PROBLEM — J98.4 RESTRICTIVE LUNG DISEASE: Status: ACTIVE | Noted: 2021-11-22

## 2021-11-22 PROBLEM — R91.8 MULTIPLE LUNG NODULES ON CT: Status: ACTIVE | Noted: 2021-11-22

## 2021-11-22 LAB
ANION GAP SERPL CALC-SCNC: 12 MMOL/L (ref 8–16)
BASOPHILS # BLD AUTO: 0.01 K/UL (ref 0–0.2)
BASOPHILS NFR BLD: 0.2 % (ref 0–1.9)
BNP SERPL-MCNC: 264 PG/ML (ref 0–99)
BUN SERPL-MCNC: 25 MG/DL (ref 6–20)
CALCIUM SERPL-MCNC: 9 MG/DL (ref 8.7–10.5)
CHLORIDE SERPL-SCNC: 89 MMOL/L (ref 95–110)
CO2 SERPL-SCNC: 47 MMOL/L (ref 23–29)
CREAT SERPL-MCNC: 1.1 MG/DL (ref 0.5–1.4)
CRP SERPL-MCNC: 60.5 MG/L (ref 0–8.2)
DIFFERENTIAL METHOD: ABNORMAL
EOSINOPHIL # BLD AUTO: 0 K/UL (ref 0–0.5)
EOSINOPHIL NFR BLD: 0.4 % (ref 0–8)
ERYTHROCYTE [DISTWIDTH] IN BLOOD BY AUTOMATED COUNT: 16.1 % (ref 11.5–14.5)
EST. GFR  (AFRICAN AMERICAN): >60 ML/MIN/1.73 M^2
EST. GFR  (NON AFRICAN AMERICAN): >60 ML/MIN/1.73 M^2
GLUCOSE SERPL-MCNC: 101 MG/DL (ref 70–110)
HCT VFR BLD AUTO: 45.4 % (ref 40–54)
HGB BLD-MCNC: 13.7 G/DL (ref 14–18)
IMM GRANULOCYTES # BLD AUTO: 0.03 K/UL (ref 0–0.04)
IMM GRANULOCYTES NFR BLD AUTO: 0.6 % (ref 0–0.5)
LYMPHOCYTES # BLD AUTO: 0.4 K/UL (ref 1–4.8)
LYMPHOCYTES NFR BLD: 7.7 % (ref 18–48)
MAGNESIUM SERPL-MCNC: 1.8 MG/DL (ref 1.6–2.6)
MCH RBC QN AUTO: 28.6 PG (ref 27–31)
MCHC RBC AUTO-ENTMCNC: 30.2 G/DL (ref 32–36)
MCV RBC AUTO: 95 FL (ref 82–98)
MONOCYTES # BLD AUTO: 0.9 K/UL (ref 0.3–1)
MONOCYTES NFR BLD: 19.8 % (ref 4–15)
NEUTROPHILS # BLD AUTO: 3.4 K/UL (ref 1.8–7.7)
NEUTROPHILS NFR BLD: 71.3 % (ref 38–73)
NRBC BLD-RTO: 0 /100 WBC
PHOSPHATE SERPL-MCNC: 1.9 MG/DL (ref 2.7–4.5)
PLATELET # BLD AUTO: 116 K/UL (ref 150–450)
PMV BLD AUTO: 12.6 FL (ref 9.2–12.9)
POTASSIUM SERPL-SCNC: 3.2 MMOL/L (ref 3.5–5.1)
RBC # BLD AUTO: 4.79 M/UL (ref 4.6–6.2)
SODIUM SERPL-SCNC: 148 MMOL/L (ref 136–145)
URATE SERPL-MCNC: 16.5 MG/DL (ref 3.4–7)
WBC # BLD AUTO: 4.7 K/UL (ref 3.9–12.7)

## 2021-11-22 PROCEDURE — 25000003 PHARM REV CODE 250: Mod: HCNC | Performed by: INTERNAL MEDICINE

## 2021-11-22 PROCEDURE — 99233 SBSQ HOSP IP/OBS HIGH 50: CPT | Mod: HCNC,,, | Performed by: STUDENT IN AN ORGANIZED HEALTH CARE EDUCATION/TRAINING PROGRAM

## 2021-11-22 PROCEDURE — 80048 BASIC METABOLIC PNL TOTAL CA: CPT | Mod: HCNC | Performed by: INTERNAL MEDICINE

## 2021-11-22 PROCEDURE — 84550 ASSAY OF BLOOD/URIC ACID: CPT | Mod: HCNC | Performed by: INTERNAL MEDICINE

## 2021-11-22 PROCEDURE — 99291 PR CRITICAL CARE, E/M 30-74 MINUTES: ICD-10-PCS | Mod: HCNC,,, | Performed by: INTERNAL MEDICINE

## 2021-11-22 PROCEDURE — 36415 COLL VENOUS BLD VENIPUNCTURE: CPT | Mod: HCNC | Performed by: INTERNAL MEDICINE

## 2021-11-22 PROCEDURE — 99233 PR SUBSEQUENT HOSPITAL CARE,LEVL III: ICD-10-PCS | Mod: HCNC,,, | Performed by: STUDENT IN AN ORGANIZED HEALTH CARE EDUCATION/TRAINING PROGRAM

## 2021-11-22 PROCEDURE — 84100 ASSAY OF PHOSPHORUS: CPT | Mod: HCNC | Performed by: INTERNAL MEDICINE

## 2021-11-22 PROCEDURE — 83735 ASSAY OF MAGNESIUM: CPT | Mod: HCNC | Performed by: INTERNAL MEDICINE

## 2021-11-22 PROCEDURE — 21400001 HC TELEMETRY ROOM: Mod: HCNC

## 2021-11-22 PROCEDURE — 83880 ASSAY OF NATRIURETIC PEPTIDE: CPT | Mod: HCNC | Performed by: PHYSICIAN ASSISTANT

## 2021-11-22 PROCEDURE — 97163 PT EVAL HIGH COMPLEX 45 MIN: CPT | Mod: HCNC

## 2021-11-22 PROCEDURE — 27000221 HC OXYGEN, UP TO 24 HOURS: Mod: HCNC

## 2021-11-22 PROCEDURE — 97530 THERAPEUTIC ACTIVITIES: CPT | Mod: HCNC

## 2021-11-22 PROCEDURE — 25000003 PHARM REV CODE 250: Mod: HCNC | Performed by: NURSE PRACTITIONER

## 2021-11-22 PROCEDURE — 99900035 HC TECH TIME PER 15 MIN (STAT): Mod: HCNC

## 2021-11-22 PROCEDURE — 97166 OT EVAL MOD COMPLEX 45 MIN: CPT | Mod: HCNC

## 2021-11-22 PROCEDURE — 63600175 PHARM REV CODE 636 W HCPCS: Mod: HCNC | Performed by: INTERNAL MEDICINE

## 2021-11-22 PROCEDURE — 86140 C-REACTIVE PROTEIN: CPT | Mod: HCNC | Performed by: INTERNAL MEDICINE

## 2021-11-22 PROCEDURE — 94660 CPAP INITIATION&MGMT: CPT | Mod: HCNC

## 2021-11-22 PROCEDURE — 94761 N-INVAS EAR/PLS OXIMETRY MLT: CPT | Mod: HCNC

## 2021-11-22 PROCEDURE — 85025 COMPLETE CBC W/AUTO DIFF WBC: CPT | Mod: HCNC | Performed by: INTERNAL MEDICINE

## 2021-11-22 PROCEDURE — 99291 CRITICAL CARE FIRST HOUR: CPT | Mod: HCNC,,, | Performed by: INTERNAL MEDICINE

## 2021-11-22 PROCEDURE — 82164 ANGIOTENSIN I ENZYME TEST: CPT | Mod: HCNC | Performed by: INTERNAL MEDICINE

## 2021-11-22 RX ORDER — SODIUM,POTASSIUM PHOSPHATES 280-250MG
2 POWDER IN PACKET (EA) ORAL 3 TIMES DAILY
Status: COMPLETED | OUTPATIENT
Start: 2021-11-22 | End: 2021-11-22

## 2021-11-22 RX ORDER — SODIUM CHLORIDE 9 MG/ML
INJECTION, SOLUTION INTRAVENOUS
Status: DISCONTINUED | OUTPATIENT
Start: 2021-11-22 | End: 2021-11-25 | Stop reason: HOSPADM

## 2021-11-22 RX ORDER — DOCUSATE SODIUM 100 MG/1
100 CAPSULE, LIQUID FILLED ORAL 2 TIMES DAILY
Status: DISCONTINUED | OUTPATIENT
Start: 2021-11-22 | End: 2021-11-25 | Stop reason: HOSPADM

## 2021-11-22 RX ORDER — POTASSIUM CHLORIDE 20 MEQ/1
40 TABLET, EXTENDED RELEASE ORAL ONCE
Status: COMPLETED | OUTPATIENT
Start: 2021-11-22 | End: 2021-11-22

## 2021-11-22 RX ORDER — FUROSEMIDE 40 MG/1
40 TABLET ORAL 2 TIMES DAILY
Status: DISCONTINUED | OUTPATIENT
Start: 2021-11-22 | End: 2021-11-23

## 2021-11-22 RX ORDER — MODAFINIL 100 MG/1
200 TABLET ORAL DAILY
Status: DISCONTINUED | OUTPATIENT
Start: 2021-11-23 | End: 2021-11-25 | Stop reason: HOSPADM

## 2021-11-22 RX ORDER — LANOLIN ALCOHOL/MO/W.PET/CERES
400 CREAM (GRAM) TOPICAL 2 TIMES DAILY
Status: DISCONTINUED | OUTPATIENT
Start: 2021-11-22 | End: 2021-11-25 | Stop reason: HOSPADM

## 2021-11-22 RX ORDER — ALLOPURINOL 300 MG/1
300 TABLET ORAL DAILY
Status: DISCONTINUED | OUTPATIENT
Start: 2021-11-22 | End: 2021-11-23

## 2021-11-22 RX ORDER — POLYETHYLENE GLYCOL 3350 17 G/17G
17 POWDER, FOR SOLUTION ORAL DAILY
Status: DISCONTINUED | OUTPATIENT
Start: 2021-11-22 | End: 2021-11-25 | Stop reason: HOSPADM

## 2021-11-22 RX ADMIN — FUROSEMIDE 40 MG: 40 TABLET ORAL at 09:11

## 2021-11-22 RX ADMIN — DEXTROSE 250 MG: 50 INJECTION, SOLUTION INTRAVENOUS at 11:11

## 2021-11-22 RX ADMIN — VENLAFAXINE HYDROCHLORIDE 150 MG: 75 CAPSULE, EXTENDED RELEASE ORAL at 09:11

## 2021-11-22 RX ADMIN — ALLOPURINOL 300 MG: 300 TABLET ORAL at 09:11

## 2021-11-22 RX ADMIN — SODIUM CHLORIDE 35 ML/HR: 0.9 INJECTION, SOLUTION INTRAVENOUS at 11:11

## 2021-11-22 RX ADMIN — APIXABAN 5 MG: 2.5 TABLET, FILM COATED ORAL at 09:11

## 2021-11-22 RX ADMIN — DEXTROSE 250 MG: 50 INJECTION, SOLUTION INTRAVENOUS at 10:11

## 2021-11-22 RX ADMIN — POTASSIUM & SODIUM PHOSPHATES POWDER PACK 280-160-250 MG 2 PACKET: 280-160-250 PACK at 09:11

## 2021-11-22 RX ADMIN — FAMOTIDINE 20 MG: 20 TABLET ORAL at 09:11

## 2021-11-22 RX ADMIN — METHYLPREDNISOLONE SODIUM SUCCINATE 40 MG: 40 INJECTION, POWDER, FOR SOLUTION INTRAMUSCULAR; INTRAVENOUS at 03:11

## 2021-11-22 RX ADMIN — MUPIROCIN: 20 OINTMENT TOPICAL at 09:11

## 2021-11-22 RX ADMIN — POTASSIUM & SODIUM PHOSPHATES POWDER PACK 280-160-250 MG 2 PACKET: 280-160-250 PACK at 03:11

## 2021-11-22 RX ADMIN — FUROSEMIDE 40 MG: 40 TABLET ORAL at 06:11

## 2021-11-22 RX ADMIN — METOPROLOL SUCCINATE 25 MG: 25 TABLET, EXTENDED RELEASE ORAL at 09:11

## 2021-11-22 RX ADMIN — METHYLPREDNISOLONE SODIUM SUCCINATE 40 MG: 40 INJECTION, POWDER, FOR SOLUTION INTRAMUSCULAR; INTRAVENOUS at 09:11

## 2021-11-22 RX ADMIN — ARIPIPRAZOLE 5 MG: 5 TABLET ORAL at 09:11

## 2021-11-22 RX ADMIN — GABAPENTIN 300 MG: 300 CAPSULE ORAL at 09:11

## 2021-11-22 RX ADMIN — DEXTROSE 250 MG: 50 INJECTION, SOLUTION INTRAVENOUS at 03:11

## 2021-11-22 RX ADMIN — POTASSIUM CHLORIDE 20 MEQ: 1500 TABLET, EXTENDED RELEASE ORAL at 09:11

## 2021-11-22 RX ADMIN — Medication 400 MG: at 09:11

## 2021-11-22 RX ADMIN — POTASSIUM CHLORIDE 40 MEQ: 1500 TABLET, EXTENDED RELEASE ORAL at 09:11

## 2021-11-23 LAB
ANION GAP SERPL CALC-SCNC: 14 MMOL/L (ref 8–16)
BASOPHILS # BLD AUTO: 0 K/UL (ref 0–0.2)
BASOPHILS NFR BLD: 0 % (ref 0–1.9)
BUN SERPL-MCNC: 25 MG/DL (ref 6–20)
CALCIUM SERPL-MCNC: 9.2 MG/DL (ref 8.7–10.5)
CHLORIDE SERPL-SCNC: 88 MMOL/L (ref 95–110)
CO2 SERPL-SCNC: 43 MMOL/L (ref 23–29)
CREAT SERPL-MCNC: 1.2 MG/DL (ref 0.5–1.4)
DIFFERENTIAL METHOD: ABNORMAL
EOSINOPHIL # BLD AUTO: 0 K/UL (ref 0–0.5)
EOSINOPHIL NFR BLD: 0 % (ref 0–8)
ERYTHROCYTE [DISTWIDTH] IN BLOOD BY AUTOMATED COUNT: 15.9 % (ref 11.5–14.5)
EST. GFR  (AFRICAN AMERICAN): >60 ML/MIN/1.73 M^2
EST. GFR  (NON AFRICAN AMERICAN): >60 ML/MIN/1.73 M^2
GLUCOSE SERPL-MCNC: 143 MG/DL (ref 70–110)
HCT VFR BLD AUTO: 47.9 % (ref 40–54)
HGB BLD-MCNC: 14.4 G/DL (ref 14–18)
IMM GRANULOCYTES # BLD AUTO: 0.03 K/UL (ref 0–0.04)
IMM GRANULOCYTES NFR BLD AUTO: 0.6 % (ref 0–0.5)
LYMPHOCYTES # BLD AUTO: 0.2 K/UL (ref 1–4.8)
LYMPHOCYTES NFR BLD: 3.7 % (ref 18–48)
MAGNESIUM SERPL-MCNC: 2 MG/DL (ref 1.6–2.6)
MCH RBC QN AUTO: 28.6 PG (ref 27–31)
MCHC RBC AUTO-ENTMCNC: 30.1 G/DL (ref 32–36)
MCV RBC AUTO: 95 FL (ref 82–98)
MONOCYTES # BLD AUTO: 0.4 K/UL (ref 0.3–1)
MONOCYTES NFR BLD: 7.6 % (ref 4–15)
NEUTROPHILS # BLD AUTO: 4.5 K/UL (ref 1.8–7.7)
NEUTROPHILS NFR BLD: 88.1 % (ref 38–73)
NRBC BLD-RTO: 0 /100 WBC
PHOSPHATE SERPL-MCNC: 2.5 MG/DL (ref 2.7–4.5)
PLATELET # BLD AUTO: 130 K/UL (ref 150–450)
PMV BLD AUTO: 11.7 FL (ref 9.2–12.9)
POTASSIUM SERPL-SCNC: 3.4 MMOL/L (ref 3.5–5.1)
RBC # BLD AUTO: 5.04 M/UL (ref 4.6–6.2)
SODIUM SERPL-SCNC: 145 MMOL/L (ref 136–145)
WBC # BLD AUTO: 5.15 K/UL (ref 3.9–12.7)

## 2021-11-23 PROCEDURE — 27000221 HC OXYGEN, UP TO 24 HOURS: Mod: HCNC

## 2021-11-23 PROCEDURE — 94761 N-INVAS EAR/PLS OXIMETRY MLT: CPT | Mod: HCNC

## 2021-11-23 PROCEDURE — 21400001 HC TELEMETRY ROOM: Mod: HCNC

## 2021-11-23 PROCEDURE — 85025 COMPLETE CBC W/AUTO DIFF WBC: CPT | Mod: HCNC | Performed by: INTERNAL MEDICINE

## 2021-11-23 PROCEDURE — 99233 PR SUBSEQUENT HOSPITAL CARE,LEVL III: ICD-10-PCS | Mod: HCNC,,, | Performed by: INTERNAL MEDICINE

## 2021-11-23 PROCEDURE — 25000003 PHARM REV CODE 250: Mod: HCNC | Performed by: INTERNAL MEDICINE

## 2021-11-23 PROCEDURE — 99900035 HC TECH TIME PER 15 MIN (STAT): Mod: HCNC

## 2021-11-23 PROCEDURE — 99233 SBSQ HOSP IP/OBS HIGH 50: CPT | Mod: HCNC,,, | Performed by: INTERNAL MEDICINE

## 2021-11-23 PROCEDURE — 84100 ASSAY OF PHOSPHORUS: CPT | Mod: HCNC | Performed by: INTERNAL MEDICINE

## 2021-11-23 PROCEDURE — 25000003 PHARM REV CODE 250: Mod: HCNC | Performed by: NURSE PRACTITIONER

## 2021-11-23 PROCEDURE — 63600175 PHARM REV CODE 636 W HCPCS: Mod: HCNC | Performed by: INTERNAL MEDICINE

## 2021-11-23 PROCEDURE — 36415 COLL VENOUS BLD VENIPUNCTURE: CPT | Mod: HCNC | Performed by: INTERNAL MEDICINE

## 2021-11-23 PROCEDURE — 99232 PR SUBSEQUENT HOSPITAL CARE,LEVL II: ICD-10-PCS | Mod: HCNC,,, | Performed by: STUDENT IN AN ORGANIZED HEALTH CARE EDUCATION/TRAINING PROGRAM

## 2021-11-23 PROCEDURE — 94660 CPAP INITIATION&MGMT: CPT | Mod: HCNC

## 2021-11-23 PROCEDURE — 99232 SBSQ HOSP IP/OBS MODERATE 35: CPT | Mod: HCNC,,, | Performed by: STUDENT IN AN ORGANIZED HEALTH CARE EDUCATION/TRAINING PROGRAM

## 2021-11-23 PROCEDURE — 80048 BASIC METABOLIC PNL TOTAL CA: CPT | Mod: HCNC | Performed by: INTERNAL MEDICINE

## 2021-11-23 PROCEDURE — 83735 ASSAY OF MAGNESIUM: CPT | Mod: HCNC | Performed by: INTERNAL MEDICINE

## 2021-11-23 RX ORDER — POTASSIUM CHLORIDE 20 MEQ/1
40 TABLET, EXTENDED RELEASE ORAL ONCE
Status: COMPLETED | OUTPATIENT
Start: 2021-11-23 | End: 2021-11-23

## 2021-11-23 RX ORDER — FUROSEMIDE 40 MG/1
40 TABLET ORAL 2 TIMES DAILY
Status: DISCONTINUED | OUTPATIENT
Start: 2021-11-24 | End: 2021-11-23

## 2021-11-23 RX ORDER — FUROSEMIDE 10 MG/ML
40 INJECTION INTRAMUSCULAR; INTRAVENOUS
Status: DISCONTINUED | OUTPATIENT
Start: 2021-11-23 | End: 2021-11-23

## 2021-11-23 RX ORDER — ACETAZOLAMIDE 250 MG/1
250 TABLET ORAL 3 TIMES DAILY
Status: DISCONTINUED | OUTPATIENT
Start: 2021-11-23 | End: 2021-11-25 | Stop reason: HOSPADM

## 2021-11-23 RX ORDER — ALLOPURINOL 100 MG/1
100 TABLET ORAL DAILY
Status: DISCONTINUED | OUTPATIENT
Start: 2021-11-24 | End: 2021-11-25 | Stop reason: HOSPADM

## 2021-11-23 RX ORDER — FUROSEMIDE 10 MG/ML
40 INJECTION INTRAMUSCULAR; INTRAVENOUS 2 TIMES DAILY
Status: DISCONTINUED | OUTPATIENT
Start: 2021-11-23 | End: 2021-11-24

## 2021-11-23 RX ORDER — ACETAZOLAMIDE 250 MG/1
250 TABLET ORAL 2 TIMES DAILY
Status: DISCONTINUED | OUTPATIENT
Start: 2021-11-23 | End: 2021-11-23

## 2021-11-23 RX ADMIN — APIXABAN 5 MG: 2.5 TABLET, FILM COATED ORAL at 08:11

## 2021-11-23 RX ADMIN — DOCUSATE SODIUM 100 MG: 100 CAPSULE, LIQUID FILLED ORAL at 09:11

## 2021-11-23 RX ADMIN — FUROSEMIDE 40 MG: 40 INJECTION, SOLUTION INTRAMUSCULAR; INTRAVENOUS at 05:11

## 2021-11-23 RX ADMIN — METOPROLOL SUCCINATE 25 MG: 25 TABLET, EXTENDED RELEASE ORAL at 08:11

## 2021-11-23 RX ADMIN — FAMOTIDINE 20 MG: 20 TABLET ORAL at 08:11

## 2021-11-23 RX ADMIN — GABAPENTIN 300 MG: 300 CAPSULE ORAL at 08:11

## 2021-11-23 RX ADMIN — VENLAFAXINE HYDROCHLORIDE 150 MG: 75 CAPSULE, EXTENDED RELEASE ORAL at 09:11

## 2021-11-23 RX ADMIN — ARIPIPRAZOLE 5 MG: 5 TABLET ORAL at 09:11

## 2021-11-23 RX ADMIN — MODAFINIL 200 MG: 100 TABLET ORAL at 09:11

## 2021-11-23 RX ADMIN — Medication 400 MG: at 08:11

## 2021-11-23 RX ADMIN — POTASSIUM CHLORIDE 20 MEQ: 1500 TABLET, EXTENDED RELEASE ORAL at 09:11

## 2021-11-23 RX ADMIN — ALLOPURINOL 300 MG: 300 TABLET ORAL at 09:11

## 2021-11-23 RX ADMIN — APIXABAN 5 MG: 2.5 TABLET, FILM COATED ORAL at 09:11

## 2021-11-23 RX ADMIN — MUPIROCIN: 20 OINTMENT TOPICAL at 08:11

## 2021-11-23 RX ADMIN — ACETAZOLAMIDE 250 MG: 250 TABLET ORAL at 08:11

## 2021-11-23 RX ADMIN — FUROSEMIDE 40 MG: 40 TABLET ORAL at 09:11

## 2021-11-23 RX ADMIN — Medication 400 MG: at 09:11

## 2021-11-23 RX ADMIN — ACETAZOLAMIDE 250 MG: 250 TABLET ORAL at 11:11

## 2021-11-23 RX ADMIN — MUPIROCIN: 20 OINTMENT TOPICAL at 09:11

## 2021-11-23 RX ADMIN — FUROSEMIDE 40 MG: 40 INJECTION, SOLUTION INTRAMUSCULAR; INTRAVENOUS at 11:11

## 2021-11-23 RX ADMIN — FAMOTIDINE 20 MG: 20 TABLET ORAL at 09:11

## 2021-11-23 RX ADMIN — POTASSIUM CHLORIDE 40 MEQ: 1500 TABLET, EXTENDED RELEASE ORAL at 11:11

## 2021-11-23 RX ADMIN — METOPROLOL SUCCINATE 25 MG: 25 TABLET, EXTENDED RELEASE ORAL at 09:11

## 2021-11-23 RX ADMIN — POLYETHYLENE GLYCOL 3350 17 G: 17 POWDER, FOR SOLUTION ORAL at 09:11

## 2021-11-24 ENCOUNTER — TELEPHONE (OUTPATIENT)
Dept: CARDIOLOGY | Facility: HOSPITAL | Age: 56
End: 2021-11-24
Payer: MEDICARE

## 2021-11-24 LAB
ACE SERPL-CCNC: 23 U/L (ref 16–85)
ANION GAP SERPL CALC-SCNC: 13 MMOL/L (ref 8–16)
BASOPHILS # BLD AUTO: 0 K/UL (ref 0–0.2)
BASOPHILS NFR BLD: 0 % (ref 0–1.9)
BUN SERPL-MCNC: 33 MG/DL (ref 6–20)
CALCIUM SERPL-MCNC: 9.3 MG/DL (ref 8.7–10.5)
CHLORIDE SERPL-SCNC: 90 MMOL/L (ref 95–110)
CO2 SERPL-SCNC: 43 MMOL/L (ref 23–29)
CREAT SERPL-MCNC: 1.2 MG/DL (ref 0.5–1.4)
DIFFERENTIAL METHOD: ABNORMAL
EOSINOPHIL # BLD AUTO: 0 K/UL (ref 0–0.5)
EOSINOPHIL NFR BLD: 0 % (ref 0–8)
ERYTHROCYTE [DISTWIDTH] IN BLOOD BY AUTOMATED COUNT: 15.9 % (ref 11.5–14.5)
EST. GFR  (AFRICAN AMERICAN): >60 ML/MIN/1.73 M^2
EST. GFR  (NON AFRICAN AMERICAN): >60 ML/MIN/1.73 M^2
GLUCOSE SERPL-MCNC: 88 MG/DL (ref 70–110)
HCT VFR BLD AUTO: 52.8 % (ref 40–54)
HGB BLD-MCNC: 15 G/DL (ref 14–18)
IMM GRANULOCYTES # BLD AUTO: 0.01 K/UL (ref 0–0.04)
IMM GRANULOCYTES NFR BLD AUTO: 0.2 % (ref 0–0.5)
LYMPHOCYTES # BLD AUTO: 0.2 K/UL (ref 1–4.8)
LYMPHOCYTES NFR BLD: 3.9 % (ref 18–48)
MAGNESIUM SERPL-MCNC: 2.3 MG/DL (ref 1.6–2.6)
MCH RBC QN AUTO: 27.8 PG (ref 27–31)
MCHC RBC AUTO-ENTMCNC: 28.4 G/DL (ref 32–36)
MCV RBC AUTO: 98 FL (ref 82–98)
MONOCYTES # BLD AUTO: 0.6 K/UL (ref 0.3–1)
MONOCYTES NFR BLD: 11.5 % (ref 4–15)
NEUTROPHILS # BLD AUTO: 4.1 K/UL (ref 1.8–7.7)
NEUTROPHILS NFR BLD: 84.4 % (ref 38–73)
NRBC BLD-RTO: 0 /100 WBC
PHOSPHATE SERPL-MCNC: 2.8 MG/DL (ref 2.7–4.5)
PLATELET # BLD AUTO: 151 K/UL (ref 150–450)
PMV BLD AUTO: 11.9 FL (ref 9.2–12.9)
POTASSIUM SERPL-SCNC: 3.4 MMOL/L (ref 3.5–5.1)
RBC # BLD AUTO: 5.4 M/UL (ref 4.6–6.2)
SODIUM SERPL-SCNC: 146 MMOL/L (ref 136–145)
WBC # BLD AUTO: 4.89 K/UL (ref 3.9–12.7)

## 2021-11-24 PROCEDURE — 85025 COMPLETE CBC W/AUTO DIFF WBC: CPT | Mod: HCNC | Performed by: INTERNAL MEDICINE

## 2021-11-24 PROCEDURE — 83735 ASSAY OF MAGNESIUM: CPT | Mod: HCNC | Performed by: INTERNAL MEDICINE

## 2021-11-24 PROCEDURE — 25000003 PHARM REV CODE 250: Mod: HCNC | Performed by: INTERNAL MEDICINE

## 2021-11-24 PROCEDURE — 63600175 PHARM REV CODE 636 W HCPCS: Mod: HCNC | Performed by: INTERNAL MEDICINE

## 2021-11-24 PROCEDURE — 84100 ASSAY OF PHOSPHORUS: CPT | Mod: HCNC | Performed by: INTERNAL MEDICINE

## 2021-11-24 PROCEDURE — 99232 SBSQ HOSP IP/OBS MODERATE 35: CPT | Mod: HCNC,,, | Performed by: INTERNAL MEDICINE

## 2021-11-24 PROCEDURE — 36415 COLL VENOUS BLD VENIPUNCTURE: CPT | Mod: HCNC | Performed by: INTERNAL MEDICINE

## 2021-11-24 PROCEDURE — 99233 PR SUBSEQUENT HOSPITAL CARE,LEVL III: ICD-10-PCS | Mod: HCNC,,, | Performed by: INTERNAL MEDICINE

## 2021-11-24 PROCEDURE — 27000221 HC OXYGEN, UP TO 24 HOURS: Mod: HCNC

## 2021-11-24 PROCEDURE — 94761 N-INVAS EAR/PLS OXIMETRY MLT: CPT | Mod: HCNC

## 2021-11-24 PROCEDURE — 21400001 HC TELEMETRY ROOM: Mod: HCNC

## 2021-11-24 PROCEDURE — 99233 SBSQ HOSP IP/OBS HIGH 50: CPT | Mod: HCNC,,, | Performed by: INTERNAL MEDICINE

## 2021-11-24 PROCEDURE — 80048 BASIC METABOLIC PNL TOTAL CA: CPT | Mod: HCNC | Performed by: INTERNAL MEDICINE

## 2021-11-24 PROCEDURE — 94660 CPAP INITIATION&MGMT: CPT | Mod: HCNC

## 2021-11-24 PROCEDURE — 99900035 HC TECH TIME PER 15 MIN (STAT): Mod: HCNC

## 2021-11-24 PROCEDURE — 25000003 PHARM REV CODE 250: Mod: HCNC | Performed by: NURSE PRACTITIONER

## 2021-11-24 PROCEDURE — 25000003 PHARM REV CODE 250: Mod: HCNC | Performed by: PHYSICIAN ASSISTANT

## 2021-11-24 PROCEDURE — 99232 PR SUBSEQUENT HOSPITAL CARE,LEVL II: ICD-10-PCS | Mod: HCNC,,, | Performed by: INTERNAL MEDICINE

## 2021-11-24 RX ORDER — POTASSIUM CHLORIDE 20 MEQ/1
40 TABLET, EXTENDED RELEASE ORAL ONCE
Status: COMPLETED | OUTPATIENT
Start: 2021-11-24 | End: 2021-11-24

## 2021-11-24 RX ORDER — PREDNISONE 20 MG/1
40 TABLET ORAL DAILY
Status: DISCONTINUED | OUTPATIENT
Start: 2021-11-24 | End: 2021-11-25 | Stop reason: HOSPADM

## 2021-11-24 RX ORDER — METOLAZONE 5 MG/1
5 TABLET ORAL DAILY
Status: DISCONTINUED | OUTPATIENT
Start: 2021-11-24 | End: 2021-11-25

## 2021-11-24 RX ORDER — LOSARTAN POTASSIUM 25 MG/1
25 TABLET ORAL DAILY
Status: DISCONTINUED | OUTPATIENT
Start: 2021-11-24 | End: 2021-11-25 | Stop reason: HOSPADM

## 2021-11-24 RX ORDER — FUROSEMIDE 40 MG/1
40 TABLET ORAL 2 TIMES DAILY
Status: DISCONTINUED | OUTPATIENT
Start: 2021-11-24 | End: 2021-11-25 | Stop reason: HOSPADM

## 2021-11-24 RX ORDER — POTASSIUM CHLORIDE 20 MEQ/1
20 TABLET, EXTENDED RELEASE ORAL 3 TIMES DAILY
Status: DISCONTINUED | OUTPATIENT
Start: 2021-11-24 | End: 2021-11-25 | Stop reason: HOSPADM

## 2021-11-24 RX ADMIN — VENLAFAXINE HYDROCHLORIDE 150 MG: 75 CAPSULE, EXTENDED RELEASE ORAL at 08:11

## 2021-11-24 RX ADMIN — FUROSEMIDE 40 MG: 40 TABLET ORAL at 05:11

## 2021-11-24 RX ADMIN — METOPROLOL SUCCINATE 25 MG: 25 TABLET, EXTENDED RELEASE ORAL at 08:11

## 2021-11-24 RX ADMIN — MUPIROCIN: 20 OINTMENT TOPICAL at 08:11

## 2021-11-24 RX ADMIN — LOSARTAN POTASSIUM 25 MG: 25 TABLET, FILM COATED ORAL at 03:11

## 2021-11-24 RX ADMIN — ACETAZOLAMIDE 250 MG: 250 TABLET ORAL at 09:11

## 2021-11-24 RX ADMIN — POTASSIUM CHLORIDE 20 MEQ: 1500 TABLET, EXTENDED RELEASE ORAL at 03:11

## 2021-11-24 RX ADMIN — ACETAZOLAMIDE 250 MG: 250 TABLET ORAL at 08:11

## 2021-11-24 RX ADMIN — ARIPIPRAZOLE 5 MG: 5 TABLET ORAL at 08:11

## 2021-11-24 RX ADMIN — DOCUSATE SODIUM 100 MG: 100 CAPSULE, LIQUID FILLED ORAL at 09:11

## 2021-11-24 RX ADMIN — POTASSIUM CHLORIDE 20 MEQ: 1500 TABLET, EXTENDED RELEASE ORAL at 08:11

## 2021-11-24 RX ADMIN — MODAFINIL 200 MG: 100 TABLET ORAL at 08:11

## 2021-11-24 RX ADMIN — Medication 400 MG: at 08:11

## 2021-11-24 RX ADMIN — POTASSIUM CHLORIDE 20 MEQ: 1500 TABLET, EXTENDED RELEASE ORAL at 09:11

## 2021-11-24 RX ADMIN — MUPIROCIN: 20 OINTMENT TOPICAL at 09:11

## 2021-11-24 RX ADMIN — FUROSEMIDE 40 MG: 40 INJECTION, SOLUTION INTRAMUSCULAR; INTRAVENOUS at 10:11

## 2021-11-24 RX ADMIN — Medication 400 MG: at 09:11

## 2021-11-24 RX ADMIN — ACETAZOLAMIDE 250 MG: 250 TABLET ORAL at 03:11

## 2021-11-24 RX ADMIN — GABAPENTIN 300 MG: 300 CAPSULE ORAL at 09:11

## 2021-11-24 RX ADMIN — ALLOPURINOL 100 MG: 100 TABLET ORAL at 08:11

## 2021-11-24 RX ADMIN — POTASSIUM CHLORIDE 40 MEQ: 1500 TABLET, EXTENDED RELEASE ORAL at 10:11

## 2021-11-24 RX ADMIN — METOPROLOL SUCCINATE 25 MG: 25 TABLET, EXTENDED RELEASE ORAL at 09:11

## 2021-11-24 RX ADMIN — APIXABAN 5 MG: 2.5 TABLET, FILM COATED ORAL at 09:11

## 2021-11-24 RX ADMIN — PREDNISONE 40 MG: 20 TABLET ORAL at 10:11

## 2021-11-24 RX ADMIN — FAMOTIDINE 20 MG: 20 TABLET ORAL at 09:11

## 2021-11-24 RX ADMIN — METOLAZONE 5 MG: 5 TABLET ORAL at 12:11

## 2021-11-24 RX ADMIN — APIXABAN 5 MG: 2.5 TABLET, FILM COATED ORAL at 08:11

## 2021-11-24 RX ADMIN — DOCUSATE SODIUM 100 MG: 100 CAPSULE, LIQUID FILLED ORAL at 08:11

## 2021-11-24 RX ADMIN — FAMOTIDINE 20 MG: 20 TABLET ORAL at 08:11

## 2021-11-25 VITALS
BODY MASS INDEX: 39.17 KG/M2 | SYSTOLIC BLOOD PRESSURE: 122 MMHG | TEMPERATURE: 98 F | DIASTOLIC BLOOD PRESSURE: 77 MMHG | HEIGHT: 75 IN | WEIGHT: 315 LBS | OXYGEN SATURATION: 95 % | HEART RATE: 82 BPM | RESPIRATION RATE: 18 BRPM

## 2021-11-25 LAB
ANION GAP SERPL CALC-SCNC: 14 MMOL/L (ref 8–16)
BUN SERPL-MCNC: 39 MG/DL (ref 6–20)
CALCIUM SERPL-MCNC: 9.2 MG/DL (ref 8.7–10.5)
CHLORIDE SERPL-SCNC: 90 MMOL/L (ref 95–110)
CO2 SERPL-SCNC: 42 MMOL/L (ref 23–29)
CREAT SERPL-MCNC: 1.2 MG/DL (ref 0.5–1.4)
EST. GFR  (AFRICAN AMERICAN): >60 ML/MIN/1.73 M^2
EST. GFR  (NON AFRICAN AMERICAN): >60 ML/MIN/1.73 M^2
GLUCOSE SERPL-MCNC: 93 MG/DL (ref 70–110)
MAGNESIUM SERPL-MCNC: 2.6 MG/DL (ref 1.6–2.6)
PHOSPHATE SERPL-MCNC: 3.3 MG/DL (ref 2.7–4.5)
POTASSIUM SERPL-SCNC: 3.2 MMOL/L (ref 3.5–5.1)
SODIUM SERPL-SCNC: 146 MMOL/L (ref 136–145)

## 2021-11-25 PROCEDURE — 99232 PR SUBSEQUENT HOSPITAL CARE,LEVL II: ICD-10-PCS | Mod: HCNC,,, | Performed by: INTERNAL MEDICINE

## 2021-11-25 PROCEDURE — 25000003 PHARM REV CODE 250: Mod: HCNC | Performed by: INTERNAL MEDICINE

## 2021-11-25 PROCEDURE — 99900035 HC TECH TIME PER 15 MIN (STAT): Mod: HCNC

## 2021-11-25 PROCEDURE — 25000003 PHARM REV CODE 250: Mod: HCNC | Performed by: NURSE PRACTITIONER

## 2021-11-25 PROCEDURE — 99232 SBSQ HOSP IP/OBS MODERATE 35: CPT | Mod: HCNC,,, | Performed by: INTERNAL MEDICINE

## 2021-11-25 PROCEDURE — 94761 N-INVAS EAR/PLS OXIMETRY MLT: CPT | Mod: HCNC

## 2021-11-25 PROCEDURE — 84100 ASSAY OF PHOSPHORUS: CPT | Mod: HCNC | Performed by: INTERNAL MEDICINE

## 2021-11-25 PROCEDURE — 36415 COLL VENOUS BLD VENIPUNCTURE: CPT | Mod: HCNC | Performed by: INTERNAL MEDICINE

## 2021-11-25 PROCEDURE — 80048 BASIC METABOLIC PNL TOTAL CA: CPT | Mod: HCNC | Performed by: INTERNAL MEDICINE

## 2021-11-25 PROCEDURE — 63600175 PHARM REV CODE 636 W HCPCS: Mod: HCNC | Performed by: INTERNAL MEDICINE

## 2021-11-25 PROCEDURE — 27000221 HC OXYGEN, UP TO 24 HOURS: Mod: HCNC

## 2021-11-25 PROCEDURE — 83735 ASSAY OF MAGNESIUM: CPT | Mod: HCNC | Performed by: INTERNAL MEDICINE

## 2021-11-25 RX ORDER — FAMOTIDINE 20 MG/1
20 TABLET, FILM COATED ORAL 2 TIMES DAILY
Qty: 60 TABLET | Refills: 0 | Status: SHIPPED | OUTPATIENT
Start: 2021-11-25 | End: 2021-12-08

## 2021-11-25 RX ORDER — METOLAZONE 2.5 MG/1
2.5 TABLET ORAL
Qty: 12 TABLET | Refills: 0 | Status: SHIPPED | OUTPATIENT
Start: 2021-11-26 | End: 2021-12-06

## 2021-11-25 RX ORDER — LANOLIN ALCOHOL/MO/W.PET/CERES
400 CREAM (GRAM) TOPICAL 2 TIMES DAILY
Qty: 60 TABLET | Refills: 0 | Status: SHIPPED | OUTPATIENT
Start: 2021-11-25 | End: 2021-12-08

## 2021-11-25 RX ORDER — ACETAZOLAMIDE 250 MG/1
250 TABLET ORAL 2 TIMES DAILY
Qty: 60 TABLET | Refills: 0 | Status: SHIPPED | OUTPATIENT
Start: 2021-11-25 | End: 2021-12-08

## 2021-11-25 RX ORDER — METOLAZONE 2.5 MG/1
2.5 TABLET ORAL EVERY OTHER DAY
Status: DISCONTINUED | OUTPATIENT
Start: 2021-11-27 | End: 2021-11-25 | Stop reason: HOSPADM

## 2021-11-25 RX ORDER — FUROSEMIDE 80 MG/1
40 TABLET ORAL 2 TIMES DAILY
Start: 2021-11-25 | End: 2021-12-06 | Stop reason: SDUPTHER

## 2021-11-25 RX ORDER — LOSARTAN POTASSIUM 25 MG/1
25 TABLET ORAL DAILY
Qty: 30 TABLET | Refills: 0 | Status: SHIPPED | OUTPATIENT
Start: 2021-11-26 | End: 2022-01-04 | Stop reason: SDUPTHER

## 2021-11-25 RX ORDER — MODAFINIL 200 MG/1
200 TABLET ORAL DAILY
Qty: 30 TABLET | Refills: 0 | Status: SHIPPED | OUTPATIENT
Start: 2021-11-26 | End: 2021-12-08

## 2021-11-25 RX ORDER — POTASSIUM CHLORIDE 20 MEQ/1
20 TABLET, EXTENDED RELEASE ORAL 3 TIMES DAILY
Qty: 90 TABLET | Refills: 0 | Status: SHIPPED | OUTPATIENT
Start: 2021-11-25 | End: 2021-12-25

## 2021-11-25 RX ORDER — PREDNISONE 20 MG/1
40 TABLET ORAL DAILY
Qty: 8 TABLET | Refills: 0 | Status: SHIPPED | OUTPATIENT
Start: 2021-11-26 | End: 2021-11-30

## 2021-11-25 RX ORDER — POTASSIUM CHLORIDE 20 MEQ/1
40 TABLET, EXTENDED RELEASE ORAL ONCE
Status: COMPLETED | OUTPATIENT
Start: 2021-11-25 | End: 2021-11-25

## 2021-11-25 RX ADMIN — Medication 400 MG: at 09:11

## 2021-11-25 RX ADMIN — ALLOPURINOL 100 MG: 100 TABLET ORAL at 09:11

## 2021-11-25 RX ADMIN — FAMOTIDINE 20 MG: 20 TABLET ORAL at 09:11

## 2021-11-25 RX ADMIN — APIXABAN 5 MG: 2.5 TABLET, FILM COATED ORAL at 09:11

## 2021-11-25 RX ADMIN — MODAFINIL 200 MG: 100 TABLET ORAL at 09:11

## 2021-11-25 RX ADMIN — METOPROLOL SUCCINATE 25 MG: 25 TABLET, EXTENDED RELEASE ORAL at 09:11

## 2021-11-25 RX ADMIN — FUROSEMIDE 40 MG: 40 TABLET ORAL at 12:11

## 2021-11-25 RX ADMIN — POTASSIUM CHLORIDE 20 MEQ: 1500 TABLET, EXTENDED RELEASE ORAL at 09:11

## 2021-11-25 RX ADMIN — ARIPIPRAZOLE 5 MG: 5 TABLET ORAL at 09:11

## 2021-11-25 RX ADMIN — DOCUSATE SODIUM 100 MG: 100 CAPSULE, LIQUID FILLED ORAL at 09:11

## 2021-11-25 RX ADMIN — VENLAFAXINE HYDROCHLORIDE 150 MG: 75 CAPSULE, EXTENDED RELEASE ORAL at 09:11

## 2021-11-25 RX ADMIN — ACETAZOLAMIDE 250 MG: 250 TABLET ORAL at 09:11

## 2021-11-25 RX ADMIN — MUPIROCIN: 20 OINTMENT TOPICAL at 09:11

## 2021-11-25 RX ADMIN — POTASSIUM CHLORIDE 40 MEQ: 1500 TABLET, EXTENDED RELEASE ORAL at 09:11

## 2021-11-25 RX ADMIN — ACETAZOLAMIDE 250 MG: 250 TABLET ORAL at 03:11

## 2021-11-25 RX ADMIN — PREDNISONE 40 MG: 20 TABLET ORAL at 09:11

## 2021-11-25 RX ADMIN — POTASSIUM CHLORIDE 20 MEQ: 1500 TABLET, EXTENDED RELEASE ORAL at 03:11

## 2021-11-26 ENCOUNTER — PATIENT OUTREACH (OUTPATIENT)
Dept: ADMINISTRATIVE | Facility: CLINIC | Age: 56
End: 2021-11-26
Payer: MEDICARE

## 2021-11-26 DIAGNOSIS — Z79.899 MEDICATION MANAGEMENT: Primary | ICD-10-CM

## 2021-11-28 RX ORDER — LANOLIN ALCOHOL/MO/W.PET/CERES
2000 CREAM (GRAM) TOPICAL DAILY
COMMUNITY
End: 2022-12-13

## 2021-11-29 ENCOUNTER — TELEPHONE (OUTPATIENT)
Dept: TRANSPLANT | Facility: CLINIC | Age: 56
End: 2021-11-29
Payer: MEDICARE

## 2021-11-30 ENCOUNTER — NURSE TRIAGE (OUTPATIENT)
Dept: ADMINISTRATIVE | Facility: CLINIC | Age: 56
End: 2021-11-30
Payer: MEDICARE

## 2021-12-03 ENCOUNTER — EXTERNAL HOME HEALTH (OUTPATIENT)
Dept: HOME HEALTH SERVICES | Facility: HOSPITAL | Age: 56
End: 2021-12-03
Payer: MEDICARE

## 2021-12-03 ENCOUNTER — LAB VISIT (OUTPATIENT)
Dept: LAB | Facility: HOSPITAL | Age: 56
End: 2021-12-03
Attending: INTERNAL MEDICINE
Payer: MEDICARE

## 2021-12-03 DIAGNOSIS — I50.33 ACUTE ON CHRONIC DIASTOLIC HEART FAILURE: Primary | ICD-10-CM

## 2021-12-03 LAB
ANION GAP SERPL CALC-SCNC: 16 MMOL/L (ref 8–16)
BUN SERPL-MCNC: 42 MG/DL (ref 6–20)
CALCIUM SERPL-MCNC: 9.9 MG/DL (ref 8.7–10.5)
CHLORIDE SERPL-SCNC: 92 MMOL/L (ref 95–110)
CO2 SERPL-SCNC: 27 MMOL/L (ref 23–29)
CREAT SERPL-MCNC: 1.7 MG/DL (ref 0.5–1.4)
EST. GFR  (AFRICAN AMERICAN): 50.6 ML/MIN/1.73 M^2
EST. GFR  (NON AFRICAN AMERICAN): 43.8 ML/MIN/1.73 M^2
GLUCOSE SERPL-MCNC: 105 MG/DL (ref 70–110)
POTASSIUM SERPL-SCNC: 3.2 MMOL/L (ref 3.5–5.1)
SODIUM SERPL-SCNC: 135 MMOL/L (ref 136–145)

## 2021-12-03 PROCEDURE — 80048 BASIC METABOLIC PNL TOTAL CA: CPT | Mod: HCNC,PO | Performed by: INTERNAL MEDICINE

## 2021-12-06 ENCOUNTER — OUTPATIENT CASE MANAGEMENT (OUTPATIENT)
Dept: ADMINISTRATIVE | Facility: OTHER | Age: 56
End: 2021-12-06
Payer: MEDICARE

## 2021-12-06 ENCOUNTER — TELEPHONE (OUTPATIENT)
Dept: CARDIOLOGY | Facility: CLINIC | Age: 56
End: 2021-12-06
Payer: MEDICARE

## 2021-12-06 DIAGNOSIS — E83.42 HYPOMAGNESEMIA: ICD-10-CM

## 2021-12-06 RX ORDER — FUROSEMIDE 80 MG/1
40 TABLET ORAL DAILY
Start: 2021-12-06 | End: 2021-12-08

## 2021-12-07 ENCOUNTER — TELEPHONE (OUTPATIENT)
Dept: CARDIOLOGY | Facility: CLINIC | Age: 56
End: 2021-12-07
Payer: MEDICARE

## 2021-12-08 ENCOUNTER — OFFICE VISIT (OUTPATIENT)
Dept: CARDIOLOGY | Facility: CLINIC | Age: 56
End: 2021-12-08
Payer: MEDICARE

## 2021-12-08 VITALS
OXYGEN SATURATION: 96 % | WEIGHT: 315 LBS | BODY MASS INDEX: 49.49 KG/M2 | SYSTOLIC BLOOD PRESSURE: 110 MMHG | HEART RATE: 75 BPM | DIASTOLIC BLOOD PRESSURE: 80 MMHG

## 2021-12-08 DIAGNOSIS — E83.42 HYPOMAGNESEMIA: ICD-10-CM

## 2021-12-08 DIAGNOSIS — I27.20 PULMONARY HYPERTENSION: ICD-10-CM

## 2021-12-08 DIAGNOSIS — I50.812 CHRONIC RIGHT-SIDED HEART FAILURE: Primary | ICD-10-CM

## 2021-12-08 DIAGNOSIS — I48.20 CHRONIC ATRIAL FIBRILLATION: ICD-10-CM

## 2021-12-08 PROCEDURE — 99215 PR OFFICE/OUTPT VISIT, EST, LEVL V, 40-54 MIN: ICD-10-PCS | Mod: HCNC,S$GLB,, | Performed by: NURSE PRACTITIONER

## 2021-12-08 PROCEDURE — 99999 PR PBB SHADOW E&M-EST. PATIENT-LVL III: ICD-10-PCS | Mod: PBBFAC,HCNC,, | Performed by: NURSE PRACTITIONER

## 2021-12-08 PROCEDURE — 99215 OFFICE O/P EST HI 40 MIN: CPT | Mod: HCNC,S$GLB,, | Performed by: NURSE PRACTITIONER

## 2021-12-08 PROCEDURE — 99499 RISK ADDL DX/OHS AUDIT: ICD-10-PCS | Mod: S$GLB,,, | Performed by: NURSE PRACTITIONER

## 2021-12-08 PROCEDURE — 99499 UNLISTED E&M SERVICE: CPT | Mod: S$GLB,,, | Performed by: NURSE PRACTITIONER

## 2021-12-08 PROCEDURE — 99999 PR PBB SHADOW E&M-EST. PATIENT-LVL III: CPT | Mod: PBBFAC,HCNC,, | Performed by: NURSE PRACTITIONER

## 2021-12-08 PROCEDURE — 4010F ACE/ARB THERAPY RXD/TAKEN: CPT | Mod: HCNC,CPTII,S$GLB, | Performed by: NURSE PRACTITIONER

## 2021-12-08 PROCEDURE — 4010F PR ACE/ARB THEARPY RXD/TAKEN: ICD-10-PCS | Mod: HCNC,CPTII,S$GLB, | Performed by: NURSE PRACTITIONER

## 2021-12-08 RX ORDER — MODAFINIL 200 MG/1
200 TABLET ORAL DAILY
Qty: 30 TABLET | Refills: 0 | OUTPATIENT
Start: 2021-12-08 | End: 2022-01-07

## 2021-12-08 RX ORDER — FUROSEMIDE 40 MG/1
40 TABLET ORAL DAILY
Start: 2021-12-08 | End: 2021-12-10 | Stop reason: SDUPTHER

## 2021-12-09 ENCOUNTER — TELEPHONE (OUTPATIENT)
Dept: CARDIOLOGY | Facility: CLINIC | Age: 56
End: 2021-12-09
Payer: MEDICARE

## 2021-12-09 ENCOUNTER — PATIENT MESSAGE (OUTPATIENT)
Dept: CARDIOLOGY | Facility: CLINIC | Age: 56
End: 2021-12-09
Payer: MEDICARE

## 2021-12-09 RX ORDER — MODAFINIL 200 MG/1
200 TABLET ORAL DAILY
Qty: 30 TABLET | Refills: 0 | OUTPATIENT
Start: 2021-12-09 | End: 2022-01-08

## 2021-12-10 DIAGNOSIS — E83.42 HYPOMAGNESEMIA: ICD-10-CM

## 2021-12-10 RX ORDER — FUROSEMIDE 40 MG/1
40 TABLET ORAL DAILY
Qty: 90 TABLET | Refills: 3 | OUTPATIENT
Start: 2021-12-10 | End: 2022-01-04 | Stop reason: SDUPTHER

## 2021-12-11 ENCOUNTER — LAB VISIT (OUTPATIENT)
Dept: LAB | Facility: HOSPITAL | Age: 56
End: 2021-12-11
Attending: INTERNAL MEDICINE
Payer: MEDICARE

## 2021-12-11 DIAGNOSIS — I50.33 ACUTE ON CHRONIC DIASTOLIC HEART FAILURE: Primary | ICD-10-CM

## 2021-12-11 LAB
ANION GAP SERPL CALC-SCNC: 16 MMOL/L (ref 8–16)
BUN SERPL-MCNC: 68 MG/DL (ref 6–20)
CALCIUM SERPL-MCNC: 9.7 MG/DL (ref 8.7–10.5)
CHLORIDE SERPL-SCNC: 87 MMOL/L (ref 95–110)
CO2 SERPL-SCNC: 30 MMOL/L (ref 23–29)
CREAT SERPL-MCNC: 2.8 MG/DL (ref 0.5–1.4)
EST. GFR  (AFRICAN AMERICAN): 27.7 ML/MIN/1.73 M^2
EST. GFR  (NON AFRICAN AMERICAN): 24 ML/MIN/1.73 M^2
GLUCOSE SERPL-MCNC: 142 MG/DL (ref 70–110)
POTASSIUM SERPL-SCNC: 3.2 MMOL/L (ref 3.5–5.1)
SODIUM SERPL-SCNC: 133 MMOL/L (ref 136–145)

## 2021-12-11 PROCEDURE — 80048 BASIC METABOLIC PNL TOTAL CA: CPT | Mod: HCNC,PO | Performed by: INTERNAL MEDICINE

## 2021-12-11 PROCEDURE — 36415 COLL VENOUS BLD VENIPUNCTURE: CPT | Mod: HCNC,PO | Performed by: INTERNAL MEDICINE

## 2021-12-13 ENCOUNTER — TELEPHONE (OUTPATIENT)
Dept: CARDIOLOGY | Facility: CLINIC | Age: 56
End: 2021-12-13
Payer: MEDICARE

## 2021-12-17 ENCOUNTER — OFFICE VISIT (OUTPATIENT)
Dept: PULMONOLOGY | Facility: CLINIC | Age: 56
End: 2021-12-17
Payer: MEDICARE

## 2021-12-17 VITALS
SYSTOLIC BLOOD PRESSURE: 110 MMHG | HEIGHT: 75 IN | DIASTOLIC BLOOD PRESSURE: 68 MMHG | HEART RATE: 88 BPM | BODY MASS INDEX: 39.17 KG/M2 | WEIGHT: 315 LBS | OXYGEN SATURATION: 94 % | RESPIRATION RATE: 17 BRPM

## 2021-12-17 DIAGNOSIS — N17.9 AKI (ACUTE KIDNEY INJURY): ICD-10-CM

## 2021-12-17 DIAGNOSIS — G47.33 SEVERE OBSTRUCTIVE SLEEP APNEA: ICD-10-CM

## 2021-12-17 DIAGNOSIS — J96.11 CHRONIC HYPOXEMIC RESPIRATORY FAILURE: Primary | ICD-10-CM

## 2021-12-17 DIAGNOSIS — R06.00 DYSPNEA AND RESPIRATORY ABNORMALITIES: ICD-10-CM

## 2021-12-17 DIAGNOSIS — I48.20 CHRONIC ATRIAL FIBRILLATION: ICD-10-CM

## 2021-12-17 DIAGNOSIS — E66.2 OBESITY HYPOVENTILATION SYNDROME: ICD-10-CM

## 2021-12-17 DIAGNOSIS — I50.43 HEART FAILURE, ACUTE ON CHRONIC, SYSTOLIC AND DIASTOLIC: ICD-10-CM

## 2021-12-17 DIAGNOSIS — R06.89 DYSPNEA AND RESPIRATORY ABNORMALITIES: ICD-10-CM

## 2021-12-17 PROCEDURE — 4010F ACE/ARB THERAPY RXD/TAKEN: CPT | Mod: HCNC,CPTII,S$GLB, | Performed by: INTERNAL MEDICINE

## 2021-12-17 PROCEDURE — 99214 OFFICE O/P EST MOD 30 MIN: CPT | Mod: HCNC,S$GLB,, | Performed by: INTERNAL MEDICINE

## 2021-12-17 PROCEDURE — 99499 RISK ADDL DX/OHS AUDIT: ICD-10-PCS | Mod: S$GLB,,, | Performed by: INTERNAL MEDICINE

## 2021-12-17 PROCEDURE — 99999 PR PBB SHADOW E&M-EST. PATIENT-LVL V: CPT | Mod: PBBFAC,HCNC,, | Performed by: INTERNAL MEDICINE

## 2021-12-17 PROCEDURE — 99499 UNLISTED E&M SERVICE: CPT | Mod: S$GLB,,, | Performed by: INTERNAL MEDICINE

## 2021-12-17 PROCEDURE — 4010F PR ACE/ARB THEARPY RXD/TAKEN: ICD-10-PCS | Mod: HCNC,CPTII,S$GLB, | Performed by: INTERNAL MEDICINE

## 2021-12-17 PROCEDURE — 99999 PR PBB SHADOW E&M-EST. PATIENT-LVL V: ICD-10-PCS | Mod: PBBFAC,HCNC,, | Performed by: INTERNAL MEDICINE

## 2021-12-17 PROCEDURE — 99214 PR OFFICE/OUTPT VISIT, EST, LEVL IV, 30-39 MIN: ICD-10-PCS | Mod: HCNC,S$GLB,, | Performed by: INTERNAL MEDICINE

## 2021-12-20 ENCOUNTER — LAB VISIT (OUTPATIENT)
Dept: LAB | Facility: HOSPITAL | Age: 56
End: 2021-12-20
Attending: INTERNAL MEDICINE
Payer: MEDICARE

## 2021-12-20 DIAGNOSIS — I50.33 ACUTE ON CHRONIC DIASTOLIC HEART FAILURE: Primary | ICD-10-CM

## 2021-12-20 LAB
ANION GAP SERPL CALC-SCNC: 15 MMOL/L (ref 8–16)
BNP SERPL-MCNC: 38 PG/ML (ref 0–99)
BUN SERPL-MCNC: 41 MG/DL (ref 6–20)
CALCIUM SERPL-MCNC: 9.8 MG/DL (ref 8.7–10.5)
CHLORIDE SERPL-SCNC: 94 MMOL/L (ref 95–110)
CO2 SERPL-SCNC: 24 MMOL/L (ref 23–29)
CREAT SERPL-MCNC: 1.5 MG/DL (ref 0.5–1.4)
EST. GFR  (AFRICAN AMERICAN): 58.9 ML/MIN/1.73 M^2
EST. GFR  (NON AFRICAN AMERICAN): 50.9 ML/MIN/1.73 M^2
GLUCOSE SERPL-MCNC: 78 MG/DL (ref 70–110)
POTASSIUM SERPL-SCNC: 3 MMOL/L (ref 3.5–5.1)
SODIUM SERPL-SCNC: 133 MMOL/L (ref 136–145)

## 2021-12-20 PROCEDURE — 36415 COLL VENOUS BLD VENIPUNCTURE: CPT | Mod: HCNC,PO | Performed by: INTERNAL MEDICINE

## 2021-12-20 PROCEDURE — 80048 BASIC METABOLIC PNL TOTAL CA: CPT | Mod: HCNC,PO | Performed by: INTERNAL MEDICINE

## 2021-12-20 PROCEDURE — 83880 ASSAY OF NATRIURETIC PEPTIDE: CPT | Mod: HCNC,PO | Performed by: INTERNAL MEDICINE

## 2021-12-21 ENCOUNTER — TELEPHONE (OUTPATIENT)
Dept: CARDIOLOGY | Facility: CLINIC | Age: 56
End: 2021-12-21
Payer: MEDICARE

## 2021-12-22 ENCOUNTER — DOCUMENT SCAN (OUTPATIENT)
Dept: HOME HEALTH SERVICES | Facility: HOSPITAL | Age: 56
End: 2021-12-22
Payer: MEDICARE

## 2021-12-30 ENCOUNTER — PATIENT MESSAGE (OUTPATIENT)
Dept: CARDIOLOGY | Facility: CLINIC | Age: 56
End: 2021-12-30
Payer: MEDICARE

## 2021-12-30 DIAGNOSIS — E83.42 HYPOMAGNESEMIA: ICD-10-CM

## 2022-01-04 ENCOUNTER — PATIENT OUTREACH (OUTPATIENT)
Dept: ADMINISTRATIVE | Facility: OTHER | Age: 57
End: 2022-01-04
Payer: MEDICARE

## 2022-01-04 ENCOUNTER — OFFICE VISIT (OUTPATIENT)
Dept: CARDIOLOGY | Facility: CLINIC | Age: 57
End: 2022-01-04
Payer: MEDICARE

## 2022-01-04 ENCOUNTER — LAB VISIT (OUTPATIENT)
Dept: LAB | Facility: HOSPITAL | Age: 57
End: 2022-01-04
Attending: INTERNAL MEDICINE
Payer: MEDICARE

## 2022-01-04 VITALS
DIASTOLIC BLOOD PRESSURE: 104 MMHG | OXYGEN SATURATION: 98 % | SYSTOLIC BLOOD PRESSURE: 152 MMHG | HEART RATE: 66 BPM | BODY MASS INDEX: 51.94 KG/M2 | WEIGHT: 315 LBS

## 2022-01-04 DIAGNOSIS — J98.4 RESTRICTIVE LUNG DISEASE: ICD-10-CM

## 2022-01-04 DIAGNOSIS — I10 ESSENTIAL HYPERTENSION: ICD-10-CM

## 2022-01-04 DIAGNOSIS — J96.01 ACUTE RESPIRATORY FAILURE WITH HYPOXIA AND HYPERCARBIA: ICD-10-CM

## 2022-01-04 DIAGNOSIS — I48.20 CHRONIC ATRIAL FIBRILLATION: ICD-10-CM

## 2022-01-04 DIAGNOSIS — I27.20 PULMONARY HYPERTENSION: ICD-10-CM

## 2022-01-04 DIAGNOSIS — E83.42 HYPOMAGNESEMIA: ICD-10-CM

## 2022-01-04 DIAGNOSIS — I50.812 CHRONIC RIGHT-SIDED HEART FAILURE: ICD-10-CM

## 2022-01-04 DIAGNOSIS — J96.02 ACUTE RESPIRATORY FAILURE WITH HYPOXIA AND HYPERCARBIA: ICD-10-CM

## 2022-01-04 DIAGNOSIS — J96.01 ACUTE RESPIRATORY FAILURE WITH HYPOXIA AND HYPERCARBIA: Primary | ICD-10-CM

## 2022-01-04 DIAGNOSIS — J96.02 ACUTE RESPIRATORY FAILURE WITH HYPOXIA AND HYPERCARBIA: Primary | ICD-10-CM

## 2022-01-04 DIAGNOSIS — I50.33 ACUTE ON CHRONIC DIASTOLIC CONGESTIVE HEART FAILURE: ICD-10-CM

## 2022-01-04 PROCEDURE — 83880 ASSAY OF NATRIURETIC PEPTIDE: CPT | Mod: HCNC | Performed by: INTERNAL MEDICINE

## 2022-01-04 PROCEDURE — 4010F ACE/ARB THERAPY RXD/TAKEN: CPT | Mod: HCNC,CPTII,S$GLB, | Performed by: INTERNAL MEDICINE

## 2022-01-04 PROCEDURE — 1160F RVW MEDS BY RX/DR IN RCRD: CPT | Mod: HCNC,CPTII,S$GLB, | Performed by: INTERNAL MEDICINE

## 2022-01-04 PROCEDURE — 99999 PR PBB SHADOW E&M-EST. PATIENT-LVL III: ICD-10-PCS | Mod: PBBFAC,HCNC,, | Performed by: INTERNAL MEDICINE

## 2022-01-04 PROCEDURE — 1160F PR REVIEW ALL MEDS BY PRESCRIBER/CLIN PHARMACIST DOCUMENTED: ICD-10-PCS | Mod: HCNC,CPTII,S$GLB, | Performed by: INTERNAL MEDICINE

## 2022-01-04 PROCEDURE — 3077F PR MOST RECENT SYSTOLIC BLOOD PRESSURE >= 140 MM HG: ICD-10-PCS | Mod: HCNC,CPTII,S$GLB, | Performed by: INTERNAL MEDICINE

## 2022-01-04 PROCEDURE — 99999 PR PBB SHADOW E&M-EST. PATIENT-LVL III: CPT | Mod: PBBFAC,HCNC,, | Performed by: INTERNAL MEDICINE

## 2022-01-04 PROCEDURE — 99499 RISK ADDL DX/OHS AUDIT: ICD-10-PCS | Mod: S$GLB,,, | Performed by: INTERNAL MEDICINE

## 2022-01-04 PROCEDURE — 3077F SYST BP >= 140 MM HG: CPT | Mod: HCNC,CPTII,S$GLB, | Performed by: INTERNAL MEDICINE

## 2022-01-04 PROCEDURE — 99499 UNLISTED E&M SERVICE: CPT | Mod: S$GLB,,, | Performed by: INTERNAL MEDICINE

## 2022-01-04 PROCEDURE — 1159F MED LIST DOCD IN RCRD: CPT | Mod: HCNC,CPTII,S$GLB, | Performed by: INTERNAL MEDICINE

## 2022-01-04 PROCEDURE — 3008F BODY MASS INDEX DOCD: CPT | Mod: HCNC,CPTII,S$GLB, | Performed by: INTERNAL MEDICINE

## 2022-01-04 PROCEDURE — 3080F DIAST BP >= 90 MM HG: CPT | Mod: HCNC,CPTII,S$GLB, | Performed by: INTERNAL MEDICINE

## 2022-01-04 PROCEDURE — 3008F PR BODY MASS INDEX (BMI) DOCUMENTED: ICD-10-PCS | Mod: HCNC,CPTII,S$GLB, | Performed by: INTERNAL MEDICINE

## 2022-01-04 PROCEDURE — 99214 PR OFFICE/OUTPT VISIT, EST, LEVL IV, 30-39 MIN: ICD-10-PCS | Mod: HCNC,S$GLB,, | Performed by: INTERNAL MEDICINE

## 2022-01-04 PROCEDURE — 36415 COLL VENOUS BLD VENIPUNCTURE: CPT | Mod: HCNC | Performed by: INTERNAL MEDICINE

## 2022-01-04 PROCEDURE — 99214 OFFICE O/P EST MOD 30 MIN: CPT | Mod: HCNC,S$GLB,, | Performed by: INTERNAL MEDICINE

## 2022-01-04 PROCEDURE — 1159F PR MEDICATION LIST DOCUMENTED IN MEDICAL RECORD: ICD-10-PCS | Mod: HCNC,CPTII,S$GLB, | Performed by: INTERNAL MEDICINE

## 2022-01-04 PROCEDURE — 4010F PR ACE/ARB THEARPY RXD/TAKEN: ICD-10-PCS | Mod: HCNC,CPTII,S$GLB, | Performed by: INTERNAL MEDICINE

## 2022-01-04 PROCEDURE — 3080F PR MOST RECENT DIASTOLIC BLOOD PRESSURE >= 90 MM HG: ICD-10-PCS | Mod: HCNC,CPTII,S$GLB, | Performed by: INTERNAL MEDICINE

## 2022-01-04 PROCEDURE — 80048 BASIC METABOLIC PNL TOTAL CA: CPT | Mod: HCNC | Performed by: INTERNAL MEDICINE

## 2022-01-04 RX ORDER — AMLODIPINE BESYLATE 5 MG/1
5 TABLET ORAL DAILY
Qty: 30 TABLET | Refills: 11 | Status: SHIPPED | OUTPATIENT
Start: 2022-01-04 | End: 2022-11-21

## 2022-01-04 RX ORDER — LOSARTAN POTASSIUM 25 MG/1
25 TABLET ORAL DAILY
Qty: 30 TABLET | Refills: 5 | Status: SHIPPED | OUTPATIENT
Start: 2022-01-04 | End: 2022-01-04 | Stop reason: SDUPTHER

## 2022-01-04 RX ORDER — FUROSEMIDE 40 MG/1
40 TABLET ORAL 2 TIMES DAILY
Qty: 60 TABLET | Refills: 5 | Status: SHIPPED | OUTPATIENT
Start: 2022-01-04 | End: 2022-07-19

## 2022-01-04 RX ORDER — LOSARTAN POTASSIUM 25 MG/1
25 TABLET ORAL DAILY
Qty: 30 TABLET | Refills: 5 | Status: SHIPPED | OUTPATIENT
Start: 2022-01-04 | End: 2022-04-26 | Stop reason: SDUPTHER

## 2022-01-04 NOTE — PROGRESS NOTES
Subjective:   Patient ID:  Caio Ontiveros is a 56 y.o. male who presents for follow up of No chief complaint on file.      53 yo male, came in for CHF exacerbation  PMH CHFpEF, chroic Afib for 7 yrs, obesity s/p gastric bypass in 2014. EVANGELIST on cpap for 5 yrs   ECHO in 2017 EF 55%  09/06/2020 went to ER at [facility] due to worsening dyspnea. BNP 1100, troponin 0.036, CXR mild edema, ekg showed afib. Had IV lasix  And good diuresis.    ChestCT w/o multiple nodule     visit states that sleeps on 2 pillows, + PND snores,  SOb, palpitation  No chest pain, dizziness  Did drink a lot of water  His wife cooks at home and f/u low sodium protocol   Lasix was d/c and Bumex 2mg tid added  Pharmacist only gave him 1 mg tid. Went to ER 2 days ago. EKG AFIB and HR 91 bpm. BNP and BMP no change  CXR no acute change. Had Lasix IV and d/c  Lost 6 pounds in 1 week     admitted for CHF exacerbation and had IV diuretics.  ECHo showed RV moderatelt dilated and RV function decreased. PAP 55 mmHG  Slight elevation of troponin to 0.036  BNPelevatde    Interval history  BNP was 1200 in  and down to 83 on 12/16/2021. Then lasix 40 mg bid was d/c'ed by some reason.  Sleeps on 2 pillows. And NO PND  Edema elevated to the pelvic area.   Echo in  normal EF. Dilated RV chamber with mod RV failure and pulm HTN            Past Medical History:   Diagnosis Date    A-fib     CHF (congestive heart failure)     Gout, chronic     Hypertension     Sleep apnea        Past Surgical History:   Procedure Laterality Date    CHOLECYSTECTOMY      GASTRIC BYPASS  2014       Social History     Tobacco Use    Smoking status: Never Smoker    Smokeless tobacco: Never Used   Substance Use Topics    Alcohol use: Yes     Comment: sometimes    Drug use: No       Family History   Problem Relation Age of Onset    Hypertension Mother     Stroke Father     Diabetes Father     Hypertension Father     Diabetes Sister      Diabetes Brother          Review of Systems   Constitutional: Negative for diaphoresis, malaise/fatigue, weight gain and weight loss.   HENT: Negative for congestion and nosebleeds.    Cardiovascular: Positive for dyspnea on exertion and leg swelling. Negative for chest pain, claudication, cyanosis, irregular heartbeat, near-syncope, orthopnea, palpitations, paroxysmal nocturnal dyspnea and syncope.   Respiratory: Positive for shortness of breath. Negative for cough, hemoptysis, sleep disturbances due to breathing, snoring, sputum production and wheezing.         Using CPAP nightly      Hematologic/Lymphatic: Negative for bleeding problem. Does not bruise/bleed easily.   Skin: Negative for rash.   Musculoskeletal: Positive for back pain. Negative for arthritis, falls, joint pain, muscle cramps and muscle weakness.   Gastrointestinal: Negative for abdominal pain, constipation, diarrhea, heartburn, hematemesis, hematochezia, melena, nausea and vomiting.   Genitourinary: Negative for dysuria, hematuria and nocturia.   Neurological: Negative for excessive daytime sleepiness, dizziness, headaches, light-headedness, loss of balance, numbness, vertigo and weakness.       Objective:   Physical Exam  HENT:      Head: Normocephalic.   Eyes:      Pupils: Pupils are equal, round, and reactive to light.   Neck:      Thyroid: No thyromegaly.      Vascular: Normal carotid pulses. No carotid bruit or JVD.   Cardiovascular:      Rate and Rhythm: Tachycardia present. Rhythm irregularly irregular.  No extrasystoles are present.     Chest Wall: PMI is not displaced.      Pulses: Normal pulses.      Heart sounds: Normal heart sounds. No murmur heard.  No gallop. No S3 sounds.    Pulmonary:      Effort: No respiratory distress.      Breath sounds: No stridor. Rales present.   Abdominal:      General: Bowel sounds are normal.      Palpations: Abdomen is soft.      Tenderness: There is no abdominal tenderness. There is no rebound.    Musculoskeletal:         General: Swelling present. Normal range of motion.   Skin:     Findings: No rash.   Neurological:      Mental Status: He is alert and oriented to person, place, and time.   Psychiatric:         Behavior: Behavior normal.         Lab Results   Component Value Date    CHOL 203 (H) 06/11/2019    CHOL 178 06/28/2018    CHOL 168 05/10/2017     Lab Results   Component Value Date    HDL 54 06/11/2019    HDL 46 06/28/2018    HDL 33 (L) 05/10/2017     Lab Results   Component Value Date    LDLCALC 135.2 06/11/2019    LDLCALC 117.8 06/28/2018    LDLCALC 114.6 05/10/2017     Lab Results   Component Value Date    TRIG 69 06/11/2019    TRIG 71 06/28/2018    TRIG 102 05/10/2017     Lab Results   Component Value Date    CHOLHDL 26.6 06/11/2019    CHOLHDL 25.8 06/28/2018    CHOLHDL 19.6 (L) 05/10/2017       Chemistry        Component Value Date/Time     (L) 12/16/2021 1630    K 3.0 (L) 12/16/2021 1630    CL 94 (L) 12/16/2021 1630    CO2 24 12/16/2021 1630    BUN 41 (H) 12/16/2021 1630    CREATININE 1.5 (H) 12/16/2021 1630    GLU 78 12/16/2021 1630        Component Value Date/Time    CALCIUM 9.8 12/16/2021 1630    ALKPHOS 54 (L) 11/20/2021 1023    AST 54 (H) 11/20/2021 1023    ALT 20 11/20/2021 1023    BILITOT 1.2 (H) 11/20/2021 1023    ESTGFRAFRICA 58.9 (A) 12/16/2021 1630    EGFRNONAA 50.9 (A) 12/16/2021 1630          No results found for: LABA1C, HGBA1C  Lab Results   Component Value Date    TSH 3.104 09/15/2017     Lab Results   Component Value Date    INR 1.2 09/06/2020    INR 1.0 02/27/2010    INR 1.0 02/26/2010     Lab Results   Component Value Date    WBC 4.89 11/24/2021    HGB 15.0 11/24/2021    HCT 52.8 11/24/2021    MCV 98 11/24/2021     11/24/2021     BMP  Sodium   Date Value Ref Range Status   12/16/2021 133 (L) 136 - 145 mmol/L Final     Potassium   Date Value Ref Range Status   12/16/2021 3.0 (L) 3.5 - 5.1 mmol/L Final     Chloride   Date Value Ref Range Status   12/16/2021 94  (L) 95 - 110 mmol/L Final     CO2   Date Value Ref Range Status   12/16/2021 24 23 - 29 mmol/L Final     BUN   Date Value Ref Range Status   12/16/2021 41 (H) 6 - 20 mg/dL Final     Creatinine   Date Value Ref Range Status   12/16/2021 1.5 (H) 0.5 - 1.4 mg/dL Final     Calcium   Date Value Ref Range Status   12/16/2021 9.8 8.7 - 10.5 mg/dL Final     Anion Gap   Date Value Ref Range Status   12/16/2021 15 8 - 16 mmol/L Final     eGFR if    Date Value Ref Range Status   12/16/2021 58.9 (A) >60 mL/min/1.73 m^2 Final     eGFR if non    Date Value Ref Range Status   12/16/2021 50.9 (A) >60 mL/min/1.73 m^2 Final     Comment:     Calculation used to obtain the estimated glomerular filtration  rate (eGFR) is the CKD-EPI equation.        BNP  @LABRCNTIP(BNP,BNPTRIAGEBLO)@  @LABRCNTIP(troponini)@  CrCl cannot be calculated (Patient's most recent lab result is older than the maximum 7 days allowed.).  No results found in the last 24 hours.  No results found in the last 24 hours.  No results found in the last 24 hours.    Assessment:      1. Acute on Chronic respiratory failure with hypoxia and hypercarbia    2. Restrictive lung disease    3. Pulmonary hypertension    4. Acute on chronic diastolic congestive heart failure    5. Essential hypertension    6. Chronic atrial fibrillation    7. Chronic right-sided heart failure    8. Hypomagnesemia      CHFpEF fluid up to the pelvic area  BP high    Plan:   Resume Lasix 40 mg bid  BNP and BMP today  Add Amlodipine 5 mg daily for HTN  Continue toprolXL losartan and Eliquis 5 mg bid for HTN and AFIB  Fluid restriction 1.5 liters a day  Na< 2 gm  RTC in 2 weeks

## 2022-01-05 ENCOUNTER — PATIENT MESSAGE (OUTPATIENT)
Dept: RHEUMATOLOGY | Facility: CLINIC | Age: 57
End: 2022-01-05
Payer: MEDICARE

## 2022-01-05 DIAGNOSIS — I10 ESSENTIAL HYPERTENSION: Primary | ICD-10-CM

## 2022-01-05 LAB
ANION GAP SERPL CALC-SCNC: 8 MMOL/L (ref 8–16)
BNP SERPL-MCNC: 297 PG/ML (ref 0–99)
BUN SERPL-MCNC: 10 MG/DL (ref 6–20)
CALCIUM SERPL-MCNC: 9.8 MG/DL (ref 8.7–10.5)
CHLORIDE SERPL-SCNC: 107 MMOL/L (ref 95–110)
CO2 SERPL-SCNC: 27 MMOL/L (ref 23–29)
CREAT SERPL-MCNC: 0.8 MG/DL (ref 0.5–1.4)
EST. GFR  (AFRICAN AMERICAN): >60 ML/MIN/1.73 M^2
EST. GFR  (NON AFRICAN AMERICAN): >60 ML/MIN/1.73 M^2
GLUCOSE SERPL-MCNC: 79 MG/DL (ref 70–110)
POTASSIUM SERPL-SCNC: 4.6 MMOL/L (ref 3.5–5.1)
SODIUM SERPL-SCNC: 142 MMOL/L (ref 136–145)

## 2022-01-06 ENCOUNTER — TELEPHONE (OUTPATIENT)
Dept: CARDIOLOGY | Facility: CLINIC | Age: 57
End: 2022-01-06
Payer: MEDICARE

## 2022-01-06 DIAGNOSIS — M1A.00X0 IDIOPATHIC CHRONIC GOUT WITHOUT TOPHUS, UNSPECIFIED SITE: Primary | ICD-10-CM

## 2022-01-06 RX ORDER — ALLOPURINOL 300 MG/1
TABLET ORAL
Qty: 90 TABLET | Refills: 1 | Status: SHIPPED | OUTPATIENT
Start: 2022-01-06 | End: 2022-12-15

## 2022-01-06 RX ORDER — COLCHICINE 0.6 MG/1
0.6 TABLET ORAL DAILY
Qty: 60 TABLET | Refills: 3 | Status: SHIPPED | OUTPATIENT
Start: 2022-01-06 | End: 2022-11-30

## 2022-01-06 RX ORDER — PREDNISONE 50 MG/1
50 TABLET ORAL DAILY
Qty: 10 TABLET | Refills: 0 | Status: SHIPPED | OUTPATIENT
Start: 2022-01-06 | End: 2022-01-16

## 2022-01-06 NOTE — TELEPHONE ENCOUNTER
----- Message from Ricky Jeffrey MD sent at 1/6/2022  7:40 AM CST -----  The lab showed worsening CHF  Continue Lasix 40 mg bid

## 2022-01-06 NOTE — TELEPHONE ENCOUNTER
Called and spoke with patient about results.    He wanted to cancel appointment with Dr. Bateman on 2/2 and keep the appt with Dr. Jeffrey on 2/3.           ----- Message from Ricky Jeffrey MD sent at 1/6/2022  7:40 AM CST -----  The lab showed worsening CHF  Continue Lasix 40 mg bid

## 2022-01-10 ENCOUNTER — PATIENT OUTREACH (OUTPATIENT)
Dept: ADMINISTRATIVE | Facility: HOSPITAL | Age: 57
End: 2022-01-10
Payer: MEDICARE

## 2022-01-10 NOTE — PROGRESS NOTES
Working HTN report:     Called to discuss scheduling appointment and to get updated BP reading.   Pt had elevated BP in cards appt last week. Pt has 2 week f/u scheduled.

## 2022-01-12 ENCOUNTER — PATIENT MESSAGE (OUTPATIENT)
Dept: CARDIOLOGY | Facility: CLINIC | Age: 57
End: 2022-01-12
Payer: MEDICARE

## 2022-01-12 RX ORDER — POTASSIUM CHLORIDE 20 MEQ/1
20 TABLET, EXTENDED RELEASE ORAL 2 TIMES DAILY
COMMUNITY
End: 2022-01-12 | Stop reason: SDUPTHER

## 2022-01-13 RX ORDER — POTASSIUM CHLORIDE 20 MEQ/1
20 TABLET, EXTENDED RELEASE ORAL 2 TIMES DAILY
Qty: 30 TABLET | Refills: 9 | Status: SHIPPED | OUTPATIENT
Start: 2022-01-13 | End: 2022-05-16

## 2022-01-31 ENCOUNTER — PATIENT MESSAGE (OUTPATIENT)
Dept: CARDIOLOGY | Facility: CLINIC | Age: 57
End: 2022-01-31
Payer: MEDICARE

## 2022-01-31 DIAGNOSIS — I27.20 PULMONARY HYPERTENSION: ICD-10-CM

## 2022-02-16 ENCOUNTER — LAB VISIT (OUTPATIENT)
Dept: LAB | Facility: HOSPITAL | Age: 57
End: 2022-02-16
Attending: FAMILY MEDICINE
Payer: MEDICARE

## 2022-02-16 ENCOUNTER — OFFICE VISIT (OUTPATIENT)
Dept: INTERNAL MEDICINE | Facility: CLINIC | Age: 57
End: 2022-02-16
Payer: MEDICARE

## 2022-02-16 ENCOUNTER — PATIENT OUTREACH (OUTPATIENT)
Dept: ADMINISTRATIVE | Facility: OTHER | Age: 57
End: 2022-02-16
Payer: MEDICARE

## 2022-02-16 DIAGNOSIS — Z79.01 CHRONIC ANTICOAGULATION: ICD-10-CM

## 2022-02-16 DIAGNOSIS — E66.2 OBESITY HYPOVENTILATION SYNDROME: ICD-10-CM

## 2022-02-16 DIAGNOSIS — Z00.00 ANNUAL PHYSICAL EXAM: ICD-10-CM

## 2022-02-16 DIAGNOSIS — F33.1 MODERATE EPISODE OF RECURRENT MAJOR DEPRESSIVE DISORDER: ICD-10-CM

## 2022-02-16 DIAGNOSIS — Z79.899 HIGH RISK MEDICATION USE: ICD-10-CM

## 2022-02-16 DIAGNOSIS — L40.50 PSORIATIC ARTHRITIS: ICD-10-CM

## 2022-02-16 DIAGNOSIS — D69.6 THROMBOCYTOPENIA, UNSPECIFIED: ICD-10-CM

## 2022-02-16 DIAGNOSIS — Z00.00 ANNUAL PHYSICAL EXAM: Primary | ICD-10-CM

## 2022-02-16 DIAGNOSIS — R45.4 IRRITABILITY AND ANGER: ICD-10-CM

## 2022-02-16 DIAGNOSIS — I10 ESSENTIAL HYPERTENSION: Chronic | ICD-10-CM

## 2022-02-16 DIAGNOSIS — F51.04 PSYCHOPHYSIOLOGICAL INSOMNIA: ICD-10-CM

## 2022-02-16 DIAGNOSIS — I48.20 CHRONIC ATRIAL FIBRILLATION: ICD-10-CM

## 2022-02-16 DIAGNOSIS — L40.9 PSORIASIS: ICD-10-CM

## 2022-02-16 DIAGNOSIS — M1A.09X0 IDIOPATHIC CHRONIC GOUT OF MULTIPLE SITES WITHOUT TOPHUS: ICD-10-CM

## 2022-02-16 PROBLEM — M1A.9XX0 CHRONIC GOUT: Status: RESOLVED | Noted: 2017-10-24 | Resolved: 2022-02-16

## 2022-02-16 PROBLEM — N17.9 AKI (ACUTE KIDNEY INJURY): Status: RESOLVED | Noted: 2021-11-20 | Resolved: 2022-02-16

## 2022-02-16 PROBLEM — R06.00 DYSPNEA: Status: RESOLVED | Noted: 2020-09-11 | Resolved: 2022-02-16

## 2022-02-16 PROBLEM — M79.18 MYOFASCIAL PAIN: Status: RESOLVED | Noted: 2018-11-30 | Resolved: 2022-02-16

## 2022-02-16 LAB
CHOLEST SERPL-MCNC: 186 MG/DL (ref 120–199)
CHOLEST/HDLC SERPL: 3.8 {RATIO} (ref 2–5)
ESTIMATED AVG GLUCOSE: 126 MG/DL (ref 68–131)
HBA1C MFR BLD: 6 % (ref 4–5.6)
HDLC SERPL-MCNC: 49 MG/DL (ref 40–75)
HDLC SERPL: 26.3 % (ref 20–50)
LDLC SERPL CALC-MCNC: 119.8 MG/DL (ref 63–159)
NONHDLC SERPL-MCNC: 137 MG/DL
TRIGL SERPL-MCNC: 86 MG/DL (ref 30–150)
TSH SERPL DL<=0.005 MIU/L-ACNC: 2.95 UIU/ML (ref 0.4–4)

## 2022-02-16 PROCEDURE — 99499 UNLISTED E&M SERVICE: CPT | Mod: S$GLB,,, | Performed by: FAMILY MEDICINE

## 2022-02-16 PROCEDURE — 3044F HG A1C LEVEL LT 7.0%: CPT | Mod: HCNC,CPTII,S$GLB, | Performed by: FAMILY MEDICINE

## 2022-02-16 PROCEDURE — 36415 COLL VENOUS BLD VENIPUNCTURE: CPT | Mod: HCNC | Performed by: FAMILY MEDICINE

## 2022-02-16 PROCEDURE — 3075F SYST BP GE 130 - 139MM HG: CPT | Mod: HCNC,CPTII,S$GLB, | Performed by: FAMILY MEDICINE

## 2022-02-16 PROCEDURE — 80061 LIPID PANEL: CPT | Mod: HCNC | Performed by: FAMILY MEDICINE

## 2022-02-16 PROCEDURE — 99396 PR PREVENTIVE VISIT,EST,40-64: ICD-10-PCS | Mod: 25,HCNC,S$GLB, | Performed by: FAMILY MEDICINE

## 2022-02-16 PROCEDURE — 99999 PR PBB SHADOW E&M-EST. PATIENT-LVL V: ICD-10-PCS | Mod: PBBFAC,HCNC,, | Performed by: FAMILY MEDICINE

## 2022-02-16 PROCEDURE — 1159F MED LIST DOCD IN RCRD: CPT | Mod: HCNC,CPTII,S$GLB, | Performed by: FAMILY MEDICINE

## 2022-02-16 PROCEDURE — 3079F DIAST BP 80-89 MM HG: CPT | Mod: HCNC,CPTII,S$GLB, | Performed by: FAMILY MEDICINE

## 2022-02-16 PROCEDURE — 1160F PR REVIEW ALL MEDS BY PRESCRIBER/CLIN PHARMACIST DOCUMENTED: ICD-10-PCS | Mod: HCNC,CPTII,S$GLB, | Performed by: FAMILY MEDICINE

## 2022-02-16 PROCEDURE — 99499 RISK ADDL DX/OHS AUDIT: ICD-10-PCS | Mod: S$GLB,,, | Performed by: FAMILY MEDICINE

## 2022-02-16 PROCEDURE — 4010F PR ACE/ARB THEARPY RXD/TAKEN: ICD-10-PCS | Mod: HCNC,CPTII,S$GLB, | Performed by: FAMILY MEDICINE

## 2022-02-16 PROCEDURE — 99396 PREV VISIT EST AGE 40-64: CPT | Mod: 25,HCNC,S$GLB, | Performed by: FAMILY MEDICINE

## 2022-02-16 PROCEDURE — 3079F PR MOST RECENT DIASTOLIC BLOOD PRESSURE 80-89 MM HG: ICD-10-PCS | Mod: HCNC,CPTII,S$GLB, | Performed by: FAMILY MEDICINE

## 2022-02-16 PROCEDURE — 3075F PR MOST RECENT SYSTOLIC BLOOD PRESS GE 130-139MM HG: ICD-10-PCS | Mod: HCNC,CPTII,S$GLB, | Performed by: FAMILY MEDICINE

## 2022-02-16 PROCEDURE — 1160F RVW MEDS BY RX/DR IN RCRD: CPT | Mod: HCNC,CPTII,S$GLB, | Performed by: FAMILY MEDICINE

## 2022-02-16 PROCEDURE — 3008F PR BODY MASS INDEX (BMI) DOCUMENTED: ICD-10-PCS | Mod: HCNC,CPTII,S$GLB, | Performed by: FAMILY MEDICINE

## 2022-02-16 PROCEDURE — 3044F PR MOST RECENT HEMOGLOBIN A1C LEVEL <7.0%: ICD-10-PCS | Mod: HCNC,CPTII,S$GLB, | Performed by: FAMILY MEDICINE

## 2022-02-16 PROCEDURE — 3008F BODY MASS INDEX DOCD: CPT | Mod: HCNC,CPTII,S$GLB, | Performed by: FAMILY MEDICINE

## 2022-02-16 PROCEDURE — 99999 PR PBB SHADOW E&M-EST. PATIENT-LVL V: CPT | Mod: PBBFAC,HCNC,, | Performed by: FAMILY MEDICINE

## 2022-02-16 PROCEDURE — 84443 ASSAY THYROID STIM HORMONE: CPT | Mod: HCNC | Performed by: FAMILY MEDICINE

## 2022-02-16 PROCEDURE — 4010F ACE/ARB THERAPY RXD/TAKEN: CPT | Mod: HCNC,CPTII,S$GLB, | Performed by: FAMILY MEDICINE

## 2022-02-16 PROCEDURE — 1159F PR MEDICATION LIST DOCUMENTED IN MEDICAL RECORD: ICD-10-PCS | Mod: HCNC,CPTII,S$GLB, | Performed by: FAMILY MEDICINE

## 2022-02-16 PROCEDURE — 83036 HEMOGLOBIN GLYCOSYLATED A1C: CPT | Mod: HCNC | Performed by: FAMILY MEDICINE

## 2022-02-16 RX ORDER — VENLAFAXINE HYDROCHLORIDE 75 MG/1
75 CAPSULE, EXTENDED RELEASE ORAL DAILY
Qty: 90 CAPSULE | Refills: 3 | Status: SHIPPED | OUTPATIENT
Start: 2022-02-16 | End: 2022-11-28

## 2022-02-16 RX ORDER — VENLAFAXINE HYDROCHLORIDE 150 MG/1
150 CAPSULE, EXTENDED RELEASE ORAL DAILY
Qty: 90 CAPSULE | Refills: 3 | Status: SHIPPED | OUTPATIENT
Start: 2022-02-16 | End: 2022-11-28

## 2022-02-16 RX ORDER — ARIPIPRAZOLE 10 MG/1
10 TABLET ORAL DAILY
Qty: 90 TABLET | Refills: 3 | Status: SHIPPED | OUTPATIENT
Start: 2022-02-16 | End: 2024-02-02

## 2022-02-16 NOTE — PROGRESS NOTES
Subjective:   Patient ID: Caio Ontiveros is a 56 y.o. male.  Chief Complaint:  Follow-up    Presents for annual physical exam and follow-up on chronic medical conditions  Also followed by Rheumatology, Cardiology, pulmonology, and Dermatology    Last visit September 2021  Moderate episode of recurrent major depressive disorder, Irritability and anger, Psychophysiological insomnia  Restarted Effexor  mg daily dose  Increased Abilify 5 mg daily dose  Reports compliance.  Denies side effects.  50% improvement in symptoms on current regimen.  Questions if he could take higher dose.    Additional medical history:  - Hypertension.  On amlodipine 5 mg daily and losartan 25 mg daily and Toprol-XL 25 mg daily.  Reports compliance.  Denies side effects.  No shortness of breath or swelling  - Chronic atrial fibrillation on chronic anticoagulation.  On Eliquis 5 mg twice a day in addition to antihypertensives above.  Reports compliance.  Denies side effects.  Also on Lasix 40 mg twice a day to prevent volume overload with additional potassium 20 mEq twice a day supplement.  - Gout.  On allopurinol 300 mg daily with colchicine as needed for breakthrough attacks.  Stable.  No recent acute exacerbation.  - Psoriasis.  Followed by Dermatology. On Taltz injection    Health maintenance needs include:  Tdap, Shingles vaccine, COVID booster, and flu vaccine in    Current Outpatient Medications:     allopurinoL (ZYLOPRIM) 300 MG tablet, TAKE 1 TABLET (300 MG) BY MOUTH ONCE DAILY (TAKE WITH 100 MG TABLET FOR TOTAL  MG DAILY), Disp: 90 tablet, Rfl: 1    amLODIPine (NORVASC) 5 MG tablet, Take 1 tablet (5 mg total) by mouth once daily., Disp: 30 tablet, Rfl: 11    apixaban (ELIQUIS) 5 mg Tab, Take 1 tablet (5 mg total) by mouth 2 (two) times daily., Disp: 180 tablet, Rfl: 3    colchicine (COLCRYS) 0.6 mg tablet, Take 1 tablet (0.6 mg total) by mouth once daily., Disp: 60 tablet, Rfl: 3    furosemide (LASIX) 40 MG tablet, Take 1  tablet (40 mg total) by mouth 2 (two) times a day., Disp: 60 tablet, Rfl: 5    losartan (COZAAR) 25 MG tablet, Take 1 tablet (25 mg total) by mouth once daily., Disp: 30 tablet, Rfl: 5    metoprolol succinate (TOPROL-XL) 25 MG 24 hr tablet, Take 1 tablet (25 mg total) by mouth 2 (two) times daily., Disp: 180 tablet, Rfl: 3    MITIGARE 0.6 mg Cap, TAKE 2 CAPSULE AT GOUT FLARE ONSET THEN TAKE 1 CAPSULE 1 HOUR LATER. ON DAY 2 AND ENSUING DAYS TAKE 1 CAP DAILY TIL RESOLVED, Disp: 90 capsule, Rfl: 1    potassium chloride SA (K-DUR,KLOR-CON) 20 MEQ tablet, Take 1 tablet (20 mEq total) by mouth 2 (two) times daily., Disp: 30 tablet, Rfl: 9    TALTZ AUTOINJECTOR, 2 PACK, 80 mg/mL AtIn, INJECT 80MG (1 PEN) UNDER THE SKIN EVERY 4 WEEKS, Disp: 3 mL, Rfl: 0    triamcinolone acetonide 0.1% (KENALOG) 0.1 % ointment, APPLY TOPICALLY 2 TIMES DAILY. DO NOT APPLY TO FACE, ARMPITS., Disp: 454 g, Rfl: 0    ARIPiprazole (ABILIFY) 10 MG Tab, Take 1 tablet (10 mg total) by mouth once daily., Disp: 90 tablet, Rfl: 3    venlafaxine (EFFEXOR-XR) 150 MG Cp24, Take 1 capsule (150 mg total) by mouth once daily., Disp: 90 capsule, Rfl: 3    venlafaxine (EFFEXOR-XR) 75 MG 24 hr capsule, Take 1 capsule (75 mg total) by mouth once daily., Disp: 90 capsule, Rfl: 3    Review of Systems   Constitutional: Positive for activity change. Negative for chills, fatigue, fever and unexpected weight change.   HENT: Negative for congestion, dental problem, ear discharge, ear pain, hearing loss, postnasal drip, rhinorrhea, sinus pressure, sinus pain, sore throat and trouble swallowing.    Eyes: Negative for pain, discharge, redness and visual disturbance.   Respiratory: Negative for cough, chest tightness, shortness of breath and wheezing.    Cardiovascular: Negative for chest pain, palpitations and leg swelling.   Gastrointestinal: Negative for abdominal pain, blood in stool, constipation, diarrhea, nausea and vomiting.   Endocrine: Negative for  "polydipsia, polyphagia and polyuria.   Genitourinary: Negative for difficulty urinating, dysuria, flank pain, frequency, hematuria and urgency.   Musculoskeletal: Positive for arthralgias and joint swelling. Negative for myalgias and neck pain.   Skin: Negative for rash.   Neurological: Positive for weakness. Negative for dizziness, light-headedness and headaches.   Hematological: Negative for adenopathy.   Psychiatric/Behavioral: Positive for dysphoric mood and sleep disturbance. Negative for agitation, behavioral problems, confusion and decreased concentration. The patient is not nervous/anxious.      Objective:   /88   Temp 98.6 °F (37 °C) (Oral)   Ht 6' 3" (1.905 m)   Wt (!) 190.3 kg (419 lb 8.6 oz)   SpO2 95%   BMI 52.44 kg/m²     Physical Exam  Vitals and nursing note reviewed.   Constitutional:       General: He is not in acute distress.     Appearance: Normal appearance. He is well-developed. He is not ill-appearing or toxic-appearing.   Neck:      Thyroid: No thyroid mass, thyromegaly or thyroid tenderness.   Cardiovascular:      Rate and Rhythm: Normal rate and regular rhythm.      Pulses:           Radial pulses are 2+ on the right side and 2+ on the left side.      Heart sounds: Normal heart sounds. No murmur heard.  No friction rub. No gallop.    Pulmonary:      Effort: Pulmonary effort is normal. No tachypnea, accessory muscle usage or respiratory distress.      Breath sounds: Normal breath sounds. No decreased breath sounds, wheezing, rhonchi or rales.   Abdominal:      General: There is no distension.      Palpations: Abdomen is soft.      Tenderness: There is no abdominal tenderness. There is no guarding or rebound.   Lymphadenopathy:      Cervical: No cervical adenopathy.   Skin:     General: Skin is warm and dry.      Findings: No rash.   Neurological:      Mental Status: He is alert and oriented to person, place, and time.      Coordination: Coordination is intact.      Gait: Gait is " intact.   Psychiatric:         Attention and Perception: Attention normal. He is attentive. He does not perceive auditory or visual hallucinations.         Mood and Affect: Mood and affect normal.         Speech: He is communicative. Speech is not rapid and pressured, delayed, slurred or tangential.         Behavior: Behavior is slowed and withdrawn. Behavior is not agitated, aggressive, hyperactive or combative. Behavior is cooperative.         Thought Content: Thought content normal.         Cognition and Memory: Cognition normal.       Assessment:       ICD-10-CM ICD-9-CM   1. Annual physical exam  Z00.00 V70.0   2. High risk medication use  Z79.899 V58.69   3. Moderate episode of recurrent major depressive disorder  F33.1 296.32   4. Irritability and anger  R45.4 799.22   5. Psychophysiological insomnia  F51.04 307.42   6. Essential hypertension  I10 401.9   7. Chronic atrial fibrillation  I48.20 427.31   8. Chronic anticoagulation  Z79.01 V58.61   9. Idiopathic chronic gout of multiple sites without tophus  M1A.09X0 274.02   10. Psoriasis  L40.9 696.1   11. Psoriatic arthritis  L40.50 696.0   12. Obesity hypoventilation syndrome  E66.2 278.03   13. Thrombocytopenia, unspecified  D69.6 287.5     Plan:   Annual physical exam  High risk medication use  -     Hemoglobin A1C; Future; Expected date: 02/16/2022  -     TSH; Future; Expected date: 02/16/2022  -     Lipid Panel; Future; Expected date: 02/16/2022  Blood pressure normal.  BMI 52. Overall exam stable.  Check labs.  Treat as indicated.  Colon cancer screening up-to-date  Prostate cancer screening up-to-date  Declines all recommended vaccines    Moderate episode of recurrent major depressive disorder  Irritability and anger  Psychophysiological insomnia  -     ARIPiprazole (ABILIFY) 10 MG Tab; Take 1 tablet (10 mg total) by mouth once daily.  Dispense: 90 tablet; Refill: 3  -     venlafaxine (EFFEXOR-XR) 150 MG Cp24; Take 1 capsule (150 mg total) by mouth  once daily.  Dispense: 90 capsule; Refill: 3  -     venlafaxine (EFFEXOR-XR) 75 MG 24 hr capsule; Take 1 capsule (75 mg total) by mouth once daily.  Dispense: 90 capsule; Refill: 3  Continue Effexor  mg total daily dose  Increase Abilify 10 mg daily  Reassess 3 months    Essential hypertension  Chronic atrial fibrillation  Chronic anticoagulation  Controlled.  Stable.  Asymptomatic.  BP at goal.  Continue all current medications  Follow-up cardiology as scheduled    Idiopathic chronic gout of multiple sites without tophus  Controlled.  Stable.  Decrease frequency of acute attacks.  Continue all current medications  Follow-up rheumatology as scheduled    Psoriasis  Controlled.  Stable.  Continue all current medications  Follow-up dermatology as scheduled    Return to clinic in 3 months or sooner if needed

## 2022-02-17 ENCOUNTER — PATIENT MESSAGE (OUTPATIENT)
Dept: DERMATOLOGY | Facility: CLINIC | Age: 57
End: 2022-02-17
Payer: MEDICARE

## 2022-02-18 VITALS
DIASTOLIC BLOOD PRESSURE: 88 MMHG | HEIGHT: 75 IN | SYSTOLIC BLOOD PRESSURE: 132 MMHG | BODY MASS INDEX: 39.17 KG/M2 | OXYGEN SATURATION: 95 % | TEMPERATURE: 99 F | WEIGHT: 315 LBS

## 2022-02-18 PROBLEM — L40.50 ARTHROPATHIC PSORIASIS, UNSPECIFIED: Status: ACTIVE | Noted: 2022-02-18

## 2022-02-18 PROBLEM — L40.9 PSORIASIS: Status: ACTIVE | Noted: 2022-02-18

## 2022-02-18 PROBLEM — D69.6 THROMBOCYTOPENIA, UNSPECIFIED: Status: ACTIVE | Noted: 2022-02-18

## 2022-02-20 ENCOUNTER — NURSE TRIAGE (OUTPATIENT)
Dept: ADMINISTRATIVE | Facility: CLINIC | Age: 57
End: 2022-02-20
Payer: MEDICARE

## 2022-02-20 NOTE — TELEPHONE ENCOUNTER
Reason for Disposition   Health Information question, no triage required and triager able to answer question    Additional Information   Negative: [1] Caller is not with the adult (patient) AND [2] reporting urgent symptoms   Negative: Lab result questions   Negative: Medication questions   Negative: Caller can't be reached by phone   Negative: Caller has already spoken to PCP or another triager   Negative: RN needs further essential information from caller in order to complete triage   Negative: Requesting regular office appointment   Negative: [1] Caller requesting NON-URGENT health information AND [2] PCP's office is the best resource    Protocols used: INFORMATION ONLY CALL - NO TRIAGE-A-  pt called re appt 2/23. wanted to get 72 hour covid test. trying to find out where to go. Offered covid test site info. Call back with questions.

## 2022-02-22 ENCOUNTER — LAB VISIT (OUTPATIENT)
Dept: LAB | Facility: HOSPITAL | Age: 57
End: 2022-02-22
Attending: INTERNAL MEDICINE
Payer: MEDICARE

## 2022-02-22 ENCOUNTER — OFFICE VISIT (OUTPATIENT)
Dept: CARDIOLOGY | Facility: CLINIC | Age: 57
End: 2022-02-22
Payer: MEDICARE

## 2022-02-22 VITALS
HEIGHT: 75 IN | RESPIRATION RATE: 16 BRPM | BODY MASS INDEX: 39.17 KG/M2 | DIASTOLIC BLOOD PRESSURE: 100 MMHG | SYSTOLIC BLOOD PRESSURE: 158 MMHG | OXYGEN SATURATION: 95 % | WEIGHT: 315 LBS | HEART RATE: 102 BPM

## 2022-02-22 DIAGNOSIS — I50.33 ACUTE ON CHRONIC DIASTOLIC CONGESTIVE HEART FAILURE: ICD-10-CM

## 2022-02-22 DIAGNOSIS — I10 ESSENTIAL HYPERTENSION: Chronic | ICD-10-CM

## 2022-02-22 DIAGNOSIS — I48.20 CHRONIC ATRIAL FIBRILLATION: ICD-10-CM

## 2022-02-22 DIAGNOSIS — I50.33 ACUTE ON CHRONIC DIASTOLIC CONGESTIVE HEART FAILURE: Primary | ICD-10-CM

## 2022-02-22 DIAGNOSIS — E66.01 MORBID OBESITY: ICD-10-CM

## 2022-02-22 DIAGNOSIS — I87.2 VENOUS INSUFFICIENCY: ICD-10-CM

## 2022-02-22 PROCEDURE — 1160F RVW MEDS BY RX/DR IN RCRD: CPT | Mod: HCNC,CPTII,S$GLB, | Performed by: INTERNAL MEDICINE

## 2022-02-22 PROCEDURE — 1159F MED LIST DOCD IN RCRD: CPT | Mod: HCNC,CPTII,S$GLB, | Performed by: INTERNAL MEDICINE

## 2022-02-22 PROCEDURE — 3077F SYST BP >= 140 MM HG: CPT | Mod: HCNC,CPTII,S$GLB, | Performed by: INTERNAL MEDICINE

## 2022-02-22 PROCEDURE — 3008F BODY MASS INDEX DOCD: CPT | Mod: HCNC,CPTII,S$GLB, | Performed by: INTERNAL MEDICINE

## 2022-02-22 PROCEDURE — 99999 PR PBB SHADOW E&M-EST. PATIENT-LVL V: ICD-10-PCS | Mod: PBBFAC,HCNC,, | Performed by: INTERNAL MEDICINE

## 2022-02-22 PROCEDURE — 1159F PR MEDICATION LIST DOCUMENTED IN MEDICAL RECORD: ICD-10-PCS | Mod: HCNC,CPTII,S$GLB, | Performed by: INTERNAL MEDICINE

## 2022-02-22 PROCEDURE — 99999 PR PBB SHADOW E&M-EST. PATIENT-LVL V: CPT | Mod: PBBFAC,HCNC,, | Performed by: INTERNAL MEDICINE

## 2022-02-22 PROCEDURE — 36415 COLL VENOUS BLD VENIPUNCTURE: CPT | Mod: HCNC | Performed by: INTERNAL MEDICINE

## 2022-02-22 PROCEDURE — 4010F PR ACE/ARB THEARPY RXD/TAKEN: ICD-10-PCS | Mod: HCNC,CPTII,S$GLB, | Performed by: INTERNAL MEDICINE

## 2022-02-22 PROCEDURE — 1160F PR REVIEW ALL MEDS BY PRESCRIBER/CLIN PHARMACIST DOCUMENTED: ICD-10-PCS | Mod: HCNC,CPTII,S$GLB, | Performed by: INTERNAL MEDICINE

## 2022-02-22 PROCEDURE — 3044F HG A1C LEVEL LT 7.0%: CPT | Mod: HCNC,CPTII,S$GLB, | Performed by: INTERNAL MEDICINE

## 2022-02-22 PROCEDURE — 4010F ACE/ARB THERAPY RXD/TAKEN: CPT | Mod: HCNC,CPTII,S$GLB, | Performed by: INTERNAL MEDICINE

## 2022-02-22 PROCEDURE — 3008F PR BODY MASS INDEX (BMI) DOCUMENTED: ICD-10-PCS | Mod: HCNC,CPTII,S$GLB, | Performed by: INTERNAL MEDICINE

## 2022-02-22 PROCEDURE — 3044F PR MOST RECENT HEMOGLOBIN A1C LEVEL <7.0%: ICD-10-PCS | Mod: HCNC,CPTII,S$GLB, | Performed by: INTERNAL MEDICINE

## 2022-02-22 PROCEDURE — 3080F PR MOST RECENT DIASTOLIC BLOOD PRESSURE >= 90 MM HG: ICD-10-PCS | Mod: HCNC,CPTII,S$GLB, | Performed by: INTERNAL MEDICINE

## 2022-02-22 PROCEDURE — 83880 ASSAY OF NATRIURETIC PEPTIDE: CPT | Mod: HCNC | Performed by: INTERNAL MEDICINE

## 2022-02-22 PROCEDURE — 3080F DIAST BP >= 90 MM HG: CPT | Mod: HCNC,CPTII,S$GLB, | Performed by: INTERNAL MEDICINE

## 2022-02-22 PROCEDURE — 3077F PR MOST RECENT SYSTOLIC BLOOD PRESSURE >= 140 MM HG: ICD-10-PCS | Mod: HCNC,CPTII,S$GLB, | Performed by: INTERNAL MEDICINE

## 2022-02-22 PROCEDURE — 80048 BASIC METABOLIC PNL TOTAL CA: CPT | Mod: HCNC | Performed by: INTERNAL MEDICINE

## 2022-02-22 PROCEDURE — 99214 PR OFFICE/OUTPT VISIT, EST, LEVL IV, 30-39 MIN: ICD-10-PCS | Mod: HCNC,S$GLB,, | Performed by: INTERNAL MEDICINE

## 2022-02-22 PROCEDURE — 99214 OFFICE O/P EST MOD 30 MIN: CPT | Mod: HCNC,S$GLB,, | Performed by: INTERNAL MEDICINE

## 2022-02-22 RX ORDER — METOPROLOL SUCCINATE 25 MG/1
75 TABLET, EXTENDED RELEASE ORAL 2 TIMES DAILY
Qty: 180 TABLET | Refills: 3 | Status: SHIPPED | OUTPATIENT
Start: 2022-02-22 | End: 2022-06-06

## 2022-02-22 NOTE — PROGRESS NOTES
Subjective:   Patient ID:  Caio Ontiveros is a 56 y.o. male who presents for follow up of No chief complaint on file.      55 yo male, came in for 2 months f/u  PMH CHFpEF, chroic Afib for 7 yrs, obesity s/p gastric bypass in 2014. EVANGELIST on cpap for 5 yrs   ECHO in 2017 EF 55%  09/06/2020 went to ER at [facility] due to worsening dyspnea. BNP 1100, troponin 0.036, CXR mild edema, ekg showed afib. Had IV lasix  And good diuresis.    ChestCT w/o multiple nodule     visit states that sleeps on 2 pillows, + PND snores,  SOb, palpitation  No chest pain, dizziness  Did drink a lot of water  His wife cooks at home and f/u low sodium protocol   Lasix was d/c and Bumex 2mg tid added  Pharmacist only gave him 1 mg tid. Went to ER 2 days ago. EKG AFIB and HR 91 bpm. BNP and BMP no change  CXR no acute change. Had Lasix IV and d/c  Lost 6 pounds in 1 week     admitted for CHF exacerbation and had IV diuretics.  ECHo showed RV moderatelt dilated and RV function decreased. PAP 55 mmHG  Slight elevation of troponin to 0.036  BNPelevatde    01/04/2022 visit  BNP was 1200 in  and down to 83 on 12/16/2021. Then lasix 40 mg bid was d/c'ed by some reason.  Sleeps on 2 pillows. And NO PND  Edema elevated to the pelvic area.   Echo in  normal EF. Dilated RV chamber with mod RV failure and pulm HTN    Interval history  BP high. On Lasix 40 mg bid, no change of weight. GAYTAN stable. No chest pain palpitation and faint               Past Medical History:   Diagnosis Date    A-fib     CHF (congestive heart failure)     Gout, chronic     Hypertension     Sleep apnea        Past Surgical History:   Procedure Laterality Date    CHOLECYSTECTOMY      GASTRIC BYPASS  2014       Social History     Tobacco Use    Smoking status: Never Smoker    Smokeless tobacco: Never Used   Substance Use Topics    Alcohol use: Yes     Comment: sometimes    Drug use: No       Family History   Problem Relation Age of  Onset    Hypertension Mother     Stroke Father     Diabetes Father     Hypertension Father     Diabetes Sister     Diabetes Brother          Review of Systems   Constitutional: Negative for diaphoresis, malaise/fatigue, weight gain and weight loss.   HENT: Negative for congestion and nosebleeds.    Cardiovascular: Positive for dyspnea on exertion and leg swelling. Negative for chest pain, claudication, cyanosis, irregular heartbeat, near-syncope, orthopnea, palpitations, paroxysmal nocturnal dyspnea and syncope.   Respiratory: Positive for shortness of breath. Negative for cough, hemoptysis, sleep disturbances due to breathing, snoring, sputum production and wheezing.         Using CPAP nightly      Hematologic/Lymphatic: Negative for bleeding problem. Does not bruise/bleed easily.   Skin: Negative for rash.   Musculoskeletal: Positive for back pain. Negative for arthritis, falls, joint pain, muscle cramps and muscle weakness.   Gastrointestinal: Negative for abdominal pain, constipation, diarrhea, heartburn, hematemesis, hematochezia, melena, nausea and vomiting.   Genitourinary: Negative for dysuria, hematuria and nocturia.   Neurological: Negative for excessive daytime sleepiness, dizziness, headaches, light-headedness, loss of balance, numbness, vertigo and weakness.       Objective:   Physical Exam  HENT:      Head: Normocephalic.   Eyes:      Pupils: Pupils are equal, round, and reactive to light.   Neck:      Thyroid: No thyromegaly.      Vascular: Normal carotid pulses. No carotid bruit or JVD.   Cardiovascular:      Rate and Rhythm: Tachycardia present. Rhythm irregularly irregular.  No extrasystoles are present.     Chest Wall: PMI is not displaced.      Pulses: Normal pulses.      Heart sounds: Normal heart sounds. No murmur heard.    No gallop. No S3 sounds.   Pulmonary:      Effort: No respiratory distress.      Breath sounds: No stridor. Rales present.   Abdominal:      General: Bowel sounds are  normal.      Palpations: Abdomen is soft.      Tenderness: There is no abdominal tenderness. There is no rebound.   Musculoskeletal:         General: Swelling present. Normal range of motion.   Skin:     Findings: No rash.   Neurological:      Mental Status: He is alert and oriented to person, place, and time.   Psychiatric:         Behavior: Behavior normal.         Lab Results   Component Value Date    CHOL 186 02/16/2022    CHOL 203 (H) 06/11/2019    CHOL 178 06/28/2018     Lab Results   Component Value Date    HDL 49 02/16/2022    HDL 54 06/11/2019    HDL 46 06/28/2018     Lab Results   Component Value Date    LDLCALC 119.8 02/16/2022    LDLCALC 135.2 06/11/2019    LDLCALC 117.8 06/28/2018     Lab Results   Component Value Date    TRIG 86 02/16/2022    TRIG 69 06/11/2019    TRIG 71 06/28/2018     Lab Results   Component Value Date    CHOLHDL 26.3 02/16/2022    CHOLHDL 26.6 06/11/2019    CHOLHDL 25.8 06/28/2018       Chemistry        Component Value Date/Time     01/04/2022 1642    K 4.6 01/04/2022 1642     01/04/2022 1642    CO2 27 01/04/2022 1642    BUN 10 01/04/2022 1642    CREATININE 0.8 01/04/2022 1642    GLU 79 01/04/2022 1642        Component Value Date/Time    CALCIUM 9.8 01/04/2022 1642    ALKPHOS 54 (L) 11/20/2021 1023    AST 54 (H) 11/20/2021 1023    ALT 20 11/20/2021 1023    BILITOT 1.2 (H) 11/20/2021 1023    ESTGFRAFRICA >60.0 01/04/2022 1642    EGFRNONAA >60.0 01/04/2022 1642          Lab Results   Component Value Date    HGBA1C 6.0 (H) 02/16/2022     Lab Results   Component Value Date    TSH 2.954 02/16/2022     Lab Results   Component Value Date    INR 1.2 09/06/2020    INR 1.0 02/27/2010    INR 1.0 02/26/2010     Lab Results   Component Value Date    WBC 4.89 11/24/2021    HGB 15.0 11/24/2021    HCT 52.8 11/24/2021    MCV 98 11/24/2021     11/24/2021     BMP  Sodium   Date Value Ref Range Status   01/04/2022 142 136 - 145 mmol/L Final     Potassium   Date Value Ref Range Status    01/04/2022 4.6 3.5 - 5.1 mmol/L Final     Chloride   Date Value Ref Range Status   01/04/2022 107 95 - 110 mmol/L Final     CO2   Date Value Ref Range Status   01/04/2022 27 23 - 29 mmol/L Final     BUN   Date Value Ref Range Status   01/04/2022 10 6 - 20 mg/dL Final     Creatinine   Date Value Ref Range Status   01/04/2022 0.8 0.5 - 1.4 mg/dL Final     Calcium   Date Value Ref Range Status   01/04/2022 9.8 8.7 - 10.5 mg/dL Final     Anion Gap   Date Value Ref Range Status   01/04/2022 8 8 - 16 mmol/L Final     eGFR if    Date Value Ref Range Status   01/04/2022 >60.0 >60 mL/min/1.73 m^2 Final     eGFR if non    Date Value Ref Range Status   01/04/2022 >60.0 >60 mL/min/1.73 m^2 Final     Comment:     Calculation used to obtain the estimated glomerular filtration  rate (eGFR) is the CKD-EPI equation.        BNP  @LABRCNTIP(BNP,BNPTRIAGEBLO)@  @LABRCNTIP(troponini)@  CrCl cannot be calculated (Patient's most recent lab result is older than the maximum 7 days allowed.).  No results found in the last 24 hours.  No results found in the last 24 hours.  No results found in the last 24 hours.    Assessment:      1. Acute on chronic diastolic congestive heart failure    2. Essential hypertension    3. Chronic atrial fibrillation    4. Venous insufficiency    5. Morbid obesity      CHF compensated  AFIB with RVR  Obesity  High BP   Plan:   increase toprolXL from 25 mg bid to 75 mg bid for HTN and afib  Continue amlodipine Losartan and lasix 40 mg bid  IF SBP < 100 mmhG, hold Amlodipine  Continue Eliquis 5mg bid  Check BNP and BMP today      Counseled DASH  Check Lipid profile in 6 months  Recommend heart-healthy diet, weight control and regular exercise.  Edwin. Risk modification.   I have reviewed all pertinent labs and cardiac studies independently. Plans and recommendations have been formulated under my direct supervision. All questions answered and patient voiced understanding.   If  symptoms persist go to the ED  RTC in 3 months

## 2022-02-23 ENCOUNTER — CLINICAL SUPPORT (OUTPATIENT)
Dept: PULMONOLOGY | Facility: CLINIC | Age: 57
End: 2022-02-23
Payer: MEDICARE

## 2022-02-23 VITALS — WEIGHT: 315 LBS | BODY MASS INDEX: 39.17 KG/M2 | HEIGHT: 75 IN

## 2022-02-23 DIAGNOSIS — R06.89 DYSPNEA AND RESPIRATORY ABNORMALITIES: ICD-10-CM

## 2022-02-23 DIAGNOSIS — E66.2 OBESITY HYPOVENTILATION SYNDROME: ICD-10-CM

## 2022-02-23 DIAGNOSIS — I50.43 HEART FAILURE, ACUTE ON CHRONIC, SYSTOLIC AND DIASTOLIC: ICD-10-CM

## 2022-02-23 DIAGNOSIS — J96.11 CHRONIC HYPOXEMIC RESPIRATORY FAILURE: ICD-10-CM

## 2022-02-23 DIAGNOSIS — I50.43 HEART FAILURE, ACUTE ON CHRONIC, SYSTOLIC AND DIASTOLIC: Primary | ICD-10-CM

## 2022-02-23 DIAGNOSIS — R06.00 DYSPNEA AND RESPIRATORY ABNORMALITIES: ICD-10-CM

## 2022-02-23 LAB
ALLENS TEST: ABNORMAL
ANION GAP SERPL CALC-SCNC: 11 MMOL/L (ref 8–16)
BNP SERPL-MCNC: 125 PG/ML (ref 0–99)
BRPFT: NORMAL
BUN SERPL-MCNC: 12 MG/DL (ref 6–20)
CALCIUM SERPL-MCNC: 9.6 MG/DL (ref 8.7–10.5)
CHLORIDE SERPL-SCNC: 103 MMOL/L (ref 95–110)
CO2 SERPL-SCNC: 28 MMOL/L (ref 23–29)
CREAT SERPL-MCNC: 0.9 MG/DL (ref 0.5–1.4)
DELSYS: ABNORMAL
DLCO ADJ PRE: 22.65 ML/(MIN*MMHG)
DLCO SINGLE BREATH LLN: 27.2
DLCO SINGLE BREATH PRE REF: 66.4 %
DLCO SINGLE BREATH REF: 34.12
DLCOC SBVA LLN: 3.14
DLCOC SBVA PRE REF: 125.4 %
DLCOC SBVA REF: 4.17
DLCOC SINGLE BREATH LLN: 27.2
DLCOC SINGLE BREATH PRE REF: 66.4 %
DLCOC SINGLE BREATH REF: 34.12
DLCOVA LLN: 3.14
DLCOVA PRE REF: 125.4 %
DLCOVA PRE: 5.23 ML/(MIN*MMHG*L)
DLCOVA REF: 4.17
DLVAADJ PRE: 5.23 ML/(MIN*MMHG*L)
EST. GFR  (AFRICAN AMERICAN): >60 ML/MIN/1.73 M^2
EST. GFR  (NON AFRICAN AMERICAN): >60 ML/MIN/1.73 M^2
FEF 25 75 LLN: 1.35
FEF 25 75 PRE REF: 47 %
FEF 25 75 REF: 3.1
FEV1 FVC LLN: 66
FEV1 FVC PRE REF: 92.5 %
FEV1 FVC REF: 78
FEV1 LLN: 2.7
FEV1 PRE REF: 61.9 %
FEV1 REF: 3.69
FIO2: 0.21
FRCN2 LLN: 2.91
FRCN2 PRE REF: 38.7 %
FRCN2 REF: 3.89
FVC LLN: 3.57
FVC PRE REF: 66.7 %
FVC REF: 4.76
GLUCOSE SERPL-MCNC: 89 MG/DL (ref 70–110)
HCO3 UR-SCNC: 29.3 MMOL/L (ref 24–28)
IVC PRE: 2.97 L
IVC SINGLE BREATH LLN: 3.57
IVC SINGLE BREATH PRE REF: 62.4 %
IVC SINGLE BREATH REF: 4.76
MODE: ABNORMAL
MVV LLN: 138
MVV PRE REF: 58.4 %
MVV REF: 163
PCO2 BLDA: 50.7 MMHG (ref 35–45)
PEF LLN: 6.81
PEF PRE REF: 80.1 %
PEF REF: 9.87
PH SMN: 7.37 [PH] (ref 7.35–7.45)
PO2 BLDA: 68 MMHG (ref 80–100)
POC BE: 4 MMOL/L
POC SATURATED O2: 92 % (ref 95–100)
POTASSIUM SERPL-SCNC: 4.1 MMOL/L (ref 3.5–5.1)
PRE DLCO: 22.65 ML/(MIN*MMHG)
PRE FEF 25 75: 1.46 L/S
PRE FET 100: 11.06 SEC
PRE FEV1 FVC: 71.84 %
PRE FEV1: 2.28 L
PRE FRC N2: 1.51 L
PRE FVC: 3.18 L
PRE MVV: 95 L/MIN
PRE PEF: 7.9 L/S
SAMPLE: ABNORMAL
SITE: ABNORMAL
SODIUM SERPL-SCNC: 142 MMOL/L (ref 136–145)
VA PRE: 4.33 L
VA SINGLE BREATH LLN: 8.03
VA SINGLE BREATH PRE REF: 54 %
VA SINGLE BREATH REF: 8.03
VCMAXN2 LLN: 3.57
VCMAXN2 PRE REF: 71.1 %
VCMAXN2 PRE: 3.39 L
VCMAXN2 REF: 4.76

## 2022-02-23 PROCEDURE — 94729 PR C02/MEMBANE DIFFUSE CAPACITY: ICD-10-PCS | Mod: HCNC,S$GLB,, | Performed by: INTERNAL MEDICINE

## 2022-02-23 PROCEDURE — 94618 PULMONARY STRESS TESTING: CPT | Mod: HCNC,S$GLB,, | Performed by: INTERNAL MEDICINE

## 2022-02-23 PROCEDURE — 36600 PR WITHDRAWAL OF ARTERIAL BLOOD: ICD-10-PCS | Mod: HCNC,S$GLB,, | Performed by: INTERNAL MEDICINE

## 2022-02-23 PROCEDURE — 94010 BREATHING CAPACITY TEST: CPT | Mod: 59,HCNC,S$GLB, | Performed by: INTERNAL MEDICINE

## 2022-02-23 PROCEDURE — 99999 PR PBB SHADOW E&M-EST. PATIENT-LVL I: CPT | Mod: PBBFAC,HCNC,,

## 2022-02-23 PROCEDURE — 94729 DIFFUSING CAPACITY: CPT | Mod: HCNC,S$GLB,, | Performed by: INTERNAL MEDICINE

## 2022-02-23 PROCEDURE — 82803 PR  BLOOD GASES: PH, PO2 & PCO2: ICD-10-PCS | Mod: HCNC,S$GLB,, | Performed by: INTERNAL MEDICINE

## 2022-02-23 PROCEDURE — 82803 BLOOD GASES ANY COMBINATION: CPT | Mod: HCNC,S$GLB,, | Performed by: INTERNAL MEDICINE

## 2022-02-23 PROCEDURE — 94010 BREATHING CAPACITY TEST: ICD-10-PCS | Mod: 59,HCNC,S$GLB, | Performed by: INTERNAL MEDICINE

## 2022-02-23 PROCEDURE — 99999 PR PBB SHADOW E&M-EST. PATIENT-LVL I: ICD-10-PCS | Mod: PBBFAC,HCNC,,

## 2022-02-23 PROCEDURE — 36600 WITHDRAWAL OF ARTERIAL BLOOD: CPT | Mod: HCNC,S$GLB,, | Performed by: INTERNAL MEDICINE

## 2022-02-23 PROCEDURE — 94618 PULMONARY STRESS TESTING: ICD-10-PCS | Mod: HCNC,S$GLB,, | Performed by: INTERNAL MEDICINE

## 2022-02-23 PROCEDURE — 94727 PR PULM FUNCTION TEST BY GAS: ICD-10-PCS | Mod: HCNC,S$GLB,, | Performed by: INTERNAL MEDICINE

## 2022-02-23 PROCEDURE — 94727 GAS DIL/WSHOT DETER LNG VOL: CPT | Mod: HCNC,S$GLB,, | Performed by: INTERNAL MEDICINE

## 2022-02-23 NOTE — PROCEDURES
"[facility] - Pulmonary Function  Six Minute Walk     SUMMARY     Ordering Provider: Dr. Bateman   Interpreting Provider: Dr. Bateman  Performing nurse/tech/RT: Gita CRT  Diagnosis:  (chronic hypoxemic respiratory failure, dyspnea and respiratory abnormalities)  Height: 6' 3" (190.5 cm)  Weight: (!) 189.3 kg (417 lb 3.5 oz)  BMI (Calculated): 52.1   Patient Race:             Phase Oxygen Assessment Supplemental O2 Heart   Rate Blood Pressure Coleen Dyspnea Scale Rating   Resting 92 % Room Air 71 bpm (!) 151/97 2   Exercise        Minute        1 96 % Room Air 118 bpm     2 92 % Room Air 103 bpm     3 93 % Room Air 136 bpm     4 86 % (placed patient on 2lnc, spo2 increased to 91%) Room Air 125 bpm     5 94 % 2 L/M 126 bpm     6  91 % 2 L/M 136 bpm (!) 197/102 4   Recovery        Minute        1 92 % 2 L/M 109 bpm     2 95 % 2 L/M 104 bpm     3 97 % 2 L/M 92 bpm     4 99 % 2 L/M 90 bpm 160/87 2     Six Minute Walk Summary  6MWT Status: completed without stopping  Patient Reported: Dyspnea (hip pain)     Interpretation:  Did the patient stop or pause?: No                                         Total Time Walked (Calculated): 360 seconds  Final Partial Lap Distance (feet): 0 feet  Total Distance Meters (Calculated): 182.88 meters  Predicted Distance Meters (Calculated): 513.87 meters  Percentage of Predicted (Calculated): 35.59  Peak VO2 (Calculated): 9.47  Mets: 2.71  Has The Patient Had a Previous Six Minute Walk Test?: Yes       Previous 6MWT Results  Has The Patient Had a Previous Six Minute Walk Test?: Yes  Date of Previous Test: 09/23/20  Total Time Walked: 294 seconds  Total Distance (meters): 91.44  Predicted Distance (meters): 516.87 meters  Percentage of Predicted: 17.69  Percent Change (Calculated): -1  "

## 2022-02-24 ENCOUNTER — TELEPHONE (OUTPATIENT)
Dept: CARDIOLOGY | Facility: CLINIC | Age: 57
End: 2022-02-24
Payer: MEDICARE

## 2022-02-24 DIAGNOSIS — J96.01 ACUTE RESPIRATORY FAILURE WITH HYPOXIA AND HYPERCARBIA: Primary | ICD-10-CM

## 2022-02-24 DIAGNOSIS — I50.32 CHRONIC DIASTOLIC CONGESTIVE HEART FAILURE: ICD-10-CM

## 2022-02-24 DIAGNOSIS — J96.02 ACUTE RESPIRATORY FAILURE WITH HYPOXIA AND HYPERCARBIA: Primary | ICD-10-CM

## 2022-02-25 ENCOUNTER — EXTERNAL HOME HEALTH (OUTPATIENT)
Dept: HOME HEALTH SERVICES | Facility: HOSPITAL | Age: 57
End: 2022-02-25
Payer: MEDICARE

## 2022-02-25 ENCOUNTER — TELEPHONE (OUTPATIENT)
Dept: CARDIOLOGY | Facility: CLINIC | Age: 57
End: 2022-02-25
Payer: MEDICARE

## 2022-02-25 NOTE — TELEPHONE ENCOUNTER
Pt contacted about results, pt verbalized understanding.          ----- Message from Ricky Jeffrey MD sent at 2/24/2022  5:30 PM CST -----  The lab showed improved CHF  Repeat BNP and BMP in 4 weeks

## 2022-03-01 ENCOUNTER — TELEPHONE (OUTPATIENT)
Dept: CARDIOLOGY | Facility: CLINIC | Age: 57
End: 2022-03-01
Payer: MEDICARE

## 2022-03-01 NOTE — TELEPHONE ENCOUNTER
Initiated PA for Metoprolol succinate 25mg BID through [employer]. CoverMyMeds indicated pt is not covered by plan.    Reference number is [id number], status of authorization can be found by calling [phone].

## 2022-03-04 ENCOUNTER — PATIENT MESSAGE (OUTPATIENT)
Dept: DERMATOLOGY | Facility: CLINIC | Age: 57
End: 2022-03-04
Payer: MEDICARE

## 2022-03-04 ENCOUNTER — TELEPHONE (OUTPATIENT)
Dept: DERMATOLOGY | Facility: CLINIC | Age: 57
End: 2022-03-04
Payer: MEDICARE

## 2022-03-04 NOTE — TELEPHONE ENCOUNTER
Attempted to returned patient phone call, no answer left detailed message with my name and call back number [phone]      Latisha      ----- Message from Trent Castellano sent at 3/4/2022 12:29 PM CST -----  Contact: Caio  Patient is calling to speak with the nurse regarding the status of patients [facility] application. Explains it was left at the [facility] over a week a go and is pending for patients medication assistance approval. Please give patient a call back today at [phone] as requested.   Thanks,  RP

## 2022-03-14 ENCOUNTER — PATIENT MESSAGE (OUTPATIENT)
Dept: DERMATOLOGY | Facility: CLINIC | Age: 57
End: 2022-03-14
Payer: MEDICARE

## 2022-03-18 ENCOUNTER — PATIENT MESSAGE (OUTPATIENT)
Dept: RHEUMATOLOGY | Facility: CLINIC | Age: 57
End: 2022-03-18
Payer: MEDICARE

## 2022-03-22 RX ORDER — GABAPENTIN 300 MG/1
300 CAPSULE ORAL DAILY
Qty: 30 CAPSULE | Refills: 11 | Status: SHIPPED | OUTPATIENT
Start: 2022-03-22 | End: 2023-02-10

## 2022-03-23 ENCOUNTER — PATIENT MESSAGE (OUTPATIENT)
Dept: DERMATOLOGY | Facility: CLINIC | Age: 57
End: 2022-03-23
Payer: MEDICARE

## 2022-03-24 ENCOUNTER — LAB VISIT (OUTPATIENT)
Dept: LAB | Facility: HOSPITAL | Age: 57
End: 2022-03-24
Attending: STUDENT IN AN ORGANIZED HEALTH CARE EDUCATION/TRAINING PROGRAM
Payer: MEDICARE

## 2022-03-24 DIAGNOSIS — Z51.81 MEDICATION MONITORING ENCOUNTER: ICD-10-CM

## 2022-03-24 LAB
BASOPHILS # BLD AUTO: 0.02 K/UL (ref 0–0.2)
BASOPHILS NFR BLD: 0.6 % (ref 0–1.9)
DIFFERENTIAL METHOD: ABNORMAL
EOSINOPHIL # BLD AUTO: 0.1 K/UL (ref 0–0.5)
EOSINOPHIL NFR BLD: 3.5 % (ref 0–8)
ERYTHROCYTE [DISTWIDTH] IN BLOOD BY AUTOMATED COUNT: 17.6 % (ref 11.5–14.5)
HCT VFR BLD AUTO: 45.5 % (ref 40–54)
HGB BLD-MCNC: 14.5 G/DL (ref 14–18)
IMM GRANULOCYTES # BLD AUTO: 0.01 K/UL (ref 0–0.04)
IMM GRANULOCYTES NFR BLD AUTO: 0.3 % (ref 0–0.5)
LYMPHOCYTES # BLD AUTO: 0.9 K/UL (ref 1–4.8)
LYMPHOCYTES NFR BLD: 29.2 % (ref 18–48)
MCH RBC QN AUTO: 29.1 PG (ref 27–31)
MCHC RBC AUTO-ENTMCNC: 31.9 G/DL (ref 32–36)
MCV RBC AUTO: 91 FL (ref 82–98)
MONOCYTES # BLD AUTO: 0.6 K/UL (ref 0.3–1)
MONOCYTES NFR BLD: 17.9 % (ref 4–15)
NEUTROPHILS # BLD AUTO: 1.5 K/UL (ref 1.8–7.7)
NEUTROPHILS NFR BLD: 48.5 % (ref 38–73)
NRBC BLD-RTO: 0 /100 WBC
PLATELET # BLD AUTO: 144 K/UL (ref 150–450)
PLATELET BLD QL SMEAR: ABNORMAL
PMV BLD AUTO: 9.9 FL (ref 9.2–12.9)
RBC # BLD AUTO: 4.99 M/UL (ref 4.6–6.2)
WBC # BLD AUTO: 3.18 K/UL (ref 3.9–12.7)

## 2022-03-24 PROCEDURE — 36415 COLL VENOUS BLD VENIPUNCTURE: CPT | Mod: PO | Performed by: STUDENT IN AN ORGANIZED HEALTH CARE EDUCATION/TRAINING PROGRAM

## 2022-03-24 PROCEDURE — 85025 COMPLETE CBC W/AUTO DIFF WBC: CPT | Mod: PO | Performed by: STUDENT IN AN ORGANIZED HEALTH CARE EDUCATION/TRAINING PROGRAM

## 2022-04-07 ENCOUNTER — PATIENT MESSAGE (OUTPATIENT)
Dept: DERMATOLOGY | Facility: CLINIC | Age: 57
End: 2022-04-07
Payer: MEDICARE

## 2022-04-26 DIAGNOSIS — E83.42 HYPOMAGNESEMIA: ICD-10-CM

## 2022-04-26 RX ORDER — LOSARTAN POTASSIUM 25 MG/1
25 TABLET ORAL DAILY
Qty: 90 TABLET | Refills: 3 | Status: SHIPPED | OUTPATIENT
Start: 2022-04-26 | End: 2022-06-14 | Stop reason: SDUPTHER

## 2022-04-27 RX ORDER — COLCHICINE 0.6 MG/1
CAPSULE ORAL
Qty: 180 CAPSULE | Refills: 1 | Status: SHIPPED | OUTPATIENT
Start: 2022-04-27 | End: 2022-12-15

## 2022-05-16 ENCOUNTER — OFFICE VISIT (OUTPATIENT)
Dept: INTERNAL MEDICINE | Facility: CLINIC | Age: 57
End: 2022-05-16
Payer: MEDICARE

## 2022-05-16 ENCOUNTER — LAB VISIT (OUTPATIENT)
Dept: LAB | Facility: HOSPITAL | Age: 57
End: 2022-05-16
Attending: FAMILY MEDICINE
Payer: MEDICARE

## 2022-05-16 VITALS
TEMPERATURE: 99 F | SYSTOLIC BLOOD PRESSURE: 138 MMHG | BODY MASS INDEX: 39.17 KG/M2 | HEART RATE: 75 BPM | HEIGHT: 75 IN | WEIGHT: 315 LBS | DIASTOLIC BLOOD PRESSURE: 86 MMHG | OXYGEN SATURATION: 92 %

## 2022-05-16 DIAGNOSIS — Z12.5 SCREENING FOR PROSTATE CANCER: ICD-10-CM

## 2022-05-16 DIAGNOSIS — F33.1 MODERATE EPISODE OF RECURRENT MAJOR DEPRESSIVE DISORDER: ICD-10-CM

## 2022-05-16 DIAGNOSIS — E66.01 MORBID OBESITY WITH BMI OF 50.0-59.9, ADULT: Chronic | ICD-10-CM

## 2022-05-16 DIAGNOSIS — F51.04 PSYCHOPHYSIOLOGICAL INSOMNIA: ICD-10-CM

## 2022-05-16 DIAGNOSIS — R73.03 PREDIABETES: Primary | ICD-10-CM

## 2022-05-16 DIAGNOSIS — R73.03 PREDIABETES: ICD-10-CM

## 2022-05-16 DIAGNOSIS — R45.4 IRRITABILITY AND ANGER: ICD-10-CM

## 2022-05-16 LAB
COMPLEXED PSA SERPL-MCNC: 0.29 NG/ML (ref 0–4)
ESTIMATED AVG GLUCOSE: 103 MG/DL (ref 68–131)
HBA1C MFR BLD: 5.2 % (ref 4–5.6)

## 2022-05-16 PROCEDURE — 99214 OFFICE O/P EST MOD 30 MIN: CPT | Mod: S$GLB,,, | Performed by: FAMILY MEDICINE

## 2022-05-16 PROCEDURE — 3008F PR BODY MASS INDEX (BMI) DOCUMENTED: ICD-10-PCS | Mod: CPTII,S$GLB,, | Performed by: FAMILY MEDICINE

## 2022-05-16 PROCEDURE — 99999 PR PBB SHADOW E&M-EST. PATIENT-LVL V: CPT | Mod: PBBFAC,,, | Performed by: FAMILY MEDICINE

## 2022-05-16 PROCEDURE — 4010F PR ACE/ARB THEARPY RXD/TAKEN: ICD-10-PCS | Mod: CPTII,S$GLB,, | Performed by: FAMILY MEDICINE

## 2022-05-16 PROCEDURE — 1159F MED LIST DOCD IN RCRD: CPT | Mod: CPTII,S$GLB,, | Performed by: FAMILY MEDICINE

## 2022-05-16 PROCEDURE — 1160F RVW MEDS BY RX/DR IN RCRD: CPT | Mod: CPTII,S$GLB,, | Performed by: FAMILY MEDICINE

## 2022-05-16 PROCEDURE — 83036 HEMOGLOBIN GLYCOSYLATED A1C: CPT | Performed by: FAMILY MEDICINE

## 2022-05-16 PROCEDURE — 3008F BODY MASS INDEX DOCD: CPT | Mod: CPTII,S$GLB,, | Performed by: FAMILY MEDICINE

## 2022-05-16 PROCEDURE — 4010F ACE/ARB THERAPY RXD/TAKEN: CPT | Mod: CPTII,S$GLB,, | Performed by: FAMILY MEDICINE

## 2022-05-16 PROCEDURE — 84153 ASSAY OF PSA TOTAL: CPT | Performed by: FAMILY MEDICINE

## 2022-05-16 PROCEDURE — 3075F SYST BP GE 130 - 139MM HG: CPT | Mod: CPTII,S$GLB,, | Performed by: FAMILY MEDICINE

## 2022-05-16 PROCEDURE — 3044F PR MOST RECENT HEMOGLOBIN A1C LEVEL <7.0%: ICD-10-PCS | Mod: CPTII,S$GLB,, | Performed by: FAMILY MEDICINE

## 2022-05-16 PROCEDURE — 99999 PR PBB SHADOW E&M-EST. PATIENT-LVL V: ICD-10-PCS | Mod: PBBFAC,,, | Performed by: FAMILY MEDICINE

## 2022-05-16 PROCEDURE — 3079F PR MOST RECENT DIASTOLIC BLOOD PRESSURE 80-89 MM HG: ICD-10-PCS | Mod: CPTII,S$GLB,, | Performed by: FAMILY MEDICINE

## 2022-05-16 PROCEDURE — 1160F PR REVIEW ALL MEDS BY PRESCRIBER/CLIN PHARMACIST DOCUMENTED: ICD-10-PCS | Mod: CPTII,S$GLB,, | Performed by: FAMILY MEDICINE

## 2022-05-16 PROCEDURE — 3079F DIAST BP 80-89 MM HG: CPT | Mod: CPTII,S$GLB,, | Performed by: FAMILY MEDICINE

## 2022-05-16 PROCEDURE — 1159F PR MEDICATION LIST DOCUMENTED IN MEDICAL RECORD: ICD-10-PCS | Mod: CPTII,S$GLB,, | Performed by: FAMILY MEDICINE

## 2022-05-16 PROCEDURE — 3044F HG A1C LEVEL LT 7.0%: CPT | Mod: CPTII,S$GLB,, | Performed by: FAMILY MEDICINE

## 2022-05-16 PROCEDURE — 3075F PR MOST RECENT SYSTOLIC BLOOD PRESS GE 130-139MM HG: ICD-10-PCS | Mod: CPTII,S$GLB,, | Performed by: FAMILY MEDICINE

## 2022-05-16 PROCEDURE — 99214 PR OFFICE/OUTPT VISIT, EST, LEVL IV, 30-39 MIN: ICD-10-PCS | Mod: S$GLB,,, | Performed by: FAMILY MEDICINE

## 2022-05-16 PROCEDURE — 36415 COLL VENOUS BLD VENIPUNCTURE: CPT | Performed by: FAMILY MEDICINE

## 2022-05-16 NOTE — PROGRESS NOTES
Subjective:   Patient ID: Caio Ontiveros is a 56 y.o. male.  Chief Complaint:  3 month follow up     Presents for 3 month follow-up    Last visit April 2022 for annual physical exam  A1c 6% and new diagnosis prediabetes.  Did not follow-up to discuss treatment options  Lipid panel with 10 year cardiovascular risk elevated at 12%.  Deferred any statin treatment to Cardiology.  TSH normal    Major depressive disorder, anger irritability, and insomnia.  Not adequately controlled last visit  Continue to Effexor  mg daily dose  Increased Amaryl 10 mg daily dose  Reports compliance.  Denies side effects.  Mood stable.  Insomnia mildly improved.  Now interested in establishing with Psychiatry and Psychology.    No new complaints or concerns today      Current Outpatient Medications:     allopurinoL (ZYLOPRIM) 300 MG tablet, TAKE 1 TABLET (300 MG) BY MOUTH ONCE DAILY (TAKE WITH 100 MG TABLET FOR TOTAL  MG DAILY), Disp: 90 tablet, Rfl: 1    amLODIPine (NORVASC) 5 MG tablet, Take 1 tablet (5 mg total) by mouth once daily., Disp: 30 tablet, Rfl: 11    apixaban (ELIQUIS) 5 mg Tab, Take 1 tablet (5 mg total) by mouth 2 (two) times daily., Disp: 180 tablet, Rfl: 3    ARIPiprazole (ABILIFY) 10 MG Tab, Take 1 tablet (10 mg total) by mouth once daily., Disp: 90 tablet, Rfl: 3    ascorbic acid, vitamin C, (VITAMIN C) 100 MG tablet, Take 100 mg by mouth once daily., Disp: , Rfl:     calcium phosphate-vitamin D3 250-400 mg-unit Chew, , Disp: , Rfl:     colchicine (COLCRYS) 0.6 mg tablet, Take 1 tablet (0.6 mg total) by mouth once daily., Disp: 60 tablet, Rfl: 3    cyanocobalamin (VITAMIN B-12) 1000 MCG tablet, Take 2,000 mcg by mouth once daily., Disp: , Rfl:     furosemide (LASIX) 40 MG tablet, Take 1 tablet (40 mg total) by mouth 2 (two) times a day., Disp: 60 tablet, Rfl: 5    gabapentin (NEURONTIN) 300 MG capsule, Take 1 capsule (300 mg total) by mouth once daily., Disp: 30 capsule, Rfl: 11    losartan (COZAAR)  25 MG tablet, Take 1 tablet (25 mg total) by mouth once daily., Disp: 90 tablet, Rfl: 3    metoprolol succinate (TOPROL-XL) 25 MG 24 hr tablet, Take 3 tablets (75 mg total) by mouth 2 (two) times daily., Disp: 180 tablet, Rfl: 3    MITIGARE 0.6 mg Cap, TAKE 2 CAPSULE AT GOUT FLARE ONSET THEN TAKE 1 CAPSULE 1 HOUR LATER. ON DAY 2 AND ENSUING DAYS TAKE 1 CAP DAILY TIL RESOLVED, Disp: 180 capsule, Rfl: 1    multivitamin (THERAGRAN) per tablet, Take 1 tablet by mouth once daily., Disp: , Rfl:     potassium chloride SA (K-DUR,KLOR-CON) 20 MEQ tablet, Take 1 tablet (20 mEq total) by mouth 2 (two) times daily., Disp: 30 tablet, Rfl: 9    TALTZ AUTOINJECTOR, 2 PACK, 80 mg/mL AtIn, INJECT 80MG (1 PEN) UNDER THE SKIN EVERY 4 WEEKS, Disp: 3 mL, Rfl: 0    triamcinolone acetonide 0.1% (KENALOG) 0.1 % ointment, APPLY TOPICALLY 2 TIMES DAILY. DO NOT APPLY TO FACE, ARMPITS., Disp: 454 g, Rfl: 0    venlafaxine (EFFEXOR-XR) 150 MG Cp24, Take 1 capsule (150 mg total) by mouth once daily., Disp: 90 capsule, Rfl: 3    venlafaxine (EFFEXOR-XR) 75 MG 24 hr capsule, Take 1 capsule (75 mg total) by mouth once daily., Disp: 90 capsule, Rfl: 3    Review of Systems   Constitutional: Negative for activity change, appetite change, chills, fatigue and fever.   Eyes: Negative for visual disturbance.   Respiratory: Negative for cough, chest tightness, shortness of breath and wheezing.    Cardiovascular: Negative for chest pain, palpitations and leg swelling.   Gastrointestinal: Negative for abdominal pain, constipation, diarrhea, nausea and vomiting.   Endocrine: Negative for polydipsia, polyphagia and polyuria.   Genitourinary: Negative for difficulty urinating.   Musculoskeletal: Positive for arthralgias and joint swelling. Negative for myalgias.   Skin: Negative for rash.   Neurological: Positive for weakness. Negative for dizziness, syncope, light-headedness, numbness and headaches.   Psychiatric/Behavioral: Positive for sleep  "disturbance. Negative for agitation, behavioral problems, confusion, decreased concentration, dysphoric mood, hallucinations, self-injury and suicidal ideas. The patient is not nervous/anxious and is not hyperactive.      Objective:   /86   Pulse 75   Temp 98.7 °F (37.1 °C)   Ht 6' 3" (1.905 m)   Wt (!) 192.5 kg (424 lb 6.2 oz)   SpO2 (!) 92%   BMI 53.04 kg/m²     Physical Exam  Vitals and nursing note reviewed.   Constitutional:       General: He is not in acute distress.     Appearance: Normal appearance. He is well-developed. He is morbidly obese. He is not ill-appearing, toxic-appearing or diaphoretic.   Neck:      Thyroid: No thyroid mass, thyromegaly or thyroid tenderness.   Cardiovascular:      Rate and Rhythm: Normal rate and regular rhythm.   Pulmonary:      Effort: Pulmonary effort is normal. No tachypnea or accessory muscle usage.   Musculoskeletal:      Right lower leg: No edema.      Left lower leg: No edema.   Skin:     Findings: No abrasion, burn, ecchymosis, laceration or rash.   Neurological:      Mental Status: He is alert and oriented to person, place, and time.      Coordination: Coordination is intact.      Gait: Gait is intact.   Psychiatric:         Attention and Perception: Attention and perception normal. He is attentive.         Mood and Affect: Mood and affect normal. Mood is not anxious or depressed.         Speech: Speech normal.         Behavior: Behavior normal. Behavior is cooperative.         Thought Content: Thought content normal.         Cognition and Memory: Cognition normal.         Judgment: Judgment normal.       Assessment:       ICD-10-CM ICD-9-CM   1. Prediabetes  R73.03 790.29   2. Morbid obesity with BMI of 50.0-59.9, adult  E66.01 278.01    Z68.43 V85.43   3. Moderate episode of recurrent major depressive disorder  F33.1 296.32   4. Irritability and anger  R45.4 799.22   5. Psychophysiological insomnia  F51.04 307.42   6. Screening for prostate cancer  Z12.5 " V76.44     Plan:   Prediabetes  Morbid obesity with BMI of 50.0-59.9, adult  -     Hemoglobin A1C; Future; Expected date: 05/16/2022  Patient education/discussion on prediabetes.  Offered choice of weekly exercise targets versus daily low-dose metformin versus injectable GLP-1.  Patient will inform us of decision when called with today's A1c results    Moderate episode of recurrent major depressive disorder  Irritability and anger  Psychophysiological insomnia  -     Ambulatory referral/consult to Psychiatry; Future; Expected date: 05/23/2022  -     Ambulatory referral/consult to Psychology; Future; Expected date: 05/23/2022  Stable, no side effects, mood and symptoms reasonably controlled on present medication .  Continue Effexor  mg daily and Abilify 10 mg daily  Referral to Psychiatry and Psychology    Screening for prostate cancer  -     PSA, Screening; Future; Expected date: 05/16/2022    Follow-up any/all specialists scheduled    Return to clinic 6 months or sooner if needed

## 2022-05-17 ENCOUNTER — PATIENT MESSAGE (OUTPATIENT)
Dept: PSYCHIATRY | Facility: CLINIC | Age: 57
End: 2022-05-17
Payer: MEDICARE

## 2022-05-26 DIAGNOSIS — I10 ESSENTIAL HYPERTENSION: Primary | ICD-10-CM

## 2022-06-09 ENCOUNTER — PATIENT MESSAGE (OUTPATIENT)
Dept: ADMINISTRATIVE | Facility: HOSPITAL | Age: 57
End: 2022-06-09
Payer: MEDICARE

## 2022-06-14 ENCOUNTER — OFFICE VISIT (OUTPATIENT)
Dept: CARDIOLOGY | Facility: CLINIC | Age: 57
End: 2022-06-14
Payer: MEDICARE

## 2022-06-14 ENCOUNTER — HOSPITAL ENCOUNTER (OUTPATIENT)
Dept: CARDIOLOGY | Facility: HOSPITAL | Age: 57
Discharge: HOME | End: 2022-06-14
Attending: INTERNAL MEDICINE
Payer: MEDICARE

## 2022-06-14 VITALS
HEIGHT: 75 IN | OXYGEN SATURATION: 95 % | HEART RATE: 99 BPM | WEIGHT: 315 LBS | DIASTOLIC BLOOD PRESSURE: 96 MMHG | BODY MASS INDEX: 39.17 KG/M2 | SYSTOLIC BLOOD PRESSURE: 142 MMHG

## 2022-06-14 DIAGNOSIS — G47.33 OSA (OBSTRUCTIVE SLEEP APNEA): ICD-10-CM

## 2022-06-14 DIAGNOSIS — Z79.01 CHRONIC ANTICOAGULATION: ICD-10-CM

## 2022-06-14 DIAGNOSIS — I10 ESSENTIAL HYPERTENSION: Chronic | ICD-10-CM

## 2022-06-14 DIAGNOSIS — I27.20 PULMONARY HYPERTENSION: ICD-10-CM

## 2022-06-14 DIAGNOSIS — J98.4 RESTRICTIVE LUNG DISEASE: ICD-10-CM

## 2022-06-14 DIAGNOSIS — I50.812 CHRONIC RIGHT-SIDED HEART FAILURE: ICD-10-CM

## 2022-06-14 DIAGNOSIS — I10 ESSENTIAL HYPERTENSION: ICD-10-CM

## 2022-06-14 DIAGNOSIS — I48.20 CHRONIC ATRIAL FIBRILLATION: ICD-10-CM

## 2022-06-14 DIAGNOSIS — I50.33 ACUTE ON CHRONIC DIASTOLIC CONGESTIVE HEART FAILURE: Primary | ICD-10-CM

## 2022-06-14 DIAGNOSIS — E66.01 MORBID OBESITY WITH BMI OF 50.0-59.9, ADULT: Chronic | ICD-10-CM

## 2022-06-14 DIAGNOSIS — E83.42 HYPOMAGNESEMIA: ICD-10-CM

## 2022-06-14 DIAGNOSIS — I87.2 VENOUS INSUFFICIENCY: ICD-10-CM

## 2022-06-14 PROCEDURE — 93010 EKG 12-LEAD: ICD-10-PCS | Mod: ,,, | Performed by: STUDENT IN AN ORGANIZED HEALTH CARE EDUCATION/TRAINING PROGRAM

## 2022-06-14 PROCEDURE — 3008F BODY MASS INDEX DOCD: CPT | Mod: CPTII,S$GLB,, | Performed by: INTERNAL MEDICINE

## 2022-06-14 PROCEDURE — 4010F ACE/ARB THERAPY RXD/TAKEN: CPT | Mod: CPTII,S$GLB,, | Performed by: INTERNAL MEDICINE

## 2022-06-14 PROCEDURE — 1160F PR REVIEW ALL MEDS BY PRESCRIBER/CLIN PHARMACIST DOCUMENTED: ICD-10-PCS | Mod: CPTII,S$GLB,, | Performed by: INTERNAL MEDICINE

## 2022-06-14 PROCEDURE — 99214 PR OFFICE/OUTPT VISIT, EST, LEVL IV, 30-39 MIN: ICD-10-PCS | Mod: S$GLB,,, | Performed by: INTERNAL MEDICINE

## 2022-06-14 PROCEDURE — 4010F PR ACE/ARB THEARPY RXD/TAKEN: ICD-10-PCS | Mod: CPTII,S$GLB,, | Performed by: INTERNAL MEDICINE

## 2022-06-14 PROCEDURE — 1159F MED LIST DOCD IN RCRD: CPT | Mod: CPTII,S$GLB,, | Performed by: INTERNAL MEDICINE

## 2022-06-14 PROCEDURE — 3077F SYST BP >= 140 MM HG: CPT | Mod: CPTII,S$GLB,, | Performed by: INTERNAL MEDICINE

## 2022-06-14 PROCEDURE — 93010 ELECTROCARDIOGRAM REPORT: CPT | Mod: ,,, | Performed by: STUDENT IN AN ORGANIZED HEALTH CARE EDUCATION/TRAINING PROGRAM

## 2022-06-14 PROCEDURE — 3080F PR MOST RECENT DIASTOLIC BLOOD PRESSURE >= 90 MM HG: ICD-10-PCS | Mod: CPTII,S$GLB,, | Performed by: INTERNAL MEDICINE

## 2022-06-14 PROCEDURE — 3077F PR MOST RECENT SYSTOLIC BLOOD PRESSURE >= 140 MM HG: ICD-10-PCS | Mod: CPTII,S$GLB,, | Performed by: INTERNAL MEDICINE

## 2022-06-14 PROCEDURE — 1159F PR MEDICATION LIST DOCUMENTED IN MEDICAL RECORD: ICD-10-PCS | Mod: CPTII,S$GLB,, | Performed by: INTERNAL MEDICINE

## 2022-06-14 PROCEDURE — 1160F RVW MEDS BY RX/DR IN RCRD: CPT | Mod: CPTII,S$GLB,, | Performed by: INTERNAL MEDICINE

## 2022-06-14 PROCEDURE — 3008F PR BODY MASS INDEX (BMI) DOCUMENTED: ICD-10-PCS | Mod: CPTII,S$GLB,, | Performed by: INTERNAL MEDICINE

## 2022-06-14 PROCEDURE — 3080F DIAST BP >= 90 MM HG: CPT | Mod: CPTII,S$GLB,, | Performed by: INTERNAL MEDICINE

## 2022-06-14 PROCEDURE — 99999 PR PBB SHADOW E&M-EST. PATIENT-LVL IV: ICD-10-PCS | Mod: PBBFAC,,, | Performed by: INTERNAL MEDICINE

## 2022-06-14 PROCEDURE — 3044F HG A1C LEVEL LT 7.0%: CPT | Mod: CPTII,S$GLB,, | Performed by: INTERNAL MEDICINE

## 2022-06-14 PROCEDURE — 93005 ELECTROCARDIOGRAM TRACING: CPT

## 2022-06-14 PROCEDURE — 3044F PR MOST RECENT HEMOGLOBIN A1C LEVEL <7.0%: ICD-10-PCS | Mod: CPTII,S$GLB,, | Performed by: INTERNAL MEDICINE

## 2022-06-14 PROCEDURE — 99214 OFFICE O/P EST MOD 30 MIN: CPT | Mod: S$GLB,,, | Performed by: INTERNAL MEDICINE

## 2022-06-14 PROCEDURE — 99999 PR PBB SHADOW E&M-EST. PATIENT-LVL IV: CPT | Mod: PBBFAC,,, | Performed by: INTERNAL MEDICINE

## 2022-06-14 RX ORDER — LOSARTAN POTASSIUM 100 MG/1
100 TABLET ORAL DAILY
Qty: 90 TABLET | Refills: 3 | Status: SHIPPED | OUTPATIENT
Start: 2022-06-14 | End: 2022-06-14 | Stop reason: SDUPTHER

## 2022-06-14 RX ORDER — LOSARTAN POTASSIUM 50 MG/1
50 TABLET ORAL DAILY
Qty: 90 TABLET | Refills: 3 | Status: SHIPPED | OUTPATIENT
Start: 2022-06-14 | End: 2022-08-01 | Stop reason: SDUPTHER

## 2022-06-14 RX ORDER — METOPROLOL SUCCINATE 50 MG/1
50 TABLET, EXTENDED RELEASE ORAL 2 TIMES DAILY
Qty: 180 TABLET | Refills: 3 | Status: SHIPPED | OUTPATIENT
Start: 2022-06-14 | End: 2023-02-14

## 2022-06-14 NOTE — PROGRESS NOTES
Subjective:   Patient ID:  Caio Ontiveros is a 56 y.o. male who presents for follow up of No chief complaint on file.      55 yo male, came in for 3 months f/u  PMH CHFpEF, chroic Afib for 7 yrs, obesity s/p gastric bypass in 2014. EVANGELIST on cpap for 5 yrs   ECHO in 2017 EF 55%  09/06/2020 went to ER at [facility] due to worsening dyspnea. BNP 1100, troponin 0.036, CXR mild edema, ekg showed afib. Had IV lasix  And good diuresis.    ChestCT w/o multiple nodule     visit states that sleeps on 2 pillows, + PND snores,  SOb, palpitation  No chest pain, dizziness  Did drink a lot of water  His wife cooks at home and f/u low sodium protocol   Lasix was d/c and Bumex 2mg tid added  Pharmacist only gave him 1 mg tid. Went to ER 2 days ago. EKG AFIB and HR 91 bpm. BNP and BMP no change  CXR no acute change. Had Lasix IV and d/c  Lost 6 pounds in 1 week     admitted for CHF exacerbation and had IV diuretics.  ECHo showed RV moderatelt dilated and RV function decreased. PAP 55 mmHG  Slight elevation of troponin to 0.036  BNPelevatde    01/04/2022 visit  BNP was 1200 in  and down to 83 on 12/16/2021. Then lasix 40 mg bid was d/c'ed by some reason.  Sleeps on 2 pillows. And NO PND  Edema elevated to the pelvic area.   Echo in  normal EF. Dilated RV chamber with mod RV failure and pulm HTN     visit  BP high. On Lasix 40 mg bid, no change of weight. GAYTAN stable. No chest pain palpitation and faint     Interval history  ekg AFIB and BP high  No dizziness faint chest pain dyspnea,                 Past Medical History:   Diagnosis Date    A-fib     CHF (congestive heart failure)     Gout, chronic     Hypertension     Sleep apnea        Past Surgical History:   Procedure Laterality Date    CHOLECYSTECTOMY      GASTRIC BYPASS  2014       Social History     Tobacco Use    Smoking status: Never Smoker    Smokeless tobacco: Never Used   Substance Use Topics    Alcohol use: Yes      Comment: sometimes    Drug use: No       Family History   Problem Relation Age of Onset    Hypertension Mother     Stroke Father     Diabetes Father     Hypertension Father     Diabetes Sister     Diabetes Brother          Review of Systems   Constitutional: Negative for diaphoresis, malaise/fatigue, weight gain and weight loss.   HENT: Negative for congestion and nosebleeds.    Cardiovascular: Positive for dyspnea on exertion and leg swelling. Negative for chest pain, claudication, cyanosis, irregular heartbeat, near-syncope, orthopnea, palpitations, paroxysmal nocturnal dyspnea and syncope.   Respiratory: Positive for shortness of breath. Negative for cough, hemoptysis, sleep disturbances due to breathing, snoring, sputum production and wheezing.         Using CPAP nightly      Hematologic/Lymphatic: Negative for bleeding problem. Does not bruise/bleed easily.   Skin: Negative for rash.   Musculoskeletal: Positive for back pain. Negative for arthritis, falls, joint pain, muscle cramps and muscle weakness.   Gastrointestinal: Negative for abdominal pain, constipation, diarrhea, heartburn, hematemesis, hematochezia, melena, nausea and vomiting.   Genitourinary: Negative for dysuria, hematuria and nocturia.   Neurological: Negative for excessive daytime sleepiness, dizziness, headaches, light-headedness, loss of balance, numbness, vertigo and weakness.       Objective:   Physical Exam  HENT:      Head: Normocephalic.   Eyes:      Pupils: Pupils are equal, round, and reactive to light.   Neck:      Thyroid: No thyromegaly.      Vascular: Normal carotid pulses. No carotid bruit or JVD.   Cardiovascular:      Rate and Rhythm: Tachycardia present. Rhythm irregularly irregular.  No extrasystoles are present.     Chest Wall: PMI is not displaced.      Pulses: Normal pulses.      Heart sounds: Normal heart sounds. No murmur heard.    No gallop. No S3 sounds.   Pulmonary:      Effort: No respiratory distress.       Breath sounds: No stridor. Rales present.   Abdominal:      General: Bowel sounds are normal.      Palpations: Abdomen is soft.      Tenderness: There is no abdominal tenderness. There is no rebound.   Musculoskeletal:         General: Swelling present. Normal range of motion.   Skin:     Findings: No rash.   Neurological:      Mental Status: He is alert and oriented to person, place, and time.   Psychiatric:         Behavior: Behavior normal.         Lab Results   Component Value Date    CHOL 186 02/16/2022    CHOL 203 (H) 06/11/2019    CHOL 178 06/28/2018     Lab Results   Component Value Date    HDL 49 02/16/2022    HDL 54 06/11/2019    HDL 46 06/28/2018     Lab Results   Component Value Date    LDLCALC 119.8 02/16/2022    LDLCALC 135.2 06/11/2019    LDLCALC 117.8 06/28/2018     Lab Results   Component Value Date    TRIG 86 02/16/2022    TRIG 69 06/11/2019    TRIG 71 06/28/2018     Lab Results   Component Value Date    CHOLHDL 26.3 02/16/2022    CHOLHDL 26.6 06/11/2019    CHOLHDL 25.8 06/28/2018       Chemistry        Component Value Date/Time     02/22/2022 1702    K 4.1 02/22/2022 1702     02/22/2022 1702    CO2 28 02/22/2022 1702    BUN 12 02/22/2022 1702    CREATININE 0.9 02/22/2022 1702    GLU 89 02/22/2022 1702        Component Value Date/Time    CALCIUM 9.6 02/22/2022 1702    ALKPHOS 54 (L) 11/20/2021 1023    AST 54 (H) 11/20/2021 1023    ALT 20 11/20/2021 1023    BILITOT 1.2 (H) 11/20/2021 1023    ESTGFRAFRICA >60.0 02/22/2022 1702    EGFRNONAA >60.0 02/22/2022 1702          Lab Results   Component Value Date    HGBA1C 5.2 05/16/2022     Lab Results   Component Value Date    TSH 2.954 02/16/2022     Lab Results   Component Value Date    INR 1.2 09/06/2020    INR 1.0 02/27/2010    INR 1.0 02/26/2010     Lab Results   Component Value Date    WBC 3.18 (L) 03/24/2022    HGB 14.5 03/24/2022    HCT 45.5 03/24/2022    MCV 91 03/24/2022     (L) 03/24/2022     BMP  Sodium   Date Value Ref Range  Status   02/22/2022 142 136 - 145 mmol/L Final     Potassium   Date Value Ref Range Status   02/22/2022 4.1 3.5 - 5.1 mmol/L Final     Chloride   Date Value Ref Range Status   02/22/2022 103 95 - 110 mmol/L Final     CO2   Date Value Ref Range Status   02/22/2022 28 23 - 29 mmol/L Final     BUN   Date Value Ref Range Status   02/22/2022 12 6 - 20 mg/dL Final     Creatinine   Date Value Ref Range Status   02/22/2022 0.9 0.5 - 1.4 mg/dL Final     Calcium   Date Value Ref Range Status   02/22/2022 9.6 8.7 - 10.5 mg/dL Final     Anion Gap   Date Value Ref Range Status   02/22/2022 11 8 - 16 mmol/L Final     eGFR if    Date Value Ref Range Status   02/22/2022 >60.0 >60 mL/min/1.73 m^2 Final     eGFR if non    Date Value Ref Range Status   02/22/2022 >60.0 >60 mL/min/1.73 m^2 Final     Comment:     Calculation used to obtain the estimated glomerular filtration  rate (eGFR) is the CKD-EPI equation.        BNP  @LABRCNTIP(BNP,BNPTRIAGEBLO)@  @LABRCNTIP(troponini)@  CrCl cannot be calculated (Patient's most recent lab result is older than the maximum 7 days allowed.).  No results found in the last 24 hours.  No results found in the last 24 hours.  No results found in the last 24 hours.    Assessment:      1. Acute on chronic diastolic congestive heart failure    2. Hypomagnesemia    3. Pulmonary hypertension    4. Restrictive lung disease    5. Essential hypertension    6. Chronic atrial fibrillation    7. Venous insufficiency    8. Chronic right-sided heart failure    9. Chronic anticoagulation    10. Morbid obesity with BMI of 50.0-59.9, adult    11. EVANGELIST (obstructive sleep apnea)/ Obesity Hypoventilation syndrome       CHFpEF  Chronic afib  HTN   Obesity    Plan:   Increase Losartan to 50 mg daily and torpolXL 50 mg bid for HTN and afib    Continue amlodipine eliquis 5 mg bid and Lasix for HTN CHF afib  Counseled DASH  Check Lipid profile in 6 months  Recommend heart-healthy diet, weight  control and regular exercise.  Edwin. Risk modification.   I have reviewed all pertinent labs and cardiac studies independently. Plans and recommendations have been formulated under my direct supervision. All questions answered and patient voiced understanding.   If symptoms persist go to the ED  RTC in 4 months

## 2022-07-07 RX ORDER — ALLOPURINOL 300 MG/1
300 TABLET ORAL DAILY
COMMUNITY
End: 2022-07-07 | Stop reason: SDUPTHER

## 2022-07-07 NOTE — TELEPHONE ENCOUNTER
----- Message from Kiara Diez sent at 7/7/2022 10:31 AM CDT -----  [facility] is needing clarification on the pt's medication allopurinoL (ZYLOPRIM) 300 MG tablet. Please fax to [phone]. Please call [phone]

## 2022-07-08 RX ORDER — ALLOPURINOL 300 MG/1
300 TABLET ORAL DAILY
Qty: 90 TABLET | Refills: 1 | Status: SHIPPED | OUTPATIENT
Start: 2022-07-08 | End: 2022-12-28 | Stop reason: SDUPTHER

## 2022-08-01 DIAGNOSIS — E83.42 HYPOMAGNESEMIA: ICD-10-CM

## 2022-08-01 RX ORDER — LOSARTAN POTASSIUM 50 MG/1
50 TABLET ORAL DAILY
Qty: 90 TABLET | Refills: 3 | Status: SHIPPED | OUTPATIENT
Start: 2022-08-01 | End: 2022-12-15 | Stop reason: SDUPTHER

## 2022-08-05 DIAGNOSIS — I27.20 PULMONARY HYPERTENSION: ICD-10-CM

## 2022-08-23 ENCOUNTER — TELEPHONE (OUTPATIENT)
Dept: PHARMACY | Facility: CLINIC | Age: 57
End: 2022-08-23
Payer: MEDICARE

## 2022-08-23 NOTE — TELEPHONE ENCOUNTER
Tried to contact the patient about his paperwork.  The patient had someone drop off the doctor portion of the application to the clinic to get signed and faxed to the drug company.  I am assuming the patient turned in his portion of the application to the drug company himself.  I am scanning in the doctor troy.

## 2022-08-30 ENCOUNTER — TELEPHONE (OUTPATIENT)
Dept: PULMONOLOGY | Facility: CLINIC | Age: 57
End: 2022-08-30
Payer: MEDICARE

## 2022-08-30 NOTE — TELEPHONE ENCOUNTER
----- Message from Magali Joe sent at 8/30/2022  2:11 PM CDT -----  Contact: [facility] --  Diabetic or Medical Supplies.    What supplies are needed:   C-pap machine and supplies     What is the brand name of the supplies:   [employer] Trilogy 100    Is this a refill or new prescription:  New Rx    Who prescribed the supplies:  Fransico    Pharmacy or company name, phone # and location:    [facility]   () [phone]  (Fax) [phone]    Would the patient like a call back, or a response through their [facility] portal?:   call back     Comments:   Please callback if any questions or concern. If need a reference # [id number]

## 2022-08-30 NOTE — TELEPHONE ENCOUNTER
Spoke to pt about CPAP machine pt explained he was hospitalized and his machine was changed to Trilogy pt would like to go back to the CPAP machine does not use oxygen also changed insurances from [employer] to [employer] will reach out to GOPAL and MD for further instructions

## 2022-09-13 ENCOUNTER — PATIENT MESSAGE (OUTPATIENT)
Dept: PULMONOLOGY | Facility: CLINIC | Age: 57
End: 2022-09-13
Payer: MEDICARE

## 2022-09-14 ENCOUNTER — PATIENT MESSAGE (OUTPATIENT)
Dept: PULMONOLOGY | Facility: CLINIC | Age: 57
End: 2022-09-14
Payer: MEDICARE

## 2022-09-21 ENCOUNTER — OFFICE VISIT (OUTPATIENT)
Dept: PULMONOLOGY | Facility: CLINIC | Age: 57
End: 2022-09-21
Payer: MEDICARE

## 2022-09-21 VITALS
HEART RATE: 97 BPM | SYSTOLIC BLOOD PRESSURE: 144 MMHG | BODY MASS INDEX: 39.17 KG/M2 | DIASTOLIC BLOOD PRESSURE: 92 MMHG | WEIGHT: 315 LBS | RESPIRATION RATE: 19 BRPM | OXYGEN SATURATION: 97 % | HEIGHT: 75 IN

## 2022-09-21 DIAGNOSIS — I50.42 CHRONIC COMBINED SYSTOLIC AND DIASTOLIC HRT FAIL: Primary | ICD-10-CM

## 2022-09-21 DIAGNOSIS — I48.20 CHRONIC ATRIAL FIBRILLATION: ICD-10-CM

## 2022-09-21 DIAGNOSIS — J96.11 CHRONIC HYPOXEMIC RESPIRATORY FAILURE: ICD-10-CM

## 2022-09-21 DIAGNOSIS — J98.4 RESTRICTIVE LUNG DISEASE: ICD-10-CM

## 2022-09-21 DIAGNOSIS — G47.33 SEVERE OBSTRUCTIVE SLEEP APNEA: ICD-10-CM

## 2022-09-21 PROCEDURE — 3080F PR MOST RECENT DIASTOLIC BLOOD PRESSURE >= 90 MM HG: ICD-10-PCS | Mod: CPTII,S$GLB,, | Performed by: INTERNAL MEDICINE

## 2022-09-21 PROCEDURE — 1159F PR MEDICATION LIST DOCUMENTED IN MEDICAL RECORD: ICD-10-PCS | Mod: CPTII,S$GLB,, | Performed by: INTERNAL MEDICINE

## 2022-09-21 PROCEDURE — 4010F ACE/ARB THERAPY RXD/TAKEN: CPT | Mod: CPTII,S$GLB,, | Performed by: INTERNAL MEDICINE

## 2022-09-21 PROCEDURE — 3077F PR MOST RECENT SYSTOLIC BLOOD PRESSURE >= 140 MM HG: ICD-10-PCS | Mod: CPTII,S$GLB,, | Performed by: INTERNAL MEDICINE

## 2022-09-21 PROCEDURE — 99499 UNLISTED E&M SERVICE: CPT | Mod: S$GLB,,, | Performed by: INTERNAL MEDICINE

## 2022-09-21 PROCEDURE — 3044F PR MOST RECENT HEMOGLOBIN A1C LEVEL <7.0%: ICD-10-PCS | Mod: CPTII,S$GLB,, | Performed by: INTERNAL MEDICINE

## 2022-09-21 PROCEDURE — 99214 PR OFFICE/OUTPT VISIT, EST, LEVL IV, 30-39 MIN: ICD-10-PCS | Mod: S$GLB,,, | Performed by: INTERNAL MEDICINE

## 2022-09-21 PROCEDURE — 99214 OFFICE O/P EST MOD 30 MIN: CPT | Mod: S$GLB,,, | Performed by: INTERNAL MEDICINE

## 2022-09-21 PROCEDURE — 1159F MED LIST DOCD IN RCRD: CPT | Mod: CPTII,S$GLB,, | Performed by: INTERNAL MEDICINE

## 2022-09-21 PROCEDURE — 3080F DIAST BP >= 90 MM HG: CPT | Mod: CPTII,S$GLB,, | Performed by: INTERNAL MEDICINE

## 2022-09-21 PROCEDURE — 3008F BODY MASS INDEX DOCD: CPT | Mod: CPTII,S$GLB,, | Performed by: INTERNAL MEDICINE

## 2022-09-21 PROCEDURE — 99499 RISK ADDL DX/OHS AUDIT: ICD-10-PCS | Mod: S$GLB,,, | Performed by: INTERNAL MEDICINE

## 2022-09-21 PROCEDURE — 3008F PR BODY MASS INDEX (BMI) DOCUMENTED: ICD-10-PCS | Mod: CPTII,S$GLB,, | Performed by: INTERNAL MEDICINE

## 2022-09-21 PROCEDURE — 99999 PR PBB SHADOW E&M-EST. PATIENT-LVL V: ICD-10-PCS | Mod: PBBFAC,,, | Performed by: INTERNAL MEDICINE

## 2022-09-21 PROCEDURE — 3044F HG A1C LEVEL LT 7.0%: CPT | Mod: CPTII,S$GLB,, | Performed by: INTERNAL MEDICINE

## 2022-09-21 PROCEDURE — 99999 PR PBB SHADOW E&M-EST. PATIENT-LVL V: CPT | Mod: PBBFAC,,, | Performed by: INTERNAL MEDICINE

## 2022-09-21 PROCEDURE — 4010F PR ACE/ARB THEARPY RXD/TAKEN: ICD-10-PCS | Mod: CPTII,S$GLB,, | Performed by: INTERNAL MEDICINE

## 2022-09-21 PROCEDURE — 3077F SYST BP >= 140 MM HG: CPT | Mod: CPTII,S$GLB,, | Performed by: INTERNAL MEDICINE

## 2022-09-21 NOTE — PROGRESS NOTES
Pulmonary Outpatient Follow Up Visit     Subjective:       Patient ID: Caio Ontiveros is a 56 y.o. male.    Chief Complaint: Sleep Apnea      HPI        57 yo M patient presenting for 1 year hospital follow-up.      9/21/2022  no hospital admission over the last year.  SOB on exertion.  Compliant with trilogy plus O2 during sleep.  Obtained a replacement but he is in touch with  [employer] regarding a power cord for his machine and supplies.    Weight gain 30 lb compared to December 2021       Admitted to the hospital ICU November 2021 for acute on chronic hypoxemic and hypercapnic respiratory failure.    He is known  with history of CHF, A fib, pulmonary hypertension,     He was discharged on Lasix 40 mg daily, O2 during sleep with trilogy provided by [employer].  Compliant with trilogy    Never smoker.  Disabled currently.    Review of Systems   Constitutional:  Positive for weight gain, activity change, appetite change, fatigue and weakness. Negative for fever and chills.        30 lb weight gain   HENT:  Negative for nosebleeds.    Eyes:  Negative for redness.   Respiratory:  Positive for cough, shortness of breath, previous hospitalization due to pulmonary problems, dyspnea on extertion and somnolence. Negative for choking.    Cardiovascular:  Positive for leg swelling. Negative for chest pain.   Genitourinary:  Negative for hematuria.   Endocrine:  Negative for cold intolerance.    Musculoskeletal:  Positive for arthralgias, back pain and gait problem.   Skin:  Negative for rash.   Gastrointestinal:  Negative for vomiting.   Neurological:  Negative for syncope.   Hematological:  Negative for adenopathy.   Psychiatric/Behavioral:  Positive for sleep disturbance. Negative for confusion.      Outpatient Encounter Medications as of 9/21/2022   Medication Sig Dispense Refill    allopurinoL (ZYLOPRIM) 300 MG tablet TAKE 1 TABLET (300 MG) BY MOUTH ONCE DAILY (TAKE WITH 100 MG TABLET FOR  TOTAL  MG DAILY) 90 tablet 1    allopurinoL (ZYLOPRIM) 300 MG tablet Take 1 tablet (300 mg total) by mouth once daily. 90 tablet 1    amLODIPine (NORVASC) 5 MG tablet Take 1 tablet (5 mg total) by mouth once daily. 30 tablet 11    apixaban (ELIQUIS) 5 mg Tab Take 1 tablet (5 mg total) by mouth 2 (two) times daily. 180 tablet 3    ARIPiprazole (ABILIFY) 10 MG Tab Take 1 tablet (10 mg total) by mouth once daily. 90 tablet 3    colchicine (COLCRYS) 0.6 mg tablet Take 1 tablet (0.6 mg total) by mouth once daily. 60 tablet 3    furosemide (LASIX) 40 MG tablet TAKE 1 TABLET BY MOUTH  TWICE DAILY 60 tablet 11    gabapentin (NEURONTIN) 300 MG capsule Take 1 capsule (300 mg total) by mouth once daily. 30 capsule 11    metoprolol succinate (TOPROL-XL) 50 MG 24 hr tablet Take 1 tablet (50 mg total) by mouth 2 (two) times daily. 180 tablet 3    MITIGARE 0.6 mg Cap TAKE 2 CAPSULE AT GOUT FLARE ONSET THEN TAKE 1 CAPSULE 1 HOUR LATER. ON DAY 2 AND ENSUING DAYS TAKE 1 CAP DAILY TIL RESOLVED 180 capsule 1    potassium chloride SA (K-DUR,KLOR-CON) 20 MEQ tablet TAKE 1 TABLET TWICE DAILY 180 tablet 2    TALTZ AUTOINJECTOR, 2 PACK, 80 mg/mL AtIn INJECT 80MG (1 PEN) UNDER THE SKIN EVERY 4 WEEKS 3 mL 0    venlafaxine (EFFEXOR-XR) 150 MG Cp24 Take 1 capsule (150 mg total) by mouth once daily. 90 capsule 3    venlafaxine (EFFEXOR-XR) 75 MG 24 hr capsule Take 1 capsule (75 mg total) by mouth once daily. 90 capsule 3    ascorbic acid, vitamin C, (VITAMIN C) 100 MG tablet Take 100 mg by mouth once daily.      calcium phosphate-vitamin D3 250-400 mg-unit Chew       cyanocobalamin (VITAMIN B-12) 1000 MCG tablet Take 2,000 mcg by mouth once daily.      losartan (COZAAR) 50 MG tablet Take 1 tablet (50 mg total) by mouth once daily. 90 tablet 3    multivitamin (THERAGRAN) per tablet Take 1 tablet by mouth once daily.      triamcinolone acetonide 0.1% (KENALOG) 0.1 % ointment APPLY TOPICALLY 2 TIMES DAILY. DO NOT APPLY TO FACE, ARMPITS. 454 g  "0     No facility-administered encounter medications on file as of 9/21/2022.       Objective:     Vital Signs (Most Recent)  Vital Signs  Pulse: 97  Resp: 19  SpO2: 97 %  BP: (!) 144/92  Height and Weight  Height: 6' 3" (190.5 cm)  Weight: (!) 193.6 kg (426 lb 11.2 oz)  BSA (Calculated - sq m): 3.2 sq meters  BMI (Calculated): 53.3  Weight in (lb) to have BMI = 25: 199.6]  Wt Readings from Last 2 Encounters:   09/21/22 (!) 193.6 kg (426 lb 11.2 oz)   06/14/22 (!) 191.9 kg (423 lb)       Physical Exam   Constitutional: He is oriented to person, place, and time. He appears well-developed and well-nourished. No distress. He is obese.   HENT:   Head: Normocephalic.   Cardiovascular: Normal rate.   Irregular S1-S2   Pulmonary/Chest: Normal expansion and effort normal. No stridor. No respiratory distress. He has decreased breath sounds. He exhibits no tenderness.   Abdominal: He exhibits no distension.   Musculoskeletal:         General: Edema present. No tenderness.      Cervical back: Neck supple.   Lymphadenopathy:     He has no cervical adenopathy.   Neurological: He is alert and oriented to person, place, and time. Gait normal.   Skin: Skin is warm. No cyanosis. Nails show no clubbing.   Psychiatric: He has a normal mood and affect. His behavior is normal. Judgment and thought content normal.   Nursing note and vitals reviewed.    Laboratory  Lab Results   Component Value Date    WBC 3.18 (L) 03/24/2022    RBC 4.99 03/24/2022    HGB 14.5 03/24/2022    HCT 45.5 03/24/2022    MCV 91 03/24/2022    MCH 29.1 03/24/2022    MCHC 31.9 (L) 03/24/2022    RDW 17.6 (H) 03/24/2022     (L) 03/24/2022    MPV 9.9 03/24/2022    GRAN 1.5 (L) 03/24/2022    GRAN 48.5 03/24/2022    LYMPH 0.9 (L) 03/24/2022    LYMPH 29.2 03/24/2022    MONO 0.6 03/24/2022    MONO 17.9 (H) 03/24/2022    EOS 0.1 03/24/2022    BASO 0.02 03/24/2022    EOSINOPHIL 3.5 03/24/2022    BASOPHIL 0.6 03/24/2022       BMP  Lab Results   Component Value Date    "  02/22/2022    K 4.1 02/22/2022     02/22/2022    CO2 28 02/22/2022    BUN 12 02/22/2022    CREATININE 0.9 02/22/2022    CALCIUM 9.6 02/22/2022    ANIONGAP 11 02/22/2022    ESTGFRAFRICA >60.0 02/22/2022    EGFRNONAA >60.0 02/22/2022    AST 54 (H) 11/20/2021    ALT 20 11/20/2021    PROT 8.7 (H) 11/20/2021       Lab Results   Component Value Date     (H) 02/22/2022     (H) 01/04/2022    BNP 38 12/16/2021     (H) 11/22/2021    BNP 2,242 (H) 11/20/2021     (H) 10/26/2021       Lab Results   Component Value Date    TSH 2.954 02/16/2022       Lab Results   Component Value Date    SEDRATE 4 02/20/2020       Lab Results   Component Value Date    CRP 60.5 (H) 11/22/2021     No results found for: IGE     No results found for: ASPERGILLUS  No results found for: AFUMIGATUSCL     Lab Results   Component Value Date    ACE 23 11/22/2021    Results for MEGHANA LOWE (MRN [id number]) as of 12/17/2021 10:48   Ref. Range 11/20/2021 14:37 11/20/2021 18:06   POC PH Latest Ref Range: 7.35 - 7.45  7.242 (LL) 7.320 (L)   POC PCO2 Latest Ref Range: 35 - 45 mmHg 96.6 (HH) 77.8 (HH)   POC PO2 Latest Ref Range: 80 - 100 mmHg 74 (L) 59 (LL)   POC BE Latest Ref Range: -2 to 2 mmol/L 14 14   POC HCO3 Latest Ref Range: 24 - 28 mmol/L 41.6 (H) 40.1 (H)   POC SATURATED O2 Latest Ref Range: 95 - 100 % 90 (L) 86 (L)   FiO2 Unknown 40 40   Vt Unknown  550   Sample Unknown ARTERIAL ARTERIAL   DelSys Unknown CPAP/BiPAP CPAP/BiPAP   Allens Test Unknown Pass Pass   Site Unknown RB RR   Mode Unknown BiPAP AVAPS   Rate Unknown 20 20     EKG 11/20/2021    Atrial fibrillation   Incomplete right bundle branch block   Possible Anteroseptal infarct ,age undetermined   Abnormal ECG       Diagnostic Results:  I have personally reviewed today the following studies:    2D echo 11/21/2021  The left ventricle is mildly enlarged with moderate concentric hypertrophy and normal systolic function.  The estimated ejection fraction is  60%.  Normal left ventricular diastolic function.  Severe right ventricular enlargement with moderately reduced right ventricular systolic function.  Moderate left atrial enlargement.  Moderate right atrial enlargement.  Moderate tricuspid regurgitation.  There is abnormal septal wall motion. There is systolic flattening of the interventricular septum consistent with right ventricle pressure overload.            PFT 2/2022      Spirometry data is acceptable and reproducibleModerate restriction based on reduced Forced Vital Capacity (FVC). Consider lung volumes if clinically indicated.Mild restriction vital capacity. (VC< LLN and > or equal to 70% of predicted).Overall there is   moderate ventilatory impairment. (FEV1 > or equal to 60% of predicted and < or equal to 69% predicted ).Flow volume loops demonstrate a restrictive defect.Maximum voluntary ventilation is moderately reduced. (MVV% predicted is 51% to 65% of   predicted)Mild reduction in diffusion capacity -unadjusted for hemoglobin (DLCO > or equal to 60% predicted and < LLN) . This interpretation of diffusing capacity does not take into account the patient's hemoglobin level (Unavailable at the time of   testing - hemoglobin assumed to be normal).       SIX MIN2/2022      Severe capacity reduction , needed O2 2 LPM         PFT September 2020     Spirometry reveals normal airflow. Lung volumes reveal severe restriction. Single breath diffusion capacity is moderately reduced.Â  This interpretation of diffusing capacity is adjusted for patient's hemoglobin level    Chest x-ray November 2021  FINDINGS:  Stable cardiomegaly.     Mild pulmonary vascular congestion.  This appears improved.  No pulmonary edema.  No effusion.     Right IJ catheter unchanged.     Impression:     Interval improvement       CT chest September 2020        Bilateral small scattered pulmonary nodules all measuring less than 6 mm.  Would recommend follow-up as per Fleischner society  guidelines.  Borderline enlarged right intrathoracic lymph nodes, favored reactive in etiology although.  Stable cardiomegaly and other incidental findings as noted above       PSG 10/2020    The diagnostic polysomnography revealed a severe obstructive sleep apnea / hypopnea syndrome (A + H Index = 104.6 events / hr asleep with 17.4 - 0.0 respiratory event - arousals / hr asleep, and no RERAs (respiratory effort - related arousals) for the study. The mean SpO2 value was 75.3 %, severe, minimum oxygen saturation during sleep was 74.0 %, and waking baseline SpO2 was 96.0 %. 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    MA contacting road check for compliance report.      Patient to continue use his machine with full face mask.      Beta-blocker and apixaban for rate control and anticoagulation for AFib.    Losartan beta-blocker Lasix.  Following with Dr. Parrish cardiology    Heart healthy diet  Limit fluid intake 50-60 oz   Daily weights and to notify clinic if weight increases by more than 3 lbs in 1 day or 5 lbs in 1 week.     Follow up in about 6 months (around 3/21/2023).    This note was prepared using voice recognition system and is likely to have sound alike errors that may have been overlooked even after proof reading.  Please call me with any questions    Discussed diagnosis, its evaluation, treatment and usual course. All questions answered.      Erick Bateman MD

## 2022-10-24 ENCOUNTER — PATIENT MESSAGE (OUTPATIENT)
Dept: PULMONOLOGY | Facility: CLINIC | Age: 57
End: 2022-10-24
Payer: MEDICARE

## 2022-10-28 ENCOUNTER — PATIENT OUTREACH (OUTPATIENT)
Dept: ADMINISTRATIVE | Facility: HOSPITAL | Age: 57
End: 2022-10-28
Payer: MEDICARE

## 2022-10-28 ENCOUNTER — PATIENT MESSAGE (OUTPATIENT)
Dept: INTERNAL MEDICINE | Facility: CLINIC | Age: 57
End: 2022-10-28
Payer: MEDICARE

## 2022-10-31 VITALS — DIASTOLIC BLOOD PRESSURE: 93 MMHG | SYSTOLIC BLOOD PRESSURE: 140 MMHG | HEART RATE: 60 BPM

## 2022-12-06 ENCOUNTER — TELEPHONE (OUTPATIENT)
Dept: PULMONOLOGY | Facility: CLINIC | Age: 57
End: 2022-12-06
Payer: MEDICARE

## 2022-12-13 ENCOUNTER — OFFICE VISIT (OUTPATIENT)
Dept: INTERNAL MEDICINE | Facility: CLINIC | Age: 57
End: 2022-12-13
Payer: MEDICARE

## 2022-12-13 ENCOUNTER — LAB VISIT (OUTPATIENT)
Dept: LAB | Facility: HOSPITAL | Age: 57
End: 2022-12-13
Attending: FAMILY MEDICINE
Payer: MEDICARE

## 2022-12-13 VITALS
WEIGHT: 315 LBS | HEIGHT: 75 IN | HEART RATE: 119 BPM | DIASTOLIC BLOOD PRESSURE: 98 MMHG | OXYGEN SATURATION: 87 % | BODY MASS INDEX: 39.17 KG/M2 | SYSTOLIC BLOOD PRESSURE: 150 MMHG

## 2022-12-13 DIAGNOSIS — I10 ESSENTIAL HYPERTENSION: Chronic | ICD-10-CM

## 2022-12-13 DIAGNOSIS — E66.01 MORBID OBESITY WITH BMI OF 50.0-59.9, ADULT: Chronic | ICD-10-CM

## 2022-12-13 DIAGNOSIS — R26.9 GAIT ABNORMALITY: ICD-10-CM

## 2022-12-13 DIAGNOSIS — R73.03 PREDIABETES: ICD-10-CM

## 2022-12-13 DIAGNOSIS — R73.03 PREDIABETES: Primary | ICD-10-CM

## 2022-12-13 DIAGNOSIS — L40.50 PSORIATIC ARTHRITIS: ICD-10-CM

## 2022-12-13 LAB
ALBUMIN SERPL BCP-MCNC: 4.2 G/DL (ref 3.5–5.2)
ANION GAP SERPL CALC-SCNC: 8 MMOL/L (ref 8–16)
BUN SERPL-MCNC: 13 MG/DL (ref 6–20)
CALCIUM SERPL-MCNC: 9.5 MG/DL (ref 8.7–10.5)
CHLORIDE SERPL-SCNC: 105 MMOL/L (ref 95–110)
CO2 SERPL-SCNC: 29 MMOL/L (ref 23–29)
CREAT SERPL-MCNC: 1.1 MG/DL (ref 0.5–1.4)
EST. GFR  (NO RACE VARIABLE): >60 ML/MIN/1.73 M^2
ESTIMATED AVG GLUCOSE: 108 MG/DL (ref 68–131)
GLUCOSE SERPL-MCNC: 104 MG/DL (ref 70–110)
HBA1C MFR BLD: 5.4 % (ref 4–5.6)
PHOSPHATE SERPL-MCNC: 2.9 MG/DL (ref 2.7–4.5)
POTASSIUM SERPL-SCNC: 5 MMOL/L (ref 3.5–5.1)
SODIUM SERPL-SCNC: 142 MMOL/L (ref 136–145)

## 2022-12-13 PROCEDURE — 1160F RVW MEDS BY RX/DR IN RCRD: CPT | Mod: CPTII,S$GLB,, | Performed by: FAMILY MEDICINE

## 2022-12-13 PROCEDURE — 4010F ACE/ARB THERAPY RXD/TAKEN: CPT | Mod: CPTII,S$GLB,, | Performed by: FAMILY MEDICINE

## 2022-12-13 PROCEDURE — 4010F PR ACE/ARB THEARPY RXD/TAKEN: ICD-10-PCS | Mod: CPTII,S$GLB,, | Performed by: FAMILY MEDICINE

## 2022-12-13 PROCEDURE — 80069 RENAL FUNCTION PANEL: CPT | Performed by: FAMILY MEDICINE

## 2022-12-13 PROCEDURE — 36415 COLL VENOUS BLD VENIPUNCTURE: CPT | Performed by: FAMILY MEDICINE

## 2022-12-13 PROCEDURE — 83036 HEMOGLOBIN GLYCOSYLATED A1C: CPT | Performed by: FAMILY MEDICINE

## 2022-12-13 PROCEDURE — 3077F SYST BP >= 140 MM HG: CPT | Mod: CPTII,S$GLB,, | Performed by: FAMILY MEDICINE

## 2022-12-13 PROCEDURE — 3044F PR MOST RECENT HEMOGLOBIN A1C LEVEL <7.0%: ICD-10-PCS | Mod: CPTII,S$GLB,, | Performed by: FAMILY MEDICINE

## 2022-12-13 PROCEDURE — 99215 PR OFFICE/OUTPT VISIT, EST, LEVL V, 40-54 MIN: ICD-10-PCS | Mod: S$GLB,,, | Performed by: FAMILY MEDICINE

## 2022-12-13 PROCEDURE — 3080F PR MOST RECENT DIASTOLIC BLOOD PRESSURE >= 90 MM HG: ICD-10-PCS | Mod: CPTII,S$GLB,, | Performed by: FAMILY MEDICINE

## 2022-12-13 PROCEDURE — 99999 PR PBB SHADOW E&M-EST. PATIENT-LVL IV: ICD-10-PCS | Mod: PBBFAC,,, | Performed by: FAMILY MEDICINE

## 2022-12-13 PROCEDURE — 3044F HG A1C LEVEL LT 7.0%: CPT | Mod: CPTII,S$GLB,, | Performed by: FAMILY MEDICINE

## 2022-12-13 PROCEDURE — 3077F PR MOST RECENT SYSTOLIC BLOOD PRESSURE >= 140 MM HG: ICD-10-PCS | Mod: CPTII,S$GLB,, | Performed by: FAMILY MEDICINE

## 2022-12-13 PROCEDURE — 3080F DIAST BP >= 90 MM HG: CPT | Mod: CPTII,S$GLB,, | Performed by: FAMILY MEDICINE

## 2022-12-13 PROCEDURE — 99215 OFFICE O/P EST HI 40 MIN: CPT | Mod: S$GLB,,, | Performed by: FAMILY MEDICINE

## 2022-12-13 PROCEDURE — 1159F PR MEDICATION LIST DOCUMENTED IN MEDICAL RECORD: ICD-10-PCS | Mod: CPTII,S$GLB,, | Performed by: FAMILY MEDICINE

## 2022-12-13 PROCEDURE — 1160F PR REVIEW ALL MEDS BY PRESCRIBER/CLIN PHARMACIST DOCUMENTED: ICD-10-PCS | Mod: CPTII,S$GLB,, | Performed by: FAMILY MEDICINE

## 2022-12-13 PROCEDURE — 99999 PR PBB SHADOW E&M-EST. PATIENT-LVL IV: CPT | Mod: PBBFAC,,, | Performed by: FAMILY MEDICINE

## 2022-12-13 PROCEDURE — 1159F MED LIST DOCD IN RCRD: CPT | Mod: CPTII,S$GLB,, | Performed by: FAMILY MEDICINE

## 2022-12-13 PROCEDURE — 3008F PR BODY MASS INDEX (BMI) DOCUMENTED: ICD-10-PCS | Mod: CPTII,S$GLB,, | Performed by: FAMILY MEDICINE

## 2022-12-13 PROCEDURE — 3008F BODY MASS INDEX DOCD: CPT | Mod: CPTII,S$GLB,, | Performed by: FAMILY MEDICINE

## 2022-12-15 RX ORDER — LOSARTAN POTASSIUM 100 MG/1
100 TABLET ORAL DAILY
Qty: 90 TABLET | Refills: 3 | Status: SHIPPED | OUTPATIENT
Start: 2022-12-15 | End: 2023-10-19

## 2022-12-15 NOTE — PROGRESS NOTES
Subjective:   Patient ID: Caio Ontiveros is a 57 y.o. male.  Chief Complaint:  Follow-up    Presents for follow-up on chronic medical conditions     Last visit May 2022   A1c 5.2%   PSA 0.29     - Prediabetes  - Morbid obesity with BMI of 50.0-59.9, adult  Last visit provided patient education/discussion on prediabetes.  Offered choice of weekly exercise targets versus daily low-dose metformin versus injectable GLP-1.  A1c level was normal, so no medications were started   Unfortunately, due to increased arthritic pains, unable to achieve much success with weekly exercise targets     - Moderate episode of recurrent major depressive disorder  - Irritability and anger  - Psychophysiological insomnia  Referrals for ordered to Psychiatry and Psychology  Unfortunately no visit scheduled yet  However today reports stable, with no side effects, and mood and symptoms reasonably controlled on present medication .  On Effexor  mg daily and Abilify 10 mg daily    - Hypertension with heart failure and chronic atrial fibrillation and chronic anticoagulation  Borderline control with reported compliance   On amlodipine 5 mg daily, losartan 50 mg daily, and Toprol XL 50 mg twice a day, and Lasix 40 mg twice a day with 20 mEq potassium supplement twice a day  Also reports compliance with Eliquis 5 mg twice a day  Increased swelling, but no other symptoms    - Psoriasis   Followed by Dermatology.    Reports compliance with Taltz injection.    Medication has helped skin significantly   No real change with regards to possible psoriatic arthritis pain control    - Gout and probable rheumatoid arthritis the  Followed by Rheumatology  On allopurinol and Colcrys and gabapentin   Reports compliance.  Denies side effects.    No gout attacks, but overall arthritis symptoms not controlled               Review of Systems   Constitutional:  Negative for chills, fatigue and fever.   Eyes:  Negative for visual disturbance.   Respiratory:   "Negative for cough, chest tightness, shortness of breath and wheezing.    Cardiovascular:  Positive for leg swelling. Negative for chest pain and palpitations.   Gastrointestinal:  Negative for abdominal pain, constipation, diarrhea, nausea and vomiting.   Endocrine: Negative for polydipsia, polyphagia and polyuria.   Genitourinary:  Negative for difficulty urinating.   Musculoskeletal:  Positive for arthralgias. Negative for myalgias and neck pain.   Skin:  Negative for rash.   Neurological:  Negative for dizziness, syncope, weakness, light-headedness, numbness and headaches.   Psychiatric/Behavioral:  Negative for sleep disturbance.      Objective:   BP (!) 150/98 (BP Location: Left arm, Patient Position: Sitting, BP Method: Large (Manual))   Pulse (!) 119   Ht 6' 3" (1.905 m)   Wt (!) 197.1 kg (434 lb 8.4 oz)   SpO2 (!) 87%   BMI 54.31 kg/m²     Physical Exam  Vitals and nursing note reviewed.   Constitutional:       Appearance: Normal appearance. He is well-developed. He is morbidly obese.      Comments:   Blood pressure elevated   Cardiovascular:      Rate and Rhythm: Regular rhythm. Tachycardia present.   Pulmonary:      Effort: Pulmonary effort is normal.   Musculoskeletal:      Right lower leg: Edema present.      Left lower leg: Edema present.   Skin:     Findings: No rash.   Neurological:      Coordination: Coordination is intact.      Gait: Gait is intact.   Psychiatric:         Mood and Affect: Mood and affect normal.       Assessment:       ICD-10-CM ICD-9-CM   1. Prediabetes  R73.03 790.29   2. Essential hypertension  I10 401.9   3. Psoriatic arthritis  L40.50 696.0   4. Morbid obesity with BMI of 50.0-59.9, adult  E66.01 278.01    Z68.43 V85.43   5. Gait abnormality  R26.9 781.2     Plan:   Prediabetes  -     Hemoglobin A1C; Future; Expected date: 12/13/2022  Stable.  Asymptomatic.  Recheck A1c today   If less than 6.5%, okay to remain off medication   If greater than 6.5%, will need medication "     Essential hypertension  -     Renal Function Panel; Future; Expected date: 12/13/2022  Uncontrolled.  BP elevated.    Check renal function panel   If stable, increase losartan 100 mg daily   Continue amlodipine 5 mg daily   Continue Toprol-XL 50 mg twice a day  Can follow-up on blood pressure at upcoming cardiology appointment     Psoriatic arthritis  Morbid obesity with BMI of 50.0-59.9, adult  Gait abnormality  -     Ambulatory referral/consult to Physical/Occupational Therapy; Future; Expected date: 12/20/2022  Ordered referral to physical therapy  Will also send internal message to rheumatology to see if other autoimmune medications besides Taltz could possibly more effective for arthritic pain control    Return to clinic 3 months for annual physical exam or sooner if needed    50+ minutes of total time spent on the encounter, which includes face to face time and non-face to face time preparing to see the patient (eg, review of tests), Obtaining and/or reviewing separately obtained history, documenting clinical information in the electronic or other health record, independently interpreting results (not separately reported) and communicating results to the patient/family/caregiver, or Care coordination (not separately reported).

## 2022-12-19 ENCOUNTER — PATIENT MESSAGE (OUTPATIENT)
Dept: CARDIOLOGY | Facility: CLINIC | Age: 57
End: 2022-12-19
Payer: MEDICARE

## 2022-12-20 ENCOUNTER — TELEPHONE (OUTPATIENT)
Dept: RHEUMATOLOGY | Facility: CLINIC | Age: 57
End: 2022-12-20
Payer: MEDICARE

## 2022-12-20 ENCOUNTER — OFFICE VISIT (OUTPATIENT)
Dept: CARDIOLOGY | Facility: CLINIC | Age: 57
End: 2022-12-20
Payer: MEDICARE

## 2022-12-20 DIAGNOSIS — I50.32 CHRONIC DIASTOLIC CONGESTIVE HEART FAILURE: Primary | ICD-10-CM

## 2022-12-20 DIAGNOSIS — I50.812 CHRONIC RIGHT-SIDED HEART FAILURE: ICD-10-CM

## 2022-12-20 DIAGNOSIS — I48.20 CHRONIC ATRIAL FIBRILLATION: ICD-10-CM

## 2022-12-20 PROCEDURE — 1159F PR MEDICATION LIST DOCUMENTED IN MEDICAL RECORD: ICD-10-PCS | Mod: CPTII,95,, | Performed by: INTERNAL MEDICINE

## 2022-12-20 PROCEDURE — 1160F RVW MEDS BY RX/DR IN RCRD: CPT | Mod: CPTII,95,, | Performed by: INTERNAL MEDICINE

## 2022-12-20 PROCEDURE — 1159F MED LIST DOCD IN RCRD: CPT | Mod: CPTII,95,, | Performed by: INTERNAL MEDICINE

## 2022-12-20 PROCEDURE — 4010F ACE/ARB THERAPY RXD/TAKEN: CPT | Mod: CPTII,95,, | Performed by: INTERNAL MEDICINE

## 2022-12-20 PROCEDURE — 99214 OFFICE O/P EST MOD 30 MIN: CPT | Mod: 95,,, | Performed by: INTERNAL MEDICINE

## 2022-12-20 PROCEDURE — 1160F PR REVIEW ALL MEDS BY PRESCRIBER/CLIN PHARMACIST DOCUMENTED: ICD-10-PCS | Mod: CPTII,95,, | Performed by: INTERNAL MEDICINE

## 2022-12-20 PROCEDURE — 99214 PR OFFICE/OUTPT VISIT, EST, LEVL IV, 30-39 MIN: ICD-10-PCS | Mod: 95,,, | Performed by: INTERNAL MEDICINE

## 2022-12-20 PROCEDURE — 4010F PR ACE/ARB THEARPY RXD/TAKEN: ICD-10-PCS | Mod: CPTII,95,, | Performed by: INTERNAL MEDICINE

## 2022-12-20 PROCEDURE — 3044F PR MOST RECENT HEMOGLOBIN A1C LEVEL <7.0%: ICD-10-PCS | Mod: CPTII,95,, | Performed by: INTERNAL MEDICINE

## 2022-12-20 PROCEDURE — 3044F HG A1C LEVEL LT 7.0%: CPT | Mod: CPTII,95,, | Performed by: INTERNAL MEDICINE

## 2022-12-20 RX ORDER — TORSEMIDE 20 MG/1
20 TABLET ORAL 2 TIMES DAILY
Qty: 180 TABLET | Refills: 3 | Status: SHIPPED | OUTPATIENT
Start: 2022-12-20 | End: 2023-05-31 | Stop reason: SDUPTHER

## 2022-12-20 NOTE — PROGRESS NOTES
Subjective:   Patient ID:  Caio Ontiveros is a 57 y.o. male who presents for follow up of No chief complaint on file.      56 yo male, came in for 6 months f/u  PMH CHFpEF, chroic Afib for 7 yrs, obesity s/p gastric bypass in 2014. EVANGELIST on cpap for 5 yrs   ECHO in 2017 EF 55%  09/06/2020 went to ER at [facility] due to worsening dyspnea. BNP 1100, troponin 0.036, CXR mild edema, ekg showed afib. Had IV lasix  And good diuresis.    ChestCT w/o multiple nodule     visit states that sleeps on 2 pillows, + PND snores,  SOb, palpitation  No chest pain, dizziness  Did drink a lot of water  His wife cooks at home and f/u low sodium protocol   Lasix was d/c and Bumex 2mg tid added  Pharmacist only gave him 1 mg tid. Went to ER 2 days ago. EKG AFIB and HR 91 bpm. BNP and BMP no change  CXR no acute change. Had Lasix IV and d/c  Lost 6 pounds in 1 week     admitted for CHF exacerbation and had IV diuretics.  ECHo showed RV moderatelt dilated and RV function decreased. PAP 55 mmHG  Slight elevation of troponin to 0.036  BNPelevatde    01/04/2022 visit  BNP was 1200 in  and down to 83 on 12/16/2021. Then lasix 40 mg bid was d/c'ed by some reason.  Sleeps on 2 pillows. And NO PND  Edema elevated to the pelvic area.   Echo in  normal EF. Dilated RV chamber with mod RV failure and pulm HTN     visit  BP high. On Lasix 40 mg bid, no change of weight. GAYTAN stable. No chest pain palpitation and faint      visit  ekg AFIB and BP high  No dizziness faint chest pain dyspnea,     Interval history telemed  Taking Lasix 40 mg bid and remains SOB occasionally. No dizziness faint  Some leg swelling. Sleeps on 2 pillows. On CPAP  Gained 40 lbs in 1 yr                  Past Medical History:   Diagnosis Date    A-fib     CHF (congestive heart failure)     Gout, chronic     Hypertension     Sleep apnea        Past Surgical History:   Procedure Laterality Date    CHOLECYSTECTOMY      GASTRIC  BYPASS  2014       Social History     Tobacco Use    Smoking status: Never    Smokeless tobacco: Never   Substance Use Topics    Alcohol use: Yes     Comment: sometimes    Drug use: No       Family History   Problem Relation Age of Onset    Hypertension Mother     Stroke Father     Diabetes Father     Hypertension Father     Diabetes Sister     Diabetes Brother          ROS    Objective:   Physical Exam    Lab Results   Component Value Date    CHOL 186 02/16/2022    CHOL 203 (H) 06/11/2019    CHOL 178 06/28/2018     Lab Results   Component Value Date    HDL 49 02/16/2022    HDL 54 06/11/2019    HDL 46 06/28/2018     Lab Results   Component Value Date    LDLCALC 119.8 02/16/2022    LDLCALC 135.2 06/11/2019    LDLCALC 117.8 06/28/2018     Lab Results   Component Value Date    TRIG 86 02/16/2022    TRIG 69 06/11/2019    TRIG 71 06/28/2018     Lab Results   Component Value Date    CHOLHDL 26.3 02/16/2022    CHOLHDL 26.6 06/11/2019    CHOLHDL 25.8 06/28/2018       Chemistry        Component Value Date/Time     12/13/2022 1540    K 5.0 12/13/2022 1540     12/13/2022 1540    CO2 29 12/13/2022 1540    BUN 13 12/13/2022 1540    CREATININE 1.1 12/13/2022 1540     12/13/2022 1540        Component Value Date/Time    CALCIUM 9.5 12/13/2022 1540    ALKPHOS 54 (L) 11/20/2021 1023    AST 54 (H) 11/20/2021 1023    ALT 20 11/20/2021 1023    BILITOT 1.2 (H) 11/20/2021 1023    ESTGFRAFRICA >60.0 02/22/2022 1702    EGFRNONAA >60.0 02/22/2022 1702          Lab Results   Component Value Date    HGBA1C 5.4 12/13/2022     Lab Results   Component Value Date    TSH 2.954 02/16/2022     Lab Results   Component Value Date    INR 1.2 09/06/2020    INR 1.0 02/27/2010    INR 1.0 02/26/2010     Lab Results   Component Value Date    WBC 3.18 (L) 03/24/2022    HGB 14.5 03/24/2022    HCT 45.5 03/24/2022    MCV 91 03/24/2022     (L) 03/24/2022     BMP  Sodium   Date Value Ref Range Status   12/13/2022 142 136 - 145 mmol/L  Final     Potassium   Date Value Ref Range Status   12/13/2022 5.0 3.5 - 5.1 mmol/L Final     Chloride   Date Value Ref Range Status   12/13/2022 105 95 - 110 mmol/L Final     CO2   Date Value Ref Range Status   12/13/2022 29 23 - 29 mmol/L Final     BUN   Date Value Ref Range Status   12/13/2022 13 6 - 20 mg/dL Final     Creatinine   Date Value Ref Range Status   12/13/2022 1.1 0.5 - 1.4 mg/dL Final     Calcium   Date Value Ref Range Status   12/13/2022 9.5 8.7 - 10.5 mg/dL Final     Anion Gap   Date Value Ref Range Status   12/13/2022 8 8 - 16 mmol/L Final     eGFR if    Date Value Ref Range Status   02/22/2022 >60.0 >60 mL/min/1.73 m^2 Final     eGFR if non    Date Value Ref Range Status   02/22/2022 >60.0 >60 mL/min/1.73 m^2 Final     Comment:     Calculation used to obtain the estimated glomerular filtration  rate (eGFR) is the CKD-EPI equation.        BNP  @LABRCNTIP(BNP,BNPTRIAGEBLO)@  @LABRCNTIP(troponini)@  CrCl cannot be calculated (Patient's most recent lab result is older than the maximum 7 days allowed.).  No results found in the last 24 hours.  No results found in the last 24 hours.  No results found in the last 24 hours.    Assessment:      1. Chronic diastolic congestive heart failure    2. Chronic atrial fibrillation    3. Chronic right-sided heart failure        Plan:   Continue Losartan to 50 mg daily and torpolXL 50 mg bid for HTN and afib  Continue amlodipine eliquis 5 mg bid for HTN CHF afib    D/c lasix  Add torsmide 20 mg bid  BNP and BMP in 2 weeks  Daily weight. Please call the office if gain 3 pounds in 1 day or 5 pounds in 1 week.  Fluid restriction 1.5 liters a day  Na< 2 gm   Rtc in 3 months        The patient location is: home  The chief complaint leading to consultation is: chf    Visit type: audiovisual    Face to Face time with patient: 20 minutes of total time spent on the encounter, which includes face to face time and non-face to face time  preparing to see the patient (eg, review of tests), Obtaining and/or reviewing separately obtained history, Documenting clinical information in the electronic or other health record, Independently interpreting results (not separately reported) and communicating results to the patient/family/caregiver, or Care coordination (not separately reported).         Each patient to whom he or she provides medical services by telemedicine is:  (1) informed of the relationship between the physician and patient and the respective role of any other health care provider with respect to management of the patient; and (2) notified that he or she may decline to receive medical services by telemedicine and may withdraw from such care at any time.    Notes:

## 2022-12-20 NOTE — TELEPHONE ENCOUNTER
"Contacted patient to discuss this with him. Appointment scheduled with Dr. Raphael next Wednesday 12/28/2022 at 1:00 at our [place] location. Patient verbalized understanding. All questions answered.     Emily Solorio (Allye), [facility]  Rheumatology Department   "

## 2022-12-20 NOTE — TELEPHONE ENCOUNTER
----- Message from Emily Solorio CMA sent at 12/16/2022  4:06 PM CST -----    ----- Message -----  From: Constantine Raphael MD  Sent: 12/16/2022  10:47 AM CST  To: Dominick Allen MD, [facility] Staff    Appreciate the update Dr. Allen.  Recommend follow up evaluation in clinic with either myself or any or my colleagues.    ----- Message -----  From: Dominick Allen MD  Sent: 12/15/2022   7:27 AM CST  To: MD Gatito Masters,     The patient is on Taltz from his dermatologist for psoriasis   Working well from skin standpoint, but with significant arthritis complaints   Would a different injectable medication be more effective for potential psoriatic arthritis?    Because he is prediabetic, I need to get him moving around more and currently he is significantly limited by his pain    Thanks   Dominick

## 2022-12-28 ENCOUNTER — OFFICE VISIT (OUTPATIENT)
Dept: RHEUMATOLOGY | Facility: CLINIC | Age: 57
End: 2022-12-28
Payer: MEDICARE

## 2022-12-28 ENCOUNTER — HOSPITAL ENCOUNTER (OUTPATIENT)
Dept: RADIOLOGY | Facility: HOSPITAL | Age: 57
Discharge: HOME | End: 2022-12-28
Attending: INTERNAL MEDICINE
Payer: MEDICARE

## 2022-12-28 VITALS
RESPIRATION RATE: 17 BRPM | HEART RATE: 69 BPM | WEIGHT: 315 LBS | HEIGHT: 75 IN | BODY MASS INDEX: 39.17 KG/M2 | SYSTOLIC BLOOD PRESSURE: 152 MMHG | DIASTOLIC BLOOD PRESSURE: 106 MMHG

## 2022-12-28 DIAGNOSIS — G89.29 CHRONIC LOW BACK PAIN, UNSPECIFIED BACK PAIN LATERALITY, UNSPECIFIED WHETHER SCIATICA PRESENT: ICD-10-CM

## 2022-12-28 DIAGNOSIS — M1A.00X0 IDIOPATHIC CHRONIC GOUT WITHOUT TOPHUS, UNSPECIFIED SITE: Primary | ICD-10-CM

## 2022-12-28 DIAGNOSIS — L40.9 PSORIASIS: ICD-10-CM

## 2022-12-28 DIAGNOSIS — M54.50 CHRONIC LOW BACK PAIN, UNSPECIFIED BACK PAIN LATERALITY, UNSPECIFIED WHETHER SCIATICA PRESENT: ICD-10-CM

## 2022-12-28 DIAGNOSIS — Z51.81 MEDICATION MONITORING ENCOUNTER: ICD-10-CM

## 2022-12-28 DIAGNOSIS — M1A.00X0 IDIOPATHIC CHRONIC GOUT WITHOUT TOPHUS, UNSPECIFIED SITE: ICD-10-CM

## 2022-12-28 PROCEDURE — 1159F PR MEDICATION LIST DOCUMENTED IN MEDICAL RECORD: ICD-10-PCS | Mod: CPTII,S$GLB,, | Performed by: INTERNAL MEDICINE

## 2022-12-28 PROCEDURE — 3080F PR MOST RECENT DIASTOLIC BLOOD PRESSURE >= 90 MM HG: ICD-10-PCS | Mod: CPTII,S$GLB,, | Performed by: INTERNAL MEDICINE

## 2022-12-28 PROCEDURE — 99214 PR OFFICE/OUTPT VISIT, EST, LEVL IV, 30-39 MIN: ICD-10-PCS | Mod: S$GLB,,, | Performed by: INTERNAL MEDICINE

## 2022-12-28 PROCEDURE — 3008F PR BODY MASS INDEX (BMI) DOCUMENTED: ICD-10-PCS | Mod: CPTII,S$GLB,, | Performed by: INTERNAL MEDICINE

## 2022-12-28 PROCEDURE — 4010F PR ACE/ARB THEARPY RXD/TAKEN: ICD-10-PCS | Mod: CPTII,S$GLB,, | Performed by: INTERNAL MEDICINE

## 2022-12-28 PROCEDURE — 99999 PR PBB SHADOW E&M-EST. PATIENT-LVL V: ICD-10-PCS | Mod: PBBFAC,,, | Performed by: INTERNAL MEDICINE

## 2022-12-28 PROCEDURE — 3080F DIAST BP >= 90 MM HG: CPT | Mod: CPTII,S$GLB,, | Performed by: INTERNAL MEDICINE

## 2022-12-28 PROCEDURE — 3077F PR MOST RECENT SYSTOLIC BLOOD PRESSURE >= 140 MM HG: ICD-10-PCS | Mod: CPTII,S$GLB,, | Performed by: INTERNAL MEDICINE

## 2022-12-28 PROCEDURE — 73130 X-RAY EXAM OF HAND: CPT | Mod: TC,50

## 2022-12-28 PROCEDURE — 99214 OFFICE O/P EST MOD 30 MIN: CPT | Mod: S$GLB,,, | Performed by: INTERNAL MEDICINE

## 2022-12-28 PROCEDURE — 73130 XR HAND COMPLETE 3 VIEWS BILATERAL: ICD-10-PCS | Mod: 26,50,, | Performed by: STUDENT IN AN ORGANIZED HEALTH CARE EDUCATION/TRAINING PROGRAM

## 2022-12-28 PROCEDURE — 73130 X-RAY EXAM OF HAND: CPT | Mod: 26,50,, | Performed by: STUDENT IN AN ORGANIZED HEALTH CARE EDUCATION/TRAINING PROGRAM

## 2022-12-28 PROCEDURE — 72100 X-RAY EXAM L-S SPINE 2/3 VWS: CPT | Mod: 26,,, | Performed by: RADIOLOGY

## 2022-12-28 PROCEDURE — 99999 PR PBB SHADOW E&M-EST. PATIENT-LVL V: CPT | Mod: PBBFAC,,, | Performed by: INTERNAL MEDICINE

## 2022-12-28 PROCEDURE — 1159F MED LIST DOCD IN RCRD: CPT | Mod: CPTII,S$GLB,, | Performed by: INTERNAL MEDICINE

## 2022-12-28 PROCEDURE — 3044F PR MOST RECENT HEMOGLOBIN A1C LEVEL <7.0%: ICD-10-PCS | Mod: CPTII,S$GLB,, | Performed by: INTERNAL MEDICINE

## 2022-12-28 PROCEDURE — 4010F ACE/ARB THERAPY RXD/TAKEN: CPT | Mod: CPTII,S$GLB,, | Performed by: INTERNAL MEDICINE

## 2022-12-28 PROCEDURE — 3077F SYST BP >= 140 MM HG: CPT | Mod: CPTII,S$GLB,, | Performed by: INTERNAL MEDICINE

## 2022-12-28 PROCEDURE — 72100 XR LUMBAR SPINE AP AND LATERAL: ICD-10-PCS | Mod: 26,,, | Performed by: RADIOLOGY

## 2022-12-28 PROCEDURE — 3008F BODY MASS INDEX DOCD: CPT | Mod: CPTII,S$GLB,, | Performed by: INTERNAL MEDICINE

## 2022-12-28 PROCEDURE — 3044F HG A1C LEVEL LT 7.0%: CPT | Mod: CPTII,S$GLB,, | Performed by: INTERNAL MEDICINE

## 2022-12-28 PROCEDURE — 72100 X-RAY EXAM L-S SPINE 2/3 VWS: CPT | Mod: TC

## 2022-12-28 RX ORDER — COLCHICINE 0.6 MG/1
0.6 TABLET ORAL DAILY
Qty: 60 TABLET | Refills: 1 | Status: SHIPPED | OUTPATIENT
Start: 2022-12-28 | End: 2023-01-06 | Stop reason: SDUPTHER

## 2022-12-28 RX ORDER — PREDNISONE 5 MG/1
TABLET ORAL
Qty: 30 TABLET | Refills: 0 | Status: SHIPPED | OUTPATIENT
Start: 2022-12-28 | End: 2023-01-09

## 2022-12-28 RX ORDER — ALLOPURINOL 300 MG/1
300 TABLET ORAL DAILY
Qty: 90 TABLET | Refills: 1 | Status: SHIPPED | OUTPATIENT
Start: 2022-12-28 | End: 2022-12-28

## 2022-12-28 RX ORDER — ALLOPURINOL 300 MG/1
300 TABLET ORAL DAILY
Qty: 90 TABLET | Refills: 1 | Status: SHIPPED | OUTPATIENT
Start: 2022-12-28 | End: 2024-02-02 | Stop reason: SDUPTHER

## 2022-12-28 NOTE — PROGRESS NOTES
RHEUMATOLOGY OUTPATIENT CLINIC NOTE    12/28/2022    Attending Rheumatologist: Constantine Raphael  Primary Care Provider/Physician Requesting Consultation: Dominick Allen MD   Chief Complaint/Reason For Consultation:  Gout (Patient has pain in back, left hip and mainly right hand.  Pain scale 8/10)      Subjective:     Caio Ontiveros is a 57 y.o. Black or  male with gout and PsA for f/u visit.    Main concern of refractory hand pain with inflammatory features and back pain w/o alarm s/s. Interval self-discontinuation of allopurinol due to refractory gout while on medication.  Last gout flare approximately 2 months ago manifesting as left podagra and rt elbow swelling.    Review of Systems   Constitutional:  Negative for fever.   Gastrointestinal:  Negative for abdominal pain, blood in stool, diarrhea and melena.        No hx of diverticulosis   Genitourinary:  Negative for hematuria.        No hx of nephrolithiasis   Musculoskeletal:  Positive for joint pain.   Skin:  Negative for rash (No PsO since on Taltz per Dermatology).     Chronic comorbid conditions affecting medical decision making today:  Past Medical History:   Diagnosis Date    A-fib     CHF (congestive heart failure)     Gout, chronic     Hypertension     Sleep apnea      Past Surgical History:   Procedure Laterality Date    CHOLECYSTECTOMY      GASTRIC BYPASS  2014     Family History   Problem Relation Age of Onset    Hypertension Mother     Stroke Father     Diabetes Father     Hypertension Father     Diabetes Sister     Diabetes Brother      Social History     Tobacco Use   Smoking Status Never   Smokeless Tobacco Never       Current Outpatient Medications:     allopurinoL (ZYLOPRIM) 300 MG tablet, Take 1 tablet (300 mg total) by mouth once daily., Disp: 90 tablet, Rfl: 1    apixaban (ELIQUIS) 5 mg Tab, Take 1 tablet (5 mg total) by mouth 2 (two) times daily., Disp: 180 tablet, Rfl: 3    ARIPiprazole (ABILIFY) 10 MG Tab, Take 1 tablet  (10 mg total) by mouth once daily., Disp: 90 tablet, Rfl: 3    colchicine (COLCRYS) 0.6 mg tablet, TAKE 1 TABLET BY MOUTH ONCE DAILY, Disp: 60 tablet, Rfl: 1    gabapentin (NEURONTIN) 300 MG capsule, Take 1 capsule (300 mg total) by mouth once daily., Disp: 30 capsule, Rfl: 11    losartan (COZAAR) 100 MG tablet, Take 1 tablet (100 mg total) by mouth once daily., Disp: 90 tablet, Rfl: 3    metoprolol succinate (TOPROL-XL) 50 MG 24 hr tablet, Take 1 tablet (50 mg total) by mouth 2 (two) times daily., Disp: 180 tablet, Rfl: 3    potassium chloride SA (K-DUR,KLOR-CON) 20 MEQ tablet, TAKE 1 TABLET TWICE DAILY, Disp: 180 tablet, Rfl: 2    TALTZ AUTOINJECTOR, 2 PACK, 80 mg/mL AtIn, INJECT 80MG (1 PEN) UNDER THE SKIN EVERY 4 WEEKS, Disp: 3 mL, Rfl: 0    torsemide (DEMADEX) 20 MG Tab, Take 1 tablet (20 mg total) by mouth 2 (two) times a day., Disp: 180 tablet, Rfl: 3    venlafaxine (EFFEXOR-XR) 150 MG Cp24, TAKE 1 CAPSULE BY MOUTH  ONCE DAILY, Disp: 90 capsule, Rfl: 3    venlafaxine (EFFEXOR-XR) 75 MG 24 hr capsule, TAKE 1 CAPSULE BY MOUTH  ONCE DAILY, Disp: 90 capsule, Rfl: 3     Objective:     Vitals:    12/28/22 1309   BP: (!) 152/106   Pulse: 69   Resp: 17     Physical Exam   Constitutional: He appears obese.   Eyes: Conjunctivae are normal.   Pulmonary/Chest: Effort normal. No respiratory distress.   Musculoskeletal:         General: Swelling and tenderness present. Normal range of motion.   Neurological: He displays no weakness.   Skin: No rash noted.     Reviewed available old and all outside pertinent medical records available.    All lab results personally reviewed and interpreted by me.       ASSESSMENT:     PsO    Gout    High risk medication use    OA    Back Pain    PLAN:     Uncontrolled gout from medication in adherence.  Active flare Right hand.  Gout clinical history and allopurinol side effects discussed in detail.  PDN taper to discontinue.  Continue colchicine for prophylaxis.  Resume allopurinol (150mg 2  weeks-> 300mg daily) after completing PDN with close monitoring of kidney function.  Repeat CMP and UrA in 3 months.  Gaol UrA <6mg/dL.  Interval diagnosis of PsO which appears to be well controlled with Taltz per dermatology.  Repeat XR to monitor for erosive changes.  Referral to pain medicine for refractory mechanical back pain.  May increase gabapentin to 300mg BID if kidney function remains stable.    Disclaimer: This note is prepared using voice recognition software and as such is likely to have errors and has not been proof read. Please contact me for questions.         Constantine Raphael M.D.

## 2022-12-29 ENCOUNTER — TELEPHONE (OUTPATIENT)
Dept: PAIN MEDICINE | Facility: CLINIC | Age: 57
End: 2022-12-29
Payer: MEDICARE

## 2023-01-06 ENCOUNTER — PATIENT MESSAGE (OUTPATIENT)
Dept: ADMINISTRATIVE | Facility: HOSPITAL | Age: 58
End: 2023-01-06
Payer: MEDICARE

## 2023-01-06 DIAGNOSIS — Z12.11 SCREENING FOR COLON CANCER: ICD-10-CM

## 2023-01-06 DIAGNOSIS — M1A.00X0 IDIOPATHIC CHRONIC GOUT WITHOUT TOPHUS, UNSPECIFIED SITE: ICD-10-CM

## 2023-01-06 RX ORDER — COLCHICINE 0.6 MG/1
0.6 TABLET ORAL DAILY
Qty: 90 TABLET | Refills: 3 | Status: SHIPPED | OUTPATIENT
Start: 2023-01-06 | End: 2023-01-10 | Stop reason: SDUPTHER

## 2023-01-09 ENCOUNTER — PATIENT MESSAGE (OUTPATIENT)
Dept: DERMATOLOGY | Facility: CLINIC | Age: 58
End: 2023-01-09
Payer: MEDICARE

## 2023-01-10 DIAGNOSIS — L40.0 PLAQUE PSORIASIS: ICD-10-CM

## 2023-01-10 DIAGNOSIS — M1A.00X0 IDIOPATHIC CHRONIC GOUT WITHOUT TOPHUS, UNSPECIFIED SITE: ICD-10-CM

## 2023-01-10 RX ORDER — COLCHICINE 0.6 MG/1
0.6 TABLET ORAL DAILY
Qty: 90 TABLET | Refills: 3 | Status: SHIPPED | OUTPATIENT
Start: 2023-01-10 | End: 2024-01-19

## 2023-01-10 RX ORDER — IXEKIZUMAB 80 MG/ML
INJECTION, SOLUTION SUBCUTANEOUS
Qty: 3 ML | Refills: 0 | OUTPATIENT
Start: 2023-01-10

## 2023-01-12 ENCOUNTER — TELEPHONE (OUTPATIENT)
Dept: PULMONOLOGY | Facility: CLINIC | Age: 58
End: 2023-01-12
Payer: MEDICARE

## 2023-01-12 NOTE — TELEPHONE ENCOUNTER
Spoke to Denise from [facility] needs notes stating pt is using and benefiting from NIV and also failed CPAP/Bipap

## 2023-01-12 NOTE — TELEPHONE ENCOUNTER
----- Message from Stevan Casey sent at 1/12/2023 11:09 AM CST -----  Contact: [phone]  Denise calling from [facility] called in regards to home medical equipment. She stated they are needing additional information to get the patient set up. Please call back at [phone]. Thanks TRINIDAD

## 2023-01-17 ENCOUNTER — PATIENT MESSAGE (OUTPATIENT)
Dept: DERMATOLOGY | Facility: CLINIC | Age: 58
End: 2023-01-17
Payer: MEDICARE

## 2023-01-17 DIAGNOSIS — L40.0 PLAQUE PSORIASIS: ICD-10-CM

## 2023-01-17 RX ORDER — IXEKIZUMAB 80 MG/ML
INJECTION, SOLUTION SUBCUTANEOUS
Qty: 3 ML | Refills: 0 | Status: SHIPPED | OUTPATIENT
Start: 2023-01-17 | End: 2023-06-23 | Stop reason: SDUPTHER

## 2023-01-17 RX ORDER — IXEKIZUMAB 80 MG/ML
INJECTION, SOLUTION SUBCUTANEOUS
Qty: 3 ML | Refills: 0 | Status: CANCELLED | OUTPATIENT
Start: 2023-01-17

## 2023-02-09 ENCOUNTER — PATIENT MESSAGE (OUTPATIENT)
Dept: PULMONOLOGY | Facility: CLINIC | Age: 58
End: 2023-02-09
Payer: MEDICARE

## 2023-03-01 ENCOUNTER — PES CALL (OUTPATIENT)
Dept: ADMINISTRATIVE | Facility: CLINIC | Age: 58
End: 2023-03-01
Payer: MEDICARE

## 2023-03-12 ENCOUNTER — PATIENT MESSAGE (OUTPATIENT)
Dept: INTERNAL MEDICINE | Facility: CLINIC | Age: 58
End: 2023-03-12
Payer: MEDICARE

## 2023-03-22 ENCOUNTER — TELEPHONE (OUTPATIENT)
Dept: PULMONOLOGY | Facility: CLINIC | Age: 58
End: 2023-03-22
Payer: MEDICARE

## 2023-03-22 NOTE — TELEPHONE ENCOUNTER
Spoke to [employer] about pt needing progress notes for pt pt needing to have notes informed her appt is Friday

## 2023-03-22 NOTE — TELEPHONE ENCOUNTER
----- Message from Lauren Maciel sent at 3/22/2023  1:19 PM CDT -----  Contact: Massiel/ Med [employer]  Massiel with [employer] is calling to speak with the nurse regarding information. Reports  needing current chart notes for the patient. Please give Massiel a call back at .[phone] or fax to [phone]

## 2023-03-24 ENCOUNTER — OFFICE VISIT (OUTPATIENT)
Dept: PULMONOLOGY | Facility: CLINIC | Age: 58
End: 2023-03-24
Payer: MEDICARE

## 2023-03-24 DIAGNOSIS — I48.20 CHRONIC ATRIAL FIBRILLATION: ICD-10-CM

## 2023-03-24 DIAGNOSIS — G47.33 OSA (OBSTRUCTIVE SLEEP APNEA): ICD-10-CM

## 2023-03-24 DIAGNOSIS — E66.2 OBESITY HYPOVENTILATION SYNDROME: ICD-10-CM

## 2023-03-24 DIAGNOSIS — J98.4 RESTRICTIVE LUNG DISEASE: Primary | ICD-10-CM

## 2023-03-24 DIAGNOSIS — I50.42 CHRONIC COMBINED SYSTOLIC AND DIASTOLIC HRT FAIL: ICD-10-CM

## 2023-03-24 DIAGNOSIS — D70.8 CHRONIC BENIGN NEUTROPENIA: ICD-10-CM

## 2023-03-24 DIAGNOSIS — J96.11 CHRONIC HYPOXEMIC RESPIRATORY FAILURE: ICD-10-CM

## 2023-03-24 PROCEDURE — 99214 OFFICE O/P EST MOD 30 MIN: CPT | Mod: 95,,, | Performed by: INTERNAL MEDICINE

## 2023-03-24 PROCEDURE — 99499 UNLISTED E&M SERVICE: CPT | Mod: 95,,, | Performed by: INTERNAL MEDICINE

## 2023-03-24 PROCEDURE — 99214 PR OFFICE/OUTPT VISIT, EST, LEVL IV, 30-39 MIN: ICD-10-PCS | Mod: 95,,, | Performed by: INTERNAL MEDICINE

## 2023-03-24 PROCEDURE — 4010F ACE/ARB THERAPY RXD/TAKEN: CPT | Mod: CPTII,95,, | Performed by: INTERNAL MEDICINE

## 2023-03-24 PROCEDURE — 4010F PR ACE/ARB THEARPY RXD/TAKEN: ICD-10-PCS | Mod: CPTII,95,, | Performed by: INTERNAL MEDICINE

## 2023-03-24 PROCEDURE — 99499 RISK ADDL DX/OHS AUDIT: ICD-10-PCS | Mod: 95,,, | Performed by: INTERNAL MEDICINE

## 2023-03-24 NOTE — PROGRESS NOTES
Pulmonary Outpatient Follow Up Visit     Subjective:       Patient ID: Caio Ontiveros is a 57 y.o. male.    Chief Complaint: No chief complaint on file.        HPI      58 yo M patient presenting for 1 year hospital follow-up for Congestive Heart failure and chornic hypoxic respiratory failure on nocturnal ventilation.  9/21/2022  no hospital admission over the last year.  SOB on exertion.  Compliant with trilogy plus O2 during sleep.  Obtained a replacement but he is in touch with  [employer] regarding a power cord for his machine and supplies.    Weight gain 30 lb compared to December 2021   Home ventilator use monitored through durable medical equipment company.  Patient is benefiting from use of nocturnal mechanical ventilation and reports no problems.  [employer]  [address] [place], [address] [phone]  Fax [phone]      Admitted to the hospital ICU November 2021 for acute on chronic hypoxemic and hypercapnic respiratory failure.  He is known  with history of CHF, A fib, pulmonary hypertension,   He was discharged on Lasix 40 mg daily, O2 during sleep with trilogy provided by [employer].  Compliant with trilogy  Never smoker.  Disabled currently.    Review of Systems   Constitutional:  Positive for weight gain, activity change, appetite change, fatigue and weakness. Negative for fever and chills.        30 lb weight gain   HENT:  Negative for nosebleeds.    Eyes:  Negative for redness.   Respiratory:  Positive for cough, shortness of breath, previous hospitalization due to pulmonary problems, dyspnea on extertion and somnolence. Negative for choking.    Cardiovascular:  Positive for leg swelling. Negative for chest pain.   Genitourinary:  Negative for hematuria.   Endocrine:  Negative for cold intolerance.    Musculoskeletal:  Positive for arthralgias, back pain and gait problem.   Skin:  Negative for rash.   Gastrointestinal:   Negative for vomiting.   Neurological:  Negative for syncope.   Hematological:  Negative for adenopathy.   Psychiatric/Behavioral:  Positive for sleep disturbance. Negative for confusion.      Outpatient Encounter Medications as of 3/24/2023   Medication Sig Dispense Refill    allopurinoL (ZYLOPRIM) 300 MG tablet Take 1 tablet (300 mg total) by mouth once daily. 90 tablet 1    apixaban (ELIQUIS) 5 mg Tab Take 1 tablet (5 mg total) by mouth 2 (two) times daily. 180 tablet 3    ARIPiprazole (ABILIFY) 10 MG Tab Take 1 tablet (10 mg total) by mouth once daily. 90 tablet 3    colchicine (COLCRYS) 0.6 mg tablet Take 1 tablet (0.6 mg total) by mouth once daily. 90 tablet 3    gabapentin (NEURONTIN) 300 MG capsule TAKE 1 CAPSULE BY MOUTH  ONCE DAILY 30 capsule 11    losartan (COZAAR) 100 MG tablet Take 1 tablet (100 mg total) by mouth once daily. 90 tablet 3    metoprolol succinate (TOPROL-XL) 50 MG 24 hr tablet Take 1 tablet (50 mg total) by mouth once daily. 30 tablet 11    potassium chloride SA (K-DUR,KLOR-CON) 20 MEQ tablet Take 1 tablet (20 mEq total) by mouth 2 (two) times daily. 180 tablet 2    TALTZ AUTOINJECTOR, 2 PACK, 80 mg/mL AtIn INJECT 80MG (1 PEN) UNDER THE SKIN EVERY 4 WEEKS 3 mL 0    torsemide (DEMADEX) 20 MG Tab Take 1 tablet (20 mg total) by mouth 2 (two) times a day. 180 tablet 3    venlafaxine (EFFEXOR-XR) 150 MG Cp24 TAKE 1 CAPSULE BY MOUTH  ONCE DAILY 90 capsule 3    venlafaxine (EFFEXOR-XR) 75 MG 24 hr capsule TAKE 1 CAPSULE BY MOUTH  ONCE DAILY 90 capsule 3     No facility-administered encounter medications on file as of 3/24/2023.       Objective:     Vital Signs (Most Recent)   ]  Wt Readings from Last 2 Encounters:   12/28/22 (!) 194.2 kg (428 lb 2.1 oz)   12/13/22 (!) 197.1 kg (434 lb 8.4 oz)       Physical Exam   Constitutional: He is oriented to person, place, and time. He appears well-developed and well-nourished. No distress. He is obese.   HENT:   Head: Normocephalic.   Pulmonary/Chest: No  stridor. No respiratory distress.   Neurological: He is alert and oriented to person, place, and time.   Skin: No cyanosis.   Psychiatric: He has a normal mood and affect. His behavior is normal. Judgment and thought content normal.   Nursing note and vitals reviewed.    Laboratory  Lab Results   Component Value Date    WBC 3.18 (L) 03/24/2022    RBC 4.99 03/24/2022    HGB 14.5 03/24/2022    HCT 45.5 03/24/2022    MCV 91 03/24/2022    MCH 29.1 03/24/2022    MCHC 31.9 (L) 03/24/2022    RDW 17.6 (H) 03/24/2022     (L) 03/24/2022    MPV 9.9 03/24/2022    GRAN 1.5 (L) 03/24/2022    GRAN 48.5 03/24/2022    LYMPH 0.9 (L) 03/24/2022    LYMPH 29.2 03/24/2022    MONO 0.6 03/24/2022    MONO 17.9 (H) 03/24/2022    EOS 0.1 03/24/2022    BASO 0.02 03/24/2022    EOSINOPHIL 3.5 03/24/2022    BASOPHIL 0.6 03/24/2022       BMP  Lab Results   Component Value Date     12/28/2022    K 4.5 12/28/2022     12/28/2022    CO2 28 12/28/2022    BUN 18 12/28/2022    CREATININE 1.2 12/28/2022    CALCIUM 9.7 12/28/2022    ANIONGAP 13 12/28/2022    ESTGFRAFRICA >60.0 02/22/2022    EGFRNONAA >60.0 02/22/2022    AST 21 12/28/2022    ALT 10 12/28/2022    PROT 8.5 (H) 12/28/2022       Lab Results   Component Value Date     (H) 02/22/2022     (H) 01/04/2022    BNP 38 12/16/2021     (H) 11/22/2021    BNP 2,242 (H) 11/20/2021     (H) 10/26/2021       Lab Results   Component Value Date    TSH 2.954 02/16/2022       Lab Results   Component Value Date    SEDRATE 4 02/20/2020       Lab Results   Component Value Date    CRP 60.5 (H) 11/22/2021     No results found for: IGE     No results found for: ASPERGILLUS  No results found for: AFUMIGATUSCL     Lab Results   Component Value Date    ACE 23 11/22/2021       Latest Reference Range & Units 11/20/21 18:06 02/23/22 11:54   Sample  ARTERIAL ARTERIAL   POC PH 7.35 - 7.45  7.320 (L) 7.370   POC PCO2 35 - 45 mmHg 77.8 (HH) 50.7 (H)   POC PO2 80 - 100 mmHg 59 (LL) 68  (L)   POC HCO3 24 - 28 mmol/L 40.1 (H) 29.3 (H)   POC SATURATED O2 95 - 100 % 86 (L) 92 (L)   Allens Test  Pass Pass   POC BE -2 to 2 mmol/L 14 4   FiO2  40 0.21   Vt  550    DelSys  CPAP/BiPAP Room Air   Site  RR LR   Mode  AVAPS SPONT   Rate  20    (HH): Data is critically high  (LL): Data is critically low  (L): Data is abnormally low  (H): Data is abnormally high  EKG 11/20/2021    Atrial fibrillation   Incomplete right bundle branch block   Possible Anteroseptal infarct ,age undetermined   Abnormal ECG       Diagnostic Results:  I have personally reviewed today the following studies:    2D echo 11/21/2021  The left ventricle is mildly enlarged with moderate concentric hypertrophy and normal systolic function.  The estimated ejection fraction is 60%.  Normal left ventricular diastolic function.  Severe right ventricular enlargement with moderately reduced right ventricular systolic function.  Moderate left atrial enlargement.  Moderate right atrial enlargement.  Moderate tricuspid regurgitation.  There is abnormal septal wall motion. There is systolic flattening of the interventricular septum consistent with right ventricle pressure overload.            PFT 2/2022      Spirometry data is acceptable and reproducibleModerate restriction based on reduced Forced Vital Capacity (FVC). Consider lung volumes if clinically indicated.Mild restriction vital capacity. (VC< LLN and > or equal to 70% of predicted).Overall there is   moderate ventilatory impairment. (FEV1 > or equal to 60% of predicted and < or equal to 69% predicted ).Flow volume loops demonstrate a restrictive defect.Maximum voluntary ventilation is moderately reduced. (MVV% predicted is 51% to 65% of   predicted)Mild reduction in diffusion capacity -unadjusted for hemoglobin (DLCO > or equal to 60% predicted and < LLN) . This interpretation of diffusing capacity does not take into account the patient's hemoglobin level (Unavailable at the time of   testing  - hemoglobin assumed to be normal).       SIX MIN2/2022      Severe capacity reduction , needed O2 2 LPM         PFT September 2020     Spirometry reveals normal airflow. Lung volumes reveal severe restriction. Single breath diffusion capacity is moderately reduced.Â  This interpretation of diffusing capacity is adjusted for patient's hemoglobin level    Chest x-ray November 2021  FINDINGS:  Stable cardiomegaly.     Mild pulmonary vascular congestion.  This appears improved.  No pulmonary edema.  No effusion.     Right IJ catheter unchanged.     Impression:     Interval improvement       CT chest September 2020      EXAMINATION:  CT CHEST WITHOUT CONTRAST     CLINICAL HISTORY:  Shortness of breath; Dyspnea, unspecified     TECHNIQUE:  Low dose axial images, sagittal and coronal reformations were obtained from the thoracic inlet to the lung bases. Contrast was not administered.     COMPARISON:  Chest x-ray 09/15/2020.     FINDINGS:  Limited imaging through the upper abdomen demonstrates cholecystectomy changes.  Hypertrophy of the caudate is seen, nonspecific but could be seen with cirrhosis.  Postsurgical changes of the stomach.  Possible tiny adenoma left adrenal gland image 105 series 2.     Heart size is slightly increased.  No anne cardiac decompensation no pericardial effusion.  Trachea and mainstem bronchi remain patent.  Thyroid gland appears normal.  There is a superior right mediastinal lymph node image 15 of series 2 which measures 10 mm in short axis, borderline enlarged additional 10 mm short axis lymph node in the level 4 station, image 8 series 2.  These are favored reactive although attention on follow-up could be considered.  Multiple additional small scattered shotty appearing lymph nodes in the mediastinum.     Mild respiratory motion artifact.  Probable juxta fissural lymph node image 203 of series 4 right lung.  There is a 3 mm nodular opacity right lung image 151 of series 4.  There is a  juxtapleural nodule measuring 3 mm left lung image 210 series 4.  A few other scattered tiny pulmonary nodules noted as well.  No pneumothorax or pleural effusion or pulmonic infiltrate.  Main pulmonary artery is dilated measuring 4.8 cm, greater than the adjacent aorta.  Recommend correlation for pulmonary hypertension.  Habitus limits detail.     Review of bone windows demonstrate the osseous structures to be intact.     Impression:     Bilateral small scattered pulmonary nodules all measuring less than 6 mm.  Would recommend follow-up as per Fleischner society guidelines.  Borderline enlarged right intrathoracic lymph nodes, favored reactive in etiology although.  Stable cardiomegaly and other incidental findings as noted above.                PSG 10/2020    The diagnostic polysomnography revealed a severe obstructive sleep apnea / hypopnea syndrome (A + H Index = 104.6 events / hr asleep with 17.4 - 0.0 respiratory event - arousals / hr asleep, and no RERAs (respiratory effort - related arousals) for the study. The mean SpO2 value was 75.3 %, severe, minimum oxygen saturation during sleep was 74.0 %, and waking baseline SpO2 was 96.0 %. 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  Has portable O2 concentrator full face mask.  Repeat ABG.  Check PFT and 6 minute walking test.    ABG in February shows significant improvement in patient hypercapnia CO2 decreased from 77 in November 2021 to 50 mm Hg February 2022.      PaO2 has significantly improved also from 59 mm Hg to 68 mm Hg indicating an improvement in O2 AA gradient.      Findings in favor of patient favorable response and compliance with trilogy  treatment.    MA contacting [facility]  for compliance report.      Patient to continue use his machine with full face mask.      Beta-blocker and apixaban for rate control and anticoagulation for AFib.    Losartan beta-blocker Lasix.  Following with Dr. Parrish cardiology    Heart healthy diet  Limit fluid intake 50-60 oz   Daily weights and to notify clinic if weight increases by more than 3 lbs in 1 day or 5 lbs in 1 week.     No follow-ups on file.    This note was prepared using voice recognition system and is likely to have sound alike errors that may have been overlooked even after proof reading.  Please call me with any questions    Discussed diagnosis, its evaluation, treatment and usual course. All questions answered.      Erick Bateman MD

## 2023-04-11 ENCOUNTER — PATIENT MESSAGE (OUTPATIENT)
Dept: INTERNAL MEDICINE | Facility: CLINIC | Age: 58
End: 2023-04-11
Payer: MEDICARE

## 2023-04-19 ENCOUNTER — PATIENT MESSAGE (OUTPATIENT)
Dept: ADMINISTRATIVE | Facility: HOSPITAL | Age: 58
End: 2023-04-19
Payer: MEDICARE

## 2023-04-19 DIAGNOSIS — Z12.11 SCREENING FOR COLON CANCER: Primary | ICD-10-CM

## 2023-04-24 ENCOUNTER — PATIENT OUTREACH (OUTPATIENT)
Dept: ADMINISTRATIVE | Facility: HOSPITAL | Age: 58
End: 2023-04-24
Payer: MEDICARE

## 2023-05-05 ENCOUNTER — PATIENT MESSAGE (OUTPATIENT)
Dept: DERMATOLOGY | Facility: CLINIC | Age: 58
End: 2023-05-05
Payer: MEDICARE

## 2023-05-09 ENCOUNTER — PATIENT MESSAGE (OUTPATIENT)
Dept: ADMINISTRATIVE | Facility: HOSPITAL | Age: 58
End: 2023-05-09
Payer: MEDICARE

## 2023-05-16 ENCOUNTER — LAB VISIT (OUTPATIENT)
Dept: LAB | Facility: HOSPITAL | Age: 58
End: 2023-05-16
Attending: FAMILY MEDICINE
Payer: MEDICARE

## 2023-05-16 ENCOUNTER — TELEPHONE (OUTPATIENT)
Dept: PAIN MEDICINE | Facility: CLINIC | Age: 58
End: 2023-05-16
Payer: MEDICARE

## 2023-05-16 ENCOUNTER — OFFICE VISIT (OUTPATIENT)
Dept: INTERNAL MEDICINE | Facility: CLINIC | Age: 58
End: 2023-05-16
Payer: MEDICARE

## 2023-05-16 VITALS
SYSTOLIC BLOOD PRESSURE: 134 MMHG | DIASTOLIC BLOOD PRESSURE: 86 MMHG | TEMPERATURE: 98 F | HEIGHT: 75 IN | WEIGHT: 315 LBS | HEART RATE: 84 BPM | OXYGEN SATURATION: 94 % | BODY MASS INDEX: 39.17 KG/M2

## 2023-05-16 DIAGNOSIS — I27.20 PULMONARY HYPERTENSION: ICD-10-CM

## 2023-05-16 DIAGNOSIS — M47.817 FACET ARTHRITIS OF LUMBOSACRAL REGION: ICD-10-CM

## 2023-05-16 DIAGNOSIS — E66.01 MORBID OBESITY WITH BMI OF 50.0-59.9, ADULT: Chronic | ICD-10-CM

## 2023-05-16 DIAGNOSIS — I10 ESSENTIAL HYPERTENSION: Chronic | ICD-10-CM

## 2023-05-16 DIAGNOSIS — R53.83 FATIGUE, UNSPECIFIED TYPE: ICD-10-CM

## 2023-05-16 DIAGNOSIS — Z12.5 ENCOUNTER FOR SCREENING FOR MALIGNANT NEOPLASM OF PROSTATE: ICD-10-CM

## 2023-05-16 DIAGNOSIS — J98.4 RESTRICTIVE LUNG DISEASE: ICD-10-CM

## 2023-05-16 DIAGNOSIS — Z79.01 CHRONIC ANTICOAGULATION: ICD-10-CM

## 2023-05-16 DIAGNOSIS — F33.1 MODERATE EPISODE OF RECURRENT MAJOR DEPRESSIVE DISORDER: ICD-10-CM

## 2023-05-16 DIAGNOSIS — R73.03 PREDIABETES: Chronic | ICD-10-CM

## 2023-05-16 DIAGNOSIS — L40.50 PSORIATIC ARTHRITIS: ICD-10-CM

## 2023-05-16 DIAGNOSIS — Z00.00 ANNUAL PHYSICAL EXAM: ICD-10-CM

## 2023-05-16 DIAGNOSIS — M1A.09X0 IDIOPATHIC CHRONIC GOUT OF MULTIPLE SITES WITHOUT TOPHUS: ICD-10-CM

## 2023-05-16 DIAGNOSIS — I50.812 CHRONIC RIGHT-SIDED HEART FAILURE: ICD-10-CM

## 2023-05-16 DIAGNOSIS — I50.32 CHRONIC DIASTOLIC CONGESTIVE HEART FAILURE: ICD-10-CM

## 2023-05-16 DIAGNOSIS — D69.6 THROMBOCYTOPENIA, UNSPECIFIED: ICD-10-CM

## 2023-05-16 DIAGNOSIS — D70.8 CHRONIC BENIGN NEUTROPENIA: ICD-10-CM

## 2023-05-16 DIAGNOSIS — E03.8 SUBCLINICAL HYPOTHYROIDISM: ICD-10-CM

## 2023-05-16 DIAGNOSIS — Z00.00 ANNUAL PHYSICAL EXAM: Primary | ICD-10-CM

## 2023-05-16 DIAGNOSIS — G89.29 CHRONIC BILATERAL LOW BACK PAIN WITH SCIATICA, SCIATICA LATERALITY UNSPECIFIED: ICD-10-CM

## 2023-05-16 DIAGNOSIS — I48.20 CHRONIC ATRIAL FIBRILLATION: ICD-10-CM

## 2023-05-16 DIAGNOSIS — M54.40 CHRONIC BILATERAL LOW BACK PAIN WITH SCIATICA, SCIATICA LATERALITY UNSPECIFIED: ICD-10-CM

## 2023-05-16 DIAGNOSIS — Z12.11 SCREENING FOR COLON CANCER: ICD-10-CM

## 2023-05-16 DIAGNOSIS — M51.37 DDD (DEGENERATIVE DISC DISEASE), LUMBOSACRAL: ICD-10-CM

## 2023-05-16 PROBLEM — J96.01 ACUTE RESPIRATORY FAILURE WITH HYPOXIA AND HYPERCARBIA: Status: RESOLVED | Noted: 2021-11-20 | Resolved: 2023-05-16

## 2023-05-16 PROBLEM — J96.02 ACUTE RESPIRATORY FAILURE WITH HYPOXIA AND HYPERCARBIA: Status: RESOLVED | Noted: 2021-11-20 | Resolved: 2023-05-16

## 2023-05-16 LAB
ALBUMIN SERPL BCP-MCNC: 4.2 G/DL (ref 3.5–5.2)
ALP SERPL-CCNC: 78 U/L (ref 55–135)
ALT SERPL W/O P-5'-P-CCNC: 16 U/L (ref 10–44)
ANION GAP SERPL CALC-SCNC: 10 MMOL/L (ref 8–16)
AST SERPL-CCNC: 24 U/L (ref 10–40)
BILIRUB SERPL-MCNC: 1 MG/DL (ref 0.1–1)
BUN SERPL-MCNC: 14 MG/DL (ref 6–20)
CALCIUM SERPL-MCNC: 9.5 MG/DL (ref 8.7–10.5)
CHLORIDE SERPL-SCNC: 101 MMOL/L (ref 95–110)
CHOLEST SERPL-MCNC: 192 MG/DL (ref 120–199)
CHOLEST/HDLC SERPL: 4.2 {RATIO} (ref 2–5)
CO2 SERPL-SCNC: 30 MMOL/L (ref 23–29)
COMPLEXED PSA SERPL-MCNC: 0.43 NG/ML (ref 0–4)
CREAT SERPL-MCNC: 1 MG/DL (ref 0.5–1.4)
ERYTHROCYTE [DISTWIDTH] IN BLOOD BY AUTOMATED COUNT: 14.7 % (ref 11.5–14.5)
EST. GFR  (NO RACE VARIABLE): >60 ML/MIN/1.73 M^2
ESTIMATED AVG GLUCOSE: 117 MG/DL (ref 68–131)
GLUCOSE SERPL-MCNC: 106 MG/DL (ref 70–110)
HBA1C MFR BLD: 5.7 % (ref 4–5.6)
HCT VFR BLD AUTO: 49.2 % (ref 40–54)
HDLC SERPL-MCNC: 46 MG/DL (ref 40–75)
HDLC SERPL: 24 % (ref 20–50)
HGB BLD-MCNC: 15.3 G/DL (ref 14–18)
LDLC SERPL CALC-MCNC: 127.6 MG/DL (ref 63–159)
MCH RBC QN AUTO: 29.2 PG (ref 27–31)
MCHC RBC AUTO-ENTMCNC: 31.1 G/DL (ref 32–36)
MCV RBC AUTO: 94 FL (ref 82–98)
NONHDLC SERPL-MCNC: 146 MG/DL
PLATELET # BLD AUTO: 117 K/UL (ref 150–450)
PMV BLD AUTO: 13.4 FL (ref 9.2–12.9)
POTASSIUM SERPL-SCNC: 4.5 MMOL/L (ref 3.5–5.1)
PROT SERPL-MCNC: 8.3 G/DL (ref 6–8.4)
RBC # BLD AUTO: 5.24 M/UL (ref 4.6–6.2)
SODIUM SERPL-SCNC: 141 MMOL/L (ref 136–145)
T4 FREE SERPL-MCNC: 0.88 NG/DL (ref 0.71–1.51)
TESTOST SERPL-MCNC: 401 NG/DL (ref 304–1227)
TRIGL SERPL-MCNC: 92 MG/DL (ref 30–150)
TSH SERPL DL<=0.005 MIU/L-ACNC: 4.14 UIU/ML (ref 0.4–4)
WBC # BLD AUTO: 3.55 K/UL (ref 3.9–12.7)

## 2023-05-16 PROCEDURE — 3079F DIAST BP 80-89 MM HG: CPT | Mod: CPTII,,, | Performed by: FAMILY MEDICINE

## 2023-05-16 PROCEDURE — 3075F SYST BP GE 130 - 139MM HG: CPT | Mod: CPTII,,, | Performed by: FAMILY MEDICINE

## 2023-05-16 PROCEDURE — 1160F RVW MEDS BY RX/DR IN RCRD: CPT | Mod: CPTII,,, | Performed by: FAMILY MEDICINE

## 2023-05-16 PROCEDURE — 84403 ASSAY OF TOTAL TESTOSTERONE: CPT | Performed by: FAMILY MEDICINE

## 2023-05-16 PROCEDURE — 85027 COMPLETE CBC AUTOMATED: CPT | Performed by: FAMILY MEDICINE

## 2023-05-16 PROCEDURE — 3044F HG A1C LEVEL LT 7.0%: CPT | Mod: CPTII,,, | Performed by: FAMILY MEDICINE

## 2023-05-16 PROCEDURE — 80061 LIPID PANEL: CPT | Performed by: FAMILY MEDICINE

## 2023-05-16 PROCEDURE — 84153 ASSAY OF PSA TOTAL: CPT | Performed by: FAMILY MEDICINE

## 2023-05-16 PROCEDURE — 3075F PR MOST RECENT SYSTOLIC BLOOD PRESS GE 130-139MM HG: ICD-10-PCS | Mod: CPTII,,, | Performed by: FAMILY MEDICINE

## 2023-05-16 PROCEDURE — 3079F PR MOST RECENT DIASTOLIC BLOOD PRESSURE 80-89 MM HG: ICD-10-PCS | Mod: CPTII,,, | Performed by: FAMILY MEDICINE

## 2023-05-16 PROCEDURE — 84439 ASSAY OF FREE THYROXINE: CPT | Performed by: FAMILY MEDICINE

## 2023-05-16 PROCEDURE — 1159F PR MEDICATION LIST DOCUMENTED IN MEDICAL RECORD: ICD-10-PCS | Mod: CPTII,,, | Performed by: FAMILY MEDICINE

## 2023-05-16 PROCEDURE — 3008F PR BODY MASS INDEX (BMI) DOCUMENTED: ICD-10-PCS | Mod: CPTII,,, | Performed by: FAMILY MEDICINE

## 2023-05-16 PROCEDURE — 84443 ASSAY THYROID STIM HORMONE: CPT | Performed by: FAMILY MEDICINE

## 2023-05-16 PROCEDURE — 1160F PR REVIEW ALL MEDS BY PRESCRIBER/CLIN PHARMACIST DOCUMENTED: ICD-10-PCS | Mod: CPTII,,, | Performed by: FAMILY MEDICINE

## 2023-05-16 PROCEDURE — 4010F ACE/ARB THERAPY RXD/TAKEN: CPT | Mod: CPTII,,, | Performed by: FAMILY MEDICINE

## 2023-05-16 PROCEDURE — 3044F PR MOST RECENT HEMOGLOBIN A1C LEVEL <7.0%: ICD-10-PCS | Mod: CPTII,,, | Performed by: FAMILY MEDICINE

## 2023-05-16 PROCEDURE — 36415 COLL VENOUS BLD VENIPUNCTURE: CPT | Performed by: FAMILY MEDICINE

## 2023-05-16 PROCEDURE — 83036 HEMOGLOBIN GLYCOSYLATED A1C: CPT | Performed by: FAMILY MEDICINE

## 2023-05-16 PROCEDURE — 99999 PR PBB SHADOW E&M-EST. PATIENT-LVL IV: ICD-10-PCS | Mod: PBBFAC,,, | Performed by: FAMILY MEDICINE

## 2023-05-16 PROCEDURE — 99215 PR OFFICE/OUTPT VISIT, EST, LEVL V, 40-54 MIN: ICD-10-PCS | Mod: ,,, | Performed by: FAMILY MEDICINE

## 2023-05-16 PROCEDURE — 3008F BODY MASS INDEX DOCD: CPT | Mod: CPTII,,, | Performed by: FAMILY MEDICINE

## 2023-05-16 PROCEDURE — 99999 PR PBB SHADOW E&M-EST. PATIENT-LVL IV: CPT | Mod: PBBFAC,,, | Performed by: FAMILY MEDICINE

## 2023-05-16 PROCEDURE — 80053 COMPREHEN METABOLIC PANEL: CPT | Performed by: FAMILY MEDICINE

## 2023-05-16 PROCEDURE — 4010F PR ACE/ARB THEARPY RXD/TAKEN: ICD-10-PCS | Mod: CPTII,,, | Performed by: FAMILY MEDICINE

## 2023-05-16 PROCEDURE — 99215 OFFICE O/P EST HI 40 MIN: CPT | Mod: ,,, | Performed by: FAMILY MEDICINE

## 2023-05-16 PROCEDURE — 1159F MED LIST DOCD IN RCRD: CPT | Mod: CPTII,,, | Performed by: FAMILY MEDICINE

## 2023-05-17 PROBLEM — D70.8 CHRONIC BENIGN NEUTROPENIA: Status: ACTIVE | Noted: 2023-05-17

## 2023-05-17 RX ORDER — LEVOTHYROXINE SODIUM 25 UG/1
25 TABLET ORAL
Qty: 90 TABLET | Refills: 3 | Status: SHIPPED | OUTPATIENT
Start: 2023-05-17 | End: 2023-10-23

## 2023-05-17 NOTE — PROGRESS NOTES
Subjective:   Patient ID: Caio Ontiveros is a 57 y.o. male.  Chief Complaint:  No chief complaint on file.    Presents for annual physical exam and follow-up on chronic medical conditions  Also followed by Rheumatology, Cardiology, Pulmonology, Psychiatry, and Dermatology     Medical History:  - Prediabetes and morbid obesity.  A1c December 2022 normal.  No medications.  Denies symptoms hypo hyperglycemia.  Unfortunately has not been able to lose any weight due to other significant health problems limiting his overall mobility and physical activity level.  - Hypertension.  On losartan 100 mg daily and Toprol-XL 50 mg daily and Demadex 20 mg twice a day.  Reports compliance.  Denies side effects.  No increased shortness breath swelling.  - Chronic atrial fibrillation on chronic anticoagulation.  On Eliquis 5 mg twice a day in addition to antihypertensives above.  Reports compliance.  Denies side effects.  Also on Lasix 40 mg twice a day to prevent volume overload with additional potassium 20 mEq twice a day supplement.  Overall stable without any active signs of volume overload.  - Gout.  On allopurinol 300 mg daily with colchicine as needed for breakthrough attacks.  Stable.  No recent acute exacerbation.  - Psoriasis.  Followed by Dermatology. On Taltz injection  - Depression.  Stable.  On Effexor  mg total daily dose and Abilify 10 mg daily.  Reports compliance.  Denies side effects.  Increased fatigue, anhedonia, and no energy but otherwise stable  - Thrombocytopenia and neutropenia.  Stable white blood cell count last CBC.  Stable platelet count greater than 100 on last CBC.  No increased risk of bleeding.  - Chronic low back pain with sciatica sacral spine.  On gabapentin.  Questionable compliance.  Referred to interventional pain management but declined visit when contact.  Was unsure why you need to see them.  Referred for external PT/hydrotherapy those insurance required copious every visit which was not  "affordable.    Health maintenance needs include:  Colon cancer screening and multiple vaccine which he is refused in the past.      Other than pain and fatigue related issues, no additional complaints today      Review of Systems   Constitutional:  Positive for fatigue. Negative for activity change, appetite change, chills, diaphoresis and fever.   Eyes:  Negative for visual disturbance.   Respiratory:  Negative for cough, chest tightness, shortness of breath and wheezing.    Cardiovascular:  Negative for chest pain, palpitations and leg swelling.   Gastrointestinal:  Negative for abdominal pain, constipation, diarrhea, nausea and vomiting.   Endocrine: Negative for cold intolerance, heat intolerance, polydipsia, polyphagia and polyuria.   Genitourinary:  Negative for difficulty urinating.   Musculoskeletal:  Positive for arthralgias, back pain, gait problem and myalgias. Negative for neck pain.   Skin:  Negative for rash.   Neurological:  Negative for dizziness, tremors, syncope, weakness, light-headedness, numbness and headaches.   Hematological:  Does not bruise/bleed easily.   Psychiatric/Behavioral:  Positive for dysphoric mood. Negative for agitation, behavioral problems, confusion, decreased concentration, hallucinations, self-injury, sleep disturbance and suicidal ideas. The patient is not nervous/anxious and is not hyperactive.      Objective:   /86 (BP Location: Left arm, Patient Position: Sitting, BP Method: Large (Manual))   Pulse 84   Temp 98.4 °F (36.9 °C) (Tympanic)   Ht 6' 3" (1.905 m)   Wt (!) 203.4 kg (448 lb 6.7 oz)   SpO2 (!) 94%   BMI 56.05 kg/m²     Physical Exam  Vitals and nursing note reviewed.   Constitutional:       General: He is not in acute distress.     Appearance: Normal appearance. He is well-developed. He is morbidly obese. He is not ill-appearing or toxic-appearing.      Comments:   Blood pressure elevated   Neck:      Thyroid: No thyroid mass, thyromegaly or thyroid " tenderness.   Cardiovascular:      Rate and Rhythm: Normal rate and regular rhythm.      Heart sounds: Normal heart sounds. No murmur heard.    No friction rub. No gallop.   Pulmonary:      Effort: Pulmonary effort is normal. No tachypnea, accessory muscle usage or respiratory distress.      Breath sounds: Normal breath sounds. No decreased breath sounds, wheezing, rhonchi or rales.   Abdominal:      General: There is no distension.      Palpations: Abdomen is soft.      Tenderness: There is no abdominal tenderness. There is no guarding or rebound.   Musculoskeletal:      Right lower leg: Edema present.      Left lower leg: Edema present.   Lymphadenopathy:      Cervical: No cervical adenopathy.   Skin:     General: Skin is warm and dry.      Findings: No rash.   Neurological:      Mental Status: He is alert and oriented to person, place, and time.      Coordination: Coordination is intact.      Gait: Gait is intact.   Psychiatric:         Attention and Perception: Attention normal. He is attentive. He does not perceive auditory or visual hallucinations.         Mood and Affect: Mood and affect normal.         Speech: Speech normal. He is communicative. Speech is not rapid and pressured, delayed, slurred or tangential.         Behavior: Behavior is not agitated, slowed, aggressive, withdrawn, hyperactive or combative. Behavior is cooperative.         Thought Content: Thought content normal.         Cognition and Memory: Cognition normal.     Assessment:       ICD-10-CM ICD-9-CM   1. Annual physical exam  Z00.00 V70.0   2. Encounter for screening for malignant neoplasm of prostate  Z12.5 V76.44   3. Prediabetes  R73.03 790.29   4. Morbid obesity with BMI of 50.0-59.9, adult  E66.01 278.01    Z68.43 V85.43   5. Chronic benign neutropenia  D70.8 288.09   6. Thrombocytopenia, unspecified  D69.6 287.5   7. Essential hypertension  I10 401.9   8. Chronic atrial fibrillation  I48.20 427.31   9. Chronic anticoagulation   Z79.01 V58.61   10. Chronic diastolic congestive heart failure  I50.32 428.32     428.0   11. Chronic right-sided heart failure  I50.812 428.0   12. Restrictive lung disease  J98.4 518.89   13. Pulmonary hypertension  I27.20 416.8   14. Idiopathic chronic gout of multiple sites without tophus  M1A.09X0 274.02   15. Psoriatic arthritis  L40.50 696.0   16. Chronic bilateral low back pain with sciatica, sciatica laterality unspecified  M54.40 724.2    G89.29 724.3     338.29   17. DDD (degenerative disc disease), lumbosacral  M51.37 722.52   18. Facet arthritis of lumbosacral region  M47.817 721.3   19. Fatigue, unspecified type  R53.83 780.79   20. Moderate episode of recurrent major depressive disorder  F33.1 296.32   21. Screening for colon cancer  Z12.11 V76.51     Plan:   Annual physical exam  Encounter for screening for malignant neoplasm of prostate  Screening for colon cancer  -     Cologuard Screening (Multitarget Stool DNA); Future; Expected date: 05/17/2023  -     PSA, Screening; Future; Expected date: 05/16/2023  -     CBC Without Differential; Future; Expected date: 05/16/2023  -     Comprehensive Metabolic Panel; Future; Expected date: 05/16/2023  -     Lipid Panel; Future; Expected date: 05/16/2023  -     TSH; Future; Expected date: 05/16/2023  -     Hemoglobin A1C; Future; Expected date: 05/16/2023  -     PSA, Screening; Future; Expected date: 05/16/2023  -     TESTOSTERONE; Future; Expected date: 05/16/2023  Blood pressure normal.  BMI 56.    Check labs.  Treat as indicated.    Agrees to Cologuard test for colon cancer  Prostate cancer screening today  Declines all recommended vaccines    Prediabetes  -     Hemoglobin A1C; Future; Expected date: 05/16/2023  Stable.  Asymptomatic.  Recheck A1c today  Less than 6.5% okay remain off medication  If greater than 6.5%, will need to start medication     Morbid obesity with BMI of 50.0-59.9, adult  Unfortunately 5 or GLP1 injections   Qsymia and Contrave  contraindicated in light of other mental health  Will need to try to improve overall pain to help promote increased physical activity to lose weight decreased BMI    Chronic benign neutropenia  Thrombocytopenia, unspecified  -     CBC Without Differential; Future; Expected date: 05/16/2023  Repeat CBC today  Additional evaluation treatment if indicated     Essential hypertension  Controlled.  Stable.  Asymptomatic BP at goal.    Continue current medications       Chronic atrial fibrillation  Chronic anticoagulation  Chronic diastolic congestive heart failure  Chronic right-sided heart failure  Restrictive lung disease  Pulmonary hypertension  Stable.  Asymptomatic.    Continue all current medications   Follow-up cardiology as scheduled     Idiopathic chronic gout of multiple sites without tophus  Psoriatic arthritis  Continue per Rheumatology     Chronic bilateral low back pain with sciatica, sciatica laterality unspecified  DDD (degenerative disc disease), lumbosacral  Facet arthritis of lumbosacral region  -     Ambulatory referral/consult to Pain Clinic; Future; Expected date: 05/23/2023  -     Ambulatory referral/consult to Physical/Occupational Therapy; Future; Expected date: 05/23/2023  Rediscussed indication for interventional pain management   Also ordered physical therapy referral for [place]    Fatigue, unspecified type  -     TSH; Future; Expected date: 05/16/2023  -     TESTOSTERONE; Future; Expected date: 05/16/2023  Most likely mood related, but check labs above   Treat as indicated     Moderate episode of recurrent major depressive disorder  Overall stable although fatigue and decreased energy levels maybe related   For now continue current medications   Follow-up Psychiatry as scheduled    Return clinic 3 months or sooner as needed    40+ minutes of total time spent on the encounter, which includes face to face time and non-face to face time preparing to see the patient (eg, review of tests), Obtaining  and/or reviewing separately obtained history, documenting clinical information in the electronic or other health record, independently interpreting results (not separately reported) and communicating results to the patient/family/caregiver, or Care coordination (not separately reported).

## 2023-05-23 NOTE — PROGRESS NOTES
ESOPHAGOGASTRODUODENOSCOPY PROCEDURE NOTE       Patient's Name:  Sandra Dalton  Medical Record Number:  [id number]  YOB: 1959    Date of Procedure:  3/28/2023     Primary Care Provider:  Mark Behar, PA-C  Referring Provider: Jorge Luis Baker MD     Endoscopist:  Dr. Jorge Luis Baker MD  Assisting Endoscopist:  Dr. Oilver Patel MD, Dr. Alon Sutherland MD    Procedure performed:  EGD (esophagogastroduodenoscopy)    Instrument Used:  Olympus video endoscope.     Extent of Examination:  Second portion of the duodenum.     Limitation of Examination:  None.    Medications:    MAC (monitored anesthesia care) anesthesia.  Please see anesthesiologist's notes for details.     Preoperative Diagnosis:  Esophageal stricture    Postoperative Diagnoses:   1. Tight upper esophageal stricture noted at approximately 15 cm from the incisors limiting passage of the endoscope s/p balloon dilation to 12 -> 15 mm with mild resistance. On re-examination, there was appropriate mucosal tearing as expected to the level of dilation  2. Normal appearing stomach, PEG tube in place  3. Normal appearing duodenum    Informed Consent:  After explaining to the patient the risks, benefits and alternatives of the esophagogastroduodenoscopy including but not limited to the risk of sedation, bleeding, infection, or perforation, they were then allowed to ask questions.  After answering all of their questions, they elected to proceed and informed consent was obtained and placed in the chart.     Procedure Details:  The patient was placed in the left lateral decubitus position and the appropriate sedation was given.  The bite block was in place and the endoscope was slowly inserted into the oropharynx down into the stomach and all the way to the second portion of the duodenum without difficulty.  Careful examination was given to the upper intestinal mucosa on insertion and withdrawal.  Upper intestinal landmarks were identified including the second  Patient returned call stating that he will collect the Fitkit and drop it off at the [facility] location.      portion of the duodenum, duodenal bulb, pyloric channel, antrum, incisura, cardiac portion of the stomach and Z-line of the esophagus.  The endoscope was then slowly withdrawn and the procedure was terminated without complications.  The patient tolerated the procedure well.     Impression:  EGD performed for known esophageal stricture at 15cm s/p balloon dilation to 12 -> 15 mm with mild resistance. On re-examination, there was appropriate mucosal tearing as expected to the level of dilation.    Plan:  Will plan for outpatient repeat EGD in 3-4 weeks for further evaluation/dilation as indicated.    Specimens Obtained:  none  Complications:  None  Estimated Blood Loss:  None    Disposition:  The patient will be allowed to recover in the recovery room as per protocol.     Dr. Baker was present for the procedure.  Oliver Patel MD  Gastroenterology Fellow  3/28/2023 1:17 PM  Pager:  [phone]  I was present for the procedure

## 2023-05-30 ENCOUNTER — PATIENT MESSAGE (OUTPATIENT)
Dept: CARDIOLOGY | Facility: CLINIC | Age: 58
End: 2023-05-30
Payer: MEDICARE

## 2023-05-30 ENCOUNTER — PES CALL (OUTPATIENT)
Dept: ADMINISTRATIVE | Facility: CLINIC | Age: 58
End: 2023-05-30
Payer: MEDICARE

## 2023-05-30 DIAGNOSIS — I50.812 CHRONIC RIGHT-SIDED HEART FAILURE: Primary | ICD-10-CM

## 2023-05-31 ENCOUNTER — PATIENT MESSAGE (OUTPATIENT)
Dept: CARDIOLOGY | Facility: CLINIC | Age: 58
End: 2023-05-31
Payer: MEDICARE

## 2023-05-31 RX ORDER — TORSEMIDE 20 MG/1
60 TABLET ORAL DAILY
Qty: 270 TABLET | Refills: 3 | Status: SHIPPED | OUTPATIENT
Start: 2023-05-31 | End: 2023-07-07 | Stop reason: SDUPTHER

## 2023-05-31 NOTE — TELEPHONE ENCOUNTER
Per documentation  You are taking Torsemide 20 mg bid  Increase to trosemide 40 mg in AM and 20 mg in PM  BNP and BMP in 2 weeks

## 2023-06-01 LAB — NONINV COLON CA DNA+OCC BLD SCRN STL QL: NEGATIVE

## 2023-06-15 ENCOUNTER — PATIENT MESSAGE (OUTPATIENT)
Dept: CARDIOLOGY | Facility: CLINIC | Age: 58
End: 2023-06-15
Payer: MEDICARE

## 2023-06-16 ENCOUNTER — LAB VISIT (OUTPATIENT)
Dept: LAB | Facility: HOSPITAL | Age: 58
End: 2023-06-16
Attending: INTERNAL MEDICINE
Payer: MEDICARE

## 2023-06-16 DIAGNOSIS — I50.812 CHRONIC RIGHT-SIDED HEART FAILURE: ICD-10-CM

## 2023-06-16 LAB
ANION GAP SERPL CALC-SCNC: 10 MMOL/L (ref 8–16)
BNP SERPL-MCNC: 184 PG/ML (ref 0–99)
BUN SERPL-MCNC: 16 MG/DL (ref 6–20)
CALCIUM SERPL-MCNC: 9.6 MG/DL (ref 8.7–10.5)
CHLORIDE SERPL-SCNC: 102 MMOL/L (ref 95–110)
CO2 SERPL-SCNC: 30 MMOL/L (ref 23–29)
CREAT SERPL-MCNC: 1.1 MG/DL (ref 0.5–1.4)
EST. GFR  (NO RACE VARIABLE): >60 ML/MIN/1.73 M^2
GLUCOSE SERPL-MCNC: 105 MG/DL (ref 70–110)
POTASSIUM SERPL-SCNC: 4.5 MMOL/L (ref 3.5–5.1)
SODIUM SERPL-SCNC: 142 MMOL/L (ref 136–145)

## 2023-06-16 PROCEDURE — 80048 BASIC METABOLIC PNL TOTAL CA: CPT | Mod: PO | Performed by: INTERNAL MEDICINE

## 2023-06-16 PROCEDURE — 83880 ASSAY OF NATRIURETIC PEPTIDE: CPT | Mod: PO | Performed by: INTERNAL MEDICINE

## 2023-06-16 PROCEDURE — 36415 COLL VENOUS BLD VENIPUNCTURE: CPT | Mod: PO | Performed by: INTERNAL MEDICINE

## 2023-06-19 ENCOUNTER — PATIENT MESSAGE (OUTPATIENT)
Dept: CARDIOLOGY | Facility: CLINIC | Age: 58
End: 2023-06-19
Payer: MEDICARE

## 2023-06-19 ENCOUNTER — TELEPHONE (OUTPATIENT)
Dept: CARDIOLOGY | Facility: CLINIC | Age: 58
End: 2023-06-19
Payer: MEDICARE

## 2023-06-19 NOTE — TELEPHONE ENCOUNTER
Called pt to discuss lab results. Pt had no questions or concerns KA    ----- Message from Ricky Jeffrey MD sent at 6/19/2023  3:39 PM CDT -----  THE LAB SHOWED CHF CONTROLLED  Continue current Rx  F/U as scheduled

## 2023-06-21 NOTE — TELEPHONE ENCOUNTER
BNP chronically slight elevation. But it is the baseline level for the pt, meaning CHF controlled now  Continue current Rx  F/U as scheduled

## 2023-06-23 ENCOUNTER — OFFICE VISIT (OUTPATIENT)
Dept: RHEUMATOLOGY | Facility: CLINIC | Age: 58
End: 2023-06-23
Payer: MEDICARE

## 2023-06-23 DIAGNOSIS — L40.0 PLAQUE PSORIASIS: ICD-10-CM

## 2023-06-23 DIAGNOSIS — I50.32 CHRONIC DIASTOLIC CONGESTIVE HEART FAILURE: ICD-10-CM

## 2023-06-23 DIAGNOSIS — M51.37 DDD (DEGENERATIVE DISC DISEASE), LUMBOSACRAL: ICD-10-CM

## 2023-06-23 DIAGNOSIS — M15.9 PRIMARY OSTEOARTHRITIS INVOLVING MULTIPLE JOINTS: ICD-10-CM

## 2023-06-23 DIAGNOSIS — I87.2 VENOUS INSUFFICIENCY: ICD-10-CM

## 2023-06-23 DIAGNOSIS — R73.03 PREDIABETES: Chronic | ICD-10-CM

## 2023-06-23 DIAGNOSIS — D70.8 CHRONIC BENIGN NEUTROPENIA: ICD-10-CM

## 2023-06-23 DIAGNOSIS — Z51.81 MEDICATION MONITORING ENCOUNTER: ICD-10-CM

## 2023-06-23 DIAGNOSIS — Z79.899 HIGH RISK MEDICATION USE: ICD-10-CM

## 2023-06-23 DIAGNOSIS — G47.63 SLEEP-RELATED BRUXISM: ICD-10-CM

## 2023-06-23 DIAGNOSIS — F33.1 MODERATE EPISODE OF RECURRENT MAJOR DEPRESSIVE DISORDER: Chronic | ICD-10-CM

## 2023-06-23 DIAGNOSIS — M1A.09X0 IDIOPATHIC CHRONIC GOUT OF MULTIPLE SITES WITHOUT TOPHUS: Primary | ICD-10-CM

## 2023-06-23 DIAGNOSIS — E66.01 MORBID OBESITY WITH BMI OF 50.0-59.9, ADULT: Chronic | ICD-10-CM

## 2023-06-23 DIAGNOSIS — L40.9 PSORIASIS: ICD-10-CM

## 2023-06-23 DIAGNOSIS — J98.4 RESTRICTIVE LUNG DISEASE: ICD-10-CM

## 2023-06-23 DIAGNOSIS — L40.50 PSORIATIC ARTHRITIS: ICD-10-CM

## 2023-06-23 PROCEDURE — 4010F ACE/ARB THERAPY RXD/TAKEN: CPT | Mod: CPTII,95,, | Performed by: INTERNAL MEDICINE

## 2023-06-23 PROCEDURE — 3044F PR MOST RECENT HEMOGLOBIN A1C LEVEL <7.0%: ICD-10-PCS | Mod: CPTII,95,, | Performed by: INTERNAL MEDICINE

## 2023-06-23 PROCEDURE — 99215 PR OFFICE/OUTPT VISIT, EST, LEVL V, 40-54 MIN: ICD-10-PCS | Mod: 95,,, | Performed by: INTERNAL MEDICINE

## 2023-06-23 PROCEDURE — 4010F PR ACE/ARB THEARPY RXD/TAKEN: ICD-10-PCS | Mod: CPTII,95,, | Performed by: INTERNAL MEDICINE

## 2023-06-23 PROCEDURE — 3044F HG A1C LEVEL LT 7.0%: CPT | Mod: CPTII,95,, | Performed by: INTERNAL MEDICINE

## 2023-06-23 PROCEDURE — 99215 OFFICE O/P EST HI 40 MIN: CPT | Mod: 95,,, | Performed by: INTERNAL MEDICINE

## 2023-06-23 RX ORDER — IXEKIZUMAB 80 MG/ML
INJECTION, SOLUTION SUBCUTANEOUS
Qty: 3 ML | Refills: 3 | Status: SHIPPED | OUTPATIENT
Start: 2023-06-23 | End: 2023-06-30

## 2023-06-23 NOTE — PROGRESS NOTES
The patient location is: [place]  The chief complaint leading to consultation is: gout    Visit type: audiovisual    Face to Face time with patient: 20  30 minutes of total time spent on the encounter, which includes face to face time and non-face to face time preparing to see the patient (eg, review of tests), Obtaining and/or reviewing separately obtained history, Documenting clinical information in the electronic or other health record, Independently interpreting results (not separately reported) and communicating results to the patient/family/caregiver, or Care coordination (not separately reported).         Each patient to whom he or she provides medical services by telemedicine is:  (1) informed of the relationship between the physician and patient and the respective role of any other health care provider with respect to management of the patient; and (2) notified that he or she may decline to receive medical services by telemedicine and may withdraw from such care at any time.      RHEUMATOLOGY OUTPATIENT CLINIC NOTE    6/23/2023    Attending Rheumatologist: Constantine Raphael  Primary Care Provider/Physician Requesting Consultation: Dominick Allen MD   Chief Complaint/Reason For Consultation:  No chief complaint on file.      Subjective:     Caio Ontiveros is a 57 y.o. Black or  male with gout and PsO/PsA for f/u    Main concern of chronic back pain w/ predominant mechanical pattern.  Refers last gout flare last month.  Adherence w/ allopurinol reported.  Active rash PsO, currently off Taltz for the past 2 months after running out of script. >60% improvement of arthralgias after Taltz.    Addendum 12/13: Msg from pharmacy  Caio Ontiveros is continuing on Taltz therapy. Please send [facility] a new order so that we may investigate patient's insurance benefits and financial assistance for the new year.    My response  JONNY, Taltz therapy prescribed by Dermatology.  Refill per your request and avoiding interruption  of therapy provided.    Review of Systems   Constitutional:  Negative for fever.   Eyes:  Negative for photophobia and pain.   Respiratory:  Negative for shortness of breath.    Gastrointestinal:  Negative for blood in stool and melena.   Genitourinary:  Negative for hematuria.   Musculoskeletal:  Positive for back pain and joint pain.   Skin:  Positive for rash.   Neurological:  Negative for focal weakness.     Chronic comorbid conditions affecting medical decision making today:  Past Medical History:   Diagnosis Date    A-fib     CHF (congestive heart failure)     Gout, chronic     Hypertension     Sleep apnea      Past Surgical History:   Procedure Laterality Date    CHOLECYSTECTOMY      GASTRIC BYPASS  2014     Family History   Problem Relation Age of Onset    Hypertension Mother     Stroke Father     Diabetes Father     Hypertension Father     Diabetes Sister     Diabetes Brother      Social History     Tobacco Use   Smoking Status Never   Smokeless Tobacco Never       Current Outpatient Medications:     allopurinoL (ZYLOPRIM) 300 MG tablet, Take 1 tablet (300 mg total) by mouth once daily., Disp: 90 tablet, Rfl: 1    apixaban (ELIQUIS) 5 mg Tab, Take 1 tablet (5 mg total) by mouth 2 (two) times daily., Disp: 180 tablet, Rfl: 3    ARIPiprazole (ABILIFY) 10 MG Tab, Take 1 tablet (10 mg total) by mouth once daily., Disp: 90 tablet, Rfl: 3    colchicine (COLCRYS) 0.6 mg tablet, Take 1 tablet (0.6 mg total) by mouth once daily., Disp: 90 tablet, Rfl: 3    gabapentin (NEURONTIN) 300 MG capsule, TAKE 1 CAPSULE BY MOUTH  ONCE DAILY, Disp: 30 capsule, Rfl: 11    levothyroxine (SYNTHROID) 25 MCG tablet, Take 1 tablet (25 mcg total) by mouth before breakfast., Disp: 90 tablet, Rfl: 3    losartan (COZAAR) 100 MG tablet, Take 1 tablet (100 mg total) by mouth once daily., Disp: 90 tablet, Rfl: 3    metoprolol succinate (TOPROL-XL) 50 MG 24 hr tablet, Take 1 tablet (50 mg total) by mouth once daily., Disp: 30 tablet, Rfl:  11    potassium chloride SA (K-DUR,KLOR-CON) 20 MEQ tablet, Take 1 tablet (20 mEq total) by mouth 2 (two) times daily., Disp: 180 tablet, Rfl: 2    TALTZ AUTOINJECTOR, 2 PACK, 80 mg/mL AtIn, INJECT 80MG (1 PEN) UNDER THE SKIN EVERY 4 WEEKS, Disp: 3 mL, Rfl: 0    torsemide (DEMADEX) 20 MG Tab, Take 3 tablets (60 mg total) by mouth once daily., Disp: 270 tablet, Rfl: 3    venlafaxine (EFFEXOR-XR) 150 MG Cp24, TAKE 1 CAPSULE BY MOUTH  ONCE DAILY, Disp: 90 capsule, Rfl: 3    venlafaxine (EFFEXOR-XR) 75 MG 24 hr capsule, TAKE 1 CAPSULE BY MOUTH  ONCE DAILY, Disp: 90 capsule, Rfl: 3     Objective:     There were no vitals filed for this visit.  Physical Exam   Constitutional: He appears obese.   Eyes: Conjunctivae are normal.   Pulmonary/Chest: Effort normal. No respiratory distress.   Musculoskeletal:         General: Tenderness present. No swelling.   Neurological: He displays no weakness.   Skin: Rash noted.     Reviewed available old and all outside pertinent medical records available.    All lab results personally reviewed and interpreted by me.       ASSESSMENT      Encounter Diagnoses   Name Primary?    Moderate episode of recurrent major depressive disorder Yes    Sleep-related bruxism     Psoriasis     Restrictive lung disease     Chronic diastolic congestive heart failure     Venous insufficiency     Chronic benign neutropenia     Morbid obesity with BMI of 50.0-59.9, adult     Prediabetes     Idiopathic chronic gout of multiple sites without tophus     Psoriatic arthritis     Primary osteoarthritis involving multiple joints     DDD (degenerative disc disease), lumbosacral     High risk medication use     Medication monitoring encounter       PLAN     DAPSA: high disease activity.  Decries good response to Taltz, >60% improvement to arthralgias.  Ran out of biologic approx 2 months ago reporting refractory skin rash >10% BSA and arthralgias with some inflammatory features.  Pain reported w/ finger flexion.  No  concerning cytopenias, evidence of active liver or renal dysfunction.  Last uric acid not at goal.  NEgative RA serologies.  Negative HLAB27.  Elevated TANYA w/ negative NADINE profile.  DJD/DDD changes on imaging w/ prominent spondylosis. No marginal erosive changes, or compression deformities.  Suggest resuming Taltz previously prescribed by Dermatology.  Adherence w/ therapy reinforced.  Repeat UrA w/ next set of labs 2 months after resuming biologic.  Repeat labs close to f/u visit.     Constantine Raphael M.D.

## 2023-06-30 ENCOUNTER — TELEPHONE (OUTPATIENT)
Dept: PHARMACY | Facility: CLINIC | Age: 58
End: 2023-06-30
Payer: MEDICARE

## 2023-06-30 RX ORDER — IXEKIZUMAB 80 MG/ML
80 INJECTION, SOLUTION SUBCUTANEOUS
Qty: 1 EACH | Refills: 3 | Status: ACTIVE | OUTPATIENT
Start: 2023-06-30 | End: 2023-07-30

## 2023-06-30 NOTE — TELEPHONE ENCOUNTER
Maribel, this is Madiha Alcantar, clinical pharmacist with [facility] that is part of your care team.  We have begun working on your prescription that your doctor has sent us for Taltz. Our next steps include:     Working with your insurance company to obtain approval for your medication  Working with you to ensure your medication is affordable     We will be calling you along the way with updates on your medication but if you have any concerns or receive information that you would like to discuss please reach us at [phone].    Welcome call outcome: Patient/caregiver reached    Pt confirms this is a continuation of Taltz therapy. Pt took his last injection on 2023 and will be due for his next injection on 2023. Pt was previously receiving his medication through the [facility] but states that his enrollment has  and he will need assistance with completing the renewal in the event that his medication has a high copay on his insurance.

## 2023-07-05 ENCOUNTER — PATIENT MESSAGE (OUTPATIENT)
Dept: CARDIOLOGY | Facility: CLINIC | Age: 58
End: 2023-07-05
Payer: MEDICARE

## 2023-07-05 DIAGNOSIS — I50.32 CHRONIC DIASTOLIC CONGESTIVE HEART FAILURE: Primary | ICD-10-CM

## 2023-07-07 ENCOUNTER — PATIENT MESSAGE (OUTPATIENT)
Dept: INFECTIOUS DISEASES | Facility: CLINIC | Age: 58
End: 2023-07-07
Payer: MEDICARE

## 2023-07-07 RX ORDER — AMLODIPINE BESYLATE 5 MG/1
5 TABLET ORAL DAILY
Qty: 30 TABLET | Refills: 11 | Status: SHIPPED | OUTPATIENT
Start: 2023-07-07 | End: 2023-07-11

## 2023-07-07 RX ORDER — TORSEMIDE 20 MG/1
40 TABLET ORAL 2 TIMES DAILY
Qty: 360 TABLET | Refills: 3 | Status: SHIPPED | OUTPATIENT
Start: 2023-07-07 | End: 2024-07-06

## 2023-07-10 ENCOUNTER — PATIENT MESSAGE (OUTPATIENT)
Dept: ADMINISTRATIVE | Facility: CLINIC | Age: 58
End: 2023-07-10
Payer: MEDICARE

## 2023-07-10 ENCOUNTER — TELEPHONE (OUTPATIENT)
Dept: ADMINISTRATIVE | Facility: CLINIC | Age: 58
End: 2023-07-10
Payer: MEDICARE

## 2023-07-11 ENCOUNTER — TELEPHONE (OUTPATIENT)
Dept: CARDIOLOGY | Facility: CLINIC | Age: 58
End: 2023-07-11

## 2023-07-11 ENCOUNTER — OFFICE VISIT (OUTPATIENT)
Dept: CARDIOLOGY | Facility: CLINIC | Age: 58
End: 2023-07-11
Payer: MEDICARE

## 2023-07-11 DIAGNOSIS — I07.1 MODERATE TRICUSPID VALVE REGURGITATION: ICD-10-CM

## 2023-07-11 DIAGNOSIS — I50.32 CHRONIC DIASTOLIC CONGESTIVE HEART FAILURE: Primary | ICD-10-CM

## 2023-07-11 DIAGNOSIS — I10 ESSENTIAL HYPERTENSION: Chronic | ICD-10-CM

## 2023-07-11 DIAGNOSIS — I48.20 CHRONIC ATRIAL FIBRILLATION: ICD-10-CM

## 2023-07-11 PROCEDURE — 3044F HG A1C LEVEL LT 7.0%: CPT | Mod: CPTII,95,, | Performed by: INTERNAL MEDICINE

## 2023-07-11 PROCEDURE — 4010F PR ACE/ARB THEARPY RXD/TAKEN: ICD-10-PCS | Mod: CPTII,95,, | Performed by: INTERNAL MEDICINE

## 2023-07-11 PROCEDURE — 3044F PR MOST RECENT HEMOGLOBIN A1C LEVEL <7.0%: ICD-10-PCS | Mod: CPTII,95,, | Performed by: INTERNAL MEDICINE

## 2023-07-11 PROCEDURE — 4010F ACE/ARB THERAPY RXD/TAKEN: CPT | Mod: CPTII,95,, | Performed by: INTERNAL MEDICINE

## 2023-07-11 PROCEDURE — 99214 OFFICE O/P EST MOD 30 MIN: CPT | Mod: 95,,, | Performed by: INTERNAL MEDICINE

## 2023-07-11 PROCEDURE — 99214 PR OFFICE/OUTPT VISIT, EST, LEVL IV, 30-39 MIN: ICD-10-PCS | Mod: 95,,, | Performed by: INTERNAL MEDICINE

## 2023-07-11 RX ORDER — METOPROLOL SUCCINATE 100 MG/1
100 TABLET, EXTENDED RELEASE ORAL DAILY
Qty: 90 TABLET | Refills: 3 | Status: SHIPPED | OUTPATIENT
Start: 2023-07-11 | End: 2024-07-10

## 2023-07-11 NOTE — TELEPHONE ENCOUNTER
Spoke with pt in regards to scheduling f/u appt.       ----- Message from Ricky Jeffrey MD sent at 7/11/2023  4:05 PM CDT -----  Rtc in 6 m

## 2023-07-11 NOTE — PROGRESS NOTES
Subjective:   Patient ID:  Caio Ontiveros is a 57 y.o. male who presents for follow up of No chief complaint on file.      56 yo male, came in for 6 months f/u  PMH CHFpEF, chroic Afib for 7 yrs, obesity s/p gastric bypass in 2014. EVANGELIST on cpap for 5 yrs   ECHO in 2017 EF 55%  09/06/2020 went to ER at [facility] due to worsening dyspnea. BNP 1100, troponin 0.036, CXR mild edema, ekg showed afib. Had IV lasix  And good diuresis.    ChestCT w/o multiple nodule     visit states that sleeps on 2 pillows, + PND snores,  SOb, palpitation  No chest pain, dizziness  Did drink a lot of water  His wife cooks at home and f/u low sodium protocol   Lasix was d/c and Bumex 2mg tid added  Pharmacist only gave him 1 mg tid. Went to ER 2 days ago. EKG AFIB and HR 91 bpm. BNP and BMP no change  CXR no acute change. Had Lasix IV and d/c  Lost 6 pounds in 1 week     admitted for CHF exacerbation and had IV diuretics.  ECHo showed RV moderatelt dilated and RV function decreased. PAP 55 mmHG  Slight elevation of troponin to 0.036  BNPelevatde    01/04/2022 visit  BNP was 1200 in  and down to 83 on 12/16/2021. Then lasix 40 mg bid was d/c'ed by some reason.  Sleeps on 2 pillows. And NO PND  Edema elevated to the pelvic area.   Echo in  normal EF. Dilated RV chamber with mod RV failure and pulm HTN     visit  BP high. On Lasix 40 mg bid, no change of weight. GAYTAN stable. No chest pain palpitation and faint      visit  ekg AFIB and BP high  No dizziness faint chest pain dyspnea,      visit telemed  Taking Lasix 40 mg bid and remains SOB occasionally. No dizziness faint   in 06/23. Weight stable.   Some leg swelling. Sleeps on 2 pillows. On CPAP  Gained 40 lbs in 1 yr    Interval history telemed  On torsemide 40 mg in AM and 20 in PM and toprolol up to 100 mg daily, BP pulse controlled  Some fatigue, remain mild SOB. No orthopnea                    Past Medical History:    Diagnosis Date    A-fib     CHF (congestive heart failure)     Gout, chronic     Hypertension     Sleep apnea        Past Surgical History:   Procedure Laterality Date    CHOLECYSTECTOMY      GASTRIC BYPASS  2014       Social History     Tobacco Use    Smoking status: Never    Smokeless tobacco: Never   Substance Use Topics    Alcohol use: Yes     Comment: sometimes    Drug use: No       Family History   Problem Relation Age of Onset    Hypertension Mother     Stroke Father     Diabetes Father     Hypertension Father     Diabetes Sister     Diabetes Brother          ROS    Objective:   Physical Exam    Lab Results   Component Value Date    CHOL 192 05/16/2023    CHOL 186 02/16/2022    CHOL 203 (H) 06/11/2019     Lab Results   Component Value Date    HDL 46 05/16/2023    HDL 49 02/16/2022    HDL 54 06/11/2019     Lab Results   Component Value Date    LDLCALC 127.6 05/16/2023    LDLCALC 119.8 02/16/2022    LDLCALC 135.2 06/11/2019     Lab Results   Component Value Date    TRIG 92 05/16/2023    TRIG 86 02/16/2022    TRIG 69 06/11/2019     Lab Results   Component Value Date    CHOLHDL 24.0 05/16/2023    CHOLHDL 26.3 02/16/2022    CHOLHDL 26.6 06/11/2019       Chemistry        Component Value Date/Time     06/16/2023 1523    K 4.5 06/16/2023 1523     06/16/2023 1523    CO2 30 (H) 06/16/2023 1523    BUN 16 06/16/2023 1523    CREATININE 1.1 06/16/2023 1523     06/16/2023 1523        Component Value Date/Time    CALCIUM 9.6 06/16/2023 1523    ALKPHOS 78 05/16/2023 1620    AST 24 05/16/2023 1620    ALT 16 05/16/2023 1620    BILITOT 1.0 05/16/2023 1620    ESTGFRAFRICA >60.0 02/22/2022 1702    EGFRNONAA >60.0 02/22/2022 1702          Lab Results   Component Value Date    HGBA1C 5.7 (H) 05/16/2023     Lab Results   Component Value Date    TSH 4.138 (H) 05/16/2023     Lab Results   Component Value Date    INR 1.2 09/06/2020    INR 1.0 02/27/2010    INR 1.0 02/26/2010     Lab Results   Component Value Date     WBC 3.55 (L) 05/16/2023    HGB 15.3 05/16/2023    HCT 49.2 05/16/2023    MCV 94 05/16/2023     (L) 05/16/2023     BMP  Sodium   Date Value Ref Range Status   06/16/2023 142 136 - 145 mmol/L Final     Potassium   Date Value Ref Range Status   06/16/2023 4.5 3.5 - 5.1 mmol/L Final     Chloride   Date Value Ref Range Status   06/16/2023 102 95 - 110 mmol/L Final     CO2   Date Value Ref Range Status   06/16/2023 30 (H) 23 - 29 mmol/L Final     BUN   Date Value Ref Range Status   06/16/2023 16 6 - 20 mg/dL Final     Creatinine   Date Value Ref Range Status   06/16/2023 1.1 0.5 - 1.4 mg/dL Final     Calcium   Date Value Ref Range Status   06/16/2023 9.6 8.7 - 10.5 mg/dL Final     Anion Gap   Date Value Ref Range Status   06/16/2023 10 8 - 16 mmol/L Final     eGFR if    Date Value Ref Range Status   02/22/2022 >60.0 >60 mL/min/1.73 m^2 Final     eGFR if non    Date Value Ref Range Status   02/22/2022 >60.0 >60 mL/min/1.73 m^2 Final     Comment:     Calculation used to obtain the estimated glomerular filtration  rate (eGFR) is the CKD-EPI equation.        BNP  @LABRCNTIP(BNP,BNPTRIAGEBLO)@  @LABRCNTIP(troponini)@  CrCl cannot be calculated (Patient's most recent lab result is older than the maximum 7 days allowed.).  No results found in the last 24 hours.  No results found in the last 24 hours.  No results found in the last 24 hours.    Assessment:      1. Chronic diastolic congestive heart failure    2. Essential hypertension    3. Chronic atrial fibrillation    4. Moderate tricuspid valve regurgitation        Plan:   Continue Losartan torpolXL torsemide 40 mg bid as needed and 60 mg baseline and eliquis   Rtc in 6m    The patient location is: home  The chief complaint leading to consultation is: CHF and afib    Visit type: audiovisual    Face to Face time with patient: 15 minutes of total time spent on the encounter, which includes face to face time and non-face to face time  preparing to see the patient (eg, review of tests), Obtaining and/or reviewing separately obtained history, Documenting clinical information in the electronic or other health record, Independently interpreting results (not separately reported) and communicating results to the patient/family/caregiver, or Care coordination (not separately reported).         Each patient to whom he or she provides medical services by telemedicine is:  (1) informed of the relationship between the physician and patient and the respective role of any other health care provider with respect to management of the patient; and (2) notified that he or she may decline to receive medical services by telemedicine and may withdraw from such care at any time.    Notes:

## 2023-07-12 ENCOUNTER — OFFICE VISIT (OUTPATIENT)
Dept: HOME HEALTH SERVICES | Facility: CLINIC | Age: 58
End: 2023-07-12
Payer: MEDICARE

## 2023-07-12 ENCOUNTER — TELEPHONE (OUTPATIENT)
Dept: ADMINISTRATIVE | Facility: CLINIC | Age: 58
End: 2023-07-12
Payer: MEDICARE

## 2023-07-12 ENCOUNTER — HOSPITAL ENCOUNTER (EMERGENCY)
Facility: HOSPITAL | Age: 58
Discharge: HOME | End: 2023-07-12
Attending: EMERGENCY MEDICINE
Payer: MEDICARE

## 2023-07-12 VITALS
HEIGHT: 75 IN | BODY MASS INDEX: 39.17 KG/M2 | WEIGHT: 315 LBS | RESPIRATION RATE: 20 BRPM | OXYGEN SATURATION: 93 % | SYSTOLIC BLOOD PRESSURE: 133 MMHG | HEART RATE: 69 BPM | TEMPERATURE: 98 F | DIASTOLIC BLOOD PRESSURE: 83 MMHG

## 2023-07-12 VITALS — WEIGHT: 315 LBS | BODY MASS INDEX: 39.17 KG/M2 | HEIGHT: 75 IN

## 2023-07-12 DIAGNOSIS — R73.03 PREDIABETES: Chronic | ICD-10-CM

## 2023-07-12 DIAGNOSIS — F33.1 MODERATE EPISODE OF RECURRENT MAJOR DEPRESSIVE DISORDER: Chronic | ICD-10-CM

## 2023-07-12 DIAGNOSIS — G47.33 OSA (OBSTRUCTIVE SLEEP APNEA): ICD-10-CM

## 2023-07-12 DIAGNOSIS — R45.4 IRRITABILITY AND ANGER: Chronic | ICD-10-CM

## 2023-07-12 DIAGNOSIS — I10 ESSENTIAL HYPERTENSION: Chronic | ICD-10-CM

## 2023-07-12 DIAGNOSIS — I50.32 CHRONIC DIASTOLIC CONGESTIVE HEART FAILURE: ICD-10-CM

## 2023-07-12 DIAGNOSIS — D70.8 CHRONIC BENIGN NEUTROPENIA: ICD-10-CM

## 2023-07-12 DIAGNOSIS — D69.6 THROMBOCYTOPENIA, UNSPECIFIED: ICD-10-CM

## 2023-07-12 DIAGNOSIS — L40.50 PSORIATIC ARTHRITIS: ICD-10-CM

## 2023-07-12 DIAGNOSIS — S81.801A LEG WOUND, RIGHT, INITIAL ENCOUNTER: Primary | ICD-10-CM

## 2023-07-12 DIAGNOSIS — E66.01 MORBID OBESITY WITH BMI OF 50.0-59.9, ADULT: Chronic | ICD-10-CM

## 2023-07-12 DIAGNOSIS — M1A.09X0 IDIOPATHIC CHRONIC GOUT OF MULTIPLE SITES WITHOUT TOPHUS: ICD-10-CM

## 2023-07-12 DIAGNOSIS — Z79.01 CHRONIC ANTICOAGULATION: ICD-10-CM

## 2023-07-12 DIAGNOSIS — Z00.00 ENCOUNTER FOR PREVENTIVE HEALTH EXAMINATION: Primary | ICD-10-CM

## 2023-07-12 DIAGNOSIS — I48.20 CHRONIC ATRIAL FIBRILLATION: ICD-10-CM

## 2023-07-12 LAB
ANION GAP SERPL CALC-SCNC: 10 MMOL/L (ref 8–16)
BASOPHILS # BLD AUTO: 0.02 K/UL (ref 0–0.2)
BASOPHILS NFR BLD: 0.8 % (ref 0–1.9)
BUN SERPL-MCNC: 21 MG/DL (ref 6–20)
CALCIUM SERPL-MCNC: 8.8 MG/DL (ref 8.7–10.5)
CHLORIDE SERPL-SCNC: 104 MMOL/L (ref 95–110)
CO2 SERPL-SCNC: 28 MMOL/L (ref 23–29)
CREAT SERPL-MCNC: 1 MG/DL (ref 0.5–1.4)
DIFFERENTIAL METHOD: ABNORMAL
EOSINOPHIL # BLD AUTO: 0.1 K/UL (ref 0–0.5)
EOSINOPHIL NFR BLD: 3.8 % (ref 0–8)
ERYTHROCYTE [DISTWIDTH] IN BLOOD BY AUTOMATED COUNT: 14.3 % (ref 11.5–14.5)
EST. GFR  (NO RACE VARIABLE): >60 ML/MIN/1.73 M^2
GLUCOSE SERPL-MCNC: 101 MG/DL (ref 70–110)
HCT VFR BLD AUTO: 46.1 % (ref 40–54)
HGB BLD-MCNC: 15 G/DL (ref 14–18)
IMM GRANULOCYTES # BLD AUTO: 0.01 K/UL (ref 0–0.04)
IMM GRANULOCYTES NFR BLD AUTO: 0.4 % (ref 0–0.5)
LYMPHOCYTES # BLD AUTO: 0.6 K/UL (ref 1–4.8)
LYMPHOCYTES NFR BLD: 23.9 % (ref 18–48)
MCH RBC QN AUTO: 30.4 PG (ref 27–31)
MCHC RBC AUTO-ENTMCNC: 32.5 G/DL (ref 32–36)
MCV RBC AUTO: 94 FL (ref 82–98)
MONOCYTES # BLD AUTO: 0.4 K/UL (ref 0.3–1)
MONOCYTES NFR BLD: 14.8 % (ref 4–15)
NEUTROPHILS # BLD AUTO: 1.5 K/UL (ref 1.8–7.7)
NEUTROPHILS NFR BLD: 56.3 % (ref 38–73)
NRBC BLD-RTO: 0 /100 WBC
PLATELET # BLD AUTO: 106 K/UL (ref 150–450)
PMV BLD AUTO: 12.9 FL (ref 9.2–12.9)
POTASSIUM SERPL-SCNC: 4.2 MMOL/L (ref 3.5–5.1)
RBC # BLD AUTO: 4.93 M/UL (ref 4.6–6.2)
SODIUM SERPL-SCNC: 142 MMOL/L (ref 136–145)
WBC # BLD AUTO: 2.64 K/UL (ref 3.9–12.7)

## 2023-07-12 PROCEDURE — 87389 HIV-1 AG W/HIV-1&-2 AB AG IA: CPT | Performed by: EMERGENCY MEDICINE

## 2023-07-12 PROCEDURE — 1159F PR MEDICATION LIST DOCUMENTED IN MEDICAL RECORD: ICD-10-PCS | Mod: CPTII,95,, | Performed by: NURSE PRACTITIONER

## 2023-07-12 PROCEDURE — 3044F PR MOST RECENT HEMOGLOBIN A1C LEVEL <7.0%: ICD-10-PCS | Mod: CPTII,95,, | Performed by: NURSE PRACTITIONER

## 2023-07-12 PROCEDURE — 1159F MED LIST DOCD IN RCRD: CPT | Mod: CPTII,95,, | Performed by: NURSE PRACTITIONER

## 2023-07-12 PROCEDURE — G0439 PR MEDICARE ANNUAL WELLNESS SUBSEQUENT VISIT: ICD-10-PCS | Mod: 95,,, | Performed by: NURSE PRACTITIONER

## 2023-07-12 PROCEDURE — 1160F PR REVIEW ALL MEDS BY PRESCRIBER/CLIN PHARMACIST DOCUMENTED: ICD-10-PCS | Mod: CPTII,95,, | Performed by: NURSE PRACTITIONER

## 2023-07-12 PROCEDURE — 25000003 PHARM REV CODE 250: Mod: ER | Performed by: EMERGENCY MEDICINE

## 2023-07-12 PROCEDURE — 85025 COMPLETE CBC W/AUTO DIFF WBC: CPT | Mod: ER | Performed by: EMERGENCY MEDICINE

## 2023-07-12 PROCEDURE — 1160F RVW MEDS BY RX/DR IN RCRD: CPT | Mod: CPTII,95,, | Performed by: NURSE PRACTITIONER

## 2023-07-12 PROCEDURE — 3008F PR BODY MASS INDEX (BMI) DOCUMENTED: ICD-10-PCS | Mod: CPTII,95,, | Performed by: NURSE PRACTITIONER

## 2023-07-12 PROCEDURE — 3008F BODY MASS INDEX DOCD: CPT | Mod: CPTII,95,, | Performed by: NURSE PRACTITIONER

## 2023-07-12 PROCEDURE — 3044F HG A1C LEVEL LT 7.0%: CPT | Mod: CPTII,95,, | Performed by: NURSE PRACTITIONER

## 2023-07-12 PROCEDURE — G0439 PPPS, SUBSEQ VISIT: HCPCS | Mod: 95,,, | Performed by: NURSE PRACTITIONER

## 2023-07-12 PROCEDURE — 86803 HEPATITIS C AB TEST: CPT | Performed by: EMERGENCY MEDICINE

## 2023-07-12 PROCEDURE — 4010F ACE/ARB THERAPY RXD/TAKEN: CPT | Mod: CPTII,95,, | Performed by: NURSE PRACTITIONER

## 2023-07-12 PROCEDURE — 4010F PR ACE/ARB THEARPY RXD/TAKEN: ICD-10-PCS | Mod: CPTII,95,, | Performed by: NURSE PRACTITIONER

## 2023-07-12 PROCEDURE — 80048 BASIC METABOLIC PNL TOTAL CA: CPT | Mod: ER | Performed by: EMERGENCY MEDICINE

## 2023-07-12 PROCEDURE — 99283 EMERGENCY DEPT VISIT LOW MDM: CPT | Mod: ER

## 2023-07-12 RX ORDER — CEPHALEXIN 500 MG/1
500 CAPSULE ORAL 4 TIMES DAILY
Qty: 20 CAPSULE | Refills: 0 | Status: SHIPPED | OUTPATIENT
Start: 2023-07-12 | End: 2023-07-17

## 2023-07-12 RX ORDER — CEPHALEXIN 500 MG/1
500 CAPSULE ORAL 4 TIMES DAILY
Qty: 20 CAPSULE | Refills: 0 | Status: SHIPPED | OUTPATIENT
Start: 2023-07-12 | End: 2023-07-12 | Stop reason: SDUPTHER

## 2023-07-12 RX ORDER — CEPHALEXIN 500 MG/1
500 CAPSULE ORAL
Status: COMPLETED | OUTPATIENT
Start: 2023-07-12 | End: 2023-07-12

## 2023-07-12 RX ADMIN — CEPHALEXIN 500 MG: 500 CAPSULE ORAL at 04:07

## 2023-07-12 NOTE — ED PROVIDER NOTES
History     Chief Complaint   Patient presents with    Leg Pain     R leg pain and fluid drainage, onset 1.5 months ago, self treating but per pt is has worsen      HPI:  Caio Ontiveros is a 57 y.o. male with PMH as below who presents to the [facility] emergency department for evaluation of slowly healing wound to his right lower leg.  He states he noticed it over a month ago.  He has been cleaning it once daily with Betadine and covering it with Neosporin and a dressing and using a compression stocking.  Presented today because he wanted to make sure it was not infected.  He denies diabetes.  He states the wound is healing but it is taking a while.  He reports issues with an ulcer in the past requiring wound care and questionable peripheral vascular disease.        PCP: Dominick Allen MD    Review of patient's allergies indicates:  No Known Allergies   Past Medical History:   Diagnosis Date    A-fib     CHF (congestive heart failure)     Gout, chronic     Hypertension     Sleep apnea      Past Surgical History:   Procedure Laterality Date    CHOLECYSTECTOMY      GASTRIC BYPASS  2014       Family History   Problem Relation Age of Onset    Hypertension Mother     Stroke Father     Diabetes Father     Hypertension Father     Diabetes Sister     Diabetes Brother      Social History     Tobacco Use    Smoking status: Never    Smokeless tobacco: Never   Substance and Sexual Activity    Alcohol use: Not Currently    Drug use: No    Sexual activity: Not Currently      Review of Systems     Review of Systems   Constitutional:  Negative for fatigue and fever.   Respiratory:  Negative for shortness of breath.    Cardiovascular:  Negative for chest pain.   Gastrointestinal:  Negative for diarrhea, nausea and vomiting.   Skin:  Positive for wound.   Neurological:  Negative for dizziness, weakness and light-headedness.      Physical Exam     Initial Vitals [07/12/23 1538]   BP Pulse Resp Temp SpO2   (!) 173/99 70 20  "98.2 °F (36.8 °C) 95 %      MAP       --          Nursing notes and vital signs reviewed.  Constitutional: Patient is in no acute distress.   Head: Normocephalic. Atraumatic.   Eyes:  Conjunctivae are not pale. No scleral icterus.   ENT: Mucous membranes moist.   Neck: Supple.   Cardiovascular: Regular rate. Regular rhythm.   Pulmonary: No respiratory distress.  Lungs are clear and equal bilaterally.    Abdominal: Non-distended.   Musculoskeletal: Moves all extremities. No obvious deformities.   Skin: Warm and dry.  Circular wound to right mid lateral lower leg with pink wound bases and no surrounding erythema, warmth, induration, fluctuance, or drainage. (See picture).     Neurological:  Alert, awake, and appropriate. Normal speech. No acute lateralizing neurologic deficits appreciated.   Psychiatric: Normal affect.             ED Course   Procedures  Vitals:    07/12/23 1538 07/12/23 1617 07/12/23 1632   BP: (!) 173/99 (!) 133/93 133/83   Pulse: 70  69   Resp: 20     Temp: 98.2 °F (36.8 °C)     TempSrc: Oral     SpO2: 95%  (!) 93%   Weight: (!) 203.3 kg (448 lb 3.1 oz)     Height: 6' 3" (1.905 m)       Lab Results Interpreted as Abnormal:  Labs Reviewed   CBC W/ AUTO DIFFERENTIAL - Abnormal; Notable for the following components:       Result Value    WBC 2.64 (*)     Platelets 106 (*)     Gran # (ANC) 1.5 (*)     Lymph # 0.6 (*)     All other components within normal limits    Narrative:     Release to patient->Immediate   BASIC METABOLIC PANEL - Abnormal; Notable for the following components:    BUN 21 (*)     All other components within normal limits    Narrative:     Release to patient->Immediate   HIV 1 / 2 ANTIBODY   HEPATITIS C ANTIBODY   HEP C VIRUS HOLD SPECIMEN      All Lab Results:  Results for orders placed or performed during the hospital encounter of 07/12/23   CBC auto differential   Result Value Ref Range    WBC 2.64 (L) 3.90 - 12.70 K/uL    RBC 4.93 4.60 - 6.20 M/uL    Hemoglobin 15.0 14.0 - 18.0 " g/dL    Hematocrit 46.1 40.0 - 54.0 %    MCV 94 82 - 98 fL    MCH 30.4 27.0 - 31.0 pg    MCHC 32.5 32.0 - 36.0 g/dL    RDW 14.3 11.5 - 14.5 %    Platelets 106 (L) 150 - 450 K/uL    MPV 12.9 9.2 - 12.9 fL    Immature Granulocytes 0.4 0.0 - 0.5 %    Gran # (ANC) 1.5 (L) 1.8 - 7.7 K/uL    Immature Grans (Abs) 0.01 0.00 - 0.04 K/uL    Lymph # 0.6 (L) 1.0 - 4.8 K/uL    Mono # 0.4 0.3 - 1.0 K/uL    Eos # 0.1 0.0 - 0.5 K/uL    Baso # 0.02 0.00 - 0.20 K/uL    nRBC 0 0 /100 WBC    Gran % 56.3 38.0 - 73.0 %    Lymph % 23.9 18.0 - 48.0 %    Mono % 14.8 4.0 - 15.0 %    Eosinophil % 3.8 0.0 - 8.0 %    Basophil % 0.8 0.0 - 1.9 %    Differential Method Automated    Basic metabolic panel   Result Value Ref Range    Sodium 142 136 - 145 mmol/L    Potassium 4.2 3.5 - 5.1 mmol/L    Chloride 104 95 - 110 mmol/L    CO2 28 23 - 29 mmol/L    Glucose 101 70 - 110 mg/dL    BUN 21 (H) 6 - 20 mg/dL    Creatinine 1.0 0.5 - 1.4 mg/dL    Calcium 8.8 8.7 - 10.5 mg/dL    Anion Gap 10 8 - 16 mmol/L    eGFR >60.0 >60 mL/min/1.73 m^2     Imaging Results    None          The emergency physician reviewed the vital signs and test results, which are outlined above.     ED Discussion       ED Course as of 07/12/23 1653 Wed Jul 12, 2023   1643 CBC shows chronic leukopenia and thrombocytopenia.  BNP shows normal renal function and electrolytes.  We will discharge home with wound care instructions, wound care follow up, and antibiotics.  No signs of infection, DVT, or arterial occlusion.  Patient may have peripheral vascular disease causing poor healing as he denies diabetes. [NF]      ED Course User Index  [NF] Anette Anderson,              ED Medication(s) Administered:  Medications   cephALEXin capsule 500 mg (500 mg Oral Given 7/12/23 1616)       Prescription Management: I performed a review of the patient's current Rx medication list as input by nursing staff.    Patient's Medications   New Prescriptions    CEPHALEXIN (KEFLEX) 500 MG CAPSULE    Take  1 capsule (500 mg total) by mouth 4 (four) times daily. for 5 days   Previous Medications    ALLOPURINOL (ZYLOPRIM) 300 MG TABLET    Take 1 tablet (300 mg total) by mouth once daily.    APIXABAN (ELIQUIS) 5 MG TAB    Take 1 tablet (5 mg total) by mouth 2 (two) times daily.    ARIPIPRAZOLE (ABILIFY) 10 MG TAB    Take 1 tablet (10 mg total) by mouth once daily.    COLCHICINE (COLCRYS) 0.6 MG TABLET    Take 1 tablet (0.6 mg total) by mouth once daily.    GABAPENTIN (NEURONTIN) 300 MG CAPSULE    TAKE 1 CAPSULE BY MOUTH  ONCE DAILY    IXEKIZUMAB (TALTZ AUTOINJECTOR) 80 MG/ML ATIN    Inject 80 mg into the skin every 28 days.    LEVOTHYROXINE (SYNTHROID) 25 MCG TABLET    Take 1 tablet (25 mcg total) by mouth before breakfast.    LOSARTAN (COZAAR) 100 MG TABLET    Take 1 tablet (100 mg total) by mouth once daily.    METOPROLOL SUCCINATE (TOPROL-XL) 100 MG 24 HR TABLET    Take 1 tablet (100 mg total) by mouth once daily.    POTASSIUM CHLORIDE SA (K-DUR,KLOR-CON) 20 MEQ TABLET    Take 1 tablet (20 mEq total) by mouth 2 (two) times daily.    TORSEMIDE (DEMADEX) 20 MG TAB    Take 2 tablets (40 mg total) by mouth 2 (two) times a day.    VENLAFAXINE (EFFEXOR-XR) 150 MG CP24    TAKE 1 CAPSULE BY MOUTH  ONCE DAILY    VENLAFAXINE (EFFEXOR-XR) 75 MG 24 HR CAPSULE    TAKE 1 CAPSULE BY MOUTH  ONCE DAILY   Modified Medications    No medications on file   Discontinued Medications    No medications on file         Follow-up Information       Dominick Allen MD In 5 days.    Specialty: Family Medicine  Contact information:  [address]  [place] [address]  [phone]                            Clinical Impression       ICD-10-CM ICD-9-CM   1. Leg wound, right, initial encounter  S81.801A 894.0      ED Disposition Condition    Discharge Stable               Anette Anderson,   07/12/23 0473

## 2023-07-12 NOTE — DISCHARGE INSTRUCTIONS
Discontinue Betadine use.  Clean your wound daily with dial antibacterial soap and warm water.  Continued to cover with Neosporin and bandage as well as compression stocking.  Follow up with wound care.  Return to the ER with any fevers, drainage from the wound, leg swelling, chest pain or shortness of breath.

## 2023-07-13 ENCOUNTER — PATIENT OUTREACH (OUTPATIENT)
Dept: EMERGENCY MEDICINE | Facility: HOSPITAL | Age: 58
End: 2023-07-13
Payer: MEDICARE

## 2023-07-13 LAB
HCV AB SERPL QL IA: NEGATIVE
HEP C VIRUS HOLD SPECIMEN: NORMAL
HIV 1+2 AB+HIV1 P24 AG SERPL QL IA: NEGATIVE

## 2023-07-14 NOTE — PROGRESS NOTES
"The patient location is: [place]  The chief complaint leading to consultation is: Health Risk Assessment    Visit type: audiovisual    Face to Face time with patient: 20  35 minutes of total time spent on the encounter, which includes face to face time and non-face to face time preparing to see the patient (eg, review of tests), Obtaining and/or reviewing separately obtained history, Documenting clinical information in the electronic or other health record, Independently interpreting results (not separately reported) and communicating results to the patient/family/caregiver, or Care coordination (not separately reported).         Each patient to whom he or she provides medical services by telemedicine is:  (1) informed of the relationship between the physician and patient and the respective role of any other health care provider with respect to management of the patient; and (2) notified that he or she may decline to receive medical services by telemedicine and may withdraw from such care at any time.    Notes:       Caio Ontiveros presented for a  Medicare AWV and comprehensive Health Risk Assessment today. The following components were reviewed and updated:    Medical history  Family History  Social history  Allergies and Current Medications  Health Risk Assessment  Health Maintenance  Care Team         ** See Completed Assessments for Annual Wellness Visit within the encounter summary.**         The following assessments were completed:  Living Situation  CAGE  Depression Screening  Fall Risk Assessment (MACH 10)  Hearing Assessment(HHI)  Cognitive Function Screening  Nutrition Screening  ADL Screening  PAQ Screening      Vitals:    07/12/23 1255   Weight: (!) 204.1 kg (450 lb)   Height: 6' 3" (1.905 m)     Body mass index is 56.25 kg/m².  Physical Exam  Constitutional:       Comments: Morbid obese   Neurological:      General: No focal deficit present.      Mental Status: He is alert and oriented to person, place, " and time.             Diagnoses and health risks identified today and associated recommendations/orders:    1. Encounter for preventive health examination  Assessments completed. Preventive measures and health maintenance reviewed with patient.  Review for opioid screening: Patient does not have any prescriptions for opioids.  Review for substance use disorder: Patient does not use any substances.    2. Morbid obesity with BMI of 50.0-59.9, adult  Stable, followed by PCP.  Healthy diet and tolerable exercises encouraged.    3. Chronic atrial fibrillation  Stable, patient on Eliquis. Followed by Cardiology.    4. Chronic benign neutropenia  Stable, followed by PCP.    5. Chronic diastolic congestive heart failure  Stable, patient on Toprol-XL and Demadex. Followed by Cardiology.    6. Psoriatic arthritis  Stable, patient on Taltz injection. Followed by Rheumatology.    7. Thrombocytopenia, unspecified  Stable, followed by PCP.    8. Moderate episode of recurrent major depressive disorder  Stable, patient on Effexor. Followed by PCP.    9. Irritability and anger  Stable, patient on Abilify. Followed by PCP.    10. Chronic anticoagulation  Stable, patient on Eliquis. Followed by Cardiology.    11. Essential hypertension  Stable, patient on Losartan and Toprol-XL. Followed by PCP.    12. Prediabetes  Stable, followed by PCP. Last A1C 5.7    13. Idiopathic chronic gout of multiple sites without tophus  Stable, patient on Allopurinol and has Colchicine prn. Followed by PCP    14. EVANGELIST (obstructive sleep apnea)/ Obesity Hypoventilation syndrome   Stable, followed by Pulmonary.      Provided Caio with a 5-10 year written screening schedule and personal prevention plan. Recommendations were developed using the USPSTF age appropriate recommendations. Education, counseling, and referrals were provided as needed. After Visit Summary printed and given to patient which includes a list of additional screenings\tests  needed.    Follow up in about 1 year (around 7/12/2024) for your next annual wellness visit.    Hattie Mckeon, NP  I offered to discuss advanced care planning, including how to pick a person who would make decisions for you if you were unable to make them for yourself, called a health care power of , and what kind of decisions you might make such as use of life sustaining treatments such as ventilators and tube feeding when faced with a life limiting illness recorded on a living will that they will need to know. (How you want to be cared for as you near the end of your natural life)     X Patient is interested in learning more about how to make advanced directives.  I provided them paperwork and offered to discuss this with them.

## 2023-07-14 NOTE — PATIENT INSTRUCTIONS
Counseling and Referral of Other Preventative  (Italic type indicates deductible and co-insurance are waived)    Patient Name: Caio Ontiveros  Today's Date: 7/13/2023    Health Maintenance       Date Due Completion Date    Shingles Vaccine (1 of 2) 05/17/2024 (Originally 10/16/1984) ---    COVID-19 Vaccine (3 - Moderna series) 05/17/2024 (Originally 10/22/2021) 8/27/2021    Pneumococcal Vaccines (Age 0-64) (2 - PCV) 05/17/2024 (Originally 1/4/2020) 1/4/2019    Influenza Vaccine (1) 09/01/2023 1/4/2019    PROSTATE-SPECIFIC ANTIGEN 05/16/2024 5/16/2023    Hemoglobin A1c (Prediabetes) 05/16/2024 5/16/2023    Colorectal Cancer Screening 05/23/2026 5/23/2023    Lipid Panel 05/16/2028 5/16/2023        No orders of the defined types were placed in this encounter.      The following information is provided to all patients.  This information is to help you find resources for any of the problems found today that may be affecting your health:                Living healthy guide: www.[facility].[place].gov      Understanding Diabetes: www.diabetes.org      Eating healthy: www.cdc.gov/healthyweight      CDC home safety checklist: www.cdc.gov/steadi/patient.html      Agency on Aging: www.goea.[place].[facility]      Alcoholics anonymous (AA): www.aa.org      Physical Activity: www.oanh.nih.gov/jr6msuw      Tobacco use: www.quitwithusla.org

## 2023-08-02 ENCOUNTER — PATIENT MESSAGE (OUTPATIENT)
Dept: INTERNAL MEDICINE | Facility: CLINIC | Age: 58
End: 2023-08-02
Payer: MEDICARE

## 2023-08-09 ENCOUNTER — OFFICE VISIT (OUTPATIENT)
Dept: PAIN MEDICINE | Facility: CLINIC | Age: 58
End: 2023-08-09
Payer: MEDICARE

## 2023-08-09 ENCOUNTER — HOSPITAL ENCOUNTER (OUTPATIENT)
Dept: RADIOLOGY | Facility: HOSPITAL | Age: 58
Discharge: HOME | End: 2023-08-09
Attending: PAIN MEDICINE
Payer: MEDICARE

## 2023-08-09 VITALS
HEIGHT: 75 IN | RESPIRATION RATE: 16 BRPM | SYSTOLIC BLOOD PRESSURE: 146 MMHG | DIASTOLIC BLOOD PRESSURE: 97 MMHG | WEIGHT: 315 LBS | BODY MASS INDEX: 39.17 KG/M2 | HEART RATE: 89 BPM

## 2023-08-09 DIAGNOSIS — M25.552 CHRONIC LEFT HIP PAIN: Primary | ICD-10-CM

## 2023-08-09 DIAGNOSIS — G89.29 CHRONIC LEFT HIP PAIN: Primary | ICD-10-CM

## 2023-08-09 DIAGNOSIS — M25.552 CHRONIC LEFT HIP PAIN: ICD-10-CM

## 2023-08-09 DIAGNOSIS — G89.29 CHRONIC LEFT HIP PAIN: ICD-10-CM

## 2023-08-09 DIAGNOSIS — M51.37 DDD (DEGENERATIVE DISC DISEASE), LUMBOSACRAL: ICD-10-CM

## 2023-08-09 DIAGNOSIS — M47.817 FACET ARTHRITIS OF LUMBOSACRAL REGION: ICD-10-CM

## 2023-08-09 DIAGNOSIS — G89.29 CHRONIC BILATERAL LOW BACK PAIN WITH SCIATICA, SCIATICA LATERALITY UNSPECIFIED: ICD-10-CM

## 2023-08-09 DIAGNOSIS — M54.40 CHRONIC BILATERAL LOW BACK PAIN WITH SCIATICA, SCIATICA LATERALITY UNSPECIFIED: ICD-10-CM

## 2023-08-09 DIAGNOSIS — M16.12 OSTEOARTHRITIS OF LEFT HIP, UNSPECIFIED OSTEOARTHRITIS TYPE: ICD-10-CM

## 2023-08-09 PROCEDURE — 3077F PR MOST RECENT SYSTOLIC BLOOD PRESSURE >= 140 MM HG: ICD-10-PCS | Mod: CPTII,S$GLB,, | Performed by: PAIN MEDICINE

## 2023-08-09 PROCEDURE — 3044F PR MOST RECENT HEMOGLOBIN A1C LEVEL <7.0%: ICD-10-PCS | Mod: CPTII,S$GLB,, | Performed by: PAIN MEDICINE

## 2023-08-09 PROCEDURE — 99204 PR OFFICE/OUTPT VISIT, NEW, LEVL IV, 45-59 MIN: ICD-10-PCS | Mod: S$GLB,,, | Performed by: PAIN MEDICINE

## 2023-08-09 PROCEDURE — 73521 X-RAY EXAM HIPS BI 2 VIEWS: CPT | Mod: 26,,, | Performed by: RADIOLOGY

## 2023-08-09 PROCEDURE — 4010F PR ACE/ARB THEARPY RXD/TAKEN: ICD-10-PCS | Mod: CPTII,S$GLB,, | Performed by: PAIN MEDICINE

## 2023-08-09 PROCEDURE — 73521 X-RAY EXAM HIPS BI 2 VIEWS: CPT | Mod: TC

## 2023-08-09 PROCEDURE — 1159F MED LIST DOCD IN RCRD: CPT | Mod: CPTII,S$GLB,, | Performed by: PAIN MEDICINE

## 2023-08-09 PROCEDURE — 99999 PR PBB SHADOW E&M-EST. PATIENT-LVL IV: CPT | Mod: PBBFAC,,, | Performed by: PAIN MEDICINE

## 2023-08-09 PROCEDURE — 3080F PR MOST RECENT DIASTOLIC BLOOD PRESSURE >= 90 MM HG: ICD-10-PCS | Mod: CPTII,S$GLB,, | Performed by: PAIN MEDICINE

## 2023-08-09 PROCEDURE — 99204 OFFICE O/P NEW MOD 45 MIN: CPT | Mod: S$GLB,,, | Performed by: PAIN MEDICINE

## 2023-08-09 PROCEDURE — 3080F DIAST BP >= 90 MM HG: CPT | Mod: CPTII,S$GLB,, | Performed by: PAIN MEDICINE

## 2023-08-09 PROCEDURE — 4010F ACE/ARB THERAPY RXD/TAKEN: CPT | Mod: CPTII,S$GLB,, | Performed by: PAIN MEDICINE

## 2023-08-09 PROCEDURE — 3008F PR BODY MASS INDEX (BMI) DOCUMENTED: ICD-10-PCS | Mod: CPTII,S$GLB,, | Performed by: PAIN MEDICINE

## 2023-08-09 PROCEDURE — 3077F SYST BP >= 140 MM HG: CPT | Mod: CPTII,S$GLB,, | Performed by: PAIN MEDICINE

## 2023-08-09 PROCEDURE — 73521 XR HIPS BILATERAL 2 VIEW INCL AP PELVIS: ICD-10-PCS | Mod: 26,,, | Performed by: RADIOLOGY

## 2023-08-09 PROCEDURE — 1159F PR MEDICATION LIST DOCUMENTED IN MEDICAL RECORD: ICD-10-PCS | Mod: CPTII,S$GLB,, | Performed by: PAIN MEDICINE

## 2023-08-09 PROCEDURE — 3044F HG A1C LEVEL LT 7.0%: CPT | Mod: CPTII,S$GLB,, | Performed by: PAIN MEDICINE

## 2023-08-09 PROCEDURE — 3008F BODY MASS INDEX DOCD: CPT | Mod: CPTII,S$GLB,, | Performed by: PAIN MEDICINE

## 2023-08-09 PROCEDURE — 99999 PR PBB SHADOW E&M-EST. PATIENT-LVL IV: ICD-10-PCS | Mod: PBBFAC,,, | Performed by: PAIN MEDICINE

## 2023-08-09 RX ORDER — TOPIRAMATE 50 MG/1
50 TABLET, FILM COATED ORAL 2 TIMES DAILY
Qty: 60 TABLET | Refills: 11 | Status: SHIPPED | OUTPATIENT
Start: 2023-08-09 | End: 2024-08-08

## 2023-08-09 NOTE — H&P (VIEW-ONLY)
New Patient Chronic Pain Note (Initial Visit)    Referring Physician: Dominick Allen MD    PCP: Dominick Allen MD    Chief Complaint:   Chief Complaint   Patient presents with    Low-back Pain        SUBJECTIVE:    Caio Ontiveros is a 57 y.o. male who presents to the clinic for the evaluation of chronic lower back pain for years that gradually got worse but has become severe over the past several months.  It is affecting his ADLs significantly including the ability to walk and stand.  It is located in the left buttock and radiates to the left groin and is worse with weight-bearing.  There is no radicular pain tingling or numbness to the legs but he reports weakness in the left leg versus due to severe pain.  Denies loss of bowel or bladder control.  It is worse with walking, standing, getting up from the bed or chair and is better (slightly) with medications including Tylenol Arthritis and gabapentin which he takes at 300 mg daily.  He could not afford the co-pay for physical therapy, not well covered by his insurance. He currently rates it at 10/10.  He gives it a 6/10 when it is at best.  He has history of extreme morbid obesity and lost significant weight after gastric bypass surgery in 2014 dropping down from 500 lb 340 lb.  He managed to gain some weight back and he is around 400 lb.  He has no history of kidney stones, glaucoma or sulfa allergies.      Pain Disability Index Review:     Last 3 PDI Scores 8/9/2023   Pain Disability Index (PDI) 62        report:  Not applicable    Pain Procedures:   None.      Past Medical History:   Diagnosis Date    A-fib     CHF (congestive heart failure)     Gout, chronic     Hypertension     Sleep apnea      Past Surgical History:   Procedure Laterality Date    CHOLECYSTECTOMY      GASTRIC BYPASS  2014     Social History     Socioeconomic History    Marital status:     Number of children: 1   Tobacco Use    Smoking status: Never    Smokeless tobacco: Never    Substance and Sexual Activity    Alcohol use: Not Currently    Drug use: No    Sexual activity: Not Currently     Social Determinants of Health     Financial Resource Strain: Low Risk  (7/12/2023)    Overall Financial Resource Strain (CARDIA)     Difficulty of Paying Living Expenses: Not hard at all   Food Insecurity: No Food Insecurity (7/12/2023)    Hunger Vital Sign     Worried About Running Out of Food in the Last Year: Never true     Ran Out of Food in the Last Year: Never true   Transportation Needs: No Transportation Needs (7/12/2023)    PRAPARE - Transportation     Lack of Transportation (Medical): No     Lack of Transportation (Non-Medical): No   Physical Activity: Insufficiently Active (7/12/2023)    Exercise Vital Sign     Days of Exercise per Week: 2 days     Minutes of Exercise per Session: 10 min   Stress: No Stress Concern Present (7/12/2023)    [employer] [place] [employer] - Occupational Stress Questionnaire     Feeling of Stress : Only a little   Recent Concern: Stress - Stress Concern Present (7/11/2023)    [employer] [place] [place] - Occupational Stress Questionnaire     Feeling of Stress : To some extent   Social Connections: Socially Isolated (7/12/2023)    Social Connection and Isolation Panel [NHANES]     Frequency of Communication with Friends and Family: Twice a week     Frequency of Social Gatherings with Friends and Family: Never     Attends Jewish Services: Never     Active Member of Clubs or Organizations: No     Attends Club or Organization Meetings: Never     Marital Status:    Housing Stability: Low Risk  (7/12/2023)    Housing Stability Vital Sign     Unable to Pay for Housing in the Last Year: No     Number of Places Lived in the Last Year: 1     Unstable Housing in the Last Year: No     Family History   Problem Relation Age of Onset    Hypertension Mother     Stroke Father     Diabetes Father     Hypertension Father     Diabetes Sister      Diabetes Brother        Review of patient's allergies indicates:  No Known Allergies    Current Outpatient Medications   Medication Sig    allopurinoL (ZYLOPRIM) 300 MG tablet Take 1 tablet (300 mg total) by mouth once daily.    apixaban (ELIQUIS) 5 mg Tab Take 1 tablet (5 mg total) by mouth 2 (two) times daily.    colchicine (COLCRYS) 0.6 mg tablet Take 1 tablet (0.6 mg total) by mouth once daily.    gabapentin (NEURONTIN) 300 MG capsule TAKE 1 CAPSULE BY MOUTH  ONCE DAILY    levothyroxine (SYNTHROID) 25 MCG tablet Take 1 tablet (25 mcg total) by mouth before breakfast.    losartan (COZAAR) 100 MG tablet Take 1 tablet (100 mg total) by mouth once daily.    metoprolol succinate (TOPROL-XL) 100 MG 24 hr tablet Take 1 tablet (100 mg total) by mouth once daily.    potassium chloride SA (K-DUR,KLOR-CON) 20 MEQ tablet Take 1 tablet (20 mEq total) by mouth 2 (two) times daily.    torsemide (DEMADEX) 20 MG Tab Take 2 tablets (40 mg total) by mouth 2 (two) times a day.    venlafaxine (EFFEXOR-XR) 150 MG Cp24 TAKE 1 CAPSULE BY MOUTH  ONCE DAILY    venlafaxine (EFFEXOR-XR) 75 MG 24 hr capsule TAKE 1 CAPSULE BY MOUTH  ONCE DAILY    ARIPiprazole (ABILIFY) 10 MG Tab Take 1 tablet (10 mg total) by mouth once daily.    topiramate (TOPAMAX) 50 MG tablet Take 1 tablet (50 mg total) by mouth 2 (two) times daily.     No current facility-administered medications for this visit.       Review of Systems     GENERAL:  No weight loss, malaise or fevers.  HEENT:   No recent changes in vision or hearing  NECK:  Negative for lumps, no difficulty with swallowing.  RESPIRATORY:  Negative for cough, wheezing or shortness of breath, patient denies any recent URI.  CARDIOVASCULAR:  Negative for chest pain, positive for fluid retention and leg swelling  GI:  Negative for abdominal discomfort, blood in stools or black stools or change in bowel habits.  MUSCULOSKELETAL:  See HPI.  SKIN:  Negative for lesions, rash, and itching.  PSYCH:  No mood  "disorder or recent psychosocial stressors.  Patients sleep is not disturbed secondary to pain.  HEMATOLOGY/LYMPHOLOGY:  Negative for prolonged bleeding, bruising easily or swollen nodes.  Patient is not currently taking any anti-coagulants  NEURO:   No history of headaches, syncope, paralysis, seizures or tremors.  All other reviewed and negative other than HPI.    OBJECTIVE:    BP (!) 146/97   Pulse 89   Resp 16   Ht 6' 3" (1.905 m)   Wt (!) 201 kg (443 lb 2 oz)   BMI 55.39 kg/m²         Physical Exam    GENERAL: Well appearing, in no acute distress, but in discomfort when trying to walk, alert and oriented x3.  Morbidly obese  PSYCH:  Mood and affect appropriate.  SKIN: Skin color, texture, turgor normal, no rashes or lesions.  HEAD/FACE:  Normocephalic, atraumatic.  CV: chronic edema changes in distal legs and ankles.  PULM: No evidence of respiratory difficulty, symmetric chest rise.  GI:  Abdomen soft and non-tender.    GAIT: slow antalgic.    LUMBAR SPINE:   Palpation:  Mild tenderness over left facet joints and paraspinous muscles. No trigger points.  ROM: Pain with flexion, extension and rotation.  Straight leg raising is negative.  SI JOINTS: bilateral SI joint, no tenderness  LEFT HIP:  SEVERELY RESTRICTED AND PAINFUL RANGE OF MOTION including internal, external rotation, abduction and flexion.  NEURO:   MOTOR: Bilateral lower extremity muscle strength is normal. No atrophy or tone abnormalities are noted.  SENSORY: No loss of sensation in L3 through S1 dermatomes bilaterally.  DTR's: No clonus.    Imaging:        Results for orders placed during the hospital encounter of 12/28/22    X-Ray Lumbar Spine AP And Lateral    Narrative  EXAMINATION:  XR LUMBAR SPINE AP AND LATERAL    CLINICAL HISTORY:  Low back pain, unspecified    TECHNIQUE:  AP, lateral and spot images were performed of the lumbar spine.    COMPARISON:  Radiographs from August 2020    FINDINGS:  No scoliosis.  Five lumbar type vertebral " bodies noted.  Bridging syndesmophytes noted at multiple levels. There is no fracture or listhesis.  Minimal narrowing at L3-L4 and L5-S1 is unchanged.  There is also minimal lower lumbar facet arthropathy.  No significant change from prior study.      Electronically signed by: Emory Bah MD  Date:    12/28/2022    XR SACROILIAC JOINTS 3 VIEWS     CLINICAL HISTORY:  mechanical lower back pain, psoriasis acitive.  Assess for sacroiilitis/ankylosis;  Low back pain     COMPARISON:  None     FINDINGS:  Degenerative changes noted in the included lumbosacral spine and hips.  No fracture or dislocation.  Pelvic ring is intact.  SI joints normal and symmetric in appearance bilaterally.  No sclerotic or erosive changes appreciated.  Heterogeneous increased density identified within the femoral heads.  There is spurring and buttressing of the femoral neck cortex consistent with degenerative changes.  Subchondral degenerative cyst/geodes cannot be excluded bilaterally.     Impression:  I reviewed the films which show moderate to severe degenerative and sclerotic changes of the left femoral head and acetabulum, worse than the right.  SI joints within normal limits.  Degenerative changes lumbosacral spine and hips.        Electronically signed by: Marty Lee MD  Date:                                            08/25/2020      LABS:  Lab Results   Component Value Date    WBC 2.64 (L) 07/12/2023    HGB 15.0 07/12/2023    HCT 46.1 07/12/2023    MCV 94 07/12/2023     (L) 07/12/2023       CMP  Sodium   Date Value Ref Range Status   07/12/2023 142 136 - 145 mmol/L Final     Potassium   Date Value Ref Range Status   07/12/2023 4.2 3.5 - 5.1 mmol/L Final     Chloride   Date Value Ref Range Status   07/12/2023 104 95 - 110 mmol/L Final     CO2   Date Value Ref Range Status   07/12/2023 28 23 - 29 mmol/L Final     Glucose   Date Value Ref Range Status   07/12/2023 101 70 - 110 mg/dL Final     BUN   Date Value Ref Range Status    07/12/2023 21 (H) 6 - 20 mg/dL Final     Creatinine   Date Value Ref Range Status   07/12/2023 1.0 0.5 - 1.4 mg/dL Final     Calcium   Date Value Ref Range Status   07/12/2023 8.8 8.7 - 10.5 mg/dL Final     Total Protein   Date Value Ref Range Status   05/16/2023 8.3 6.0 - 8.4 g/dL Final     Albumin   Date Value Ref Range Status   05/16/2023 4.2 3.5 - 5.2 g/dL Final     Total Bilirubin   Date Value Ref Range Status   05/16/2023 1.0 0.1 - 1.0 mg/dL Final     Comment:     For infants and newborns, interpretation of results should be based  on gestational age, weight and in agreement with clinical  observations.    Premature Infant recommended reference ranges:  Up to 24 hours.............<8.0 mg/dL  Up to 48 hours............<12.0 mg/dL  3-5 days..................<15.0 mg/dL  6-29 days.................<15.0 mg/dL       Alkaline Phosphatase   Date Value Ref Range Status   05/16/2023 78 55 - 135 U/L Final     AST   Date Value Ref Range Status   05/16/2023 24 10 - 40 U/L Final     ALT   Date Value Ref Range Status   05/16/2023 16 10 - 44 U/L Final     Anion Gap   Date Value Ref Range Status   07/12/2023 10 8 - 16 mmol/L Final     eGFR if    Date Value Ref Range Status   02/22/2022 >60.0 >60 mL/min/1.73 m^2 Final     eGFR if non    Date Value Ref Range Status   02/22/2022 >60.0 >60 mL/min/1.73 m^2 Final     Comment:     Calculation used to obtain the estimated glomerular filtration  rate (eGFR) is the CKD-EPI equation.          Lab Results   Component Value Date    HGBA1C 5.7 (H) 05/16/2023             ASSESSMENT: 57 y.o. year old male with chronic left hip pain that has gotten severe over the past several months not improving with medications.  X-rays show advanced sclerotic changes of the left hip:    1. Chronic left hip pain  X-Ray Hips Bilateral 2 View Incl AP Pelvis    IR Aspiration Injection Large Joint W FL    IR Aspiration Injection Large Joint W FL    Case Request-RAD/Other  Procedure Area: Left IA Hip Joint injection    CANCELED: Case Request-RAD/Other Procedure Area: Left IA Hip Joint injection      2. Osteoarthritis of left hip, unspecified osteoarthritis type        3. Chronic bilateral low back pain with sciatica, sciatica laterality unspecified  Ambulatory referral/consult to Pain Clinic    topiramate (TOPAMAX) 50 MG tablet      4. DDD (degenerative disc disease), lumbosacral  Ambulatory referral/consult to Pain Clinic      5. Facet arthritis of lumbosacral region  Ambulatory referral/consult to Pain Clinic            PLAN:    1- Interventions:  The patient has failed multiple medications.  He can not afford physical therapy.  I honestly do not think he can tolerate it any way.  Left intra-articular hip joint injection under fluoroscopy guidance.  Explained the risks and benefits of the procedure in detail with the patient today in clinic along with alternative treatment options, and the patient elected to pursue the intervention at this time.    2. Follow-up to clinic 2 weeks after the injection.  3. We will order updated left hip x-ray.  His last one is 3 years old.  4. We will replace gabapentin by topiramate which will be titrated.      - Anticoagulation use: Patient is taking apixaban (Eliquis) for atrial fibrillation.    ; he will NOT have to stop  it  prior to procedure.        - Medications:  We will replace gabapentin by topiramate which will be titrated.  He has no contraindications.  Hopefully this will help with his weight loss  - Therapy:  Can not afford co-pay.  I believe he will not be able to tolerate it.  - Imaging:  X-ray of the left hip  - Consults/Referrals:  - Records:  Reviewed and personally evaluated images of his x-rays    - Counseled patient regarding the importance of activity modification    - Patient Questions: Answered all of the patient's questions regarding diagnosis, therapy, and treatment    The above plan and management options were discussed at  length with patient. Patient is in agreement with the above and verbalized understanding.      Jony Rausch MD  Interventional Pain Management  [facility]    Disclaimer:  This note was prepared using voice recognition system and is likely to have sound alike errors that may have been overlooked even after proof reading.  Please call me with any questions

## 2023-08-09 NOTE — PROGRESS NOTES
New Patient Chronic Pain Note (Initial Visit)    Referring Physician: Dominick Allen MD    PCP: Dominick Allen MD    Chief Complaint:   Chief Complaint   Patient presents with    Low-back Pain        SUBJECTIVE:    Caio Ontiveros is a 57 y.o. male who presents to the clinic for the evaluation of chronic lower back pain for years that gradually got worse but has become severe over the past several months.  It is affecting his ADLs significantly including the ability to walk and stand.  It is located in the left buttock and radiates to the left groin and is worse with weight-bearing.  There is no radicular pain tingling or numbness to the legs but he reports weakness in the left leg versus due to severe pain.  Denies loss of bowel or bladder control.  It is worse with walking, standing, getting up from the bed or chair and is better (slightly) with medications including Tylenol Arthritis and gabapentin which he takes at 300 mg daily.  He could not afford the co-pay for physical therapy, not well covered by his insurance. He currently rates it at 10/10.  He gives it a 6/10 when it is at best.  He has history of extreme morbid obesity and lost significant weight after gastric bypass surgery in 2014 dropping down from 500 lb 340 lb.  He managed to gain some weight back and he is around 400 lb.  He has no history of kidney stones, glaucoma or sulfa allergies.      Pain Disability Index Review:     Last 3 PDI Scores 8/9/2023   Pain Disability Index (PDI) 62        report:  Not applicable    Pain Procedures:   None.      Past Medical History:   Diagnosis Date    A-fib     CHF (congestive heart failure)     Gout, chronic     Hypertension     Sleep apnea      Past Surgical History:   Procedure Laterality Date    CHOLECYSTECTOMY      GASTRIC BYPASS  2014     Social History     Socioeconomic History    Marital status:     Number of children: 1   Tobacco Use    Smoking status: Never    Smokeless tobacco: Never    Substance and Sexual Activity    Alcohol use: Not Currently    Drug use: No    Sexual activity: Not Currently     Social Determinants of Health     Financial Resource Strain: Low Risk  (7/12/2023)    Overall Financial Resource Strain (CARDIA)     Difficulty of Paying Living Expenses: Not hard at all   Food Insecurity: No Food Insecurity (7/12/2023)    Hunger Vital Sign     Worried About Running Out of Food in the Last Year: Never true     Ran Out of Food in the Last Year: Never true   Transportation Needs: No Transportation Needs (7/12/2023)    PRAPARE - Transportation     Lack of Transportation (Medical): No     Lack of Transportation (Non-Medical): No   Physical Activity: Insufficiently Active (7/12/2023)    Exercise Vital Sign     Days of Exercise per Week: 2 days     Minutes of Exercise per Session: 10 min   Stress: No Stress Concern Present (7/12/2023)    [employer] [place] [employer] - Occupational Stress Questionnaire     Feeling of Stress : Only a little   Recent Concern: Stress - Stress Concern Present (7/11/2023)    [employer] [place] [employer] - Occupational Stress Questionnaire     Feeling of Stress : To some extent   Social Connections: Socially Isolated (7/12/2023)    Social Connection and Isolation Panel [NHANES]     Frequency of Communication with Friends and Family: Twice a week     Frequency of Social Gatherings with Friends and Family: Never     Attends Worship Services: Never     Active Member of Clubs or Organizations: No     Attends Club or Organization Meetings: Never     Marital Status:    Housing Stability: Low Risk  (7/12/2023)    Housing Stability Vital Sign     Unable to Pay for Housing in the Last Year: No     Number of Places Lived in the Last Year: 1     Unstable Housing in the Last Year: No     Family History   Problem Relation Age of Onset    Hypertension Mother     Stroke Father     Diabetes Father     Hypertension Father     Diabetes Sister      Diabetes Brother        Review of patient's allergies indicates:  No Known Allergies    Current Outpatient Medications   Medication Sig    allopurinoL (ZYLOPRIM) 300 MG tablet Take 1 tablet (300 mg total) by mouth once daily.    apixaban (ELIQUIS) 5 mg Tab Take 1 tablet (5 mg total) by mouth 2 (two) times daily.    colchicine (COLCRYS) 0.6 mg tablet Take 1 tablet (0.6 mg total) by mouth once daily.    gabapentin (NEURONTIN) 300 MG capsule TAKE 1 CAPSULE BY MOUTH  ONCE DAILY    levothyroxine (SYNTHROID) 25 MCG tablet Take 1 tablet (25 mcg total) by mouth before breakfast.    losartan (COZAAR) 100 MG tablet Take 1 tablet (100 mg total) by mouth once daily.    metoprolol succinate (TOPROL-XL) 100 MG 24 hr tablet Take 1 tablet (100 mg total) by mouth once daily.    potassium chloride SA (K-DUR,KLOR-CON) 20 MEQ tablet Take 1 tablet (20 mEq total) by mouth 2 (two) times daily.    torsemide (DEMADEX) 20 MG Tab Take 2 tablets (40 mg total) by mouth 2 (two) times a day.    venlafaxine (EFFEXOR-XR) 150 MG Cp24 TAKE 1 CAPSULE BY MOUTH  ONCE DAILY    venlafaxine (EFFEXOR-XR) 75 MG 24 hr capsule TAKE 1 CAPSULE BY MOUTH  ONCE DAILY    ARIPiprazole (ABILIFY) 10 MG Tab Take 1 tablet (10 mg total) by mouth once daily.    topiramate (TOPAMAX) 50 MG tablet Take 1 tablet (50 mg total) by mouth 2 (two) times daily.     No current facility-administered medications for this visit.       Review of Systems     GENERAL:  No weight loss, malaise or fevers.  HEENT:   No recent changes in vision or hearing  NECK:  Negative for lumps, no difficulty with swallowing.  RESPIRATORY:  Negative for cough, wheezing or shortness of breath, patient denies any recent URI.  CARDIOVASCULAR:  Negative for chest pain, positive for fluid retention and leg swelling  GI:  Negative for abdominal discomfort, blood in stools or black stools or change in bowel habits.  MUSCULOSKELETAL:  See HPI.  SKIN:  Negative for lesions, rash, and itching.  PSYCH:  No mood  "disorder or recent psychosocial stressors.  Patients sleep is not disturbed secondary to pain.  HEMATOLOGY/LYMPHOLOGY:  Negative for prolonged bleeding, bruising easily or swollen nodes.  Patient is not currently taking any anti-coagulants  NEURO:   No history of headaches, syncope, paralysis, seizures or tremors.  All other reviewed and negative other than HPI.    OBJECTIVE:    BP (!) 146/97   Pulse 89   Resp 16   Ht 6' 3" (1.905 m)   Wt (!) 201 kg (443 lb 2 oz)   BMI 55.39 kg/m²         Physical Exam    GENERAL: Well appearing, in no acute distress, but in discomfort when trying to walk, alert and oriented x3.  Morbidly obese  PSYCH:  Mood and affect appropriate.  SKIN: Skin color, texture, turgor normal, no rashes or lesions.  HEAD/FACE:  Normocephalic, atraumatic.  CV: chronic edema changes in distal legs and ankles.  PULM: No evidence of respiratory difficulty, symmetric chest rise.  GI:  Abdomen soft and non-tender.    GAIT: slow antalgic.    LUMBAR SPINE:   Palpation:  Mild tenderness over left facet joints and paraspinous muscles. No trigger points.  ROM: Pain with flexion, extension and rotation.  Straight leg raising is negative.  SI JOINTS: bilateral SI joint, no tenderness  LEFT HIP:  SEVERELY RESTRICTED AND PAINFUL RANGE OF MOTION including internal, external rotation, abduction and flexion.  NEURO:   MOTOR: Bilateral lower extremity muscle strength is normal. No atrophy or tone abnormalities are noted.  SENSORY: No loss of sensation in L3 through S1 dermatomes bilaterally.  DTR's: No clonus.    Imaging:        Results for orders placed during the hospital encounter of 12/28/22    X-Ray Lumbar Spine AP And Lateral    Narrative  EXAMINATION:  XR LUMBAR SPINE AP AND LATERAL    CLINICAL HISTORY:  Low back pain, unspecified    TECHNIQUE:  AP, lateral and spot images were performed of the lumbar spine.    COMPARISON:  Radiographs from August 2020    FINDINGS:  No scoliosis.  Five lumbar type vertebral " bodies noted.  Bridging syndesmophytes noted at multiple levels. There is no fracture or listhesis.  Minimal narrowing at L3-L4 and L5-S1 is unchanged.  There is also minimal lower lumbar facet arthropathy.  No significant change from prior study.      Electronically signed by: Emory Bah MD  Date:    12/28/2022    XR SACROILIAC JOINTS 3 VIEWS     CLINICAL HISTORY:  mechanical lower back pain, psoriasis acitive.  Assess for sacroiilitis/ankylosis;  Low back pain     COMPARISON:  None     FINDINGS:  Degenerative changes noted in the included lumbosacral spine and hips.  No fracture or dislocation.  Pelvic ring is intact.  SI joints normal and symmetric in appearance bilaterally.  No sclerotic or erosive changes appreciated.  Heterogeneous increased density identified within the femoral heads.  There is spurring and buttressing of the femoral neck cortex consistent with degenerative changes.  Subchondral degenerative cyst/geodes cannot be excluded bilaterally.     Impression:  I reviewed the films which show moderate to severe degenerative and sclerotic changes of the left femoral head and acetabulum, worse than the right.  SI joints within normal limits.  Degenerative changes lumbosacral spine and hips.        Electronically signed by: Marty Lee MD  Date:                                            08/25/2020      LABS:  Lab Results   Component Value Date    WBC 2.64 (L) 07/12/2023    HGB 15.0 07/12/2023    HCT 46.1 07/12/2023    MCV 94 07/12/2023     (L) 07/12/2023       CMP  Sodium   Date Value Ref Range Status   07/12/2023 142 136 - 145 mmol/L Final     Potassium   Date Value Ref Range Status   07/12/2023 4.2 3.5 - 5.1 mmol/L Final     Chloride   Date Value Ref Range Status   07/12/2023 104 95 - 110 mmol/L Final     CO2   Date Value Ref Range Status   07/12/2023 28 23 - 29 mmol/L Final     Glucose   Date Value Ref Range Status   07/12/2023 101 70 - 110 mg/dL Final     BUN   Date Value Ref Range Status    07/12/2023 21 (H) 6 - 20 mg/dL Final     Creatinine   Date Value Ref Range Status   07/12/2023 1.0 0.5 - 1.4 mg/dL Final     Calcium   Date Value Ref Range Status   07/12/2023 8.8 8.7 - 10.5 mg/dL Final     Total Protein   Date Value Ref Range Status   05/16/2023 8.3 6.0 - 8.4 g/dL Final     Albumin   Date Value Ref Range Status   05/16/2023 4.2 3.5 - 5.2 g/dL Final     Total Bilirubin   Date Value Ref Range Status   05/16/2023 1.0 0.1 - 1.0 mg/dL Final     Comment:     For infants and newborns, interpretation of results should be based  on gestational age, weight and in agreement with clinical  observations.    Premature Infant recommended reference ranges:  Up to 24 hours.............<8.0 mg/dL  Up to 48 hours............<12.0 mg/dL  3-5 days..................<15.0 mg/dL  6-29 days.................<15.0 mg/dL       Alkaline Phosphatase   Date Value Ref Range Status   05/16/2023 78 55 - 135 U/L Final     AST   Date Value Ref Range Status   05/16/2023 24 10 - 40 U/L Final     ALT   Date Value Ref Range Status   05/16/2023 16 10 - 44 U/L Final     Anion Gap   Date Value Ref Range Status   07/12/2023 10 8 - 16 mmol/L Final     eGFR if    Date Value Ref Range Status   02/22/2022 >60.0 >60 mL/min/1.73 m^2 Final     eGFR if non    Date Value Ref Range Status   02/22/2022 >60.0 >60 mL/min/1.73 m^2 Final     Comment:     Calculation used to obtain the estimated glomerular filtration  rate (eGFR) is the CKD-EPI equation.          Lab Results   Component Value Date    HGBA1C 5.7 (H) 05/16/2023             ASSESSMENT: 57 y.o. year old male with chronic left hip pain that has gotten severe over the past several months not improving with medications.  X-rays show advanced sclerotic changes of the left hip:    1. Chronic left hip pain  X-Ray Hips Bilateral 2 View Incl AP Pelvis    IR Aspiration Injection Large Joint W FL    IR Aspiration Injection Large Joint W FL    Case Request-RAD/Other  Procedure Area: Left IA Hip Joint injection    CANCELED: Case Request-RAD/Other Procedure Area: Left IA Hip Joint injection      2. Osteoarthritis of left hip, unspecified osteoarthritis type        3. Chronic bilateral low back pain with sciatica, sciatica laterality unspecified  Ambulatory referral/consult to Pain Clinic    topiramate (TOPAMAX) 50 MG tablet      4. DDD (degenerative disc disease), lumbosacral  Ambulatory referral/consult to Pain Clinic      5. Facet arthritis of lumbosacral region  Ambulatory referral/consult to Pain Clinic            PLAN:    1- Interventions:  The patient has failed multiple medications.  He can not afford physical therapy.  I honestly do not think he can tolerate it any way.  Left intra-articular hip joint injection under fluoroscopy guidance.  Explained the risks and benefits of the procedure in detail with the patient today in clinic along with alternative treatment options, and the patient elected to pursue the intervention at this time.    2. Follow-up to clinic 2 weeks after the injection.  3. We will order updated left hip x-ray.  His last one is 3 years old.  4. We will replace gabapentin by topiramate which will be titrated.      - Anticoagulation use: Patient is taking apixaban (Eliquis) for atrial fibrillation.    ; he will NOT have to stop  it  prior to procedure.        - Medications:  We will replace gabapentin by topiramate which will be titrated.  He has no contraindications.  Hopefully this will help with his weight loss  - Therapy:  Can not afford co-pay.  I believe he will not be able to tolerate it.  - Imaging:  X-ray of the left hip  - Consults/Referrals:  - Records:  Reviewed and personally evaluated images of his x-rays    - Counseled patient regarding the importance of activity modification    - Patient Questions: Answered all of the patient's questions regarding diagnosis, therapy, and treatment    The above plan and management options were discussed at  length with patient. Patient is in agreement with the above and verbalized understanding.      Jony Rausch MD  Interventional Pain Management  [facility]    Disclaimer:  This note was prepared using voice recognition system and is likely to have sound alike errors that may have been overlooked even after proof reading.  Please call me with any questions

## 2023-08-10 NOTE — PRE-PROCEDURE INSTRUCTIONS
Spoke with patient regarding procedure scheduled on 8.23     Arrival time 0845     Has patient been sick with fever or on antibiotics within the last 7 days? No     Does the patient have any open wounds, sores or rashes? 7/12 leg wound not healing but pt states he completed ABX and wound is healing and scabbed over, denies any S/S of infection     Does the patient have any recent fractures? no     Has patient received a vaccination within the last 7 days? No     Received the COVID vaccination?      Has the patient stopped all medications as directed? na     Does patient have a pacemaker and or defibrillator? no     Does the patient have a ride to and from procedure and someone reliable to remain with patient? wife      Is the patient diabetic? no     Does the patient have sleep apnea? Or use O2 at home? luna cpap     Is the patient receiving sedation?      Is the patient instructed to remain NPO beginning at midnight the night before their procedure? yes     Procedure location confirmed with patient? Yes     Covid- Denies signs/symptoms. Instructed to notify PAT/MD if any changes.

## 2023-08-18 ENCOUNTER — PATIENT MESSAGE (OUTPATIENT)
Dept: PAIN MEDICINE | Facility: HOSPITAL | Age: 58
End: 2023-08-18
Payer: MEDICARE

## 2023-08-22 ENCOUNTER — PATIENT MESSAGE (OUTPATIENT)
Dept: INTERNAL MEDICINE | Facility: CLINIC | Age: 58
End: 2023-08-22
Payer: MEDICARE

## 2023-08-23 ENCOUNTER — HOSPITAL ENCOUNTER (OUTPATIENT)
Facility: HOSPITAL | Age: 58
Discharge: HOME | End: 2023-08-23
Attending: PAIN MEDICINE | Admitting: PAIN MEDICINE
Payer: MEDICARE

## 2023-08-23 VITALS
TEMPERATURE: 98 F | HEIGHT: 75 IN | BODY MASS INDEX: 39.17 KG/M2 | HEART RATE: 70 BPM | RESPIRATION RATE: 18 BRPM | WEIGHT: 315 LBS | OXYGEN SATURATION: 94 % | DIASTOLIC BLOOD PRESSURE: 83 MMHG | SYSTOLIC BLOOD PRESSURE: 120 MMHG

## 2023-08-23 DIAGNOSIS — G89.29 CHRONIC LEFT HIP PAIN: Primary | ICD-10-CM

## 2023-08-23 DIAGNOSIS — M25.552 CHRONIC LEFT HIP PAIN: Primary | ICD-10-CM

## 2023-08-23 PROCEDURE — 20610 DRAIN/INJ JOINT/BURSA W/O US: CPT | Mod: LT | Performed by: PAIN MEDICINE

## 2023-08-23 PROCEDURE — 25000003 PHARM REV CODE 250: Performed by: PAIN MEDICINE

## 2023-08-23 PROCEDURE — 63600175 PHARM REV CODE 636 W HCPCS: Performed by: PAIN MEDICINE

## 2023-08-23 PROCEDURE — 25500020 PHARM REV CODE 255: Performed by: PAIN MEDICINE

## 2023-08-23 RX ORDER — DIAZEPAM 5 MG/1
5 TABLET ORAL EVERY 6 HOURS PRN
Status: DISCONTINUED | OUTPATIENT
Start: 2023-08-23 | End: 2023-08-23 | Stop reason: HOSPADM

## 2023-08-23 RX ORDER — METHYLPREDNISOLONE ACETATE 40 MG/ML
INJECTION, SUSPENSION INTRA-ARTICULAR; INTRALESIONAL; INTRAMUSCULAR; SOFT TISSUE
Status: DISCONTINUED | OUTPATIENT
Start: 2023-08-23 | End: 2023-08-23 | Stop reason: HOSPADM

## 2023-08-23 RX ORDER — BUPIVACAINE HYDROCHLORIDE 5 MG/ML
INJECTION, SOLUTION EPIDURAL; INTRACAUDAL
Status: DISCONTINUED | OUTPATIENT
Start: 2023-08-23 | End: 2023-08-23 | Stop reason: HOSPADM

## 2023-08-23 RX ADMIN — DIAZEPAM 5 MG: 5 TABLET ORAL at 09:08

## 2023-08-23 NOTE — DISCHARGE SUMMARY
Discharge Note  Short Stay      SUMMARY     Admit Date: 8/23/2023    Attending Physician: Jony Rausch MD        Discharge Physician: Jony Rausch MD        Discharge Date: 8/23/2023 10:22 AM    Procedure(s) (LRB):  Left IA Hip Joint injection (Left)    Final Diagnosis: Chronic left hip pain [M25.552, G89.29]    Disposition: Home or self care    Patient Instructions:   Discharge Medication List as of 8/23/2023  9:04 AM        CONTINUE these medications which have NOT CHANGED    Details   apixaban (ELIQUIS) 5 mg Tab Take 1 tablet (5 mg total) by mouth 2 (two) times daily., Starting Sat 8/6/2022, Normal      losartan (COZAAR) 100 MG tablet Take 1 tablet (100 mg total) by mouth once daily., Starting Thu 12/15/2022, Until Fri 12/15/2023, Normal      metoprolol succinate (TOPROL-XL) 100 MG 24 hr tablet Take 1 tablet (100 mg total) by mouth once daily., Starting Tue 7/11/2023, Until Wed 7/10/2024, Normal      allopurinoL (ZYLOPRIM) 300 MG tablet Take 1 tablet (300 mg total) by mouth once daily., Starting Wed 12/28/2022, Normal      ARIPiprazole (ABILIFY) 10 MG Tab Take 1 tablet (10 mg total) by mouth once daily., Starting Wed 2/16/2022, Until Wed 7/12/2023, Normal      colchicine (COLCRYS) 0.6 mg tablet Take 1 tablet (0.6 mg total) by mouth once daily., Starting Tue 1/10/2023, Normal      gabapentin (NEURONTIN) 300 MG capsule TAKE 1 CAPSULE BY MOUTH  ONCE DAILY, Normal      levothyroxine (SYNTHROID) 25 MCG tablet Take 1 tablet (25 mcg total) by mouth before breakfast., Starting Wed 5/17/2023, Until Thu 5/16/2024, Normal      potassium chloride SA (K-DUR,KLOR-CON) 20 MEQ tablet Take 1 tablet (20 mEq total) by mouth 2 (two) times daily., Starting Tue 2/14/2023, Normal      topiramate (TOPAMAX) 50 MG tablet Take 1 tablet (50 mg total) by mouth 2 (two) times daily., Starting Wed 8/9/2023, Until Thu 8/8/2024, Normal      torsemide (DEMADEX) 20 MG Tab Take 2 tablets (40 mg total) by mouth 2 (two) times a day., Starting Fri  7/7/2023, Until Sat 7/6/2024, Normal      !! venlafaxine (EFFEXOR-XR) 150 MG Cp24 TAKE 1 CAPSULE BY MOUTH  ONCE DAILY, Normal      !! venlafaxine (EFFEXOR-XR) 75 MG 24 hr capsule TAKE 1 CAPSULE BY MOUTH  ONCE DAILY, Normal       !! - Potential duplicate medications found. Please discuss with provider.              Discharge Diagnosis: Chronic left hip pain [M25.552, G89.29]  Condition on Discharge: Stable with no complications to procedure   Diet on Discharge: Same as before.  Activity: as per instruction sheet.  Discharge to: Home with a responsible adult.  Follow up: 2-4 weeks       Please call the office at [phone] if you experience any weakness or loss of sensation, fever > 101.5, pain uncontrolled with oral medications, persistent nausea/vomiting/or diarrhea, redness or drainage from the incisions, or any other worrisome concerns. If physician on call was not reached or could not communicate with our office for any reason please go to the nearest emergency department

## 2023-08-23 NOTE — DISCHARGE INSTRUCTIONS

## 2023-08-23 NOTE — OP NOTE
Procedure: Left Hip joint injection under fluoroscopic guidance    Pre-procedure diagnosis: osteoarthritis of the hip (of the above noted laterality)  Post-procedure diagnosis: same    PROVIDER: YAW Rausch MD    ANESTHESIA:   Conscious sedation provided by M.D    INDICATION: The patient has hip pain unresponsive to conservative treatments. Fluoroscopy was used to optimize visualization of needle placement and to maximize safety.     Description of Procedure:  Prior to starting this procedure, risks, benefits, complications, and alternatives were discussed with the patient. The patient agreed to proceed with the procedure and signed a consent. The site and side of the procedure was identified and marked. The patient was taken to the procedural suite and positioned on the table in supine position. A time out was performed. All in attendance were in agreement with the time out. The area over the operative site was widely prepped with ChloraPrep and drapped in usual sterile fashion.    The above noted joint/s was identified on fluoroscopy. A 27-gauge 1.5 inch needle was used to localize the site of entry with 2 mL of 1% PF Lidocaine. A 22 gauge 5 inch spinal needle was then introduced and advanced towards the neck of the femur. The target site was at the junction of the neck and femoral head laterally. The needle was advanced until it hit the bone. It was then retracted by 1-2 mm. Following negative aspiration, we injected a total of 5 cc of Omnipaque 300 contrast solution into the joint capsule, delineating an arthrogram. After negative aspiration, we injected a total of 4 cc of ropivacaine 0.5% and 40 mg of methylprednisolone. No paresthesias were noted on injection or during insertion of the needle. The needle was removed intact. The patient tolerated the procedure well. A bandage was applied to the site of injection.    Description of Findings: Significant narrowing and sclerosis of the hip  joint.    ANESTHESIA NOTE:     The patient was monitored with continuous pulse oximetry, EKG, and intermittent blood pressure monitors.  The patient was hemodynamically stable throughout the entire process and was responsive to voice, and breathing spontaneously.  Supplemental O2 was provided via nasal cannula.  Patient was comfortable for the duration of the procedure. (See nurse documentation and case log for sedation time)    A total of 5 mg of diazepam was given orally 20 minutes before the procedure.    DISPOSITION / PLANS: The patient was transferred to the recovery area in a stable condition for observation. The patient was reexamined prior to discharge. There was no evidence of acute neurologic injury following the procedure.  Patient was discharged from the recovery room after meeting discharge criteria. Home discharge instructions were given to the patient by the staff.    Jony Rausch MD.

## 2023-08-27 DIAGNOSIS — I27.20 PULMONARY HYPERTENSION: ICD-10-CM

## 2023-08-28 RX ORDER — APIXABAN 5 MG/1
5 TABLET, FILM COATED ORAL 2 TIMES DAILY
Qty: 180 TABLET | Refills: 3 | Status: SHIPPED | OUTPATIENT
Start: 2023-08-28

## 2023-09-05 ENCOUNTER — PATIENT MESSAGE (OUTPATIENT)
Dept: DERMATOLOGY | Facility: CLINIC | Age: 58
End: 2023-09-05

## 2023-10-04 ENCOUNTER — PATIENT MESSAGE (OUTPATIENT)
Dept: PAIN MEDICINE | Facility: CLINIC | Age: 58
End: 2023-10-04
Payer: MEDICARE

## 2023-10-09 DIAGNOSIS — M54.40 CHRONIC BILATERAL LOW BACK PAIN WITH SCIATICA, SCIATICA LATERALITY UNSPECIFIED: ICD-10-CM

## 2023-10-09 DIAGNOSIS — G89.29 CHRONIC BILATERAL LOW BACK PAIN WITH SCIATICA, SCIATICA LATERALITY UNSPECIFIED: ICD-10-CM

## 2023-10-09 DIAGNOSIS — G89.29 CHRONIC LEFT HIP PAIN: ICD-10-CM

## 2023-10-09 DIAGNOSIS — M25.552 CHRONIC LEFT HIP PAIN: ICD-10-CM

## 2023-10-09 DIAGNOSIS — M51.37 DDD (DEGENERATIVE DISC DISEASE), LUMBOSACRAL: Primary | ICD-10-CM

## 2023-10-09 RX ORDER — NABUMETONE 750 MG/1
750 TABLET, FILM COATED ORAL 2 TIMES DAILY
Qty: 30 TABLET | Refills: 0 | Status: SHIPPED | OUTPATIENT
Start: 2023-10-09 | End: 2023-11-13 | Stop reason: SDUPTHER

## 2023-10-19 ENCOUNTER — LAB VISIT (OUTPATIENT)
Dept: LAB | Facility: HOSPITAL | Age: 58
End: 2023-10-19
Attending: FAMILY MEDICINE
Payer: MEDICARE

## 2023-10-19 ENCOUNTER — OFFICE VISIT (OUTPATIENT)
Dept: INTERNAL MEDICINE | Facility: CLINIC | Age: 58
End: 2023-10-19
Payer: MEDICARE

## 2023-10-19 VITALS
TEMPERATURE: 98 F | HEIGHT: 75 IN | SYSTOLIC BLOOD PRESSURE: 118 MMHG | WEIGHT: 315 LBS | BODY MASS INDEX: 39.17 KG/M2 | HEART RATE: 96 BPM | OXYGEN SATURATION: 84 % | DIASTOLIC BLOOD PRESSURE: 78 MMHG

## 2023-10-19 DIAGNOSIS — F51.04 PSYCHOPHYSIOLOGICAL INSOMNIA: ICD-10-CM

## 2023-10-19 DIAGNOSIS — D70.8 CHRONIC BENIGN NEUTROPENIA: ICD-10-CM

## 2023-10-19 DIAGNOSIS — I10 ESSENTIAL HYPERTENSION: Chronic | ICD-10-CM

## 2023-10-19 DIAGNOSIS — M51.37 DDD (DEGENERATIVE DISC DISEASE), LUMBOSACRAL: ICD-10-CM

## 2023-10-19 DIAGNOSIS — G89.29 CHRONIC BILATERAL LOW BACK PAIN WITH SCIATICA, SCIATICA LATERALITY UNSPECIFIED: ICD-10-CM

## 2023-10-19 DIAGNOSIS — D69.6 THROMBOCYTOPENIA, UNSPECIFIED: ICD-10-CM

## 2023-10-19 DIAGNOSIS — L40.50 PSORIATIC ARTHRITIS: ICD-10-CM

## 2023-10-19 DIAGNOSIS — I50.812 CHRONIC RIGHT-SIDED HEART FAILURE: ICD-10-CM

## 2023-10-19 DIAGNOSIS — M47.817 FACET ARTHRITIS OF LUMBOSACRAL REGION: ICD-10-CM

## 2023-10-19 DIAGNOSIS — R45.4 IRRITABILITY AND ANGER: ICD-10-CM

## 2023-10-19 DIAGNOSIS — R73.03 PREDIABETES: ICD-10-CM

## 2023-10-19 DIAGNOSIS — M54.40 CHRONIC BILATERAL LOW BACK PAIN WITH SCIATICA, SCIATICA LATERALITY UNSPECIFIED: ICD-10-CM

## 2023-10-19 DIAGNOSIS — M1A.09X0 IDIOPATHIC CHRONIC GOUT OF MULTIPLE SITES WITHOUT TOPHUS: ICD-10-CM

## 2023-10-19 DIAGNOSIS — I48.20 CHRONIC ATRIAL FIBRILLATION: ICD-10-CM

## 2023-10-19 DIAGNOSIS — F33.1 MODERATE EPISODE OF RECURRENT MAJOR DEPRESSIVE DISORDER: ICD-10-CM

## 2023-10-19 DIAGNOSIS — I50.32 CHRONIC DIASTOLIC CONGESTIVE HEART FAILURE: ICD-10-CM

## 2023-10-19 DIAGNOSIS — E03.8 SUBCLINICAL HYPOTHYROIDISM: ICD-10-CM

## 2023-10-19 DIAGNOSIS — I10 ESSENTIAL HYPERTENSION: ICD-10-CM

## 2023-10-19 DIAGNOSIS — Z79.01 CHRONIC ANTICOAGULATION: ICD-10-CM

## 2023-10-19 DIAGNOSIS — E03.8 SUBCLINICAL HYPOTHYROIDISM: Primary | ICD-10-CM

## 2023-10-19 PROCEDURE — 84443 ASSAY THYROID STIM HORMONE: CPT | Performed by: FAMILY MEDICINE

## 2023-10-19 PROCEDURE — 1160F PR REVIEW ALL MEDS BY PRESCRIBER/CLIN PHARMACIST DOCUMENTED: ICD-10-PCS | Mod: CPTII,S$GLB,, | Performed by: FAMILY MEDICINE

## 2023-10-19 PROCEDURE — 3008F BODY MASS INDEX DOCD: CPT | Mod: CPTII,S$GLB,, | Performed by: FAMILY MEDICINE

## 2023-10-19 PROCEDURE — 4010F ACE/ARB THERAPY RXD/TAKEN: CPT | Mod: CPTII,S$GLB,, | Performed by: FAMILY MEDICINE

## 2023-10-19 PROCEDURE — 99214 OFFICE O/P EST MOD 30 MIN: CPT | Mod: S$GLB,,, | Performed by: FAMILY MEDICINE

## 2023-10-19 PROCEDURE — 3044F PR MOST RECENT HEMOGLOBIN A1C LEVEL <7.0%: ICD-10-PCS | Mod: CPTII,S$GLB,, | Performed by: FAMILY MEDICINE

## 2023-10-19 PROCEDURE — 36415 COLL VENOUS BLD VENIPUNCTURE: CPT | Performed by: FAMILY MEDICINE

## 2023-10-19 PROCEDURE — 3078F PR MOST RECENT DIASTOLIC BLOOD PRESSURE < 80 MM HG: ICD-10-PCS | Mod: CPTII,S$GLB,, | Performed by: FAMILY MEDICINE

## 2023-10-19 PROCEDURE — 3078F DIAST BP <80 MM HG: CPT | Mod: CPTII,S$GLB,, | Performed by: FAMILY MEDICINE

## 2023-10-19 PROCEDURE — 3074F PR MOST RECENT SYSTOLIC BLOOD PRESSURE < 130 MM HG: ICD-10-PCS | Mod: CPTII,S$GLB,, | Performed by: FAMILY MEDICINE

## 2023-10-19 PROCEDURE — 99999 PR PBB SHADOW E&M-EST. PATIENT-LVL III: CPT | Mod: PBBFAC,,, | Performed by: FAMILY MEDICINE

## 2023-10-19 PROCEDURE — 3074F SYST BP LT 130 MM HG: CPT | Mod: CPTII,S$GLB,, | Performed by: FAMILY MEDICINE

## 2023-10-19 PROCEDURE — 99999 PR PBB SHADOW E&M-EST. PATIENT-LVL III: ICD-10-PCS | Mod: PBBFAC,,, | Performed by: FAMILY MEDICINE

## 2023-10-19 PROCEDURE — 4010F PR ACE/ARB THEARPY RXD/TAKEN: ICD-10-PCS | Mod: CPTII,S$GLB,, | Performed by: FAMILY MEDICINE

## 2023-10-19 PROCEDURE — 99214 PR OFFICE/OUTPT VISIT, EST, LEVL IV, 30-39 MIN: ICD-10-PCS | Mod: S$GLB,,, | Performed by: FAMILY MEDICINE

## 2023-10-19 PROCEDURE — 1159F MED LIST DOCD IN RCRD: CPT | Mod: CPTII,S$GLB,, | Performed by: FAMILY MEDICINE

## 2023-10-19 PROCEDURE — 1159F PR MEDICATION LIST DOCUMENTED IN MEDICAL RECORD: ICD-10-PCS | Mod: CPTII,S$GLB,, | Performed by: FAMILY MEDICINE

## 2023-10-19 PROCEDURE — 3044F HG A1C LEVEL LT 7.0%: CPT | Mod: CPTII,S$GLB,, | Performed by: FAMILY MEDICINE

## 2023-10-19 PROCEDURE — 1160F RVW MEDS BY RX/DR IN RCRD: CPT | Mod: CPTII,S$GLB,, | Performed by: FAMILY MEDICINE

## 2023-10-19 PROCEDURE — 3008F PR BODY MASS INDEX (BMI) DOCUMENTED: ICD-10-PCS | Mod: CPTII,S$GLB,, | Performed by: FAMILY MEDICINE

## 2023-10-19 PROCEDURE — 84439 ASSAY OF FREE THYROXINE: CPT | Performed by: FAMILY MEDICINE

## 2023-10-19 RX ORDER — LOSARTAN POTASSIUM 100 MG/1
100 TABLET ORAL
Qty: 90 TABLET | Refills: 2 | Status: SHIPPED | OUTPATIENT
Start: 2023-10-19

## 2023-10-19 RX ORDER — AMLODIPINE BESYLATE 5 MG/1
5 TABLET ORAL
COMMUNITY
Start: 2023-10-16 | End: 2023-10-19

## 2023-10-19 NOTE — PROGRESS NOTES
Subjective:   Patient ID: Caio Ontiveros is a 58 y.o. male.  Chief Complaint:  3mth f/u    Presents for annual physical exam and follow-up on chronic medical conditions  Also followed by Rheumatology, Cardiology, Pulmonology, Psychiatry, and Dermatology    Last annual physical exam May 2023   TSH mildly elevated, free T4 low normal . Due to lack of energy, start low-dose Synthroid 25 mcg daily . Recheck TSH 3 months   CMP with normal kidney, liver, glucose, electrolyte test   CBC with stable low white blood cell count and low platelet level.  No anemia.    A1c 5.7%.  Remains stable.  Okay remain off medication for prediabetes.    Testosterone level normal   Prostate cancer screening tests normal   Lipid panel stable/acceptable      Medical History:  - Subclinical hypothyroidism.  Started Synthroid 25 mcg daily.  Reports compliance.  Denies side effects.  Needs repeat TSH.  - Prediabetes and morbid obesity.  Last A1c 5.7%.  No medications.  Denies symptoms hypo hyperglycemia.    - Hypertension.  On losartan 100 mg daily and Toprol- mg daily and Demadex 20 mg 2 tablets twice a day, and potassium 20 mEq twice a day.  Reports compliance.  Denies side effects.  No increased shortness breath swelling.  - Chronic atrial fibrillation on chronic anticoagulation.  On Eliquis 5 mg twice a day in addition to antihypertensives above.  Reports compliance.  Denies side effects.   Overall stable without any active signs of volume overload.  - Gout.  On allopurinol 300 mg daily with colchicine as needed for breakthrough attacks.  Stable.  No recent acute exacerbation.  - Psoriasis.  Followed by Dermatology. On Taltz injection  - Depression.  Stable.  On Effexor  mg total daily dose and Abilify 10 mg daily.  Reports compliance.  Denies side effects.  Mood stable   - Thrombocytopenia and neutropenia.  Stable white blood cell count last CBC.  Stable platelet count greater than 100 on last CBC.  No increased risk of bleeding.  -  "Chronic low back pain with sciatica sacral spine.  Followed by Internal pain management.  Since last visit started on Relafen 750 mg twice a day, discontinue gabapentin, started Topamax 50 mg twice a day.  Reports compliance.  Denies side effects.  Pain stable.I    Health maintenance needs include shingles vaccine series, COVID booster, pneumonia vaccine, and flu vaccine.    Left lower extremity wound that had developed since last visit has now healed     No new complaints or concerns today    Review of Systems   Constitutional:  Negative for activity change, appetite change, chills, diaphoresis, fatigue and fever.   Eyes:  Negative for visual disturbance.   Respiratory:  Negative for cough, chest tightness, shortness of breath and wheezing.    Cardiovascular:  Positive for leg swelling. Negative for chest pain and palpitations.   Gastrointestinal:  Negative for abdominal pain, constipation, diarrhea, nausea and vomiting.   Endocrine: Negative for cold intolerance, heat intolerance, polydipsia, polyphagia and polyuria.   Genitourinary:  Negative for difficulty urinating.   Musculoskeletal:  Positive for arthralgias, back pain, gait problem and myalgias. Negative for neck pain.   Skin:  Positive for wound. Negative for rash.   Neurological:  Negative for dizziness, tremors, syncope, weakness, light-headedness, numbness and headaches.   Hematological:  Does not bruise/bleed easily.   Psychiatric/Behavioral:  Negative for agitation, behavioral problems, confusion, decreased concentration, dysphoric mood, hallucinations, self-injury, sleep disturbance and suicidal ideas. The patient is not nervous/anxious and is not hyperactive.        Objective:   /78 (BP Location: Left arm, Patient Position: Sitting, BP Method: Large (Manual))   Pulse 96   Temp 97.9 °F (36.6 °C) (Tympanic)   Ht 6' 3" (1.905 m)   Wt (!) 198.3 kg (437 lb 2.8 oz)   SpO2 (!) 84%   BMI 54.64 kg/m²     Physical Exam  Vitals and nursing note " reviewed.   Constitutional:       General: He is not in acute distress.     Appearance: Normal appearance. He is well-developed. He is morbidly obese. He is not ill-appearing or toxic-appearing.   Neck:      Thyroid: No thyroid mass, thyromegaly or thyroid tenderness.   Cardiovascular:      Rate and Rhythm: Normal rate. Rhythm irregularly irregular.      Heart sounds: Normal heart sounds. No murmur heard.     No friction rub. No gallop.   Pulmonary:      Effort: Pulmonary effort is normal. No tachypnea, accessory muscle usage or respiratory distress.      Breath sounds: Normal breath sounds. No decreased breath sounds, wheezing, rhonchi or rales.   Abdominal:      General: There is no distension.      Palpations: Abdomen is soft.      Tenderness: There is no abdominal tenderness. There is no guarding or rebound.   Musculoskeletal:      Right lower leg: Edema present.      Left lower leg: Edema present.   Lymphadenopathy:      Cervical: No cervical adenopathy.   Skin:     Comments:   Dry skin with bilateral venous stasis changes in currently without any open wounds   Neurological:      Mental Status: He is alert and oriented to person, place, and time.      Coordination: Coordination is intact.      Gait: Gait is intact.   Psychiatric:         Attention and Perception: Attention normal. He is attentive. He does not perceive auditory or visual hallucinations.         Mood and Affect: Mood and affect normal.         Speech: Speech normal. He is communicative. Speech is not rapid and pressured, delayed, slurred or tangential.         Behavior: Behavior is not agitated, slowed, aggressive, withdrawn, hyperactive or combative. Behavior is cooperative.         Thought Content: Thought content normal.         Cognition and Memory: Cognition normal.       Assessment:       ICD-10-CM ICD-9-CM   1. Subclinical hypothyroidism  E03.8 244.8   2. Prediabetes  R73.03 790.29   3. Chronic benign neutropenia  D70.8 288.09   4.  Thrombocytopenia, unspecified  D69.6 287.5   5. Essential hypertension  I10 401.9   6. Chronic diastolic congestive heart failure  I50.32 428.32     428.0   7. Chronic right-sided heart failure  I50.812 428.0   8. Chronic atrial fibrillation  I48.20 427.31   9. Chronic anticoagulation  Z79.01 V58.61   10. Moderate episode of recurrent major depressive disorder  F33.1 296.32   11. Irritability and anger  R45.4 799.22   12. Psychophysiological insomnia  F51.04 307.42   13. Psoriatic arthritis  L40.50 696.0   14. Idiopathic chronic gout of multiple sites without tophus  M1A.09X0 274.02   15. Chronic bilateral low back pain with sciatica, sciatica laterality unspecified  M54.40 724.2    G89.29 724.3     338.29   16. DDD (degenerative disc disease), lumbosacral  M51.37 722.52   17. Facet arthritis of lumbosacral region  M47.817 721.3     Plan:   Subclinical hypothyroidism  -     TSH; Future; Expected date: 10/19/2023  Repeat TSH  Adjust Synthroid 25 mcg daily if indicated     Prediabetes  Stable.  Asymptomatic.  Last A1c less than 6.5%.    Okay remain off medication     Chronic benign neutropenia  Thrombocytopenia, unspecified  Stable.  No increased risk for significant traumatic or spontaneous bleeding   No increased risk for infection     Essential hypertension  Chronic diastolic congestive heart failure  Chronic right-sided heart failure  Controlled.  Stable.  Asymptomatic.  BP at goal.    Continue current medications     Chronic atrial fibrillation  Chronic anticoagulation  Stable.  Asymptomatic.    Continue current medications   Follow-up cardiology as scheduled    Moderate episode of recurrent major depressive disorder  Irritability and anger  Psychophysiological insomnia  Stable.  Continue current medications   Follow-up Psychiatry as scheduled    Psoriatic arthritis  Idiopathic chronic gout of multiple sites without tophus  Stable   Continue current medications   Follow-up rheumatology as scheduled     Chronic  bilateral low back pain with sciatica, sciatica laterality unspecified  DDD (degenerative disc disease), lumbosacral  Facet arthritis of lumbosacral region  Stable   Continue current medications   Follow-up interventional pain management as scheduled    Routine health maintenance   Declines all recommended vaccines    Return to clinic 6 months for annual physical exam or sooner if needed

## 2023-10-20 LAB
T4 FREE SERPL-MCNC: 0.93 NG/DL (ref 0.71–1.51)
TSH SERPL DL<=0.005 MIU/L-ACNC: 4.89 UIU/ML (ref 0.4–4)

## 2023-10-20 RX ORDER — POTASSIUM CHLORIDE 20 MEQ/1
20 TABLET, EXTENDED RELEASE ORAL 2 TIMES DAILY
Qty: 180 TABLET | Refills: 3 | Status: SHIPPED | OUTPATIENT
Start: 2023-10-20

## 2023-10-20 NOTE — TELEPHONE ENCOUNTER
No care due was identified.  [facility] Embedded Care Due Messages. Reference number: [id number].   10/19/2023 10:15:48 PM CDT

## 2023-10-20 NOTE — TELEPHONE ENCOUNTER
Refill Routing Note   Medication(s) are not appropriate for processing by [facility] for the following reason(s):      Drug-disease interaction: venlafaxine and Essential hypertension    [facility] action(s):  Defer  Approve Care Due:  None identified          Appointments  past 12m or future 3m with PCP    Date Provider   Last Visit   10/19/2023 Dominick Allen MD   Next Visit   Visit date not found Dominick Allen MD   ED visits in past 90 days: 0        Note composed:10:31 PM 10/19/2023

## 2023-10-23 ENCOUNTER — PATIENT MESSAGE (OUTPATIENT)
Dept: PAIN MEDICINE | Facility: CLINIC | Age: 58
End: 2023-10-23
Payer: MEDICARE

## 2023-10-23 DIAGNOSIS — E03.8 SUBCLINICAL HYPOTHYROIDISM: Primary | ICD-10-CM

## 2023-10-23 RX ORDER — LEVOTHYROXINE SODIUM 50 UG/1
50 TABLET ORAL
Qty: 90 TABLET | Refills: 3 | Status: SHIPPED | OUTPATIENT
Start: 2023-10-23 | End: 2024-10-22

## 2023-10-23 RX ORDER — VENLAFAXINE HYDROCHLORIDE 75 MG/1
75 CAPSULE, EXTENDED RELEASE ORAL DAILY
Qty: 90 CAPSULE | Refills: 3 | Status: SHIPPED | OUTPATIENT
Start: 2023-10-23 | End: 2024-10-22

## 2023-10-23 RX ORDER — VENLAFAXINE HYDROCHLORIDE 150 MG/1
150 CAPSULE, EXTENDED RELEASE ORAL DAILY
Qty: 90 CAPSULE | Refills: 3 | Status: SHIPPED | OUTPATIENT
Start: 2023-10-23 | End: 2024-10-22

## 2023-10-31 RX ORDER — AMLODIPINE BESYLATE 5 MG/1
TABLET ORAL
Qty: 30 TABLET | Refills: 11 | Status: SHIPPED | OUTPATIENT
Start: 2023-10-31

## 2023-11-13 ENCOUNTER — PATIENT MESSAGE (OUTPATIENT)
Dept: PAIN MEDICINE | Facility: CLINIC | Age: 58
End: 2023-11-13
Payer: MEDICARE

## 2023-11-13 DIAGNOSIS — M54.40 CHRONIC BILATERAL LOW BACK PAIN WITH SCIATICA, SCIATICA LATERALITY UNSPECIFIED: ICD-10-CM

## 2023-11-13 DIAGNOSIS — G89.29 CHRONIC LEFT HIP PAIN: ICD-10-CM

## 2023-11-13 DIAGNOSIS — G89.29 CHRONIC BILATERAL LOW BACK PAIN WITH SCIATICA, SCIATICA LATERALITY UNSPECIFIED: ICD-10-CM

## 2023-11-13 DIAGNOSIS — M51.37 DDD (DEGENERATIVE DISC DISEASE), LUMBOSACRAL: ICD-10-CM

## 2023-11-13 DIAGNOSIS — M25.552 CHRONIC LEFT HIP PAIN: ICD-10-CM

## 2023-11-13 RX ORDER — ARM BRACE
EACH MISCELLANEOUS
Status: CANCELLED | OUTPATIENT
Start: 2023-11-13

## 2023-11-14 RX ORDER — NABUMETONE 750 MG/1
750 TABLET, FILM COATED ORAL 2 TIMES DAILY
Qty: 30 TABLET | Refills: 0 | Status: SHIPPED | OUTPATIENT
Start: 2023-11-14 | End: 2023-11-15 | Stop reason: SDUPTHER

## 2023-11-15 RX ORDER — NABUMETONE 750 MG/1
750 TABLET, FILM COATED ORAL 2 TIMES DAILY
Qty: 60 TABLET | Refills: 1 | Status: SHIPPED | OUTPATIENT
Start: 2023-11-15

## 2023-12-20 ENCOUNTER — TELEPHONE (OUTPATIENT)
Dept: PHARMACY | Facility: CLINIC | Age: 58
End: 2023-12-20
Payer: MEDICARE

## 2024-01-04 DIAGNOSIS — I10 ESSENTIAL HYPERTENSION: Primary | Chronic | ICD-10-CM

## 2024-01-04 DIAGNOSIS — I48.20 CHRONIC ATRIAL FIBRILLATION: ICD-10-CM

## 2024-01-05 PROBLEM — J96.11 CHRONIC HYPOXEMIC RESPIRATORY FAILURE: Status: ACTIVE | Noted: 2024-01-05

## 2024-01-11 ENCOUNTER — HOSPITAL ENCOUNTER (OUTPATIENT)
Dept: CARDIOLOGY | Facility: HOSPITAL | Age: 59
Discharge: HOME | End: 2024-01-11
Attending: INTERNAL MEDICINE
Payer: MEDICARE

## 2024-01-11 ENCOUNTER — OFFICE VISIT (OUTPATIENT)
Dept: CARDIOLOGY | Facility: CLINIC | Age: 59
End: 2024-01-11
Payer: MEDICARE

## 2024-01-11 VITALS
OXYGEN SATURATION: 100 % | HEART RATE: 80 BPM | DIASTOLIC BLOOD PRESSURE: 80 MMHG | HEIGHT: 75 IN | SYSTOLIC BLOOD PRESSURE: 124 MMHG | WEIGHT: 315 LBS | BODY MASS INDEX: 39.17 KG/M2

## 2024-01-11 DIAGNOSIS — I10 ESSENTIAL HYPERTENSION: Chronic | ICD-10-CM

## 2024-01-11 DIAGNOSIS — I07.1 MODERATE TRICUSPID VALVE REGURGITATION: ICD-10-CM

## 2024-01-11 DIAGNOSIS — I48.20 CHRONIC ATRIAL FIBRILLATION: ICD-10-CM

## 2024-01-11 DIAGNOSIS — R73.03 PREDIABETES: Chronic | ICD-10-CM

## 2024-01-11 DIAGNOSIS — I50.33 ACUTE ON CHRONIC DIASTOLIC CONGESTIVE HEART FAILURE: ICD-10-CM

## 2024-01-11 DIAGNOSIS — I83.813 VARICOSE VEINS OF BOTH LOWER EXTREMITIES WITH PAIN: ICD-10-CM

## 2024-01-11 DIAGNOSIS — I87.2 VENOUS INSUFFICIENCY: ICD-10-CM

## 2024-01-11 DIAGNOSIS — E66.01 MORBID OBESITY WITH BMI OF 50.0-59.9, ADULT: Chronic | ICD-10-CM

## 2024-01-11 DIAGNOSIS — I83.10 VARICOSE VEINS OF LOWER EXTREMITY WITH INFLAMMATION, UNSPECIFIED LATERALITY: ICD-10-CM

## 2024-01-11 DIAGNOSIS — I50.32 CHRONIC DIASTOLIC CONGESTIVE HEART FAILURE: ICD-10-CM

## 2024-01-11 DIAGNOSIS — I50.812 CHRONIC RIGHT-SIDED HEART FAILURE: ICD-10-CM

## 2024-01-11 DIAGNOSIS — I27.20 PULMONARY HYPERTENSION: Primary | ICD-10-CM

## 2024-01-11 PROCEDURE — 93005 ELECTROCARDIOGRAM TRACING: CPT

## 2024-01-11 PROCEDURE — 99999 PR PBB SHADOW E&M-EST. PATIENT-LVL IV: CPT | Mod: PBBFAC,,, | Performed by: INTERNAL MEDICINE

## 2024-01-11 PROCEDURE — 99214 OFFICE O/P EST MOD 30 MIN: CPT | Mod: 25,S$GLB,, | Performed by: INTERNAL MEDICINE

## 2024-01-11 PROCEDURE — 93010 ELECTROCARDIOGRAM REPORT: CPT | Mod: ,,, | Performed by: INTERNAL MEDICINE

## 2024-01-11 RX ORDER — SEMAGLUTIDE 2.4 MG/.75ML
2.4 INJECTION, SOLUTION SUBCUTANEOUS
Qty: 3 ML | Refills: 0 | Status: ACTIVE | OUTPATIENT
Start: 2024-01-11

## 2024-01-11 NOTE — PROGRESS NOTES
Subjective:   Patient ID:  Caio Ontiveros is a 58 y.o. male who presents for follow up of No chief complaint on file.      56 yo male, came in for 6 months f/u  PMH CHFpEF, chroic Afib for 7 yrs, obesity s/p gastric bypass in 2014. EVANGELIST on cpap for 5 yrs   ECHO in 2017 EF 55%  09/06/2020 went to ER at [facility] due to worsening dyspnea. BNP 1100, troponin 0.036, CXR mild edema, ekg showed afib. Had IV lasix  And good diuresis.    ChestCT w/o multiple nodule     visit states that sleeps on 2 pillows, + PND snores,  SOb, palpitation  No chest pain, dizziness  Did drink a lot of water  His wife cooks at home and f/u low sodium protocol   Lasix was d/c and Bumex 2mg tid added  Pharmacist only gave him 1 mg tid. Went to ER 2 days ago. EKG AFIB and HR 91 bpm. BNP and BMP no change  CXR no acute change. Had Lasix IV and d/c  Lost 6 pounds in 1 week     admitted for CHF exacerbation and had IV diuretics.  ECHo showed RV moderatelt dilated and RV function decreased. PAP 55 mmHG  Slight elevation of troponin to 0.036  BNPelevatde    01/04/2022 visit  BNP was 1200 in  and down to 83 on 12/16/2021. Then lasix 40 mg bid was d/c'ed by some reason.  Sleeps on 2 pillows. And NO PND  Edema elevated to the pelvic area.   Echo in  normal EF. Dilated RV chamber with mod RV failure and pulm HTN     visit  BP high. On Lasix 40 mg bid, no change of weight. GAYTAN stable. No chest pain palpitation and faint      visit  ekg AFIB and BP high  No dizziness faint chest pain dyspnea,      visit telemed  Taking Lasix 40 mg bid and remains SOB occasionally. No dizziness faint   in 06/23. Weight stable.   Some leg swelling. Sleeps on 2 pillows. On CPAP  Gained 40 lbs in 1 yr    07/23 visit telemed  On torsemide 40 mg in AM and 20 in PM and toprolol up to 100 mg daily, BP pulse controlled  Some fatigue, remain mild SOB. No orthopnea    Interval history  EKG afib VR C. No orthopnea PND,  on cpap.no chest pain dizziness. On compression socks                      Past Medical History:   Diagnosis Date    A-fib     CHF (congestive heart failure)     Gout, chronic     Hypertension     Sleep apnea        Past Surgical History:   Procedure Laterality Date    CHOLECYSTECTOMY      GASTRIC BYPASS  2014    INJECTION OF JOINT Left 8/23/2023    Procedure: Left IA Hip Joint injection;  Surgeon: Peña Rausch MD;  Location: [facility];  Service: Pain Management;  Laterality: Left;       Social History     Tobacco Use    Smoking status: Never    Smokeless tobacco: Never   Substance Use Topics    Alcohol use: Not Currently    Drug use: No       Family History   Problem Relation Age of Onset    Hypertension Mother     Stroke Father     Diabetes Father     Hypertension Father     Diabetes Sister     Diabetes Brother          Review of Systems   Constitutional: Negative for diaphoresis, malaise/fatigue, weight gain and weight loss.   HENT:  Negative for congestion and nosebleeds.    Cardiovascular:  Positive for dyspnea on exertion and leg swelling. Negative for chest pain, claudication, cyanosis, irregular heartbeat, near-syncope, orthopnea, palpitations, paroxysmal nocturnal dyspnea and syncope.   Respiratory:  Positive for shortness of breath. Negative for cough, hemoptysis, sleep disturbances due to breathing, snoring, sputum production and wheezing.         Using CPAP nightly      Hematologic/Lymphatic: Negative for bleeding problem. Does not bruise/bleed easily.   Skin:  Negative for rash.   Musculoskeletal:  Positive for back pain. Negative for arthritis, falls, joint pain, muscle cramps and muscle weakness.   Gastrointestinal:  Negative for abdominal pain, constipation, diarrhea, heartburn, hematemesis, hematochezia, melena, nausea and vomiting.   Genitourinary:  Negative for dysuria, hematuria and nocturia.   Neurological:  Negative for excessive daytime sleepiness, dizziness, headaches, light-headedness,  loss of balance, numbness, vertigo and weakness.       Objective:   Physical Exam  Constitutional:       Appearance: He is obese.   HENT:      Head: Normocephalic.   Eyes:      Pupils: Pupils are equal, round, and reactive to light.   Neck:      Thyroid: No thyromegaly.      Vascular: Normal carotid pulses. No carotid bruit or JVD.   Cardiovascular:      Rate and Rhythm: Tachycardia present. Rhythm irregularly irregular. No extrasystoles are present.     Chest Wall: PMI is not displaced.      Pulses: Normal pulses.      Heart sounds: Normal heart sounds. No murmur heard.     No gallop. No S3 sounds.   Pulmonary:      Effort: No respiratory distress.      Breath sounds: No stridor. Rales present.   Abdominal:      General: Bowel sounds are normal.      Palpations: Abdomen is soft.      Tenderness: There is no abdominal tenderness. There is no rebound.   Musculoskeletal:         General: Swelling present. Normal range of motion.   Skin:     Findings: No rash.   Neurological:      Mental Status: He is alert and oriented to person, place, and time.   Psychiatric:         Behavior: Behavior normal.         Lab Results   Component Value Date    CHOL 192 05/16/2023    CHOL 186 02/16/2022    CHOL 203 (H) 06/11/2019     Lab Results   Component Value Date    HDL 46 05/16/2023    HDL 49 02/16/2022    HDL 54 06/11/2019     Lab Results   Component Value Date    LDLCALC 127.6 05/16/2023    LDLCALC 119.8 02/16/2022    LDLCALC 135.2 06/11/2019     Lab Results   Component Value Date    TRIG 92 05/16/2023    TRIG 86 02/16/2022    TRIG 69 06/11/2019     Lab Results   Component Value Date    CHOLHDL 24.0 05/16/2023    CHOLHDL 26.3 02/16/2022    CHOLHDL 26.6 06/11/2019       Chemistry        Component Value Date/Time     01/11/2024 1626    K 4.2 01/11/2024 1626     01/11/2024 1626    CO2 26 01/11/2024 1626    BUN 26 (H) 01/11/2024 1626    CREATININE 1.2 01/11/2024 1626    GLU 92 01/11/2024 1626        Component Value  Date/Time    CALCIUM 9.9 01/11/2024 1626    ALKPHOS 78 05/16/2023 1620    AST 24 05/16/2023 1620    ALT 16 05/16/2023 1620    BILITOT 1.0 05/16/2023 1620    ESTGFRAFRICA >60.0 02/22/2022 1702    EGFRNONAA >60.0 02/22/2022 1702          Lab Results   Component Value Date    HGBA1C 5.7 (H) 05/16/2023     Lab Results   Component Value Date    TSH 4.887 (H) 10/19/2023     Lab Results   Component Value Date    INR 1.2 09/06/2020    INR 1.0 02/27/2010    INR 1.0 02/26/2010     Lab Results   Component Value Date    WBC 2.64 (L) 07/12/2023    HGB 15.0 07/12/2023    HCT 46.1 07/12/2023    MCV 94 07/12/2023     (L) 07/12/2023     BMP  Sodium   Date Value Ref Range Status   01/11/2024 140 136 - 145 mmol/L Final     Potassium   Date Value Ref Range Status   01/11/2024 4.2 3.5 - 5.1 mmol/L Final     Chloride   Date Value Ref Range Status   01/11/2024 105 95 - 110 mmol/L Final     CO2   Date Value Ref Range Status   01/11/2024 26 23 - 29 mmol/L Final     BUN   Date Value Ref Range Status   01/11/2024 26 (H) 6 - 20 mg/dL Final     Creatinine   Date Value Ref Range Status   01/11/2024 1.2 0.5 - 1.4 mg/dL Final     Calcium   Date Value Ref Range Status   01/11/2024 9.9 8.7 - 10.5 mg/dL Final     Anion Gap   Date Value Ref Range Status   01/11/2024 9 8 - 16 mmol/L Final     eGFR if    Date Value Ref Range Status   02/22/2022 >60.0 >60 mL/min/1.73 m^2 Final     eGFR if non    Date Value Ref Range Status   02/22/2022 >60.0 >60 mL/min/1.73 m^2 Final     Comment:     Calculation used to obtain the estimated glomerular filtration  rate (eGFR) is the CKD-EPI equation.        BNP  @LABRCNTIP(BNP,BNPTRIAGEBLO)@  @LABRCNTIP(troponini)@  Estimated Creatinine Clearance: 119.1 mL/min (based on SCr of 1.2 mg/dL).  No results found in the last 24 hours.  No results found in the last 24 hours.  No results found in the last 24 hours.    Assessment:      1. Pulmonary hypertension    2. Chronic diastolic  congestive heart failure    3. Essential hypertension    4. Varicose veins of lower extremity with inflammation, unspecified laterality    5. Chronic atrial fibrillation    6. Venous insufficiency    7. Varicose veins of both lower extremities with pain    8. Chronic right-sided heart failure    9. Morbid obesity with BMI of 50.0-59.9, adult    10. Acute on chronic diastolic congestive heart failure    11. Moderate tricuspid valve regurgitation    12. Prediabetes        Plan:   Add wegovy 2.4 mg sq weekly for obesity. Discussed the benefits and risks of GLP-1 RA. R/o medical history of pancreatitis, cholecystitis and F/H thyroid Ca. Reviewed kidney and liver functions.    Check BNP and BMP today  Continue amlodipine losartan BB torsemide 40 mg bid and eliquis  DM Rx per PCP  Counseled DASH  Check Lipid profile with PCP in 6 months  Recommend heart-healthy diet, weight control and regular exercise.  [place]. Risk modification.   I have reviewed all pertinent labs and cardiac studies independently. Plans and recommendations have been formulated under my direct supervision. All questions answered and patient voiced understanding.   If symptoms persist go to the ED  RTC in 3 months

## 2024-01-16 ENCOUNTER — TELEPHONE (OUTPATIENT)
Dept: CARDIOLOGY | Facility: CLINIC | Age: 59
End: 2024-01-16
Payer: MEDICARE

## 2024-01-16 NOTE — TELEPHONE ENCOUNTER
The patient has been notified of this information and all questions answered.  .    ----- Message from Ricky Jeffrey MD sent at 1/16/2024  2:26 PM CST -----  The lab showed CHF controlled  Continue current Rx  F/U as scheduled

## 2024-01-18 DIAGNOSIS — M1A.00X0 IDIOPATHIC CHRONIC GOUT WITHOUT TOPHUS, UNSPECIFIED SITE: ICD-10-CM

## 2024-01-19 RX ORDER — COLCHICINE 0.6 MG/1
0.6 TABLET ORAL
Qty: 90 TABLET | Refills: 0 | Status: SHIPPED | OUTPATIENT
Start: 2024-01-19 | End: 2024-02-02 | Stop reason: SDUPTHER

## 2024-02-02 ENCOUNTER — LAB VISIT (OUTPATIENT)
Dept: LAB | Facility: HOSPITAL | Age: 59
End: 2024-02-02
Attending: INTERNAL MEDICINE
Payer: MEDICARE

## 2024-02-02 ENCOUNTER — OFFICE VISIT (OUTPATIENT)
Dept: RHEUMATOLOGY | Facility: CLINIC | Age: 59
End: 2024-02-02
Payer: MEDICARE

## 2024-02-02 VITALS
WEIGHT: 315 LBS | DIASTOLIC BLOOD PRESSURE: 89 MMHG | SYSTOLIC BLOOD PRESSURE: 137 MMHG | HEIGHT: 75 IN | HEART RATE: 76 BPM | BODY MASS INDEX: 39.17 KG/M2

## 2024-02-02 DIAGNOSIS — R73.03 PREDIABETES: Chronic | ICD-10-CM

## 2024-02-02 DIAGNOSIS — I50.33 ACUTE ON CHRONIC DIASTOLIC CONGESTIVE HEART FAILURE: ICD-10-CM

## 2024-02-02 DIAGNOSIS — L40.50 PSORIATIC ARTHRITIS: Primary | ICD-10-CM

## 2024-02-02 DIAGNOSIS — D84.821 IMMUNOSUPPRESSION DUE TO DRUG THERAPY: ICD-10-CM

## 2024-02-02 DIAGNOSIS — Z51.81 MEDICATION MONITORING ENCOUNTER: ICD-10-CM

## 2024-02-02 DIAGNOSIS — E66.01 MORBID OBESITY WITH BMI OF 50.0-59.9, ADULT: ICD-10-CM

## 2024-02-02 DIAGNOSIS — Z79.01 CHRONIC ANTICOAGULATION: ICD-10-CM

## 2024-02-02 DIAGNOSIS — M1A.09X0 IDIOPATHIC CHRONIC GOUT OF MULTIPLE SITES WITHOUT TOPHUS: ICD-10-CM

## 2024-02-02 DIAGNOSIS — L40.50 PSORIATIC ARTHRITIS: ICD-10-CM

## 2024-02-02 DIAGNOSIS — M51.35 DDD (DEGENERATIVE DISC DISEASE), THORACOLUMBAR: ICD-10-CM

## 2024-02-02 DIAGNOSIS — Z79.899 IMMUNOSUPPRESSION DUE TO DRUG THERAPY: ICD-10-CM

## 2024-02-02 LAB
HBV CORE AB SERPL QL IA: NORMAL
HBV SURFACE AG SERPL QL IA: NORMAL

## 2024-02-02 PROCEDURE — 99215 OFFICE O/P EST HI 40 MIN: CPT | Mod: S$GLB,,, | Performed by: INTERNAL MEDICINE

## 2024-02-02 PROCEDURE — 86480 TB TEST CELL IMMUN MEASURE: CPT | Performed by: INTERNAL MEDICINE

## 2024-02-02 PROCEDURE — 86704 HEP B CORE ANTIBODY TOTAL: CPT | Performed by: INTERNAL MEDICINE

## 2024-02-02 PROCEDURE — 99999 PR PBB SHADOW E&M-EST. PATIENT-LVL IV: CPT | Mod: PBBFAC,,, | Performed by: INTERNAL MEDICINE

## 2024-02-02 PROCEDURE — 36415 COLL VENOUS BLD VENIPUNCTURE: CPT | Performed by: INTERNAL MEDICINE

## 2024-02-02 PROCEDURE — 87340 HEPATITIS B SURFACE AG IA: CPT | Performed by: INTERNAL MEDICINE

## 2024-02-02 RX ORDER — COLCHICINE 0.6 MG/1
0.6 TABLET ORAL DAILY
Qty: 90 TABLET | Refills: 1 | Status: SHIPPED | OUTPATIENT
Start: 2024-02-02 | End: 2024-07-31

## 2024-02-02 RX ORDER — ALLOPURINOL 300 MG/1
300 TABLET ORAL DAILY
Qty: 90 TABLET | Refills: 1 | Status: SHIPPED | OUTPATIENT
Start: 2024-02-02 | End: 2024-06-11

## 2024-02-02 RX ORDER — UPADACITINIB 15 MG/1
15 TABLET, EXTENDED RELEASE ORAL DAILY
Qty: 30 TABLET | Refills: 0 | Status: ACTIVE | OUTPATIENT
Start: 2024-02-02 | End: 2024-03-03

## 2024-02-02 NOTE — PROGRESS NOTES
RHEUMATOLOGY OUTPATIENT CLINIC NOTE    2/2/2024    Attending Rheumatologist: Constantine Raphael  Primary Care Provider/Physician Requesting Consultation: Dominick Allen MD   Chief Complaint/Reason For Consultation:  Gout, Psoriatic Arthritis, Osteoarthritis, and DDD      Subjective:     Caio Ontiveros is a 58 y.o. Black or  male with SpA/PsA and gout    Recurrent back pain and stiffness.  Great response to Taltz in terms of skin Pso clearance.  No significant effect on joint or back pain.  Denies gout flares since last visit.     Review of Systems   Constitutional:  Negative for fever.   Eyes:  Negative for photophobia and pain.   Respiratory:  Negative for cough and shortness of breath.    Cardiovascular:  Negative for chest pain.   Gastrointestinal:  Negative for blood in stool.        No hx of diverticulitis.   Genitourinary:  Negative for hematuria.   Musculoskeletal:  Positive for back pain and joint pain.   Skin:  Negative for rash.   Neurological:  Negative for focal weakness and weakness.   Endo/Heme/Allergies:         No hx of DVT/PE.       Chronic comorbid conditions affecting medical decision making today:  Past Medical History:   Diagnosis Date    A-fib     CHF (congestive heart failure)     Gout, chronic     Hypertension     Sleep apnea      Past Surgical History:   Procedure Laterality Date    CHOLECYSTECTOMY      GASTRIC BYPASS  2014    INJECTION OF JOINT Left 8/23/2023    Procedure: Left IA Hip Joint injection;  Surgeon: Peña Rausch MD;  Location: [facility];  Service: Pain Management;  Laterality: Left;     Family History   Problem Relation Age of Onset    Hypertension Mother     Stroke Father     Diabetes Father     Hypertension Father     Diabetes Sister     Diabetes Brother      Social History     Tobacco Use   Smoking Status Never   Smokeless Tobacco Never       Current Outpatient Medications:     allopurinoL (ZYLOPRIM) 300 MG tablet, Take 1 tablet (300 mg total) by mouth  once daily., Disp: 90 tablet, Rfl: 1    amLODIPine (NORVASC) 5 MG tablet, TAKE 1 TABLET BY MOUTH ONCE  DAILY, Disp: 30 tablet, Rfl: 11    ARIPiprazole (ABILIFY) 10 MG Tab, Take 1 tablet (10 mg total) by mouth once daily., Disp: 90 tablet, Rfl: 3    colchicine (COLCRYS) 0.6 mg tablet, Take 1 tablet by mouth once daily, Disp: 90 tablet, Rfl: 0    ELIQUIS 5 mg Tab, TAKE 1 TABLET BY MOUTH  TWICE DAILY, Disp: 180 tablet, Rfl: 3    ixekizumab (TALTZ AUTOINJECTOR) 80 mg/mL AtIn, Inject 80 mg into the skin every 28 days., Disp: 1 each, Rfl: 3    levothyroxine (SYNTHROID) 50 MCG tablet, Take 1 tablet (50 mcg total) by mouth before breakfast., Disp: 90 tablet, Rfl: 3    losartan (COZAAR) 100 MG tablet, TAKE 1 TABLET BY MOUTH ONCE  DAILY, Disp: 90 tablet, Rfl: 2    methocarbamoL (ROBAXIN) 500 MG Tab, Take 1 tablet (500 mg total) by mouth 3 (three) times daily as needed., Disp: 60 tablet, Rfl: 5    metoprolol succinate (TOPROL-XL) 100 MG 24 hr tablet, Take 1 tablet (100 mg total) by mouth once daily., Disp: 90 tablet, Rfl: 3    nabumetone (RELAFEN) 750 MG tablet, Take 1 tablet (750 mg total) by mouth 2 (two) times daily., Disp: 60 tablet, Rfl: 1    potassium chloride SA (K-DUR,KLOR-CON) 20 MEQ tablet, TAKE 1 TABLET BY MOUTH TWICE  DAILY, Disp: 180 tablet, Rfl: 3    semaglutide, weight loss, (WEGOVY) 2.4 mg/0.75 mL PnIj, Inject 2.4 mg into the skin every 7 days., Disp: 3 mL, Rfl: 0    topiramate (TOPAMAX) 50 MG tablet, Take 1 tablet (50 mg total) by mouth 2 (two) times daily., Disp: 60 tablet, Rfl: 11    torsemide (DEMADEX) 20 MG Tab, Take 2 tablets (40 mg total) by mouth 2 (two) times a day., Disp: 360 tablet, Rfl: 3    venlafaxine (EFFEXOR-XR) 150 MG Cp24, Take 1 capsule (150 mg total) by mouth once daily., Disp: 90 capsule, Rfl: 3    venlafaxine (EFFEXOR-XR) 75 MG 24 hr capsule, Take 1 capsule (75 mg total) by mouth once daily., Disp: 90 capsule, Rfl: 3     Objective:     Vitals:    02/02/24 1101   BP: 137/89   Pulse: 76      Physical Exam   Constitutional: He appears obese.   Eyes: Conjunctivae are normal.   Pulmonary/Chest: Effort normal. No respiratory distress.   Musculoskeletal:         General: No swelling or tenderness. Normal range of motion.   Neurological: He displays no weakness.   Skin: No rash noted.       Reviewed available old and all outside pertinent medical records available.    All lab results personally reviewed and interpreted by me.       ASSESSMENT      Encounter Diagnoses   Name Primary?    Psoriatic arthritis Yes    Idiopathic chronic gout of multiple sites without tophus     Immunosuppression due to drug therapy     Medication monitoring encounter     Acute on chronic diastolic congestive heart failure     Chronic anticoagulation     Prediabetes     DDD (degenerative disc disease), thoracolumbar     Morbid obesity with BMI of 50.0-59.9, adult       PLAN     - DAPSA: high disease activity.  Clearance of skin PsO, no efficacy for joint pain relief.  Not on ULT.  - squeeze tenderness several PIPj, MCP, wrists.  No nodules or active rashes  - no toxicity from biologic use.  No concerning cytopenias or nephropathy.  UrA not at goal.  - Low titer TANYA.  Negative NADINE.  Negative RA serologies.  Negative HLAB27.  - DDD/DDD changes on imaging.  Bridging osteophytes and syndesmophytes noted at multiple levels   - Recommend replacing Taltz by Rinvoq.  Side effects discussed.  - resume Allopurinol, gradually escalate to 300mg per day.  Goal UrA <6mg/dL.  C/w daily colchicine for prophylaxis.  - blood work to monitor for toxicity / CI o rx.  Trend uric acid level.    Constantine Raphael M.D.

## 2024-02-05 LAB
GAMMA INTERFERON BACKGROUND BLD IA-ACNC: 0.01 IU/ML
M TB IFN-G CD4+ BCKGRND COR BLD-ACNC: 0.01 IU/ML
M TB IFN-G CD4+ BCKGRND COR BLD-ACNC: 0.02 IU/ML
MITOGEN IGNF BCKGRD COR BLD-ACNC: 4.93 IU/ML
TB GOLD PLUS: NEGATIVE

## 2024-02-09 ENCOUNTER — TELEPHONE (OUTPATIENT)
Dept: RHEUMATOLOGY | Facility: CLINIC | Age: 59
End: 2024-02-09
Payer: MEDICARE

## 2024-02-09 NOTE — TELEPHONE ENCOUNTER
----- Message from Nohemi Garcia sent at 2/8/2024  3:25 PM CST -----  Regarding: Rinvoq PAP  Hi Dr Raphael and Staff,      Rinvoq is approved by the patient's insurance with a high copay. We will be assisting the patient in applying to the  patient assistance program. I am faxing the application prescription to [phone] for your review and signature. Please fax signed paperwork back to my attention @ [phone]. If you would like the paperwork faxed to an alternate number please let me know.        Nohemi Garcia, [facility]  Patient Care Advocate  [facility]  [email or web address]  Phone: [phone]  Fax: [phone]

## 2024-03-21 ENCOUNTER — PATIENT MESSAGE (OUTPATIENT)
Dept: PAIN MEDICINE | Facility: CLINIC | Age: 59
End: 2024-03-21
Payer: MEDICARE

## 2024-03-21 NOTE — TELEPHONE ENCOUNTER
Called pt and scheduled him an appt being that his medication wasn't working for him anymore and that he was experiencing back pain. Pt verbalized understanding.    Isabel SOLIZ

## 2024-03-22 ENCOUNTER — TELEPHONE (OUTPATIENT)
Dept: PAIN MEDICINE | Facility: CLINIC | Age: 59
End: 2024-03-22

## 2024-03-22 ENCOUNTER — OFFICE VISIT (OUTPATIENT)
Dept: PAIN MEDICINE | Facility: CLINIC | Age: 59
End: 2024-03-22
Payer: MEDICARE

## 2024-03-22 DIAGNOSIS — G89.29 CHRONIC LEFT HIP PAIN: ICD-10-CM

## 2024-03-22 DIAGNOSIS — M51.37 DDD (DEGENERATIVE DISC DISEASE), LUMBOSACRAL: Primary | ICD-10-CM

## 2024-03-22 DIAGNOSIS — M47.817 FACET ARTHRITIS OF LUMBOSACRAL REGION: ICD-10-CM

## 2024-03-22 DIAGNOSIS — M25.552 CHRONIC LEFT HIP PAIN: ICD-10-CM

## 2024-03-22 PROCEDURE — 99214 OFFICE O/P EST MOD 30 MIN: CPT | Mod: 95,,, | Performed by: NURSE PRACTITIONER

## 2024-03-22 RX ORDER — PREGABALIN 75 MG/1
75 CAPSULE ORAL 2 TIMES DAILY
Qty: 60 CAPSULE | Refills: 2 | Status: SHIPPED | OUTPATIENT
Start: 2024-03-22 | End: 2024-04-05

## 2024-03-22 NOTE — PROGRESS NOTES
Telemedicine Exam  Established Patient - TeleHealth Visit    The patient location is: [place]  The chief complaint leading to consultation is: chronic pain     Visit type: audiovisual    Face to Face time with patient: 10-15 minutes  20 minutes of total time spent on the encounter, which includes face to face time and non-face to face time preparing to see the patient (eg, review of tests), Obtaining and/or reviewing separately obtained history, Documenting clinical information in the electronic or other health record, Independently interpreting results (not separately reported) and communicating results to the patient/family/caregiver, or Care coordination (not separately reported).     Each patient to whom he or she provides medical services by telemedicine is:  (1) informed of the relationship between the physician and patient and the respective role of any other health care provider with respect to management of the patient; and (2) notified that he or she may decline to receive medical services by telemedicine and may withdraw from such care at any time.          Chronic Pain Note    Referring Physician: No ref. provider found    PCP: Dominick Allen MD    Chief Complaint:   No chief complaint on file.       SUBJECTIVE:  Interval History (3/22/2024):  Patient Caio Ontiveros presents today for follow-up visit.  Patient was last seen on 8/23/2023 for Left IA hip joint injection with 80% relief.  He reports his hip pain is doing better however he does have some lower back pain which seems to be getting worse.  The pain remains in the back and does not radiate into the legs.  Prolonged standing and extending back make the pain worse.  If he leans over and wrists with surface bending forward he will get some relief of his pain.  He rates his pain today an 8/10.  No new injury.  He is requesting to try a different medication to try to help with his pain and is leery of doing any procedures on his back at this time.  He  has not done formal physical therapy because he can not afford it at this time and he also does not think he would be able to tolerate it at this time.  He has been doing home exercises and stretches and is trying to work on weight loss because he feels this will help his pain as well.  Patient denies night fever/night sweats, urinary incontinence, bowel incontinence, significant weight loss and significant motor weakness.   Patient denies any other complaints or concerns at this time.        Interval history 8/9/2023  Caio Ontiveros is a 58 y.o. male who presents to the clinic for the evaluation of chronic lower back pain for years that gradually got worse but has become severe over the past several months.  It is affecting his ADLs significantly including the ability to walk and stand.  It is located in the left buttock and radiates to the left groin and is worse with weight-bearing.  There is no radicular pain tingling or numbness to the legs but he reports weakness in the left leg versus due to severe pain.  Denies loss of bowel or bladder control.  It is worse with walking, standing, getting up from the bed or chair and is better (slightly) with medications including Tylenol Arthritis and gabapentin which he takes at 300 mg daily.  He could not afford the co-pay for physical therapy, not well covered by his insurance. He currently rates it at 10/10.  He gives it a 6/10 when it is at best.  He has history of extreme morbid obesity and lost significant weight after gastric bypass surgery in 2014 dropping down from 500 lb 340 lb.  He managed to gain some weight back and he is around 400 lb.  He has no history of kidney stones, glaucoma or sulfa allergies.      Pain Disability Index Review:         8/9/2023     2:11 PM   Last 3 PDI Scores   Pain Disability Index (PDI) 62        report:  Not applicable    Pain Procedures:   8/23/2023 Left IA hip joint injection with 80% relief      Past Medical History:   Diagnosis  Date    A-fib     CHF (congestive heart failure)     Gout, chronic     Hypertension     Sleep apnea      Past Surgical History:   Procedure Laterality Date    CHOLECYSTECTOMY      GASTRIC BYPASS  2014    INJECTION OF JOINT Left 8/23/2023    Procedure: Left IA Hip Joint injection;  Surgeon: Peña Rausch MD;  Location: [facility];  Service: Pain Management;  Laterality: Left;     Social History     Socioeconomic History    Marital status:     Number of children: 1   Tobacco Use    Smoking status: Never    Smokeless tobacco: Never   Substance and Sexual Activity    Alcohol use: Not Currently    Drug use: No    Sexual activity: Not Currently     Social Determinants of Health     Financial Resource Strain: Low Risk  (1/8/2024)    Overall Financial Resource Strain (CARDIA)     Difficulty of Paying Living Expenses: Not hard at all   Food Insecurity: No Food Insecurity (1/8/2024)    Hunger Vital Sign     Worried About Running Out of Food in the Last Year: Never true     Ran Out of Food in the Last Year: Never true   Transportation Needs: No Transportation Needs (1/8/2024)    PRAPARE - Transportation     Lack of Transportation (Medical): No     Lack of Transportation (Non-Medical): No   Physical Activity: Inactive (1/8/2024)    Exercise Vital Sign     Days of Exercise per Week: 0 days     Minutes of Exercise per Session: 0 min   Stress: Stress Concern Present (1/8/2024)    [place] [place] [employer] - Occupational Stress Questionnaire     Feeling of Stress : To some extent   Social Connections: Socially Isolated (1/8/2024)    Social Connection and Isolation Panel [NHANES]     Frequency of Communication with Friends and Family: Once a week     Frequency of Social Gatherings with Friends and Family: Once a week     Attends Cheondoism Services: Never     Active Member of Clubs or Organizations: No     Attends Club or Organization Meetings: Never     Marital Status:    Housing Stability: Low Risk   (1/8/2024)    Housing Stability Vital Sign     Unable to Pay for Housing in the Last Year: No     Number of Places Lived in the Last Year: 1     Unstable Housing in the Last Year: No     Family History   Problem Relation Age of Onset    Hypertension Mother     Stroke Father     Diabetes Father     Hypertension Father     Diabetes Sister     Diabetes Brother        Review of patient's allergies indicates:  No Known Allergies    Current Outpatient Medications   Medication Sig    allopurinoL (ZYLOPRIM) 300 MG tablet Take 1 tablet (300 mg total) by mouth once daily.    amLODIPine (NORVASC) 5 MG tablet TAKE 1 TABLET BY MOUTH ONCE  DAILY    ARIPiprazole (ABILIFY) 10 MG Tab Take 1 tablet (10 mg total) by mouth once daily.    colchicine (COLCRYS) 0.6 mg tablet Take 1 tablet (0.6 mg total) by mouth once daily.    ELIQUIS 5 mg Tab TAKE 1 TABLET BY MOUTH  TWICE DAILY    levothyroxine (SYNTHROID) 50 MCG tablet Take 1 tablet (50 mcg total) by mouth before breakfast.    losartan (COZAAR) 100 MG tablet TAKE 1 TABLET BY MOUTH ONCE  DAILY    methocarbamoL (ROBAXIN) 500 MG Tab Take 1 tablet (500 mg total) by mouth 3 (three) times daily as needed.    metoprolol succinate (TOPROL-XL) 100 MG 24 hr tablet Take 1 tablet (100 mg total) by mouth once daily.    nabumetone (RELAFEN) 750 MG tablet Take 1 tablet (750 mg total) by mouth 2 (two) times daily.    potassium chloride SA (K-DUR,KLOR-CON) 20 MEQ tablet TAKE 1 TABLET BY MOUTH TWICE  DAILY    semaglutide, weight loss, (WEGOVY) 2.4 mg/0.75 mL PnIj Inject 2.4 mg into the skin every 7 days.    topiramate (TOPAMAX) 50 MG tablet Take 1 tablet (50 mg total) by mouth 2 (two) times daily.    torsemide (DEMADEX) 20 MG Tab Take 2 tablets (40 mg total) by mouth 2 (two) times a day.    venlafaxine (EFFEXOR-XR) 150 MG Cp24 Take 1 capsule (150 mg total) by mouth once daily.    venlafaxine (EFFEXOR-XR) 75 MG 24 hr capsule Take 1 capsule (75 mg total) by mouth once daily.     No current  facility-administered medications for this visit.       Review of Systems     GENERAL:  No weight loss, malaise or fevers.  HEENT:   No recent changes in vision or hearing  NECK:  Negative for lumps, no difficulty with swallowing.  RESPIRATORY:  Negative for cough, wheezing or shortness of breath, patient denies any recent URI.  CARDIOVASCULAR:  Negative for chest pain, positive for fluid retention and leg swelling  GI:  Negative for abdominal discomfort, blood in stools or black stools or change in bowel habits.  MUSCULOSKELETAL:  See HPI.  SKIN:  Negative for lesions, rash, and itching.  PSYCH:  No mood disorder or recent psychosocial stressors.  Patients sleep is not disturbed secondary to pain.  HEMATOLOGY/LYMPHOLOGY:  Negative for prolonged bleeding, bruising easily or swollen nodes.  Patient is not currently taking any anti-coagulants  NEURO:   No history of headaches, syncope, paralysis, seizures or tremors.  All other reviewed and negative other than HPI.    OBJECTIVE:    There were no vitals taken for this visit.    Telemedicine Exam  There were no vitals filed for this visit.  There is no height or weight on file to calculate BMI.      Physical Exam: last in clinic visit:      Physical Exam    GENERAL: Well appearing, in no acute distress, but in discomfort when trying to walk, alert and oriented x3.  Morbidly obese  PSYCH:  Mood and affect appropriate.  SKIN: Skin color, texture, turgor normal, no rashes or lesions.  HEAD/FACE:  Normocephalic, atraumatic.  CV: chronic edema changes in distal legs and ankles.  PULM: No evidence of respiratory difficulty, symmetric chest rise.  GI:  Abdomen soft and non-tender.    GAIT: slow antalgic.    LUMBAR SPINE:   Palpation:  Mild tenderness over left facet joints and paraspinous muscles. No trigger points.  ROM: Pain with flexion, extension and rotation.  Straight leg raising is negative.  SI JOINTS: bilateral SI joint, no tenderness  LEFT HIP:  SEVERELY RESTRICTED  AND PAINFUL RANGE OF MOTION including internal, external rotation, abduction and flexion.  NEURO:   MOTOR: Bilateral lower extremity muscle strength is normal. No atrophy or tone abnormalities are noted.  SENSORY: No loss of sensation in L3 through S1 dermatomes bilaterally.  DTR's: No clonus.    Imaging:        Results for orders placed during the hospital encounter of 12/28/22    X-Ray Lumbar Spine AP And Lateral    Narrative  EXAMINATION:  XR LUMBAR SPINE AP AND LATERAL    CLINICAL HISTORY:  Low back pain, unspecified    TECHNIQUE:  AP, lateral and spot images were performed of the lumbar spine.    COMPARISON:  Radiographs from August 2020    FINDINGS:  No scoliosis.  Five lumbar type vertebral bodies noted.  Bridging syndesmophytes noted at multiple levels. There is no fracture or listhesis.  Minimal narrowing at L3-L4 and L5-S1 is unchanged.  There is also minimal lower lumbar facet arthropathy.  No significant change from prior study.      Electronically signed by: Emory Bah MD  Date:    12/28/2022    XR SACROILIAC JOINTS 3 VIEWS     CLINICAL HISTORY:  mechanical lower back pain, psoriasis acitive.  Assess for sacroiilitis/ankylosis;  Low back pain     COMPARISON:  None     FINDINGS:  Degenerative changes noted in the included lumbosacral spine and hips.  No fracture or dislocation.  Pelvic ring is intact.  SI joints normal and symmetric in appearance bilaterally.  No sclerotic or erosive changes appreciated.  Heterogeneous increased density identified within the femoral heads.  There is spurring and buttressing of the femoral neck cortex consistent with degenerative changes.  Subchondral degenerative cyst/geodes cannot be excluded bilaterally.     Impression:  I reviewed the films which show moderate to severe degenerative and sclerotic changes of the left femoral head and acetabulum, worse than the right.  SI joints within normal limits.  Degenerative changes lumbosacral spine and hips.         Electronically signed by: Marty Lee MD  Date:                                            08/25/2020      LABS:  Lab Results   Component Value Date    WBC 2.64 (L) 07/12/2023    HGB 15.0 07/12/2023    HCT 46.1 07/12/2023    MCV 94 07/12/2023     (L) 07/12/2023       CMP  Sodium   Date Value Ref Range Status   01/11/2024 140 136 - 145 mmol/L Final     Potassium   Date Value Ref Range Status   01/11/2024 4.2 3.5 - 5.1 mmol/L Final     Chloride   Date Value Ref Range Status   01/11/2024 105 95 - 110 mmol/L Final     CO2   Date Value Ref Range Status   01/11/2024 26 23 - 29 mmol/L Final     Glucose   Date Value Ref Range Status   01/11/2024 92 70 - 110 mg/dL Final     BUN   Date Value Ref Range Status   01/11/2024 26 (H) 6 - 20 mg/dL Final     Creatinine   Date Value Ref Range Status   01/11/2024 1.2 0.5 - 1.4 mg/dL Final     Calcium   Date Value Ref Range Status   01/11/2024 9.9 8.7 - 10.5 mg/dL Final     Total Protein   Date Value Ref Range Status   05/16/2023 8.3 6.0 - 8.4 g/dL Final     Albumin   Date Value Ref Range Status   05/16/2023 4.2 3.5 - 5.2 g/dL Final     Total Bilirubin   Date Value Ref Range Status   05/16/2023 1.0 0.1 - 1.0 mg/dL Final     Comment:     For infants and newborns, interpretation of results should be based  on gestational age, weight and in agreement with clinical  observations.    Premature Infant recommended reference ranges:  Up to 24 hours.............<8.0 mg/dL  Up to 48 hours............<12.0 mg/dL  3-5 days..................<15.0 mg/dL  6-29 days.................<15.0 mg/dL       Alkaline Phosphatase   Date Value Ref Range Status   05/16/2023 78 55 - 135 U/L Final     AST   Date Value Ref Range Status   05/16/2023 24 10 - 40 U/L Final     ALT   Date Value Ref Range Status   05/16/2023 16 10 - 44 U/L Final     Anion Gap   Date Value Ref Range Status   01/11/2024 9 8 - 16 mmol/L Final     eGFR if    Date Value Ref Range Status   02/22/2022 >60.0 >60  mL/min/1.73 m^2 Final     eGFR if non    Date Value Ref Range Status   02/22/2022 >60.0 >60 mL/min/1.73 m^2 Final     Comment:     Calculation used to obtain the estimated glomerular filtration  rate (eGFR) is the CKD-EPI equation.          Lab Results   Component Value Date    HGBA1C 5.7 (H) 05/16/2023             ASSESSMENT: 58 y.o. year old male with chronic left hip pain that has gotten severe over the past several months not improving with medications.  X-rays show advanced sclerotic changes of the left hip:    1. DDD (degenerative disc disease), lumbosacral        2. Facet arthritis of lumbosacral region        3. Chronic left hip pain                PLAN:    1- Interventions:  Consider lumbar MBB for axial back pain- pt declines at this time  2. Follow-up to clinic in 3 months or sooner if needed      - Anticoagulation use: Patient is taking apixaban (Eliquis) for atrial fibrillation.    ;       - Medications:  Continue topamax  Start lyrica 75mg QHS x 1 week then increase to BID. -We discussed potential side effects of this medication which may include drowsiness,dizziness, dry mouth, constipation or peripheral edema. He has taken gabapentin in the past without relief  Continue robaxin 500mg  - Therapy:  Can not afford co-pay.  He does not believe he will not be able to tolerate it. Continue at home exercises and stretches  - Imaging:  reviewed xray lumbar spine  - Consults/Referrals:  - Records:  Reviewed and personally evaluated images of his x-rays    - Counseled patient regarding the importance of activity modification    - Patient Questions: Answered all of the patient's questions regarding diagnosis, therapy, and treatment    The above plan and management options were discussed at length with patient. Patient is in agreement with the above and verbalized understanding.      Lovely Palmer NP  Interventional Pain Management  [facility]    Disclaimer:  This note was prepared  using voice recognition system and is likely to have sound alike errors that may have been overlooked even after proof reading.  Please call me with any questions

## 2024-03-22 NOTE — TELEPHONE ENCOUNTER
----- Message from Lovely Palmer NP sent at 3/22/2024  7:47 AM CDT -----  Please schedule 3 month followup

## 2024-04-03 ENCOUNTER — PATIENT MESSAGE (OUTPATIENT)
Dept: PULMONOLOGY | Facility: CLINIC | Age: 59
End: 2024-04-03
Payer: MEDICARE

## 2024-04-05 ENCOUNTER — PATIENT MESSAGE (OUTPATIENT)
Dept: PAIN MEDICINE | Facility: CLINIC | Age: 59
End: 2024-04-05
Payer: MEDICARE

## 2024-04-08 PROBLEM — J96.11 CHRONIC HYPOXEMIC RESPIRATORY FAILURE: Status: RESOLVED | Noted: 2024-01-05 | Resolved: 2024-04-08

## 2024-04-08 RX ORDER — PREGABALIN 100 MG/1
100 CAPSULE ORAL 2 TIMES DAILY
Qty: 60 CAPSULE | Refills: 2 | Status: SHIPPED | OUTPATIENT
Start: 2024-04-08 | End: 2024-10-07

## 2024-04-19 ENCOUNTER — TELEPHONE (OUTPATIENT)
Dept: RHEUMATOLOGY | Facility: CLINIC | Age: 59
End: 2024-04-19
Payer: MEDICARE

## 2024-04-19 NOTE — TELEPHONE ENCOUNTER
Therapeutic discussion with patient regarding Black Box Warning for DVT development of Rinvoq. Patient has previous history and is on Eliquis. Patient denies smoking or testosterone replacement therapy and has no history of DVT. Endorses history of varicose veins.   Patient voiced understanding and had no further questions.

## 2024-04-19 NOTE — TELEPHONE ENCOUNTER
Spoke with patient . He now knows who [employer] is and will speak with them. He does how ever would like to speak about Rinvoq vs  Eliquis. Please call him to discuss.

## 2024-04-19 NOTE — TELEPHONE ENCOUNTER
----- Message from Nya Roberts sent at 4/18/2024  5:06 PM CDT -----  Type:  Scheduling First Order     Who Called: Yumiko   Would the patient rather a call back or a response via [facility]? Call back   Best Call Back Number: [phone] option 1   Additional Information: Please be advised, caller stated if dr or nurse can contact pt in regards to scheduling first order w/ them, caller stated that pt didn't know they were and wouldn't provide information

## 2024-04-21 DIAGNOSIS — U07.1 COVID-19 VIRUS DETECTED: ICD-10-CM

## 2024-04-22 ENCOUNTER — PATIENT OUTREACH (OUTPATIENT)
Dept: EMERGENCY MEDICINE | Facility: HOSPITAL | Age: 59
End: 2024-04-22
Payer: MEDICARE

## 2024-04-22 ENCOUNTER — HOSPITAL ENCOUNTER (INPATIENT)
Facility: HOSPITAL | Age: 59
LOS: 6 days | Discharge: HOME | DRG: 177 | End: 2024-04-28
Attending: EMERGENCY MEDICINE | Admitting: HOSPITALIST
Payer: MEDICARE

## 2024-04-22 DIAGNOSIS — N17.9 ACUTE RENAL FAILURE, UNSPECIFIED ACUTE RENAL FAILURE TYPE: ICD-10-CM

## 2024-04-22 DIAGNOSIS — I50.9 ACUTE DECOMPENSATED HEART FAILURE: ICD-10-CM

## 2024-04-22 DIAGNOSIS — U07.1 COVID-19: ICD-10-CM

## 2024-04-22 DIAGNOSIS — I50.33 ACUTE ON CHRONIC DIASTOLIC CONGESTIVE HEART FAILURE: ICD-10-CM

## 2024-04-22 DIAGNOSIS — I50.9 HEART FAILURE: ICD-10-CM

## 2024-04-22 DIAGNOSIS — G47.33 OSA (OBSTRUCTIVE SLEEP APNEA): ICD-10-CM

## 2024-04-22 DIAGNOSIS — J96.22 ACUTE ON CHRONIC RESPIRATORY FAILURE WITH HYPOXIA AND HYPERCAPNIA: Primary | ICD-10-CM

## 2024-04-22 DIAGNOSIS — E66.01 MORBID OBESITY: ICD-10-CM

## 2024-04-22 DIAGNOSIS — J96.21 ACUTE ON CHRONIC RESPIRATORY FAILURE WITH HYPOXIA AND HYPERCAPNIA: Primary | ICD-10-CM

## 2024-04-22 DIAGNOSIS — R06.02 SHORTNESS OF BREATH: ICD-10-CM

## 2024-04-22 DIAGNOSIS — R00.1 BRADYCARDIA: ICD-10-CM

## 2024-04-22 LAB
ALBUMIN SERPL BCP-MCNC: 3.5 G/DL (ref 3.5–5.2)
ALLENS TEST: ABNORMAL
ALP SERPL-CCNC: 65 U/L (ref 55–135)
ALT SERPL W/O P-5'-P-CCNC: 24 U/L (ref 10–44)
ANION GAP SERPL CALC-SCNC: 9 MMOL/L (ref 8–16)
AST SERPL-CCNC: 32 U/L (ref 10–40)
BASOPHILS # BLD AUTO: 0.01 K/UL (ref 0–0.2)
BASOPHILS NFR BLD: 0.3 % (ref 0–1.9)
BILIRUB SERPL-MCNC: 0.8 MG/DL (ref 0.1–1)
BNP SERPL-MCNC: 983 PG/ML (ref 0–99)
BUN SERPL-MCNC: 45 MG/DL (ref 6–20)
CALCIUM SERPL-MCNC: 8.2 MG/DL (ref 8.7–10.5)
CHLORIDE SERPL-SCNC: 107 MMOL/L (ref 95–110)
CK SERPL-CCNC: 203 U/L (ref 20–200)
CO2 SERPL-SCNC: 23 MMOL/L (ref 23–29)
CREAT SERPL-MCNC: 3.1 MG/DL (ref 0.5–1.4)
CRP SERPL-MCNC: 49.3 MG/L (ref 0–8.2)
DELSYS: ABNORMAL
DIFFERENTIAL METHOD BLD: ABNORMAL
EOSINOPHIL # BLD AUTO: 0 K/UL (ref 0–0.5)
EOSINOPHIL NFR BLD: 0.3 % (ref 0–8)
ERYTHROCYTE [DISTWIDTH] IN BLOOD BY AUTOMATED COUNT: 14.9 % (ref 11.5–14.5)
EST. GFR  (NO RACE VARIABLE): 22 ML/MIN/1.73 M^2
FERRITIN SERPL-MCNC: 283 NG/ML (ref 20–300)
FIO2: 21
GLUCOSE SERPL-MCNC: 108 MG/DL (ref 70–110)
HCO3 UR-SCNC: 24.9 MMOL/L (ref 24–28)
HCT VFR BLD AUTO: 42.6 % (ref 40–54)
HGB BLD-MCNC: 13.6 G/DL (ref 14–18)
IMM GRANULOCYTES # BLD AUTO: 0.01 K/UL (ref 0–0.04)
IMM GRANULOCYTES NFR BLD AUTO: 0.3 % (ref 0–0.5)
LACTATE SERPL-SCNC: 1.2 MMOL/L (ref 0.5–2.2)
LDH SERPL L TO P-CCNC: 264 U/L (ref 110–260)
LYMPHOCYTES # BLD AUTO: 0.7 K/UL (ref 1–4.8)
LYMPHOCYTES NFR BLD: 21.2 % (ref 18–48)
MCH RBC QN AUTO: 30.4 PG (ref 27–31)
MCHC RBC AUTO-ENTMCNC: 31.9 G/DL (ref 32–36)
MCV RBC AUTO: 95 FL (ref 82–98)
MODE: ABNORMAL
MONOCYTES # BLD AUTO: 0.6 K/UL (ref 0.3–1)
MONOCYTES NFR BLD: 18 % (ref 4–15)
NEUTROPHILS # BLD AUTO: 2 K/UL (ref 1.8–7.7)
NEUTROPHILS NFR BLD: 59.9 % (ref 38–73)
NRBC BLD-RTO: 0 /100 WBC
PCO2 BLDA: 54.3 MMHG (ref 35–45)
PH SMN: 7.27 [PH] (ref 7.35–7.45)
PLATELET # BLD AUTO: 75 K/UL (ref 150–450)
PMV BLD AUTO: 12.6 FL (ref 9.2–12.9)
PO2 BLDA: 49 MMHG (ref 80–100)
POC BE: -2 MMOL/L
POC SATURATED O2: 78 % (ref 95–100)
POCT GLUCOSE: 94 MG/DL (ref 70–110)
POTASSIUM SERPL-SCNC: 4.5 MMOL/L (ref 3.5–5.1)
PROCALCITONIN SERPL IA-MCNC: 0.41 NG/ML
PROT SERPL-MCNC: 7.4 G/DL (ref 6–8.4)
RBC # BLD AUTO: 4.47 M/UL (ref 4.6–6.2)
SAMPLE: ABNORMAL
SITE: ABNORMAL
SODIUM SERPL-SCNC: 139 MMOL/L (ref 136–145)
TROPONIN I SERPL DL<=0.01 NG/ML-MCNC: 0.07 NG/ML (ref 0–0.03)
TROPONIN I SERPL DL<=0.01 NG/ML-MCNC: 0.09 NG/ML (ref 0–0.03)
WBC # BLD AUTO: 3.39 K/UL (ref 3.9–12.7)

## 2024-04-22 PROCEDURE — 83880 ASSAY OF NATRIURETIC PEPTIDE: CPT | Performed by: EMERGENCY MEDICINE

## 2024-04-22 PROCEDURE — 93005 ELECTROCARDIOGRAM TRACING: CPT

## 2024-04-22 PROCEDURE — 36415 COLL VENOUS BLD VENIPUNCTURE: CPT | Performed by: NURSE PRACTITIONER

## 2024-04-22 PROCEDURE — 85025 COMPLETE CBC W/AUTO DIFF WBC: CPT | Performed by: EMERGENCY MEDICINE

## 2024-04-22 PROCEDURE — 80053 COMPREHEN METABOLIC PANEL: CPT | Performed by: EMERGENCY MEDICINE

## 2024-04-22 PROCEDURE — 84484 ASSAY OF TROPONIN QUANT: CPT | Performed by: EMERGENCY MEDICINE

## 2024-04-22 PROCEDURE — 96374 THER/PROPH/DIAG INJ IV PUSH: CPT

## 2024-04-22 PROCEDURE — 99900035 HC TECH TIME PER 15 MIN (STAT)

## 2024-04-22 PROCEDURE — 82728 ASSAY OF FERRITIN: CPT | Performed by: EMERGENCY MEDICINE

## 2024-04-22 PROCEDURE — 93010 ELECTROCARDIOGRAM REPORT: CPT | Mod: ,,, | Performed by: INTERNAL MEDICINE

## 2024-04-22 PROCEDURE — 25000003 PHARM REV CODE 250: Performed by: NURSE PRACTITIONER

## 2024-04-22 PROCEDURE — 21400001 HC TELEMETRY ROOM

## 2024-04-22 PROCEDURE — 63600175 PHARM REV CODE 636 W HCPCS: Performed by: EMERGENCY MEDICINE

## 2024-04-22 PROCEDURE — 27000207 HC ISOLATION

## 2024-04-22 PROCEDURE — 27100171 HC OXYGEN HIGH FLOW UP TO 24 HOURS

## 2024-04-22 PROCEDURE — 84484 ASSAY OF TROPONIN QUANT: CPT | Mod: 91 | Performed by: NURSE PRACTITIONER

## 2024-04-22 PROCEDURE — 27000190 HC CPAP FULL FACE MASK W/VALVE

## 2024-04-22 PROCEDURE — 84145 PROCALCITONIN (PCT): CPT | Performed by: EMERGENCY MEDICINE

## 2024-04-22 PROCEDURE — 5A09357 ASSISTANCE WITH RESPIRATORY VENTILATION, LESS THAN 24 CONSECUTIVE HOURS, CONTINUOUS POSITIVE AIRWAY PRESSURE: ICD-10-PCS | Performed by: HOSPITALIST

## 2024-04-22 PROCEDURE — 99291 CRITICAL CARE FIRST HOUR: CPT

## 2024-04-22 PROCEDURE — 86140 C-REACTIVE PROTEIN: CPT | Performed by: EMERGENCY MEDICINE

## 2024-04-22 PROCEDURE — 94761 N-INVAS EAR/PLS OXIMETRY MLT: CPT

## 2024-04-22 PROCEDURE — 25000003 PHARM REV CODE 250: Performed by: HOSPITALIST

## 2024-04-22 PROCEDURE — 82550 ASSAY OF CK (CPK): CPT | Performed by: EMERGENCY MEDICINE

## 2024-04-22 PROCEDURE — 94660 CPAP INITIATION&MGMT: CPT

## 2024-04-22 PROCEDURE — 87040 BLOOD CULTURE FOR BACTERIA: CPT | Mod: 59 | Performed by: EMERGENCY MEDICINE

## 2024-04-22 PROCEDURE — 36600 WITHDRAWAL OF ARTERIAL BLOOD: CPT

## 2024-04-22 PROCEDURE — 83615 LACTATE (LD) (LDH) ENZYME: CPT | Performed by: EMERGENCY MEDICINE

## 2024-04-22 PROCEDURE — 83605 ASSAY OF LACTIC ACID: CPT | Performed by: EMERGENCY MEDICINE

## 2024-04-22 RX ORDER — LOSARTAN POTASSIUM 50 MG/1
100 TABLET ORAL DAILY
Status: DISCONTINUED | OUTPATIENT
Start: 2024-04-23 | End: 2024-04-22

## 2024-04-22 RX ORDER — INSULIN ASPART 100 [IU]/ML
0-5 INJECTION, SOLUTION INTRAVENOUS; SUBCUTANEOUS
Status: DISCONTINUED | OUTPATIENT
Start: 2024-04-22 | End: 2024-04-28 | Stop reason: HOSPADM

## 2024-04-22 RX ORDER — VENLAFAXINE HYDROCHLORIDE 75 MG/1
150 CAPSULE, EXTENDED RELEASE ORAL DAILY
Status: DISCONTINUED | OUTPATIENT
Start: 2024-04-23 | End: 2024-04-28 | Stop reason: HOSPADM

## 2024-04-22 RX ORDER — BENZONATATE 100 MG/1
100 CAPSULE ORAL 3 TIMES DAILY PRN
Status: DISCONTINUED | OUTPATIENT
Start: 2024-04-22 | End: 2024-04-28 | Stop reason: HOSPADM

## 2024-04-22 RX ORDER — IXEKIZUMAB 80 MG/ML
INJECTION, SOLUTION SUBCUTANEOUS
Qty: 1 ML | Refills: 0 | Status: SHIPPED | OUTPATIENT
Start: 2024-04-22

## 2024-04-22 RX ORDER — IBUPROFEN 200 MG
24 TABLET ORAL
Status: DISCONTINUED | OUTPATIENT
Start: 2024-04-22 | End: 2024-04-28 | Stop reason: HOSPADM

## 2024-04-22 RX ORDER — COLCHICINE 0.6 MG/1
0.6 TABLET, FILM COATED ORAL DAILY
Status: DISCONTINUED | OUTPATIENT
Start: 2024-04-23 | End: 2024-04-23

## 2024-04-22 RX ORDER — ALBUTEROL SULFATE 90 UG/1
2 AEROSOL, METERED RESPIRATORY (INHALATION) EVERY 6 HOURS PRN
Status: DISCONTINUED | OUTPATIENT
Start: 2024-04-22 | End: 2024-04-28 | Stop reason: HOSPADM

## 2024-04-22 RX ORDER — ALLOPURINOL 300 MG/1
300 TABLET ORAL DAILY
Status: DISCONTINUED | OUTPATIENT
Start: 2024-04-23 | End: 2024-04-22

## 2024-04-22 RX ORDER — GLUCAGON 1 MG
1 KIT INJECTION
Status: DISCONTINUED | OUTPATIENT
Start: 2024-04-22 | End: 2024-04-28 | Stop reason: HOSPADM

## 2024-04-22 RX ORDER — LOPERAMIDE HYDROCHLORIDE 2 MG/1
2 CAPSULE ORAL EVERY 6 HOURS PRN
Status: DISCONTINUED | OUTPATIENT
Start: 2024-04-22 | End: 2024-04-28 | Stop reason: HOSPADM

## 2024-04-22 RX ORDER — PREGABALIN 100 MG/1
100 CAPSULE ORAL 2 TIMES DAILY
Status: DISCONTINUED | OUTPATIENT
Start: 2024-04-22 | End: 2024-04-28 | Stop reason: HOSPADM

## 2024-04-22 RX ORDER — ONDANSETRON HYDROCHLORIDE 2 MG/ML
4 INJECTION, SOLUTION INTRAVENOUS EVERY 6 HOURS PRN
Status: DISCONTINUED | OUTPATIENT
Start: 2024-04-22 | End: 2024-04-28 | Stop reason: HOSPADM

## 2024-04-22 RX ORDER — SODIUM CHLORIDE 0.9 % (FLUSH) 0.9 %
10 SYRINGE (ML) INJECTION
Status: DISCONTINUED | OUTPATIENT
Start: 2024-04-22 | End: 2024-04-28 | Stop reason: HOSPADM

## 2024-04-22 RX ORDER — FUROSEMIDE 10 MG/ML
80 INJECTION INTRAMUSCULAR; INTRAVENOUS
Status: COMPLETED | OUTPATIENT
Start: 2024-04-22 | End: 2024-04-22

## 2024-04-22 RX ORDER — POLYETHYLENE GLYCOL 3350 17 G/17G
17 POWDER, FOR SOLUTION ORAL DAILY
Status: DISCONTINUED | OUTPATIENT
Start: 2024-04-23 | End: 2024-04-22

## 2024-04-22 RX ORDER — METOPROLOL SUCCINATE 50 MG/1
100 TABLET, EXTENDED RELEASE ORAL DAILY
Status: DISCONTINUED | OUTPATIENT
Start: 2024-04-23 | End: 2024-04-23

## 2024-04-22 RX ORDER — TALC
6 POWDER (GRAM) TOPICAL NIGHTLY PRN
Status: DISCONTINUED | OUTPATIENT
Start: 2024-04-22 | End: 2024-04-28 | Stop reason: HOSPADM

## 2024-04-22 RX ORDER — ACETAMINOPHEN 325 MG/1
650 TABLET ORAL EVERY 4 HOURS PRN
Status: DISCONTINUED | OUTPATIENT
Start: 2024-04-22 | End: 2024-04-28 | Stop reason: HOSPADM

## 2024-04-22 RX ORDER — IBUPROFEN 200 MG
16 TABLET ORAL
Status: DISCONTINUED | OUTPATIENT
Start: 2024-04-22 | End: 2024-04-28 | Stop reason: HOSPADM

## 2024-04-22 RX ORDER — ASCORBIC ACID 500 MG
500 TABLET ORAL 2 TIMES DAILY
Status: DISCONTINUED | OUTPATIENT
Start: 2024-04-22 | End: 2024-04-28 | Stop reason: HOSPADM

## 2024-04-22 RX ORDER — AMLODIPINE BESYLATE 5 MG/1
5 TABLET ORAL DAILY
Status: DISCONTINUED | OUTPATIENT
Start: 2024-04-23 | End: 2024-04-23

## 2024-04-22 RX ORDER — LEVOTHYROXINE SODIUM 50 UG/1
50 TABLET ORAL
Status: DISCONTINUED | OUTPATIENT
Start: 2024-04-23 | End: 2024-04-28 | Stop reason: HOSPADM

## 2024-04-22 RX ADMIN — APIXABAN 5 MG: 2.5 TABLET, FILM COATED ORAL at 09:04

## 2024-04-22 RX ADMIN — FUROSEMIDE 80 MG: 10 INJECTION, SOLUTION INTRAMUSCULAR; INTRAVENOUS at 04:04

## 2024-04-22 RX ADMIN — PREGABALIN 100 MG: 25 CAPSULE ORAL at 09:04

## 2024-04-22 RX ADMIN — OXYCODONE HYDROCHLORIDE AND ACETAMINOPHEN 500 MG: 500 TABLET ORAL at 09:04

## 2024-04-22 NOTE — H&P
[facility] - Emergency Dept.  [facility] Medicine  History & Physical    Patient Name: Caio Ontiveros  MRN: [id number]  Patient Class: IP- Inpatient  Admission Date: 4/22/2024  Attending Physician: Junior Gunter MD   Primary Care Provider: Dominick Allen MD         Patient information was obtained from patient, spouse/SO, past medical records, and ER records.     Subjective:     Principal Problem:Acute on chronic respiratory failure with hypoxia and hypercapnia    Chief Complaint:   Chief Complaint   Patient presents with    COVID-19 Concerns     Pt was diagnosed yesterday with covid.  He c/o weakness, fatigue, and low O2 sat at home.        HPI: 59 y/o male with PMHx of CHF (right sided heart failure), atrial fibrillation on eliquis, HTN, EVANGELIST wearing Trilogy, gout, morbid obesity who presented to the ER today with complaints of SOB, fatigue, congestion, cough.  He was diagnosed with COVID at [facility] ed yesterday.  He denies any fever/chills.  He also reports leg swelling and recent weight gain (unsure of amount).  In the ED, o2 sats 85% on room air, placed on 2 liters with noted improvement.  Lab work notable for creatinine 3.1, , Trop 0.86, procal 0.41.  ABG showed Ph 7.2, CO2 54, , HCO3 24.  Chest xray with no acute findings.  Patient will be admitted to inpatient for acute on chronic hypoxic/hypercapnic resp failure d/t covid/heart failure exacerbation.     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     Past Medical History:   Diagnosis Date    A-fib     CHF (congestive heart failure)     Gout, chronic     Hypertension     Sleep apnea        Past Surgical History:   Procedure Laterality Date    CHOLECYSTECTOMY      GASTRIC BYPASS  2014    INJECTION OF JOINT Left 8/23/2023    Procedure: Left IA Hip Joint injection;  Surgeon: Peña Rausch MD;  Location: [facility];  Service: Pain Management;  Laterality: Left;       Review of patient's allergies indicates:  No Known Allergies    Current  Facility-Administered Medications   Medication Dose Route Frequency Provider Last Rate Last Admin    acetaminophen tablet 650 mg  650 mg Oral Q4H PRN Junior Gunter MD        albuterol inhaler 2 puff  2 puff Inhalation Q6H PRN Junior Gunter MD        [START ON 4/23/2024] amLODIPine tablet 5 mg  5 mg Oral Daily Junior Gunter MD        apixaban tablet 5 mg  5 mg Oral BID Junior Gunter MD        ascorbic acid (vitamin C) tablet 500 mg  500 mg Oral BID Junior Gunter MD        benzonatate capsule 100 mg  100 mg Oral TID PRN Junior Gunter MD        [START ON 4/23/2024] colchicine capsule/tablet 0.6 mg  0.6 mg Oral Daily Junior Gunter MD        [START ON 4/23/2024] dexAMETHasone tablet 6 mg  6 mg Oral Daily Junior Gunter MD        dextrose 10% bolus 125 mL 125 mL  12.5 g Intravenous PRN Junior Gunter MD        dextrose 10% bolus 250 mL 250 mL  25 g Intravenous PRN Junior Gunter MD        glucagon (human recombinant) injection 1 mg  1 mg Intramuscular PRN Junior Gunter MD        glucose chewable tablet 16 g  16 g Oral PRN Junior Gunter MD        glucose chewable tablet 24 g  24 g Oral PRN Junior Gunter MD        insulin aspart U-100 pen 0-5 Units  0-5 Units Subcutaneous QID (AC + HS) PRN Junior Gunter MD        [START ON 4/23/2024] levothyroxine tablet 50 mcg  50 mcg Oral Before breakfast Junior Gunter MD        loperamide capsule 2 mg  2 mg Oral Q6H PRN Junior Gunter MD        melatonin tablet 6 mg  6 mg Oral Nightly PRN Junior Gunter MD        [START ON 4/23/2024] metoprolol succinate (TOPROL-XL) 24 hr tablet 100 mg  100 mg Oral Daily Junior Gunter MD        [START ON 4/23/2024] multivitamin tablet  1 tablet Oral Daily Junior Gunter MD        ondansetron injection 4 mg  4 mg Intravenous Q6H PRN Junior Gunter MD        sodium chloride 0.9% flush 10 mL  10 mL Intravenous PRN Junior Gunter MD         Current Outpatient Medications   Medication Sig Dispense Refill    allopurinoL (ZYLOPRIM) 300 MG tablet  Take 1 tablet (300 mg total) by mouth once daily. 90 tablet 1    amLODIPine (NORVASC) 5 MG tablet TAKE 1 TABLET BY MOUTH ONCE  DAILY 30 tablet 11    ARIPiprazole (ABILIFY) 10 MG Tab Take 1 tablet (10 mg total) by mouth once daily. 90 tablet 3    colchicine (COLCRYS) 0.6 mg tablet Take 1 tablet (0.6 mg total) by mouth once daily. 90 tablet 1    ELIQUIS 5 mg Tab TAKE 1 TABLET BY MOUTH  TWICE DAILY 180 tablet 3    levothyroxine (SYNTHROID) 50 MCG tablet Take 1 tablet (50 mcg total) by mouth before breakfast. 90 tablet 3    losartan (COZAAR) 100 MG tablet TAKE 1 TABLET BY MOUTH ONCE  DAILY 90 tablet 2    methocarbamoL (ROBAXIN) 500 MG Tab Take 1 tablet (500 mg total) by mouth 3 (three) times daily as needed. 60 tablet 5    metoprolol succinate (TOPROL-XL) 100 MG 24 hr tablet Take 1 tablet (100 mg total) by mouth once daily. 90 tablet 3    nabumetone (RELAFEN) 750 MG tablet Take 1 tablet (750 mg total) by mouth 2 (two) times daily. 60 tablet 1    potassium chloride SA (K-DUR,KLOR-CON) 20 MEQ tablet TAKE 1 TABLET BY MOUTH TWICE  DAILY 180 tablet 3    pregabalin (LYRICA) 100 MG capsule Take 1 capsule (100 mg total) by mouth 2 (two) times daily. 60 capsule 2    semaglutide, weight loss, (WEGOVY) 2.4 mg/0.75 mL PnIj Inject 2.4 mg into the skin every 7 days. 3 mL 0    TALTZ AUTOINJECTOR 80 mg/mL AtIn INJECT 80MG (1 PEN) UNDER THE SKIN EVERY 4 WEEKS 1 mL 0    topiramate (TOPAMAX) 50 MG tablet Take 1 tablet (50 mg total) by mouth 2 (two) times daily. 60 tablet 11    torsemide (DEMADEX) 20 MG Tab Take 2 tablets (40 mg total) by mouth 2 (two) times a day. 360 tablet 3    venlafaxine (EFFEXOR-XR) 150 MG Cp24 Take 1 capsule (150 mg total) by mouth once daily. 90 capsule 3    venlafaxine (EFFEXOR-XR) 75 MG 24 hr capsule Take 1 capsule (75 mg total) by mouth once daily. 90 capsule 3     Family History       Problem Relation (Age of Onset)    Diabetes Father, Sister, Brother    Hypertension Mother, Father    Stroke Father           Tobacco Use    Smoking status: Never    Smokeless tobacco: Never   Substance and Sexual Activity    Alcohol use: Not Currently    Drug use: No    Sexual activity: Not Currently     Review of Systems   Constitutional:  Positive for fatigue and unexpected weight change.   Respiratory:  Positive for cough and shortness of breath.    Cardiovascular:  Positive for leg swelling.     Objective:     Vital Signs (Most Recent):  Temp: 98.1 °F (36.7 °C) (04/22/24 1503)  Pulse: 86 (04/22/24 1717)  Resp: 20 (04/22/24 1717)  BP: (!) 125/58 (04/22/24 1717)  SpO2: 95 % (04/22/24 1717) Vital Signs (24h Range):  Temp:  [98.1 °F (36.7 °C)] 98.1 °F (36.7 °C)  Pulse:  [] 86  Resp:  [20-21] 20  SpO2:  [85 %-97 %] 95 %  BP: ()/(54-61) 125/58     Weight: (!) 195.6 kg (431 lb 3.5 oz)  Body mass index is 53.9 kg/m².     Physical Exam  Vitals and nursing note reviewed.   Constitutional:       Appearance: He is obese.   Cardiovascular:      Rate and Rhythm: Normal rate and regular rhythm.      Pulses: Normal pulses.      Heart sounds: Normal heart sounds.   Pulmonary:      Effort: Pulmonary effort is normal.      Comments: Diminished breath sounds all lobes   Abdominal:      General: Bowel sounds are normal. There is no distension.      Palpations: Abdomen is soft.      Tenderness: There is no abdominal tenderness.   Musculoskeletal:      Right lower leg: Edema present.      Left lower leg: Edema present.   Skin:     General: Skin is warm and dry.   Neurological:      Mental Status: He is alert and oriented to person, place, and time.                Significant Labs: All pertinent labs within the past 24 hours have been reviewed.    Significant Imaging: I have reviewed all pertinent imaging results/findings within the past 24 hours.  Assessment/Plan:     * Acute on chronic respiratory failure with hypoxia and hypercapnia  Patient with Hypercapnic and Hypoxic Respiratory failure which is Acute on chronic.  he is not on home  oxygen. Wears trilogy and has supplemental o2 as needed.  Supplemental oxygen was provided and noted-      .   Signs/symptoms of respiratory failure include- increased work of breathing and hypoxia on room air . Contributing diagnoses includes -  COVID and Heart Failure exacerbation   Labs and images were reviewed. Patient Has recent ABG, which has been reviewed. Will treat underlying causes and adjust management of respiratory failure as follows    - start steroids  - s/p dose of iV lasix 80 mg in ED, will hold further diuretics tonight d/t MONA, re-evaluate in am  - o2 as needed  - trilogy q hs  - supportive care    COVID  - see plan for respiratory failure     Acute on chronic diastolic congestive heart failure  Patient is identified as having Diastolic (HFpEF) heart failure that is Acute on chronic. CHF is currently uncontrolled due to Continued edema of extremities and Weight gain  Latest ECHO performed and demonstrates- Results for orders placed during the hospital encounter of 11/20/21    Echo    Interpretation Summary  · The left ventricle is mildly enlarged with moderate concentric hypertrophy and normal systolic function.  · The estimated ejection fraction is 60%.  · Normal left ventricular diastolic function.  · Severe right ventricular enlargement with moderately reduced right ventricular systolic function.  · Moderate left atrial enlargement.  · Moderate right atrial enlargement.  · Moderate tricuspid regurgitation.  · There is abnormal septal wall motion. There is systolic flattening of the interventricular septum consistent with right ventricle pressure overload.  . Continue Beta Blocker and Furosemide and monitor clinical status closely. Monitor on telemetry. Patient is off CHF pathway.  Monitor strict Is&Os and daily weights.  Place on fluid restriction of 1.5 L. Cardiology has not been consulted. Continue to stress to patient importance of self efficacy and  on diet for CHF. Last BNP  reviewed- and noted below   Recent Labs   Lab 04/22/24  1550   *       MONA (acute kidney injury)  Patient with acute kidney injury/acute renal failure unclear cause MONA is currently stable. Baseline creatinine  1.2  - Labs reviewed- Renal function/electrolytes with Estimated Creatinine Clearance: 46.8 mL/min (A) (based on SCr of 3.1 mg/dL (H)). according to latest data. Monitor urine output and serial BMP and adjust therapy as needed. Avoid nephrotoxins and renally dose meds for GFR listed above.  - holding home arb, allopurinol, Demadex   - may be d/t vascular congestion, given dose of 80 IV lasix in ed, will monitor output overnight, repeat labs in am, further diuresis pending labs     Moderate episode of recurrent major depressive disorder  - continue home effexor          Elevated troponin  - in the setting of covid infection, heart failure exacerbation  - will trend  - remains chest pain free        Chronic atrial fibrillation  Patient with Permanent atrial fibrillation which is controlled currently with Beta Blocker. Patient is currently in atrial fibrillation.Anticoagulation indicated. Anticoagulation done with eliquis .    EVANGELIST (obstructive sleep apnea)/ Obesity Hypoventilation syndrome   - continue home trilogy        VTE Risk Mitigation (From admission, onward)           Ordered     apixaban tablet 5 mg  2 times daily         04/22/24 1722                                    Lovely Perrin NP  Department of Hospital Medicine  [facility] - Emergency Dept.

## 2024-04-22 NOTE — PHARMACY MED REC
"Admission Medication History     The home medication history was taken by Kamila Cameron.    You may go to "Admission" then "Reconcile Home Medications" tabs to review and/or act upon these items.     The home medication list has been updated by the Pharmacy department.   Please read ALL comments highlighted in yellow.   Please address this information as you see fit.    Feel free to contact us if you have any questions or require assistance.      The medications listed below were removed from the home medication list. Please reorder if appropriate:  Patient reports no longer taking the following medication(s):  Wegovy 2.4 mg  Relafen 750 mg      Medications listed below were obtained from: Patient/family and Analytic software- Oli Levine (wife)        LAST MED REC COMPLETED:       Kamila Cameron  [id number]    Current Outpatient Medications on File Prior to Encounter   Medication Sig Dispense Refill Last Dose    allopurinoL (ZYLOPRIM) 300 MG tablet Take 1 tablet (300 mg total) by mouth once daily. 90 tablet 1 4/21/2024    colchicine (COLCRYS) 0.6 mg tablet Take 1 tablet (0.6 mg total) by mouth once daily. 90 tablet 1 4/21/2024    ELIQUIS 5 mg Tab TAKE 1 TABLET BY MOUTH  TWICE DAILY 180 tablet 3 4/21/2024    levothyroxine (SYNTHROID) 50 MCG tablet Take 1 tablet (50 mcg total) by mouth before breakfast. 90 tablet 3 4/21/2024    losartan (COZAAR) 100 MG tablet TAKE 1 TABLET BY MOUTH ONCE  DAILY 90 tablet 2 4/21/2024    methocarbamoL (ROBAXIN) 500 MG Tab Take 1 tablet (500 mg total) by mouth 3 (three) times daily as needed. 60 tablet 5 4/21/2024    metoprolol succinate (TOPROL-XL) 100 MG 24 hr tablet Take 1 tablet (100 mg total) by mouth once daily. 90 tablet 3 4/21/2024    potassium chloride SA (K-DUR,KLOR-CON) 20 MEQ tablet TAKE 1 TABLET BY MOUTH TWICE  DAILY 180 tablet 3 4/21/2024    pregabalin (LYRICA) 100 MG capsule Take 1 capsule (100 mg total) by mouth 2 (two) times daily. 60 capsule 2 4/21/2024 "    TALTZ AUTOINJECTOR 80 mg/mL AtIn INJECT 80MG (1 PEN) UNDER THE SKIN EVERY 4 WEEKS 1 mL 0 Past Month    torsemide (DEMADEX) 20 MG Tab Take 2 tablets (40 mg total) by mouth 2 (two) times a day. 360 tablet 3 4/21/2024    venlafaxine (EFFEXOR-XR) 150 MG Cp24 Take 1 capsule (150 mg total) by mouth once daily. 90 capsule 3 4/21/2024    venlafaxine (EFFEXOR-XR) 75 MG 24 hr capsule Take 1 capsule (75 mg total) by mouth once daily. 90 capsule 3 4/21/2024    amLODIPine (NORVASC) 5 MG tablet TAKE 1 TABLET BY MOUTH ONCE  DAILY (Patient not taking: Reported on 4/22/2024) 30 tablet 11 Not Taking    topiramate (TOPAMAX) 50 MG tablet Take 1 tablet (50 mg total) by mouth 2 (two) times daily. (Patient not taking: Reported on 4/22/2024) 60 tablet 11 Not Taking                           .

## 2024-04-22 NOTE — ASSESSMENT & PLAN NOTE
Patient with acute kidney injury/acute renal failure unclear cause MONA is currently stable. Baseline creatinine  1.2  - Labs reviewed- Renal function/electrolytes with Estimated Creatinine Clearance: 46.8 mL/min (A) (based on SCr of 3.1 mg/dL (H)). according to latest data. Monitor urine output and serial BMP and adjust therapy as needed. Avoid nephrotoxins and renally dose meds for GFR listed above.  - holding home arb, allopurinol, Demadex   - may be d/t vascular congestion, given dose of 80 IV lasix in ed, will monitor output overnight, repeat labs in am, further diuresis pending labs

## 2024-04-22 NOTE — ED PROVIDER NOTES
SCRIBE #1 NOTE: I, Stas Cuellar, am scribing for, and in the presence of, Maria A Nguyen MD. I have scribed the entire note.       History     Chief Complaint   Patient presents with    COVID-19 Concerns     Pt was diagnosed yesterday with covid.  He c/o weakness, fatigue, and low O2 sat at home.     Review of patient's allergies indicates:  No Known Allergies      History of Present Illness     HPI    4/22/2024, 3:19 PM  History obtained from the patient      History of Present Illness: Caio Ontiveros is a 58 y.o. male patient with a PMHx of A-fib, CHF, gout, HTN, and sleep apnea who presents to the Emergency Department for evaluation of SOB which onset gradually. Pt was dx with COVID yesterday at [facility] after presenting with URI sxs and fever. He has sleep apnea and wears a CPAP machine at night, no O2 throughout the day. He noticed that his O2 saturation has been decreased since hiral COVID, and he has been feeling increasingly SOB over the last few hours. Symptoms are constant and moderate in severity. No mitigating or exacerbating factors reported. Associated sxs include cough, congestion, rhinorrhea, weakness, and fatigue. Patient denies any CP, HA, N/V/D, LOC, and all other sxs at this time. No prior Tx. Pt sleeps on 2 pillows at night. He did not take his medications today. No further complaints or concerns at this time.       Arrival mode: Personal vehicle      PCP: Dominick Allen MD        Past Medical History:  Past Medical History:   Diagnosis Date    A-fib     CHF (congestive heart failure)     Gout, chronic     Hypertension     Sleep apnea        Past Surgical History:  Past Surgical History:   Procedure Laterality Date    CHOLECYSTECTOMY      GASTRIC BYPASS  2014    INJECTION OF JOINT Left 8/23/2023    Procedure: Left IA Hip Joint injection;  Surgeon: Peña Rausch MD;  Location: [facility];  Service: Pain Management;  Laterality: Left;         Family History:  Family History    Problem Relation Name Age of Onset    Hypertension Mother      Stroke Father      Diabetes Father      Hypertension Father      Diabetes Sister      Diabetes Brother         Social History:  Social History     Tobacco Use    Smoking status: Never    Smokeless tobacco: Never   Substance and Sexual Activity    Alcohol use: Not Currently    Drug use: No    Sexual activity: Not Currently        Review of Systems     Review of Systems   Constitutional:  Positive for fatigue. Negative for fever.   HENT:  Positive for congestion and rhinorrhea. Negative for sore throat.    Respiratory:  Positive for cough and shortness of breath.    Cardiovascular:  Negative for chest pain.   Gastrointestinal:  Negative for diarrhea, nausea and vomiting.   Genitourinary:  Negative for dysuria.   Musculoskeletal:  Negative for back pain.   Skin:  Negative for rash.   Neurological:  Positive for weakness. Negative for syncope and headaches.   Hematological:  Does not bruise/bleed easily.   All other systems reviewed and are negative.     Physical Exam     Initial Vitals [04/22/24 1503]   BP Pulse Resp Temp SpO2   120/60 100 20 98.1 °F (36.7 °C) (!) 85 %      MAP       --          Physical Exam  Nursing Notes and Vital Signs Reviewed.  Constitutional: Patient is in no acute distress. Morbidly obese. Nontoxic appearing.  Head: Atraumatic. Normocephalic.  Eyes: PERRL. EOM intact. Conjunctivae are not pale. No scleral icterus.  ENT: Mucous membranes are moist. Oropharynx is clear and symmetric.    Neck: Supple. Full ROM. No lymphadenopathy.  Cardiovascular: Regular rate. Regular rhythm. No murmurs, rubs, or gallops. Distal pulses are 2+ and symmetric.  Pulmonary/Chest: No respiratory distress. Diminished breath sounds bilaterally. No wheezing or rales.  Abdominal: Soft and non-distended.  There is no tenderness.  No rebound, guarding, or rigidity. Good bowel sounds.  Genitourinary: No CVA tenderness  Musculoskeletal: Moves all extremities. No  obvious deformities. No edema. No calf tenderness.  Skin: Warm and dry.  Neurological:  Alert, awake, and appropriate.  Normal speech.  No acute focal neurological deficits are appreciated.  Psychiatric: Normal affect. Good eye contact. Appropriate in content.     ED Course   Critical Care    Date/Time: 4/22/2024 7:14 PM    Performed by: Maria A Nguyen MD  Authorized by: Junior Gunter MD  Direct patient critical care time: 25 minutes  Additional history critical care time: 6 minutes  Ordering / reviewing critical care time: 6 minutes  Documentation critical care time: 5 minutes  Consulting other physicians critical care time: 5 minutes  Total critical care time (exclusive of procedural time) : 47 minutes  Critical care was necessary to treat or prevent imminent or life-threatening deterioration of the following conditions: respiratory failure, cardiac failure and renal failure.  Critical care was time spent personally by me on the following activities: blood draw for specimens, development of treatment plan with patient or surrogate, discussions with consultants, interpretation of cardiac output measurements, evaluation of patient's response to treatment, examination of patient, obtaining history from patient or surrogate, ordering and performing treatments and interventions, ordering and review of radiographic studies, ordering and review of laboratory studies, pulse oximetry, re-evaluation of patient's condition and review of old charts.        ED Vital Signs:  Vitals:    04/22/24 1503 04/22/24 1516 04/22/24 1534 04/22/24 1538   BP: 120/60      Pulse: 100  89 92   Resp: 20      Temp: 98.1 °F (36.7 °C)      TempSrc: Oral      SpO2: (!) 85%  95% (!) 93%   Weight:  (!) 195.6 kg (431 lb 3.5 oz)      04/22/24 1547 04/22/24 1601 04/22/24 1630 04/22/24 1647   BP: 104/61  (!) 98/54 (!) 106/54   Pulse: 88  86 88   Resp: 20   (!) 21   Temp:       TempSrc:       SpO2:  95% 97% 96%   Weight:        04/22/24 1717   BP: (!)  125/58   Pulse: 86   Resp: 20   Temp:    TempSrc:    SpO2: 95%   Weight:        Abnormal Lab Results:  Labs Reviewed   CBC W/ AUTO DIFFERENTIAL - Abnormal; Notable for the following components:       Result Value    WBC 3.39 (*)     RBC 4.47 (*)     Hemoglobin 13.6 (*)     MCHC 31.9 (*)     RDW 14.9 (*)     Platelets 75 (*)     Lymph # 0.7 (*)     Mono % 18.0 (*)     All other components within normal limits   COMPREHENSIVE METABOLIC PANEL - Abnormal; Notable for the following components:    BUN 45 (*)     Creatinine 3.1 (*)     Calcium 8.2 (*)     eGFR 22 (*)     All other components within normal limits   C-REACTIVE PROTEIN - Abnormal; Notable for the following components:    CRP 49.3 (*)     All other components within normal limits   LACTATE DEHYDROGENASE - Abnormal; Notable for the following components:     (*)     All other components within normal limits   CK - Abnormal; Notable for the following components:     (*)     All other components within normal limits   TROPONIN I - Abnormal; Notable for the following components:    Troponin I 0.086 (*)     All other components within normal limits   PROCALCITONIN - Abnormal; Notable for the following components:    Procalcitonin 0.41 (*)     All other components within normal limits   B-TYPE NATRIURETIC PEPTIDE - Abnormal; Notable for the following components:     (*)     All other components within normal limits   ISTAT PROCEDURE - Abnormal; Notable for the following components:    POC PH 7.270 (*)     POC PCO2 54.3 (*)     POC PO2 49 (*)     All other components within normal limits   CULTURE, BLOOD   CULTURE, BLOOD   FERRITIN   LACTIC ACID, PLASMA   TROPONIN I   POCT GLUCOSE   POCT GLUCOSE MONITORING CONTINUOUS        All Lab Results:  Results for orders placed or performed during the hospital encounter of 04/22/24   CBC auto differential   Result Value Ref Range    WBC 3.39 (L) 3.90 - 12.70 K/uL    RBC 4.47 (L) 4.60 - 6.20 M/uL    Hemoglobin  13.6 (L) 14.0 - 18.0 g/dL    Hematocrit 42.6 40.0 - 54.0 %    MCV 95 82 - 98 fL    MCH 30.4 27.0 - 31.0 pg    MCHC 31.9 (L) 32.0 - 36.0 g/dL    RDW 14.9 (H) 11.5 - 14.5 %    Platelets 75 (L) 150 - 450 K/uL    MPV 12.6 9.2 - 12.9 fL    Immature Granulocytes 0.3 0.0 - 0.5 %    Gran # (ANC) 2.0 1.8 - 7.7 K/uL    Immature Grans (Abs) 0.01 0.00 - 0.04 K/uL    Lymph # 0.7 (L) 1.0 - 4.8 K/uL    Mono # 0.6 0.3 - 1.0 K/uL    Eos # 0.0 0.0 - 0.5 K/uL    Baso # 0.01 0.00 - 0.20 K/uL    nRBC 0 0 /100 WBC    Gran % 59.9 38.0 - 73.0 %    Lymph % 21.2 18.0 - 48.0 %    Mono % 18.0 (H) 4.0 - 15.0 %    Eosinophil % 0.3 0.0 - 8.0 %    Basophil % 0.3 0.0 - 1.9 %    Differential Method Automated    Comprehensive metabolic panel   Result Value Ref Range    Sodium 139 136 - 145 mmol/L    Potassium 4.5 3.5 - 5.1 mmol/L    Chloride 107 95 - 110 mmol/L    CO2 23 23 - 29 mmol/L    Glucose 108 70 - 110 mg/dL    BUN 45 (H) 6 - 20 mg/dL    Creatinine 3.1 (H) 0.5 - 1.4 mg/dL    Calcium 8.2 (L) 8.7 - 10.5 mg/dL    Total Protein 7.4 6.0 - 8.4 g/dL    Albumin 3.5 3.5 - 5.2 g/dL    Total Bilirubin 0.8 0.1 - 1.0 mg/dL    Alkaline Phosphatase 65 55 - 135 U/L    AST 32 10 - 40 U/L    ALT 24 10 - 44 U/L    eGFR 22 (A) >60 mL/min/1.73 m^2    Anion Gap 9 8 - 16 mmol/L   C-Reactive Protein   Result Value Ref Range    CRP 49.3 (H) 0.0 - 8.2 mg/L   Ferritin   Result Value Ref Range    Ferritin 283 20.0 - 300.0 ng/mL   Lactate Dehydrogenase   Result Value Ref Range     (H) 110 - 260 U/L   CK   Result Value Ref Range     (H) 20 - 200 U/L   Lactic Acid, Plasma   Result Value Ref Range    Lactate (Lactic Acid) 1.2 0.5 - 2.2 mmol/L   Troponin I   Result Value Ref Range    Troponin I 0.086 (H) 0.000 - 0.026 ng/mL   Procalcitonin   Result Value Ref Range    Procalcitonin 0.41 (H) <0.25 ng/mL   Brain natriuretic peptide   Result Value Ref Range     (H) 0 - 99 pg/mL   ISTAT PROCEDURE   Result Value Ref Range    POC PH 7.270 (LL) 7.35 - 7.45    POC  PCO2 54.3 (H) 35 - 45 mmHg    POC PO2 49 (LL) 80 - 100 mmHg    POC HCO3 24.9 24 - 28 mmol/L    POC BE -2 -2 to 2 mmol/L    POC SATURATED O2 78 95 - 100 %    Sample ARTERIAL     Site RR     Allens Test Pass     DelSys Room Air     Mode SPONT     FiO2 21    POCT glucose   Result Value Ref Range    POCT Glucose 94 70 - 110 mg/dL        Imaging Results:  Imaging Results              X-Ray Chest AP Portable (Final result)  Result time 04/22/24 15:41:57      Final result by Glen Martinez MD (04/22/24 15:41:57)                   Impression:      No acute findings.      Electronically signed by: Glen Martinez MD  Date:    04/22/2024  Time:    15:41               Narrative:    EXAMINATION:  XR CHEST AP PORTABLE    CLINICAL HISTORY:  COVID-19;  cough    TECHNIQUE:  AP view of the chest was performed.    COMPARISON:  None    FINDINGS:  The cardiac and mediastinal silhouettes appear within normal limits.   The lungs are clear bilaterally.  No acute osseous findings demonstrated.                                       The EKG was ordered, reviewed, and independently interpreted by the ED provider.  Interpretation time: 15:39  Rate: 88 BPM  Rhythm: atrial fibrillation  Interpretation: ST and T wave abnormality, consider anterior ischemia. No STEMI.           The Emergency Provider reviewed the vital signs and test results, which are outlined above.     ED Discussion       4:54 PM: Discussed case with Junior Gunter MD (Hospital Medicine). Dr. Gunter agrees with current care and management of pt and accepts admission.   Admitting Service: Hospital Medicine  Admitting Physician: Dr. Gunter  Admit to: Inpatient Med/Tele    4:54 PM: Re-evaluated pt. I have discussed test results, shared treatment plan, and the need for admission with patient and family at bedside. Pt and family express understanding at this time and agree with all information. All questions answered. Pt and family have no further questions or concerns at this time. Pt is  ready for admit.         Medical Decision Making  DDX: 1. Decompensated HF 2. Pneumonia 3. ACS 4. Respiratory failure    Lab work reviewed and has acute kidney injury, mildly elevated crp, BNP over 900, CXR no acute findings, ECG no acute ischemic changes has chronic atrial fib, troponin mildly elevated likely from hypoxemia and covid, no concerns for ACS, lactic acid normal, procal mildly elevated, ABG reviewed and has chronic hypercapnia, pH is low but patient in no distress clinically and do not feel Bipap is indicated at this time, given large dose of iv lasix for CHF, hospital med consulted for admission.         Amount and/or Complexity of Data Reviewed  External Data Reviewed: labs and notes.     Details: Seen at [facility] yesterday covid positive, no imaging or further workup done  Labs: ordered. Decision-making details documented in ED Course.  Radiology: ordered. Decision-making details documented in ED Course.  ECG/medicine tests: ordered and independent interpretation performed. Decision-making details documented in ED Course.    Risk  Prescription drug management.  Decision regarding hospitalization.                ED Medication(s):  Medications   sodium chloride 0.9% flush 10 mL (has no administration in time range)   glucose chewable tablet 16 g (has no administration in time range)   glucose chewable tablet 24 g (has no administration in time range)   glucagon (human recombinant) injection 1 mg (has no administration in time range)   ascorbic acid (vitamin C) tablet 500 mg (has no administration in time range)   multivitamin tablet (has no administration in time range)   dexAMETHasone tablet 6 mg (has no administration in time range)   ondansetron injection 4 mg (has no administration in time range)   loperamide capsule 2 mg (has no administration in time range)   acetaminophen tablet 650 mg (has no administration in time range)   insulin aspart U-100 pen 0-5 Units (has no administration in time  range)   albuterol inhaler 2 puff (has no administration in time range)   benzonatate capsule 100 mg (has no administration in time range)   melatonin tablet 6 mg (has no administration in time range)   dextrose 10% bolus 125 mL 125 mL (has no administration in time range)   dextrose 10% bolus 250 mL 250 mL (has no administration in time range)   amLODIPine tablet 5 mg (has no administration in time range)   colchicine capsule/tablet 0.6 mg (has no administration in time range)   apixaban tablet 5 mg (has no administration in time range)   levothyroxine tablet 50 mcg (has no administration in time range)   metoprolol succinate (TOPROL-XL) 24 hr tablet 100 mg (has no administration in time range)   venlafaxine 24 hr capsule 150 mg (has no administration in time range)   pregabalin capsule 100 mg (has no administration in time range)   furosemide injection 80 mg (80 mg Intravenous Given 4/22/24 1653)       Current Discharge Medication List                  Scribe Attestation:   Scribe #1: I performed the above scribed service and the documentation accurately describes the services I performed. I attest to the accuracy of the note.     Attending:   Physician Attestation Statement for Scribe #1: I, Maria A Nguyen MD, personally performed the services described in this documentation, as scribed by Stas Cuellar, in my presence, and it is both accurate and complete.           Clinical Impression       ICD-10-CM ICD-9-CM   1. Acute on chronic respiratory failure with hypoxia and hypercapnia  J96.21 518.84    J96.22 786.09     799.02   2. COVID-19  U07.1 079.89   3. Acute decompensated heart failure  I50.9 428.0   4. Morbid obesity  E66.01 278.01   5. Acute renal failure, unspecified acute renal failure type  N17.9 584.9   6. EVANGELIST (obstructive sleep apnea)  G47.33 327.23   7. Shortness of breath  R06.02 786.05   8. Heart failure  I50.9 428.9       Disposition:   Disposition: Admitted  Condition: Fair          Maria A Nguyen MD  04/22/24 1936

## 2024-04-22 NOTE — ASSESSMENT & PLAN NOTE
- in the setting of covid infection, heart failure exacerbation  - will trend  - remains chest pain free

## 2024-04-22 NOTE — HPI
57 y/o male with PMHx of CHF (right sided heart failure), atrial fibrillation on eliquis, HTN, EVANGELIST wearing Trilogy, gout, morbid obesity who presented to the ER today with complaints of SOB, fatigue, congestion, cough.  He was diagnosed with COVID at [facility] ed yesterday.  He denies any fever/chills.  He also reports leg swelling and recent weight gain (unsure of amount).  In the ED, o2 sats 85% on room air, placed on 2 liters with noted improvement.  Lab work notable for creatinine 3.1, , Trop 0.86, procal 0.41.  ABG showed Ph 7.2, CO2 54, , HCO3 24.  Chest xray with no acute findings.  Patient will be admitted to inpatient for acute on chronic hypoxic/hypercapnic resp failure d/t covid/heart failure exacerbation.     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

## 2024-04-22 NOTE — ASSESSMENT & PLAN NOTE
Patient with Hypercapnic and Hypoxic Respiratory failure which is Acute on chronic.  he is not on home oxygen. Wears trilogy and has supplemental o2 as needed.  Supplemental oxygen was provided and noted-      .   Signs/symptoms of respiratory failure include- increased work of breathing and hypoxia on room air . Contributing diagnoses includes -  COVID and Heart Failure exacerbation   Labs and images were reviewed. Patient Has recent ABG, which has been reviewed. Will treat underlying causes and adjust management of respiratory failure as follows    - start steroids  - s/p dose of iV lasix 80 mg in ED, will hold further diuretics tonight d/t MONA, re-evaluate in am  - o2 as needed  - trilogy q hs  - supportive care

## 2024-04-22 NOTE — ASSESSMENT & PLAN NOTE
Patient is identified as having Diastolic (HFpEF) heart failure that is Acute on chronic. CHF is currently uncontrolled due to Continued edema of extremities and Weight gain  Latest ECHO performed and demonstrates- Results for orders placed during the hospital encounter of 11/20/21    Echo    Interpretation Summary  · The left ventricle is mildly enlarged with moderate concentric hypertrophy and normal systolic function.  · The estimated ejection fraction is 60%.  · Normal left ventricular diastolic function.  · Severe right ventricular enlargement with moderately reduced right ventricular systolic function.  · Moderate left atrial enlargement.  · Moderate right atrial enlargement.  · Moderate tricuspid regurgitation.  · There is abnormal septal wall motion. There is systolic flattening of the interventricular septum consistent with right ventricle pressure overload.  . Continue Beta Blocker and Furosemide and monitor clinical status closely. Monitor on telemetry. Patient is off CHF pathway.  Monitor strict Is&Os and daily weights.  Place on fluid restriction of 1.5 L. Cardiology has not been consulted. Continue to stress to patient importance of self efficacy and  on diet for CHF. Last BNP reviewed- and noted below   Recent Labs   Lab 04/22/24  1550   *

## 2024-04-22 NOTE — SUBJECTIVE & OBJECTIVE
Past Medical History:   Diagnosis Date    A-fib     CHF (congestive heart failure)     Gout, chronic     Hypertension     Sleep apnea        Past Surgical History:   Procedure Laterality Date    CHOLECYSTECTOMY      GASTRIC BYPASS  2014    INJECTION OF JOINT Left 8/23/2023    Procedure: Left IA Hip Joint injection;  Surgeon: Peña Rausch MD;  Location: [facility];  Service: Pain Management;  Laterality: Left;       Review of patient's allergies indicates:  No Known Allergies    Current Facility-Administered Medications   Medication Dose Route Frequency Provider Last Rate Last Admin    acetaminophen tablet 650 mg  650 mg Oral Q4H PRN Junior Gunter MD        albuterol inhaler 2 puff  2 puff Inhalation Q6H PRN Junior Gunter MD        [START ON 4/23/2024] amLODIPine tablet 5 mg  5 mg Oral Daily Junior Gunter MD        apixaban tablet 5 mg  5 mg Oral BID Junior Gunter MD        ascorbic acid (vitamin C) tablet 500 mg  500 mg Oral BID Junior Gunter MD        benzonatate capsule 100 mg  100 mg Oral TID PRN Junior Gunter MD        [START ON 4/23/2024] colchicine capsule/tablet 0.6 mg  0.6 mg Oral Daily Junior Gunter MD        [START ON 4/23/2024] dexAMETHasone tablet 6 mg  6 mg Oral Daily uJnior Gunter MD        dextrose 10% bolus 125 mL 125 mL  12.5 g Intravenous PRN Junior Gunter MD        dextrose 10% bolus 250 mL 250 mL  25 g Intravenous PRN Junior Gunter MD        glucagon (human recombinant) injection 1 mg  1 mg Intramuscular PRN Juinor Gunter MD        glucose chewable tablet 16 g  16 g Oral PRN Junior Gunter MD        glucose chewable tablet 24 g  24 g Oral PRN Junior Gunter MD        insulin aspart U-100 pen 0-5 Units  0-5 Units Subcutaneous QID (AC + HS) PRN Junior Gunter MD        [START ON 4/23/2024] levothyroxine tablet 50 mcg  50 mcg Oral Before breakfast Junior Gunter MD        loperamide capsule 2 mg  2 mg Oral Q6H PRN Junior Gunter MD        melatonin tablet 6 mg  6 mg Oral Nightly PRN  Junior Gunter MD        [START ON 4/23/2024] metoprolol succinate (TOPROL-XL) 24 hr tablet 100 mg  100 mg Oral Daily Junior Gunter MD        [START ON 4/23/2024] multivitamin tablet  1 tablet Oral Daily Junior Gunter MD        ondansetron injection 4 mg  4 mg Intravenous Q6H PRN Junior Gunter MD        sodium chloride 0.9% flush 10 mL  10 mL Intravenous PRN Junior Gunter MD         Current Outpatient Medications   Medication Sig Dispense Refill    allopurinoL (ZYLOPRIM) 300 MG tablet Take 1 tablet (300 mg total) by mouth once daily. 90 tablet 1    amLODIPine (NORVASC) 5 MG tablet TAKE 1 TABLET BY MOUTH ONCE  DAILY 30 tablet 11    ARIPiprazole (ABILIFY) 10 MG Tab Take 1 tablet (10 mg total) by mouth once daily. 90 tablet 3    colchicine (COLCRYS) 0.6 mg tablet Take 1 tablet (0.6 mg total) by mouth once daily. 90 tablet 1    ELIQUIS 5 mg Tab TAKE 1 TABLET BY MOUTH  TWICE DAILY 180 tablet 3    levothyroxine (SYNTHROID) 50 MCG tablet Take 1 tablet (50 mcg total) by mouth before breakfast. 90 tablet 3    losartan (COZAAR) 100 MG tablet TAKE 1 TABLET BY MOUTH ONCE  DAILY 90 tablet 2    methocarbamoL (ROBAXIN) 500 MG Tab Take 1 tablet (500 mg total) by mouth 3 (three) times daily as needed. 60 tablet 5    metoprolol succinate (TOPROL-XL) 100 MG 24 hr tablet Take 1 tablet (100 mg total) by mouth once daily. 90 tablet 3    nabumetone (RELAFEN) 750 MG tablet Take 1 tablet (750 mg total) by mouth 2 (two) times daily. 60 tablet 1    potassium chloride SA (K-DUR,KLOR-CON) 20 MEQ tablet TAKE 1 TABLET BY MOUTH TWICE  DAILY 180 tablet 3    pregabalin (LYRICA) 100 MG capsule Take 1 capsule (100 mg total) by mouth 2 (two) times daily. 60 capsule 2    semaglutide, weight loss, (WEGOVY) 2.4 mg/0.75 mL PnIj Inject 2.4 mg into the skin every 7 days. 3 mL 0    TALTZ AUTOINJECTOR 80 mg/mL AtIn INJECT 80MG (1 PEN) UNDER THE SKIN EVERY 4 WEEKS 1 mL 0    topiramate (TOPAMAX) 50 MG tablet Take 1 tablet (50 mg total) by mouth 2 (two) times  daily. 60 tablet 11    torsemide (DEMADEX) 20 MG Tab Take 2 tablets (40 mg total) by mouth 2 (two) times a day. 360 tablet 3    venlafaxine (EFFEXOR-XR) 150 MG Cp24 Take 1 capsule (150 mg total) by mouth once daily. 90 capsule 3    venlafaxine (EFFEXOR-XR) 75 MG 24 hr capsule Take 1 capsule (75 mg total) by mouth once daily. 90 capsule 3     Family History       Problem Relation (Age of Onset)    Diabetes Father, Sister, Brother    Hypertension Mother, Father    Stroke Father          Tobacco Use    Smoking status: Never    Smokeless tobacco: Never   Substance and Sexual Activity    Alcohol use: Not Currently    Drug use: No    Sexual activity: Not Currently     Review of Systems   Constitutional:  Positive for fatigue and unexpected weight change.   Respiratory:  Positive for cough and shortness of breath.    Cardiovascular:  Positive for leg swelling.     Objective:     Vital Signs (Most Recent):  Temp: 98.1 °F (36.7 °C) (04/22/24 1503)  Pulse: 86 (04/22/24 1717)  Resp: 20 (04/22/24 1717)  BP: (!) 125/58 (04/22/24 1717)  SpO2: 95 % (04/22/24 1717) Vital Signs (24h Range):  Temp:  [98.1 °F (36.7 °C)] 98.1 °F (36.7 °C)  Pulse:  [] 86  Resp:  [20-21] 20  SpO2:  [85 %-97 %] 95 %  BP: ()/(54-61) 125/58     Weight: (!) 195.6 kg (431 lb 3.5 oz)  Body mass index is 53.9 kg/m².     Physical Exam  Vitals and nursing note reviewed.   Constitutional:       Appearance: He is obese.   Cardiovascular:      Rate and Rhythm: Normal rate and regular rhythm.      Pulses: Normal pulses.      Heart sounds: Normal heart sounds.   Pulmonary:      Effort: Pulmonary effort is normal.      Comments: Diminished breath sounds all lobes   Abdominal:      General: Bowel sounds are normal. There is no distension.      Palpations: Abdomen is soft.      Tenderness: There is no abdominal tenderness.   Musculoskeletal:      Right lower leg: Edema present.      Left lower leg: Edema present.   Skin:     General: Skin is warm and dry.    Neurological:      Mental Status: He is alert and oriented to person, place, and time.                Significant Labs: All pertinent labs within the past 24 hours have been reviewed.    Significant Imaging: I have reviewed all pertinent imaging results/findings within the past 24 hours.

## 2024-04-22 NOTE — ASSESSMENT & PLAN NOTE
Patient with Permanent atrial fibrillation which is controlled currently with Beta Blocker. Patient is currently in atrial fibrillation.Anticoagulation indicated. Anticoagulation done with eliquis .

## 2024-04-23 PROBLEM — E16.2 HYPOGLYCEMIA: Status: ACTIVE | Noted: 2024-04-23

## 2024-04-23 LAB
ALBUMIN SERPL BCP-MCNC: 3.5 G/DL (ref 3.5–5.2)
ALLENS TEST: ABNORMAL
ALP SERPL-CCNC: 66 U/L (ref 55–135)
ALT SERPL W/O P-5'-P-CCNC: 24 U/L (ref 10–44)
ANION GAP SERPL CALC-SCNC: 10 MMOL/L (ref 8–16)
AORTIC ROOT ANNULUS: 3.92 CM
ASCENDING AORTA: 3.51 CM
AST SERPL-CCNC: 26 U/L (ref 10–40)
AV INDEX (PROSTH): 0.66
AV MEAN GRADIENT: 5 MMHG
AV PEAK GRADIENT: 8 MMHG
AV VALVE AREA BY VELOCITY RATIO: 2.92 CM²
AV VALVE AREA: 3.36 CM²
AV VELOCITY RATIO: 0.58
BASOPHILS # BLD AUTO: 0.01 K/UL (ref 0–0.2)
BASOPHILS NFR BLD: 0.4 % (ref 0–1.9)
BILIRUB DIRECT SERPL-MCNC: 0.3 MG/DL (ref 0.1–0.3)
BILIRUB SERPL-MCNC: 0.6 MG/DL (ref 0.1–1)
BSA FOR ECHO PROCEDURE: 3.22 M2
BUN SERPL-MCNC: 43 MG/DL (ref 6–20)
CALCIUM SERPL-MCNC: 7.8 MG/DL (ref 8.7–10.5)
CHLORIDE SERPL-SCNC: 107 MMOL/L (ref 95–110)
CK SERPL-CCNC: 190 U/L (ref 20–200)
CO2 SERPL-SCNC: 26 MMOL/L (ref 23–29)
CREAT SERPL-MCNC: 2.1 MG/DL (ref 0.5–1.4)
CV ECHO LV RWT: 0.54 CM
DELSYS: ABNORMAL
DIFFERENTIAL METHOD BLD: ABNORMAL
DOP CALC AO PEAK VEL: 1.42 M/S
DOP CALC AO VTI: 24.1 CM
DOP CALC LVOT AREA: 5.1 CM2
DOP CALC LVOT DIAMETER: 2.54 CM
DOP CALC LVOT PEAK VEL: 0.82 M/S
DOP CALC LVOT STROKE VOLUME: 81.03 CM3
DOP CALC RVOT PEAK VEL: 0.71 M/S
DOP CALC RVOT VTI: 12 CM
DOP CALCLVOT PEAK VEL VTI: 16 CM
E WAVE DECELERATION TIME: 136.31 MSEC
E/A RATIO: 1.94
E/E' RATIO: 4 M/S
ECHO LV POSTERIOR WALL: 1.39 CM (ref 0.6–1.1)
EOSINOPHIL # BLD AUTO: 0 K/UL (ref 0–0.5)
EOSINOPHIL NFR BLD: 0.7 % (ref 0–8)
EP: 8
ERYTHROCYTE [DISTWIDTH] IN BLOOD BY AUTOMATED COUNT: 14.9 % (ref 11.5–14.5)
ERYTHROCYTE [SEDIMENTATION RATE] IN BLOOD BY WESTERGREN METHOD: 16 MM/H
ERYTHROCYTE [SEDIMENTATION RATE] IN BLOOD BY WESTERGREN METHOD: 20 MM/H
EST. GFR  (NO RACE VARIABLE): 36 ML/MIN/1.73 M^2
FIO2: 40
FIO2: 60
FRACTIONAL SHORTENING: 35 % (ref 28–44)
GLUCOSE SERPL-MCNC: 124 MG/DL (ref 70–110)
GLUCOSE SERPL-MCNC: 95 MG/DL (ref 70–110)
HCO3 UR-SCNC: 30.7 MMOL/L (ref 24–28)
HCO3 UR-SCNC: 30.8 MMOL/L (ref 24–28)
HCO3 UR-SCNC: 31.2 MMOL/L (ref 24–28)
HCT VFR BLD AUTO: 46.3 % (ref 40–54)
HGB BLD-MCNC: 14.1 G/DL (ref 14–18)
IMM GRANULOCYTES # BLD AUTO: 0.03 K/UL (ref 0–0.04)
IMM GRANULOCYTES NFR BLD AUTO: 1.1 % (ref 0–0.5)
INTERVENTRICULAR SEPTUM: 1.74 CM (ref 0.6–1.1)
IP: 16
IVC DIAMETER: 2.89 CM
IVRT: 71.36 MSEC
LA MAJOR: 8.06 CM
LA MINOR: 6.02 CM
LA WIDTH: 5.3 CM
LACTATE SERPL-SCNC: 0.7 MMOL/L (ref 0.5–2.2)
LEFT ATRIUM SIZE: 4.33 CM
LEFT ATRIUM VOLUME INDEX: 44.2 ML/M2
LEFT ATRIUM VOLUME: 134.44 CM3
LEFT INTERNAL DIMENSION IN SYSTOLE: 3.38 CM (ref 2.1–4)
LEFT VENTRICLE DIASTOLIC VOLUME INDEX: 42.3 ML/M2
LEFT VENTRICLE DIASTOLIC VOLUME: 128.59 ML
LEFT VENTRICLE MASS INDEX: 119 G/M2
LEFT VENTRICLE SYSTOLIC VOLUME INDEX: 15.4 ML/M2
LEFT VENTRICLE SYSTOLIC VOLUME: 46.89 ML
LEFT VENTRICULAR INTERNAL DIMENSION IN DIASTOLE: 5.18 CM (ref 3.5–6)
LEFT VENTRICULAR MASS: 362.41 G
LV LATERAL E/E' RATIO: 2.82 M/S
LV SEPTAL E/E' RATIO: 6.89 M/S
LVOT MG: 1.45 MMHG
LVOT MV: 0.56 CM/S
LYMPHOCYTES # BLD AUTO: 0.7 K/UL (ref 1–4.8)
LYMPHOCYTES NFR BLD: 24.3 % (ref 18–48)
MAGNESIUM SERPL-MCNC: 2.4 MG/DL (ref 1.6–2.6)
MCH RBC QN AUTO: 30.4 PG (ref 27–31)
MCHC RBC AUTO-ENTMCNC: 30.5 G/DL (ref 32–36)
MCV RBC AUTO: 100 FL (ref 82–98)
MODE: ABNORMAL
MONOCYTES # BLD AUTO: 0.7 K/UL (ref 0.3–1)
MONOCYTES NFR BLD: 23.9 % (ref 4–15)
MV PEAK A VEL: 0.32 M/S
MV PEAK E VEL: 0.62 M/S
MV STENOSIS PRESSURE HALF TIME: 39.53 MS
MV VALVE AREA P 1/2 METHOD: 5.57 CM2
NEUTROPHILS # BLD AUTO: 1.4 K/UL (ref 1.8–7.7)
NEUTROPHILS NFR BLD: 49.6 % (ref 38–73)
NRBC BLD-RTO: 1 /100 WBC
OHS QRS DURATION: 94 MS
OHS QTC CALCULATION: 454 MS
PCO2 BLDA: 81.1 MMHG (ref 35–45)
PCO2 BLDA: 90.9 MMHG (ref 35–45)
PCO2 BLDA: 97.6 MMHG (ref 35–45)
PH SMN: 7.11 [PH] (ref 7.35–7.45)
PH SMN: 7.14 [PH] (ref 7.35–7.45)
PH SMN: 7.19 [PH] (ref 7.35–7.45)
PHOSPHATE SERPL-MCNC: 5.7 MG/DL (ref 2.7–4.5)
PIP: 12
PIP: 17
PISA TR MAX VEL: 4.34 M/S
PLATELET # BLD AUTO: 74 K/UL (ref 150–450)
PMV BLD AUTO: 13.5 FL (ref 9.2–12.9)
PO2 BLDA: 71 MMHG (ref 80–100)
PO2 BLDA: 75 MMHG (ref 80–100)
PO2 BLDA: 92 MMHG (ref 80–100)
POC BE: 1 MMOL/L
POC BE: 2 MMOL/L
POC BE: 3 MMOL/L
POC SATURATED O2: 85 % (ref 95–100)
POC SATURATED O2: 90 % (ref 95–100)
POC SATURATED O2: 93 % (ref 95–100)
POCT GLUCOSE: 101 MG/DL (ref 70–110)
POCT GLUCOSE: 111 MG/DL (ref 70–110)
POCT GLUCOSE: 117 MG/DL (ref 70–110)
POCT GLUCOSE: 118 MG/DL (ref 70–110)
POCT GLUCOSE: 26 MG/DL (ref 70–110)
POCT GLUCOSE: 46 MG/DL (ref 70–110)
POCT GLUCOSE: 86 MG/DL (ref 70–110)
POTASSIUM SERPL-SCNC: 4.4 MMOL/L (ref 3.5–5.1)
PROT SERPL-MCNC: 7 G/DL (ref 6–8.4)
PV MEAN GRADIENT: 1 MMHG
RA MAJOR: 7.36 CM
RA PRESSURE ESTIMATED: 15 MMHG
RA WIDTH: 3.5 CM
RBC # BLD AUTO: 4.64 M/UL (ref 4.6–6.2)
RV MID DIAMA: 6.15 CM
RV TB RVSP: 19 MMHG
SAMPLE: ABNORMAL
SITE: ABNORMAL
SODIUM SERPL-SCNC: 143 MMOL/L (ref 136–145)
STJ: 3.51 CM
TDI LATERAL: 0.22 M/S
TDI SEPTAL: 0.09 M/S
TDI: 0.16 M/S
TR MAX PG: 75 MMHG
TRICUSPID ANNULAR PLANE SYSTOLIC EXCURSION: 2.06 CM
TROPONIN I SERPL DL<=0.01 NG/ML-MCNC: 0.05 NG/ML (ref 0–0.03)
TROPONIN I SERPL DL<=0.01 NG/ML-MCNC: 0.1 NG/ML (ref 0–0.03)
TV REST PULMONARY ARTERY PRESSURE: 90 MMHG
VT: 500
VT: 500
WBC # BLD AUTO: 2.8 K/UL (ref 3.9–12.7)
Z-SCORE OF LEFT VENTRICULAR DIMENSION IN END DIASTOLE: -30.77
Z-SCORE OF LEFT VENTRICULAR DIMENSION IN END SYSTOLE: -22.98

## 2024-04-23 PROCEDURE — 36415 COLL VENOUS BLD VENIPUNCTURE: CPT | Performed by: INTERNAL MEDICINE

## 2024-04-23 PROCEDURE — 25000003 PHARM REV CODE 250: Performed by: STUDENT IN AN ORGANIZED HEALTH CARE EDUCATION/TRAINING PROGRAM

## 2024-04-23 PROCEDURE — 99900035 HC TECH TIME PER 15 MIN (STAT)

## 2024-04-23 PROCEDURE — 63600175 PHARM REV CODE 636 W HCPCS

## 2024-04-23 PROCEDURE — 82803 BLOOD GASES ANY COMBINATION: CPT

## 2024-04-23 PROCEDURE — 20000000 HC ICU ROOM

## 2024-04-23 PROCEDURE — 25000003 PHARM REV CODE 250: Performed by: HOSPITALIST

## 2024-04-23 PROCEDURE — 94761 N-INVAS EAR/PLS OXIMETRY MLT: CPT | Mod: XB

## 2024-04-23 PROCEDURE — 27100171 HC OXYGEN HIGH FLOW UP TO 24 HOURS

## 2024-04-23 PROCEDURE — 36410 VNPNXR 3YR/> PHY/QHP DX/THER: CPT

## 2024-04-23 PROCEDURE — C1751 CATH, INF, PER/CENT/MIDLINE: HCPCS

## 2024-04-23 PROCEDURE — 82947 ASSAY GLUCOSE BLOOD QUANT: CPT | Performed by: INTERNAL MEDICINE

## 2024-04-23 PROCEDURE — XW033E5 INTRODUCTION OF REMDESIVIR ANTI-INFECTIVE INTO PERIPHERAL VEIN, PERCUTANEOUS APPROACH, NEW TECHNOLOGY GROUP 5: ICD-10-PCS | Performed by: STUDENT IN AN ORGANIZED HEALTH CARE EDUCATION/TRAINING PROGRAM

## 2024-04-23 PROCEDURE — 94660 CPAP INITIATION&MGMT: CPT

## 2024-04-23 PROCEDURE — 63600175 PHARM REV CODE 636 W HCPCS: Mod: JZ,TB | Performed by: STUDENT IN AN ORGANIZED HEALTH CARE EDUCATION/TRAINING PROGRAM

## 2024-04-23 PROCEDURE — 84484 ASSAY OF TROPONIN QUANT: CPT | Performed by: NURSE PRACTITIONER

## 2024-04-23 PROCEDURE — S5010 5% DEXTROSE AND 0.45% SALINE: HCPCS

## 2024-04-23 PROCEDURE — 82550 ASSAY OF CK (CPK): CPT | Performed by: STUDENT IN AN ORGANIZED HEALTH CARE EDUCATION/TRAINING PROGRAM

## 2024-04-23 PROCEDURE — 80076 HEPATIC FUNCTION PANEL: CPT | Performed by: STUDENT IN AN ORGANIZED HEALTH CARE EDUCATION/TRAINING PROGRAM

## 2024-04-23 PROCEDURE — 83735 ASSAY OF MAGNESIUM: CPT | Performed by: STUDENT IN AN ORGANIZED HEALTH CARE EDUCATION/TRAINING PROGRAM

## 2024-04-23 PROCEDURE — 83605 ASSAY OF LACTIC ACID: CPT | Performed by: STUDENT IN AN ORGANIZED HEALTH CARE EDUCATION/TRAINING PROGRAM

## 2024-04-23 PROCEDURE — 36415 COLL VENOUS BLD VENIPUNCTURE: CPT | Mod: XB | Performed by: STUDENT IN AN ORGANIZED HEALTH CARE EDUCATION/TRAINING PROGRAM

## 2024-04-23 PROCEDURE — 85025 COMPLETE CBC W/AUTO DIFF WBC: CPT | Performed by: STUDENT IN AN ORGANIZED HEALTH CARE EDUCATION/TRAINING PROGRAM

## 2024-04-23 PROCEDURE — 36415 COLL VENOUS BLD VENIPUNCTURE: CPT | Performed by: NURSE PRACTITIONER

## 2024-04-23 PROCEDURE — 80048 BASIC METABOLIC PNL TOTAL CA: CPT | Performed by: STUDENT IN AN ORGANIZED HEALTH CARE EDUCATION/TRAINING PROGRAM

## 2024-04-23 PROCEDURE — 84100 ASSAY OF PHOSPHORUS: CPT | Performed by: STUDENT IN AN ORGANIZED HEALTH CARE EDUCATION/TRAINING PROGRAM

## 2024-04-23 PROCEDURE — 25000003 PHARM REV CODE 250

## 2024-04-23 PROCEDURE — 27000207 HC ISOLATION

## 2024-04-23 PROCEDURE — 36600 WITHDRAWAL OF ARTERIAL BLOOD: CPT

## 2024-04-23 PROCEDURE — 84484 ASSAY OF TROPONIN QUANT: CPT | Mod: 91 | Performed by: INTERNAL MEDICINE

## 2024-04-23 RX ORDER — AZITHROMYCIN 250 MG/1
250 TABLET, FILM COATED ORAL DAILY
Status: DISCONTINUED | OUTPATIENT
Start: 2024-04-23 | End: 2024-04-23

## 2024-04-23 RX ORDER — MUPIROCIN 20 MG/G
OINTMENT TOPICAL 2 TIMES DAILY
Status: DISPENSED | OUTPATIENT
Start: 2024-04-23 | End: 2024-04-28

## 2024-04-23 RX ORDER — SODIUM CHLORIDE 0.9 % (FLUSH) 0.9 %
10 SYRINGE (ML) INJECTION EVERY 6 HOURS
Status: DISCONTINUED | OUTPATIENT
Start: 2024-04-23 | End: 2024-04-23

## 2024-04-23 RX ORDER — SODIUM CHLORIDE 0.9 % (FLUSH) 0.9 %
10 SYRINGE (ML) INJECTION
Status: DISCONTINUED | OUTPATIENT
Start: 2024-04-23 | End: 2024-04-28 | Stop reason: HOSPADM

## 2024-04-23 RX ORDER — DEXAMETHASONE SODIUM PHOSPHATE 4 MG/ML
6 INJECTION, SOLUTION INTRA-ARTICULAR; INTRALESIONAL; INTRAMUSCULAR; INTRAVENOUS; SOFT TISSUE ONCE
Status: COMPLETED | OUTPATIENT
Start: 2024-04-23 | End: 2024-04-23

## 2024-04-23 RX ORDER — DEXTROSE MONOHYDRATE AND SODIUM CHLORIDE 5; .45 G/100ML; G/100ML
INJECTION, SOLUTION INTRAVENOUS CONTINUOUS
Status: DISCONTINUED | OUTPATIENT
Start: 2024-04-23 | End: 2024-04-24

## 2024-04-23 RX ORDER — METOPROLOL TARTRATE 1 MG/ML
2.5 INJECTION, SOLUTION INTRAVENOUS
Status: DISCONTINUED | OUTPATIENT
Start: 2024-04-23 | End: 2024-04-26

## 2024-04-23 RX ADMIN — METOROPROLOL TARTRATE 2.5 MG: 5 INJECTION, SOLUTION INTRAVENOUS at 02:04

## 2024-04-23 RX ADMIN — DEXTROSE MONOHYDRATE 250 ML: 100 INJECTION, SOLUTION INTRAVENOUS at 01:04

## 2024-04-23 RX ADMIN — DEXTROSE AND SODIUM CHLORIDE: 5; 450 INJECTION, SOLUTION INTRAVENOUS at 12:04

## 2024-04-23 RX ADMIN — DEXAMETHASONE SODIUM PHOSPHATE 6 MG: 4 INJECTION INTRA-ARTICULAR; INTRALESIONAL; INTRAMUSCULAR; INTRAVENOUS; SOFT TISSUE at 02:04

## 2024-04-23 RX ADMIN — AZITHROMYCIN DIHYDRATE 500 MG: 500 INJECTION, POWDER, LYOPHILIZED, FOR SOLUTION INTRAVENOUS at 02:04

## 2024-04-23 RX ADMIN — REMDESIVIR 200 MG: 100 INJECTION, POWDER, LYOPHILIZED, FOR SOLUTION INTRAVENOUS at 09:04

## 2024-04-23 RX ADMIN — MUPIROCIN: 20 OINTMENT TOPICAL at 08:04

## 2024-04-23 RX ADMIN — METOROPROLOL TARTRATE 2.5 MG: 5 INJECTION, SOLUTION INTRAVENOUS at 08:04

## 2024-04-23 RX ADMIN — DEXTROSE MONOHYDRATE 250 ML: 100 INJECTION, SOLUTION INTRAVENOUS at 12:04

## 2024-04-23 RX ADMIN — CEFTRIAXONE 2 G: 2 INJECTION, POWDER, FOR SOLUTION INTRAMUSCULAR; INTRAVENOUS at 02:04

## 2024-04-23 RX ADMIN — ACETAMINOPHEN 650 MG: 325 TABLET ORAL at 12:04

## 2024-04-23 NOTE — ASSESSMENT & PLAN NOTE
- Episode of hypoglycemia this am; poct glucose 46  - Not sure exact cause; no identified causative agents   - Not on any diabetic medications  - Will monitor with more frequent glucose checks; start gentle D5/half

## 2024-04-23 NOTE — ASSESSMENT & PLAN NOTE
- Patient has persistent depression which is unknown and is currently controlled.   - Will Continue anti-depressant medications.   - We will not consult psychiatry at this time. Patient does not display psychosis at this time. Continue to monitor closely and adjust plan of care as needed.

## 2024-04-23 NOTE — CONSULTS
[facility] - Intensive Care ([facility])  Wound Care    Patient Name:  Caio Ontiveros   MRN:  [id number]  Date: 4/23/2024  Diagnosis: Acute on chronic respiratory failure with hypoxia and hypercapnia    History:     Past Medical History:   Diagnosis Date    A-fib     CHF (congestive heart failure)     Gout, chronic     Hypertension     Sleep apnea        Social History     Socioeconomic History    Marital status:     Number of children: 1   Tobacco Use    Smoking status: Never    Smokeless tobacco: Never   Substance and Sexual Activity    Alcohol use: Not Currently    Drug use: No    Sexual activity: Not Currently     Social Determinants of Health     Financial Resource Strain: Low Risk  (1/8/2024)    Overall Financial Resource Strain (CARDIA)     Difficulty of Paying Living Expenses: Not hard at all   Food Insecurity: No Food Insecurity (1/8/2024)    Hunger Vital Sign     Worried About Running Out of Food in the Last Year: Never true     Ran Out of Food in the Last Year: Never true   Transportation Needs: No Transportation Needs (1/8/2024)    PRAPARE - Transportation     Lack of Transportation (Medical): No     Lack of Transportation (Non-Medical): No   Physical Activity: Inactive (1/8/2024)    Exercise Vital Sign     Days of Exercise per Week: 0 days     Minutes of Exercise per Session: 0 min   Stress: Stress Concern Present (1/8/2024)    [employer] - Occupational Stress Questionnaire     Feeling of Stress : To some extent   Social Connections: Socially Isolated (1/8/2024)    Social Connection and Isolation Panel [NHANES]     Frequency of Communication with Friends and Family: Once a week     Frequency of Social Gatherings with Friends and Family: Once a week     Attends Buddhist Services: Never     Active Member of Clubs or Organizations: No     Attends Club or Organization Meetings: Never     Marital Status:    Housing Stability: Low Risk  (1/8/2024)    Housing Stability Vital Sign      Unable to Pay for Housing in the Last Year: No     Number of Places Lived in the Last Year: 1     Unstable Housing in the Last Year: No       Precautions:     Allergies as of 04/22/2024    (No Known Allergies)       WOC Assessment Details/Treatment     Consulted on this 57 y/o M patient due to present on admission wounds. PMH significant for psoriasis, he is on Taltz injections and wife reports his last injection was earlier this month. His only current area of skin breakdown from psoriasis is his lower abdominal fold. Recommend daily hygiene care to site.  BLE assessed, vericose veins noted, no open ulcerations. Moisturizer applied. Recommend pressure injury prevention interventions.  No further wound care needs, will sign off.  04/23/2024

## 2024-04-23 NOTE — ASSESSMENT & PLAN NOTE
- Trop bumped to 0.095 yesterday; has trended down to 0.051 today  - Likely demand ischemia; will continue to monitor

## 2024-04-23 NOTE — ASSESSMENT & PLAN NOTE
- Chronic, uncontrolled. Latest blood pressure and vitals reviewed:   Temp:  [98.1 °F (36.7 °C)-101.8 °F (38.8 °C)]   Pulse:  []   Resp:  [14-26]   BP: ()/(35-81)   SpO2:  [84 %-100 %] .   - Home meds for hypertension were reviewed and noted below:   Hypertension Medications               amLODIPine (NORVASC) 5 MG tablet TAKE 1 TABLET BY MOUTH ONCE  DAILY    losartan (COZAAR) 100 MG tablet TAKE 1 TABLET BY MOUTH ONCE  DAILY    metoprolol succinate (TOPROL-XL) 100 MG 24 hr tablet Take 1 tablet (100 mg total) by mouth once daily.    torsemide (DEMADEX) 20 MG Tab Take 2 tablets (40 mg total) by mouth 2 (two) times a day.   - While in the hospital, will manage blood pressure as follows; Holding home meds  - Will utilize p.r.n. blood pressure medication only if patient's blood pressure greater than 180/110 and he develops symptoms such as worsening chest pain or shortness of breath.

## 2024-04-23 NOTE — PROGRESS NOTES
Pt visited the ED on 4/20/24. I made 2 attempts to reach patient to assist with scheduling a post ED 7-day follow up with PCP. Unable to reach. Closing encounter.    Gita De Santiago

## 2024-04-23 NOTE — CONSULTS
[facility] ([facility])  Critical Care Medicine  Consult Note    Patient Name: Caio Ontiveros  MRN: [id number]  Admission Date: 4/22/2024  Hospital Length of Stay: 1 days  Code Status: Full Code  Attending Physician: Jasmine Benjamin MD   Primary Care Provider: Dominick Allen MD   Principal Problem: Acute on chronic respiratory failure with hypoxia and hypercapnia    Subjective:     HPI:  Mr. Ontiveros is a 58-year-old male with past medical history of Afib on Eliquis, CHF, HTN, morbid obesity, EVANGELIST on trelegy, gout who presented to ED yesterday 4/22 with complaints of SOB, fatigue, cough, congestion. Admits to associated leg swelling but denies any recent sick contacts, fever, chills. Covid + in the ED. O2 sats in the 80s which improved with 2L NC. ABGs consistent with acute on chronic hypoxic and hypercapnic respiratory failure. Also, elevated BNP at 983 and trop 0.085. Cr 3.1. Admitted to  for management. This am patient noted to have increased work of breathing, worsening hypoxia. Repeat ABG with worsening respiratory acidosis Ph 7.1 with CO2 of 90, hypotensive. Patient with increased somnolence and not responding appropriately. Immediately placed on BIPAP and transferred to CC unit. After placed on bipap, responses more appropriate, awake and alert. Still complained of some shortness of breath but denied any pain.     Past Medical History:   Diagnosis Date    A-fib     CHF (congestive heart failure)     Gout, chronic     Hypertension     Sleep apnea      Past Surgical History:   Procedure Laterality Date    CHOLECYSTECTOMY      GASTRIC BYPASS  2014    INJECTION OF JOINT Left 8/23/2023    Procedure: Left IA Hip Joint injection;  Surgeon: Peña Rausch MD;  Location: [facility];  Service: Pain Management;  Laterality: Left;     Review of patient's allergies indicates:  No Known Allergies    Family History       Problem Relation (Age of Onset)    Diabetes Father, Sister, Brother    Hypertension  Mother, Father    Stroke Father          Tobacco Use    Smoking status: Never    Smokeless tobacco: Never   Substance and Sexual Activity    Alcohol use: Not Currently    Drug use: No    Sexual activity: Not Currently       Review of Systems   Constitutional:  Negative for fatigue and fever.   Respiratory:  Positive for cough, chest tightness, shortness of breath and wheezing.    Cardiovascular:  Positive for leg swelling.   Gastrointestinal:  Negative for abdominal pain, nausea and vomiting.   Genitourinary:  Negative for difficulty urinating and dysuria.   Neurological:  Positive for weakness. Negative for dizziness and headaches.   Psychiatric/Behavioral:  Negative for agitation.      Objective:     Vital Signs (Most Recent):  Temp: 98.3 °F (36.8 °C) (04/23/24 0945)  Pulse: 88 (04/23/24 1148)  Resp: 18 (04/23/24 1148)  BP: (!) 101/59 (04/23/24 1100)  SpO2: 99 % (04/23/24 1148) Vital Signs (24h Range):  Temp:  [98.1 °F (36.7 °C)-101.8 °F (38.8 °C)] 98.3 °F (36.8 °C)  Pulse:  [] 88  Resp:  [14-26] 18  SpO2:  [84 %-100 %] 99 %  BP: ()/(35-81) 101/59     Weight: (!) 195.5 kg (431 lb)  Body mass index is 53.87 kg/m².  Intake/Output Summary (Last 24 hours) at 4/23/2024 1158  Last data filed at 4/23/2024 1100  Gross per 24 hour   Intake 243.89 ml   Output 825 ml   Net -581.11 ml     Physical Exam  Constitutional:       General: He is in acute distress.      Appearance: He is obese. He is ill-appearing.   HENT:      Head: Normocephalic.   Eyes:      General: Lids are normal.   Cardiovascular:      Rate and Rhythm: Normal rate. Rhythm irregular.      Pulses: Normal pulses.   Pulmonary:      Comments: Diminished breath sounds bilaterally   Abdominal:      Palpations: Abdomen is soft.      Tenderness: There is no abdominal tenderness.      Comments: Very large abdomen   Musculoskeletal:         General: Swelling present.      Right lower leg: Edema present.      Left lower leg: Edema present.   Skin:      General: Skin is warm.      Comments: Thickened areas with discoloration medial around ankles consistent with venous stasis changes   Neurological:      General: No focal deficit present.      Mental Status: He is alert and oriented to person, place, and time.   Psychiatric:         Behavior: Behavior normal.     Vents:  Oxygen Concentration (%): 40 (04/23/24 1148)    Lines/Drains/Airways       Drain  Duration             Male External Urinary Catheter 04/23/24 0934 <1 day              Peripheral Intravenous Line  Duration                  Midline Catheter - Single Lumen 04/23/24 1102 Left basilic vein (medial side of arm) other (see comments) <1 day         Peripheral IV - Single Lumen 04/22/24 1555 20 G Right Antecubital <1 day                  Significant Labs:    CBC/Anemia Profile:  Recent Labs   Lab 04/22/24  1550 04/23/24  0659   WBC 3.39* 2.80*   HGB 13.6* 14.1   HCT 42.6 46.3   PLT 75* 74*   MCV 95 100*   RDW 14.9* 14.9*   FERRITIN 283  --      Chemistries:  Recent Labs   Lab 04/22/24  1550 04/23/24  0659    143   K 4.5 4.4    107   CO2 23 26   BUN 45* 43*   CREATININE 3.1* 2.1*   CALCIUM 8.2* 7.8*   ALBUMIN 3.5 3.5   PROT 7.4 7.0   BILITOT 0.8 0.6   ALKPHOS 65 66   ALT 24 24   AST 32 26   MG  --  2.4   PHOS  --  5.7*     ABGs:   Recent Labs   Lab 04/23/24  1050   PH 7.108*   PCO2 97.6*   HCO3 30.8*   POCSATURATED 85   BE 1     Lactic Acid:   Recent Labs   Lab 04/22/24  1550 04/23/24  0659   LACTATE 1.2 0.7     POCT Glucose:   Recent Labs   Lab 04/22/24  1831 04/23/24  0621   POCTGLUCOSE 94 86     Troponin:   Recent Labs   Lab 04/22/24 2025 04/23/24  0240 04/23/24  1042   TROPONINI 0.068* 0.095* 0.051*     Significant Imaging:   I have reviewed all pertinent imaging results/findings within the past 24 hours.  I have reviewed and interpreted all pertinent imaging results/findings within the past 24 hours.    Imaging Results              X-Ray Chest AP Portable (Final result)  Result time  04/22/24 15:41:57      Final result by Glen Martinez MD (04/22/24 15:41:57)                   Impression:      No acute findings.      Electronically signed by: Glen Martinez MD  Date:    04/22/2024  Time:    15:41               Narrative:    EXAMINATION:  XR CHEST AP PORTABLE    CLINICAL HISTORY:  COVID-19;  cough    TECHNIQUE:  AP view of the chest was performed.    COMPARISON:  None    FINDINGS:  The cardiac and mediastinal silhouettes appear within normal limits.   The lungs are clear bilaterally.  No acute osseous findings demonstrated.                                     ABG  Recent Labs   Lab 04/23/24  1050   PH 7.108*   PO2 71*   PCO2 97.6*   HCO3 30.8*   BE 1     Assessment/Plan:     Psychiatric  Moderate episode of recurrent major depressive disorder  - Patient has persistent depression which is unknown and is currently controlled.   - Will Continue anti-depressant medications.   - We will not consult psychiatry at this time. Patient does not display psychosis at this time. Continue to monitor closely and adjust plan of care as needed.    Pulmonary  * Acute on chronic respiratory failure with hypoxia and hypercapnia  - Patient with Hypercapnic and Hypoxic Respiratory failure which is Acute on chronic. Home O2 as needed, and Trilogy. Supplemental oxygen was provided and noted- Oxygen Concentration (%):  [40] 40  - Signs/symptoms of respiratory failure include - tachypnea, increased work of breathing, respiratory distress, and lethargy. Contributing diagnoses includes - CHF, and Covid Pneumonia Labs and images were reviewed. Patient Has recent ABG, which has been reviewed.   - Transferred to unit 4/23; has improved on BIPAP  - Continue supplemental oxygen and wean as able  - Continue to monitor ABGs  - Monitor WBC, temperature; CXR as indicated for worsening resp. Status.   - Continue steroids, remdesivir, azithromycin  - No wheezing heard on exam but diminished sounds bilaterally; does not appear to be fluid  overloaded; continue to monitor volume status    Cardiac/Vascular  Elevated troponin  - Trop bumped to 0.095 yesterday; has trended down to 0.051 today  - Likely demand ischemia; will continue to monitor     Chronic atrial fibrillation  - Patient with Persistent (7 days or more) atrial fibrillation which is controlled currently with Beta Blocker. Patient is currently in atrial fibrillation.[id number] Score: The patient doesn't have any registry metric data available.   - Anticoagulation indicated. Anticoagulation done with Eliquis .  - Currently holding BB due to hypotension; will resume as indicated    Essential hypertension  - Chronic, uncontrolled. Latest blood pressure and vitals reviewed:   Temp:  [98.1 °F (36.7 °C)-101.8 °F (38.8 °C)]   Pulse:  []   Resp:  [14-26]   BP: ()/(35-81)   SpO2:  [84 %-100 %] .   - Home meds for hypertension were reviewed and noted below:   Hypertension Medications               amLODIPine (NORVASC) 5 MG tablet TAKE 1 TABLET BY MOUTH ONCE  DAILY    losartan (COZAAR) 100 MG tablet TAKE 1 TABLET BY MOUTH ONCE  DAILY    metoprolol succinate (TOPROL-XL) 100 MG 24 hr tablet Take 1 tablet (100 mg total) by mouth once daily.    torsemide (DEMADEX) 20 MG Tab Take 2 tablets (40 mg total) by mouth 2 (two) times a day.   - While in the hospital, will manage blood pressure as follows; Holding home meds  - Will utilize p.r.n. blood pressure medication only if patient's blood pressure greater than 180/110 and he develops symptoms such as worsening chest pain or shortness of breath.    Acute on chronic diastolic congestive heart failure  - Patient is identified as having Diastolic (HFpEF) heart failure that is Acute on chronic. CHF is currently uncontrolled due to dyspnea, peripheral edema. - Latest ECHO performed and demonstrates- Results for orders placed during the hospital encounter of 11/20/21    Echo- Interpretation Summary:  · The left ventricle is mildly enlarged with moderate  concentric hypertrophy and normal systolic function.  · The estimated ejection fraction is 60%.  · Normal left ventricular diastolic function.  · Severe right ventricular enlargement with moderately reduced right ventricular systolic function.  · Moderate left atrial enlargement.  · Moderate right atrial enlargement.  · Moderate tricuspid regurgitation.  · There is abnormal septal wall motion. There is systolic flattening of the interventricular septum consistent with right ventricle pressure overload.  - Patient takes BB/lasix at home but currently holding due to hypotension/elevated creatinine. Monitor on telemetry. Patient is off CHF pathway.  Monitor strict Is&Os and daily weights.  Place on fluid restriction of 1.5 L. Cardiology has not been consulted. Continue to stress to patient importance of self efficacy and  on diet for CHF. Last BNP reviewed - and noted below:  Recent Labs   Lab 04/22/24  1550   *     Renal/  MONA (acute kidney injury)  - Patient with acute kidney injury/acute renal failure likely due to unclear cause. MONA is currently improving. Baseline creatinine  1.2  - Labs reviewed- Renal function/electrolytes with Estimated Creatinine Clearance: 69.1 mL/min (A) (based on SCr of 2.1 mg/dL (H)). according to latest data. Monitor urine output and serial BMP and adjust therapy as needed. Avoid nephrotoxins and renally dose meds for GFR listed above.  - Cr is improving  - Lasix was held; will resume as indicated    ID  COVID  - Worsening resp. Status earlier today with transfer to CC unit requiring Bipap  - Patient started on Remdesivir; also covering with prophylactic azithromycin   - Continue with oxygen supplementation and wean as able  - See plan for RF    Endocrine  Hypoglycemia  - Episode of hypoglycemia this am; poct glucose 46  - Not sure exact cause; no identified causative agents   - Not on any diabetic medications  - Will monitor with more frequent glucose checks; start gentle  D5/half    Morbid obesity with BMI of 50.0-59.9, adult  - Body mass index is 53.87 kg/m². Morbid obesity complicates all aspects of disease management from diagnostic modalities to treatment. Weight loss encouraged and health benefits explained to patient.     Other  EVANGELIST (obstructive sleep apnea)/ Obesity Hypoventilation syndrome   - Contributing to current state  - Wears Trilogy at home  - Continue Bipap, will need at least qhs     Critical Care Time: 46 minutes  Critical secondary to Patient has a condition that poses threat to life and bodily function: Severe Respiratory Distress     Critical care was time spent personally by me on the following activities: development of treatment plan with patient or surrogate and bedside caregivers, discussions with consultants, evaluation of patient's response to treatment, examination of patient, ordering and performing treatments and interventions, ordering and review of laboratory studies, ordering and review of radiographic studies, pulse oximetry, re-evaluation of patient's condition. This critical care time did not overlap with that of any other provider or involve time for any procedures.    Thank you for your consult. I will follow-up with patient. Please contact us if you have any additional questions.     Mike Youngblood PA-C  Critical Care Medicine  [facility] ([facility]

## 2024-04-23 NOTE — PLAN OF CARE
Pt transferred from  241 this morning d/t becoming unresponsive and requiring continuous BiPAP.  Upon arrival to ICU, pt responsive and answering questions appropriately.  Remains on BiPAP @ 40% FiO2 with no labored breathing.  Pt resting in bed with no complaints.  Upon checking serial BG, reported BG of 46 and 24 consecutively requiring bolus of D10% and beginning D5 gtt infusing at 30 cc/hr.  HR remains in a-fib and required a dose of lopressor d/t HR increasing to 160.  Male external catheter placed and pt voiding spontaneously; collecting in canister.  Wife updated at bedside via provider.

## 2024-04-23 NOTE — ASSESSMENT & PLAN NOTE
- Patient with Hypercapnic and Hypoxic Respiratory failure which is Acute on chronic. Home O2 as needed, and Trilogy. Supplemental oxygen was provided and noted- Oxygen Concentration (%):  [40] 40  - Signs/symptoms of respiratory failure include - tachypnea, increased work of breathing, respiratory distress, and lethargy. Contributing diagnoses includes - CHF, and Covid Pneumonia Labs and images were reviewed. Patient Has recent ABG, which has been reviewed.   - Transferred to unit 4/23; has improved on BIPAP  - Continue supplemental oxygen and wean as able  - Continue to monitor ABGs  - Monitor WBC, temperature; CXR as indicated for worsening resp. Status.   - Continue steroids, remdesivir, azithromycin  - No wheezing heard on exam but diminished sounds bilaterally; does not appear to be fluid overloaded; continue to monitor volume status

## 2024-04-23 NOTE — PROGRESS NOTES
"[facility] ([facility])  [facility] Medicine  Progress Note     Patient Name:  Caio Ontiveros  MRN:  [id number]  Patient Class: IP-Inpatient  Admission Date:  4/22/2024   Length of Stay:  1  Attending Physician: Amilcar Monterroso MD  Primary Care Provider: Dominick Allen MD    Subjective:      Subsequent Care: Follow up Acute on chronic respiratory failure with hypoxia and hypercapnia        HPI:  59 y/o male with PMHx of CHF (right sided heart failure), atrial fibrillation on eliquis, HTN, EVANGELIST wearing Trilogy, gout, morbid obesity who presented to the ER today with complaints of SOB, fatigue, congestion, cough. He was diagnosed with COVID at [facility] ed yesterday. He denies any fever/chills. He also reports leg swelling and recent weight gain (unsure of amount). In the ED, o2 sats 85% on room air, placed on 2 liters with noted improvement. Lab work notable for creatinine 3.1, , Trop 0.86, procal 0.41. ABG showed Ph 7.2, CO2 54, , HCO3 24. Chest xray with no acute findings. Patient will be admitted to inpatient for acute on chronic hypoxic/hypercapnic resp failure d/t covid/heart failure exacerbation.       Overview/Hospital Course:  04/23/2024  Transient hypotension this AM. Stat Lactic acid normal. Nephrotoxic agents like colchicine held this morning. Renal US ordered to further investigate MONA as urine studies would be unreliable after diuretic use.     CALLED TO BEDSIDE: later in the AM for unresponsiveness. Pulmonology at bedside. ABG returned confirming pCO2 now in the 90s (in the 50s on admission). Transfer to ICU for continuous BIPAP.      Interval Hx  See hospital course    Objective  /78   Pulse 93   Temp 99.7 °F (37.6 °C)   Resp 16   Ht 6' 3" (1.905 m)   Wt (!) 195.6 kg (431 lb 3.5 oz)   SpO2 96%   BMI 53.90 kg/m²     Intake/Output Summary (Last 24 hours) at 4/23/2024 0739  Last data filed at 4/22/2024 2256  Gross per 24 hour   Intake --   Output 825 ml   Net -825 ml       PHYSICAL " "EXAM  Vitals reviewed  Constitutional:       Appearance: He is obese.   Cardiovascular:      Rate and Rhythm: Normal rate and regular rhythm.      Pulses: Normal pulses.      Heart sounds: Normal heart sounds.   Pulmonary:      Effort: Pulmonary effort is normal.      Comments: Diminished breath sounds all lobes   Abdominal:      General: Bowel sounds are normal. There is no distension.      Palpations: Abdomen is soft.      Tenderness: There is no abdominal tenderness.   Musculoskeletal:      Right lower leg: Edema present.      Left lower leg: Edema present.   Skin:     General: Skin is warm and dry.   Neurological:      Mental Status: He is alert and oriented to person, place, and time.     LABS  All labs from the past 24 hours were reviewed.     BMP:   Recent Labs   Lab 04/22/24  1550         K 4.5      CO2 23   BUN 45*   CREATININE 3.1*   CALCIUM 8.2*     CBC:   Recent Labs   Lab 04/22/24  1550 04/23/24  0659   WBC 3.39* 2.80*   HGB 13.6* 14.1   HCT 42.6 46.3   PLT 75* 74*     CMP:   Recent Labs   Lab 04/22/24  1550      K 4.5      CO2 23      BUN 45*   CREATININE 3.1*   CALCIUM 8.2*   PROT 7.4   ALBUMIN 3.5   BILITOT 0.8   ALKPHOS 65   AST 32   ALT 24   ANIONGAP 9     Cardiac Markers:   Recent Labs   Lab 04/22/24  1550   *     Coagulation: No results for input(s): "PT", "INR", "APTT" in the last 48 hours.  Lactic Acid:   Recent Labs   Lab 04/22/24  1550 04/23/24  0659   LACTATE 1.2 0.7     Magnesium: No results for input(s): "MG" in the last 48 hours.  Troponin:   Recent Labs   Lab 04/22/24  1550 04/22/24  2025 04/23/24  0240   TROPONINI 0.086* 0.068* 0.095*     TSH:   No results for input(s): "TSH" in the last 4320 hours.  Urine Studies:   No results for input(s): "COLORU", "APPEARANCEUA", "PHUR", "SPECGRAV", "PROTEINUA", "GLUCUA", "KETONESU", "BILIRUBINUA", "OCCULTUA", "NITRITE", "UROBILINOGEN", "LEUKOCYTESUR", "RBCUA", "WBCUA", "BACTERIA", "SQUAMEPITHEL", " ""HYALINECASTS" in the last 48 hours.    Invalid input(s): "WRIGHTSUR"    IMAGING  All imaging from the past 24 hours were reviewed.     Imaging Results              X-Ray Chest AP Portable (Final result)  Result time 04/22/24 15:41:57      Final result by Glen Martinez MD (04/22/24 15:41:57)                   Impression:      No acute findings.      Electronically signed by: Glen Martinez MD  Date:    04/22/2024  Time:    15:41               Narrative:    EXAMINATION:  XR CHEST AP PORTABLE    CLINICAL HISTORY:  COVID-19;  cough    TECHNIQUE:  AP view of the chest was performed.    COMPARISON:  None    FINDINGS:  The cardiac and mediastinal silhouettes appear within normal limits.   The lungs are clear bilaterally.  No acute osseous findings demonstrated.                                      Assessment/Plan:    * Acute on chronic respiratory failure with hypoxia and hypercapnia  COVID positive test, negative CXR  - start steroids  - s/p dose of iV lasix 80 mg in ED, will hold further diuretics tonight d/t MONA, re-evaluate in am  - o2 as needed  - trilogy q hs  - supportive care    04/23/2024  --acutely worsened this AM  --hypercapnea worsened, pCO2 in the 90s  --transferring to ICU for continuous bipap  --given O2 requirements + new covid POS test, meets criteria for remdesivir   --can stop remdesivir therapy early if symptoms show improvement  --will also add on azithromycin for completion  --Monitor clinically for respiratory distress, Q4H O2 sats     MONA  04/23/2024  --improved from yesterday  --will discontinue colchicine for now  --avoid nephrotoxic agents while hospitalized  --renally dose meds  --daily BMP to monitor renal fxn   --US renal ordered /pending     Acute on chronic diastolic congestive heart failure  04/23/2024  --Last ECHO from 2021 confirm EF 60 %  --BNP elevated, compared to previous on file  --no obvious vascular congestion on CXR  --s/p 80 mg IV lasix dose in the ER  --hold further diuretic " therapy given renal function  --monitor electrolytes, UOP  --strict I&O and daily weights ordered     Moderate episode of recurrent major depressive disorder  - continue home effexor     Elevated troponin  - in the setting of covid infection, heart failure exacerbation  - will trend  - remains chest pain free     Chronic atrial fibrillation  Patient with Permanent atrial fibrillation which is controlled currently with Beta Blocker. Patient is currently in atrial fibrillation.Anticoagulation indicated. Anticoagulation done with eliquis .     EVANGELIST (obstructive sleep apnea)/ Obesity Hypoventilation syndrome   - continue home trilogy        CORE MEASURES:  VTE Risk Mitigation (From admission, onward)           Ordered     apixaban tablet 5 mg  2 times daily         04/22/24 3586                    Code Status: FULL    Diet: Diet Cardiac Fluid - 1500mL; Standard Tray    Disposition: Transfer to ICU        Amilcar Monterroso MD  Department of Hospital Medicine   [facility] - Telemetry ([facility])

## 2024-04-23 NOTE — ASSESSMENT & PLAN NOTE
- Body mass index is 53.87 kg/m². Morbid obesity complicates all aspects of disease management from diagnostic modalities to treatment. Weight loss encouraged and health benefits explained to patient.

## 2024-04-23 NOTE — ASSESSMENT & PLAN NOTE
- Worsening resp. Status earlier today with transfer to CC unit requiring Bipap  - Patient started on Remdesivir; also covering with prophylactic azithromycin   - Continue with oxygen supplementation and wean as able  - See plan for RF

## 2024-04-23 NOTE — ASSESSMENT & PLAN NOTE
- Contributing to current state  - Wears Trilogy at home  - Continue Bipap, will need at least qhs

## 2024-04-23 NOTE — HPI
Caio Ontiveros is a 58-year-old male with past medical history of Afib on Eliquis, CHF, HTN, morbid obesity, EVANGELIST on trelegy, gout who presented to ED yesterday 4/22 with complaints of SOB, fatigue, cough, congestion. Admits to associated leg swelling but denies any recent sick contacts, fever, chills. Covid + in the ED. O2 sats in the 80s which improved with 2L NC. ABGs consistent with acute on chronic hypoxic and hypercapnic respiratory failure. Also, elevated BNP at 983 and trop 0.085. Cr 3.1. Admitted to  for management. This am patient noted to have increased work of breathing, worsening hypoxia. Repeat ABG with worsening respiratory acidosis Ph 7.1 with CO2 of 90, hypotensive. Patient with increased somnolence and not responding appropriately. Immediately placed on BIPAP and transferred to CC unit. After placed on bipap, responses more appropriate, awake and alert. Still complained of some shortness of breath but denied any pain.     Interval history, f/u chief complaint of shortness of breath:  4/24: Awake, alert, conversating in bed. Transitioned to NC this am. Maintaining good O2 sats, despite no improvement in repeat ABG. WBC 1.58. Glucose now in lower 100s. Sputum cx pending. No changes on chest imaging. US BLE negative for DVT. Trop has trended down. Cr trended down. HR controlled, BP soft but stable.   4/25: Doing well this am. Bipap qhs. Sitting in bedside chair. No chest pain/shortness of breath this am. Cr 1.0, glucose controlled. Around 3L UOP yesterday after lasix. Will repeat dose this am.  4/26: Pulmonary status appears stable on nocturnal AVAPS and 3L/NC during awake hours. Denies acute complaints at this time. Significant diuresis with diuretics.

## 2024-04-23 NOTE — PLAN OF CARE
[facility] - Intensive Care (Hospital)  Initial Discharge Assessment       Primary Care Provider: Dominick Allen MD    Admission Diagnosis: Morbid obesity [E66.01]  Shortness of breath [R06.02]  EVANGELIST (obstructive sleep apnea) [G47.33]  Heart failure [I50.9]  Acute decompensated heart failure [I50.9]  Acute renal failure, unspecified acute renal failure type [N17.9]  Acute on chronic respiratory failure with hypoxia and hypercapnia [J96.21, J96.22]  COVID-19 [U07.1]    Admission Date: 4/22/2024  Expected Discharge Date:     Transition of Care Barriers: None    Payor: [employer] MGD MCAJOSSELYN [facility] / Plan: [employer] CHOICES / Product Type: Medicare Advantage /     Extended Emergency Contact Information  Primary Emergency Contact: Rc Ontiveros   [place]  Mobile Phone: [phone]  Relation: Spouse    Discharge Plan A: Home Health         [facility] - [place] - [address]  [address] [address]  Phone: [phone] Fax: [phone]    [facility] - [place], [place] - [address]  [address]  [facility] [place] [address]  Phone: [phone] Fax: [phone]    [facility] - [place], [place] - [address]  [address]  [address]  [facility] [address]  Phone: [phone] Fax: [phone]    [facility] ([facility]) - [place] [id number] [address]  Phone: [phone] Fax: [phone]      Initial Assessment (most recent)       Adult Discharge Assessment - 04/23/24 1517          Discharge Assessment    Assessment Type Discharge Planning Assessment     Confirmed/corrected address, phone number and insurance Yes     Confirmed Demographics Correct on Facesheet     Source of Information family     Communicated TREVON with patient/caregiver Date not available/Unable to determine     Reason For Admission Acute on chronic  respiratory failure with hypoxia and hypercapnia     People in Home spouse;parent(s)     Facility Arrived From: home     Do you expect to return to your current living situation? Yes     Do you have help at home or someone to help you manage your care at home? Yes     Who are your caregiver(s) and their phone number(s)? spouse, Rc     Prior to hospitilization cognitive status: Alert/Oriented     Current cognitive status: Unable to Assess     Walking or Climbing Stairs Difficulty no     Dressing/Bathing Difficulty no     Home Accessibility wheelchair accessible     Home Layout Able to live on 1st floor     Equipment Currently Used at Home CPAP;oxygen     Readmission within 30 days? No     Patient currently being followed by outpatient case management? No     Do you currently have service(s) that help you manage your care at home? No     Do you take prescription medications? Yes     Do you have prescription coverage? Yes     Coverage MCR     Do you have any problems affording any of your prescribed medications? No     Is the patient taking medications as prescribed? yes     Who is going to help you get home at discharge? Spouse     How do you get to doctors appointments? car, drives self     Are you on dialysis? No     Do you take coumadin? No     Discharge Plan A Home Health     DME Needed Upon Discharge  oxygen     Discharge Plan discussed with: Spouse/sig other     Transition of Care Barriers None                   Anticipated DC dispo: Home health   Prior Level of Function: independent with ADLs   People in home: spouse and mother     Comments:  CM spoke with patient's spouse to introduce role and discuss discharge planning. Spouse will be help at home and can provide transport at time of discharge. Patient has O2 with his CPAP at home. Confirmed demographics, insurance, and emergency contacts. CM discharge needs depends on hospital progress. CM will continue following to assist with other needs.

## 2024-04-23 NOTE — ASSESSMENT & PLAN NOTE
- Patient is identified as having Diastolic (HFpEF) heart failure that is Acute on chronic. CHF is currently uncontrolled due to dyspnea, peripheral edema. - Latest ECHO performed and demonstrates- Results for orders placed during the hospital encounter of 11/20/21    Echo- Interpretation Summary:  · The left ventricle is mildly enlarged with moderate concentric hypertrophy and normal systolic function.  · The estimated ejection fraction is 60%.  · Normal left ventricular diastolic function.  · Severe right ventricular enlargement with moderately reduced right ventricular systolic function.  · Moderate left atrial enlargement.  · Moderate right atrial enlargement.  · Moderate tricuspid regurgitation.  · There is abnormal septal wall motion. There is systolic flattening of the interventricular septum consistent with right ventricle pressure overload.  - Patient takes BB/lasix at home but currently holding due to hypotension/elevated creatinine. Monitor on telemetry. Patient is off CHF pathway.  Monitor strict Is&Os and daily weights.  Place on fluid restriction of 1.5 L. Cardiology has not been consulted. Continue to stress to patient importance of self efficacy and  on diet for CHF. Last BNP reviewed - and noted below:  Recent Labs   Lab 04/22/24  1550   *

## 2024-04-23 NOTE — NURSING
Pt transferred from room 241 on continuous bipap d/t being unresponsive on the floor.  Wife is in waiting room at this time and will escort her to his room and educate on precautions/protocol while pt has COVID.      Once pt arrived in room, he is AAOx3.  Altered skin integrity noted to abdominal folds as well as the bilateral lower extremity.

## 2024-04-23 NOTE — ASSESSMENT & PLAN NOTE
- Patient with acute kidney injury/acute renal failure likely due to unclear cause. MONA is currently improving. Baseline creatinine  1.2  - Labs reviewed- Renal function/electrolytes with Estimated Creatinine Clearance: 69.1 mL/min (A) (based on SCr of 2.1 mg/dL (H)). according to latest data. Monitor urine output and serial BMP and adjust therapy as needed. Avoid nephrotoxins and renally dose meds for GFR listed above.  - Cr is improving  - Lasix was held; will resume as indicated

## 2024-04-23 NOTE — ASSESSMENT & PLAN NOTE
- Patient with Persistent (7 days or more) atrial fibrillation which is controlled currently with Beta Blocker. Patient is currently in atrial fibrillation.[id number] Score: The patient doesn't have any registry metric data available.   - Anticoagulation indicated. Anticoagulation done with Eliquis .  - Currently holding BB due to hypotension; will resume as indicated

## 2024-04-23 NOTE — NURSING TRANSFER
Nursing Transfer Note      4/23/2024  Pt being transferred to icu, bedside report completed.

## 2024-04-23 NOTE — SUBJECTIVE & OBJECTIVE
Past Medical History:   Diagnosis Date    A-fib     CHF (congestive heart failure)     Gout, chronic     Hypertension     Sleep apnea      Past Surgical History:   Procedure Laterality Date    CHOLECYSTECTOMY      GASTRIC BYPASS  2014    INJECTION OF JOINT Left 8/23/2023    Procedure: Left IA Hip Joint injection;  Surgeon: Peña Rausch MD;  Location: [facility];  Service: Pain Management;  Laterality: Left;     Review of patient's allergies indicates:  No Known Allergies    Family History       Problem Relation (Age of Onset)    Diabetes Father, Sister, Brother    Hypertension Mother, Father    Stroke Father          Tobacco Use    Smoking status: Never    Smokeless tobacco: Never   Substance and Sexual Activity    Alcohol use: Not Currently    Drug use: No    Sexual activity: Not Currently       Review of Systems   Constitutional:  Negative for fatigue and fever.   Respiratory:  Positive for cough, chest tightness, shortness of breath and wheezing.    Cardiovascular:  Positive for leg swelling.   Gastrointestinal:  Negative for abdominal pain, nausea and vomiting.   Genitourinary:  Negative for difficulty urinating and dysuria.   Neurological:  Positive for weakness. Negative for dizziness and headaches.   Psychiatric/Behavioral:  Negative for agitation.      Objective:     Vital Signs (Most Recent):  Temp: 98.3 °F (36.8 °C) (04/23/24 0945)  Pulse: 88 (04/23/24 1148)  Resp: 18 (04/23/24 1148)  BP: (!) 101/59 (04/23/24 1100)  SpO2: 99 % (04/23/24 1148) Vital Signs (24h Range):  Temp:  [98.1 °F (36.7 °C)-101.8 °F (38.8 °C)] 98.3 °F (36.8 °C)  Pulse:  [] 88  Resp:  [14-26] 18  SpO2:  [84 %-100 %] 99 %  BP: ()/(35-81) 101/59     Weight: (!) 195.5 kg (431 lb)  Body mass index is 53.87 kg/m².  Intake/Output Summary (Last 24 hours) at 4/23/2024 1158  Last data filed at 4/23/2024 1100  Gross per 24 hour   Intake 243.89 ml   Output 825 ml   Net -581.11 ml     Physical Exam  Constitutional:       General: He  is in acute distress.      Appearance: He is obese. He is ill-appearing.   HENT:      Head: Normocephalic.   Eyes:      General: Lids are normal.   Cardiovascular:      Rate and Rhythm: Normal rate. Rhythm irregular.      Pulses: Normal pulses.   Pulmonary:      Comments: Diminished breath sounds bilaterally   Abdominal:      Palpations: Abdomen is soft.      Tenderness: There is no abdominal tenderness.      Comments: Very large abdomen   Musculoskeletal:         General: Swelling present.      Right lower leg: Edema present.      Left lower leg: Edema present.   Skin:     General: Skin is warm.      Comments: Thickened areas with discoloration medial around ankles consistent with venous stasis changes   Neurological:      General: No focal deficit present.      Mental Status: He is alert and oriented to person, place, and time.   Psychiatric:         Behavior: Behavior normal.     Vents:  Oxygen Concentration (%): 40 (04/23/24 1148)    Lines/Drains/Airways       Drain  Duration             Male External Urinary Catheter 04/23/24 0934 <1 day              Peripheral Intravenous Line  Duration                  Midline Catheter - Single Lumen 04/23/24 1102 Left basilic vein (medial side of arm) other (see comments) <1 day         Peripheral IV - Single Lumen 04/22/24 1555 20 G Right Antecubital <1 day                  Significant Labs:    CBC/Anemia Profile:  Recent Labs   Lab 04/22/24  1550 04/23/24  0659   WBC 3.39* 2.80*   HGB 13.6* 14.1   HCT 42.6 46.3   PLT 75* 74*   MCV 95 100*   RDW 14.9* 14.9*   FERRITIN 283  --      Chemistries:  Recent Labs   Lab 04/22/24  1550 04/23/24  0659    143   K 4.5 4.4    107   CO2 23 26   BUN 45* 43*   CREATININE 3.1* 2.1*   CALCIUM 8.2* 7.8*   ALBUMIN 3.5 3.5   PROT 7.4 7.0   BILITOT 0.8 0.6   ALKPHOS 65 66   ALT 24 24   AST 32 26   MG  --  2.4   PHOS  --  5.7*     ABGs:   Recent Labs   Lab 04/23/24  1050   PH 7.108*   PCO2 97.6*   HCO3 30.8*   POCSATURATED 85   BE 1      Lactic Acid:   Recent Labs   Lab 04/22/24  1550 04/23/24  0659   LACTATE 1.2 0.7     POCT Glucose:   Recent Labs   Lab 04/22/24  1831 04/23/24  0621   POCTGLUCOSE 94 86     Troponin:   Recent Labs   Lab 04/22/24  2025 04/23/24  0240 04/23/24  1042   TROPONINI 0.068* 0.095* 0.051*     Significant Imaging:   I have reviewed all pertinent imaging results/findings within the past 24 hours.  I have reviewed and interpreted all pertinent imaging results/findings within the past 24 hours.    Imaging Results              X-Ray Chest AP Portable (Final result)  Result time 04/22/24 15:41:57      Final result by Glen Martinez MD (04/22/24 15:41:57)                   Impression:      No acute findings.      Electronically signed by: Glen Martinez MD  Date:    04/22/2024  Time:    15:41               Narrative:    EXAMINATION:  XR CHEST AP PORTABLE    CLINICAL HISTORY:  COVID-19;  cough    TECHNIQUE:  AP view of the chest was performed.    COMPARISON:  None    FINDINGS:  The cardiac and mediastinal silhouettes appear within normal limits.   The lungs are clear bilaterally.  No acute osseous findings demonstrated.

## 2024-04-24 LAB
ALLENS TEST: ABNORMAL
ALLENS TEST: ABNORMAL
ANION GAP SERPL CALC-SCNC: 9 MMOL/L (ref 8–16)
ANISOCYTOSIS BLD QL SMEAR: SLIGHT
BASO STIPL BLD QL SMEAR: ABNORMAL
BASOPHILS NFR BLD: 0 % (ref 0–1.9)
BNP SERPL-MCNC: 134 PG/ML (ref 0–99)
BUN SERPL-MCNC: 33 MG/DL (ref 6–20)
CALCIUM SERPL-MCNC: 8 MG/DL (ref 8.7–10.5)
CHLORIDE SERPL-SCNC: 107 MMOL/L (ref 95–110)
CO2 SERPL-SCNC: 29 MMOL/L (ref 23–29)
CREAT SERPL-MCNC: 1.3 MG/DL (ref 0.5–1.4)
DELSYS: ABNORMAL
DELSYS: ABNORMAL
DIFFERENTIAL METHOD BLD: ABNORMAL
EOSINOPHIL NFR BLD: 0 % (ref 0–8)
ERYTHROCYTE [DISTWIDTH] IN BLOOD BY AUTOMATED COUNT: 14.6 % (ref 11.5–14.5)
ERYTHROCYTE [SEDIMENTATION RATE] IN BLOOD BY WESTERGREN METHOD: 20 MM/H
EST. GFR  (NO RACE VARIABLE): >60 ML/MIN/1.73 M^2
FIO2: 40
FLOW: 3
GLUCOSE SERPL-MCNC: 113 MG/DL (ref 70–110)
HCO3 UR-SCNC: 31.3 MMOL/L (ref 24–28)
HCO3 UR-SCNC: 33.5 MMOL/L (ref 24–28)
HCT VFR BLD AUTO: 44.5 % (ref 40–54)
HGB BLD-MCNC: 13.8 G/DL (ref 14–18)
IMM GRANULOCYTES # BLD AUTO: ABNORMAL K/UL (ref 0–0.04)
IMM GRANULOCYTES NFR BLD AUTO: ABNORMAL % (ref 0–0.5)
LYMPHOCYTES NFR BLD: 14 % (ref 18–48)
MAGNESIUM SERPL-MCNC: 2.5 MG/DL (ref 1.6–2.6)
MCH RBC QN AUTO: 30.5 PG (ref 27–31)
MCHC RBC AUTO-ENTMCNC: 31 G/DL (ref 32–36)
MCV RBC AUTO: 99 FL (ref 82–98)
MODE: ABNORMAL
MODE: ABNORMAL
MONOCYTES NFR BLD: 19 % (ref 4–15)
NEUTROPHILS NFR BLD: 67 % (ref 38–73)
NRBC BLD-RTO: 0 /100 WBC
PCO2 BLDA: 53.9 MMHG (ref 35–45)
PCO2 BLDA: 88.9 MMHG (ref 35–45)
PH SMN: 7.18 [PH] (ref 7.35–7.45)
PH SMN: 7.37 [PH] (ref 7.35–7.45)
PHOSPHATE SERPL-MCNC: 4.1 MG/DL (ref 2.7–4.5)
PIP: 16
PLATELET # BLD AUTO: 71 K/UL (ref 150–450)
PLATELET BLD QL SMEAR: ABNORMAL
PMV BLD AUTO: 13.5 FL (ref 9.2–12.9)
PO2 BLDA: 112 MMHG (ref 80–100)
PO2 BLDA: 73 MMHG (ref 80–100)
POC BE: 5 MMOL/L
POC BE: 6 MMOL/L
POC SATURATED O2: 94 % (ref 95–100)
POC SATURATED O2: 96 % (ref 95–100)
POCT GLUCOSE: 108 MG/DL (ref 70–110)
POCT GLUCOSE: 108 MG/DL (ref 70–110)
POCT GLUCOSE: 115 MG/DL (ref 70–110)
POCT GLUCOSE: 118 MG/DL (ref 70–110)
POCT GLUCOSE: 119 MG/DL (ref 70–110)
POCT GLUCOSE: 120 MG/DL (ref 70–110)
POCT GLUCOSE: 124 MG/DL (ref 70–110)
POCT GLUCOSE: 129 MG/DL (ref 70–110)
POIKILOCYTOSIS BLD QL SMEAR: SLIGHT
POLYCHROMASIA BLD QL SMEAR: ABNORMAL
POTASSIUM SERPL-SCNC: 5 MMOL/L (ref 3.5–5.1)
RBC # BLD AUTO: 4.52 M/UL (ref 4.6–6.2)
SAMPLE: ABNORMAL
SAMPLE: ABNORMAL
SITE: ABNORMAL
SITE: ABNORMAL
SODIUM SERPL-SCNC: 145 MMOL/L (ref 136–145)
VT: 500
WBC # BLD AUTO: 1.58 K/UL (ref 3.9–12.7)

## 2024-04-24 PROCEDURE — 82803 BLOOD GASES ANY COMBINATION: CPT

## 2024-04-24 PROCEDURE — 63700000 PHARM REV CODE 250 ALT 637 W/O HCPCS

## 2024-04-24 PROCEDURE — 99900035 HC TECH TIME PER 15 MIN (STAT)

## 2024-04-24 PROCEDURE — 83735 ASSAY OF MAGNESIUM: CPT | Performed by: STUDENT IN AN ORGANIZED HEALTH CARE EDUCATION/TRAINING PROGRAM

## 2024-04-24 PROCEDURE — 63600175 PHARM REV CODE 636 W HCPCS

## 2024-04-24 PROCEDURE — 84100 ASSAY OF PHOSPHORUS: CPT | Performed by: STUDENT IN AN ORGANIZED HEALTH CARE EDUCATION/TRAINING PROGRAM

## 2024-04-24 PROCEDURE — 25000003 PHARM REV CODE 250: Performed by: NURSE PRACTITIONER

## 2024-04-24 PROCEDURE — 97162 PT EVAL MOD COMPLEX 30 MIN: CPT

## 2024-04-24 PROCEDURE — 25000003 PHARM REV CODE 250

## 2024-04-24 PROCEDURE — 94660 CPAP INITIATION&MGMT: CPT

## 2024-04-24 PROCEDURE — 36600 WITHDRAWAL OF ARTERIAL BLOOD: CPT

## 2024-04-24 PROCEDURE — 27100171 HC OXYGEN HIGH FLOW UP TO 24 HOURS

## 2024-04-24 PROCEDURE — 20000000 HC ICU ROOM

## 2024-04-24 PROCEDURE — 27000207 HC ISOLATION

## 2024-04-24 PROCEDURE — 83880 ASSAY OF NATRIURETIC PEPTIDE: CPT | Performed by: STUDENT IN AN ORGANIZED HEALTH CARE EDUCATION/TRAINING PROGRAM

## 2024-04-24 PROCEDURE — 85007 BL SMEAR W/DIFF WBC COUNT: CPT | Performed by: STUDENT IN AN ORGANIZED HEALTH CARE EDUCATION/TRAINING PROGRAM

## 2024-04-24 PROCEDURE — 97166 OT EVAL MOD COMPLEX 45 MIN: CPT

## 2024-04-24 PROCEDURE — 25000003 PHARM REV CODE 250: Performed by: HOSPITALIST

## 2024-04-24 PROCEDURE — 80048 BASIC METABOLIC PNL TOTAL CA: CPT | Performed by: STUDENT IN AN ORGANIZED HEALTH CARE EDUCATION/TRAINING PROGRAM

## 2024-04-24 PROCEDURE — 97530 THERAPEUTIC ACTIVITIES: CPT

## 2024-04-24 PROCEDURE — 25000003 PHARM REV CODE 250: Performed by: STUDENT IN AN ORGANIZED HEALTH CARE EDUCATION/TRAINING PROGRAM

## 2024-04-24 PROCEDURE — 94761 N-INVAS EAR/PLS OXIMETRY MLT: CPT

## 2024-04-24 PROCEDURE — 63600175 PHARM REV CODE 636 W HCPCS: Mod: JZ,TB | Performed by: STUDENT IN AN ORGANIZED HEALTH CARE EDUCATION/TRAINING PROGRAM

## 2024-04-24 PROCEDURE — 94799 UNLISTED PULMONARY SVC/PX: CPT

## 2024-04-24 PROCEDURE — 36415 COLL VENOUS BLD VENIPUNCTURE: CPT | Performed by: STUDENT IN AN ORGANIZED HEALTH CARE EDUCATION/TRAINING PROGRAM

## 2024-04-24 PROCEDURE — 85027 COMPLETE CBC AUTOMATED: CPT | Performed by: STUDENT IN AN ORGANIZED HEALTH CARE EDUCATION/TRAINING PROGRAM

## 2024-04-24 RX ORDER — FUROSEMIDE 10 MG/ML
60 INJECTION INTRAMUSCULAR; INTRAVENOUS ONCE
Status: COMPLETED | OUTPATIENT
Start: 2024-04-24 | End: 2024-04-24

## 2024-04-24 RX ORDER — AZITHROMYCIN 250 MG/1
500 TABLET, FILM COATED ORAL DAILY
Status: COMPLETED | OUTPATIENT
Start: 2024-04-24 | End: 2024-04-25

## 2024-04-24 RX ORDER — DEXAMETHASONE SODIUM PHOSPHATE 4 MG/ML
6 INJECTION, SOLUTION INTRA-ARTICULAR; INTRALESIONAL; INTRAMUSCULAR; INTRAVENOUS; SOFT TISSUE DAILY
Status: DISCONTINUED | OUTPATIENT
Start: 2024-04-24 | End: 2024-04-24

## 2024-04-24 RX ADMIN — OXYCODONE HYDROCHLORIDE AND ACETAMINOPHEN 500 MG: 500 TABLET ORAL at 08:04

## 2024-04-24 RX ADMIN — OXYCODONE HYDROCHLORIDE AND ACETAMINOPHEN 500 MG: 500 TABLET ORAL at 11:04

## 2024-04-24 RX ADMIN — REMDESIVIR 100 MG: 100 INJECTION, POWDER, LYOPHILIZED, FOR SOLUTION INTRAVENOUS at 08:04

## 2024-04-24 RX ADMIN — DEXAMETHASONE SODIUM PHOSPHATE 6 MG: 4 INJECTION INTRA-ARTICULAR; INTRALESIONAL; INTRAMUSCULAR; INTRAVENOUS; SOFT TISSUE at 08:04

## 2024-04-24 RX ADMIN — PREGABALIN 100 MG: 25 CAPSULE ORAL at 08:04

## 2024-04-24 RX ADMIN — MUPIROCIN: 20 OINTMENT TOPICAL at 08:04

## 2024-04-24 RX ADMIN — APIXABAN 5 MG: 2.5 TABLET, FILM COATED ORAL at 08:04

## 2024-04-24 RX ADMIN — THERA TABS 1 TABLET: TAB at 11:04

## 2024-04-24 RX ADMIN — FUROSEMIDE 60 MG: 10 INJECTION, SOLUTION INTRAMUSCULAR; INTRAVENOUS at 07:04

## 2024-04-24 RX ADMIN — LEVOTHYROXINE SODIUM 50 MCG: 50 TABLET ORAL at 08:04

## 2024-04-24 RX ADMIN — VENLAFAXINE HYDROCHLORIDE 150 MG: 75 CAPSULE, EXTENDED RELEASE ORAL at 08:04

## 2024-04-24 RX ADMIN — METOROPROLOL TARTRATE 2.5 MG: 5 INJECTION, SOLUTION INTRAVENOUS at 02:04

## 2024-04-24 RX ADMIN — AZITHROMYCIN DIHYDRATE 500 MG: 250 TABLET ORAL at 11:04

## 2024-04-24 RX ADMIN — CEFTRIAXONE 1 G: 1 INJECTION, POWDER, FOR SOLUTION INTRAMUSCULAR; INTRAVENOUS at 02:04

## 2024-04-24 RX ADMIN — METOROPROLOL TARTRATE 2.5 MG: 5 INJECTION, SOLUTION INTRAVENOUS at 08:04

## 2024-04-24 NOTE — ASSESSMENT & PLAN NOTE
- Patient is identified as having Diastolic (HFpEF) heart failure that is Acute on chronic. CHF is currently uncontrolled due to dyspnea, peripheral edema. - Latest ECHO performed and demonstrates- Results for orders placed during the hospital encounter of 11/20/21    Echo- Interpretation Summary:  · The left ventricle is mildly enlarged with moderate concentric hypertrophy and normal systolic function.  · The estimated ejection fraction is 60%.  · Normal left ventricular diastolic function.  · Severe right ventricular enlargement with moderately reduced right ventricular systolic function.  · Moderate left atrial enlargement.  · Moderate right atrial enlargement.  · Moderate tricuspid regurgitation.  · There is abnormal septal wall motion. There is systolic flattening of the interventricular septum consistent with right ventricle pressure overload.  - Patient takes BB/lasix at home but currently holding due to hypotension/elevated creatinine. Monitor on telemetry. Patient is off CHF pathway.  Monitor strict Is&Os and daily weights.  Place on fluid restriction of 1.5 L. Cardiology has not been consulted. Continue to stress to patient importance of self efficacy and  on diet for CHF. Last BNP reviewed - and noted below:  Recent Labs   Lab 04/24/24  0418   *   - Resume lasix; fluid restriction

## 2024-04-24 NOTE — SUBJECTIVE & OBJECTIVE
Objective:     Vital Signs (Most Recent):  Temp: 97.7 °F (36.5 °C) (04/24/24 0715)  Pulse: 84 (04/24/24 1000)  Resp: 17 (04/24/24 1000)  BP: (!) 114/57 (04/24/24 1000)  SpO2: (!) 92 % (04/24/24 1000) Vital Signs (24h Range):  Temp:  [97.7 °F (36.5 °C)-99.1 °F (37.3 °C)] 97.7 °F (36.5 °C)  Pulse:  [] 84  Resp:  [14-23] 17  SpO2:  [81 %-100 %] 92 %  BP: ()/(51-88) 114/57     Weight: (!) 195.5 kg (431 lb)  Body mass index is 53.87 kg/m².  Intake/Output Summary (Last 24 hours) at 4/24/2024 1206  Last data filed at 4/24/2024 1000  Gross per 24 hour   Intake 1709.72 ml   Output 3650 ml   Net -1940.28 ml     Physical Exam  Constitutional:       General: He is not in acute distress.     Appearance: He is obese. He is ill-appearing.   HENT:      Head: Normocephalic.   Eyes:      Extraocular Movements: Extraocular movements intact.      Conjunctiva/sclera: Conjunctivae normal.      Pupils: Pupils are equal, round, and reactive to light.   Cardiovascular:      Rate and Rhythm: Normal rate. Rhythm irregular.      Heart sounds: No murmur heard.  Pulmonary:      Comments: Diminished breath sounds bilaterally with decreased air movement   Abdominal:      Palpations: Abdomen is soft. There is no mass.      Tenderness: There is no abdominal tenderness.      Comments: Very large abdomen   Genitourinary:     Comments: Condom cath in place  Musculoskeletal:      Cervical back: Neck supple.      Right lower leg: Edema present.      Left lower leg: Edema present.   Skin:     Capillary Refill: Capillary refill takes less than 2 seconds.   Neurological:      General: No focal deficit present.      Mental Status: He is alert and oriented to person, place, and time.   Psychiatric:         Mood and Affect: Mood normal.         Behavior: Behavior normal.     Review of Systems   Constitutional:  Negative for fatigue and fever.   Respiratory:  Positive for shortness of breath.    Cardiovascular:  Positive for leg swelling. Negative  for chest pain and palpitations.   Gastrointestinal:  Negative for abdominal pain, nausea and vomiting.   Genitourinary:  Negative for difficulty urinating and dysuria.   Neurological:  Negative for dizziness, weakness and headaches.   Psychiatric/Behavioral:  The patient is nervous/anxious.      Vents:  Oxygen Concentration (%): (S) 32 (04/24/24 0800)    Lines/Drains/Airways       Drain  Duration             Male External Urinary Catheter 04/23/24 0934 1 day              Peripheral Intravenous Line  Duration                  Midline Catheter - Single Lumen 04/23/24 1102 Left basilic vein (medial side of arm) other (see comments) 1 day         Midline Catheter - Single Lumen 04/23/24 1139 Right basilic vein (medial side of arm) other (see comments) 1 day         Peripheral IV - Single Lumen 04/22/24 1555 20 G Right Antecubital 1 day                  Significant Labs:    CBC/Anemia Profile:  Recent Labs   Lab 04/22/24  1550 04/23/24  0659 04/24/24  0418   WBC 3.39* 2.80* 1.58*   HGB 13.6* 14.1 13.8*   HCT 42.6 46.3 44.5   PLT 75* 74* 71*   MCV 95 100* 99*   RDW 14.9* 14.9* 14.6*   FERRITIN 283  --   --      Chemistries:  Recent Labs   Lab 04/22/24  1550 04/23/24  0659 04/24/24  0418    143 145   K 4.5 4.4 5.0    107 107   CO2 23 26 29   BUN 45* 43* 33*   CREATININE 3.1* 2.1* 1.3   CALCIUM 8.2* 7.8* 8.0*   ALBUMIN 3.5 3.5  --    PROT 7.4 7.0  --    BILITOT 0.8 0.6  --    ALKPHOS 65 66  --    ALT 24 24  --    AST 32 26  --    MG  --  2.4 2.5   PHOS  --  5.7* 4.1     POCT Glucose:   Recent Labs   Lab 04/24/24  0603 04/24/24  0737 04/24/24  1144   POCTGLUCOSE 119* 120* 129*     Troponin:   Recent Labs   Lab 04/22/24 2025 04/23/24  0240 04/23/24  1042   TROPONINI 0.068* 0.095* 0.051*     Significant Imaging:  I have reviewed all pertinent imaging results/findings within the past 24 hours.  I have reviewed and interpreted all pertinent imaging results/findings within the past 24 hours.

## 2024-04-24 NOTE — ASSESSMENT & PLAN NOTE
- Chronic, controlled. Latest blood pressure and vitals reviewed:   Temp:  [98.1 °F (36.7 °C)-101.8 °F (38.8 °C)]   Pulse:  []   Resp:  [14-26]   BP: ()/(35-81)   SpO2:  [84 %-100 %] .   - Home meds for hypertension were reviewed and noted below:   Hypertension Medications               amLODIPine (NORVASC) 5 MG tablet TAKE 1 TABLET BY MOUTH ONCE  DAILY    losartan (COZAAR) 100 MG tablet TAKE 1 TABLET BY MOUTH ONCE  DAILY    metoprolol succinate (TOPROL-XL) 100 MG 24 hr tablet Take 1 tablet (100 mg total) by mouth once daily.    torsemide (DEMADEX) 20 MG Tab Take 2 tablets (40 mg total) by mouth 2 (two) times a day.   - While in the hospital, will manage blood pressure as follows; Holding home meds  - Continue BB; will continue diuresis with lasix   - Will utilize p.r.n. blood pressure medication only if patient's blood pressure greater than 180/110 and he develops symptoms such as worsening chest pain or shortness of breath.

## 2024-04-24 NOTE — ASSESSMENT & PLAN NOTE
- Repeat echo with pulmonary artery systolic pressure of 90 mmHg; EF 60-65%  - Acute on chronic diastolic HF, sleep apnea, obesity hypoventilation   - Continue Eliquis   - Will resume lasix; continue O2 supplementation, wean as able

## 2024-04-24 NOTE — ASSESSMENT & PLAN NOTE
- Patient with Hypercapnic and Hypoxic Respiratory failure which is Acute on chronic. Home O2 as needed, and Trilogy. Supplemental oxygen was provided and noted- Oxygen Concentration (%):  [32-40] 32  - Signs/symptoms of respiratory failure include - tachypnea, increased work of breathing, respiratory distress, and lethargy. Contributing diagnoses includes - CHF, and Covid Pneumonia Labs and images were reviewed. Patient Has recent ABG, which has been reviewed.   - Transferred to unit 4/23; has improved on BIPAP; transitioned to NC this am with good O2 sats; tolerating diet, conversating  - Continue supplemental oxygen and wean as able  - Continue to monitor ABGs; not improving despite improving clinically  - Monitor WBC, temperature; CXR as indicated for worsening resp. Status.   - Continue steroids, remdesivir, azithromycin  - No wheezing heard on exam but diminished sounds bilaterally; does not appear to be fluid overloaded; continue to monitor volume status  - Will restart lasix today and monitor UOP

## 2024-04-24 NOTE — PT/OT/SLP EVAL
"Occupational Therapy Evaluation and Treatment    Name: Caio Ontiveros  MRN: [id number]  Admitting Diagnosis: Acute on chronic respiratory failure with hypoxia and hypercapnia  Recent Surgery: * No surgery found *      Recommendations:     Discharge Recommendations: Low Intensity Therapy (24/7 SPV and A)  Level of Assistance Recommended: 24 hours significant assistance  Discharge Equipment Recommendations: walker, rolling, shower chair, bedside commode  Barriers to discharge: None    Assessment:     Caio Ontiveros is a 58 y.o. male with a medical diagnosis of Acute on chronic respiratory failure with hypoxia and hypercapnia. He presents with performance deficits affecting function including weakness, impaired endurance, impaired self care skills, impaired functional mobility, impaired balance, impaired cardiopulmonary response to activity, decreased safety awareness.    Rehab Prognosis: Good; patient would benefit from acute OT services to address these deficits and reach maximum level of function.    Plan:     Patient to be seen 2 x/week to address the above listed problems via self-care/home management, therapeutic activities, therapeutic exercises  Plan of Care Expires: 05/08/24  Plan of Care Reviewed with: patient    Subjective     Chief Complaint: Reported "I have to get better to get home."  Patient Comments/Goals: increase independence  Pain/Comfort:  Pain Rating 1: 0/10 (at rest)  Pain Addressed 1:  (activity pacing)    Social History:  Living Environment: Patient  live with his wife and mother  in a single story home with  no steps/stairs to enter.  Prior Level of Function: Prior to admission, patient was independent with ADLs and limited community distance ambulation.  Roles and Routines: Patient was driving and retired prior to admission.  Equipment Used at Home: oxygen (trilogy)  DME owned (not currently used): none  Assistance Upon Discharge: family    Objective:     Communicated with nurseMady, prior to session. " Patient found supine with blood pressure cuff, pulse ox (continuous), telemetry, peripheral IV, Condom Catheter, PICC line, oxygen upon OT entry to room.    General Precautions: Standard, fall, respiratory, airborne, contact, droplet   Orthopedic Precautions: N/A   Braces: N/A    Respiratory Status: Nasal cannula, flow 3 L/min    Occupational Performance    Bed Mobility:   Supine to sit from left side of bed with maximal assistance and of 2 persons  Sit to Supine with moderate assistance and of 2 persons on left side of bed  Seated scooting at EOB with min A    Functional Mobility/Transfers:  Sit <> Stand Transfer with moderate assistance and 2 persons with no AD  Completed from significantly elevated hospital bed  Static standing at EOB x15 minutes to promote upright activity, increase activity tolerance, and gross functional strength  Functional Mobility: Patient complete side stepping at EOB with min HHA x3ft.  Will continue to progress functional mobility tolerance, held OOB to chair transfer due to seat height concerns.    Activities of Daily Living:  Upper Body Dressing: minimum assistance  Lower Body Dressing: maximal assistance    Cognitive/Visual Perceptual:  Cognitive/Psychosocial Skills:    -     Oriented to: Person, Place, Time, Situation  -     Follows Commands/attention: Follows two-step commands    Physical Exam:  Balance:    -     Sitting: supervision  -     Standing: contact guard assistance  Upper Extremity Range of Motion:     -       Right Upper Extremity: WFL  -       Left Upper Extremity: WFL  Upper Extremity Strength:    -       Right Upper Extremity: Deficits: grossly 4/5  -       Left Upper Extremity: Deficits: grossly 4/5   Strength:    -       Right Upper Extremity: Deficits: fair  -       Left Upper Extremity: Deficits: fair  Declined sensation deficits in B UE.    AMPAC 6 Click ADL:  AMPAC Total Score: 14    Treatment & Education:  Therapist provided facilitation and instruction of  proper body mechanics, energy conservation, and fall prevention strategies during tasks listed above  Patient educated on role of OT, POC, and goals for therapy  Patient educated on importance of OOB activities with staff member assistance and sitting OOB majority of the day  Encouraged completion of B UE AROM therex throughout the day to increase functional strength and activity tolerance needed for ADL completion.    Patient left HOB elevated with all lines intact, call button in reach, and RN present.    GOALS:   Multidisciplinary Problems       Occupational Therapy Goals          Problem: Occupational Therapy    Goal Priority Disciplines Outcome Interventions   Occupational Therapy Goal     OT, PT/OT     Description: Goals to be met by: 5/8/24     Patient will increase functional independence with ADLs by performing:    Toileting from toilet with Contact Guard Assistance for hygiene and clothing management.   Toilet transfer to toilet with Stand-by Assistance.  Increased functional strength in B UE grossly by 1/2 MM grade.                         History:     Past Medical History:   Diagnosis Date    A-fib     CHF (congestive heart failure)     Gout, chronic     Hypertension     Sleep apnea          Past Surgical History:   Procedure Laterality Date    CHOLECYSTECTOMY      GASTRIC BYPASS  2014    INJECTION OF JOINT Left 8/23/2023    Procedure: Left IA Hip Joint injection;  Surgeon: Peña Rausch MD;  Location: [facility];  Service: Pain Management;  Laterality: Left;       Time Tracking:     OT Date of Treatment: 04/24/24  OT Start Time: 1100  OT Stop Time: 1125  OT Total Time (min): 25 min    Billable Minutes: Evaluation 15 and Therapeutic Activity 10    Violette Rosas, BERNARD  4/24/2024

## 2024-04-24 NOTE — PROGRESS NOTES
[facility] - Intensive Care ([facility])  Critical Care Medicine  Progress Note    Patient Name: Caio Ontiveros  MRN: [id number]  Admission Date: 4/22/2024  Hospital Length of Stay: 2 days  Code Status: Full Code  Attending Provider: Jasmine Benjamin MD  Primary Care Provider: Dominick Allen MD   Principal Problem: Acute on chronic respiratory failure with hypoxia and hypercapnia    Subjective:     HPI:  Mr. Ontiveros is a 58-year-old male with past medical history of Afib on Eliquis, CHF, HTN, morbid obesity, EVANGELIST on trelegy, gout who presented to ED yesterday 4/22 with complaints of SOB, fatigue, cough, congestion. Admits to associated leg swelling but denies any recent sick contacts, fever, chills. Covid + in the ED. O2 sats in the 80s which improved with 2L NC. ABGs consistent with acute on chronic hypoxic and hypercapnic respiratory failure. Also, elevated BNP at 983 and trop 0.085. Cr 3.1. Admitted to  for management. This am patient noted to have increased work of breathing, worsening hypoxia. Repeat ABG with worsening respiratory acidosis Ph 7.1 with CO2 of 90, hypotensive. Patient with increased somnolence and not responding appropriately. Immediately placed on BIPAP and transferred to CC unit. After placed on bipap, responses more appropriate, awake and alert. Still complained of some shortness of breath but denied any pain.     Hospital/ICU Course:  4/24: Awake, alert, conversating in bed. Transitioned to NC this am. Maintaining good O2 sats, despite no improvement in repeat ABG. WBC 1.58. Glucose now in lower 100s. Sputum cx pending. No changes on chest imaging. US BLE negative for DVT. Trop has trended down. Cr trended down. HR controlled, BP soft but stable.     Objective:     Vital Signs (Most Recent):  Temp: 97.7 °F (36.5 °C) (04/24/24 0715)  Pulse: 84 (04/24/24 1000)  Resp: 17 (04/24/24 1000)  BP: (!) 114/57 (04/24/24 1000)  SpO2: (!) 92 % (04/24/24 1000) Vital Signs (24h Range):  Temp:  [97.7 °F (36.5  °C)-99.1 °F (37.3 °C)] 97.7 °F (36.5 °C)  Pulse:  [] 84  Resp:  [14-23] 17  SpO2:  [81 %-100 %] 92 %  BP: ()/(51-88) 114/57     Weight: (!) 195.5 kg (431 lb)  Body mass index is 53.87 kg/m².  Intake/Output Summary (Last 24 hours) at 4/24/2024 1206  Last data filed at 4/24/2024 1000  Gross per 24 hour   Intake 1709.72 ml   Output 3650 ml   Net -1940.28 ml     Physical Exam  Constitutional:       General: He is not in acute distress.     Appearance: He is obese. He is ill-appearing.   HENT:      Head: Normocephalic.   Eyes:      Extraocular Movements: Extraocular movements intact.      Conjunctiva/sclera: Conjunctivae normal.      Pupils: Pupils are equal, round, and reactive to light.   Cardiovascular:      Rate and Rhythm: Normal rate. Rhythm irregular.      Heart sounds: No murmur heard.  Pulmonary:      Comments: Diminished breath sounds bilaterally with decreased air movement   Abdominal:      Palpations: Abdomen is soft. There is no mass.      Tenderness: There is no abdominal tenderness.      Comments: Very large abdomen   Genitourinary:     Comments: Condom cath in place  Musculoskeletal:      Cervical back: Neck supple.      Right lower leg: Edema present.      Left lower leg: Edema present.   Skin:     Capillary Refill: Capillary refill takes less than 2 seconds.   Neurological:      General: No focal deficit present.      Mental Status: He is alert and oriented to person, place, and time.   Psychiatric:         Mood and Affect: Mood normal.         Behavior: Behavior normal.     Review of Systems   Constitutional:  Negative for fatigue and fever.   Respiratory:  Positive for shortness of breath.    Cardiovascular:  Positive for leg swelling. Negative for chest pain and palpitations.   Gastrointestinal:  Negative for abdominal pain, nausea and vomiting.   Genitourinary:  Negative for difficulty urinating and dysuria.   Neurological:  Negative for dizziness, weakness and headaches.    Psychiatric/Behavioral:  The patient is nervous/anxious.      Vents:  Oxygen Concentration (%): (S) 32 (04/24/24 0800)    Lines/Drains/Airways       Drain  Duration             Male External Urinary Catheter 04/23/24 0934 1 day              Peripheral Intravenous Line  Duration                  Midline Catheter - Single Lumen 04/23/24 1102 Left basilic vein (medial side of arm) other (see comments) 1 day         Midline Catheter - Single Lumen 04/23/24 1139 Right basilic vein (medial side of arm) other (see comments) 1 day         Peripheral IV - Single Lumen 04/22/24 1555 20 G Right Antecubital 1 day                  Significant Labs:    CBC/Anemia Profile:  Recent Labs   Lab 04/22/24  1550 04/23/24  0659 04/24/24  0418   WBC 3.39* 2.80* 1.58*   HGB 13.6* 14.1 13.8*   HCT 42.6 46.3 44.5   PLT 75* 74* 71*   MCV 95 100* 99*   RDW 14.9* 14.9* 14.6*   FERRITIN 283  --   --      Chemistries:  Recent Labs   Lab 04/22/24  1550 04/23/24  0659 04/24/24  0418    143 145   K 4.5 4.4 5.0    107 107   CO2 23 26 29   BUN 45* 43* 33*   CREATININE 3.1* 2.1* 1.3   CALCIUM 8.2* 7.8* 8.0*   ALBUMIN 3.5 3.5  --    PROT 7.4 7.0  --    BILITOT 0.8 0.6  --    ALKPHOS 65 66  --    ALT 24 24  --    AST 32 26  --    MG  --  2.4 2.5   PHOS  --  5.7* 4.1     POCT Glucose:   Recent Labs   Lab 04/24/24  0603 04/24/24  0737 04/24/24  1144   POCTGLUCOSE 119* 120* 129*     Troponin:   Recent Labs   Lab 04/22/24 2025 04/23/24  0240 04/23/24  1042   TROPONINI 0.068* 0.095* 0.051*     Significant Imaging:  I have reviewed all pertinent imaging results/findings within the past 24 hours.  I have reviewed and interpreted all pertinent imaging results/findings within the past 24 hours.    ABG  Recent Labs   Lab 04/24/24  0400   PH 7.184*   PO2 112*   PCO2 88.9*   HCO3 33.5*   BE 5*     Assessment/Plan:     Psychiatric  Moderate episode of recurrent major depressive disorder  - Patient has persistent depression which is unknown and is  currently controlled.   - Will Continue anti-depressant medications.   - We will not consult psychiatry at this time. Patient does not display psychosis at this time. Continue to monitor closely and adjust plan of care as needed.    Pulmonary  * Acute on chronic respiratory failure with hypoxia and hypercapnia  - Patient with Hypercapnic and Hypoxic Respiratory failure which is Acute on chronic. Home O2 as needed, and Trilogy. Supplemental oxygen was provided and noted- Oxygen Concentration (%):  [32-40] 32  - Signs/symptoms of respiratory failure include - tachypnea, increased work of breathing, respiratory distress, and lethargy. Contributing diagnoses includes - CHF, and Covid Pneumonia Labs and images were reviewed. Patient Has recent ABG, which has been reviewed.   - Transferred to unit 4/23; has improved on BIPAP; transitioned to NC this am with good O2 sats; tolerating diet, conversating  - Continue supplemental oxygen and wean as able  - Continue to monitor ABGs; not improving despite improving clinically  - Monitor WBC, temperature; CXR as indicated for worsening resp. Status.   - Continue steroids, remdesivir, azithromycin  - No wheezing heard on exam but diminished sounds bilaterally; does not appear to be fluid overloaded; continue to monitor volume status  - Will restart lasix today and monitor UOP    Cardiac/Vascular  Elevated troponin  - Peaked at 0.095 yesterday; trended down to 0.051  - Likely demand ischemia; will continue to monitor     Chronic atrial fibrillation  - Patient with Persistent (7 days or more) atrial fibrillation which is controlled currently with Beta Blocker. Patient is currently in atrial fibrillation.[id number] Score: The patient doesn't have any registry metric data available.   - Anticoagulation indicated. Anticoagulation done with Eliquis .  - BB resumed; held yesterday due to hypotension   - Rate is controlled    Pulmonary hypertension  - Repeat echo with pulmonary artery  systolic pressure of 90 mmHg; EF 60-65%  - Acute on chronic diastolic HF, sleep apnea, obesity hypoventilation   - Continue Eliquis   - Will resume lasix; continue O2 supplementation, wean as able     Essential hypertension  - Chronic, controlled. Latest blood pressure and vitals reviewed:   Temp:  [98.1 °F (36.7 °C)-101.8 °F (38.8 °C)]   Pulse:  []   Resp:  [14-26]   BP: ()/(35-81)   SpO2:  [84 %-100 %] .   - Home meds for hypertension were reviewed and noted below:   Hypertension Medications               amLODIPine (NORVASC) 5 MG tablet TAKE 1 TABLET BY MOUTH ONCE  DAILY    losartan (COZAAR) 100 MG tablet TAKE 1 TABLET BY MOUTH ONCE  DAILY    metoprolol succinate (TOPROL-XL) 100 MG 24 hr tablet Take 1 tablet (100 mg total) by mouth once daily.    torsemide (DEMADEX) 20 MG Tab Take 2 tablets (40 mg total) by mouth 2 (two) times a day.   - While in the hospital, will manage blood pressure as follows; Holding home meds  - Continue BB; will continue diuresis with lasix   - Will utilize p.r.n. blood pressure medication only if patient's blood pressure greater than 180/110 and he develops symptoms such as worsening chest pain or shortness of breath.    Acute on chronic diastolic congestive heart failure  - Patient is identified as having Diastolic (HFpEF) heart failure that is Acute on chronic. CHF is currently uncontrolled due to dyspnea, peripheral edema. - Latest ECHO performed and demonstrates- Results for orders placed during the hospital encounter of 11/20/21    Echo- Interpretation Summary:  · The left ventricle is mildly enlarged with moderate concentric hypertrophy and normal systolic function.  · The estimated ejection fraction is 60%.  · Normal left ventricular diastolic function.  · Severe right ventricular enlargement with moderately reduced right ventricular systolic function.  · Moderate left atrial enlargement.  · Moderate right atrial enlargement.  · Moderate tricuspid regurgitation.  ·  There is abnormal septal wall motion. There is systolic flattening of the interventricular septum consistent with right ventricle pressure overload.  - Patient takes BB/lasix at home but currently holding due to hypotension/elevated creatinine. Monitor on telemetry. Patient is off CHF pathway.  Monitor strict Is&Os and daily weights.  Place on fluid restriction of 1.5 L. Cardiology has not been consulted. Continue to stress to patient importance of self efficacy and  on diet for CHF. Last BNP reviewed - and noted below:  Recent Labs   Lab 04/24/24  0418   *   - Resume lasix; fluid restriction    Renal/  MONA (acute kidney injury)  - Patient with acute kidney injury/acute renal failure likely due to unclear cause. MONA is currently improving. Baseline creatinine  1.2  - Labs reviewed- Renal function/electrolytes with Estimated Creatinine Clearance: 111.5 mL/min (based on SCr of 1.3 mg/dL). According to latest data.   - Monitor urine output and serial BMP and adjust therapy as needed. Avoid nephrotoxins and renally dose meds for GFR listed above.  - Cr improved 1.2    ID  COVID  - Worsening resp. Status on floor with transfer to CC unit requiring Bipap  - Patient started on Remdesivir; also covering with prophylactic azithromycin   - Continue with oxygen supplementation and wean as able; able to transition to 3L NC this am and maintaining good O2 sats  - See plan for RF    Endocrine  Hypoglycemia  - Episode of hypoglycemia yesterday; poct glucose 46; redrawn from midline  - Not sure exact cause; no identified causative agents   - Not on any diabetic medications  - Improved today and in 100s; D5/half d/c'ed  - Accuchecks q6 + w/ meals; now tolerating diet    Morbid obesity with BMI of 50.0-59.9, adult  - Body mass index is 53.87 kg/m². Morbid obesity complicates all aspects of disease management from diagnostic modalities to treatment. Weight loss encouraged and health benefits explained to  patient.     Other  EVANGELIST (obstructive sleep apnea)/ Obesity Hypoventilation syndrome   - Wears Trilogy at home; reports compliance  - Bipap [facility]     Critical Care Daily Checklist:    A: Awake: RASS Goal/Actual Goal:  n/a  Actual:     B: Spontaneous Breathing Trial Performed?  N/a   C: SAT & SBT Coordinated?  N/a                     D: Delirium: CAM-ICU Overall CAM-ICU: Negative   E: Early Mobility Performed? yes   F: Feeding Goal:    Status:     Current Diet Order   Procedures    Diet Cardiac Fluid - 1500mL; Isolation Tray - Styrofoam     Order Specific Question:   Fluid restriction:     Answer:   Fluid - 1500mL     Order Specific Question:   Tray type:     Answer:   Isolation Tray - Styrofoam      AS: Analgesia/Sedation no   T: Thromboembolic Prophylaxis eliquis   H: HOB > 300 yes   U: Stress Ulcer Prophylaxis (if needed) no   G: Glucose Control monitor   B: Bowel Function     I: Indwelling Catheter (Lines & Dwyer) Necessity Condom cath, B midline, R PIV   D: De-escalation of Antimicrobials/Pharmacotherapies no    Plan for the day/ETD Wean O2    Code Status:  Family/Goals of Care: Full Code       Critical Care Time: 39 minutes  Critical secondary to Severe respiratory distress     Critical care was time spent personally by me on the following activities: development of treatment plan with patient or surrogate and bedside caregivers, discussions with consultants, evaluation of patient's response to treatment, examination of patient, ordering and performing treatments and interventions, ordering and review of laboratory studies, ordering and review of radiographic studies, pulse oximetry, re-evaluation of patient's condition. This critical care time did not overlap with that of any other provider or involve time for any procedures.     Mike Youngblood PA-C  Critical Care Medicine  '[facility] - Intensive [facility] ([facility])

## 2024-04-24 NOTE — PLAN OF CARE
P.T. EVAL COMPLETE.  PT CURRENTLY REQUIRES MODA X 2 FOR BED MOBILITY, FABY FOR SIT<>STAND FROM ELEVATED BED, FABY FOR TAKING SIDE STEPS.  P.T. RECOMMENDS LOW INTENSITY THERAPY UPON DISCHARGE WITH 24 HOUR CARE FOR SAFETY

## 2024-04-24 NOTE — ASSESSMENT & PLAN NOTE
- Episode of hypoglycemia yesterday; poct glucose 46; redrawn from midline  - Not sure exact cause; no identified causative agents   - Not on any diabetic medications  - Improved today and in 100s; D5/half d/c'ed  - Accuchecks q6 + w/ meals; now tolerating diet

## 2024-04-24 NOTE — ASSESSMENT & PLAN NOTE
- Worsening resp. Status on floor with transfer to CC unit requiring Bipap  - Patient started on Remdesivir; also covering with prophylactic azithromycin   - Continue with oxygen supplementation and wean as able; able to transition to 3L NC this am and maintaining good O2 sats  - See plan for RF

## 2024-04-24 NOTE — PLAN OF CARE
POC reviewed with pt. Pt seems more drowsy this am. No significant events occurred over night. Pt remains on BiPAP and in afib, BP stable. Report to be given to day shift RN who will assume care.

## 2024-04-24 NOTE — PT/OT/SLP EVAL
Physical Therapy Evaluation and Treatment    Patient Name:  Caio Ontiveros   MRN:  [id number]    Recommendations:     Discharge Recommendations: Low Intensity Therapy (WITH 24 HOUR CARE FOR SAFETY)   Discharge Equipment Recommendations: walker, rolling, bedside commode, shower chair   Barriers to discharge: None    Assessment:     Caio Ontiveros is a 58 y.o. male admitted with a medical diagnosis of Acute on chronic respiratory failure with hypoxia and hypercapnia.  He presents with the following impairments/functional limitations: weakness, impaired endurance, impaired functional mobility, gait instability, impaired balance, decreased safety awareness, impaired cardiopulmonary response to activity, decreased lower extremity function, decreased upper extremity function, decreased coordination.    Rehab Prognosis: Good; patient would benefit from acute skilled PT services to address these deficits and reach maximum level of function.    Recent Surgery: * No surgery found *     Plan:     During this hospitalization, patient to be seen 3 x/week to address the identified rehab impairments via gait training, therapeutic activities, therapeutic exercises and progress toward the following goals:    Plan of Care Expires:  05/08/24    Subjective     Chief Complaint: WEAKNESS  Patient/Family Comments/goals:   Pain/Comfort:  Pain Rating 1: 0/10    Patients cultural, spiritual, [employer] conflicts given the current situation:      Living Environment:  PT LIVES WITH WIFE AND MOTHER, WIFE CAN ASSIST MINIMALLY, LIVES IN 1 STORY HOUSE NO STEPS TO ENTER, PT AMB MOSTLY HOUSEHOLD DISTANCES NO AD, LIMITED COMMUNITY DISTANCES, DRIVES, RETIRED, INDEP WITH ADL'S, USES O2 AT HOME AS NEEDED  Prior to admission, patients level of function was DRAKE IN HOME.  Equipment used at home: oxygen (TRILOGY).  DME owned (not currently used): none.  Upon discharge, patient will have assistance from WIFE.    Objective:     Communicated with NURSE MENDIOLA prior to  "session.  Patient found supine with telemetry, peripheral IV, blood pressure cuff, pulse ox (continuous), Condom Catheter, PICC line, oxygen  upon PT entry to room.    General Precautions: Standard, fall, respiratory, airborne, contact, droplet  Orthopedic Precautions:N/A   Braces: N/A  Respiratory Status: Nasal cannula, flow 3 L/min    Exams:  Cognitive Exam:  Patient is oriented to Person, Place, Time, and Situation  Postural Exam:  Patient presented with the following abnormalities:    -       Rounded shoulders  Sensation:    -       Intact  RLE ROM: WFL  RLE Strength: GROSSLY 4/5  LLE ROM: WFL  LLE Strength: GROSSLY 4/5    Functional Mobility:  Bed Mobility:     Rolling Left:  minimum assistance  Rolling Right: minimum assistance  Scooting: minimum assistance-SEATED SCOOT TOWARD HOB, BWD SCOOT TOWARD MIDDLE OF BED  Bridging: minimum assistance-BED IN [place] POSITION, BUE SUPPORT TO BED, ABLE TO COMPLETE WITH DIRECTIONS  Supine to Sit: moderate assistance and of 2 persons  Sit to Supine: moderate assistance and of 2 persons, BLE LIFT BACK INTO BED  Transfers:     Sit to Stand:  minimum assistance FROM ELEVATED BED  Gait: PT TOLERATED SIDE STEPS TOWARD HOB WITH FABY, ICU LINES IN TOW  PT TOLERATED APPROX. 15 MINUTES IN STANDING HOLDING ONTO BACK OF CHAIR INTERMITTENTLY FOR BALANCE, TOLERATED PRE-GAIT DURING THIS TIME, LATERAL WT. SHIFTING AS WELL AS MARCHING IN PLACE WITH CGA, GOOD EFFORT, O2 SATS STABLE ABOVE 88%  Balance: FAIR SITTING BALANCE, FAIR STANDING BALANCE    AM-PAC 6 CLICK MOBILITY  Total Score:12     Treatment & Education:  PT EDUCATION:  - ROLE OF P.T. AND POC IN ACUTE CARE HOSPITAL SETTING  - ENCOURAGED TO INCREASE TIME OOB IN CHAIR TO TOLERANCE   - IMPORTANCE OF ACTIVITY PACING FOR ENERGY CONSERVATION  - RISK FOR FALLS DUE TO GENERALIZED WEAKNESS, EDUCATED ON "CALL DON'T FALL", ENCOURAGED TO CALL FOR ASSISTANCE WITH ALL NEEDS SUCH AS BED<>CHAIR TRANSFERS OR TRIPS TO BATHROOM, PT AGREEABLE TO " SAFETY PRECAUTIONS    Patient left HOB elevated with all lines intact, call button in reach, and NURSE notified.    GOALS:   Multidisciplinary Problems       Physical Therapy Goals          Problem: Physical Therapy    Goal Priority Disciplines Outcome Goal Variances Interventions   Physical Therapy Goal     PT, PT/OT      Description: LTG'S TO BE MET IN 14 DAYS (5-8-24)  PT WILL REQUIRE CGA FOR BED MOBILITY  PT WILL REQUIRE CGA FOR BED<>CHAIR TF'S  PT WILL  FEET WITH RW AND CGA  PT WILL INC AMPAC SCORE BY 2 POINTS TO PROGRESS GROSS FUNC MOBILITY                         History:     Past Medical History:   Diagnosis Date    A-fib     CHF (congestive heart failure)     Gout, chronic     Hypertension     Sleep apnea        Past Surgical History:   Procedure Laterality Date    CHOLECYSTECTOMY      GASTRIC BYPASS  2014    INJECTION OF JOINT Left 8/23/2023    Procedure: Left IA Hip Joint injection;  Surgeon: Peña Rausch MD;  Location: [facility];  Service: Pain Management;  Laterality: Left;       Time Tracking:     PT Received On: 04/24/24  PT Start Time: 1100     PT Stop Time: 1130  PT Total Time (min): 30 min     Billable Minutes: Evaluation 15 and Therapeutic Activity 15    04/24/2024

## 2024-04-24 NOTE — ASSESSMENT & PLAN NOTE
- Peaked at 0.095 yesterday; trended down to 0.051  - Likely demand ischemia; will continue to monitor

## 2024-04-24 NOTE — PLAN OF CARE
Weaned pt to NC @ 3L; tolerating well.  VSS.  Changed BG to AC/HS; no coverage required.  Ambulated with PT/OT in room.  Advanced diet to cardiac with 1.5 L fluid restriction.  Received one dose of lasix and adequate urine output.  Wife updated at bedside and staying in room 572.

## 2024-04-24 NOTE — ASSESSMENT & PLAN NOTE
- Patient with Persistent (7 days or more) atrial fibrillation which is controlled currently with Beta Blocker. Patient is currently in atrial fibrillation.[id number] Score: The patient doesn't have any registry metric data available.   - Anticoagulation indicated. Anticoagulation done with Eliquis .  - BB resumed; held yesterday due to hypotension   - Rate is controlled

## 2024-04-25 LAB
ANION GAP SERPL CALC-SCNC: 5 MMOL/L (ref 8–16)
ANISOCYTOSIS BLD QL SMEAR: SLIGHT
BASO STIPL BLD QL SMEAR: ABNORMAL
BASOPHILS # BLD AUTO: 0 K/UL (ref 0–0.2)
BASOPHILS NFR BLD: 0 % (ref 0–1.9)
BUN SERPL-MCNC: 28 MG/DL (ref 6–20)
CALCIUM SERPL-MCNC: 8.4 MG/DL (ref 8.7–10.5)
CHLORIDE SERPL-SCNC: 106 MMOL/L (ref 95–110)
CO2 SERPL-SCNC: 33 MMOL/L (ref 23–29)
CREAT SERPL-MCNC: 1 MG/DL (ref 0.5–1.4)
DIFFERENTIAL METHOD BLD: ABNORMAL
EOSINOPHIL # BLD AUTO: 0 K/UL (ref 0–0.5)
EOSINOPHIL NFR BLD: 0 % (ref 0–8)
ERYTHROCYTE [DISTWIDTH] IN BLOOD BY AUTOMATED COUNT: 14 % (ref 11.5–14.5)
EST. GFR  (NO RACE VARIABLE): >60 ML/MIN/1.73 M^2
GLUCOSE SERPL-MCNC: 100 MG/DL (ref 70–110)
HCT VFR BLD AUTO: 43.2 % (ref 40–54)
HGB BLD-MCNC: 13.5 G/DL (ref 14–18)
IMM GRANULOCYTES # BLD AUTO: 0.01 K/UL (ref 0–0.04)
IMM GRANULOCYTES NFR BLD AUTO: 0.6 % (ref 0–0.5)
LYMPHOCYTES # BLD AUTO: 0.4 K/UL (ref 1–4.8)
LYMPHOCYTES NFR BLD: 27.2 % (ref 18–48)
MAGNESIUM SERPL-MCNC: 2.5 MG/DL (ref 1.6–2.6)
MCH RBC QN AUTO: 30 PG (ref 27–31)
MCHC RBC AUTO-ENTMCNC: 31.3 G/DL (ref 32–36)
MCV RBC AUTO: 96 FL (ref 82–98)
MONOCYTES # BLD AUTO: 0.4 K/UL (ref 0.3–1)
MONOCYTES NFR BLD: 22.8 % (ref 4–15)
NEUTROPHILS # BLD AUTO: 0.8 K/UL (ref 1.8–7.7)
NEUTROPHILS NFR BLD: 49.4 % (ref 38–73)
NRBC BLD-RTO: 0 /100 WBC
PHOSPHATE SERPL-MCNC: 2.6 MG/DL (ref 2.7–4.5)
PLATELET # BLD AUTO: 81 K/UL (ref 150–450)
PLATELET BLD QL SMEAR: ABNORMAL
PMV BLD AUTO: 12.6 FL (ref 9.2–12.9)
POCT GLUCOSE: 105 MG/DL (ref 70–110)
POCT GLUCOSE: 109 MG/DL (ref 70–110)
POCT GLUCOSE: 97 MG/DL (ref 70–110)
POIKILOCYTOSIS BLD QL SMEAR: SLIGHT
POLYCHROMASIA BLD QL SMEAR: ABNORMAL
POTASSIUM SERPL-SCNC: 4.2 MMOL/L (ref 3.5–5.1)
RBC # BLD AUTO: 4.5 M/UL (ref 4.6–6.2)
SODIUM SERPL-SCNC: 144 MMOL/L (ref 136–145)
STOMATOCYTES BLD QL SMEAR: PRESENT
TARGETS BLD QL SMEAR: ABNORMAL
WBC # BLD AUTO: 1.62 K/UL (ref 3.9–12.7)

## 2024-04-25 PROCEDURE — 80048 BASIC METABOLIC PNL TOTAL CA: CPT | Performed by: STUDENT IN AN ORGANIZED HEALTH CARE EDUCATION/TRAINING PROGRAM

## 2024-04-25 PROCEDURE — 85025 COMPLETE CBC W/AUTO DIFF WBC: CPT | Performed by: STUDENT IN AN ORGANIZED HEALTH CARE EDUCATION/TRAINING PROGRAM

## 2024-04-25 PROCEDURE — 94799 UNLISTED PULMONARY SVC/PX: CPT

## 2024-04-25 PROCEDURE — 63600175 PHARM REV CODE 636 W HCPCS: Mod: JZ,TB | Performed by: STUDENT IN AN ORGANIZED HEALTH CARE EDUCATION/TRAINING PROGRAM

## 2024-04-25 PROCEDURE — 25000003 PHARM REV CODE 250: Performed by: HOSPITALIST

## 2024-04-25 PROCEDURE — 94660 CPAP INITIATION&MGMT: CPT

## 2024-04-25 PROCEDURE — 84100 ASSAY OF PHOSPHORUS: CPT | Performed by: STUDENT IN AN ORGANIZED HEALTH CARE EDUCATION/TRAINING PROGRAM

## 2024-04-25 PROCEDURE — 97530 THERAPEUTIC ACTIVITIES: CPT

## 2024-04-25 PROCEDURE — 27100171 HC OXYGEN HIGH FLOW UP TO 24 HOURS

## 2024-04-25 PROCEDURE — 63600175 PHARM REV CODE 636 W HCPCS

## 2024-04-25 PROCEDURE — 25000003 PHARM REV CODE 250

## 2024-04-25 PROCEDURE — 94761 N-INVAS EAR/PLS OXIMETRY MLT: CPT

## 2024-04-25 PROCEDURE — 63700000 PHARM REV CODE 250 ALT 637 W/O HCPCS

## 2024-04-25 PROCEDURE — 21400001 HC TELEMETRY ROOM

## 2024-04-25 PROCEDURE — 27000207 HC ISOLATION

## 2024-04-25 PROCEDURE — 99900035 HC TECH TIME PER 15 MIN (STAT)

## 2024-04-25 PROCEDURE — 25000003 PHARM REV CODE 250: Performed by: EMERGENCY MEDICINE

## 2024-04-25 PROCEDURE — 94799 UNLISTED PULMONARY SVC/PX: CPT | Mod: XB

## 2024-04-25 PROCEDURE — 25000003 PHARM REV CODE 250: Performed by: NURSE PRACTITIONER

## 2024-04-25 PROCEDURE — 83735 ASSAY OF MAGNESIUM: CPT | Performed by: STUDENT IN AN ORGANIZED HEALTH CARE EDUCATION/TRAINING PROGRAM

## 2024-04-25 PROCEDURE — 25000003 PHARM REV CODE 250: Performed by: STUDENT IN AN ORGANIZED HEALTH CARE EDUCATION/TRAINING PROGRAM

## 2024-04-25 RX ORDER — SODIUM CHLORIDE 9 MG/ML
INJECTION, SOLUTION INTRAVENOUS
Status: DISCONTINUED | OUTPATIENT
Start: 2024-04-25 | End: 2024-04-28 | Stop reason: HOSPADM

## 2024-04-25 RX ORDER — FUROSEMIDE 10 MG/ML
40 INJECTION INTRAMUSCULAR; INTRAVENOUS ONCE
Status: COMPLETED | OUTPATIENT
Start: 2024-04-25 | End: 2024-04-25

## 2024-04-25 RX ADMIN — OXYCODONE HYDROCHLORIDE AND ACETAMINOPHEN 500 MG: 500 TABLET ORAL at 08:04

## 2024-04-25 RX ADMIN — APIXABAN 5 MG: 2.5 TABLET, FILM COATED ORAL at 09:04

## 2024-04-25 RX ADMIN — CEFTRIAXONE 1 G: 1 INJECTION, POWDER, FOR SOLUTION INTRAMUSCULAR; INTRAVENOUS at 04:04

## 2024-04-25 RX ADMIN — PREGABALIN 100 MG: 25 CAPSULE ORAL at 08:04

## 2024-04-25 RX ADMIN — PREGABALIN 100 MG: 25 CAPSULE ORAL at 09:04

## 2024-04-25 RX ADMIN — THERA TABS 1 TABLET: TAB at 09:04

## 2024-04-25 RX ADMIN — AZITHROMYCIN DIHYDRATE 500 MG: 250 TABLET ORAL at 09:04

## 2024-04-25 RX ADMIN — METOROPROLOL TARTRATE 2.5 MG: 5 INJECTION, SOLUTION INTRAVENOUS at 04:04

## 2024-04-25 RX ADMIN — MUPIROCIN: 20 OINTMENT TOPICAL at 08:04

## 2024-04-25 RX ADMIN — DEXAMETHASONE 6 MG: 4 TABLET ORAL at 09:04

## 2024-04-25 RX ADMIN — METOROPROLOL TARTRATE 2.5 MG: 5 INJECTION, SOLUTION INTRAVENOUS at 09:04

## 2024-04-25 RX ADMIN — FUROSEMIDE 40 MG: 10 INJECTION, SOLUTION INTRAMUSCULAR; INTRAVENOUS at 09:04

## 2024-04-25 RX ADMIN — MUPIROCIN: 20 OINTMENT TOPICAL at 09:04

## 2024-04-25 RX ADMIN — OXYCODONE HYDROCHLORIDE AND ACETAMINOPHEN 500 MG: 500 TABLET ORAL at 09:04

## 2024-04-25 RX ADMIN — VENLAFAXINE HYDROCHLORIDE 150 MG: 75 CAPSULE, EXTENDED RELEASE ORAL at 09:04

## 2024-04-25 RX ADMIN — SODIUM CHLORIDE: 9 INJECTION, SOLUTION INTRAVENOUS at 04:04

## 2024-04-25 RX ADMIN — METOROPROLOL TARTRATE 2.5 MG: 5 INJECTION, SOLUTION INTRAVENOUS at 08:04

## 2024-04-25 RX ADMIN — LEVOTHYROXINE SODIUM 50 MCG: 50 TABLET ORAL at 05:04

## 2024-04-25 RX ADMIN — APIXABAN 5 MG: 2.5 TABLET, FILM COATED ORAL at 08:04

## 2024-04-25 RX ADMIN — REMDESIVIR 100 MG: 100 INJECTION, POWDER, LYOPHILIZED, FOR SOLUTION INTRAVENOUS at 09:04

## 2024-04-25 NOTE — ASSESSMENT & PLAN NOTE
- Body mass index is 51.25 kg/m². Morbid obesity complicates all aspects of disease management from diagnostic modalities to treatment. Weight loss encouraged and health benefits explained to patient.

## 2024-04-25 NOTE — PROGRESS NOTES
[facility] ([facility])  [facility]  Progress Note    Patient Name: Caio Ontiveros  MRN: [id number]  Patient Class: IP- Inpatient   Admission Date: 4/22/2024  Length of Stay: 3 days  Attending Physician: Jasmine Benjamin MD  Primary Care Provider: Dominick Allen MD        Subjective:     Principal Problem:Acute on chronic respiratory failure with hypoxia and hypercapnia        HPI:  57 y/o male with PMHx of CHF (right sided heart failure), atrial fibrillation on eliquis, HTN, EVANGELIST wearing Trilogy, gout, morbid obesity who presented to the ER today with complaints of SOB, fatigue, congestion, cough.  He was diagnosed with COVID at [facility] ed yesterday.  He denies any fever/chills.  He also reports leg swelling and recent weight gain (unsure of amount).  In the ED, o2 sats 85% on room air, placed on 2 liters with noted improvement.  Lab work notable for creatinine 3.1, , Trop 0.86, procal 0.41.  ABG showed Ph 7.2, CO2 54, , HCO3 24.  Chest xray with no acute findings.  Patient will be admitted to inpatient for acute on chronic hypoxic/hypercapnic resp failure d/t covid/heart failure exacerbation.     Code Status Full  Surrogate Decision Maker wife     Overview/Hospital Course:  4/24: Awake, alert, conversating in bed. Transitioned to NC this am. Maintaining good O2 sats, despite no improvement in repeat ABG. WBC 1.58. Glucose now in lower 100s. Sputum cx pending. No changes on chest imaging. US BLE negative for DVT. Trop has trended down. Cr trended down. HR controlled, BP soft but stable.     4/25: Doing well this am. Bipap qhs. Sitting in bedside chair. No chest pain/shortness of breath this am. Cr 1.0, glucose controlled. Around 3L UOP yesterday after lasix. Will repeat dose this am.  ----  Patient deemed stable to transfer to floor.  UOP 3.2 L, currently on 3L NC  Denies complaints, sitting in chair      Review of Systems   All other systems reviewed and are negative.    Objective:      Vital Signs (Most Recent):  Temp: 97.2 °F (36.2 °C) (04/25/24 0500)  Pulse: 67 (04/25/24 0743)  Resp: 20 (04/25/24 0743)  BP: 134/87 (04/25/24 0939)  SpO2: 95 % (04/25/24 0743) Vital Signs (24h Range):  Temp:  [97 °F (36.1 °C)-98.6 °F (37 °C)] 97.2 °F (36.2 °C)  Pulse:  [67-98] 67  Resp:  [12-23] 20  SpO2:  [87 %-98 %] 95 %  BP: (106-134)/(57-87) 134/87     Weight: (!) 186 kg (410 lb 0.9 oz)  Body mass index is 51.25 kg/m².    Intake/Output Summary (Last 24 hours) at 4/25/2024 1004  Last data filed at 4/25/2024 0600  Gross per 24 hour   Intake 243.97 ml   Output 1660 ml   Net -1416.03 ml         Physical Exam  Vitals and nursing note reviewed.   Constitutional:       General: He is not in acute distress.     Appearance: Normal appearance.   Cardiovascular:      Rate and Rhythm: Normal rate. Rhythm irregular.      Heart sounds: No murmur heard.  Pulmonary:      Effort: Pulmonary effort is normal. No respiratory distress.      Breath sounds: Normal breath sounds. No wheezing.      Comments: Wearing 3 L NC  Abdominal:      General: There is no distension.      Palpations: Abdomen is soft.      Tenderness: There is no abdominal tenderness.   Genitourinary:     Comments: Condom cath in place  Musculoskeletal:      Right lower leg: Edema present.      Left lower leg: Edema present.   Neurological:      Mental Status: He is alert.             Significant Labs: All pertinent labs within the past 24 hours have been reviewed.  Recent Lab Results  (Last 5 results in the past 24 hours)        04/25/24  0516   04/25/24  0514   04/24/24  2030   04/24/24  1704   04/24/24  1346        Allens Test         Pass       Anion Gap 5               Aniso Slight               Baso # 0.00               Basophilic Stippling Occasional               Basophil % 0.0               Site         RR       BUN 28               Calcium 8.4               Chloride 106               CO2 33               Creatinine 1.0               Claudio          Nasal Can       Differential Method Automated               eGFR >60               Eos # 0.0               Eos % 0.0               Flow         3       Glucose 100               Gran # (ANC) 0.8               Gran % 49.4               Hematocrit 43.2               Hemoglobin 13.5               Immature Grans (Abs) 0.01  Comment: Mild elevation in immature granulocytes is non specific and   can be seen in a variety of conditions including stress response,   acute inflammation, trauma and pregnancy. Correlation with other   laboratory and clinical findings is essential.                 Immature Granulocytes 0.6               Lymph # 0.4               Lymph % 27.2               Magnesium  2.5               MCH 30.0               MCHC 31.3               MCV 96               Mode         SPONT       Mono # 0.4               Mono % 22.8               MPV 12.6               nRBC 0               Phosphorus Level 2.6               Platelet Estimate Decreased               Platelet Count 81               POC BE         6       POC HCO3         31.3       POC PCO2         53.9       POC PH         7.371       POC PO2         73       POC SATURATED O2         94       POCT Glucose   109   108   108         Poikilocytosis Slight               Poly Occasional               Potassium 4.2               RBC 4.50               RDW 14.0               Sample         ARTERIAL       Sodium 144               Stomatocytes Present               Target Cells Occasional               WBC 1.62  Comment: WBC   critical result(s) called and verbal readback obtained from   GEORGES LOVE RN ICU by [facility] 04/25/2024 06:14                                        Significant Imaging: I have reviewed all pertinent imaging results/findings within the past 24 hours.    X-Ray Chest 1 View   Final Result      Low lung volumes.         Electronically signed by: Johnnie Arteaga   Date:    04/24/2024   Time:    08:50      US Lower Extremity Veins Bilateral    Final Result      No evidence of deep venous thrombosis in either lower extremity.         Electronically signed by: Luke Larios   Date:    04/23/2024   Time:    16:12      US Retroperitoneal Complete   Final Result      Limited visualization of hydronephrosis.  No hydronephrosis evident         Electronically signed by: Monroe Duran MD   Date:    04/23/2024   Time:    16:40      X-Ray Chest AP Portable   Final Result      No acute findings.         Electronically signed by: Glen Martinez MD   Date:    04/22/2024   Time:    15:41            Assessment/Plan:      * Acute on chronic respiratory failure with hypoxia and hypercapnia  Patient with Hypercapnic and Hypoxic Respiratory failure which is Acute on chronic.  he is not on home oxygen. Wears trilogy and has supplemental o2 as needed.  Supplemental oxygen was provided and noted- Oxygen Concentration (%):  [32-50] 32    Signs/symptoms of respiratory failure include- increased work of breathing and hypoxia on room air . Contributing diagnoses includes -  COVID and Heart Failure exacerbation   Labs and images were reviewed. Patient Has recent ABG, which has been reviewed. Will treat underlying causes and adjust management of respiratory failure as follows    Continue steroids, remdesivir, ceftriaxone/azithromycin  Also improvement with Lasix  Wean O2 as tolerated  Wears Trilogy at home, using BiPAP qHS here    COVID  See plan for respiratory failure    Acute on chronic diastolic congestive heart failure  Patient is identified as having Diastolic (HFpEF) heart failure that is Acute on chronic. CHF is currently uncontrolled due to Continued edema of extremities and Weight gain  Latest ECHO performed and demonstrates- Results for orders placed during the hospital encounter of 11/20/21    Echo    Interpretation Summary  · The left ventricle is mildly enlarged with moderate concentric hypertrophy and normal systolic function.  · The estimated ejection fraction is 60%.  ·  Normal left ventricular diastolic function.  · Severe right ventricular enlargement with moderately reduced right ventricular systolic function.  · Moderate left atrial enlargement.  · Moderate right atrial enlargement.  · Moderate tricuspid regurgitation.  · There is abnormal septal wall motion. There is systolic flattening of the interventricular septum consistent with right ventricle pressure overload.  . Continue Beta Blocker and Furosemide and monitor clinical status closely. Monitor on telemetry. Patient is off CHF pathway.  Monitor strict Is&Os and daily weights.  Place on fluid restriction of 1.5 L. Cardiology has not been consulted. Continue to stress to patient importance of self efficacy and  on diet for CHF. Last BNP reviewed- and noted below   Recent Labs   Lab 04/24/24  1228   *         MONA (acute kidney injury)  Patient with acute kidney injury/acute renal failure unclear cause MONA is currently stable. Baseline creatinine  1.2  - Labs reviewed- Renal function/electrolytes with Estimated Creatinine Clearance: 140.7 mL/min (based on SCr of 1 mg/dL). according to latest data. Monitor urine output and serial BMP and adjust therapy as needed. Avoid nephrotoxins and renally dose meds for GFR listed above.  - holding home arb, allopurinol, Demadex   - may be d/t vascular congestion, given dose of 80 IV lasix in ed, will monitor output overnight, repeat labs in am, further diuresis pending labs     Chronic atrial fibrillation  Patient with Permanent atrial fibrillation which is controlled currently with Beta Blocker. Patient is currently in atrial fibrillation.Anticoagulation indicated. Anticoagulation done with eliquis .    Elevated troponin  - in the setting of covid infection, heart failure exacerbation  - will trend  - remains chest pain free        Moderate episode of recurrent major depressive disorder  - continue home effexor          EVANGELIST (obstructive sleep apnea)/ Obesity Hypoventilation  syndrome   - continue home trilogy        VTE Risk Mitigation (From admission, onward)           Ordered     apixaban tablet 5 mg  2 times daily         04/22/24 9052                    Discharge Planning   TREVON:      Code Status: Full Code   Is the patient medically ready for discharge?:     Reason for patient still in hospital (select all that apply): Patient trending condition, Laboratory test, and Treatment  Discharge Plan A: Home Health            Junior Gunter MD  Department of Hospital Medicine   [facility] ([facility])

## 2024-04-25 NOTE — NURSING
Pt. AAOx4. Pt.  rested well on BiPAP overnight. VSS. 700 ml out out of urine to external catheter. 0300 dose of metoprolol held due to mild hypotension.  HR WNL throughout shift. Rhythm on telemetry monitor, Afib, pt. Has known history.  Pt. With no complaints of pain at this time. Plan of care reviewed with patient.

## 2024-04-25 NOTE — ASSESSMENT & PLAN NOTE
- Patient with Persistent (7 days or more) atrial fibrillation which is controlled currently with Beta Blocker. Patient is currently in atrial fibrillation.[id number] Score: The patient doesn't have any registry metric data available.   - Anticoagulation indicated. Anticoagulation done with Eliquis .  - BB resumed  - Rate is controlled

## 2024-04-25 NOTE — HOSPITAL COURSE
4/24: Awake, alert, conversating in bed. Transitioned to NC this am. Maintaining good O2 sats, despite no improvement in repeat ABG. WBC 1.58. Glucose now in lower 100s. Sputum cx pending. No changes on chest imaging. US BLE negative for DVT. Trop has trended down. Cr trended down. HR controlled, BP soft but stable.     4/25: Doing well this am. Bipap qhs. Sitting in bedside chair. No chest pain/shortness of breath this am. Cr 1.0, glucose controlled. Around 3L UOP yesterday after lasix. Will repeat dose this am.  ----  Patient deemed stable to transfer to floor.  UOP 3.2 L, currently on 3L NC  Denies complaints, sitting in chair    4/26- pt seen and examined, chart, imaging reviewed, had a detailed d/w pt and his wife. Doing much better after initial CHF exacerbation with MONA and Covid, great response to lasix, MONA resolved, Cr normal. No FCCS, leg swelling much better. Pt sitting up comfortably in chair, on NC. Getting Dexa and Remdesivir. Remains in isolation. WBC 2.02, H/H 13.7/37, Plat 84 K. Bun/Cr normal.    4/27- looks and feels better, sitting up in chair, he ambulated in the room by himself as well as with PT/OT. He also came off Dexa and Remdesivir after 5 days. No CP or SOB. Leg edema trace B. Labs stable, may be discharged home soon.    4/28- looks and feels much better, sitting up in chair on RA, no CP or SOB, only trace leg edema present. He is eating drinking well, walking around well. He already has Home O2 and Home Trilogy. He has already been cleared by Pulm/Cards. He was counseled in detail about salt and fluid restrictions which he understands and accepts. He was seen and examined and deemed stable for discharge home today.

## 2024-04-25 NOTE — SUBJECTIVE & OBJECTIVE
Review of Systems   All other systems reviewed and are negative.    Objective:     Vital Signs (Most Recent):  Temp: 97.2 °F (36.2 °C) (04/25/24 0500)  Pulse: 67 (04/25/24 0743)  Resp: 20 (04/25/24 0743)  BP: 134/87 (04/25/24 0939)  SpO2: 95 % (04/25/24 0743) Vital Signs (24h Range):  Temp:  [97 °F (36.1 °C)-98.6 °F (37 °C)] 97.2 °F (36.2 °C)  Pulse:  [67-98] 67  Resp:  [12-23] 20  SpO2:  [87 %-98 %] 95 %  BP: (106-134)/(57-87) 134/87     Weight: (!) 186 kg (410 lb 0.9 oz)  Body mass index is 51.25 kg/m².    Intake/Output Summary (Last 24 hours) at 4/25/2024 1004  Last data filed at 4/25/2024 0600  Gross per 24 hour   Intake 243.97 ml   Output 1660 ml   Net -1416.03 ml         Physical Exam  Vitals and nursing note reviewed.   Constitutional:       General: He is not in acute distress.     Appearance: Normal appearance.   Cardiovascular:      Rate and Rhythm: Normal rate. Rhythm irregular.      Heart sounds: No murmur heard.  Pulmonary:      Effort: Pulmonary effort is normal. No respiratory distress.      Breath sounds: Normal breath sounds. No wheezing.      Comments: Wearing 3 L NC  Abdominal:      General: There is no distension.      Palpations: Abdomen is soft.      Tenderness: There is no abdominal tenderness.   Genitourinary:     Comments: Condom cath in place  Musculoskeletal:      Right lower leg: Edema present.      Left lower leg: Edema present.   Neurological:      Mental Status: He is alert.             Significant Labs: All pertinent labs within the past 24 hours have been reviewed.  Recent Lab Results  (Last 5 results in the past 24 hours)        04/25/24  0516   04/25/24  0514   04/24/24  2030   04/24/24  1704   04/24/24  1346        Allens Test         Pass       Anion Gap 5               Aniso Slight               Baso # 0.00               Basophilic Stippling Occasional               Basophil % 0.0               Site         RR       BUN 28               Calcium 8.4               Chloride 106                CO2 33               Creatinine 1.0               DelSys         Nasal Can       Differential Method Automated               eGFR >60               Eos # 0.0               Eos % 0.0               Flow         3       Glucose 100               Gran # (ANC) 0.8               Gran % 49.4               Hematocrit 43.2               Hemoglobin 13.5               Immature Grans (Abs) 0.01  Comment: Mild elevation in immature granulocytes is non specific and   can be seen in a variety of conditions including stress response,   acute inflammation, trauma and pregnancy. Correlation with other   laboratory and clinical findings is essential.                 Immature Granulocytes 0.6               Lymph # 0.4               Lymph % 27.2               Magnesium  2.5               MCH 30.0               MCHC 31.3               MCV 96               Mode         SPONT       Mono # 0.4               Mono % 22.8               MPV 12.6               nRBC 0               Phosphorus Level 2.6               Platelet Estimate Decreased               Platelet Count 81               POC BE         6       POC HCO3         31.3       POC PCO2         53.9       POC PH         7.371       POC PO2         73       POC SATURATED O2         94       POCT Glucose   109   108   108         Poikilocytosis Slight               Poly Occasional               Potassium 4.2               RBC 4.50               RDW 14.0               Sample         ARTERIAL       Sodium 144               Stomatocytes Present               Target Cells Occasional               WBC 1.62  Comment: WBC   critical result(s) called and verbal readback obtained from   GEORGES LOVE RN ICU by [facility] 04/25/2024 06:14                                        Significant Imaging: I have reviewed all pertinent imaging results/findings within the past 24 hours.    X-Ray Chest 1 View   Final Result      Low lung volumes.         Electronically signed by: Johnnie Arteaga    Date:    04/24/2024   Time:    08:50      US Lower Extremity Veins Bilateral   Final Result      No evidence of deep venous thrombosis in either lower extremity.         Electronically signed by: Luke Larios   Date:    04/23/2024   Time:    16:12      US Retroperitoneal Complete   Final Result      Limited visualization of hydronephrosis.  No hydronephrosis evident         Electronically signed by: Monroe Duran MD   Date:    04/23/2024   Time:    16:40      X-Ray Chest AP Portable   Final Result      No acute findings.         Electronically signed by: Glen Martinez MD   Date:    04/22/2024   Time:    15:41

## 2024-04-25 NOTE — PT/OT/SLP PROGRESS
Physical Therapy Treatment    Patient Name:  Caio Ontiveros   MRN:  [id number]    Recommendations:     Discharge Recommendations: Low Intensity Therapy  Discharge Equipment Recommendations: walker, rolling, bedside commode, shower chair  Barriers to discharge: None    Assessment:     Caio Ontiveros is a 58 y.o. male admitted with a medical diagnosis of Acute on chronic respiratory failure with hypoxia and hypercapnia.  He presents with the following impairments/functional limitations: weakness, impaired endurance, impaired functional mobility, gait instability, impaired balance, decreased safety awareness, decreased lower extremity function, decreased coordination, impaired cardiopulmonary response to activity.    Rehab Prognosis: Good; patient would benefit from acute skilled PT services to address these deficits and reach maximum level of function.    Recent Surgery: * No surgery found *     Plan:     During this hospitalization, patient to be seen 3 x/week to address the identified rehab impairments via gait training, therapeutic activities, therapeutic exercises and progress toward the following goals:    Plan of Care Expires:  05/08/24    Subjective     Chief Complaint: NONE, EAGER TO GET OOB  Patient/Family Comments/goals:   Pain/Comfort:  Pain Rating 1: 0/10      Objective:     Communicated with NURSE BOLAÑOS prior to session.  Patient found supine with telemetry, PICC line, oxygen, pulse ox (continuous), blood pressure cuff, PureWick, peripheral IV upon PT entry to room.     General Precautions: Standard, airborne, contact, droplet, fall, respiratory  Orthopedic Precautions: N/A  Braces: N/A  Respiratory Status: Nasal cannula, flow 2 L/min     Functional Mobility:  Bed Mobility:     Rolling Left:  minimum assistance  Scooting: minimum assistance  Supine to Sit: moderate assistance and of 2 persons-ENCOURAGED LOG ROLLING FOR BED MOBILITY, EXTRA TIME TO COMPLETE TF, GOOD EFFORT  Transfers:     Sit to Stand:  minimum  "assistance with no AD-FROM ELEVATED SURFACE  Bed to Chair: minimum assistance with  no AD  using  Step Transfer, CHAIR SEAT HT. ABLE TO ELEVATE  Gait: PT AMB APPROX. 5' NO AD WITH FABY TO TF FROM BED TO CHAIR, CUES FOR UPRIGHT POSTURE AND DEEP BREATHING, ICU LINES IN TOW  Balance: FAIR SITTING BALANCE, POOR+ DYNAMIC BALANCE DURING GAIT    AM-PAC 6 CLICK MOBILITY  Turning over in bed (including adjusting bedclothes, sheets and blankets)?: 2  Sitting down on and standing up from a chair with arms (e.g., wheelchair, bedside commode, etc.): 3 (FROM ELEVATED SURFACE)  Moving from lying on back to sitting on the side of the bed?: 2  Moving to and from a bed to a chair (including a wheelchair)?: 3  Need to walk in hospital room?: 3  Climbing 3-5 steps with a railing?: 1  Basic Mobility Total Score: 14     Treatment & Education:  PT EDUCATION:  - ROLE OF P.T. AND POC IN ACUTE CARE HOSPITAL SETTING  - IMPORTANCE OF ACTIVITY PACING  - ENCOURAGED TO INCREASE TIME OOB IN CHAIR TO TOLERANCE   - TO CONTINUE THERAPUETIC EXERCISES THROUGHOUT THE DAY TO INCREASE ACTIVITY TOLERANCE AND DECREASE RISK FOR PNEUMONIA AND BLOOD CLOTS: HIP FLEX/EXT, HIP ABD/ADD, QUAD SET, HEEL SLIDE, AP  - RISK FOR FALLS DUE TO GENERALIZED WEAKNESS, EDUCATED ON "CALL DON'T FALL", ENCOURAGED TO CALL FOR ASSISTANCE WITH ALL NEEDS SUCH AS BED<>CHAIR TRANSFERS OR TRIPS TO BATHROOM, PT AGREEABLE TO SAFETY PRECAUTIONS    Patient left up in chair with all lines intact, call button in reach, chair alarm on, and NURSE notified..    GOALS:   Multidisciplinary Problems       Physical Therapy Goals          Problem: Physical Therapy    Goal Priority Disciplines Outcome Goal Variances Interventions   Physical Therapy Goal     PT, PT/OT Progressing     Description: LTG'S TO BE MET IN 14 DAYS (5-8-24)  PT WILL REQUIRE CGA FOR BED MOBILITY  PT WILL REQUIRE CGA FOR BED<>CHAIR TF'S  PT WILL  FEET WITH RW AND CGA  PT WILL INC AMPAC SCORE BY 2 POINTS TO PROGRESS GROSS " FUNC MOBILITY                         Time Tracking:     PT Received On: 04/25/24  PT Start Time: 0815     PT Stop Time: 0840  PT Total Time (min): 25 min     Billable Minutes: Therapeutic Activity 25    Treatment Type: Treatment  PT/PTA: PT     Number of PTA visits since last PT visit: 0     04/25/2024

## 2024-04-25 NOTE — ASSESSMENT & PLAN NOTE
- Patient with Hypercapnic and Hypoxic Respiratory failure which is Acute on chronic. Home O2 as needed, and Trilogy. Supplemental oxygen was provided and noted- Oxygen Concentration (%):  [32-50] 32  - Signs/symptoms of respiratory failure include - tachypnea, increased work of breathing, respiratory distress, and lethargy. Contributing diagnoses includes - CHF, and Covid Pneumonia Labs and images were reviewed. Patient Has recent ABG, which has been reviewed.   - Transferred to unit 4/23; has improved on BIPAP; transitioned to NC this am with good O2 sats; tolerating diet, conversating  - Continue supplemental oxygen and wean as able  - Continue to monitor ABGs; repeat ABG much improved  - Monitor WBC, temperature; CXR as indicated for worsening resp. Status.   - Continue steroids, remdesivir, azithromycin  - No wheezing heard on exam but diminished sounds bilaterally; does not appear to be fluid overloaded; continue to monitor volume status  - Great response to lasix  - Patient stable for transfer back to floor today; will get HM to resume care

## 2024-04-25 NOTE — NURSING TRANSFER
Nursing Transfer Note      4/25/2024   2:27 PM    Nurse giving handoff: Melania   Nurse receiving handoff:Lary         Transfer From: ICU    Transfer via Cardiac Chair    Transfer with cardiac monitoring    Transported by KIKI Velázquez     Telemetry: Box Number [id number]  Order for Tele Monitor? Yes    Additional Lines: Oxygen    4eyes on Skin: yes    Medicines sent: Mupirocin Ointment put in Pt specific bin     Any spe    Patient belongings transferred with patient: No    Chart send with patient: Yes    Notified: family at bedside

## 2024-04-25 NOTE — PT/OT/SLP PROGRESS
Occupational Therapy   Treatment    Name: Caio Ontiveros  MRN: [id number]  Admitting Diagnosis:  Acute on chronic respiratory failure with hypoxia and hypercapnia       Recommendations:     Discharge Recommendations: Low Intensity Therapy (with 24/7 care)  Discharge Equipment Recommendations:  walker, rolling, bedside commode, shower chair  Barriers to discharge:  None    Assessment:     Caio Ontiveros is a 58 y.o. male with a medical diagnosis of Acute on chronic respiratory failure with hypoxia and hypercapnia.  He presents with the following performance deficits affecting function are weakness, impaired endurance, impaired self care skills, impaired functional mobility, gait instability, impaired balance, decreased coordination, decreased upper extremity function, decreased lower extremity function, decreased safety awareness, impaired cardiopulmonary response to activity.     Rehab Prognosis:  Good; patient would benefit from acute skilled OT services to address these deficits and reach maximum level of function.       Plan:     Patient to be seen 2 x/week to address the above listed problems via self-care/home management, therapeutic activities, therapeutic exercises  Plan of Care Expires: 05/08/24  Plan of Care Reviewed with: patient    Subjective     Chief Complaint: weakness, COVID 19  Patient/Family Comments/goals: improve strength, mobility, and independence  Pain/Comfort:  Pain Rating 1: 0/10    Objective:     Communicated with: Nurse and epic chart review prior to session.  Patient found supine with telemetry, PICC line, oxygen, blood pressure cuff, pulse ox (continuous), PureWick upon OT entry to room.    General Precautions: Standard, fall, airborne, contact, droplet, respiratory    Orthopedic Precautions:N/A  Braces: N/A  Respiratory Status: Nasal cannula, flow 2 L/min     Occupational Performance:     Bed Mobility:    Patient completed Rolling/Turning to Left with  minimum assistance  Patient completed Supine to  Sit with moderate assistance and 2 persons   Forward scoot to EOB with Min A.     Functional Mobility/Transfers:  Patient completed Sit <> Stand Transfer with minimum assistance  with  hand-held assist and bed height elevated.   Patient completed Bed <> Chair Transfer using Step Transfer technique with minimum assistance with hand-held assist  Functional Mobility: Patient completed x5ft functional mobility with Min A and HHA to increase dynamic standing balance and activity tolerance needed for ADL completion.     Activities of Daily Living:  Feeding:  setup A. Eating breakfast in chair  Upper Body Dressing: moderate assistance dagoberto gown around back   Lower Body Dressing: maximal assistance dagoberto socks    [facility] 6 Click ADL: 15    Treatment & Education:  Educated pt on BUE AROM shoulder flexion, elbow flexion/ext, wrist flexion/ext, and digit flexion/ext; pt demonstrated understanding by performing x5 reps each. Patient educated on role of OT in acute setting and benefits of participation. Educated on techniques to use to increase independence and decrease fall risk with functional transfers. Educated on importance of OOB activity and calling for A to transfer back to bed and meet needs. Encouraged completion of B UE AROM therex throughout the day to tolerance to increase functional strength and activity tolerance. Educated patient on importance of increased tolerance to upright position and direct impact on CV endurance and strength. Patient encouraged to sit up in chair for a minimum of 2 consecutive hours per day and for meals. Patient stated understanding and in agreement with POC.     Patient left up in chair with all lines intact, call button in reach, and nurse notified    GOALS:   Multidisciplinary Problems       Occupational Therapy Goals          Problem: Occupational Therapy    Goal Priority Disciplines Outcome Interventions   Occupational Therapy Goal     OT, PT/OT Progressing    Description: Goals to be  met by: 5/8/24     Patient will increase functional independence with ADLs by performing:    Toileting from toilet with Contact Guard Assistance for hygiene and clothing management.   Toilet transfer to toilet with Stand-by Assistance.  Increased functional strength in B UE grossly by 1/2 MM grade.                         Time Tracking:     OT Date of Treatment: 04/25/24  OT Start Time: 0815  OT Stop Time: 0845  OT Total Time (min): 30 min    Billable Minutes:Therapeutic Activity 30    OT/KRISS: OT      Lizeth Arellano OT     4/25/2024

## 2024-04-25 NOTE — ASSESSMENT & PLAN NOTE
Patient is identified as having Diastolic (HFpEF) heart failure that is Acute on chronic. CHF is currently uncontrolled due to Continued edema of extremities and Weight gain  Latest ECHO performed and demonstrates- Results for orders placed during the hospital encounter of 11/20/21    Echo    Interpretation Summary  · The left ventricle is mildly enlarged with moderate concentric hypertrophy and normal systolic function.  · The estimated ejection fraction is 60%.  · Normal left ventricular diastolic function.  · Severe right ventricular enlargement with moderately reduced right ventricular systolic function.  · Moderate left atrial enlargement.  · Moderate right atrial enlargement.  · Moderate tricuspid regurgitation.  · There is abnormal septal wall motion. There is systolic flattening of the interventricular septum consistent with right ventricle pressure overload.  . Continue Beta Blocker and Furosemide and monitor clinical status closely. Monitor on telemetry. Patient is off CHF pathway.  Monitor strict Is&Os and daily weights.  Place on fluid restriction of 1.5 L. Cardiology has not been consulted. Continue to stress to patient importance of self efficacy and  on diet for CHF. Last BNP reviewed- and noted below   Recent Labs   Lab 04/24/24  6618   *

## 2024-04-25 NOTE — ASSESSMENT & PLAN NOTE
- Patient with acute kidney injury/acute renal failure likely due to unclear cause. MONA is currently improving. Baseline creatinine  1.2  - Labs reviewed- Renal function/electrolytes with Estimated Creatinine Clearance: 140.7 mL/min (based on SCr of 1 mg/dL). According to latest data.   - Monitor urine output and serial BMP and adjust therapy as needed. Avoid nephrotoxins and renally dose meds for GFR listed above.  - Cr improved 1.0

## 2024-04-25 NOTE — PLAN OF CARE
POC reviewed with pt and family. Pt and family verbalizes understanding of POC. No questions at this time.  AAOx3. NADN.  Afib on cardiac monitor.  Pt remains free of falls.  No complaints at this time.  Safety measures in place. Will continue to monitor.  Informed pt to call for assistance before getting up. Pt verbalizes understanding.  Hourly rounding and chart check complete.  IV abx given.  Pt.up in chair

## 2024-04-25 NOTE — ASSESSMENT & PLAN NOTE
Patient with acute kidney injury/acute renal failure unclear cause MONA is currently stable. Baseline creatinine  1.2  - Labs reviewed- Renal function/electrolytes with Estimated Creatinine Clearance: 140.7 mL/min (based on SCr of 1 mg/dL). according to latest data. Monitor urine output and serial BMP and adjust therapy as needed. Avoid nephrotoxins and renally dose meds for GFR listed above.  - holding home arb, allopurinol, Demadex   - may be d/t vascular congestion, given dose of 80 IV lasix in ed, will monitor output overnight, repeat labs in am, further diuresis pending labs

## 2024-04-25 NOTE — ASSESSMENT & PLAN NOTE
Patient with Hypercapnic and Hypoxic Respiratory failure which is Acute on chronic.  he is not on home oxygen. Wears trilogy and has supplemental o2 as needed.  Supplemental oxygen was provided and noted- Oxygen Concentration (%):  [32-50] 32    Signs/symptoms of respiratory failure include- increased work of breathing and hypoxia on room air . Contributing diagnoses includes -  COVID and Heart Failure exacerbation   Labs and images were reviewed. Patient Has recent ABG, which has been reviewed. Will treat underlying causes and adjust management of respiratory failure as follows    Continue steroids, remdesivir, ceftriaxone/azithromycin  Also improvement with Lasix  Wean O2 as tolerated  Wears Trilogy at home, using BiPAP qHS here

## 2024-04-25 NOTE — ASSESSMENT & PLAN NOTE
- Worsening resp. Status on floor with transfer to CC unit requiring Bipap  - Patient started on Remdesivir; also covering with prophylactic azithromycin   - Continue with oxygen supplementation and wean as able; NC throughout day with bipap qhs and maintaining good O2 sats  - Repeat ABG much improved yesterday  - See plan for RF

## 2024-04-25 NOTE — ASSESSMENT & PLAN NOTE
- Patient is identified as having Diastolic (HFpEF) heart failure that is Acute on chronic. CHF is currently uncontrolled due to dyspnea, peripheral edema. - Latest ECHO performed and demonstrates- Results for orders placed during the hospital encounter of 11/20/21    Echo- Interpretation Summary:  · The left ventricle is mildly enlarged with moderate concentric hypertrophy and normal systolic function.  · The estimated ejection fraction is 60%.  · Normal left ventricular diastolic function.  · Severe right ventricular enlargement with moderately reduced right ventricular systolic function.  · Moderate left atrial enlargement.  · Moderate right atrial enlargement.  · Moderate tricuspid regurgitation.  · There is abnormal septal wall motion. There is systolic flattening of the interventricular septum consistent with right ventricle pressure overload.  - Patient takes BB/lasix at home but currently holding due to hypotension/elevated creatinine. Monitor on telemetry. Patient is off CHF pathway.  Monitor strict Is&Os and daily weights.  Place on fluid restriction of 1.5 L. Cardiology has not been consulted. Continue to stress to patient importance of self efficacy and  on diet for CHF. Last BNP reviewed - and noted below:  Recent Labs   Lab 04/24/24  0418   *   - Resume lasix; fluid restriction  - Monitor UOP; great response yesterday

## 2024-04-25 NOTE — SUBJECTIVE & OBJECTIVE
Objective:     Vital Signs (Most Recent):  Temp: 97.2 °F (36.2 °C) (04/25/24 0500)  Pulse: 67 (04/25/24 0743)  Resp: 20 (04/25/24 0743)  BP: 134/87 (04/25/24 0939)  SpO2: 95 % (04/25/24 0743) Vital Signs (24h Range):  Temp:  [97 °F (36.1 °C)-98.6 °F (37 °C)] 97.2 °F (36.2 °C)  Pulse:  [67-98] 67  Resp:  [12-23] 20  SpO2:  [87 %-98 %] 95 %  BP: (106-134)/(57-87) 134/87     Weight: (!) 186 kg (410 lb 0.9 oz)  Body mass index is 51.25 kg/m².  Intake/Output Summary (Last 24 hours) at 4/25/2024 0947  Last data filed at 4/25/2024 0600  Gross per 24 hour   Intake 286.89 ml   Output 2460 ml   Net -2173.11 ml     Physical Exam  Constitutional:       General: He is not in acute distress.     Appearance: He is obese. He is ill-appearing.   HENT:      Head: Normocephalic.      Mouth/Throat:      Mouth: Mucous membranes are moist.   Eyes:      Extraocular Movements: Extraocular movements intact.      Pupils: Pupils are equal, round, and reactive to light.   Cardiovascular:      Rate and Rhythm: Normal rate. Rhythm irregular.   Pulmonary:      Comments: Diminished breath sounds bilaterally   Abdominal:      General: There is no distension.      Palpations: Abdomen is soft.      Tenderness: There is no abdominal tenderness.   Musculoskeletal:         General: Swelling present.   Skin:     General: Skin is warm.      Capillary Refill: Capillary refill takes 2 to 3 seconds.      Coloration: Skin is not jaundiced or pale.   Neurological:      General: No focal deficit present.      Mental Status: He is alert and oriented to person, place, and time.   Psychiatric:         Behavior: Behavior normal.     Review of Systems   Constitutional:  Positive for fatigue. Negative for chills and fever.   Respiratory:  Negative for chest tightness and shortness of breath.    Cardiovascular:  Positive for leg swelling. Negative for chest pain and palpitations.   Gastrointestinal:  Negative for abdominal pain, nausea and vomiting.   Genitourinary:   Negative for difficulty urinating and dysuria.   Musculoskeletal:  Negative for arthralgias and myalgias.   Neurological:  Positive for weakness. Negative for dizziness and headaches.   Psychiatric/Behavioral:  Negative for agitation and confusion.      Vents:  Oxygen Concentration (%): 32 (04/25/24 0743)    Lines/Drains/Airways       Drain  Duration             Male External Urinary Catheter 04/23/24 0934 2 days              Peripheral Intravenous Line  Duration                  Peripheral IV - Single Lumen 04/22/24 1555 20 G Right Antecubital 2 days         Midline Catheter - Single Lumen 04/23/24 1102 Left basilic vein (medial side of arm) other (see comments) 1 day         Midline Catheter - Single Lumen 04/23/24 1139 Right basilic vein (medial side of arm) other (see comments) 1 day                  Significant Labs:    CBC/Anemia Profile:  Recent Labs   Lab 04/24/24  0418 04/25/24  0516   WBC 1.58* 1.62*   HGB 13.8* 13.5*   HCT 44.5 43.2   PLT 71* 81*   MCV 99* 96   RDW 14.6* 14.0     Chemistries:  Recent Labs   Lab 04/24/24  0418 04/25/24  0516    144   K 5.0 4.2    106   CO2 29 33*   BUN 33* 28*   CREATININE 1.3 1.0   CALCIUM 8.0* 8.4*   MG 2.5 2.5   PHOS 4.1 2.6*     POCT Glucose:   Recent Labs   Lab 04/24/24  1704 04/24/24  2030 04/25/24  0514   POCTGLUCOSE 108 108 109     Significant Imaging:  I have reviewed all pertinent imaging results/findings within the past 24 hours.  I have reviewed and interpreted all pertinent imaging results/findings within the past 24 hours.

## 2024-04-25 NOTE — PROGRESS NOTES
[facility] ([facility])  Critical Care Medicine  Progress Note    Patient Name: Caio Ontiveros  MRN: [id number]  Admission Date: 4/22/2024  Hospital Length of Stay: 3 days  Code Status: Full Code  Attending Provider: Jasmine Benjamin MD  Primary Care Provider: Dominick Allen MD   Principal Problem: Acute on chronic respiratory failure with hypoxia and hypercapnia    Subjective:     HPI:  Mr. Ontiveros is a 58-year-old male with past medical history of Afib on Eliquis, CHF, HTN, morbid obesity, EVANGELIST on trelegy, gout who presented to ED yesterday 4/22 with complaints of SOB, fatigue, cough, congestion. Admits to associated leg swelling but denies any recent sick contacts, fever, chills. Covid + in the ED. O2 sats in the 80s which improved with 2L NC. ABGs consistent with acute on chronic hypoxic and hypercapnic respiratory failure. Also, elevated BNP at 983 and trop 0.085. Cr 3.1. Admitted to  for management. This am patient noted to have increased work of breathing, worsening hypoxia. Repeat ABG with worsening respiratory acidosis Ph 7.1 with CO2 of 90, hypotensive. Patient with increased somnolence and not responding appropriately. Immediately placed on BIPAP and transferred to CC unit. After placed on bipap, responses more appropriate, awake and alert. Still complained of some shortness of breath but denied any pain.     Hospital/ICU Course:  4/24: Awake, alert, conversating in bed. Transitioned to NC this am. Maintaining good O2 sats, despite no improvement in repeat ABG. WBC 1.58. Glucose now in lower 100s. Sputum cx pending. No changes on chest imaging. US BLE negative for DVT. Trop has trended down. Cr trended down. HR controlled, BP soft but stable.   4/25: Doing well this am. Bipap qhs. Sitting in bedside chair. No chest pain/shortness of breath this am. Cr 1.0, glucose controlled. Around 3L UOP yesterday after lasix. Will repeat dose this am.    Objective:     Vital Signs (Most Recent):  Temp: 97.2  °F (36.2 °C) (04/25/24 0500)  Pulse: 67 (04/25/24 0743)  Resp: 20 (04/25/24 0743)  BP: 134/87 (04/25/24 0939)  SpO2: 95 % (04/25/24 0743) Vital Signs (24h Range):  Temp:  [97 °F (36.1 °C)-98.6 °F (37 °C)] 97.2 °F (36.2 °C)  Pulse:  [67-98] 67  Resp:  [12-23] 20  SpO2:  [87 %-98 %] 95 %  BP: (106-134)/(57-87) 134/87     Weight: (!) 186 kg (410 lb 0.9 oz)  Body mass index is 51.25 kg/m².  Intake/Output Summary (Last 24 hours) at 4/25/2024 0947  Last data filed at 4/25/2024 0600  Gross per 24 hour   Intake 286.89 ml   Output 2460 ml   Net -2173.11 ml     Physical Exam  Constitutional:       General: He is not in acute distress.     Appearance: He is obese. He is ill-appearing.   HENT:      Head: Normocephalic.      Mouth/Throat:      Mouth: Mucous membranes are moist.   Eyes:      Extraocular Movements: Extraocular movements intact.      Pupils: Pupils are equal, round, and reactive to light.   Cardiovascular:      Rate and Rhythm: Normal rate. Rhythm irregular.   Pulmonary:      Comments: Diminished breath sounds bilaterally   Abdominal:      General: There is no distension.      Palpations: Abdomen is soft.      Tenderness: There is no abdominal tenderness.   Musculoskeletal:         General: Swelling present.   Skin:     General: Skin is warm.      Capillary Refill: Capillary refill takes 2 to 3 seconds.      Coloration: Skin is not jaundiced or pale.   Neurological:      General: No focal deficit present.      Mental Status: He is alert and oriented to person, place, and time.   Psychiatric:         Behavior: Behavior normal.     Review of Systems   Constitutional:  Positive for fatigue. Negative for chills and fever.   Respiratory:  Negative for chest tightness and shortness of breath.    Cardiovascular:  Positive for leg swelling. Negative for chest pain and palpitations.   Gastrointestinal:  Negative for abdominal pain, nausea and vomiting.   Genitourinary:  Negative for difficulty urinating and dysuria.    Musculoskeletal:  Negative for arthralgias and myalgias.   Neurological:  Positive for weakness. Negative for dizziness and headaches.   Psychiatric/Behavioral:  Negative for agitation and confusion.      Vents:  Oxygen Concentration (%): 32 (04/25/24 0743)    Lines/Drains/Airways       Drain  Duration             Male External Urinary Catheter 04/23/24 0934 2 days              Peripheral Intravenous Line  Duration                  Peripheral IV - Single Lumen 04/22/24 1555 20 G Right Antecubital 2 days         Midline Catheter - Single Lumen 04/23/24 1102 Left basilic vein (medial side of arm) other (see comments) 1 day         Midline Catheter - Single Lumen 04/23/24 1139 Right basilic vein (medial side of arm) other (see comments) 1 day                  Significant Labs:    CBC/Anemia Profile:  Recent Labs   Lab 04/24/24  0418 04/25/24  0516   WBC 1.58* 1.62*   HGB 13.8* 13.5*   HCT 44.5 43.2   PLT 71* 81*   MCV 99* 96   RDW 14.6* 14.0     Chemistries:  Recent Labs   Lab 04/24/24  0418 04/25/24  0516    144   K 5.0 4.2    106   CO2 29 33*   BUN 33* 28*   CREATININE 1.3 1.0   CALCIUM 8.0* 8.4*   MG 2.5 2.5   PHOS 4.1 2.6*     POCT Glucose:   Recent Labs   Lab 04/24/24  1704 04/24/24  2030 04/25/24  0514   POCTGLUCOSE 108 108 109     Significant Imaging:  I have reviewed all pertinent imaging results/findings within the past 24 hours.  I have reviewed and interpreted all pertinent imaging results/findings within the past 24 hours.    ABG  Recent Labs   Lab 04/24/24  1346   PH 7.371   PO2 73*   PCO2 53.9*   HCO3 31.3*   BE 6*     Assessment/Plan:     Psychiatric  Moderate episode of recurrent major depressive disorder  - Patient has persistent depression which is unknown and is currently controlled.   - Will Continue anti-depressant medications.   - We will not consult psychiatry at this time. Patient does not display psychosis at this time. Continue to monitor closely and adjust plan of care as  needed.    Pulmonary  * Acute on chronic respiratory failure with hypoxia and hypercapnia  - Patient with Hypercapnic and Hypoxic Respiratory failure which is Acute on chronic. Home O2 as needed, and Trilogy. Supplemental oxygen was provided and noted- Oxygen Concentration (%):  [32-50] 32  - Signs/symptoms of respiratory failure include - tachypnea, increased work of breathing, respiratory distress, and lethargy. Contributing diagnoses includes - CHF, and Covid Pneumonia Labs and images were reviewed. Patient Has recent ABG, which has been reviewed.   - Transferred to unit 4/23; has improved on BIPAP; transitioned to NC this am with good O2 sats; tolerating diet, conversating  - Continue supplemental oxygen and wean as able  - Continue to monitor ABGs; repeat ABG much improved  - Monitor WBC, temperature; CXR as indicated for worsening resp. Status.   - Continue steroids, remdesivir, azithromycin  - No wheezing heard on exam but diminished sounds bilaterally; does not appear to be fluid overloaded; continue to monitor volume status  - Great response to lasix  - Patient stable for transfer back to floor today; will get HM to resume care    Cardiac/Vascular  Elevated troponin  - Peaked at 0.095 yesterday; trended down to 0.051  - Likely demand ischemia    Chronic atrial fibrillation  - Patient with Persistent (7 days or more) atrial fibrillation which is controlled currently with Beta Blocker. Patient is currently in atrial fibrillation.[id number] Score: The patient doesn't have any registry metric data available.   - Anticoagulation indicated. Anticoagulation done with Eliquis .  - BB resumed  - Rate is controlled    Pulmonary hypertension  - Repeat echo with pulmonary artery systolic pressure of 90 mmHg; EF 60-65%  - Acute on chronic diastolic HF, sleep apnea, obesity hypoventilation   - Continue Eliquis   - Will resume lasix; continue O2 supplementation, wean as able     Essential hypertension  - Chronic,  controlled. Latest blood pressure and vitals reviewed:   Temp:  [98.1 °F (36.7 °C)-101.8 °F (38.8 °C)]   Pulse:  []   Resp:  [14-26]   BP: ()/(35-81)   SpO2:  [84 %-100 %] .   - Home meds for hypertension were reviewed and noted below:   Hypertension Medications               amLODIPine (NORVASC) 5 MG tablet TAKE 1 TABLET BY MOUTH ONCE  DAILY    losartan (COZAAR) 100 MG tablet TAKE 1 TABLET BY MOUTH ONCE  DAILY    metoprolol succinate (TOPROL-XL) 100 MG 24 hr tablet Take 1 tablet (100 mg total) by mouth once daily.    torsemide (DEMADEX) 20 MG Tab Take 2 tablets (40 mg total) by mouth 2 (two) times a day.   - While in the hospital, will manage blood pressure as follows; Holding home meds  - Continue BB; will continue diuresis with lasix   - Will utilize p.r.n. blood pressure medication only if patient's blood pressure greater than 180/110 and he develops symptoms such as worsening chest pain or shortness of breath.    Acute on chronic diastolic congestive heart failure  - Patient is identified as having Diastolic (HFpEF) heart failure that is Acute on chronic. CHF is currently uncontrolled due to dyspnea, peripheral edema. - Latest ECHO performed and demonstrates- Results for orders placed during the hospital encounter of 11/20/21    Echo- Interpretation Summary:  · The left ventricle is mildly enlarged with moderate concentric hypertrophy and normal systolic function.  · The estimated ejection fraction is 60%.  · Normal left ventricular diastolic function.  · Severe right ventricular enlargement with moderately reduced right ventricular systolic function.  · Moderate left atrial enlargement.  · Moderate right atrial enlargement.  · Moderate tricuspid regurgitation.  · There is abnormal septal wall motion. There is systolic flattening of the interventricular septum consistent with right ventricle pressure overload.  - Patient takes BB/lasix at home but currently holding due to hypotension/elevated  creatinine. Monitor on telemetry. Patient is off CHF pathway.  Monitor strict Is&Os and daily weights.  Place on fluid restriction of 1.5 L. Cardiology has not been consulted. Continue to stress to patient importance of self efficacy and  on diet for CHF. Last BNP reviewed - and noted below:  Recent Labs   Lab 04/24/24  3248   *   - Resume lasix; fluid restriction  - Monitor UOP; great response yesterday    Renal/  MONA (acute kidney injury)  - Patient with acute kidney injury/acute renal failure likely due to unclear cause. MONA is currently improving. Baseline creatinine  1.2  - Labs reviewed- Renal function/electrolytes with Estimated Creatinine Clearance: 140.7 mL/min (based on SCr of 1 mg/dL). According to latest data.   - Monitor urine output and serial BMP and adjust therapy as needed. Avoid nephrotoxins and renally dose meds for GFR listed above.  - Cr improved 1.0    ID  COVID  - Worsening resp. Status on floor with transfer to CC unit requiring Bipap  - Patient started on Remdesivir; also covering with prophylactic azithromycin   - Continue with oxygen supplementation and wean as able; NC throughout day with bipap qhs and maintaining good O2 sats  - Repeat ABG much improved yesterday  - See plan for RF    Endocrine  Hypoglycemia  - Resolved    Morbid obesity with BMI of 50.0-59.9, adult  - Body mass index is 51.25 kg/m². Morbid obesity complicates all aspects of disease management from diagnostic modalities to treatment. Weight loss encouraged and health benefits explained to patient.     Other  EVANGELIST (obstructive sleep apnea)/ Obesity Hypoventilation syndrome   - Wears Trilogy at home; reports compliance  - Bipap qhs     Critical Care Daily Checklist:    A: Awake: RASS Goal/Actual Goal:  n/a  Actual:     B: Spontaneous Breathing Trial Performed?  N/a   C: SAT & SBT Coordinated?  N/a                    D: Delirium: CAM-ICU Overall CAM-ICU: Negative   E: Early Mobility Performed? yes   F:  Feeding Goal:    Status:     Current Diet Order   Procedures    Diet Cardiac Fluid - 1500mL; Isolation Tray - Styrofoam     Order Specific Question:   Fluid restriction:     Answer:   Fluid - 1500mL     Order Specific Question:   Tray type:     Answer:   Isolation Tray - Styrofoam      AS: Analgesia/Sedation no   T: Thromboembolic Prophylaxis eliquis   H: HOB > 300 yes   U: Stress Ulcer Prophylaxis (if needed) no   G: Glucose Control monitor   B: Bowel Function     I: Indwelling Catheter (Lines & Dwyer) Necessity yes   D: De-escalation of Antimicrobials/Pharmacotherapies no    Plan for the day/ETD Stable for floor     Code Status:  Family/Goals of Care: Full Code       Mike Youngblood PA-C  Critical Care Medicine  [facility] ([facility])

## 2024-04-25 NOTE — NURSING TRANSFER
Nursing Transfer Note      4/25/2024   1:50 PM    Nurse giving handoff: KIKI Velázquez  Nurse receiving handoff: KIKI Barth    Reason patient is being transferred: downgraded to Med/Tele status     Transfer To: Telemetry 216    Transfer via recliner    Transfer with 2LNC, cardiac monitoring, BiPAP machine, and bedside commode    Transported by RINKU Pelayo & KIKI Velázquez    Telemetry: monitor in place    Order for Tele Monitor? Yes    Additional Lines: Oxygen    4eyes on Skin: yes    Medicines sent: Mupirocin    Any special needs or follow-up needed: N/A    Patient belongings transferred with patient: Yes    Chart send with patient: Yes    Notified: spouse

## 2024-04-26 PROBLEM — R79.89 ELEVATED TROPONIN: Status: RESOLVED | Noted: 2020-09-12 | Resolved: 2024-04-26

## 2024-04-26 LAB
BASOPHILS # BLD AUTO: 0.01 K/UL (ref 0–0.2)
BASOPHILS NFR BLD: 0.5 % (ref 0–1.9)
DIFFERENTIAL METHOD BLD: ABNORMAL
EOSINOPHIL # BLD AUTO: 0 K/UL (ref 0–0.5)
EOSINOPHIL NFR BLD: 0.5 % (ref 0–8)
ERYTHROCYTE [DISTWIDTH] IN BLOOD BY AUTOMATED COUNT: 14 % (ref 11.5–14.5)
HCT VFR BLD AUTO: 43.7 % (ref 40–54)
HGB BLD-MCNC: 13.7 G/DL (ref 14–18)
IMM GRANULOCYTES # BLD AUTO: 0.01 K/UL (ref 0–0.04)
IMM GRANULOCYTES NFR BLD AUTO: 0.5 % (ref 0–0.5)
LYMPHOCYTES # BLD AUTO: 0.5 K/UL (ref 1–4.8)
LYMPHOCYTES NFR BLD: 22.8 % (ref 18–48)
MAGNESIUM SERPL-MCNC: 2.6 MG/DL (ref 1.6–2.6)
MCH RBC QN AUTO: 30.2 PG (ref 27–31)
MCHC RBC AUTO-ENTMCNC: 31.4 G/DL (ref 32–36)
MCV RBC AUTO: 96 FL (ref 82–98)
MONOCYTES # BLD AUTO: 0.4 K/UL (ref 0.3–1)
MONOCYTES NFR BLD: 17.8 % (ref 4–15)
NEUTROPHILS # BLD AUTO: 1.2 K/UL (ref 1.8–7.7)
NEUTROPHILS NFR BLD: 57.9 % (ref 38–73)
NRBC BLD-RTO: 0 /100 WBC
PLATELET # BLD AUTO: 84 K/UL (ref 150–450)
PMV BLD AUTO: 13.2 FL (ref 9.2–12.9)
POCT GLUCOSE: 106 MG/DL (ref 70–110)
POCT GLUCOSE: 110 MG/DL (ref 70–110)
POCT GLUCOSE: 77 MG/DL (ref 70–110)
RBC # BLD AUTO: 4.54 M/UL (ref 4.6–6.2)
WBC # BLD AUTO: 2.02 K/UL (ref 3.9–12.7)

## 2024-04-26 PROCEDURE — 25000003 PHARM REV CODE 250: Performed by: NURSE PRACTITIONER

## 2024-04-26 PROCEDURE — 94761 N-INVAS EAR/PLS OXIMETRY MLT: CPT

## 2024-04-26 PROCEDURE — 21400001 HC TELEMETRY ROOM

## 2024-04-26 PROCEDURE — 25000003 PHARM REV CODE 250: Performed by: HOSPITALIST

## 2024-04-26 PROCEDURE — 27000221 HC OXYGEN, UP TO 24 HOURS

## 2024-04-26 PROCEDURE — 25000003 PHARM REV CODE 250: Performed by: STUDENT IN AN ORGANIZED HEALTH CARE EDUCATION/TRAINING PROGRAM

## 2024-04-26 PROCEDURE — 94799 UNLISTED PULMONARY SVC/PX: CPT

## 2024-04-26 PROCEDURE — 99900035 HC TECH TIME PER 15 MIN (STAT)

## 2024-04-26 PROCEDURE — 94660 CPAP INITIATION&MGMT: CPT

## 2024-04-26 PROCEDURE — 83735 ASSAY OF MAGNESIUM: CPT | Performed by: STUDENT IN AN ORGANIZED HEALTH CARE EDUCATION/TRAINING PROGRAM

## 2024-04-26 PROCEDURE — 63600175 PHARM REV CODE 636 W HCPCS

## 2024-04-26 PROCEDURE — 25000003 PHARM REV CODE 250: Performed by: EMERGENCY MEDICINE

## 2024-04-26 PROCEDURE — 27000207 HC ISOLATION

## 2024-04-26 PROCEDURE — 85025 COMPLETE CBC W/AUTO DIFF WBC: CPT | Performed by: STUDENT IN AN ORGANIZED HEALTH CARE EDUCATION/TRAINING PROGRAM

## 2024-04-26 PROCEDURE — 63600175 PHARM REV CODE 636 W HCPCS: Mod: JZ,TB | Performed by: STUDENT IN AN ORGANIZED HEALTH CARE EDUCATION/TRAINING PROGRAM

## 2024-04-26 PROCEDURE — 25000003 PHARM REV CODE 250

## 2024-04-26 RX ORDER — METOPROLOL TARTRATE 25 MG/1
25 TABLET, FILM COATED ORAL 2 TIMES DAILY
Status: DISCONTINUED | OUTPATIENT
Start: 2024-04-26 | End: 2024-04-28 | Stop reason: HOSPADM

## 2024-04-26 RX ORDER — ACETAZOLAMIDE 250 MG/1
500 TABLET ORAL ONCE
Status: COMPLETED | OUTPATIENT
Start: 2024-04-27 | End: 2024-04-27

## 2024-04-26 RX ORDER — THIAMINE HCL 100 MG
100 TABLET ORAL DAILY
Status: DISCONTINUED | OUTPATIENT
Start: 2024-04-26 | End: 2024-04-28 | Stop reason: HOSPADM

## 2024-04-26 RX ADMIN — MUPIROCIN: 20 OINTMENT TOPICAL at 10:04

## 2024-04-26 RX ADMIN — Medication 100 MG: at 08:04

## 2024-04-26 RX ADMIN — OXYCODONE HYDROCHLORIDE AND ACETAMINOPHEN 500 MG: 500 TABLET ORAL at 08:04

## 2024-04-26 RX ADMIN — CEFTRIAXONE 1 G: 1 INJECTION, POWDER, FOR SOLUTION INTRAMUSCULAR; INTRAVENOUS at 02:04

## 2024-04-26 RX ADMIN — MUPIROCIN: 20 OINTMENT TOPICAL at 08:04

## 2024-04-26 RX ADMIN — PREGABALIN 100 MG: 25 CAPSULE ORAL at 10:04

## 2024-04-26 RX ADMIN — DEXAMETHASONE 6 MG: 4 TABLET ORAL at 08:04

## 2024-04-26 RX ADMIN — LEVOTHYROXINE SODIUM 50 MCG: 50 TABLET ORAL at 05:04

## 2024-04-26 RX ADMIN — Medication 1 TABLET: at 08:04

## 2024-04-26 RX ADMIN — METOROPROLOL TARTRATE 2.5 MG: 5 INJECTION, SOLUTION INTRAVENOUS at 09:04

## 2024-04-26 RX ADMIN — OXYCODONE HYDROCHLORIDE AND ACETAMINOPHEN 500 MG: 500 TABLET ORAL at 10:04

## 2024-04-26 RX ADMIN — APIXABAN 5 MG: 2.5 TABLET, FILM COATED ORAL at 08:04

## 2024-04-26 RX ADMIN — PREGABALIN 100 MG: 25 CAPSULE ORAL at 08:04

## 2024-04-26 RX ADMIN — METOPROLOL TARTRATE 25 MG: 25 TABLET, FILM COATED ORAL at 10:04

## 2024-04-26 RX ADMIN — REMDESIVIR 100 MG: 100 INJECTION, POWDER, LYOPHILIZED, FOR SOLUTION INTRAVENOUS at 08:04

## 2024-04-26 RX ADMIN — APIXABAN 5 MG: 2.5 TABLET, FILM COATED ORAL at 10:04

## 2024-04-26 RX ADMIN — THERA TABS 1 TABLET: TAB at 08:04

## 2024-04-26 RX ADMIN — VENLAFAXINE HYDROCHLORIDE 150 MG: 75 CAPSULE, EXTENDED RELEASE ORAL at 08:04

## 2024-04-26 RX ADMIN — METOROPROLOL TARTRATE 2.5 MG: 5 INJECTION, SOLUTION INTRAVENOUS at 03:04

## 2024-04-26 NOTE — PROGRESS NOTES
[facility]  Pulmonology Progress Note    Patient Name: Caio Ontiveros   MRN: [id number]  Admission Date: 4/22/2024  Hospital Length of Stay: 4 days  Code Status: Full Code   Attending Provider: Pebbles Tenorio MD    Subjective:   History of present illness:  Caio Ontiveros is a 58-year-old male with past medical history of Afib on Eliquis, CHF, HTN, morbid obesity, EVANGELIST on trelegy, gout who presented to ED yesterday 4/22 with complaints of SOB, fatigue, cough, congestion. Admits to associated leg swelling but denies any recent sick contacts, fever, chills. Covid + in the ED. O2 sats in the 80s which improved with 2L NC. ABGs consistent with acute on chronic hypoxic and hypercapnic respiratory failure. Also, elevated BNP at 983 and trop 0.085. Cr 3.1. Admitted to  for management. This am patient noted to have increased work of breathing, worsening hypoxia. Repeat ABG with worsening respiratory acidosis Ph 7.1 with CO2 of 90, hypotensive. Patient with increased somnolence and not responding appropriately. Immediately placed on BIPAP and transferred to CC unit. After placed on bipap, responses more appropriate, awake and alert. Still complained of some shortness of breath but denied any pain.     Interval history, f/u chief complaint of shortness of breath:  4/24: Awake, alert, conversating in bed. Transitioned to NC this am. Maintaining good O2 sats, despite no improvement in repeat ABG. WBC 1.58. Glucose now in lower 100s. Sputum cx pending. No changes on chest imaging. US BLE negative for DVT. Trop has trended down. Cr trended down. HR controlled, BP soft but stable.   4/25: Doing well this am. Bipap qhs. Sitting in bedside chair. No chest pain/shortness of breath this am. Cr 1.0, glucose controlled. Around 3L UOP yesterday after lasix. Will repeat dose this am.  4/26: Pulmonary status appears stable on nocturnal AVAPS and 3L/NC during awake hours. Denies acute complaints at this time.  Significant diuresis with diuretics.     Objective:     Vital Signs (Most Recent):  Temp: 98.6 °F (37 °C) (04/26/24 0742)  Pulse: 70 (04/26/24 1654)  Resp: 20 (04/26/24 0742)  BP: 132/80 (04/26/24 1654)  SpO2: 99 % (04/26/24 1654) Vital Signs (24h Range):  Temp:  [97.5 °F (36.4 °C)-98.6 °F (37 °C)] 98.6 °F (37 °C)  Pulse:  [56-89] 70  Resp:  [20] 20  SpO2:  [95 %-100 %] 99 %  BP: (114-151)/(71-91) 132/80   Weight: (!) 186 kg (410 lb 0.9 oz);  Body mass index is 51.25 kg/m².    Intake/Output Summary (Last 24 hours) at 4/26/2024 1817  Last data filed at 4/26/2024 0413  Gross per 24 hour   Intake --   Output 350 ml   Net -350 ml      Physical Exam  Vitals and nursing note reviewed.   Constitutional:       Appearance: He is obese.   HENT:      Head: Normocephalic.   Eyes:      Conjunctiva/sclera: Conjunctivae normal.   Cardiovascular:      Rate and Rhythm: Normal rate.   Pulmonary:      Effort: Pulmonary effort is normal.      Breath sounds: Normal breath sounds.   Abdominal:      Palpations: Abdomen is soft.   Musculoskeletal:      Cervical back: Normal range of motion.      Right lower leg: Edema present.      Left lower leg: Edema present.   Skin:     General: Skin is warm and dry.   Neurological:      General: No focal deficit present.      Mental Status: He is alert and oriented to person, place, and time.   Psychiatric:         Mood and Affect: Mood normal.         Review of Systems: Negative except as indicated in HPI    Significant Labs:  CBC/Anemia Profile:  Recent Labs   Lab 04/25/24  0516 04/26/24  0515   WBC 1.62* 2.02*   HGB 13.5* 13.7*   HCT 43.2 43.7   PLT 81* 84*   MCV 96 96   RDW 14.0 14.0   Chemistries:  Recent Labs   Lab 04/25/24  0516 04/26/24  0515     --    K 4.2  --      --    CO2 33*  --    BUN 28*  --    CREATININE 1.0  --    CALCIUM 8.4*  --    MG 2.5 2.6   PHOS 2.6*  --    ABG  Recent Labs   Lab 04/24/24  1346   PH 7.371   PO2 73*   PCO2 53.9*   HCO3 31.3*   BE 6*      Assessment/Plan:   Acute on chronic respiratory failure with hypoxia and hypercapnia  COVID  Patient with acute on chronic hypercapnic and hypoxic respiratory failure who is on home oxygen as needed and nocturnal Trilogy.     Transferred to unit 4/23; has improved on BIPAP  Transitioned to NC on 4/25 with good O2 sats  Continue supplemental oxygen and wean as able to maintain sats 90% or greater  Continue to monitor ABGs  Monitor WBC and temperature curve  Continue steroids, remdesivir  Continue diuretics as needed; add Diamox po x1 in am    Pulmonary hypertension  Repeat echo with pulmonary artery systolic pressure of 90 mmHg; EF 60-65%  Acute on chronic diastolic HF, sleep apnea, obesity hypoventilation   Continue Eliquis   Continue diuretics as needed; continue O2 supplementation, wean as able     Acute on chronic diastolic congestive heart failure  D/C IV Lopressor  Start Metoprolol 25mg BID  Monitor for further diuretics  Follow I&O trends  Follow hemodynamics and adjust meds as appropriate    Endocrine  Morbid obesity with BMI of 50.0-59.9, adult  Body mass index is 51.25 kg/m². Morbid obesity complicates all aspects of disease management from diagnostic modalities to treatment. Weight loss encouraged and health benefits explained to patient.     EVANGELIST (obstructive sleep apnea)/ Obesity Hypoventilation syndrome   Continue nocturnal and prn AVAPS     Beryl Levin NP  Pulmonary and Critical Care  [facility]

## 2024-04-26 NOTE — SUBJECTIVE & OBJECTIVE
Interval History: pt seen and examined, chart, imaging reviewed, had a detailed d/w pt and his wife. Doing much better after initial CHF exacerbation with MONA and Covid, great response to lasix, MONA resolved, Cr normal. No FCCS, leg swelling much better. Pt sitting up comfortably in chair, on NC. Getting Dexa and Remdesivir. Remains in isolation. WBC 2.02, H/H 13.7/37, Plat 84 K. Bun/Cr 84.     Review of Systems   Constitutional:  Positive for fatigue. Negative for chills and fever.   Respiratory:  Negative for chest tightness and shortness of breath.    Cardiovascular:  Positive for leg swelling. Negative for chest pain and palpitations.   Gastrointestinal:  Negative for abdominal pain, nausea and vomiting.   Genitourinary:  Negative for difficulty urinating and dysuria.   Musculoskeletal:  Negative for arthralgias and myalgias.   Neurological:  Negative for dizziness, weakness and headaches.   Psychiatric/Behavioral:  Negative for agitation and confusion.      Objective:     Vital Signs (Most Recent):  Temp: 98.6 °F (37 °C) (04/26/24 0742)  Pulse: 70 (04/26/24 1654)  Resp: 20 (04/26/24 0742)  BP: 132/80 (04/26/24 1654)  SpO2: 99 % (04/26/24 1654) Vital Signs (24h Range):  Temp:  [97.5 °F (36.4 °C)-98.6 °F (37 °C)] 98.6 °F (37 °C)  Pulse:  [56-89] 70  Resp:  [20] 20  SpO2:  [95 %-100 %] 99 %  BP: (114-151)/(71-91) 132/80     Weight: (!) 186 kg (410 lb 0.9 oz)  Body mass index is 51.25 kg/m².    Intake/Output Summary (Last 24 hours) at 4/26/2024 1725  Last data filed at 4/26/2024 0413  Gross per 24 hour   Intake 300.41 ml   Output 350 ml   Net -49.59 ml         Physical Exam  Vitals and nursing note reviewed.   Constitutional:       General: He is not in acute distress.     Appearance: He is obese. He is ill-appearing. He is not toxic-appearing or diaphoretic.   HENT:      Head: Normocephalic.      Mouth/Throat:      Mouth: Mucous membranes are moist.   Eyes:      Extraocular Movements: Extraocular movements intact.       Pupils: Pupils are equal, round, and reactive to light.   Cardiovascular:      Rate and Rhythm: Normal rate. Rhythm irregular.   Pulmonary:      Comments: Diminished breath sounds bilaterally   Abdominal:      General: There is no distension.      Palpations: Abdomen is soft.      Tenderness: There is no abdominal tenderness.   Musculoskeletal:         General: Swelling present.   Skin:     General: Skin is warm.      Capillary Refill: Capillary refill takes 2 to 3 seconds.      Coloration: Skin is not jaundiced or pale.   Neurological:      General: No focal deficit present.      Mental Status: He is alert and oriented to person, place, and time.   Psychiatric:         Behavior: Behavior normal.             Significant Labs: All pertinent labs within the past 24 hours have been reviewed.  BMP:   Recent Labs   Lab 04/25/24  0516 04/26/24  0515     --      --    K 4.2  --      --    CO2 33*  --    BUN 28*  --    CREATININE 1.0  --    CALCIUM 8.4*  --    MG 2.5 2.6     CBC:   Recent Labs   Lab 04/25/24  0516 04/26/24  0515   WBC 1.62* 2.02*   HGB 13.5* 13.7*   HCT 43.2 43.7   PLT 81* 84*     Cardiac Markers:   Coagulation:   Magnesium:   Recent Labs   Lab 04/25/24  0516 04/26/24  0515   MG 2.5 2.6       Significant Imaging: I have reviewed all pertinent imaging results/findings within the past 24 hours.

## 2024-04-26 NOTE — ASSESSMENT & PLAN NOTE
Patient with acute on chronic hypercapnic and hypoxic respiratory failure who is on home oxygen as needed and nocturnal Trilogy.     Transferred to unit 4/23; has improved on BIPAP  Transitioned to NC on 4/25 with good O2 sats  Continue supplemental oxygen and wean as able to maintain sats 90% or greater  Continue to monitor ABGs  Monitor WBC and temperature curve  Continue steroids, remdesivir  Continue diuretics as needed; add Diamox po x1 in am

## 2024-04-26 NOTE — ASSESSMENT & PLAN NOTE
Patient with acute kidney injury/acute renal failure unclear cause MONA is currently stable. Baseline creatinine  1.2  - Labs reviewed- Renal function/electrolytes with Estimated Creatinine Clearance: 140.7 mL/min (based on SCr of 1 mg/dL). according to latest data. Monitor urine output and serial BMP and adjust therapy as needed. Avoid nephrotoxins and renally dose meds for GFR listed above.  - holding home arb, allopurinol, Demadex   - may be d/t vascular congestion, given dose of 80 IV lasix in ed, will monitor output overnight, repeat labs in am, further diuresis pending labs     Great response to lasix, great diuresis, MONA resolved, appears euvolemic now

## 2024-04-26 NOTE — ASSESSMENT & PLAN NOTE
Patient with Hypercapnic and Hypoxic Respiratory failure which is Acute on chronic.  he is not on home oxygen. Wears trilogy and has supplemental o2 as needed.  Supplemental oxygen was provided and noted- Oxygen Concentration (%):  [32-40] 40    Signs/symptoms of respiratory failure include- increased work of breathing and hypoxia on room air . Contributing diagnoses includes -  COVID and Heart Failure exacerbation   Labs and images were reviewed. Patient Has recent ABG, which has been reviewed. Will treat underlying causes and adjust management of respiratory failure as follows    Continue steroids, remdesivir, ceftriaxone/azithromycin  Also improvement with Lasix  Wean O2 as tolerated  Wears Trilogy at home, using BiPAP qHS here    Improving, cont Trilogy at night, must exercise and do IS during the day

## 2024-04-26 NOTE — PROGRESS NOTES
[facility] - Telemetry ([facility] Medicine  Progress Note    Patient Name: Caio Ontiveros  MRN: [id number]  Patient Class: IP- Inpatient   Admission Date: 4/22/2024  Length of Stay: 4 days  Attending Physician: Pebbles Tenorio MD  Primary Care Provider: Dominick Allen MD        Subjective:     Principal Problem:Acute on chronic respiratory failure with hypoxia and hypercapnia        HPI:  57 y/o male with PMHx of CHF (right sided heart failure), atrial fibrillation on eliquis, HTN, EVANGELIST wearing Trilogy, gout, morbid obesity who presented to the ER today with complaints of SOB, fatigue, congestion, cough.  He was diagnosed with COVID at [facility] ed yesterday.  He denies any fever/chills.  He also reports leg swelling and recent weight gain (unsure of amount).  In the ED, o2 sats 85% on room air, placed on 2 liters with noted improvement.  Lab work notable for creatinine 3.1, , Trop 0.86, procal 0.41.  ABG showed Ph 7.2, CO2 54, , HCO3 24.  Chest xray with no acute findings.  Patient will be admitted to inpatient for acute on chronic hypoxic/hypercapnic resp failure d/t covid/heart failure exacerbation.     Code Status Full  Surrogate Decision Maker wife     Overview/Hospital Course:  4/24: Awake, alert, conversating in bed. Transitioned to NC this am. Maintaining good O2 sats, despite no improvement in repeat ABG. WBC 1.58. Glucose now in lower 100s. Sputum cx pending. No changes on chest imaging. US BLE negative for DVT. Trop has trended down. Cr trended down. HR controlled, BP soft but stable.     4/25: Doing well this am. Bipap qhs. Sitting in bedside chair. No chest pain/shortness of breath this am. Cr 1.0, glucose controlled. Around 3L UOP yesterday after lasix. Will repeat dose this am.  ----  Patient deemed stable to transfer to floor.  UOP 3.2 L, currently on 3L NC  Denies complaints, sitting in chair    4/26- pt seen and examined, chart, imaging reviewed, had a detailed d/w pt and his wife. Doing  much better after initial CHF exacerbation with MONA and Covid, great response to lasix, MONA resolved, Cr normal. No FCCS, leg swelling much better. Pt sitting up comfortably in chair, on NC. Getting Dexa and Remdesivir. Remains in isolation. WBC 2.02, H/H 13.7/37, Plat 84 K. Bun/Cr Normal     Interval History: pt seen and examined, chart, imaging reviewed, had a detailed d/w pt and his wife. Doing much better after initial CHF exacerbation with MONA and Covid, great response to lasix, MONA resolved, Cr normal. No FCCS, leg swelling much better. Pt sitting up comfortably in chair, on NC. Getting Dexa and Remdesivir. Remains in isolation. WBC 2.02, H/H 13.7/37, Plat 84 K. Bun/Cr normal.     Review of Systems   Constitutional:  Positive for fatigue. Negative for chills and fever.   Respiratory:  Negative for chest tightness and shortness of breath.    Cardiovascular:  Positive for leg swelling. Negative for chest pain and palpitations.   Gastrointestinal:  Negative for abdominal pain, nausea and vomiting.   Genitourinary:  Negative for difficulty urinating and dysuria.   Musculoskeletal:  Negative for arthralgias and myalgias.   Neurological:  Negative for dizziness, weakness and headaches.   Psychiatric/Behavioral:  Negative for agitation and confusion.      Objective:     Vital Signs (Most Recent):  Temp: 98.6 °F (37 °C) (04/26/24 0742)  Pulse: 70 (04/26/24 1654)  Resp: 20 (04/26/24 0742)  BP: 132/80 (04/26/24 1654)  SpO2: 99 % (04/26/24 1654) Vital Signs (24h Range):  Temp:  [97.5 °F (36.4 °C)-98.6 °F (37 °C)] 98.6 °F (37 °C)  Pulse:  [56-89] 70  Resp:  [20] 20  SpO2:  [95 %-100 %] 99 %  BP: (114-151)/(71-91) 132/80     Weight: (!) 186 kg (410 lb 0.9 oz)  Body mass index is 51.25 kg/m².    Intake/Output Summary (Last 24 hours) at 4/26/2024 0582  Last data filed at 4/26/2024 0413  Gross per 24 hour   Intake 300.41 ml   Output 350 ml   Net -49.59 ml         Physical Exam  Vitals and nursing note reviewed.    Constitutional:       General: He is not in acute distress.     Appearance: He is obese. He is ill-appearing. He is not toxic-appearing or diaphoretic.   HENT:      Head: Normocephalic.      Mouth/Throat:      Mouth: Mucous membranes are moist.   Eyes:      Extraocular Movements: Extraocular movements intact.      Pupils: Pupils are equal, round, and reactive to light.   Cardiovascular:      Rate and Rhythm: Normal rate. Rhythm irregular.   Pulmonary:      Comments: Diminished breath sounds bilaterally   Abdominal:      General: There is no distension.      Palpations: Abdomen is soft.      Tenderness: There is no abdominal tenderness.   Musculoskeletal:         General: Swelling present.   Skin:     General: Skin is warm.      Capillary Refill: Capillary refill takes 2 to 3 seconds.      Coloration: Skin is not jaundiced or pale.   Neurological:      General: No focal deficit present.      Mental Status: He is alert and oriented to person, place, and time.   Psychiatric:         Behavior: Behavior normal.             Significant Labs: All pertinent labs within the past 24 hours have been reviewed.  BMP:   Recent Labs   Lab 04/25/24  0516 04/26/24  0515     --      --    K 4.2  --      --    CO2 33*  --    BUN 28*  --    CREATININE 1.0  --    CALCIUM 8.4*  --    MG 2.5 2.6     CBC:   Recent Labs   Lab 04/25/24  0516 04/26/24  0515   WBC 1.62* 2.02*   HGB 13.5* 13.7*   HCT 43.2 43.7   PLT 81* 84*     Cardiac Markers:   Coagulation:   Magnesium:   Recent Labs   Lab 04/25/24  0516 04/26/24  0515   MG 2.5 2.6       Significant Imaging: I have reviewed all pertinent imaging results/findings within the past 24 hours.    Assessment/Plan:      * Acute on chronic respiratory failure with hypoxia and hypercapnia  Patient with Hypercapnic and Hypoxic Respiratory failure which is Acute on chronic.  he is not on home oxygen. Wears trilogy and has supplemental o2 as needed.  Supplemental oxygen was provided  and noted- Oxygen Concentration (%):  [32-40] 40    Signs/symptoms of respiratory failure include- increased work of breathing and hypoxia on room air . Contributing diagnoses includes -  COVID and Heart Failure exacerbation   Labs and images were reviewed. Patient Has recent ABG, which has been reviewed. Will treat underlying causes and adjust management of respiratory failure as follows    Continue steroids, remdesivir, ceftriaxone/azithromycin  Also improvement with Lasix  Wean O2 as tolerated  Wears Trilogy at home, using BiPAP qHS here    Improving, cont Trilogy at night, must exercise and do IS during the day    Chronic atrial fibrillation  Patient with Permanent atrial fibrillation which is controlled currently with Beta Blocker. Patient is currently in atrial fibrillation.Anticoagulation indicated. Anticoagulation done with eliquis .    COVID  See plan for respiratory failure    On Dexa and Remdesivir    MONA (acute kidney injury)  Patient with acute kidney injury/acute renal failure unclear cause MONA is currently stable. Baseline creatinine  1.2  - Labs reviewed- Renal function/electrolytes with Estimated Creatinine Clearance: 140.7 mL/min (based on SCr of 1 mg/dL). according to latest data. Monitor urine output and serial BMP and adjust therapy as needed. Avoid nephrotoxins and renally dose meds for GFR listed above.  - holding home arb, allopurinol, Demadex   - may be d/t vascular congestion, given dose of 80 IV lasix in ed, will monitor output overnight, repeat labs in am, further diuresis pending labs     Great response to lasix, great diuresis, MONA resolved, appears euvolemic now    Moderate episode of recurrent major depressive disorder  - continue home effexor          Essential hypertension  Chronic, controlled. Latest blood pressure and vitals reviewed-     Temp:  [97.5 °F (36.4 °C)-98.6 °F (37 °C)]   Pulse:  [56-89]   Resp:  [20]   BP: (114-151)/(71-91)   SpO2:  [95 %-100 %] .   Home meds for  hypertension were reviewed and noted below.   Hypertension Medications               amLODIPine (NORVASC) 5 MG tablet TAKE 1 TABLET BY MOUTH ONCE  DAILY    losartan (COZAAR) 100 MG tablet TAKE 1 TABLET BY MOUTH ONCE  DAILY    metoprolol succinate (TOPROL-XL) 100 MG 24 hr tablet Take 1 tablet (100 mg total) by mouth once daily.    torsemide (DEMADEX) 20 MG Tab Take 2 tablets (40 mg total) by mouth 2 (two) times a day.            While in the hospital, will manage blood pressure as follows; Continue home antihypertensive regimen    Will utilize p.r.n. blood pressure medication only if patient's blood pressure greater than 160/100 and he develops symptoms such as worsening chest pain or shortness of breath.    Morbid obesity with BMI of 50.0-59.9, adult  Body mass index is 51.25 kg/m². Morbid obesity complicates all aspects of disease management from diagnostic modalities to treatment. Weight loss encouraged and health benefits explained to patient.         EVANGELIST (obstructive sleep apnea)/ Obesity Hypoventilation syndrome   - continue home trilogy      Acute on chronic diastolic congestive heart failure  Patient is identified as having Diastolic (HFpEF) heart failure that is Acute on chronic. CHF is currently uncontrolled due to Continued edema of extremities and Weight gain  Latest ECHO performed and demonstrates- Results for orders placed during the hospital encounter of 11/20/21    Echo    Interpretation Summary  · The left ventricle is mildly enlarged with moderate concentric hypertrophy and normal systolic function.  · The estimated ejection fraction is 60%.  · Normal left ventricular diastolic function.  · Severe right ventricular enlargement with moderately reduced right ventricular systolic function.  · Moderate left atrial enlargement.  · Moderate right atrial enlargement.  · Moderate tricuspid regurgitation.  · There is abnormal septal wall motion. There is systolic flattening of the interventricular septum  consistent with right ventricle pressure overload.  . Continue Beta Blocker and Furosemide and monitor clinical status closely. Monitor on telemetry. Patient is off CHF pathway.  Monitor strict Is&Os and daily weights.  Place on fluid restriction of 1.5 L. Cardiology has not been consulted. Continue to stress to patient importance of self efficacy and  on diet for CHF. Last BNP reviewed- and noted below   Recent Labs   Lab 04/24/24  0418   *       Doing much better. Cont current Tx      VTE Risk Mitigation (From admission, onward)           Ordered     apixaban tablet 5 mg  2 times daily         04/22/24 1722                    Discharge Planning   TREVON:      Code Status: Full Code   Is the patient medically ready for discharge?:     Reason for patient still in hospital (select all that apply): Patient trending condition, Laboratory test, Treatment, Imaging, and Consult recommendations  Discharge Plan A: Home Health            Pebbles Tenorio MD  Department of Hospital Medicine   [facility] - Telemetry ([facility])

## 2024-04-26 NOTE — ASSESSMENT & PLAN NOTE
Chronic, controlled. Latest blood pressure and vitals reviewed-     Temp:  [97.5 °F (36.4 °C)-98.6 °F (37 °C)]   Pulse:  [56-89]   Resp:  [20]   BP: (114-151)/(71-91)   SpO2:  [95 %-100 %] .   Home meds for hypertension were reviewed and noted below.   Hypertension Medications               amLODIPine (NORVASC) 5 MG tablet TAKE 1 TABLET BY MOUTH ONCE  DAILY    losartan (COZAAR) 100 MG tablet TAKE 1 TABLET BY MOUTH ONCE  DAILY    metoprolol succinate (TOPROL-XL) 100 MG 24 hr tablet Take 1 tablet (100 mg total) by mouth once daily.    torsemide (DEMADEX) 20 MG Tab Take 2 tablets (40 mg total) by mouth 2 (two) times a day.            While in the hospital, will manage blood pressure as follows; Continue home antihypertensive regimen    Will utilize p.r.n. blood pressure medication only if patient's blood pressure greater than 160/100 and he develops symptoms such as worsening chest pain or shortness of breath.

## 2024-04-26 NOTE — PLAN OF CARE
A216/A216 ELIZABETH Ontiveros is a 58 y.o.male admitted on 4/22/2024 for Acute on chronic respiratory failure with hypoxia and hypercapnia   Code Status: Full Code MRN: [id number]   Review of patient's allergies indicates:  No Known Allergies  Past Medical History:   Diagnosis Date    A-fib     CHF (congestive heart failure)     Gout, chronic     Hypertension     Sleep apnea       PRN meds    sodium chloride 0.9%, , PRN  acetaminophen, 650 mg, Q4H PRN  albuterol, 2 puff, Q6H PRN  benzonatate, 100 mg, TID PRN  dextrose 10%, 12.5 g, PRN  dextrose 10%, 25 g, PRN  glucagon (human recombinant), 1 mg, PRN  glucose, 16 g, PRN  glucose, 24 g, PRN  influenza, 0.5 mL, vaccine x 1 dose  insulin aspart U-100, 0-5 Units, QID (AC + HS) PRN  loperamide, 2 mg, Q6H PRN  melatonin, 6 mg, Nightly PRN  ondansetron, 4 mg, Q6H PRN  pneumoc 20-lucy conj-dip cr(PF), 0.5 mL, vaccine x 1 dose  sodium chloride 0.9%, 10 mL, PRN  sodium chloride 0.9%, 10 mL, PRN      Chart check completed. Will continue plan of care.      Orientation: oriented x 4  Carlos Coma Scale Score: 15     Lead Monitored: Lead II Rhythm: atrial rhythm    Cardiac/Telemetry Box Number: [id number]  VTE Required Core Measure: Pharmacological prophylaxis initiated/maintained Last Bowel Movement: 04/25/24  Diet Cardiac Fluid - 1500mL; Isolation Tray - Styrofoam  Voiding Characteristics: voids spontaneously without difficulty  Florentino Score: 17  Fall Risk Score: 11  Accucheck [x]   Freq? ACHS     Lines/Drains/Airways       Drain  Duration             Male External Urinary Catheter 04/23/24 0934 3 days              Peripheral Intravenous Line  Duration                  Peripheral IV - Single Lumen 04/22/24 1555 20 G Right Antecubital 4 days         Midline Catheter - Single Lumen 04/23/24 1102 Left basilic vein (medial side of arm) other (see comments) 3 days         Midline Catheter - Single Lumen 04/23/24 1139 Right basilic vein (medial side of arm) other (see comments) 3 days

## 2024-04-26 NOTE — SUBJECTIVE & OBJECTIVE
Caio Ontiveros is a 58-year-old male with past medical history of Afib on Eliquis, CHF, HTN, morbid obesity, EVANGELIST on trelegy, gout who presented to ED yesterday 4/22 with complaints of SOB, fatigue, cough, congestion. Admits to associated leg swelling but denies any recent sick contacts, fever, chills. Covid + in the ED. O2 sats in the 80s which improved with 2L NC. ABGs consistent with acute on chronic hypoxic and hypercapnic respiratory failure. Also, elevated BNP at 983 and trop 0.085. Cr 3.1. Admitted to  for management. This am patient noted to have increased work of breathing, worsening hypoxia. Repeat ABG with worsening respiratory acidosis Ph 7.1 with CO2 of 90, hypotensive. Patient with increased somnolence and not responding appropriately. Immediately placed on BIPAP and transferred to CC unit. After placed on bipap, responses more appropriate, awake and alert. Still complained of some shortness of breath but denied any pain.     Interval history, f/u chief complaint of shortness of breath:  4/24: Awake, alert, conversating in bed. Transitioned to NC this am. Maintaining good O2 sats, despite no improvement in repeat ABG. WBC 1.58. Glucose now in lower 100s. Sputum cx pending. No changes on chest imaging. US BLE negative for DVT. Trop has trended down. Cr trended down. HR controlled, BP soft but stable.   4/25: Doing well this am. Bipap qhs. Sitting in bedside chair. No chest pain/shortness of breath this am. Cr 1.0, glucose controlled. Around 3L UOP yesterday after lasix. Will repeat dose this am.  4/26: Pulmonary status appears stable on nocturnal AVAPS and 3L/NC during awake hours. Denies acute complaints at this time. Significant diuresis with diuretics.     Objective:     Vital Signs (Most Recent):  Temp: 98.6 °F (37 °C) (04/26/24 0742)  Pulse: 70 (04/26/24 1654)  Resp: 20 (04/26/24 0742)  BP: 132/80 (04/26/24 1654)  SpO2: 99 % (04/26/24 1654) Vital Signs (24h Range):  Temp:  [97.5 °F (36.4 °C)-98.6 °F  (37 °C)] 98.6 °F (37 °C)  Pulse:  [56-89] 70  Resp:  [20] 20  SpO2:  [95 %-100 %] 99 %  BP: (114-151)/(71-91) 132/80   Weight: (!) 186 kg (410 lb 0.9 oz);  Body mass index is 51.25 kg/m².    Intake/Output Summary (Last 24 hours) at 4/26/2024 1817  Last data filed at 4/26/2024 0413  Gross per 24 hour   Intake --   Output 350 ml   Net -350 ml      Physical Exam  Vitals and nursing note reviewed.   Constitutional:       Appearance: He is obese.   HENT:      Head: Normocephalic.   Eyes:      Conjunctiva/sclera: Conjunctivae normal.   Cardiovascular:      Rate and Rhythm: Normal rate.   Pulmonary:      Effort: Pulmonary effort is normal.      Breath sounds: Normal breath sounds.   Abdominal:      Palpations: Abdomen is soft.   Musculoskeletal:      Cervical back: Normal range of motion.      Right lower leg: Edema present.      Left lower leg: Edema present.   Skin:     General: Skin is warm and dry.   Neurological:      General: No focal deficit present.      Mental Status: He is alert and oriented to person, place, and time.   Psychiatric:         Mood and Affect: Mood normal.         Review of Systems: Negative except as indicated in HPI    Significant Labs:  CBC/Anemia Profile:  Recent Labs   Lab 04/25/24  0516 04/26/24  0515   WBC 1.62* 2.02*   HGB 13.5* 13.7*   HCT 43.2 43.7   PLT 81* 84*   MCV 96 96   RDW 14.0 14.0   Chemistries:  Recent Labs   Lab 04/25/24  0516 04/26/24  0515     --    K 4.2  --      --    CO2 33*  --    BUN 28*  --    CREATININE 1.0  --    CALCIUM 8.4*  --    MG 2.5 2.6   PHOS 2.6*  --

## 2024-04-26 NOTE — ASSESSMENT & PLAN NOTE
Patient is identified as having Diastolic (HFpEF) heart failure that is Acute on chronic. CHF is currently uncontrolled due to Continued edema of extremities and Weight gain  Latest ECHO performed and demonstrates- Results for orders placed during the hospital encounter of 11/20/21    Echo    Interpretation Summary  · The left ventricle is mildly enlarged with moderate concentric hypertrophy and normal systolic function.  · The estimated ejection fraction is 60%.  · Normal left ventricular diastolic function.  · Severe right ventricular enlargement with moderately reduced right ventricular systolic function.  · Moderate left atrial enlargement.  · Moderate right atrial enlargement.  · Moderate tricuspid regurgitation.  · There is abnormal septal wall motion. There is systolic flattening of the interventricular septum consistent with right ventricle pressure overload.  . Continue Beta Blocker and Furosemide and monitor clinical status closely. Monitor on telemetry. Patient is off CHF pathway.  Monitor strict Is&Os and daily weights.  Place on fluid restriction of 1.5 L. Cardiology has not been consulted. Continue to stress to patient importance of self efficacy and  on diet for CHF. Last BNP reviewed- and noted below   Recent Labs   Lab 04/24/24  0418   *       Doing much better. Cont current Tx

## 2024-04-26 NOTE — ASSESSMENT & PLAN NOTE
Repeat echo with pulmonary artery systolic pressure of 90 mmHg; EF 60-65%  Acute on chronic diastolic HF, sleep apnea, obesity hypoventilation   Continue Eliquis   Continue diuretics as needed; continue O2 supplementation, wean as able

## 2024-04-26 NOTE — ASSESSMENT & PLAN NOTE
D/C IV Lopressor  Start Metoprolol 25mg BID  Monitor for further diuretics  Follow I&O trends  Follow hemodynamics and adjust meds as appropriate

## 2024-04-26 NOTE — PLAN OF CARE
A216/A216 ELIZABETH Ontiveros is a 58 y.o.male admitted on 4/22/2024 for Acute on chronic respiratory failure with hypoxia and hypercapnia   Code Status: Full Code MRN: [id number]   Review of patient's allergies indicates:  No Known Allergies  Past Medical History:   Diagnosis Date    A-fib     CHF (congestive heart failure)     Gout, chronic     Hypertension     Sleep apnea       PRN meds    sodium chloride 0.9%, , PRN  acetaminophen, 650 mg, Q4H PRN  albuterol, 2 puff, Q6H PRN  benzonatate, 100 mg, TID PRN  dextrose 10%, 12.5 g, PRN  dextrose 10%, 25 g, PRN  glucagon (human recombinant), 1 mg, PRN  glucose, 16 g, PRN  glucose, 24 g, PRN  influenza, 0.5 mL, vaccine x 1 dose  insulin aspart U-100, 0-5 Units, QID (AC + HS) PRN  loperamide, 2 mg, Q6H PRN  melatonin, 6 mg, Nightly PRN  ondansetron, 4 mg, Q6H PRN  pneumoc 20-lucy conj-dip cr(PF), 0.5 mL, vaccine x 1 dose  sodium chloride 0.9%, 10 mL, PRN  sodium chloride 0.9%, 10 mL, PRN      Chart check completed. Will continue plan of care.      Orientation: oriented x 4  Carlos Coma Scale Score: 15     Lead Monitored: Lead II Rhythm: atrial rhythm    Cardiac/Telemetry Box Number: [id number]  VTE Required Core Measure: Pharmacological prophylaxis initiated/maintained Last Bowel Movement: 04/25/24  Diet Cardiac Fluid - 1500mL; Isolation Tray - Styrofoam  Voiding Characteristics: voids spontaneously without difficulty  Florentino Score: 17  Fall Risk Score: 11  Accucheck []   Freq?      Lines/Drains/Airways       Drain  Duration             Male External Urinary Catheter 04/23/24 0934 2 days              Peripheral Intravenous Line  Duration                  Peripheral IV - Single Lumen 04/22/24 1555 20 G Right Antecubital 3 days         Midline Catheter - Single Lumen 04/23/24 1102 Left basilic vein (medial side of arm) other (see comments) 2 days         Midline Catheter - Single Lumen 04/23/24 1139 Right basilic vein (medial side of arm) other (see comments) 2 days

## 2024-04-27 PROBLEM — E16.2 HYPOGLYCEMIA: Status: RESOLVED | Noted: 2024-04-23 | Resolved: 2024-04-27

## 2024-04-27 LAB
ANION GAP SERPL CALC-SCNC: 4 MMOL/L (ref 8–16)
ANION GAP SERPL CALC-SCNC: 4 MMOL/L (ref 8–16)
BUN SERPL-MCNC: 18 MG/DL (ref 6–20)
BUN SERPL-MCNC: 19 MG/DL (ref 6–20)
CALCIUM SERPL-MCNC: 8.5 MG/DL (ref 8.7–10.5)
CALCIUM SERPL-MCNC: 8.7 MG/DL (ref 8.7–10.5)
CHLORIDE SERPL-SCNC: 104 MMOL/L (ref 95–110)
CHLORIDE SERPL-SCNC: 106 MMOL/L (ref 95–110)
CO2 SERPL-SCNC: 33 MMOL/L (ref 23–29)
CO2 SERPL-SCNC: 34 MMOL/L (ref 23–29)
CREAT SERPL-MCNC: 0.8 MG/DL (ref 0.5–1.4)
CREAT SERPL-MCNC: 0.8 MG/DL (ref 0.5–1.4)
EST. GFR  (NO RACE VARIABLE): >60 ML/MIN/1.73 M^2
EST. GFR  (NO RACE VARIABLE): >60 ML/MIN/1.73 M^2
GLUCOSE SERPL-MCNC: 107 MG/DL (ref 70–110)
GLUCOSE SERPL-MCNC: 94 MG/DL (ref 70–110)
MAGNESIUM SERPL-MCNC: 2.5 MG/DL (ref 1.6–2.6)
MAGNESIUM SERPL-MCNC: 2.5 MG/DL (ref 1.6–2.6)
POCT GLUCOSE: 74 MG/DL (ref 70–110)
POCT GLUCOSE: 91 MG/DL (ref 70–110)
POTASSIUM SERPL-SCNC: 4.1 MMOL/L (ref 3.5–5.1)
POTASSIUM SERPL-SCNC: 4.5 MMOL/L (ref 3.5–5.1)
SODIUM SERPL-SCNC: 141 MMOL/L (ref 136–145)
SODIUM SERPL-SCNC: 144 MMOL/L (ref 136–145)
T4 FREE SERPL-MCNC: 1.12 NG/DL (ref 0.71–1.51)
TSH SERPL DL<=0.005 MIU/L-ACNC: 2.45 UIU/ML (ref 0.4–4)

## 2024-04-27 PROCEDURE — 25000003 PHARM REV CODE 250: Performed by: HOSPITALIST

## 2024-04-27 PROCEDURE — 21400001 HC TELEMETRY ROOM

## 2024-04-27 PROCEDURE — 63600175 PHARM REV CODE 636 W HCPCS: Mod: JZ,TB | Performed by: STUDENT IN AN ORGANIZED HEALTH CARE EDUCATION/TRAINING PROGRAM

## 2024-04-27 PROCEDURE — 25000003 PHARM REV CODE 250: Performed by: EMERGENCY MEDICINE

## 2024-04-27 PROCEDURE — 97530 THERAPEUTIC ACTIVITIES: CPT | Mod: CQ

## 2024-04-27 PROCEDURE — 93010 ELECTROCARDIOGRAM REPORT: CPT | Mod: ,,, | Performed by: INTERNAL MEDICINE

## 2024-04-27 PROCEDURE — 84439 ASSAY OF FREE THYROXINE: CPT | Performed by: INTERNAL MEDICINE

## 2024-04-27 PROCEDURE — 80048 BASIC METABOLIC PNL TOTAL CA: CPT | Performed by: INTERNAL MEDICINE

## 2024-04-27 PROCEDURE — 25000003 PHARM REV CODE 250

## 2024-04-27 PROCEDURE — 94799 UNLISTED PULMONARY SVC/PX: CPT

## 2024-04-27 PROCEDURE — 99900035 HC TECH TIME PER 15 MIN (STAT)

## 2024-04-27 PROCEDURE — 27000221 HC OXYGEN, UP TO 24 HOURS

## 2024-04-27 PROCEDURE — 94761 N-INVAS EAR/PLS OXIMETRY MLT: CPT

## 2024-04-27 PROCEDURE — 94660 CPAP INITIATION&MGMT: CPT

## 2024-04-27 PROCEDURE — 83735 ASSAY OF MAGNESIUM: CPT | Performed by: INTERNAL MEDICINE

## 2024-04-27 PROCEDURE — 27000207 HC ISOLATION

## 2024-04-27 PROCEDURE — 97116 GAIT TRAINING THERAPY: CPT | Mod: CQ

## 2024-04-27 PROCEDURE — 63600175 PHARM REV CODE 636 W HCPCS

## 2024-04-27 PROCEDURE — 83735 ASSAY OF MAGNESIUM: CPT | Mod: 91 | Performed by: NURSE PRACTITIONER

## 2024-04-27 PROCEDURE — 25000003 PHARM REV CODE 250: Performed by: NURSE PRACTITIONER

## 2024-04-27 PROCEDURE — 84443 ASSAY THYROID STIM HORMONE: CPT | Performed by: INTERNAL MEDICINE

## 2024-04-27 PROCEDURE — 80048 BASIC METABOLIC PNL TOTAL CA: CPT | Mod: 91 | Performed by: NURSE PRACTITIONER

## 2024-04-27 PROCEDURE — 93005 ELECTROCARDIOGRAM TRACING: CPT

## 2024-04-27 PROCEDURE — 25000003 PHARM REV CODE 250: Performed by: STUDENT IN AN ORGANIZED HEALTH CARE EDUCATION/TRAINING PROGRAM

## 2024-04-27 RX ADMIN — THERA TABS 1 TABLET: TAB at 09:04

## 2024-04-27 RX ADMIN — ACETAZOLAMIDE 500 MG: 250 TABLET ORAL at 09:04

## 2024-04-27 RX ADMIN — REMDESIVIR 100 MG: 100 INJECTION, POWDER, LYOPHILIZED, FOR SOLUTION INTRAVENOUS at 09:04

## 2024-04-27 RX ADMIN — OXYCODONE HYDROCHLORIDE AND ACETAMINOPHEN 500 MG: 500 TABLET ORAL at 09:04

## 2024-04-27 RX ADMIN — METOPROLOL TARTRATE 25 MG: 25 TABLET, FILM COATED ORAL at 08:04

## 2024-04-27 RX ADMIN — APIXABAN 5 MG: 2.5 TABLET, FILM COATED ORAL at 08:04

## 2024-04-27 RX ADMIN — Medication 100 MG: at 09:04

## 2024-04-27 RX ADMIN — METOPROLOL TARTRATE 25 MG: 25 TABLET, FILM COATED ORAL at 09:04

## 2024-04-27 RX ADMIN — DEXAMETHASONE 6 MG: 4 TABLET ORAL at 09:04

## 2024-04-27 RX ADMIN — APIXABAN 5 MG: 2.5 TABLET, FILM COATED ORAL at 09:04

## 2024-04-27 RX ADMIN — Medication 1 TABLET: at 09:04

## 2024-04-27 RX ADMIN — CEFTRIAXONE 1 G: 1 INJECTION, POWDER, FOR SOLUTION INTRAMUSCULAR; INTRAVENOUS at 04:04

## 2024-04-27 RX ADMIN — PREGABALIN 100 MG: 25 CAPSULE ORAL at 09:04

## 2024-04-27 RX ADMIN — OXYCODONE HYDROCHLORIDE AND ACETAMINOPHEN 500 MG: 500 TABLET ORAL at 08:04

## 2024-04-27 RX ADMIN — MUPIROCIN: 20 OINTMENT TOPICAL at 09:04

## 2024-04-27 RX ADMIN — LEVOTHYROXINE SODIUM 50 MCG: 50 TABLET ORAL at 05:04

## 2024-04-27 RX ADMIN — PREGABALIN 100 MG: 25 CAPSULE ORAL at 08:04

## 2024-04-27 RX ADMIN — MUPIROCIN: 20 OINTMENT TOPICAL at 08:04

## 2024-04-27 RX ADMIN — VENLAFAXINE HYDROCHLORIDE 150 MG: 75 CAPSULE, EXTENDED RELEASE ORAL at 09:04

## 2024-04-27 NOTE — ASSESSMENT & PLAN NOTE
Patient with Hypercapnic and Hypoxic Respiratory failure which is Acute on chronic.  he is not on home oxygen. Wears trilogy and has supplemental o2 as needed.  Supplemental oxygen was provided and noted- Oxygen Concentration (%):  [40] 40    Signs/symptoms of respiratory failure include- increased work of breathing and hypoxia on room air . Contributing diagnoses includes -  COVID and Heart Failure exacerbation   Labs and images were reviewed. Patient Has recent ABG, which has been reviewed. Will treat underlying causes and adjust management of respiratory failure as follows    Continue steroids, remdesivir, ceftriaxone/azithromycin  Also improvement with Lasix  Wean O2 as tolerated  Wears Trilogy at home, using BiPAP qHS here    Improving, cont Trilogy at night, must exercise and do IS during the day    Appears at baseline, may be discharged home now

## 2024-04-27 NOTE — ASSESSMENT & PLAN NOTE
Patient with acute kidney injury/acute renal failure unclear cause MONA is currently stable. Baseline creatinine  1.2  - Labs reviewed- Renal function/electrolytes with Estimated Creatinine Clearance: 175.9 mL/min (based on SCr of 0.8 mg/dL). according to latest data. Monitor urine output and serial BMP and adjust therapy as needed. Avoid nephrotoxins and renally dose meds for GFR listed above.  - holding home arb, allopurinol, Demadex   - may be d/t vascular congestion, given dose of 80 IV lasix in ed, will monitor output overnight, repeat labs in am, further diuresis pending labs     Great response to lasix, great diuresis, MONA resolved, appears euvolemic now

## 2024-04-27 NOTE — SUBJECTIVE & OBJECTIVE
Interval History: looks and feels better, sitting up in chair, he ambulated in the room by himself as well as with PT/OT. He also came off Dexa and Remdesivir after 5 days. No CP or SOB. Leg edema trace B. Labs stable, may be discharged home soon.    Review of Systems   Constitutional:  Positive for fatigue. Negative for chills and fever.   Respiratory:  Negative for chest tightness and shortness of breath.    Cardiovascular:  Positive for leg swelling. Negative for chest pain and palpitations.   Gastrointestinal:  Negative for abdominal pain, nausea and vomiting.   Genitourinary:  Negative for difficulty urinating and dysuria.   Musculoskeletal:  Negative for arthralgias and myalgias.   Neurological:  Negative for dizziness, weakness and headaches.   Psychiatric/Behavioral:  Negative for agitation and confusion.      Objective:     Vital Signs (Most Recent):  Temp: 98.9 °F (37.2 °C) (04/27/24 0025)  Pulse: 67 (04/27/24 1651)  Resp: 18 (04/27/24 0742)  BP: (!) 132/94 (04/27/24 1651)  SpO2: 95 % (04/27/24 1651) Vital Signs (24h Range):  Temp:  [96.2 °F (35.7 °C)-98.9 °F (37.2 °C)] 98.9 °F (37.2 °C)  Pulse:  [66-88] 67  Resp:  [16-22] 18  SpO2:  [95 %-100 %] 95 %  BP: (132-138)/(86-95) 132/94     Weight: (!) 186 kg (410 lb 0.9 oz)  Body mass index is 51.25 kg/m².  No intake or output data in the 24 hours ending 04/27/24 1706      Physical Exam  Vitals and nursing note reviewed.   Constitutional:       General: He is not in acute distress.     Appearance: He is obese. He is ill-appearing. He is not toxic-appearing or diaphoretic.   HENT:      Head: Normocephalic.      Mouth/Throat:      Mouth: Mucous membranes are moist.   Eyes:      Extraocular Movements: Extraocular movements intact.      Pupils: Pupils are equal, round, and reactive to light.   Cardiovascular:      Rate and Rhythm: Normal rate. Rhythm irregular.   Pulmonary:      Comments: Diminished breath sounds bilaterally   Abdominal:      General: There is no  distension.      Palpations: Abdomen is soft.      Tenderness: There is no abdominal tenderness.   Musculoskeletal:         General: No swelling. Normal range of motion.      Right lower leg: Edema present.      Left lower leg: Edema present.   Skin:     General: Skin is warm.      Capillary Refill: Capillary refill takes 2 to 3 seconds.      Coloration: Skin is not jaundiced or pale.   Neurological:      General: No focal deficit present.      Mental Status: He is alert and oriented to person, place, and time.   Psychiatric:         Behavior: Behavior normal.             Significant Labs: All pertinent labs within the past 24 hours have been reviewed.  BMP:   Recent Labs   Lab 04/27/24  0544   GLU 94      K 4.5      CO2 34*   BUN 18   CREATININE 0.8   CALCIUM 8.7   MG 2.5     CBC:   Recent Labs   Lab 04/26/24  0515   WBC 2.02*   HGB 13.7*   HCT 43.7   PLT 84*       Significant Imaging: I have reviewed all pertinent imaging results/findings within the past 24 hours.

## 2024-04-27 NOTE — PLAN OF CARE
PATIENT SITTING IN BED SIDE CHAIR UPON ARRIVAL     MOTIVATED TO PARTICIPATE THIS SESSION    SIT<-->STAND VC FOR INCREASING FORWARD LEAN AND COMPLETED WITH CLOSE SBA    AMBULATED IN ROOM NO ASSISTIVE DEVICE TOUCHING OBJECTS AS HE WALKED PAST THEM SBA WITH CONVERSATION. 2 X 40'     ENCOURAGED TO USE INSPIROMETER 1 X/HOUR, TO AMBULATE 1 X/HOUR.

## 2024-04-27 NOTE — PROGRESS NOTES
[facility] - Telemetry ([facility] Medicine  Progress Note    Patient Name: Caio Ontiveros  MRN: [id number]  Patient Class: IP- Inpatient   Admission Date: 4/22/2024  Length of Stay: 5 days  Attending Physician: Pebbles Tenorio MD  Primary Care Provider: Dominick Allen MD        Subjective:     Principal Problem:Acute on chronic respiratory failure with hypoxia and hypercapnia        HPI:  57 y/o male with PMHx of CHF (right sided heart failure), atrial fibrillation on eliquis, HTN, EVANGELIST wearing Trilogy, gout, morbid obesity who presented to the ER today with complaints of SOB, fatigue, congestion, cough.  He was diagnosed with COVID at [facility] ed yesterday.  He denies any fever/chills.  He also reports leg swelling and recent weight gain (unsure of amount).  In the ED, o2 sats 85% on room air, placed on 2 liters with noted improvement.  Lab work notable for creatinine 3.1, , Trop 0.86, procal 0.41.  ABG showed Ph 7.2, CO2 54, , HCO3 24.  Chest xray with no acute findings.  Patient will be admitted to inpatient for acute on chronic hypoxic/hypercapnic resp failure d/t covid/heart failure exacerbation.     Code Status Full  Surrogate Decision Maker wife     Overview/Hospital Course:  4/24: Awake, alert, conversating in bed. Transitioned to NC this am. Maintaining good O2 sats, despite no improvement in repeat ABG. WBC 1.58. Glucose now in lower 100s. Sputum cx pending. No changes on chest imaging. US BLE negative for DVT. Trop has trended down. Cr trended down. HR controlled, BP soft but stable.     4/25: Doing well this am. Bipap qhs. Sitting in bedside chair. No chest pain/shortness of breath this am. Cr 1.0, glucose controlled. Around 3L UOP yesterday after lasix. Will repeat dose this am.  ----  Patient deemed stable to transfer to floor.  UOP 3.2 L, currently on 3L NC  Denies complaints, sitting in chair    4/26- pt seen and examined, chart, imaging reviewed, had a detailed d/w pt and his wife. Doing  much better after initial CHF exacerbation with MONA and Covid, great response to lasix, MONA resolved, Cr normal. No FCCS, leg swelling much better. Pt sitting up comfortably in chair, on NC. Getting Dexa and Remdesivir. Remains in isolation. WBC 2.02, H/H 13.7/37, Plat 84 K. Bun/Cr normal.    4/27- looks and feels better, sitting up in chair, he ambulated in the room by himself as well as with PT/OT. He also came off Dexa and Remdesivir after 5 days. No CP or SOB. Leg edema trace B. Labs stable, may be discharged home soon.      Interval History: looks and feels better, sitting up in chair, he ambulated in the room by himself as well as with PT/OT. He also came off Dexa and Remdesivir after 5 days. No CP or SOB. Leg edema trace B. Labs stable, may be discharged home soon.    Review of Systems   Constitutional:  Positive for fatigue. Negative for chills and fever.   Respiratory:  Negative for chest tightness and shortness of breath.    Cardiovascular:  Positive for leg swelling. Negative for chest pain and palpitations.   Gastrointestinal:  Negative for abdominal pain, nausea and vomiting.   Genitourinary:  Negative for difficulty urinating and dysuria.   Musculoskeletal:  Negative for arthralgias and myalgias.   Neurological:  Negative for dizziness, weakness and headaches.   Psychiatric/Behavioral:  Negative for agitation and confusion.      Objective:     Vital Signs (Most Recent):  Temp: 98.9 °F (37.2 °C) (04/27/24 0025)  Pulse: 67 (04/27/24 1651)  Resp: 18 (04/27/24 0742)  BP: (!) 132/94 (04/27/24 1651)  SpO2: 95 % (04/27/24 1651) Vital Signs (24h Range):  Temp:  [96.2 °F (35.7 °C)-98.9 °F (37.2 °C)] 98.9 °F (37.2 °C)  Pulse:  [66-88] 67  Resp:  [16-22] 18  SpO2:  [95 %-100 %] 95 %  BP: (132-138)/(86-95) 132/94     Weight: (!) 186 kg (410 lb 0.9 oz)  Body mass index is 51.25 kg/m².  No intake or output data in the 24 hours ending 04/27/24 1706      Physical Exam  Vitals and nursing note reviewed.    Constitutional:       General: He is not in acute distress.     Appearance: He is obese. He is ill-appearing. He is not toxic-appearing or diaphoretic.   HENT:      Head: Normocephalic.      Mouth/Throat:      Mouth: Mucous membranes are moist.   Eyes:      Extraocular Movements: Extraocular movements intact.      Pupils: Pupils are equal, round, and reactive to light.   Cardiovascular:      Rate and Rhythm: Normal rate. Rhythm irregular.   Pulmonary:      Comments: Diminished breath sounds bilaterally   Abdominal:      General: There is no distension.      Palpations: Abdomen is soft.      Tenderness: There is no abdominal tenderness.   Musculoskeletal:         General: No swelling. Normal range of motion.      Right lower leg: Edema present.      Left lower leg: Edema present.   Skin:     General: Skin is warm.      Capillary Refill: Capillary refill takes 2 to 3 seconds.      Coloration: Skin is not jaundiced or pale.   Neurological:      General: No focal deficit present.      Mental Status: He is alert and oriented to person, place, and time.   Psychiatric:         Behavior: Behavior normal.             Significant Labs: All pertinent labs within the past 24 hours have been reviewed.  BMP:   Recent Labs   Lab 04/27/24  0544   GLU 94      K 4.5      CO2 34*   BUN 18   CREATININE 0.8   CALCIUM 8.7   MG 2.5     CBC:   Recent Labs   Lab 04/26/24  0515   WBC 2.02*   HGB 13.7*   HCT 43.7   PLT 84*       Significant Imaging: I have reviewed all pertinent imaging results/findings within the past 24 hours.    Assessment/Plan:      * Acute on chronic respiratory failure with hypoxia and hypercapnia  Patient with Hypercapnic and Hypoxic Respiratory failure which is Acute on chronic.  he is not on home oxygen. Wears trilogy and has supplemental o2 as needed.  Supplemental oxygen was provided and noted- Oxygen Concentration (%):  [40] 40    Signs/symptoms of respiratory failure include- increased work of  breathing and hypoxia on room air . Contributing diagnoses includes -  COVID and Heart Failure exacerbation   Labs and images were reviewed. Patient Has recent ABG, which has been reviewed. Will treat underlying causes and adjust management of respiratory failure as follows    Continue steroids, remdesivir, ceftriaxone/azithromycin  Also improvement with Lasix  Wean O2 as tolerated  Wears Trilogy at home, using BiPAP qHS here    Improving, cont Trilogy at night, must exercise and do IS during the day    Appears at baseline, may be discharged home now    Pulmonary hypertension  Cont CPAP at home      Chronic atrial fibrillation  Patient with Permanent atrial fibrillation which is controlled currently with Beta Blocker. Patient is currently in atrial fibrillation.Anticoagulation indicated. Anticoagulation done with eliquis .    COVID  See plan for respiratory failure    On Dexa and Remdesivir    Resolved, off both now    MONA (acute kidney injury)  Patient with acute kidney injury/acute renal failure unclear cause MONA is currently stable. Baseline creatinine  1.2  - Labs reviewed- Renal function/electrolytes with Estimated Creatinine Clearance: 175.9 mL/min (based on SCr of 0.8 mg/dL). according to latest data. Monitor urine output and serial BMP and adjust therapy as needed. Avoid nephrotoxins and renally dose meds for GFR listed above.  - holding home arb, allopurinol, Demadex   - may be d/t vascular congestion, given dose of 80 IV lasix in ed, will monitor output overnight, repeat labs in am, further diuresis pending labs     Great response to lasix, great diuresis, MONA resolved, appears euvolemic now    Moderate episode of recurrent major depressive disorder  - continue home effexor          Essential hypertension  Chronic, controlled. Latest blood pressure and vitals reviewed-     Temp:  [97.5 °F (36.4 °C)-98.6 °F (37 °C)]   Pulse:  [56-89]   Resp:  [20]   BP: (114-151)/(71-91)   SpO2:  [95 %-100 %] .   Home meds  for hypertension were reviewed and noted below.   Hypertension Medications               amLODIPine (NORVASC) 5 MG tablet TAKE 1 TABLET BY MOUTH ONCE  DAILY    losartan (COZAAR) 100 MG tablet TAKE 1 TABLET BY MOUTH ONCE  DAILY    metoprolol succinate (TOPROL-XL) 100 MG 24 hr tablet Take 1 tablet (100 mg total) by mouth once daily.    torsemide (DEMADEX) 20 MG Tab Take 2 tablets (40 mg total) by mouth 2 (two) times a day.            While in the hospital, will manage blood pressure as follows; Continue home antihypertensive regimen    Will utilize p.r.n. blood pressure medication only if patient's blood pressure greater than 160/100 and he develops symptoms such as worsening chest pain or shortness of breath.    Morbid obesity with BMI of 50.0-59.9, adult  Body mass index is 51.25 kg/m². Morbid obesity complicates all aspects of disease management from diagnostic modalities to treatment. Weight loss encouraged and health benefits explained to patient.         EVANGELIST (obstructive sleep apnea)/ Obesity Hypoventilation syndrome   - continue home trilogy      Acute on chronic diastolic congestive heart failure  Patient is identified as having Diastolic (HFpEF) heart failure that is Acute on chronic. CHF is currently uncontrolled due to Continued edema of extremities and Weight gain  Latest ECHO performed and demonstrates- Results for orders placed during the hospital encounter of 11/20/21    Echo    Interpretation Summary  · The left ventricle is mildly enlarged with moderate concentric hypertrophy and normal systolic function.  · The estimated ejection fraction is 60%.  · Normal left ventricular diastolic function.  · Severe right ventricular enlargement with moderately reduced right ventricular systolic function.  · Moderate left atrial enlargement.  · Moderate right atrial enlargement.  · Moderate tricuspid regurgitation.  · There is abnormal septal wall motion. There is systolic flattening of the interventricular septum  consistent with right ventricle pressure overload.  . Continue Beta Blocker and Furosemide and monitor clinical status closely. Monitor on telemetry. Patient is off CHF pathway.  Monitor strict Is&Os and daily weights.  Place on fluid restriction of 1.5 L. Cardiology has not been consulted. Continue to stress to patient importance of self efficacy and  on diet for CHF. Last BNP reviewed- and noted below   Recent Labs   Lab 04/24/24  0418   *       Doing much better. Cont current Tx    Great diuresis, almost euvolemic now      VTE Risk Mitigation (From admission, onward)           Ordered     apixaban tablet 5 mg  2 times daily         04/22/24 1722                    Discharge Planning   TREVON:      Code Status: Full Code   Is the patient medically ready for discharge?:     Reason for patient still in hospital (select all that apply): Patient trending condition, Laboratory test, Treatment, Imaging, Consult recommendations, and PT / OT recommendations  Discharge Plan A: Home Health            Pebbles Tenorio MD  Department of Hospital Medicine   [facility] - Telemetry ([facility])

## 2024-04-27 NOTE — ASSESSMENT & PLAN NOTE
Patient is identified as having Diastolic (HFpEF) heart failure that is Acute on chronic. CHF is currently uncontrolled due to Continued edema of extremities and Weight gain  Latest ECHO performed and demonstrates- Results for orders placed during the hospital encounter of 11/20/21    Echo    Interpretation Summary  · The left ventricle is mildly enlarged with moderate concentric hypertrophy and normal systolic function.  · The estimated ejection fraction is 60%.  · Normal left ventricular diastolic function.  · Severe right ventricular enlargement with moderately reduced right ventricular systolic function.  · Moderate left atrial enlargement.  · Moderate right atrial enlargement.  · Moderate tricuspid regurgitation.  · There is abnormal septal wall motion. There is systolic flattening of the interventricular septum consistent with right ventricle pressure overload.  . Continue Beta Blocker and Furosemide and monitor clinical status closely. Monitor on telemetry. Patient is off CHF pathway.  Monitor strict Is&Os and daily weights.  Place on fluid restriction of 1.5 L. Cardiology has not been consulted. Continue to stress to patient importance of self efficacy and  on diet for CHF. Last BNP reviewed- and noted below   Recent Labs   Lab 04/24/24  0418   *       Doing much better. Cont current Tx    Great diuresis, almost euvolemic now

## 2024-04-27 NOTE — PT/OT/SLP PROGRESS
Physical Therapy  Treatment    Caio Ontiveros   MRN: [id number]   Admitting Diagnosis: Acute on chronic respiratory failure with hypoxia and hypercapnia       PT Start Time: 1235     PT Stop Time: 1300    PT Total Time (min): 25 min       Billable Minutes:  Gait Training 15 and Therapeutic Activity 10    Treatment Type: Treatment  PT/PTA: PTA     Number of PTA visits since last PT visit: 1       General Precautions: Standard, airborne, contact, droplet, respiratory  Orthopedic Precautions: N/A  Braces: N/A         Subjective:  Communicated with RYAN prior to session.      Pain/Comfort  Pain Rating 1: 0/10  Pain Rating Post-Intervention 1: 0/10    Treatment and Education:    PATIENT SITTING IN BED SIDE CHAIR UPON ARRIVAL     MOTIVATED TO PARTICIPATE THIS SESSION    SIT<-->STAND VC FOR INCREASING FORWARD LEAN AND COMPLETED WITH CLOSE SBA    AMBULATED IN ROOM NO ASSISTIVE DEVICE TOUCHING OBJECTS AS HE WALKED PAST THEM SBA WITH CONVERSATION. 2 X 40'     ENCOURAGED TO USE INSPIROMETER 1 X/HOUR, TO AMBULATE 1 X/HOUR.        AM-PAC 6 CLICK MOBILITY  How much help from another person does this patient currently need?   1 = Unable, Total/Dependent Assistance  2 = A lot, Maximum/Moderate Assistance  3 = A little, Minimum/Contact Guard/Supervision  4 = None, Modified [place]/Independent    Turning over in bed (including adjusting bedclothes, sheets and blankets)?:  (NA)  Sitting down on and standing up from a chair with arms (e.g., wheelchair, bedside commode, etc.): 4  Moving from lying on back to sitting on the side of the bed?:  (NA)  Moving to and from a bed to a chair (including a wheelchair)?:  (NA)  Need to walk in hospital room?: 4  Climbing 3-5 steps with a railing?: 1    AM-PAC Raw Score CMS G-Code Modifier Level of Impairment Assistance   6 % Total / Unable   7 - 9 CM 80 - 100% Maximal Assist   10 - 14 CL 60 - 80% Moderate Assist   15 - 19 CK 40 - 60% Moderate Assist   20 - 22 CJ 20 - 40% Minimal Assist   23  CI 1-20% SBA / CGA   24 CH 0% Independent/ Mod I     Patient left up in chair with all lines intact, call button in reach, and WIFE present.    Assessment:  Caio Ontiveros is a 58 y.o. male with a medical diagnosis of Acute on chronic respiratory failure with hypoxia and hypercapnia and presents with .    Rehab identified problem list/impairments: weakness, impaired endurance, impaired self care skills, decreased lower extremity function, impaired functional mobility, impaired cardiopulmonary response to activity    Rehab potential is good.    Activity tolerance: Fair    Discharge recommendations: Low Intensity Therapy      Barriers to discharge:      Equipment recommendations: bedside commode, walker, rolling, shower chair     GOALS:   Multidisciplinary Problems       Physical Therapy Goals          Problem: Physical Therapy    Goal Priority Disciplines Outcome Goal Variances Interventions   Physical Therapy Goal     PT, PT/OT Progressing     Description: LTG'S TO BE MET IN 14 DAYS (5-8-24)  PT WILL REQUIRE CGA FOR BED MOBILITY  PT WILL REQUIRE CGA FOR BED<>CHAIR TF'S  PT WILL  FEET WITH RW AND CGA  PT WILL INC AMPAC SCORE BY 2 POINTS TO PROGRESS GROSS FUNC MOBILITY                         PLAN:    Patient to be seen 3 x/week to address the above listed problems via gait training, therapeutic activities, therapeutic exercises  Plan of Care expires: 05/08/24  Plan of Care reviewed with: patient         04/27/2024

## 2024-04-27 NOTE — SIGNIFICANT EVENT
58-year-old  man currently admitted for acute on chronic respiratory failure with hypoxia and hypercapnia, chronic atrial fibrillation, COVID-19 infection, acute kidney injury, depression, hypertension, morbid obesity, obstructive sleep apnea, acute on chronic diastolic CHF.  Consults on this admission include pulmonology.    Nursing staff notified nocturnist of heart rate of 40 with 2.104 2nd pause.  Patient was asymptomatic.  Vital signs after with blood pressure 132/86 with map of 104 and heart rate 66.  Patient is on Lopressor 25 mg p.o. b.i.d..    Will check BMP, magnesium level, and thyroid studies stat along with EKG.  Continue telemetry monitoring.  Patient may benefit from Cardiology consult in a.m..

## 2024-04-28 VITALS
OXYGEN SATURATION: 99 % | HEART RATE: 70 BPM | HEIGHT: 75 IN | WEIGHT: 315 LBS | SYSTOLIC BLOOD PRESSURE: 130 MMHG | RESPIRATION RATE: 16 BRPM | TEMPERATURE: 98 F | DIASTOLIC BLOOD PRESSURE: 89 MMHG | BODY MASS INDEX: 39.17 KG/M2

## 2024-04-28 PROBLEM — U07.1 COVID: Status: RESOLVED | Noted: 2024-04-22 | Resolved: 2024-04-28

## 2024-04-28 PROBLEM — J96.21 ACUTE ON CHRONIC RESPIRATORY FAILURE WITH HYPOXIA AND HYPERCAPNIA: Status: RESOLVED | Noted: 2024-04-22 | Resolved: 2024-04-28

## 2024-04-28 PROBLEM — N17.9 AKI (ACUTE KIDNEY INJURY): Status: RESOLVED | Noted: 2021-11-20 | Resolved: 2024-04-28

## 2024-04-28 PROBLEM — J96.22 ACUTE ON CHRONIC RESPIRATORY FAILURE WITH HYPOXIA AND HYPERCAPNIA: Status: RESOLVED | Noted: 2024-04-22 | Resolved: 2024-04-28

## 2024-04-28 LAB
ANION GAP SERPL CALC-SCNC: 4 MMOL/L (ref 8–16)
BACTERIA BLD CULT: NORMAL
BACTERIA BLD CULT: NORMAL
BUN SERPL-MCNC: 18 MG/DL (ref 6–20)
CALCIUM SERPL-MCNC: 8.8 MG/DL (ref 8.7–10.5)
CHLORIDE SERPL-SCNC: 107 MMOL/L (ref 95–110)
CO2 SERPL-SCNC: 29 MMOL/L (ref 23–29)
CREAT SERPL-MCNC: 0.9 MG/DL (ref 0.5–1.4)
EST. GFR  (NO RACE VARIABLE): >60 ML/MIN/1.73 M^2
GLUCOSE SERPL-MCNC: 94 MG/DL (ref 70–110)
OHS QRS DURATION: 94 MS
OHS QTC CALCULATION: 443 MS
POTASSIUM SERPL-SCNC: 3.9 MMOL/L (ref 3.5–5.1)
SODIUM SERPL-SCNC: 140 MMOL/L (ref 136–145)

## 2024-04-28 PROCEDURE — 25000003 PHARM REV CODE 250: Performed by: HOSPITALIST

## 2024-04-28 PROCEDURE — 25000003 PHARM REV CODE 250: Performed by: NURSE PRACTITIONER

## 2024-04-28 PROCEDURE — 25000003 PHARM REV CODE 250: Performed by: EMERGENCY MEDICINE

## 2024-04-28 PROCEDURE — 80048 BASIC METABOLIC PNL TOTAL CA: CPT | Performed by: NURSE PRACTITIONER

## 2024-04-28 PROCEDURE — 99900035 HC TECH TIME PER 15 MIN (STAT)

## 2024-04-28 PROCEDURE — 94761 N-INVAS EAR/PLS OXIMETRY MLT: CPT

## 2024-04-28 PROCEDURE — 94660 CPAP INITIATION&MGMT: CPT

## 2024-04-28 PROCEDURE — 27000221 HC OXYGEN, UP TO 24 HOURS

## 2024-04-28 RX ADMIN — LEVOTHYROXINE SODIUM 50 MCG: 50 TABLET ORAL at 05:04

## 2024-04-28 RX ADMIN — THERA TABS 1 TABLET: TAB at 09:04

## 2024-04-28 RX ADMIN — Medication 1 TABLET: at 09:04

## 2024-04-28 RX ADMIN — PREGABALIN 100 MG: 25 CAPSULE ORAL at 09:04

## 2024-04-28 RX ADMIN — VENLAFAXINE HYDROCHLORIDE 150 MG: 75 CAPSULE, EXTENDED RELEASE ORAL at 09:04

## 2024-04-28 RX ADMIN — Medication 100 MG: at 09:04

## 2024-04-28 RX ADMIN — METOPROLOL TARTRATE 25 MG: 25 TABLET, FILM COATED ORAL at 09:04

## 2024-04-28 RX ADMIN — OXYCODONE HYDROCHLORIDE AND ACETAMINOPHEN 500 MG: 500 TABLET ORAL at 09:04

## 2024-04-28 RX ADMIN — APIXABAN 5 MG: 2.5 TABLET, FILM COATED ORAL at 09:04

## 2024-04-28 NOTE — PLAN OF CARE
Pt ready for discharge. No s/s of distress noted. Pt free from injury. IV discontinued. Tele monitor discontinued. Belongings with pt. Discharge instructions reviewed with pt. Pt verbalized understanding.

## 2024-04-28 NOTE — DISCHARGE SUMMARY
[facility] - Telemetry ([facility])  [facility] Medicine  Discharge Summary      Patient Name: Caio Ontiveros  MRN: [id number]  TRINIDAD: [id number]  Patient Class: IP- Inpatient  Admission Date: 4/22/2024  Hospital Length of Stay: 6 days  Discharge Date and Time:  04/28/2024 6:10 PM  Attending Physician: No att. providers found   Discharging Provider: Pebbles Tenorio MD  Primary Care Provider: Dominick Allen MD    Primary Care Team: [facility] MEDICINE D    HPI:   57 y/o male with PMHx of CHF (right sided heart failure), atrial fibrillation on eliquis, HTN, EVANGELIST wearing Trilogy, gout, morbid obesity who presented to the ER today with complaints of SOB, fatigue, congestion, cough.  He was diagnosed with COVID at [facility] ed yesterday.  He denies any fever/chills.  He also reports leg swelling and recent weight gain (unsure of amount).  In the ED, o2 sats 85% on room air, placed on 2 liters with noted improvement.  Lab work notable for creatinine 3.1, , Trop 0.86, procal 0.41.  ABG showed Ph 7.2, CO2 54, , HCO3 24.  Chest xray with no acute findings.  Patient will be admitted to inpatient for acute on chronic hypoxic/hypercapnic resp failure d/t covid/heart failure exacerbation.     Code Status Full  Surrogate Decision Maker wife     * No surgery found *      Hospital Course:   4/24: Awake, alert, conversating in bed. Transitioned to NC this am. Maintaining good O2 sats, despite no improvement in repeat ABG. WBC 1.58. Glucose now in lower 100s. Sputum cx pending. No changes on chest imaging. US BLE negative for DVT. Trop has trended down. Cr trended down. HR controlled, BP soft but stable.     4/25: Doing well this am. Bipap qhs. Sitting in bedside chair. No chest pain/shortness of breath this am. Cr 1.0, glucose controlled. Around 3L UOP yesterday after lasix. Will repeat dose this am.  ----  Patient deemed stable to transfer to floor.  UOP 3.2 L, currently on 3L NC  Denies complaints, sitting in chair    4/26- pt seen and  examined, chart, imaging reviewed, had a detailed d/w pt and his wife. Doing much better after initial CHF exacerbation with MONA and Covid, great response to lasix, MONA resolved, Cr normal. No FCCS, leg swelling much better. Pt sitting up comfortably in chair, on NC. Getting Dexa and Remdesivir. Remains in isolation. WBC 2.02, H/H 13.7/37, Plat 84 K. Bun/Cr normal.    4/27- looks and feels better, sitting up in chair, he ambulated in the room by himself as well as with PT/OT. He also came off Dexa and Remdesivir after 5 days. No CP or SOB. Leg edema trace B. Labs stable, may be discharged home soon.    4/28- looks and feels much better, sitting up in chair on RA, no CP or SOB, only trace leg edema present. He is eating drinking well, walking around well. He already has Home O2 and Home Trilogy. He has already been cleared by Pulm/Cards. He was counseled in detail about salt and fluid restrictions which he understands and accepts. He was seen and examined and deemed stable for discharge home today.       Goals of Care Treatment Preferences:  Code Status: Full Code      Consults:   Consults (From admission, onward)          Status Ordering Provider     Inpatient consult to Critical Care Medicine  Once        Provider:  (Not yet assigned)    MELISSA Grimm            No new Assessment & Plan notes have been filed under this hospital service since the last note was generated.  Service: Hospital Medicine    Final Active Diagnoses:    Diagnosis Date Noted POA    Pulmonary hypertension [I27.20] 12/20/2012 Yes    Chronic atrial fibrillation [I48.20] 12/20/2012 Yes    Moderate episode of recurrent major depressive disorder [F33.1] 03/10/2021 Yes     Chronic    EVANGELIST (obstructive sleep apnea)/ Obesity Hypoventilation syndrome  [G47.33] 12/20/2012 Yes    Morbid obesity with BMI of 50.0-59.9, adult [E66.01, Z68.43] 12/20/2012 Not Applicable     Chronic    Essential hypertension [I10] 12/20/2012 Yes     Chronic       Problems Resolved During this Admission:    Diagnosis Date Noted Date Resolved POA    PRINCIPAL PROBLEM:  Acute on chronic respiratory failure with hypoxia and hypercapnia [J96.21, J96.22] 04/22/2024 04/28/2024 Yes    Hypoglycemia [E16.2] 04/23/2024 04/27/2024 No    COVID [U07.1] 04/22/2024 04/28/2024 Yes    MONA (acute kidney injury) [N17.9] 11/20/2021 04/28/2024 Yes    Elevated troponin [R79.89] 09/12/2020 04/26/2024 Yes    Acute on chronic diastolic congestive heart failure [I50.33] 12/20/2012 04/28/2024 Yes       Discharged Condition: stable    Disposition: Home or Self Care    Follow Up:   Follow-up Information       Dominick Allen MD. Schedule an appointment as soon as possible for a visit in 3 day(s).    Specialty: Family Medicine  Why: Hospital follow up, As needed  Contact information:  [address]  [place] [address]  [phone]               Anette Martinez, FNP-[place] Schedule an appointment as soon as possible for a visit in 1 week(s).    Specialty: Cardiology  Why: Hospital follow up  Contact information:  [address] [place] [address] [address] [place] [address]  [phone]               Anette Martinez RN .                           Patient Instructions:      Diet Cardiac     Activity as tolerated       Significant Diagnostic Studies: Labs: BMP:   Recent Labs   Lab 04/27/24  0057 04/27/24  0544 04/28/24  0546    94 94    144 140   K 4.1 4.5 3.9    106 107   CO2 33* 34* 29   BUN 19 18 18   CREATININE 0.8 0.8 0.9   CALCIUM 8.5* 8.7 8.8   MG 2.5 2.5  --    , CMP   Recent Labs   Lab 04/27/24  0057 04/27/24  0544 04/28/24  0546    144 140   K 4.1 4.5 3.9    106 107   CO2 33* 34* 29    94 94   BUN 19 18 18   CREATININE 0.8 0.8 0.9   CALCIUM 8.5* 8.7 8.8   ANIONGAP 4* 4* 4*     Lab Results   Component Value Date    WBC 2.02 (L) 04/26/2024    HGB 13.7 (L) 04/26/2024    HCT 43.7 04/26/2024    MCV 96 04/26/2024    PLT 84 (L) 04/26/2024      All labs within the past 24  hours have been reviewed  Radiology:   Imaging Results              X-Ray Chest AP Portable (Final result)  Result time 04/22/24 15:41:57      Final result by Glen Martinez MD (04/22/24 15:41:57)                   Impression:      No acute findings.      Electronically signed by: Glen Martinez MD  Date:    04/22/2024  Time:    15:41               Narrative:    EXAMINATION:  XR CHEST AP PORTABLE    CLINICAL HISTORY:  COVID-19;  cough    TECHNIQUE:  AP view of the chest was performed.    COMPARISON:  None    FINDINGS:  The cardiac and mediastinal silhouettes appear within normal limits.   The lungs are clear bilaterally.  No acute osseous findings demonstrated.                                     Cardiac Graphics: Echocardiogram: Transthoracic echo (TTE) complete (Cupid Only):   Results for orders placed or performed during the hospital encounter of 04/22/24   Echo   Result Value Ref Range    BSA 3.22 m2    LA WIDTH 5.3 cm    LVOT stroke volume 81.03 cm3    LVIDd 5.18 3.5 - 6.0 cm    LV Systolic Volume 46.89 mL    LV Systolic Volume Index 15.4 mL/m2    LVIDs 3.38 2.1 - 4.0 cm    LV Diastolic Volume 128.59 mL    LV Diastolic Volume Index 42.30 mL/m2    IVS 1.74 (A) 0.6 - 1.1 cm    LVOT diameter 2.54 cm    LVOT area 5.1 cm2    FS 35 28 - 44 %    Left Ventricle Relative Wall Thickness 0.54 cm    Posterior Wall 1.39 (A) 0.6 - 1.1 cm    LV mass 362.41 g    LV Mass Index 119 g/m2    MV Peak E Jw 0.62 m/s    TDI LATERAL 0.22 m/s    TDI SEPTAL 0.09 m/s    E/E' ratio 4.00 m/s    MV Peak A Jw 0.32 m/s    TR Max Jw 4.34 m/s    E/A ratio 1.94     IVRT 71.36 msec    E wave deceleration time 136.31 msec    LV SEPTAL E/E' RATIO 6.89 m/s    LA Volume Index 44.2 mL/m2    LV LATERAL E/E' RATIO 2.82 m/s    LA volume 134.44 cm3    LVOT peak jw 0.82 m/s    Left Ventricular Outflow Tract Mean Velocity 0.56 cm/s    Left Ventricular Outflow Tract Mean Gradient 1.45 mmHg    RV mid diameter 6.15 cm    RVOT peak VTI 12.0 cm    TAPSE 2.06 cm     LA size 4.33 cm    Left Atrium Minor Axis 6.02 cm    Left Atrium Major Axis 8.06 cm    RA Major Axis 7.36 cm    AV mean gradient 5 mmHg    AV peak gradient 8 mmHg    Ao peak anil 1.42 m/s    Ao VTI 24.10 cm    LVOT peak VTI 16.00 cm    AV valve area 3.36 cm²    AV Velocity Ratio 0.58     AV index (prosthetic) 0.66     IDALIA by Velocity Ratio 2.92 cm²    MV stenosis pressure 1/2 time 39.53 ms    MV valve area p 1/2 method 5.57 cm2    Triscuspid Valve Regurgitation Peak Gradient 75 mmHg    PV mean gradient 1 mmHg    RVOT peak anil 0.71 m/s    Ao root annulus 3.92 cm    STJ 3.51 cm    Ascending aorta 3.51 cm    IVC diameter 2.89 cm    Mean e' 0.16 m/s    ZLVIDS -22.98     ZLVIDD -30.77     RA Width 3.5 cm    TV resting pulmonary artery pressure 90 mmHg    RV TB RVSP 19 mmHg    Est. RA pres 15 mmHg    Narrative      Left Ventricle: The left ventricle is normal in size. Moderately   increased wall thickness. There is moderate concentric hypertrophy. Septal   motion is abnormal. Septal flattening in diastole and systole consistent   with right ventricular volume and pressure overload. There is normal   systolic function with a visually estimated ejection fraction of 60 - 65%.   Grade II diastolic dysfunction.    Right Ventricle: Right ventricle was not well visualized due to poor   acoustic window. Severe right ventricular enlargement. There is moderate   hypertrophy. Right ventricle wall motion has global hypokinesis. Systolic   function is moderately reduced.    Left Atrium: Left atrium is moderately dilated.    Right Atrium: Right atrium is severely dilated.    Tricuspid Valve: There is moderate to severe regurgitation.    Pulmonic Valve: There is mild to moderate regurgitation.    Pulmonary Artery: There is pulmonary hypertension. The estimated   pulmonary artery systolic pressure is 90 mmHg.    IVC/SVC: Elevated venous pressure at 15 mmHg.         Pending Diagnostic Studies:       None            Medications:  Reconciled Home Medications:      Medication List        START taking these medications      pulse oximeter device  Commonly known as: pulse oximeter  by Apply Externally route 2 (two) times a day. Use twice daily at 8 AM and 3 PM and record the value in MyChart as directed.            CONTINUE taking these medications      allopurinoL 300 MG tablet  Commonly known as: ZYLOPRIM  Take 1 tablet (300 mg total) by mouth once daily.     colchicine 0.6 mg tablet  Commonly known as: COLCRYS  Take 1 tablet (0.6 mg total) by mouth once daily.     ELIQUIS 5 mg Tab  Generic drug: apixaban  TAKE 1 TABLET BY MOUTH  TWICE DAILY     levothyroxine 50 MCG tablet  Commonly known as: SYNTHROID  Take 1 tablet (50 mcg total) by mouth before breakfast.     losartan 100 MG tablet  Commonly known as: COZAAR  TAKE 1 TABLET BY MOUTH ONCE  DAILY     methocarbamoL 500 MG Tab  Commonly known as: ROBAXIN  Take 1 tablet (500 mg total) by mouth 3 (three) times daily as needed.     metoprolol succinate 100 MG 24 hr tablet  Commonly known as: TOPROL-XL  Take 1 tablet (100 mg total) by mouth once daily.     potassium chloride SA 20 MEQ tablet  Commonly known as: K-DUR,KLOR-CON  TAKE 1 TABLET BY MOUTH TWICE  DAILY     pregabalin 100 MG capsule  Commonly known as: LYRICA  Take 1 capsule (100 mg total) by mouth 2 (two) times daily.     TALTZ AUTOINJECTOR 80 mg/mL Atin  Generic drug: ixekizumab  INJECT 80MG (1 PEN) UNDER THE SKIN EVERY 4 WEEKS     torsemide 20 MG Tab  Commonly known as: DEMADEX  Take 2 tablets (40 mg total) by mouth 2 (two) times a day.     * venlafaxine 75 MG 24 hr capsule  Commonly known as: EFFEXOR-XR  Take 1 capsule (75 mg total) by mouth once daily.     * venlafaxine 150 MG Cp24  Commonly known as: EFFEXOR-XR  Take 1 capsule (150 mg total) by mouth once daily.           * This list has 2 medication(s) that are the same as other medications prescribed for you. Read the directions carefully, and ask your doctor or  other care provider to review them with you.                STOP taking these medications      amLODIPine 5 MG tablet  Commonly known as: NORVASC     topiramate 50 MG tablet  Commonly known as: TOPAMAX              Indwelling Lines/Drains at time of discharge:   Lines/Drains/Airways       None                   Time spent on the discharge of patient: 45 minutes         Pebbles Tenorio MD  Department of Hospital Medicine  '[facility] - Telemetry ([facility])

## 2024-04-29 ENCOUNTER — TELEPHONE (OUTPATIENT)
Dept: INTERNAL MEDICINE | Facility: CLINIC | Age: 59
End: 2024-04-29
Payer: MEDICARE

## 2024-04-29 ENCOUNTER — TELEPHONE (OUTPATIENT)
Dept: CARDIOLOGY | Facility: CLINIC | Age: 59
End: 2024-04-29
Payer: MEDICARE

## 2024-04-29 ENCOUNTER — PATIENT OUTREACH (OUTPATIENT)
Dept: ADMINISTRATIVE | Facility: CLINIC | Age: 59
End: 2024-04-29
Payer: MEDICARE

## 2024-04-29 NOTE — TELEPHONE ENCOUNTER
Spoke with pt; MA was calling to schedule hospital f/u; MA scheduled next available; pt verbalized understanding /LD

## 2024-04-29 NOTE — TELEPHONE ENCOUNTER
----- Message from Batsheva Schuler RN sent at 4/28/2024 11:33 AM CDT -----  Regarding: hospital f/u  [facility]. Please schedule pt a hospital f/u asap. Thanks.

## 2024-04-29 NOTE — PROGRESS NOTES
C3 nurse spoke with Caio Ontiveros for a TCC post hospital discharge follow up call. The patient has a scheduled HOSFU appointment with Dominick Allen MD on 05/01/24 @ 1615.

## 2024-04-29 NOTE — TELEPHONE ENCOUNTER
4/24 hospital visit hypercapnia    DR Jeffrey pt- made follow up with Dr Jeffrey as requested by pt        ----- Message from Batsheva Schuler RN sent at 4/28/2024 11:32 AM CDT -----  Regarding: hospital f/u  [facility]. Please schedule pt a 1 week hospital f/u. Thanks.

## 2024-05-01 ENCOUNTER — OFFICE VISIT (OUTPATIENT)
Dept: INTERNAL MEDICINE | Facility: CLINIC | Age: 59
End: 2024-05-01
Payer: MEDICARE

## 2024-05-01 VITALS
SYSTOLIC BLOOD PRESSURE: 126 MMHG | WEIGHT: 315 LBS | RESPIRATION RATE: 20 BRPM | OXYGEN SATURATION: 96 % | TEMPERATURE: 99 F | HEART RATE: 82 BPM | BODY MASS INDEX: 52.41 KG/M2 | DIASTOLIC BLOOD PRESSURE: 82 MMHG

## 2024-05-01 DIAGNOSIS — I50.43 ACUTE ON CHRONIC COMBINED SYSTOLIC AND DIASTOLIC CHF (CONGESTIVE HEART FAILURE): ICD-10-CM

## 2024-05-01 DIAGNOSIS — N17.9 ACUTE KIDNEY INJURY: ICD-10-CM

## 2024-05-01 DIAGNOSIS — U07.1 UPPER RESPIRATORY TRACT INFECTION DUE TO COVID-19 VIRUS: ICD-10-CM

## 2024-05-01 DIAGNOSIS — J06.9 UPPER RESPIRATORY TRACT INFECTION DUE TO COVID-19 VIRUS: ICD-10-CM

## 2024-05-01 DIAGNOSIS — Z09 HOSPITAL DISCHARGE FOLLOW-UP: Primary | ICD-10-CM

## 2024-05-01 DIAGNOSIS — J96.22 ACUTE AND CHRONIC RESPIRATORY FAILURE WITH HYPERCAPNIA: ICD-10-CM

## 2024-05-01 PROCEDURE — 1111F DSCHRG MED/CURRENT MED MERGE: CPT | Mod: CPTII,S$GLB,, | Performed by: FAMILY MEDICINE

## 2024-05-01 PROCEDURE — 99496 TRANSJ CARE MGMT HIGH F2F 7D: CPT | Mod: S$GLB,,, | Performed by: FAMILY MEDICINE

## 2024-05-01 PROCEDURE — 3079F DIAST BP 80-89 MM HG: CPT | Mod: CPTII,S$GLB,, | Performed by: FAMILY MEDICINE

## 2024-05-01 PROCEDURE — 4010F ACE/ARB THERAPY RXD/TAKEN: CPT | Mod: CPTII,S$GLB,, | Performed by: FAMILY MEDICINE

## 2024-05-01 PROCEDURE — 3074F SYST BP LT 130 MM HG: CPT | Mod: CPTII,S$GLB,, | Performed by: FAMILY MEDICINE

## 2024-05-01 PROCEDURE — 1159F MED LIST DOCD IN RCRD: CPT | Mod: CPTII,S$GLB,, | Performed by: FAMILY MEDICINE

## 2024-05-01 PROCEDURE — 99999 PR PBB SHADOW E&M-EST. PATIENT-LVL IV: CPT | Mod: PBBFAC,,, | Performed by: FAMILY MEDICINE

## 2024-05-01 PROCEDURE — 1160F RVW MEDS BY RX/DR IN RCRD: CPT | Mod: CPTII,S$GLB,, | Performed by: FAMILY MEDICINE

## 2024-05-01 RX ORDER — ALBUTEROL SULFATE 90 UG/1
2 AEROSOL, METERED RESPIRATORY (INHALATION) EVERY 6 HOURS PRN
Qty: 18 G | Refills: 0 | Status: SHIPPED | OUTPATIENT
Start: 2024-05-01 | End: 2024-06-11

## 2024-05-01 NOTE — PROGRESS NOTES
Subjective:   Patient ID: Caio Ontiveros is a 58 y.o. male.  Chief Complaint:  Hospital Follow Up (COVID and CHF) and Transitional Care    Presents for hospital discharge follow-up/transitional care visit   Admitted to [facility] 4/22/2024 through 4/28/2024   Presented with increased shortness a breath and dyspnea on exertion after testing positive for COVID prior to admission   Admitted to the ICU with acute on chronic respiratory failure with hypercapnia  Treated with oxygen, started steroids in antiviral, nebulizer treatments  Responded well to therapy with resolution of acute respiratory failure and returned to baseline chronic hypoxia   For acute on chronic CHF with volume overload aggressively diurese with IV medication  Diuresed well with removal of 4 L plus fluid   Returned to baseline weight, shortness for breath, and CHF   With treatment for COVID and other acute issues, kidneys responded well and returned to baseline normal with resolution of any kidney injury  Evaluated by PT/OT and was noted to ambulate without difficulty.  No additional therapy was recommended.    O2 requirement was at baseline.  Already had home O2 therapy   Advised to discontinue amlodipine and Topamax, would patient reports he is done   Continued on all current medications   Today only complains of some increased/persistent fatigue not back to his previous baseline   Denies any significant shortness a breath or increased swelling   Does have mild persistent cough      Review of Systems   Constitutional:  Positive for fatigue.   Respiratory:  Positive for cough.      Objective:   /82 (BP Location: Left arm, Patient Position: Sitting, BP Method: Large (Manual))   Pulse 82   Temp 99.1 °F (37.3 °C) (Tympanic)   Resp 20   Wt (!) 190.2 kg (419 lb 5 oz)   SpO2 96%   BMI 52.41 kg/m²     Physical Exam  Vitals and nursing note reviewed.   Constitutional:       General: He is not in acute distress.      Appearance: Normal appearance. He is well-developed. He is morbidly obese. He is not ill-appearing or toxic-appearing.   Neck:      Thyroid: No thyroid mass, thyromegaly or thyroid tenderness.   Cardiovascular:      Rate and Rhythm: Normal rate. Rhythm irregularly irregular.      Heart sounds: Normal heart sounds.   Pulmonary:      Effort: Pulmonary effort is normal. No tachypnea or accessory muscle usage.      Breath sounds: Rhonchi present. No decreased breath sounds.   Abdominal:      General: There is distension.      Palpations: Abdomen is soft.      Tenderness: There is no abdominal tenderness.   Musculoskeletal:      Right lower leg: Edema present.      Left lower leg: Edema present.   Lymphadenopathy:      Cervical: No cervical adenopathy.   Neurological:      Mental Status: He is alert and oriented to person, place, and time.      Gait: Gait abnormal.   Psychiatric:         Mood and Affect: Mood and affect normal.         Assessment:       ICD-10-CM ICD-9-CM   1. Hospital discharge follow-up  Z09 V67.59   2. Acute and chronic respiratory failure with hypercapnia  J96.22 518.84   3. Acute on chronic combined systolic and diastolic CHF (congestive heart failure)  I50.43 428.43     428.0   4. Acute kidney injury  N17.9 584.9   5. Upper respiratory tract infection due to COVID-19 virus  U07.1 465.9    J06.9 079.89     Plan:   Hospital discharge follow-up  Acute and chronic respiratory failure with hypercapnia  Acute on chronic combined systolic and diastolic CHF (congestive heart failure)  Acute kidney injury  Clinically and hemodynamically stable   No decompensation since hospital discharge   Compliant with medications as recommended since hospital discharge   Follow up with cardiology as scheduled  Discussed could take an additional 2-4 weeks to return to previous baseline from an overall fatigue and strength level     Upper respiratory tract infection due to COVID-19 virus  -     albuterol  (PROVENTIL/VENTOLIN HFA) 90 mcg/actuation inhaler; Inhale 2 puffs into the lungs every 6 (six) hours as needed for Wheezing or Shortness of Breath (or Cough).  Dispense: 18 g; Refill: 0  Add albuterol inhaler as needed for cough, wheezing, or shortness a breath    Return to clinic one-month as scheduled for annual physical exam    Transitional Care Note    Family and/or Caretaker present at visit?  Yes.  Diagnostic tests reviewed/disposition: I have reviewed all completed as well as pending diagnostic tests at the time of discharge.  Disease/illness education:  Acute kidney injury, CHF exacerbation prevention, expected post COVID infection course  Home health/community services discussion/referrals: Patient does not have home health established from hospital visit.  They do not need home health.  If needed, we will set up home health for the patient.   Establishment or re-establishment of referral orders for community resources: No other necessary community resources.   Discussion with other health care providers: No discussion with other health care providers necessary.     40+ minutes of total time spent on the encounter, which includes face to face time and non-face to face time preparing to see the patient (eg, review of tests), Obtaining and/or reviewing separately obtained history, documenting clinical information in the electronic or other health record, independently interpreting results (not separately reported) and communicating results to the patient/family/caregiver, or Care coordination (not separately reported).

## 2024-05-02 ENCOUNTER — PATIENT MESSAGE (OUTPATIENT)
Dept: INTERNAL MEDICINE | Facility: CLINIC | Age: 59
End: 2024-05-02
Payer: MEDICARE

## 2024-05-02 NOTE — TELEPHONE ENCOUNTER
Please contact patient is pharmacy   Find out which albuterol inhaler is cheapest   Okay to verbal order that inhaler at 2 puffs every 4-6 hours as needed for wheezing, cough, shortness of breath   Dispense 1 inhaler with no refills

## 2024-05-09 ENCOUNTER — PATIENT MESSAGE (OUTPATIENT)
Dept: CARDIOLOGY | Facility: CLINIC | Age: 59
End: 2024-05-09
Payer: MEDICARE

## 2024-05-12 ENCOUNTER — PATIENT MESSAGE (OUTPATIENT)
Dept: PAIN MEDICINE | Facility: CLINIC | Age: 59
End: 2024-05-12
Payer: MEDICARE

## 2024-05-13 DIAGNOSIS — G89.29 CHRONIC BILATERAL LOW BACK PAIN WITH SCIATICA, SCIATICA LATERALITY UNSPECIFIED: ICD-10-CM

## 2024-05-13 DIAGNOSIS — M54.40 CHRONIC BILATERAL LOW BACK PAIN WITH SCIATICA, SCIATICA LATERALITY UNSPECIFIED: ICD-10-CM

## 2024-05-13 RX ORDER — METHOCARBAMOL 500 MG/1
500 TABLET, FILM COATED ORAL
Qty: 60 TABLET | Refills: 0 | OUTPATIENT
Start: 2024-05-13

## 2024-05-13 NOTE — TELEPHONE ENCOUNTER
Called patient in regards to medication refill request. Patient wants to refill Robaxin 500MG. Stated to patient that I will send over refill request and will reach out to him when request has been sent. Patient verbalized understanding.    Tosha Gonzales MA

## 2024-05-13 NOTE — TELEPHONE ENCOUNTER
Can you refill? Robaxin 500MG    Last Visit: 03/22/24  Next Visit: 06/24/24  Last refill: 11/22/23  How patient is currently taking medication requested: 1 tab PO TID  Pharmacy: [id number] - [place]

## 2024-05-14 ENCOUNTER — PATIENT MESSAGE (OUTPATIENT)
Dept: PAIN MEDICINE | Facility: CLINIC | Age: 59
End: 2024-05-14
Payer: MEDICARE

## 2024-05-14 NOTE — TELEPHONE ENCOUNTER
----- Message from Charito Sims sent at 5/14/2024 10:06 AM CDT -----  Contact: Patient, [phone]  Calling to inquire about refill request for Rx methocarbamoL (ROBAXIN) 500 MG Tab. Please call him. Thanks.

## 2024-05-15 RX ORDER — METHOCARBAMOL 500 MG/1
500 TABLET, FILM COATED ORAL 3 TIMES DAILY PRN
Qty: 60 TABLET | Refills: 5 | Status: SHIPPED | OUTPATIENT
Start: 2024-05-15

## 2024-06-11 ENCOUNTER — LAB VISIT (OUTPATIENT)
Dept: LAB | Facility: HOSPITAL | Age: 59
End: 2024-06-11
Attending: FAMILY MEDICINE
Payer: MEDICARE

## 2024-06-11 ENCOUNTER — OFFICE VISIT (OUTPATIENT)
Dept: INTERNAL MEDICINE | Facility: CLINIC | Age: 59
End: 2024-06-11
Payer: MEDICARE

## 2024-06-11 VITALS
HEART RATE: 81 BPM | BODY MASS INDEX: 39.17 KG/M2 | WEIGHT: 315 LBS | HEIGHT: 75 IN | SYSTOLIC BLOOD PRESSURE: 128 MMHG | DIASTOLIC BLOOD PRESSURE: 84 MMHG | OXYGEN SATURATION: 87 %

## 2024-06-11 DIAGNOSIS — Z00.00 ANNUAL PHYSICAL EXAM: Primary | ICD-10-CM

## 2024-06-11 DIAGNOSIS — Z12.5 SCREENING FOR PROSTATE CANCER: ICD-10-CM

## 2024-06-11 DIAGNOSIS — F33.1 MODERATE EPISODE OF RECURRENT MAJOR DEPRESSIVE DISORDER: ICD-10-CM

## 2024-06-11 DIAGNOSIS — D69.6 THROMBOCYTOPENIA, UNSPECIFIED: ICD-10-CM

## 2024-06-11 DIAGNOSIS — D70.8 CHRONIC BENIGN NEUTROPENIA: ICD-10-CM

## 2024-06-11 DIAGNOSIS — R45.4 IRRITABILITY AND ANGER: ICD-10-CM

## 2024-06-11 DIAGNOSIS — I48.20 CHRONIC ATRIAL FIBRILLATION: ICD-10-CM

## 2024-06-11 DIAGNOSIS — I10 ESSENTIAL HYPERTENSION: ICD-10-CM

## 2024-06-11 DIAGNOSIS — M1A.09X0 IDIOPATHIC CHRONIC GOUT OF MULTIPLE SITES WITHOUT TOPHUS: ICD-10-CM

## 2024-06-11 DIAGNOSIS — G89.29 CHRONIC BILATERAL LOW BACK PAIN WITH SCIATICA, SCIATICA LATERALITY UNSPECIFIED: ICD-10-CM

## 2024-06-11 DIAGNOSIS — M54.40 CHRONIC BILATERAL LOW BACK PAIN WITH SCIATICA, SCIATICA LATERALITY UNSPECIFIED: ICD-10-CM

## 2024-06-11 DIAGNOSIS — R73.03 PREDIABETES: ICD-10-CM

## 2024-06-11 DIAGNOSIS — E03.8 SUBCLINICAL HYPOTHYROIDISM: ICD-10-CM

## 2024-06-11 DIAGNOSIS — Z00.00 ANNUAL PHYSICAL EXAM: ICD-10-CM

## 2024-06-11 DIAGNOSIS — L40.50 PSORIATIC ARTHRITIS: ICD-10-CM

## 2024-06-11 DIAGNOSIS — M51.37 DDD (DEGENERATIVE DISC DISEASE), LUMBOSACRAL: ICD-10-CM

## 2024-06-11 DIAGNOSIS — F51.04 PSYCHOPHYSIOLOGICAL INSOMNIA: ICD-10-CM

## 2024-06-11 DIAGNOSIS — Z79.01 CHRONIC ANTICOAGULATION: ICD-10-CM

## 2024-06-11 DIAGNOSIS — I50.812 CHRONIC RIGHT-SIDED HEART FAILURE: ICD-10-CM

## 2024-06-11 DIAGNOSIS — M47.817 FACET ARTHRITIS OF LUMBOSACRAL REGION: ICD-10-CM

## 2024-06-11 DIAGNOSIS — I50.32 CHRONIC DIASTOLIC CONGESTIVE HEART FAILURE: ICD-10-CM

## 2024-06-11 LAB
ALBUMIN SERPL BCP-MCNC: 4.1 G/DL (ref 3.5–5.2)
ALP SERPL-CCNC: 76 U/L (ref 55–135)
ALT SERPL W/O P-5'-P-CCNC: 12 U/L (ref 10–44)
ANION GAP SERPL CALC-SCNC: 12 MMOL/L (ref 8–16)
AST SERPL-CCNC: 22 U/L (ref 10–40)
BILIRUB SERPL-MCNC: 0.7 MG/DL (ref 0.1–1)
BUN SERPL-MCNC: 23 MG/DL (ref 6–20)
CALCIUM SERPL-MCNC: 9.2 MG/DL (ref 8.7–10.5)
CHLORIDE SERPL-SCNC: 105 MMOL/L (ref 95–110)
CHOLEST SERPL-MCNC: 185 MG/DL (ref 120–199)
CHOLEST/HDLC SERPL: 4.2 {RATIO} (ref 2–5)
CO2 SERPL-SCNC: 26 MMOL/L (ref 23–29)
COMPLEXED PSA SERPL-MCNC: 0.73 NG/ML (ref 0–4)
CREAT SERPL-MCNC: 1.3 MG/DL (ref 0.5–1.4)
EST. GFR  (NO RACE VARIABLE): >60 ML/MIN/1.73 M^2
ESTIMATED AVG GLUCOSE: 114 MG/DL (ref 68–131)
GLUCOSE SERPL-MCNC: 93 MG/DL (ref 70–110)
HBA1C MFR BLD: 5.6 % (ref 4–5.6)
HDLC SERPL-MCNC: 44 MG/DL (ref 40–75)
HDLC SERPL: 23.8 % (ref 20–50)
LDLC SERPL CALC-MCNC: 123.2 MG/DL (ref 63–159)
NONHDLC SERPL-MCNC: 141 MG/DL
POTASSIUM SERPL-SCNC: 4.3 MMOL/L (ref 3.5–5.1)
PROT SERPL-MCNC: 8.1 G/DL (ref 6–8.4)
SODIUM SERPL-SCNC: 143 MMOL/L (ref 136–145)
TRIGL SERPL-MCNC: 89 MG/DL (ref 30–150)
TSH SERPL DL<=0.005 MIU/L-ACNC: 3.58 UIU/ML (ref 0.4–4)

## 2024-06-11 PROCEDURE — 99396 PREV VISIT EST AGE 40-64: CPT | Mod: S$GLB,,, | Performed by: FAMILY MEDICINE

## 2024-06-11 PROCEDURE — 85027 COMPLETE CBC AUTOMATED: CPT | Performed by: FAMILY MEDICINE

## 2024-06-11 PROCEDURE — 1160F RVW MEDS BY RX/DR IN RCRD: CPT | Mod: CPTII,S$GLB,, | Performed by: FAMILY MEDICINE

## 2024-06-11 PROCEDURE — 80053 COMPREHEN METABOLIC PANEL: CPT | Performed by: FAMILY MEDICINE

## 2024-06-11 PROCEDURE — 3079F DIAST BP 80-89 MM HG: CPT | Mod: CPTII,S$GLB,, | Performed by: FAMILY MEDICINE

## 2024-06-11 PROCEDURE — 99999 PR PBB SHADOW E&M-EST. PATIENT-LVL III: CPT | Mod: PBBFAC,,, | Performed by: FAMILY MEDICINE

## 2024-06-11 PROCEDURE — 3008F BODY MASS INDEX DOCD: CPT | Mod: CPTII,S$GLB,, | Performed by: FAMILY MEDICINE

## 2024-06-11 PROCEDURE — 80061 LIPID PANEL: CPT | Performed by: FAMILY MEDICINE

## 2024-06-11 PROCEDURE — 4010F ACE/ARB THERAPY RXD/TAKEN: CPT | Mod: CPTII,S$GLB,, | Performed by: FAMILY MEDICINE

## 2024-06-11 PROCEDURE — 84550 ASSAY OF BLOOD/URIC ACID: CPT | Performed by: FAMILY MEDICINE

## 2024-06-11 PROCEDURE — 83036 HEMOGLOBIN GLYCOSYLATED A1C: CPT | Performed by: FAMILY MEDICINE

## 2024-06-11 PROCEDURE — 1159F MED LIST DOCD IN RCRD: CPT | Mod: CPTII,S$GLB,, | Performed by: FAMILY MEDICINE

## 2024-06-11 PROCEDURE — 84443 ASSAY THYROID STIM HORMONE: CPT | Performed by: FAMILY MEDICINE

## 2024-06-11 PROCEDURE — 36415 COLL VENOUS BLD VENIPUNCTURE: CPT | Performed by: FAMILY MEDICINE

## 2024-06-11 PROCEDURE — 3074F SYST BP LT 130 MM HG: CPT | Mod: CPTII,S$GLB,, | Performed by: FAMILY MEDICINE

## 2024-06-11 PROCEDURE — 84153 ASSAY OF PSA TOTAL: CPT | Performed by: FAMILY MEDICINE

## 2024-06-11 NOTE — PROGRESS NOTES
Subjective:   Patient ID: Caio Ontiveros is a 58 y.o. male.  Chief Complaint:  Follow-up    Presents for annual physical exam   Last visit 5/20/2024 for hospital discharge follow-up   Has continued to do well since hospital discharge without any significant decompensation of chronic medical conditions    Medical History:  - Subclinical hypothyroidism.  On Synthroid 50 mcg daily.  Reports compliance.  Denies side effects.  Denies symptoms hypo or hyperthyroidism.  Needs repeat TSH.  - Prediabetes and morbid obesity.  Last A1c 5.7%.  No medications.  Denies symptoms hypo or hyperglycemia.  Needs repeat A1c.  - Hypertension with CHF..  On losartan 100 mg daily and Toprol- mg daily and Demadex 20 mg 2 tablets twice a day, and potassium 20 mEq twice a day.  Reports compliance.  Denies side effects.  No increased shortness of breath or swelling.  - Chronic atrial fibrillation on chronic anticoagulation.  On Eliquis 5 mg twice a day in addition to antihypertensives above.  Reports compliance.  Denies side effects.   Overall stable without any active signs of volume overload.  - Gout.  Allopurinol discontinued in hospital.  On colchicine 0.6 mg daily. Stable.  No recent acute exacerbation.  Needs uric acid.  - Psoriasis.  Followed by Dermatology. On Taltz injection  - Depression.  Stable.  On Effexor  mg total daily dose.  No longer on Abilify. Reports compliance.  Denies side effects.  Mood stable   - Thrombocytopenia and neutropenia.  Stable white blood cell count last CBC.  Stable platelet count greater than 100 on last CBC.  No increased risk of bleeding.  - Chronic low back pain with sciatica sacral spine.  Followed by Internal pain management.  On Robaxin 500 mg 3 times a day and Lyrica 100 mg twice a day.  Reports compliance.  Denies side effects.  Pain only mildly improved and still present since last visit.     Health maintenance needs include shingles vaccine series, COVID booster, and pneumonia  "vaccine.    No new complaints today        Current Outpatient Medications:     colchicine (COLCRYS) 0.6 mg tablet, Take 1 tablet (0.6 mg total) by mouth once daily., Disp: 90 tablet, Rfl: 1    ELIQUIS 5 mg Tab, TAKE 1 TABLET BY MOUTH  TWICE DAILY, Disp: 180 tablet, Rfl: 3    levothyroxine (SYNTHROID) 50 MCG tablet, Take 1 tablet (50 mcg total) by mouth before breakfast., Disp: 90 tablet, Rfl: 3    losartan (COZAAR) 100 MG tablet, TAKE 1 TABLET BY MOUTH ONCE  DAILY, Disp: 90 tablet, Rfl: 2    methocarbamoL (ROBAXIN) 500 MG Tab, Take 1 tablet (500 mg total) by mouth 3 (three) times daily as needed., Disp: 60 tablet, Rfl: 5    metoprolol succinate (TOPROL-XL) 100 MG 24 hr tablet, Take 1 tablet (100 mg total) by mouth once daily., Disp: 90 tablet, Rfl: 3    potassium chloride SA (K-DUR,KLOR-CON) 20 MEQ tablet, TAKE 1 TABLET BY MOUTH TWICE  DAILY, Disp: 180 tablet, Rfl: 3    pregabalin (LYRICA) 100 MG capsule, Take 1 capsule (100 mg total) by mouth 2 (two) times daily., Disp: 60 capsule, Rfl: 2    TALTZ AUTOINJECTOR 80 mg/mL AtIn, INJECT 80MG (1 PEN) UNDER THE SKIN EVERY 4 WEEKS, Disp: 1 mL, Rfl: 0    torsemide (DEMADEX) 20 MG Tab, Take 2 tablets (40 mg total) by mouth 2 (two) times a day., Disp: 360 tablet, Rfl: 3    venlafaxine (EFFEXOR-XR) 150 MG Cp24, Take 1 capsule (150 mg total) by mouth once daily., Disp: 90 capsule, Rfl: 3    venlafaxine (EFFEXOR-XR) 75 MG 24 hr capsule, Take 1 capsule (75 mg total) by mouth once daily., Disp: 90 capsule, Rfl: 3    Review of Systems   Musculoskeletal:  Positive for arthralgias, back pain, gait problem and myalgias.       Objective:   /84 (BP Location: Left arm, BP Method: Large (Manual))   Pulse 81   Ht 6' 3" (1.905 m)   Wt (!) 192.7 kg (424 lb 13.2 oz)   SpO2 (!) 87%   BMI 53.10 kg/m²     Physical Exam  Vitals and nursing note reviewed.   Constitutional:       General: He is not in acute distress.     Appearance: Normal appearance. He is well-developed. He is morbidly " obese. He is not ill-appearing or toxic-appearing.      Comments:      Eyes:      General: No scleral icterus.     Conjunctiva/sclera: Conjunctivae normal.   Neck:      Thyroid: No thyroid mass, thyromegaly or thyroid tenderness.      Vascular: Normal carotid pulses. No carotid bruit or JVD.   Cardiovascular:      Rate and Rhythm: Normal rate. Rhythm irregular.      Heart sounds: Normal heart sounds. No murmur heard.     No friction rub. No gallop.   Pulmonary:      Effort: Pulmonary effort is normal. No tachypnea, accessory muscle usage or respiratory distress.      Breath sounds: Normal breath sounds. No decreased breath sounds, wheezing, rhonchi or rales.   Abdominal:      General: There is no distension.      Palpations: Abdomen is soft.      Tenderness: There is no abdominal tenderness. There is no guarding or rebound.   Musculoskeletal:      Right lower leg: Edema present.      Left lower leg: Edema present.   Lymphadenopathy:      Cervical: No cervical adenopathy.   Skin:     General: Skin is warm and dry.      Findings: No rash.   Neurological:      Mental Status: He is alert and oriented to person, place, and time.      Coordination: Coordination is intact.      Gait: Gait is intact.   Psychiatric:         Attention and Perception: Attention normal. He is attentive. He does not perceive auditory or visual hallucinations.         Mood and Affect: Mood and affect normal.         Speech: Speech normal. He is communicative. Speech is not rapid and pressured, delayed, slurred or tangential.         Behavior: Behavior is not agitated, slowed, aggressive, withdrawn, hyperactive or combative. Behavior is cooperative.         Thought Content: Thought content normal.         Cognition and Memory: Cognition normal.         Assessment:       ICD-10-CM ICD-9-CM   1. Annual physical exam  Z00.00 V70.0   2. Screening for prostate cancer  Z12.5 V76.44   3. Essential hypertension  I10 401.9   4. Chronic diastolic congestive  heart failure  I50.32 428.32     428.0   5. Chronic right-sided heart failure  I50.812 428.0   6. Chronic atrial fibrillation  I48.20 427.31   7. Chronic anticoagulation  Z79.01 V58.61   8. Prediabetes  R73.03 790.29   9. Subclinical hypothyroidism  E03.8 244.8   10. Chronic benign neutropenia  D70.8 288.09   11. Thrombocytopenia, unspecified  D69.6 287.5   12. Moderate episode of recurrent major depressive disorder  F33.1 296.32   13. Irritability and anger  R45.4 799.22   14. Psychophysiological insomnia  F51.04 307.42   15. Psoriatic arthritis  L40.50 696.0   16. Idiopathic chronic gout of multiple sites without tophus  M1A.09X0 274.02   17. Chronic bilateral low back pain with sciatica, sciatica laterality unspecified  M54.40 724.2    G89.29 724.3     338.29   18. Facet arthritis of lumbosacral region  M47.817 721.3   19. DDD (degenerative disc disease), lumbosacral  M51.37 722.52     Plan:   Annual physical exam  Screening for prostate cancer  -     PSA, Screening; Future; Expected date: 06/11/2024  -     CBC Without Differential; Future; Expected date: 06/11/2024  -     Comprehensive Metabolic Panel; Future; Expected date: 06/11/2024  -     Lipid Panel; Future; Expected date: 06/11/2024  -     TSH; Future; Expected date: 06/11/2024  -     Hemoglobin A1C; Future; Expected date: 06/11/2024  -     PSA, Screening; Future; Expected date: 06/11/2024  Blood pressure normal.  BMI 53.  Overall exam stable.    Check labs.  Treat as indicated.    Colon cancer screening up-to-date   Prostate cancer screening today  Declines recommended vaccines    Essential hypertension  Chronic diastolic congestive heart failure  Chronic right-sided heart failure  Controlled.  Stable.  Asymptomatic.  BP at goal.    Continue current medications     Chronic atrial fibrillation  Chronic anticoagulation  Rate controlled rhythm   Asymptomatic   Continue current hypertension medications   Continue chronic anticoagulation with Eliquis    Follow-up cardiology as scheduled     Prediabetes  -     Hemoglobin A1C; Future; Expected date: 06/11/2024  Asymptomatic   Check A1c   If greater than 6.5%, start weekly GLP 1 injections    Subclinical hypothyroidism  -     TSH; Future; Expected date: 06/11/2024  Asymptomatic   Check TSH   Adjust Synthroid 50 mcg daily as needed    Chronic benign neutropenia  Thrombocytopenia, unspecified  -     CBC Without Differential; Future; Expected date: 06/11/2024  Recheck CBC today     Moderate episode of recurrent major depressive disorder  Irritability and anger  Psychophysiological insomnia  Mood stable and reasonably controlled on current regimen   Continue Effexor 225 mg daily dose     Psoriatic arthritis  Idiopathic chronic gout of multiple sites without tophus  -     Uric Acid; Future; Expected date: 06/11/2024  Continue told   Continue colchicine   Follow-up urology as scheduled     Chronic low back pain with degenerative disc disease and facet arthritis   Continue Robaxin and Lyrica per interventional pain management   Follow-up interventional pain management as scheduled     Return to clinic 1 year for annual physical exam or sooner as needed

## 2024-06-12 LAB
ERYTHROCYTE [DISTWIDTH] IN BLOOD BY AUTOMATED COUNT: 13.9 % (ref 11.5–14.5)
HCT VFR BLD AUTO: 45.9 % (ref 40–54)
HGB BLD-MCNC: 14.9 G/DL (ref 14–18)
MCH RBC QN AUTO: 30.3 PG (ref 27–31)
MCHC RBC AUTO-ENTMCNC: 32.5 G/DL (ref 32–36)
MCV RBC AUTO: 94 FL (ref 82–98)
PLATELET # BLD AUTO: 107 K/UL (ref 150–450)
PMV BLD AUTO: ABNORMAL FL (ref 9.2–12.9)
RBC # BLD AUTO: 4.91 M/UL (ref 4.6–6.2)
URATE SERPL-MCNC: 10.4 MG/DL (ref 3.4–7)
WBC # BLD AUTO: 2.6 K/UL (ref 3.9–12.7)

## 2024-06-14 DIAGNOSIS — I10 ESSENTIAL HYPERTENSION: Chronic | ICD-10-CM

## 2024-06-15 RX ORDER — LOSARTAN POTASSIUM 100 MG/1
100 TABLET ORAL
Qty: 90 TABLET | Refills: 3 | Status: SHIPPED | OUTPATIENT
Start: 2024-06-15

## 2024-06-15 NOTE — TELEPHONE ENCOUNTER
Refill Decision Note   Caio Ontiveros  is requesting a refill authorization.  Brief Assessment and Rationale for Refill:  Approve     Medication Therapy Plan:        Comments:     Note composed:1:47 AM 06/15/2024

## 2024-06-15 NOTE — TELEPHONE ENCOUNTER
No care due was identified.  [facility] Embedded Care Due Messages. Reference number: [id number].   6/14/2024 9:08:53 PM CDT

## 2024-06-20 ENCOUNTER — OFFICE VISIT (OUTPATIENT)
Dept: CARDIOLOGY | Facility: CLINIC | Age: 59
End: 2024-06-20
Payer: MEDICARE

## 2024-06-20 VITALS
HEIGHT: 75 IN | BODY MASS INDEX: 39.17 KG/M2 | DIASTOLIC BLOOD PRESSURE: 100 MMHG | SYSTOLIC BLOOD PRESSURE: 160 MMHG | OXYGEN SATURATION: 87 % | HEART RATE: 90 BPM | WEIGHT: 315 LBS

## 2024-06-20 DIAGNOSIS — I50.32 CHRONIC DIASTOLIC CONGESTIVE HEART FAILURE: Primary | ICD-10-CM

## 2024-06-20 DIAGNOSIS — I48.20 CHRONIC ATRIAL FIBRILLATION: ICD-10-CM

## 2024-06-20 DIAGNOSIS — I27.20 PULMONARY HYPERTENSION: ICD-10-CM

## 2024-06-20 DIAGNOSIS — R73.03 PREDIABETES: Chronic | ICD-10-CM

## 2024-06-20 DIAGNOSIS — I83.10 VARICOSE VEINS OF LOWER EXTREMITY WITH INFLAMMATION, UNSPECIFIED LATERALITY: ICD-10-CM

## 2024-06-20 DIAGNOSIS — I10 ESSENTIAL HYPERTENSION: Chronic | ICD-10-CM

## 2024-06-20 DIAGNOSIS — I07.1 MODERATE TRICUSPID VALVE REGURGITATION: ICD-10-CM

## 2024-06-20 DIAGNOSIS — E66.01 MORBID OBESITY WITH BMI OF 50.0-59.9, ADULT: Chronic | ICD-10-CM

## 2024-06-20 PROCEDURE — 3080F DIAST BP >= 90 MM HG: CPT | Mod: CPTII,S$GLB,, | Performed by: INTERNAL MEDICINE

## 2024-06-20 PROCEDURE — 3044F HG A1C LEVEL LT 7.0%: CPT | Mod: CPTII,S$GLB,, | Performed by: INTERNAL MEDICINE

## 2024-06-20 PROCEDURE — 1160F RVW MEDS BY RX/DR IN RCRD: CPT | Mod: CPTII,S$GLB,, | Performed by: INTERNAL MEDICINE

## 2024-06-20 PROCEDURE — 3077F SYST BP >= 140 MM HG: CPT | Mod: CPTII,S$GLB,, | Performed by: INTERNAL MEDICINE

## 2024-06-20 PROCEDURE — 99999 PR PBB SHADOW E&M-EST. PATIENT-LVL III: CPT | Mod: PBBFAC,,, | Performed by: INTERNAL MEDICINE

## 2024-06-20 PROCEDURE — 1159F MED LIST DOCD IN RCRD: CPT | Mod: CPTII,S$GLB,, | Performed by: INTERNAL MEDICINE

## 2024-06-20 PROCEDURE — 99214 OFFICE O/P EST MOD 30 MIN: CPT | Mod: S$GLB,,, | Performed by: INTERNAL MEDICINE

## 2024-06-20 PROCEDURE — 4010F ACE/ARB THERAPY RXD/TAKEN: CPT | Mod: CPTII,S$GLB,, | Performed by: INTERNAL MEDICINE

## 2024-06-20 PROCEDURE — 3008F BODY MASS INDEX DOCD: CPT | Mod: CPTII,S$GLB,, | Performed by: INTERNAL MEDICINE

## 2024-06-20 NOTE — PROGRESS NOTES
Subjective:   Patient ID:  Caio Ontiveros is a 58 y.o. male who presents for follow up of No chief complaint on file.      57 yo male, came in for 6 months f/u  PMH CHFpEF, chroic Afib for 7 yrs, obesity s/p gastric bypass in 2014. EVANGELIST on cpap for 5 yrs   ECHO in 2017 EF 55%  09/06/2020 went to ER at [facility] due to worsening dyspnea. BNP 1100, troponin 0.036, CXR mild edema, ekg showed afib. Had IV lasix  And good diuresis.    ChestCT w/o multiple nodule     visit states that sleeps on 2 pillows, + PND snores,  SOb, palpitation  No chest pain, dizziness  Did drink a lot of water  His wife cooks at home and f/u low sodium protocol   Lasix was d/c and Bumex 2mg tid added  Pharmacist only gave him 1 mg tid. Went to ER 2 days ago. EKG AFIB and HR 91 bpm. BNP and BMP no change  CXR no acute change. Had Lasix IV and d/c  Lost 6 pounds in 1 week     admitted for CHF exacerbation and had IV diuretics.  ECHo showed RV moderatelt dilated and RV function decreased. PAP 55 mmHG  Slight elevation of troponin to 0.036  BNPelevatde    01/04/2022 visit  BNP was 1200 in  and down to 83 on 12/16/2021. Then lasix 40 mg bid was d/c'ed by some reason.  Sleeps on 2 pillows. And NO PND  Edema elevated to the pelvic area.   Echo in  normal EF. Dilated RV chamber with mod RV failure and pulm HTN     visit  BP high. On Lasix 40 mg bid, no change of weight. GAYTAN stable. No chest pain palpitation and faint      visit  ekg AFIB and BP high  No dizziness faint chest pain dyspnea,      visit telemed  Taking Lasix 40 mg bid and remains SOB occasionally. No dizziness faint   in 06/23. Weight stable.   Some leg swelling. Sleeps on 2 pillows. On CPAP  Gained 40 lbs in 1 yr    07/23 visit telemed  On torsemide 40 mg in AM and 20 in PM and toprolol up to 100 mg daily, BP pulse controlled  Some fatigue, remain mild SOB. No orthopnea    01/24 visit  EKG afib VR C. No orthopnea PND, on  cpap.no chest pain dizziness. On compression socks    Interval history  BP controlled at home 120/80 mmHG. AFIB VR C  Could not afford GLP-1 RA.  Weight stable/ h/o gastric sleeve                       Past Medical History:   Diagnosis Date    A-fib     CHF (congestive heart failure)     Gout, chronic     Hypertension     Sleep apnea        Past Surgical History:   Procedure Laterality Date    CHOLECYSTECTOMY      GASTRIC BYPASS  2014    INJECTION OF JOINT Left 8/23/2023    Procedure: Left IA Hip Joint injection;  Surgeon: Peña Rausch MD;  Location: [facility];  Service: Pain Management;  Laterality: Left;       Social History     Tobacco Use    Smoking status: Never    Smokeless tobacco: Never   Substance Use Topics    Alcohol use: Not Currently    Drug use: No       Family History   Problem Relation Name Age of Onset    Hypertension Mother      Stroke Father      Diabetes Father      Hypertension Father      Diabetes Sister      Diabetes Brother           ROS    Objective:   Physical Exam  Constitutional:       Appearance: He is obese.   HENT:      Head: Normocephalic.   Eyes:      Pupils: Pupils are equal, round, and reactive to light.   Neck:      Thyroid: No thyromegaly.      Vascular: Normal carotid pulses. No carotid bruit or JVD.   Cardiovascular:      Rate and Rhythm: Tachycardia present. Rhythm irregularly irregular. No extrasystoles are present.     Chest Wall: PMI is not displaced.      Pulses: Normal pulses.      Heart sounds: Normal heart sounds. No murmur heard.     No gallop. No S3 sounds.   Pulmonary:      Effort: No respiratory distress.      Breath sounds: No stridor. Rales present.   Abdominal:      General: Bowel sounds are normal.      Palpations: Abdomen is soft.      Tenderness: There is no abdominal tenderness. There is no rebound.   Musculoskeletal:         General: Swelling present. Normal range of motion.   Skin:     Findings: No rash.   Neurological:      Mental Status: He  is alert and oriented to person, place, and time.   Psychiatric:         Behavior: Behavior normal.         Lab Results   Component Value Date    CHOL 185 06/11/2024    CHOL 192 05/16/2023    CHOL 186 02/16/2022     Lab Results   Component Value Date    HDL 44 06/11/2024    HDL 46 05/16/2023    HDL 49 02/16/2022     Lab Results   Component Value Date    LDLCALC 123.2 06/11/2024    LDLCALC 127.6 05/16/2023    LDLCALC 119.8 02/16/2022     Lab Results   Component Value Date    TRIG 89 06/11/2024    TRIG 92 05/16/2023    TRIG 86 02/16/2022     Lab Results   Component Value Date    CHOLHDL 23.8 06/11/2024    CHOLHDL 24.0 05/16/2023    CHOLHDL 26.3 02/16/2022       Chemistry        Component Value Date/Time     06/11/2024 1550    K 4.3 06/11/2024 1550     06/11/2024 1550    CO2 26 06/11/2024 1550    BUN 23 (H) 06/11/2024 1550    CREATININE 1.3 06/11/2024 1550    GLU 93 06/11/2024 1550        Component Value Date/Time    CALCIUM 9.2 06/11/2024 1550    ALKPHOS 76 06/11/2024 1550    AST 22 06/11/2024 1550    ALT 12 06/11/2024 1550    BILITOT 0.7 06/11/2024 1550    ESTGFRAFRICA >60.0 02/22/2022 1702    EGFRNONAA >60.0 02/22/2022 1702          Lab Results   Component Value Date    HGBA1C 5.6 06/11/2024     Lab Results   Component Value Date    TSH 3.581 06/11/2024     Lab Results   Component Value Date    INR 1.2 09/06/2020    INR 1.0 02/27/2010    INR 1.0 02/26/2010     Lab Results   Component Value Date    WBC 2.60 (L) 06/11/2024    HGB 14.9 06/11/2024    HCT 45.9 06/11/2024    MCV 94 06/11/2024     (L) 06/11/2024     BMP  Sodium   Date Value Ref Range Status   06/11/2024 143 136 - 145 mmol/L Final     Potassium   Date Value Ref Range Status   06/11/2024 4.3 3.5 - 5.1 mmol/L Final     Chloride   Date Value Ref Range Status   06/11/2024 105 95 - 110 mmol/L Final     CO2   Date Value Ref Range Status   06/11/2024 26 23 - 29 mmol/L Final     BUN   Date Value Ref Range Status   06/11/2024 23 (H) 6 - 20 mg/dL  Final     Creatinine   Date Value Ref Range Status   06/11/2024 1.3 0.5 - 1.4 mg/dL Final     Calcium   Date Value Ref Range Status   06/11/2024 9.2 8.7 - 10.5 mg/dL Final     Anion Gap   Date Value Ref Range Status   06/11/2024 12 8 - 16 mmol/L Final     eGFR if    Date Value Ref Range Status   02/22/2022 >60.0 >60 mL/min/1.73 m^2 Final     eGFR if non    Date Value Ref Range Status   02/22/2022 >60.0 >60 mL/min/1.73 m^2 Final     Comment:     Calculation used to obtain the estimated glomerular filtration  rate (eGFR) is the CKD-EPI equation.        BNP  @LABRCNTIP(BNP,BNPTRIAGEBLO)@  @LABRCNTIP(troponini)@  CrCl cannot be calculated (Patient's most recent lab result is older than the maximum 7 days allowed.).  No results found in the last 24 hours.  No results found in the last 24 hours.  No results found in the last 24 hours.    Assessment:      1. Chronic diastolic congestive heart failure    2. Chronic atrial fibrillation    3. Essential hypertension    4. Moderate tricuspid valve regurgitation    5. Pulmonary hypertension    6. Varicose veins of lower extremity with inflammation, unspecified laterality    7. Morbid obesity with BMI of 50.0-59.9, adult    8. Prediabetes        Plan:   Continue Torsemide 40 mg bid losartan toprolXL eliquis   Refer to wellness for obesity    Counseled DASH  Check Lipid profile with PCP in 6 months  Recommend heart-healthy diet, weight control   [place]. Risk modification.   I have reviewed all pertinent labs and cardiac studies independently. Plans and recommendations have been formulated under my direct supervision. All questions answered and patient voiced understanding.   If symptoms persist go to the ED  RTC in 6 months

## 2024-06-24 ENCOUNTER — OFFICE VISIT (OUTPATIENT)
Dept: PAIN MEDICINE | Facility: CLINIC | Age: 59
End: 2024-06-24
Payer: MEDICARE

## 2024-06-24 DIAGNOSIS — M54.50 DORSALGIA OF LUMBAR REGION: ICD-10-CM

## 2024-06-24 DIAGNOSIS — M47.817 FACET ARTHRITIS OF LUMBOSACRAL REGION: Primary | ICD-10-CM

## 2024-06-24 DIAGNOSIS — M51.37 DDD (DEGENERATIVE DISC DISEASE), LUMBOSACRAL: ICD-10-CM

## 2024-06-24 PROCEDURE — 99214 OFFICE O/P EST MOD 30 MIN: CPT | Mod: 95,,, | Performed by: PHYSICIAN ASSISTANT

## 2024-06-24 PROCEDURE — 3044F HG A1C LEVEL LT 7.0%: CPT | Mod: CPTII,95,, | Performed by: PHYSICIAN ASSISTANT

## 2024-06-24 PROCEDURE — 4010F ACE/ARB THERAPY RXD/TAKEN: CPT | Mod: CPTII,95,, | Performed by: PHYSICIAN ASSISTANT

## 2024-06-24 RX ORDER — PREGABALIN 150 MG/1
150 CAPSULE ORAL 2 TIMES DAILY
Qty: 60 CAPSULE | Refills: 1 | Status: SHIPPED | OUTPATIENT
Start: 2024-06-24

## 2024-06-24 RX ORDER — METHOCARBAMOL 750 MG/1
750 TABLET, FILM COATED ORAL 3 TIMES DAILY PRN
Qty: 60 TABLET | Refills: 1 | Status: SHIPPED | OUTPATIENT
Start: 2024-06-24

## 2024-06-24 NOTE — PROGRESS NOTES
Established Patient - TeleHealth Visit    The patient location is: [place]  The chief complaint leading to consultation is: chronic pain     Visit type: audiovisual    Face to Face time with patient: 11-15 minutes  20 minutes of total time spent on the encounter, which includes face to face time and non-face to face time preparing to see the patient (eg, review of tests), Obtaining and/or reviewing separately obtained history, Documenting clinical information in the electronic or other health record, Independently interpreting results (not separately reported) and communicating results to the patient/family/caregiver, or Care coordination (not separately reported).     Each patient to whom he or she provides medical services by telemedicine is:  (1) informed of the relationship between the physician and patient and the respective role of any other health care provider with respect to management of the patient; and (2) notified that he or she may decline to receive medical services by telemedicine and may withdraw from such care at any time.      Referring Physician: No ref. provider found    PCP: Dominick Allen MD       SUBJECTIVE:    Interval History (6/24/2024):  Caio Ontiveros presents today for follow-up visit.  Patient was last seen on 3/22/2024. Patient reports that he is experiencing more pain when moving around and walking. He localizes his pain across lower back and with LLE. He also c/o stiffness in his lower back. Patient reports pain as 8/10 today. Since his last visit, Lyrica was increased from 75 mg QHS to 100 mg BID. He is also taking Robaxin 500 mg.       Interval History (3/22/2024):  Caio Ontiveros presents today for follow-up visit.  Patient was last seen on 8/23/2023 for Left IA hip joint injection with 80% relief.  He reports his hip pain is doing better however he does have some lower back pain which seems to be getting worse.  The pain remains in the back and does not radiate into the legs.  Prolonged  standing and extending back make the pain worse.  If he leans over and wrists with surface bending forward he will get some relief of his pain.  He rates his pain today an 8/10.  No new injury.  He is requesting to try a different medication to try to help with his pain and is leery of doing any procedures on his back at this time.  He has not done formal physical therapy because he can not afford it at this time and he also does not think he would be able to tolerate it at this time.  He has been doing home exercises and stretches and is trying to work on weight loss because he feels this will help his pain as well.  Patient denies night fever/night sweats, urinary incontinence, bowel incontinence, significant weight loss and significant motor weakness.   Patient denies any other complaints or concerns at this time.        Interval history 8/9/2023  Caio Ontiveros is a 58 y.o. male who presents to the clinic for the evaluation of chronic lower back pain for years that gradually got worse but has become severe over the past several months.  It is affecting his ADLs significantly including the ability to walk and stand.  It is located in the left buttock and radiates to the left groin and is worse with weight-bearing.  There is no radicular pain tingling or numbness to the legs but he reports weakness in the left leg versus due to severe pain.  Denies loss of bowel or bladder control.  It is worse with walking, standing, getting up from the bed or chair and is better (slightly) with medications including Tylenol Arthritis and gabapentin which he takes at 300 mg daily.  He could not afford the co-pay for physical therapy, not well covered by his insurance. He currently rates it at 10/10.  He gives it a 6/10 when it is at best.  He has history of extreme morbid obesity and lost significant weight after gastric bypass surgery in 2014 dropping down from 500 lb 340 lb.  He managed to gain some weight back and he is around 400  lb.  He has no history of kidney stones, glaucoma or sulfa allergies.      Pain Disability Index Review:         8/9/2023     2:11 PM   Last 3 PDI Scores   Pain Disability Index (PDI) 62        report:  Not applicable    Pain Procedures:   8/23/2023 Left IA hip joint injection with 80% relief      Past Medical History:   Diagnosis Date    A-fib     CHF (congestive heart failure)     Gout, chronic     Hypertension     Sleep apnea      Past Surgical History:   Procedure Laterality Date    CHOLECYSTECTOMY      GASTRIC BYPASS  2014    INJECTION OF JOINT Left 8/23/2023    Procedure: Left IA Hip Joint injection;  Surgeon: Peña Rausch MD;  Location: [facility];  Service: Pain Management;  Laterality: Left;     Social History     Socioeconomic History    Marital status:     Number of children: 1   Tobacco Use    Smoking status: Never    Smokeless tobacco: Never   Substance and Sexual Activity    Alcohol use: Not Currently    Drug use: No    Sexual activity: Not Currently     Social Determinants of Health     Financial Resource Strain: Low Risk  (1/8/2024)    Overall Financial Resource Strain (CARDIA)     Difficulty of Paying Living Expenses: Not hard at all   Food Insecurity: No Food Insecurity (1/8/2024)    Hunger Vital Sign     Worried About Running Out of Food in the Last Year: Never true     Ran Out of Food in the Last Year: Never true   Transportation Needs: No Transportation Needs (1/8/2024)    PRAPARE - Transportation     Lack of Transportation (Medical): No     Lack of Transportation (Non-Medical): No   Physical Activity: Inactive (1/8/2024)    Exercise Vital Sign     Days of Exercise per Week: 0 days     Minutes of Exercise per Session: 0 min   Stress: Stress Concern Present (1/8/2024)    [employer] - Occupational Stress Questionnaire     Feeling of Stress : To some extent   Housing Stability: Low Risk  (1/8/2024)    Housing Stability Vital Sign     Unable to Pay for  Housing in the Last Year: No     Number of Places Lived in the Last Year: 1     Unstable Housing in the Last Year: No     Family History   Problem Relation Name Age of Onset    Hypertension Mother      Stroke Father      Diabetes Father      Hypertension Father      Diabetes Sister      Diabetes Brother         Review of patient's allergies indicates:  No Known Allergies    Current Outpatient Medications   Medication Sig    colchicine (COLCRYS) 0.6 mg tablet Take 1 tablet (0.6 mg total) by mouth once daily.    ELIQUIS 5 mg Tab TAKE 1 TABLET BY MOUTH  TWICE DAILY    levothyroxine (SYNTHROID) 50 MCG tablet Take 1 tablet (50 mcg total) by mouth before breakfast.    losartan (COZAAR) 100 MG tablet TAKE 1 TABLET BY MOUTH ONCE  DAILY    methocarbamoL (ROBAXIN) 500 MG Tab Take 1 tablet (500 mg total) by mouth 3 (three) times daily as needed.    metoprolol succinate (TOPROL-XL) 100 MG 24 hr tablet Take 1 tablet (100 mg total) by mouth once daily.    potassium chloride SA (K-DUR,KLOR-CON) 20 MEQ tablet TAKE 1 TABLET BY MOUTH TWICE  DAILY    pregabalin (LYRICA) 100 MG capsule Take 1 capsule (100 mg total) by mouth 2 (two) times daily.    TALTZ AUTOINJECTOR 80 mg/mL AtIn INJECT 80MG (1 PEN) UNDER THE SKIN EVERY 4 WEEKS    torsemide (DEMADEX) 20 MG Tab Take 2 tablets (40 mg total) by mouth 2 (two) times a day.    venlafaxine (EFFEXOR-XR) 150 MG Cp24 Take 1 capsule (150 mg total) by mouth once daily.    venlafaxine (EFFEXOR-XR) 75 MG 24 hr capsule Take 1 capsule (75 mg total) by mouth once daily.     No current facility-administered medications for this visit.       Review of Systems     GENERAL:  No weight loss, malaise or fevers.  HEENT:   No recent changes in vision or hearing  NECK:  Negative for lumps, no difficulty with swallowing.  RESPIRATORY:  Negative for cough, wheezing or shortness of breath, patient denies any recent URI.  CARDIOVASCULAR:  Negative for chest pain, positive for fluid retention and leg swelling  GI:   Negative for abdominal discomfort, blood in stools or black stools or change in bowel habits.  MUSCULOSKELETAL:  See HPI.  SKIN:  Negative for lesions, rash, and itching.  PSYCH:  No mood disorder or recent psychosocial stressors.  Patients sleep is not disturbed secondary to pain.  HEMATOLOGY/LYMPHOLOGY:  Negative for prolonged bleeding, bruising easily or swollen nodes.  Patient is not currently taking any anti-coagulants  NEURO:   No history of headaches, syncope, paralysis, seizures or tremors.  All other reviewed and negative other than HPI.    OBJECTIVE:  Telemedicine Exam  There were no vitals filed for this visit.  There is no height or weight on file to calculate BMI.   (reviewed on 6/24/2024)     GENERAL: Well appearing, in no acute distress, alert and oriented x3.  Cooperative.  PSYCH:  Mood and affect appropriate.  SKIN: Skin color & texture with no obvious abnormalities.    HEAD/FACE:  Normocephalic, atraumatic.    PULM:  No difficulty breathing. No nasal flaring. No obvious wheezing.  EXTREMITIES: No obvious deformities. Moving all extremities well, appears to have symmetric strength throughout.  MUSCULOSKELETAL: No obvious atrophy abnormalities are noted.   NEURO: No obvious neurologic deficit.   GAIT: sitting.     Physical Exam: last in clinic visit:      Physical Exam    GENERAL: Well appearing, in no acute distress, but in discomfort when trying to walk, alert and oriented x3.  Morbidly obese  PSYCH:  Mood and affect appropriate.  SKIN: Skin color, texture, turgor normal, no rashes or lesions.  HEAD/FACE:  Normocephalic, atraumatic.  CV: chronic edema changes in distal legs and ankles.  PULM: No evidence of respiratory difficulty, symmetric chest rise.  GI:  Abdomen soft and non-tender.    GAIT: slow antalgic.    LUMBAR SPINE:   Palpation:  Mild tenderness over left facet joints and paraspinous muscles. No trigger points.  ROM: Pain with flexion, extension and rotation.  Straight leg raising is  negative.  SI JOINTS: bilateral SI joint, no tenderness  LEFT HIP:  SEVERELY RESTRICTED AND PAINFUL RANGE OF MOTION including internal, external rotation, abduction and flexion.  NEURO:   MOTOR: Bilateral lower extremity muscle strength is normal. No atrophy or tone abnormalities are noted.  SENSORY: No loss of sensation in L3 through S1 dermatomes bilaterally.  DTR's: No clonus.    Imaging:        Results for orders placed during the hospital encounter of 12/28/22    X-Ray Lumbar Spine AP And Lateral    Narrative  EXAMINATION:  XR LUMBAR SPINE AP AND LATERAL    CLINICAL HISTORY:  Low back pain, unspecified    TECHNIQUE:  AP, lateral and spot images were performed of the lumbar spine.    COMPARISON:  Radiographs from August 2020    FINDINGS:  No scoliosis.  Five lumbar type vertebral bodies noted.  Bridging syndesmophytes noted at multiple levels. There is no fracture or listhesis.  Minimal narrowing at L3-L4 and L5-S1 is unchanged.  There is also minimal lower lumbar facet arthropathy.  No significant change from prior study.      Electronically signed by: Emory Bah MD  Date:    12/28/2022    XR SACROILIAC JOINTS 3 VIEWS     CLINICAL HISTORY:  mechanical lower back pain, psoriasis acitive.  Assess for sacroiilitis/ankylosis;  Low back pain     COMPARISON:  None     FINDINGS:  Degenerative changes noted in the included lumbosacral spine and hips.  No fracture or dislocation.  Pelvic ring is intact.  SI joints normal and symmetric in appearance bilaterally.  No sclerotic or erosive changes appreciated.  Heterogeneous increased density identified within the femoral heads.  There is spurring and buttressing of the femoral neck cortex consistent with degenerative changes.  Subchondral degenerative cyst/geodes cannot be excluded bilaterally.     Impression:  I reviewed the films which show moderate to severe degenerative and sclerotic changes of the left femoral head and acetabulum, worse than the right.  SI joints  within normal limits.  Degenerative changes lumbosacral spine and hips.        Electronically signed by: Mraty Lee MD  Date:                                            08/25/2020      LABS:  Lab Results   Component Value Date    WBC 2.60 (L) 06/11/2024    HGB 14.9 06/11/2024    HCT 45.9 06/11/2024    MCV 94 06/11/2024     (L) 06/11/2024       CMP  Sodium   Date Value Ref Range Status   06/11/2024 143 136 - 145 mmol/L Final     Potassium   Date Value Ref Range Status   06/11/2024 4.3 3.5 - 5.1 mmol/L Final     Chloride   Date Value Ref Range Status   06/11/2024 105 95 - 110 mmol/L Final     CO2   Date Value Ref Range Status   06/11/2024 26 23 - 29 mmol/L Final     Glucose   Date Value Ref Range Status   06/11/2024 93 70 - 110 mg/dL Final     BUN   Date Value Ref Range Status   06/11/2024 23 (H) 6 - 20 mg/dL Final     Creatinine   Date Value Ref Range Status   06/11/2024 1.3 0.5 - 1.4 mg/dL Final     Calcium   Date Value Ref Range Status   06/11/2024 9.2 8.7 - 10.5 mg/dL Final     Total Protein   Date Value Ref Range Status   06/11/2024 8.1 6.0 - 8.4 g/dL Final     Albumin   Date Value Ref Range Status   06/11/2024 4.1 3.5 - 5.2 g/dL Final     Total Bilirubin   Date Value Ref Range Status   06/11/2024 0.7 0.1 - 1.0 mg/dL Final     Comment:     For infants and newborns, interpretation of results should be based  on gestational age, weight and in agreement with clinical  observations.    Premature Infant recommended reference ranges:  Up to 24 hours.............<8.0 mg/dL  Up to 48 hours............<12.0 mg/dL  3-5 days..................<15.0 mg/dL  6-29 days.................<15.0 mg/dL       Alkaline Phosphatase   Date Value Ref Range Status   06/11/2024 76 55 - 135 U/L Final     AST   Date Value Ref Range Status   06/11/2024 22 10 - 40 U/L Final     ALT   Date Value Ref Range Status   06/11/2024 12 10 - 44 U/L Final     Anion Gap   Date Value Ref Range Status   06/11/2024 12 8 - 16 mmol/L Final     eGFR if     Date Value Ref Range Status   02/22/2022 >60.0 >60 mL/min/1.73 m^2 Final     eGFR if non    Date Value Ref Range Status   02/22/2022 >60.0 >60 mL/min/1.73 m^2 Final     Comment:     Calculation used to obtain the estimated glomerular filtration  rate (eGFR) is the CKD-EPI equation.          Lab Results   Component Value Date    HGBA1C 5.6 06/11/2024             ASSESSMENT: 58 y.o. year old male with chronic left hip pain that has gotten severe over the past several months not improving with medications.  X-rays show advanced sclerotic changes of the left hip:    No diagnosis found.          PLAN:    1- Interventions:  none at this time. Patient advised to consider lumbar MBB for axial back pain. He wants to hold off at this time.     2. Pharmacology:  - Anticoagulation use: Patient is taking apixaban (Eliquis) for atrial fibrillation.    - Medications:    Continue Lyrica 150 mg BID. We discussed potential side effects of this medication which may include drowsiness,dizziness, dry mouth, constipation or peripheral edema. He has taken gabapentin in the past without relief.  (He was taking Lyrica 100 mg BID but will increase to 150 mg to see if this helps.)    Continue Robaxin. Increase from 500 mg TID to 750 mg TID.     - Topamax discontinued in the past- not effective.     report:  Reviewed and consistent with medication use as prescribed.      - Therapy:  Can not afford co-pay.  He does not believe he will not be able to tolerate it. Continue at home exercises and stretches  - Imaging:  reviewed xray lumbar spine. Consider more advanced imaging in the future.   - Consults/Referrals: none at this time.  - Records:  Reviewed and personally evaluated images of his x-rays    - Follow up: 6 weeks (virtual visit ).      - Counseled patient regarding the importance of activity modification    - Patient Questions: Answered all of the patient's questions regarding diagnosis, therapy, and  treatment    The above plan and management options were discussed at length with patient. Patient is in agreement with the above and verbalized understanding.            Cliff Medley PA-C  Interventional Pain Management  [facility]

## 2024-06-25 ENCOUNTER — TELEPHONE (OUTPATIENT)
Dept: INTERNAL MEDICINE | Facility: CLINIC | Age: 59
End: 2024-06-25
Payer: MEDICARE

## 2024-06-25 VITALS — DIASTOLIC BLOOD PRESSURE: 70 MMHG | SYSTOLIC BLOOD PRESSURE: 117 MMHG

## 2024-06-27 RX ORDER — METOPROLOL SUCCINATE 100 MG/1
100 TABLET, EXTENDED RELEASE ORAL
Qty: 90 TABLET | Refills: 3 | Status: SHIPPED | OUTPATIENT
Start: 2024-06-27

## 2024-07-08 RX ORDER — TORSEMIDE 20 MG/1
40 TABLET ORAL 2 TIMES DAILY
Qty: 120 TABLET | Refills: 3 | Status: SHIPPED | OUTPATIENT
Start: 2024-07-08

## 2024-07-09 ENCOUNTER — TELEPHONE (OUTPATIENT)
Dept: RHEUMATOLOGY | Facility: CLINIC | Age: 59
End: 2024-07-09
Payer: MEDICARE

## 2024-07-09 NOTE — TELEPHONE ENCOUNTER
Prior auth for Rinvoq submitted to Insurance through Cover My Meds      (Key: [id number])  PA Case ID #: [id number]  Need Help? Call us at [phone]  Status  Sent to Plan today  Drug  Rinvoq 15MG er tablets    Form  [employer] Part D Electronic Prior Authorization Form (2

## 2024-07-30 DIAGNOSIS — Z00.00 ENCOUNTER FOR MEDICARE ANNUAL WELLNESS EXAM: ICD-10-CM

## 2024-08-05 PROBLEM — J96.22 ACUTE AND CHRONIC RESPIRATORY FAILURE WITH HYPERCAPNIA: Status: RESOLVED | Noted: 2022-01-20 | Resolved: 2024-08-05

## 2024-08-07 DIAGNOSIS — L40.50 PSORIATIC ARTHRITIS: Primary | ICD-10-CM

## 2024-08-08 RX ORDER — IXEKIZUMAB 80 MG/ML
INJECTION, SOLUTION SUBCUTANEOUS
Qty: 4 ML | Refills: 0 | Status: SHIPPED | OUTPATIENT
Start: 2024-08-08

## 2024-08-10 ENCOUNTER — PATIENT MESSAGE (OUTPATIENT)
Dept: ADMINISTRATIVE | Facility: CLINIC | Age: 59
End: 2024-08-10
Payer: MEDICARE

## 2024-08-12 ENCOUNTER — OFFICE VISIT (OUTPATIENT)
Dept: PAIN MEDICINE | Facility: CLINIC | Age: 59
End: 2024-08-12
Payer: MEDICARE

## 2024-08-12 ENCOUNTER — TELEPHONE (OUTPATIENT)
Dept: ADMINISTRATIVE | Facility: CLINIC | Age: 59
End: 2024-08-12
Payer: MEDICARE

## 2024-08-12 ENCOUNTER — TELEPHONE (OUTPATIENT)
Dept: INTERNAL MEDICINE | Facility: CLINIC | Age: 59
End: 2024-08-12
Payer: MEDICARE

## 2024-08-12 DIAGNOSIS — M51.37 DDD (DEGENERATIVE DISC DISEASE), LUMBOSACRAL: ICD-10-CM

## 2024-08-12 DIAGNOSIS — M54.50 DORSALGIA OF LUMBAR REGION: Primary | ICD-10-CM

## 2024-08-12 DIAGNOSIS — M47.817 FACET ARTHRITIS OF LUMBOSACRAL REGION: ICD-10-CM

## 2024-08-12 PROCEDURE — 1159F MED LIST DOCD IN RCRD: CPT | Mod: CPTII,95,, | Performed by: PHYSICIAN ASSISTANT

## 2024-08-12 PROCEDURE — 3044F HG A1C LEVEL LT 7.0%: CPT | Mod: CPTII,95,, | Performed by: PHYSICIAN ASSISTANT

## 2024-08-12 PROCEDURE — 99213 OFFICE O/P EST LOW 20 MIN: CPT | Mod: 95,,, | Performed by: PHYSICIAN ASSISTANT

## 2024-08-12 PROCEDURE — 1160F RVW MEDS BY RX/DR IN RCRD: CPT | Mod: CPTII,95,, | Performed by: PHYSICIAN ASSISTANT

## 2024-08-12 PROCEDURE — 4010F ACE/ARB THERAPY RXD/TAKEN: CPT | Mod: CPTII,95,, | Performed by: PHYSICIAN ASSISTANT

## 2024-08-12 RX ORDER — METHOCARBAMOL 750 MG/1
750 TABLET, FILM COATED ORAL 3 TIMES DAILY PRN
Qty: 60 TABLET | Refills: 3 | Status: SHIPPED | OUTPATIENT
Start: 2024-08-12

## 2024-08-12 RX ORDER — PREGABALIN 200 MG/1
200 CAPSULE ORAL 2 TIMES DAILY
Qty: 60 CAPSULE | Refills: 3 | Status: SHIPPED | OUTPATIENT
Start: 2024-08-12

## 2024-08-12 NOTE — TELEPHONE ENCOUNTER
Called patient in reference to needing an appointment. Pt. Was informed the clinic isn't scheduling appointments at this time.

## 2024-08-12 NOTE — PROGRESS NOTES
Established Patient - TeleHealth Visit    The patient location is: [place]  The chief complaint leading to consultation is: discuss medications, lower back pain follow up  Visit type: audiovisual    Face to Face time with patient: 5-10 minutes of total time spent on the encounter, which includes face to face time and non-face to face time preparing to see the patient (eg, review of tests), Obtaining and/or reviewing separately obtained history, Documenting clinical information in the electronic or other health record, Independently interpreting results (not separately reported) and communicating results to the patient/family/caregiver, or Care coordination (not separately reported).     Each patient to whom he or she provides medical services by telemedicine is:  (1) informed of the relationship between the physician and patient and the respective role of any other health care provider with respect to management of the patient; and (2) notified that he or she may decline to receive medical services by telemedicine and may withdraw from such care at any time.      Referring Physician: No ref. provider found    PCP: Dominick Allen MD       SUBJECTIVE:    Interval History (8/12/2024):  Caio Ontiveros presents today for follow-up visit.  Patient was last seen on 6/24/2024. The patient reports that the medication increases/changes help but he still has lower back pain. He has discussed back procedures with several friends and is afraid to move forward with it due to their experiences (his friends informed him that the procedures did not help with pain).   He is currently taking Lyrica 150 mg BID and Robaxin 750 gm TID. Tolerating medications well with no side effects.       Interval History (6/24/2024):  Caio Ontiveros presents today for follow-up visit.  Patient was last seen on 3/22/2024. Patient reports that he is experiencing more pain when moving around and walking. He localizes his pain across lower back and with LLE. He  also c/o stiffness in his lower back. Patient reports pain as 8/10 today. Since his last visit, Lyrica was increased from 75 mg QHS to 100 mg BID. He is also taking Robaxin 500 mg.       Interval History (3/22/2024):  Caio Ontiveros presents today for follow-up visit.  Patient was last seen on 8/23/2023 for Left IA hip joint injection with 80% relief.  He reports his hip pain is doing better however he does have some lower back pain which seems to be getting worse.  The pain remains in the back and does not radiate into the legs.  Prolonged standing and extending back make the pain worse.  If he leans over and wrists with surface bending forward he will get some relief of his pain.  He rates his pain today an 8/10.  No new injury.  He is requesting to try a different medication to try to help with his pain and is leery of doing any procedures on his back at this time.  He has not done formal physical therapy because he can not afford it at this time and he also does not think he would be able to tolerate it at this time.  He has been doing home exercises and stretches and is trying to work on weight loss because he feels this will help his pain as well.  Patient denies night fever/night sweats, urinary incontinence, bowel incontinence, significant weight loss and significant motor weakness.   Patient denies any other complaints or concerns at this time.        Interval history 8/9/2023  Caio Ontiveros is a 58 y.o. male who presents to the clinic for the evaluation of chronic lower back pain for years that gradually got worse but has become severe over the past several months.  It is affecting his ADLs significantly including the ability to walk and stand.  It is located in the left buttock and radiates to the left groin and is worse with weight-bearing.  There is no radicular pain tingling or numbness to the legs but he reports weakness in the left leg versus due to severe pain.  Denies loss of bowel or bladder control.  It  is worse with walking, standing, getting up from the bed or chair and is better (slightly) with medications including Tylenol Arthritis and gabapentin which he takes at 300 mg daily.  He could not afford the co-pay for physical therapy, not well covered by his insurance. He currently rates it at 10/10.  He gives it a 6/10 when it is at best.  He has history of extreme morbid obesity and lost significant weight after gastric bypass surgery in 2014 dropping down from 500 lb 340 lb.  He managed to gain some weight back and he is around 400 lb.  He has no history of kidney stones, glaucoma or sulfa allergies.      Pain Disability Index Review:         8/9/2023     2:11 PM   Last 3 PDI Scores   Pain Disability Index (PDI) 62        report:  Not applicable    Pain Procedures:   8/23/2023 Left IA hip joint injection with 80% relief      Past Medical History:   Diagnosis Date    A-fib     CHF (congestive heart failure)     Gout, chronic     Hypertension     Sleep apnea      Past Surgical History:   Procedure Laterality Date    CHOLECYSTECTOMY      GASTRIC BYPASS  2014    INJECTION OF JOINT Left 8/23/2023    Procedure: Left IA Hip Joint injection;  Surgeon: Peña Rausch MD;  [facility];  Service: Pain Management;  Laterality: Left;     Social History     Socioeconomic History    Marital status:     Number of children: 1   Tobacco Use    Smoking status: Never    Smokeless tobacco: Never   Substance and Sexual Activity    Alcohol use: Not Currently    Drug use: No    Sexual activity: Not Currently     Social Determinants of Health     Financial Resource Strain: Low Risk  (1/8/2024)    Overall Financial Resource Strain (CARDIA)     Difficulty of Paying Living Expenses: Not hard at all   Food Insecurity: No Food Insecurity (1/8/2024)    Hunger Vital Sign     Worried About Running Out of Food in the Last Year: Never true     Ran Out of Food in the Last Year: Never true   Transportation Needs: No  Transportation Needs (1/8/2024)    PRAPARE - Transportation     Lack of Transportation (Medical): No     Lack of Transportation (Non-Medical): No   Physical Activity: Inactive (1/8/2024)    Exercise Vital Sign     Days of Exercise per Week: 0 days     Minutes of Exercise per Session: 0 min   Stress: Stress Concern Present (1/8/2024)    [place] [employer] - Occupational Stress Questionnaire     Feeling of Stress : To some extent   Housing Stability: Low Risk  (1/8/2024)    Housing Stability Vital Sign     Unable to Pay for Housing in the Last Year: No     Number of Places Lived in the Last Year: 1     Unstable Housing in the Last Year: No     Family History   Problem Relation Name Age of Onset    Hypertension Mother      Stroke Father      Diabetes Father      Hypertension Father      Diabetes Sister      Diabetes Brother         Review of patient's allergies indicates:  No Known Allergies    Current Outpatient Medications   Medication Sig    colchicine (COLCRYS) 0.6 mg tablet Take 1 tablet (0.6 mg total) by mouth once daily.    ELIQUIS 5 mg Tab TAKE 1 TABLET BY MOUTH  TWICE DAILY    levothyroxine (SYNTHROID) 50 MCG tablet Take 1 tablet (50 mcg total) by mouth before breakfast.    losartan (COZAAR) 100 MG tablet TAKE 1 TABLET BY MOUTH ONCE  DAILY    methocarbamoL (ROBAXIN) 750 MG Tab Take 1 tablet (750 mg total) by mouth 3 (three) times daily as needed (muscle spasms).    metoprolol succinate (TOPROL-XL) 100 MG 24 hr tablet Take 1 tablet by mouth once daily    potassium chloride SA (K-DUR,KLOR-CON) 20 MEQ tablet TAKE 1 TABLET BY MOUTH TWICE  DAILY    pregabalin (LYRICA) 150 MG capsule Take 1 capsule (150 mg total) by mouth 2 (two) times daily.    TALTZ AUTOINJECTOR 80 mg/mL AtIn INJECT 80MG (1 PEN) UNDER THE SKIN EVERY 4 WEEKS    torsemide (DEMADEX) 20 MG Tab Take 2 tablets by mouth twice daily    venlafaxine (EFFEXOR-XR) 150 MG Cp24 Take 1 capsule (150 mg total) by mouth once daily.     venlafaxine (EFFEXOR-XR) 75 MG 24 hr capsule Take 1 capsule (75 mg total) by mouth once daily.     No current facility-administered medications for this visit.       Review of Systems     GENERAL:  No weight loss, malaise or fevers.  HEENT:   No recent changes in vision or hearing  NECK:  Negative for lumps, no difficulty with swallowing.  RESPIRATORY:  Negative for cough, wheezing or shortness of breath, patient denies any recent URI.  CARDIOVASCULAR:  Negative for chest pain, positive for fluid retention and leg swelling  GI:  Negative for abdominal discomfort, blood in stools or black stools or change in bowel habits.  MUSCULOSKELETAL:  See HPI.  SKIN:  Negative for lesions, rash, and itching.  PSYCH:  No mood disorder or recent psychosocial stressors.  Patients sleep is not disturbed secondary to pain.  HEMATOLOGY/LYMPHOLOGY:  Negative for prolonged bleeding, bruising easily or swollen nodes.  Patient is not currently taking any anti-coagulants  NEURO:   No history of headaches, syncope, paralysis, seizures or tremors.  All other reviewed and negative other than HPI.    OBJECTIVE:  Telemedicine Exam  There were no vitals filed for this visit.  There is no height or weight on file to calculate BMI.   (reviewed on 8/12/2024)     GENERAL: Well appearing, in no acute distress, alert and oriented x3.  Cooperative.  PSYCH:  Mood and affect appropriate.  SKIN: Skin color & texture with no obvious abnormalities.    HEAD/FACE:  Normocephalic, atraumatic.    PULM:  No difficulty breathing. No nasal flaring. No obvious wheezing.  EXTREMITIES: No obvious deformities. Moving all extremities well, appears to have symmetric strength throughout.  MUSCULOSKELETAL: No obvious atrophy abnormalities are noted.   NEURO: No obvious neurologic deficit.   GAIT: sitting.     Physical Exam: last in clinic visit:      Physical Exam    GENERAL: Well appearing, in no acute distress, but in discomfort when trying to walk, alert and oriented  x3.  Morbidly obese  PSYCH:  Mood and affect appropriate.  SKIN: Skin color, texture, turgor normal, no rashes or lesions.  HEAD/FACE:  Normocephalic, atraumatic.  CV: chronic edema changes in distal legs and ankles.  PULM: No evidence of respiratory difficulty, symmetric chest rise.  GI:  Abdomen soft and non-tender.    GAIT: slow antalgic.    LUMBAR SPINE:   Palpation:  Mild tenderness over left facet joints and paraspinous muscles. No trigger points.  ROM: Pain with flexion, extension and rotation.  Straight leg raising is negative.  SI JOINTS: bilateral SI joint, no tenderness  LEFT HIP:  SEVERELY RESTRICTED AND PAINFUL RANGE OF MOTION including internal, external rotation, abduction and flexion.  NEURO:   MOTOR: Bilateral lower extremity muscle strength is normal. No atrophy or tone abnormalities are noted.  SENSORY: No loss of sensation in L3 through S1 dermatomes bilaterally.  DTR's: No clonus.    Imaging:        Results for orders placed during the hospital encounter of 12/28/22    X-Ray Lumbar Spine AP And Lateral    Narrative  EXAMINATION:  XR LUMBAR SPINE AP AND LATERAL    CLINICAL HISTORY:  Low back pain, unspecified    TECHNIQUE:  AP, lateral and spot images were performed of the lumbar spine.    COMPARISON:  Radiographs from August 2020    FINDINGS:  No scoliosis.  Five lumbar type vertebral bodies noted.  Bridging syndesmophytes noted at multiple levels. There is no fracture or listhesis.  Minimal narrowing at L3-L4 and L5-S1 is unchanged.  There is also minimal lower lumbar facet arthropathy.  No significant change from prior study.      Electronically signed by: Emory Bah MD  Date:    12/28/2022    XR SACROILIAC JOINTS 3 VIEWS     CLINICAL HISTORY:  mechanical lower back pain, psoriasis acitive.  Assess for sacroiilitis/ankylosis;  Low back pain     COMPARISON:  None     FINDINGS:  Degenerative changes noted in the included lumbosacral spine and hips.  No fracture or dislocation.  Pelvic ring is  intact.  SI joints normal and symmetric in appearance bilaterally.  No sclerotic or erosive changes appreciated.  Heterogeneous increased density identified within the femoral heads.  There is spurring and buttressing of the femoral neck cortex consistent with degenerative changes.  Subchondral degenerative cyst/geodes cannot be excluded bilaterally.     Impression:  I reviewed the films which show moderate to severe degenerative and sclerotic changes of the left femoral head and acetabulum, worse than the right.  SI joints within normal limits.  Degenerative changes lumbosacral spine and hips.        Electronically signed by: Marty Lee MD  Date:                                            08/25/2020      LABS:  Lab Results   Component Value Date    WBC 2.60 (L) 06/11/2024    HGB 14.9 06/11/2024    HCT 45.9 06/11/2024    MCV 94 06/11/2024     (L) 06/11/2024       CMP  Sodium   Date Value Ref Range Status   06/11/2024 143 136 - 145 mmol/L Final     Potassium   Date Value Ref Range Status   06/11/2024 4.3 3.5 - 5.1 mmol/L Final     Chloride   Date Value Ref Range Status   06/11/2024 105 95 - 110 mmol/L Final     CO2   Date Value Ref Range Status   06/11/2024 26 23 - 29 mmol/L Final     Glucose   Date Value Ref Range Status   06/11/2024 93 70 - 110 mg/dL Final     BUN   Date Value Ref Range Status   06/11/2024 23 (H) 6 - 20 mg/dL Final     Creatinine   Date Value Ref Range Status   06/11/2024 1.3 0.5 - 1.4 mg/dL Final     Calcium   Date Value Ref Range Status   06/11/2024 9.2 8.7 - 10.5 mg/dL Final     Total Protein   Date Value Ref Range Status   06/11/2024 8.1 6.0 - 8.4 g/dL Final     Albumin   Date Value Ref Range Status   06/11/2024 4.1 3.5 - 5.2 g/dL Final     Total Bilirubin   Date Value Ref Range Status   06/11/2024 0.7 0.1 - 1.0 mg/dL Final     Comment:     For infants and newborns, interpretation of results should be based  on gestational age, weight and in agreement with  clinical  observations.    Premature Infant recommended reference ranges:  Up to 24 hours.............<8.0 mg/dL  Up to 48 hours............<12.0 mg/dL  3-5 days..................<15.0 mg/dL  6-29 days.................<15.0 mg/dL       Alkaline Phosphatase   Date Value Ref Range Status   06/11/2024 76 55 - 135 U/L Final     AST   Date Value Ref Range Status   06/11/2024 22 10 - 40 U/L Final     ALT   Date Value Ref Range Status   06/11/2024 12 10 - 44 U/L Final     Anion Gap   Date Value Ref Range Status   06/11/2024 12 8 - 16 mmol/L Final     eGFR if    Date Value Ref Range Status   02/22/2022 >60.0 >60 mL/min/1.73 m^2 Final     eGFR if non    Date Value Ref Range Status   02/22/2022 >60.0 >60 mL/min/1.73 m^2 Final     Comment:     Calculation used to obtain the estimated glomerular filtration  rate (eGFR) is the CKD-EPI equation.        Lab Results   Component Value Date    HGBA1C 5.6 06/11/2024         ASSESSMENT: 58 y.o. year old male with chronic left hip pain that has gotten severe over the past several months not improving with medications.  X-rays show advanced sclerotic changes of the left hip:    1. Dorsalgia of lumbar region  methocarbamoL (ROBAXIN) 750 MG Tab    pregabalin (LYRICA) 200 MG Cap      2. Facet arthritis of lumbosacral region  methocarbamoL (ROBAXIN) 750 MG Tab    pregabalin (LYRICA) 200 MG Cap      3. DDD (degenerative disc disease), lumbosacral  methocarbamoL (ROBAXIN) 750 MG Tab    pregabalin (LYRICA) 200 MG Cap                PLAN:    1- Interventions:  none at this time. Patient advised to consider lumbar MBB for axial back pain relief in the future.    2. Pharmacology:  - Anticoagulation use: Patient is taking apixaban (Eliquis) for atrial fibrillation.    - Medications:    Increase Lyrica 150 mg BID to 200 mg BID.. We discussed potential side effects of this medication which may include drowsiness,dizziness, dry mouth, constipation or peripheral edema.  He has taken gabapentin in the past without relief.      Continue Robaxin 750 mg TID. Refill sent today.    - Topamax discontinued in the past- not effective.  - Not a candidate for oral NSAIDs due to Eliquis.    - Can take Tylenol (acetaminophen) 1000mg (two tablets of 500mg) 3x per day as needed for pain (to take up to max dose of 3000mg per day).       report:  Reviewed and consistent with medication use as prescribed.      - Therapy:  Continue at home exercises and stretches as tolerated.  - Imaging:  Consider more advanced imaging in the future.   - Consults/Referrals: none at this time.    - Follow up: patient will be update me on his symptoms in 2-3 weeks. If no significant improvement will consider lumbar medial branch block.       - Counseled patient regarding the importance of activity modification    - Patient Questions: Answered all of the patient's questions regarding diagnosis, therapy, and treatment    The above plan and management options were discussed at length with patient. Patient is in agreement with the above and verbalized understanding.            Cliff Medley PA-C  Interventional Pain Management  [facility]

## 2024-08-14 ENCOUNTER — TELEPHONE (OUTPATIENT)
Dept: ADMINISTRATIVE | Facility: CLINIC | Age: 59
End: 2024-08-14
Payer: MEDICARE

## 2024-08-14 ENCOUNTER — OFFICE VISIT (OUTPATIENT)
Dept: FAMILY MEDICINE | Facility: CLINIC | Age: 59
End: 2024-08-14
Payer: MEDICARE

## 2024-08-14 DIAGNOSIS — M51.9 LUMBOSACRAL DISC DISEASE: ICD-10-CM

## 2024-08-14 DIAGNOSIS — M1A.09X0 IDIOPATHIC CHRONIC GOUT OF MULTIPLE SITES WITHOUT TOPHUS: ICD-10-CM

## 2024-08-14 DIAGNOSIS — I10 ESSENTIAL HYPERTENSION: Chronic | ICD-10-CM

## 2024-08-14 DIAGNOSIS — I50.43 ACUTE ON CHRONIC COMBINED SYSTOLIC AND DIASTOLIC CHF (CONGESTIVE HEART FAILURE): ICD-10-CM

## 2024-08-14 DIAGNOSIS — E66.01 MORBID OBESITY WITH BMI OF 50.0-59.9, ADULT: Chronic | ICD-10-CM

## 2024-08-14 DIAGNOSIS — M25.552 CHRONIC LEFT HIP PAIN: ICD-10-CM

## 2024-08-14 DIAGNOSIS — J98.4 RESTRICTIVE LUNG DISEASE: ICD-10-CM

## 2024-08-14 DIAGNOSIS — G89.29 CHRONIC LEFT HIP PAIN: ICD-10-CM

## 2024-08-14 DIAGNOSIS — I48.20 CHRONIC ATRIAL FIBRILLATION: ICD-10-CM

## 2024-08-14 DIAGNOSIS — D69.6 THROMBOCYTOPENIA, UNSPECIFIED: ICD-10-CM

## 2024-08-14 DIAGNOSIS — Z00.00 ENCOUNTER FOR PREVENTIVE HEALTH EXAMINATION: ICD-10-CM

## 2024-08-14 DIAGNOSIS — R73.03 PREDIABETES: Chronic | ICD-10-CM

## 2024-08-14 DIAGNOSIS — F33.1 MODERATE EPISODE OF RECURRENT MAJOR DEPRESSIVE DISORDER: Chronic | ICD-10-CM

## 2024-08-14 DIAGNOSIS — L40.50 PSORIATIC ARTHRITIS: ICD-10-CM

## 2024-08-14 DIAGNOSIS — Z00.00 ENCOUNTER FOR MEDICARE ANNUAL WELLNESS EXAM: Primary | ICD-10-CM

## 2024-08-14 DIAGNOSIS — G47.33 OSA (OBSTRUCTIVE SLEEP APNEA): ICD-10-CM

## 2024-08-14 DIAGNOSIS — I27.20 PULMONARY HYPERTENSION: ICD-10-CM

## 2024-08-14 PROCEDURE — 1159F MED LIST DOCD IN RCRD: CPT | Mod: CPTII,95,, | Performed by: NURSE PRACTITIONER

## 2024-08-14 PROCEDURE — 1160F RVW MEDS BY RX/DR IN RCRD: CPT | Mod: CPTII,95,, | Performed by: NURSE PRACTITIONER

## 2024-08-14 PROCEDURE — G0439 PPPS, SUBSEQ VISIT: HCPCS | Mod: 95,,, | Performed by: NURSE PRACTITIONER

## 2024-08-14 PROCEDURE — 4010F ACE/ARB THERAPY RXD/TAKEN: CPT | Mod: CPTII,95,, | Performed by: NURSE PRACTITIONER

## 2024-08-14 PROCEDURE — 3044F HG A1C LEVEL LT 7.0%: CPT | Mod: CPTII,95,, | Performed by: NURSE PRACTITIONER

## 2024-08-14 NOTE — TELEPHONE ENCOUNTER
Called pt; informed pt I was just making a reminder call for pt's virtual visit today at 2:30pm and to see if pt needed any help; pt stated didn't need any help and would complete the e-pre check soon; pt informed to login 10 minutes prior to appt time

## 2024-08-14 NOTE — PATIENT INSTRUCTIONS
Counseling and Referral of Other Preventative  (Italic type indicates deductible and co-insurance are waived)    Patient Name: Caio Ontiveros  Today's Date: 8/14/2024    Health Maintenance       Date Due Completion Date    Shingles Vaccine (1 of 2) 06/11/2025 (Originally 10/16/1984) ---    Pneumococcal Vaccines (Age 0-64) (2 of 2 - PCV) 06/11/2025 (Originally 1/4/2020) 1/4/2019    Influenza Vaccine (1) 09/01/2024 1/4/2019    PROSTATE-SPECIFIC ANTIGEN 06/11/2025 6/11/2024    Hemoglobin A1c (Prediabetes) 06/11/2025 6/11/2024    Colorectal Cancer Screening 05/23/2026 5/23/2023    Lipid Panel 06/11/2029 6/11/2024        No orders of the defined types were placed in this encounter.      The following information is provided to all patients.  This information is to help you find resources for any of the problems found today that may be affecting your health:                  Living healthy guide: www.[facility].[place].gov      Understanding Diabetes: www.diabetes.org      Eating healthy: www.cdc.gov/healthyweight      CDC home safety checklist: www.cdc.gov/steadi/patient.html      Agency on Aging: www.goea.[place].gov      Alcoholics anonymous (AA): www.aa.org      Physical Activity: www.oanh.nih.gov/pd7mssv      Tobacco use: www.quitwithusla.org

## 2024-08-14 NOTE — PROGRESS NOTES
"The patient location is: [place]  The chief complaint leading to consultation is AWV    Visit type: audiovisual    Face to Face time with patient: 50   60 minutes of total time spent on the encounter, which includes face to face time and non-face to face time preparing to see the patient (eg, review of tests), Obtaining and/or reviewing separately obtained history, Documenting clinical information in the electronic or other health record, Independently interpreting results (not separately reported) and communicating results to the patient/family/caregiver, or Care coordination (not separately reported).         Each patient to whom he or she provides medical services by telemedicine is:  (1) informed of the relationship between the physician and patient and the respective role of any other health care provider with respect to management of the patient; and (2) notified that he or she may decline to receive medical services by telemedicine and may withdraw from such care at any time.    Notes:       Caio Ontiveros presented for a follow-up Medicare AWV today. The following components were reviewed and updated:    Medical history  Family History  Social history  Allergies and Current Medications  Health Risk Assessment  Health Maintenance  Care Team    **See Completed Assessments for Annual Wellness visit with in the encounter summary    The following assessments were completed:  Depression Screening  Cognitive function Screening  Timed Get Up Test  Whisper Test      Opioid documentation:      Patient does not have a current opioid prescription.        Ht: 6' 3" (1.905 m)  Last Wt: 193.3 kg>30 days  BP: 117/70>1 day  Pulse: 90>1 day         Physical Exam  Constitutional:       Appearance: He is obese.   Pulmonary:      Effort: Pulmonary effort is normal.   Neurological:      Mental Status: He is alert and oriented to person, place, and time.   Psychiatric:         Mood and Affect: Mood normal.         Behavior: Behavior " normal.         Thought Content: Thought content normal.         Judgment: Judgment normal.     Current Outpatient Medications   Medication Instructions    colchicine (COLCRYS) 0.6 mg, Oral, Daily    ELIQUIS 5 mg, Oral, 2 times daily    levothyroxine (SYNTHROID) 50 mcg, Oral, Before breakfast    losartan (COZAAR) 100 mg, Oral    methocarbamol (ROBAXIN) 750 mg, Oral, 3 times daily PRN    metoprolol succinate (TOPROL-XL) 100 mg, Oral    potassium chloride SA (K-DUR,KLOR-CON) 20 MEQ tablet 20 mEq, Oral, 2 times daily    pregabalin (LYRICA) 200 mg, Oral, 2 times daily    TALTZ AUTOINJECTOR 80 mg/mL AtIn INJECT 80 MG (1 PEN) UNDER THE SKIN EVERY 4 WEEKS    torsemide (DEMADEX) 40 mg, Oral, 2 times daily    venlafaxine (EFFEXOR-XR) 75 mg, Oral, Daily    venlafaxine (EFFEXOR-XR) 150 mg, Oral, Daily         Diagnoses and health risks identified today and associated recommendations/orders:  1. Encounter for Medicare annual wellness exam   Ambulatory Referral/Consult to Enhanced Annual Wellness Visit (eAWV)    2. Acute on chronic combined systolic and diastolic CHF (congestive heart failure) echo 4/ 2023      Left Ventricle: The left ventricle is normal in size. Moderately increased wall thickness. There is moderate concentric hypertrophy. Septal motion is abnormal. Septal flattening in diastole and systole consistent with right ventricular volume and pressure overload. There is normal systolic function with a visually estimated ejection fraction of 60 - 65%. Grade II diastolic dysfunction.    Right Ventricle: Right ventricle was not well visualized due to poor acoustic window. Severe right ventricular enlargement. There is moderate hypertrophy. Right ventricle wall motion has global hypokinesis. Systolic function is moderately reduced.    Left Atrium: Left atrium is moderately dilated.    Right Atrium: Right atrium is severely dilated.    Tricuspid Valve: There is moderate to severe regurgitation.     Pulmonic Valve: There is mild to moderate regurgitation.    Pulmonary Artery: There is pulmonary hypertension. The estimated pulmonary artery systolic pressure is 90 mmHg.    IVC/SVC: Elevated venous pressure at 15 mmHg.  Chronic and Stable on Elquis, Toprol and Demadex. Continue current treatment plan as previously prescribed with your PCP/carrsd    3. Chronic atrial fibrillation  Chronic and Stable on Elquis, Toprol and Demadex. Continue current treatment plan as previously prescribed with your PCP/carrsd    4. Pulmonary hypertension  Chronic and Stable on Losartan, Toprol and Demadex. Continue current treatment plan as previously prescribed with your PCP/carrsd    5. Restrictive lung disease  Chronic and Stable EVANGELIST  Last pulm visit 3/24  Due for appt  States he will call and f/u    6. Morbid obesity with BMI of 50.0-59.9, adult  Chronic and Ongoing.  S/P gastric bypass  Continue current treatment plan as previously prescribed with your PCP    7. EVANGELIST (obstructive sleep apnea)/ Obesity Hypoventilation syndrome   Chronic and Stable EVANGELIST  Last pulm visit 3/24  Due for appt  States he will call and f/u    8. Moderate episode of recurrent major depressive disorder  Chronic and Stable on Effexor. Continue current treatment plan as previously prescribed with your PCP    9. Essential hypertension  Chronic and Stable on Losartan, Toprol and Demadex. Continue current treatment plan as previously prescribed with your PCP/carrsd    10. 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 Continue current treatment plan as previously prescribed with your rheuma logist    11. Thrombocytopenia, unspecified  Chronic and Stable. Continue current treatment plan as previously prescribed with your PCP    12. Psoriatic arthritis  Chronic and Stable   TALTZ AUTOINJECTOR 80 mg/mL AtIn INJECT 80 MG (1 PEN) UNDER THE SKIN EVERY 4 WEEKS   Continue current treatment plan as previously prescribed with  your rheum mmd      13 Chronic left hip pain   Chronic and Ongoing on  Ly ulysses and Robaxin. Continue current treatment plan as previously prescribed with your pain management    14. Lumbosacral disc disease   Chronic and Ongoing on  Ly ulysses and Robaxin. Continue current treatment plan as previously prescribed with your pain management    Provided Caio with a 5-10 year written screening schedule and personal prevention plan. Recommendations were developed using the USPSTF age appropriate recommendations. Education, counseling, and referrals were provided as needed.  After Visit Summary printed and given to patient which includes a list of additional screenings\tests needed.    Follow up in about 1 year (around 8/14/2025).      Nery Paredes NP

## 2024-09-04 DIAGNOSIS — I27.20 PULMONARY HYPERTENSION: ICD-10-CM

## 2024-09-05 RX ORDER — APIXABAN 5 MG/1
5 TABLET, FILM COATED ORAL 2 TIMES DAILY
Qty: 60 TABLET | Refills: 5 | Status: SHIPPED | OUTPATIENT
Start: 2024-09-05

## 2024-09-07 DIAGNOSIS — F51.04 PSYCHOPHYSIOLOGICAL INSOMNIA: ICD-10-CM

## 2024-09-07 DIAGNOSIS — F33.1 MODERATE EPISODE OF RECURRENT MAJOR DEPRESSIVE DISORDER: ICD-10-CM

## 2024-09-07 DIAGNOSIS — R45.4 IRRITABILITY AND ANGER: ICD-10-CM

## 2024-09-08 NOTE — TELEPHONE ENCOUNTER
Refill Routing Note   Medication(s) are not appropriate for processing by [facility] for the following reason(s):        Drug-disease interaction:  venlafaxine and Essential hypertension     [facility] action(s):  Defer        Medication Therapy Plan: Pt takes 225 mg total of venlafaxine daily      Appointments  past 12m or future 3m with PCP    Date Provider   Last Visit   6/11/2024 Dominick Allen MD   Next Visit   12/11/2024 Dominick Allen MD   ED visits in past 90 days: 0        Note composed:4:12 PM 09/08/2024

## 2024-09-08 NOTE — TELEPHONE ENCOUNTER
No care due was identified.  [facility] Embedded Care Due Messages. Reference number: [id number].   9/07/2024 9:21:50 PM CDT

## 2024-09-10 RX ORDER — VENLAFAXINE HYDROCHLORIDE 150 MG/1
150 CAPSULE, EXTENDED RELEASE ORAL
Qty: 90 CAPSULE | Refills: 3 | Status: SHIPPED | OUTPATIENT
Start: 2024-09-10

## 2024-09-10 RX ORDER — VENLAFAXINE HYDROCHLORIDE 75 MG/1
75 CAPSULE, EXTENDED RELEASE ORAL
Qty: 90 CAPSULE | Refills: 3 | Status: SHIPPED | OUTPATIENT
Start: 2024-09-10

## 2024-10-18 DIAGNOSIS — L40.50 PSORIATIC ARTHRITIS: ICD-10-CM

## 2024-10-23 ENCOUNTER — PATIENT MESSAGE (OUTPATIENT)
Dept: RHEUMATOLOGY | Facility: CLINIC | Age: 59
End: 2024-10-23
Payer: MEDICARE

## 2024-10-25 DIAGNOSIS — L40.50 PSORIATIC ARTHRITIS: ICD-10-CM

## 2024-10-25 RX ORDER — COLCHICINE 0.6 MG/1
0.6 TABLET ORAL
Qty: 90 TABLET | Refills: 0 | Status: SHIPPED | OUTPATIENT
Start: 2024-10-25

## 2024-10-29 RX ORDER — COLCHICINE 0.6 MG/1
0.6 TABLET ORAL DAILY
Qty: 90 TABLET | Refills: 0 | Status: SHIPPED | OUTPATIENT
Start: 2024-10-29 | End: 2024-11-01 | Stop reason: SDUPTHER

## 2024-10-30 ENCOUNTER — PATIENT MESSAGE (OUTPATIENT)
Dept: INTERNAL MEDICINE | Facility: CLINIC | Age: 59
End: 2024-10-30
Payer: MEDICARE

## 2024-11-01 ENCOUNTER — OFFICE VISIT (OUTPATIENT)
Dept: RHEUMATOLOGY | Facility: CLINIC | Age: 59
End: 2024-11-01
Payer: MEDICARE

## 2024-11-01 DIAGNOSIS — L40.50 PSORIATIC ARTHRITIS: ICD-10-CM

## 2024-11-01 DIAGNOSIS — Z51.81 MEDICATION MONITORING ENCOUNTER: ICD-10-CM

## 2024-11-01 DIAGNOSIS — Z79.899 IMMUNOSUPPRESSION DUE TO DRUG THERAPY: ICD-10-CM

## 2024-11-01 DIAGNOSIS — L40.9 PSORIASIS: ICD-10-CM

## 2024-11-01 DIAGNOSIS — R73.03 PREDIABETES: ICD-10-CM

## 2024-11-01 DIAGNOSIS — D84.821 IMMUNOSUPPRESSION DUE TO DRUG THERAPY: ICD-10-CM

## 2024-11-01 DIAGNOSIS — M1A.09X0 IDIOPATHIC CHRONIC GOUT OF MULTIPLE SITES WITHOUT TOPHUS: Primary | ICD-10-CM

## 2024-11-01 DIAGNOSIS — Z79.01 CHRONIC ANTICOAGULATION: ICD-10-CM

## 2024-11-01 RX ORDER — COLCHICINE 0.6 MG/1
0.6 TABLET ORAL DAILY
Qty: 90 TABLET | Refills: 0 | Status: SHIPPED | OUTPATIENT
Start: 2024-11-01

## 2024-11-01 RX ORDER — CALCIPOTRIENE 50 UG/G
CREAM TOPICAL 2 TIMES DAILY
Qty: 30 G | Refills: 1 | Status: SHIPPED | OUTPATIENT
Start: 2024-11-01 | End: 2025-11-01

## 2024-11-01 RX ORDER — FEBUXOSTAT 40 MG/1
40 TABLET, FILM COATED ORAL DAILY
Qty: 30 TABLET | Refills: 3 | Status: SHIPPED | OUTPATIENT
Start: 2024-11-01 | End: 2024-12-01

## 2024-11-06 ENCOUNTER — PATIENT MESSAGE (OUTPATIENT)
Dept: CARDIOLOGY | Facility: CLINIC | Age: 59
End: 2024-11-06
Payer: MEDICARE

## 2024-11-06 RX ORDER — TORSEMIDE 20 MG/1
60 TABLET ORAL 2 TIMES DAILY PRN
Qty: 180 TABLET | Refills: 5 | Status: SHIPPED | OUTPATIENT
Start: 2024-11-06 | End: 2024-11-14

## 2024-11-12 DIAGNOSIS — L40.50 PSORIATIC ARTHRITIS: ICD-10-CM

## 2024-11-12 RX ORDER — IXEKIZUMAB 80 MG/ML
INJECTION, SOLUTION SUBCUTANEOUS
Qty: 4 ML | Refills: 0 | Status: SHIPPED | OUTPATIENT
Start: 2024-11-12

## 2024-11-14 ENCOUNTER — HOSPITAL ENCOUNTER (EMERGENCY)
Facility: HOSPITAL | Age: 59
Discharge: HOME | End: 2024-11-14
Attending: EMERGENCY MEDICINE
Payer: MEDICARE

## 2024-11-14 VITALS
OXYGEN SATURATION: 94 % | HEIGHT: 76 IN | HEART RATE: 76 BPM | BODY MASS INDEX: 38.36 KG/M2 | DIASTOLIC BLOOD PRESSURE: 70 MMHG | SYSTOLIC BLOOD PRESSURE: 126 MMHG | RESPIRATION RATE: 20 BRPM | TEMPERATURE: 98 F | WEIGHT: 315 LBS

## 2024-11-14 DIAGNOSIS — R60.9 EDEMA, UNSPECIFIED TYPE: ICD-10-CM

## 2024-11-14 DIAGNOSIS — I50.9 ACUTE ON CHRONIC CONGESTIVE HEART FAILURE, UNSPECIFIED HEART FAILURE TYPE: Primary | ICD-10-CM

## 2024-11-14 DIAGNOSIS — R06.02 SHORTNESS OF BREATH: ICD-10-CM

## 2024-11-14 LAB
ALBUMIN SERPL BCP-MCNC: 3.7 G/DL (ref 3.5–5.2)
ALP SERPL-CCNC: 60 U/L (ref 40–150)
ALT SERPL W/O P-5'-P-CCNC: 14 U/L (ref 10–44)
ANION GAP SERPL CALC-SCNC: 11 MMOL/L (ref 8–16)
AST SERPL-CCNC: 20 U/L (ref 10–40)
BASOPHILS # BLD AUTO: 0.02 K/UL (ref 0–0.2)
BASOPHILS NFR BLD: 0.7 % (ref 0–1.9)
BILIRUB SERPL-MCNC: 0.8 MG/DL (ref 0.1–1)
BNP SERPL-MCNC: 760 PG/ML (ref 0–99)
BUN SERPL-MCNC: 31 MG/DL (ref 6–20)
CALCIUM SERPL-MCNC: 8.6 MG/DL (ref 8.7–10.5)
CHLORIDE SERPL-SCNC: 104 MMOL/L (ref 95–110)
CO2 SERPL-SCNC: 31 MMOL/L (ref 23–29)
CREAT SERPL-MCNC: 1.6 MG/DL (ref 0.5–1.4)
DIFFERENTIAL METHOD BLD: ABNORMAL
EOSINOPHIL # BLD AUTO: 0 K/UL (ref 0–0.5)
EOSINOPHIL NFR BLD: 1.5 % (ref 0–8)
ERYTHROCYTE [DISTWIDTH] IN BLOOD BY AUTOMATED COUNT: 16.4 % (ref 11.5–14.5)
EST. GFR  (NO RACE VARIABLE): 49 ML/MIN/1.73 M^2
GLUCOSE SERPL-MCNC: 89 MG/DL (ref 70–110)
HCT VFR BLD AUTO: 44.3 % (ref 40–54)
HGB BLD-MCNC: 13.8 G/DL (ref 14–18)
IMM GRANULOCYTES # BLD AUTO: 0.01 K/UL (ref 0–0.04)
IMM GRANULOCYTES NFR BLD AUTO: 0.4 % (ref 0–0.5)
LYMPHOCYTES # BLD AUTO: 0.5 K/UL (ref 1–4.8)
LYMPHOCYTES NFR BLD: 17.8 % (ref 18–48)
MCH RBC QN AUTO: 30.5 PG (ref 27–31)
MCHC RBC AUTO-ENTMCNC: 31.2 G/DL (ref 32–36)
MCV RBC AUTO: 98 FL (ref 82–98)
MONOCYTES # BLD AUTO: 0.3 K/UL (ref 0.3–1)
MONOCYTES NFR BLD: 12.6 % (ref 4–15)
NEUTROPHILS # BLD AUTO: 1.8 K/UL (ref 1.8–7.7)
NEUTROPHILS NFR BLD: 67 % (ref 38–73)
NRBC BLD-RTO: 0 /100 WBC
PLATELET # BLD AUTO: 119 K/UL (ref 150–450)
PMV BLD AUTO: 12.8 FL (ref 9.2–12.9)
POTASSIUM SERPL-SCNC: 3.9 MMOL/L (ref 3.5–5.1)
PROT SERPL-MCNC: 7.4 G/DL (ref 6–8.4)
RBC # BLD AUTO: 4.53 M/UL (ref 4.6–6.2)
SODIUM SERPL-SCNC: 146 MMOL/L (ref 136–145)
TROPONIN I SERPL DL<=0.01 NG/ML-MCNC: 0.03 NG/ML (ref 0–0.03)
WBC # BLD AUTO: 2.7 K/UL (ref 3.9–12.7)

## 2024-11-14 PROCEDURE — 80053 COMPREHEN METABOLIC PANEL: CPT | Mod: ER | Performed by: EMERGENCY MEDICINE

## 2024-11-14 PROCEDURE — 94761 N-INVAS EAR/PLS OXIMETRY MLT: CPT | Mod: ER

## 2024-11-14 PROCEDURE — 85025 COMPLETE CBC W/AUTO DIFF WBC: CPT | Mod: ER | Performed by: EMERGENCY MEDICINE

## 2024-11-14 PROCEDURE — 63600175 PHARM REV CODE 636 W HCPCS: Mod: ER | Performed by: EMERGENCY MEDICINE

## 2024-11-14 PROCEDURE — 84484 ASSAY OF TROPONIN QUANT: CPT | Mod: ER | Performed by: EMERGENCY MEDICINE

## 2024-11-14 PROCEDURE — 99285 EMERGENCY DEPT VISIT HI MDM: CPT | Mod: 25,ER

## 2024-11-14 PROCEDURE — 93010 ELECTROCARDIOGRAM REPORT: CPT | Mod: ,,, | Performed by: INTERNAL MEDICINE

## 2024-11-14 PROCEDURE — 83880 ASSAY OF NATRIURETIC PEPTIDE: CPT | Mod: ER | Performed by: EMERGENCY MEDICINE

## 2024-11-14 PROCEDURE — 96374 THER/PROPH/DIAG INJ IV PUSH: CPT | Mod: ER

## 2024-11-14 PROCEDURE — 93005 ELECTROCARDIOGRAM TRACING: CPT | Mod: ER

## 2024-11-14 RX ORDER — TORSEMIDE 20 MG/1
40 TABLET ORAL 2 TIMES DAILY
Qty: 120 TABLET | Refills: 2 | Status: SHIPPED | OUTPATIENT
Start: 2024-11-14

## 2024-11-14 RX ORDER — FUROSEMIDE 10 MG/ML
100 INJECTION INTRAMUSCULAR; INTRAVENOUS
Status: COMPLETED | OUTPATIENT
Start: 2024-11-14 | End: 2024-11-14

## 2024-11-14 RX ADMIN — FUROSEMIDE 100 MG: 10 INJECTION, SOLUTION INTRAMUSCULAR; INTRAVENOUS at 06:11

## 2024-11-15 ENCOUNTER — PATIENT OUTREACH (OUTPATIENT)
Dept: EMERGENCY MEDICINE | Facility: HOSPITAL | Age: 59
End: 2024-11-15
Payer: MEDICARE

## 2024-11-15 LAB
OHS QRS DURATION: 86 MS
OHS QTC CALCULATION: 470 MS

## 2024-11-15 NOTE — ED PROVIDER NOTES
Encounter Date: 11/14/2024       History     Chief Complaint   Patient presents with    Fatigue     Generalized weakness for month . Pcp adjusting fluid meds but feels retaining too much fluids     The history is provided by the patient.   Shortness of Breath  This is a chronic problem. The problem occurs intermittently.The problem has been gradually worsening. Associated symptoms include orthopnea and leg swelling. Pertinent negatives include no fever, no rhinorrhea, no sore throat, no neck pain, no cough, no sputum production, no hemoptysis, no wheezing, no PND, no chest pain, no syncope, no vomiting, no abdominal pain, no rash, no leg pain and no claudication.     Review of patient's allergies indicates:  No Known Allergies  Past Medical History:   Diagnosis Date    A-fib     CHF (congestive heart failure)     Gout, chronic     Hypertension     Sleep apnea      Past Surgical History:   Procedure Laterality Date    CHOLECYSTECTOMY      GASTRIC BYPASS  2014    INJECTION OF JOINT Left 8/23/2023    Procedure: Left IA Hip Joint injection;  Surgeon: Peña Rausch MD;  Location: [facility];  Service: Pain Management;  Laterality: Left;     Family History   Problem Relation Name Age of Onset    Hypertension Mother      Stroke Father      Diabetes Father      Hypertension Father      Diabetes Sister      Diabetes Brother       Social History     Tobacco Use    Smoking status: Never    Smokeless tobacco: Never   Substance Use Topics    Alcohol use: Not Currently    Drug use: No     Review of Systems   Constitutional:  Negative for fever.   HENT:  Negative for rhinorrhea and sore throat.    Respiratory:  Positive for shortness of breath. Negative for cough, hemoptysis, sputum production and wheezing.    Cardiovascular:  Positive for orthopnea and leg swelling. Negative for chest pain, claudication, syncope and PND.   Gastrointestinal:  Negative for abdominal pain, nausea and vomiting.   Genitourinary:  Negative for  dysuria.   Musculoskeletal:  Negative for back pain and neck pain.   Skin:  Negative for rash.   Neurological:  Negative for weakness.   Hematological:  Does not bruise/bleed easily.       Physical Exam     Initial Vitals [11/14/24 1731]   BP Pulse Resp Temp SpO2   122/75 104 20 97.5 °F (36.4 °C) 96 %      MAP       --         Physical Exam    Nursing note and vitals reviewed.  Constitutional: He appears well-developed and well-nourished.   HENT:   Head: Normocephalic and atraumatic. Mouth/Throat: Oropharynx is clear and moist.   Eyes: Conjunctivae and EOM are normal. Pupils are equal, round, and reactive to light.   Neck: Neck supple.   Normal range of motion.  Cardiovascular:  Normal rate and normal heart sounds. An irregularly irregular rhythm present.           Pulmonary/Chest: He has decreased breath sounds. He has rales.   Abdominal: Abdomen is soft. Bowel sounds are normal.   Musculoskeletal:         General: Edema present. Normal range of motion.      Cervical back: Normal range of motion and neck supple.      Comments: 3+ edema     Neurological: He is alert and oriented to person, place, and time. He has normal strength.   Skin: Skin is warm and dry.   Psychiatric: He has a normal mood and affect. Thought content normal.         ED Course   Procedures  Labs Reviewed   CBC W/ AUTO DIFFERENTIAL - Abnormal       Result Value    WBC 2.70 (*)     RBC 4.53 (*)     Hemoglobin 13.8 (*)     Hematocrit 44.3      MCV 98      MCH 30.5      MCHC 31.2 (*)     RDW 16.4 (*)     Platelets 119 (*)     MPV 12.8      Immature Granulocytes 0.4      Gran # (ANC) 1.8      Immature Grans (Abs) 0.01      Lymph # 0.5 (*)     Mono # 0.3      Eos # 0.0      Baso # 0.02      nRBC 0      Gran % 67.0      Lymph % 17.8 (*)     Mono % 12.6      Eosinophil % 1.5      Basophil % 0.7      Differential Method Automated     COMPREHENSIVE METABOLIC PANEL - Abnormal    Sodium 146 (*)     Potassium 3.9      Chloride 104      CO2 31 (*)     Glucose  89      BUN 31 (*)     Creatinine 1.6 (*)     Calcium 8.6 (*)     Total Protein 7.4      Albumin 3.7      Total Bilirubin 0.8      Alkaline Phosphatase 60      AST 20      ALT 14      eGFR 49.0 (*)     Anion Gap 11     B-TYPE NATRIURETIC PEPTIDE - Abnormal     (*)    TROPONIN I    Troponin I 0.026       EKG Readings: (Independently Interpreted)   Rhythm: Atrial Fibrillation. Heart Rate: 80. Ectopy: No Ectopy. ST Segments: Non-Specific ST Segment Depression. T Waves: Normal. Axis: Normal. Clinical Impression: Atrial Fibrillation       Imaging Results              X-Ray Chest AP Portable (Final result)  Result time 11/14/24 18:57:22      Final result by Leslye Horton MD (11/14/24 18:57:22)                   Impression:      No active pulmonary finding      Electronically signed by: Leslye Horton  Date:    11/14/2024  Time:    18:57               Narrative:    EXAMINATION:  XR CHEST AP PORTABLE    CLINICAL HISTORY:  CHF;    TECHNIQUE:  Single frontal portable view of the chest was performed.    COMPARISON:  04/24/2024    FINDINGS:  No sizable pulmonary opacity portable under penetrated radiograph with artifact                                       Medications   furosemide injection 100 mg (100 mg Intravenous Given 11/14/24 1840)     Medical Decision Making  DDx CHF, MONA, ARF, Cirrhosis, Vascualr insufficiency    Problems Addressed:  Acute on chronic congestive heart failure, unspecified heart failure type: chronic illness or injury with exacerbation, progression, or side effects of treatment  Edema, unspecified type: acute illness or injury  Shortness of breath: acute illness or injury    Amount and/or Complexity of Data Reviewed  Labs: ordered.  Radiology: ordered.  ECG/medicine tests: ordered and independent interpretation performed. Decision-making details documented in ED Course.    Risk  Prescription drug management.                                      Clinical Impression:  Final  diagnoses:  [R06.02] Shortness of breath  [R60.9] Edema, unspecified type (Primary)  [I50.9] Acute on chronic congestive heart failure, unspecified heart failure type          ED Disposition Condition    Discharge Stable          ED Prescriptions    None       Follow-up Information       Follow up With Specialties Details Why Contact Info    Dominick Allen MD Family Medicine Schedule an appointment as soon as possible for a visit in 2 days  [address]  [place] [address]  [phone]      [facility] Emergency Dept Emergency Medicine Schedule an appointment as soon as possible for a visit   [id number] [facility] [address]  Emergency Department  [facility] [id number] [phone]    Cardiology  Call in 1 day [phone], As needed              Jesus Lan MD  11/14/24 [id number]

## 2024-11-18 NOTE — PROGRESS NOTES
Pt visited the ED on 11/14/24. I made 2 attempts to reach patient to assist with scheduling a post ED 7-day follow up with PCP. Unable to reach.  Closing Encounter.    Gita De Santiago

## 2024-11-20 ENCOUNTER — PATIENT MESSAGE (OUTPATIENT)
Dept: CARDIOLOGY | Facility: CLINIC | Age: 59
End: 2024-11-20
Payer: MEDICARE

## 2024-11-20 DIAGNOSIS — I50.32 CHRONIC DIASTOLIC CONGESTIVE HEART FAILURE: Primary | ICD-10-CM

## 2024-11-20 NOTE — TELEPHONE ENCOUNTER
The chart reviewed  Had CHF exacerbation  Continue torsemide 40 mg bid  Repeat BNP and BMP next Monday

## 2024-11-21 ENCOUNTER — PATIENT MESSAGE (OUTPATIENT)
Dept: INTERNAL MEDICINE | Facility: CLINIC | Age: 59
End: 2024-11-21
Payer: MEDICARE

## 2024-12-10 ENCOUNTER — TELEPHONE (OUTPATIENT)
Dept: RHEUMATOLOGY | Facility: CLINIC | Age: 59
End: 2024-12-10
Payer: MEDICARE

## 2024-12-10 NOTE — TELEPHONE ENCOUNTER
----- Message from Med Assistant Fuller sent at 12/9/2024  3:45 PM CST -----  Contact: Appeals Dept 1 [phone]    ----- Message -----  From: Eve Mccrary  Sent: 12/9/2024   3:44 PM CST  To: Donavon Fitch Staff    The [place] [employer] RX department is calling about PA for the Taltz Autoinjector that is not covered by the insurance  REF #  [id number]  you can file an appeal

## 2024-12-16 ENCOUNTER — TELEPHONE (OUTPATIENT)
Dept: RHEUMATOLOGY | Facility: CLINIC | Age: 59
End: 2024-12-16
Payer: MEDICARE

## 2024-12-16 DIAGNOSIS — L40.50 PSORIATIC ARTHRITIS: ICD-10-CM

## 2024-12-16 RX ORDER — IXEKIZUMAB 80 MG/ML
80 INJECTION, SOLUTION SUBCUTANEOUS
Qty: 3 ML | Refills: 3 | Status: ACTIVE | OUTPATIENT
Start: 2024-12-16

## 2024-12-19 ENCOUNTER — PATIENT MESSAGE (OUTPATIENT)
Dept: PAIN MEDICINE | Facility: CLINIC | Age: 59
End: 2024-12-19
Payer: MEDICARE

## 2024-12-19 DIAGNOSIS — M47.817 FACET ARTHRITIS OF LUMBOSACRAL REGION: ICD-10-CM

## 2024-12-19 DIAGNOSIS — M54.50 DORSALGIA OF LUMBAR REGION: ICD-10-CM

## 2024-12-19 DIAGNOSIS — M51.379 DDD (DEGENERATIVE DISC DISEASE), LUMBOSACRAL: ICD-10-CM

## 2024-12-20 DIAGNOSIS — M54.50 DORSALGIA OF LUMBAR REGION: ICD-10-CM

## 2024-12-20 DIAGNOSIS — M51.379 DDD (DEGENERATIVE DISC DISEASE), LUMBOSACRAL: ICD-10-CM

## 2024-12-20 DIAGNOSIS — M47.817 FACET ARTHRITIS OF LUMBOSACRAL REGION: ICD-10-CM

## 2024-12-20 RX ORDER — METHOCARBAMOL 750 MG/1
TABLET, FILM COATED ORAL
Qty: 60 TABLET | Refills: 0 | Status: SHIPPED | OUTPATIENT
Start: 2024-12-20

## 2024-12-20 RX ORDER — PREGABALIN 200 MG/1
200 CAPSULE ORAL 2 TIMES DAILY
Qty: 60 CAPSULE | Refills: 0 | Status: SHIPPED | OUTPATIENT
Start: 2024-12-20

## 2024-12-20 NOTE — TELEPHONE ENCOUNTER
Called patient and scheduled him an appt with Ms. Medley and sent med refill request over to the provider. Pt verbalized understanding.    Divine SOLIZ

## 2024-12-20 NOTE — TELEPHONE ENCOUNTER
Can you refill?methocarbamoL (ROBAXIN) 750 MG Tab     Last Visit:08/12/2024  Next Visit:01/06/2025  Last refill:08/12/2024  How pt patient is currently taking medication requested: Sig - Route: Take 1 tablet (750 mg total) by mouth 3 (three) times daily as needed (muscle spasms). - Oral   Pharmacy:   [facility] [address]       Can you refill?pregabalin (LYRICA) 200 MG Cap     Last Visit:08/12/2024  Next Visit:01/06/2025  Last refill:08/12/2024  How pt patient is currently taking medication requested: Sig - Route: Take 1 capsule (200 mg total) by mouth 2 (two) times daily. - Oral   Pharmacy:   [facility] [address]

## 2024-12-23 ENCOUNTER — TELEPHONE (OUTPATIENT)
Dept: RHEUMATOLOGY | Facility: CLINIC | Age: 59
End: 2024-12-23
Payer: MEDICARE

## 2024-12-23 RX ORDER — PREGABALIN 200 MG/1
200 CAPSULE ORAL 2 TIMES DAILY
Qty: 60 CAPSULE | Refills: 3 | OUTPATIENT
Start: 2024-12-23

## 2024-12-23 RX ORDER — METHOCARBAMOL 750 MG/1
750 TABLET, FILM COATED ORAL 3 TIMES DAILY PRN
Qty: 60 TABLET | Refills: 3 | OUTPATIENT
Start: 2024-12-23

## 2024-12-23 NOTE — TELEPHONE ENCOUNTER
----- Message from Sharita sent at 12/23/2024 10:34 AM CST -----  Contact: Meche ([facility])   Vikash from [facility] was calling to let the staff know that they did approve the medication ixekizumab (TALTZ AUTOINJECTOR) 80 mg/mL AtIn until the rest of the 2025 year. If you have any question please reach ou to them at [phone] fax number is [phone].    Thanks

## 2024-12-26 ENCOUNTER — TELEPHONE (OUTPATIENT)
Dept: RHEUMATOLOGY | Facility: CLINIC | Age: 59
End: 2024-12-26
Payer: MEDICARE

## 2024-12-26 NOTE — TELEPHONE ENCOUNTER
----- Message from Robert Pacheco sent at 12/24/2024 12:02 PM CST -----  Hi Dr. Raphael and Staff,      Taltz is approved by the patient's insurance with a high copay. We will be assisting the patient in applying to the  patient assistance program. I am faxing the application prescription to [phone] for your review and signature. Please fax signed paperwork back to my attention @ [phone]. If you would like the paperwork faxed to an alternate number, please let me know.          HERB Grande, [facility]  Patient Care Advocate  [facility]  [email or web address]  Phone: [phone]  Fax: [phone]

## 2025-01-06 ENCOUNTER — TELEPHONE (OUTPATIENT)
Dept: PAIN MEDICINE | Facility: CLINIC | Age: 60
End: 2025-01-06
Payer: MEDICARE

## 2025-01-07 ENCOUNTER — OFFICE VISIT (OUTPATIENT)
Dept: PAIN MEDICINE | Facility: CLINIC | Age: 60
End: 2025-01-07
Payer: MEDICARE

## 2025-01-07 ENCOUNTER — OFFICE VISIT (OUTPATIENT)
Dept: CARDIOLOGY | Facility: CLINIC | Age: 60
End: 2025-01-07
Payer: MEDICARE

## 2025-01-07 ENCOUNTER — OFFICE VISIT (OUTPATIENT)
Dept: PULMONOLOGY | Facility: CLINIC | Age: 60
End: 2025-01-07
Payer: MEDICARE

## 2025-01-07 ENCOUNTER — PATIENT MESSAGE (OUTPATIENT)
Dept: CARDIOLOGY | Facility: CLINIC | Age: 60
End: 2025-01-07
Payer: MEDICARE

## 2025-01-07 DIAGNOSIS — I27.20 PULMONARY HTN: Primary | ICD-10-CM

## 2025-01-07 DIAGNOSIS — E66.2 OBESITY HYPOVENTILATION SYNDROME: ICD-10-CM

## 2025-01-07 DIAGNOSIS — Z99.11 DEPENDENCE ON HOME VENTILATOR: ICD-10-CM

## 2025-01-07 DIAGNOSIS — I27.20 PULMONARY HYPERTENSION: ICD-10-CM

## 2025-01-07 DIAGNOSIS — M54.9 DORSALGIA, UNSPECIFIED: ICD-10-CM

## 2025-01-07 DIAGNOSIS — I50.9 ACUTE ON CHRONIC CONGESTIVE HEART FAILURE, UNSPECIFIED HEART FAILURE TYPE: ICD-10-CM

## 2025-01-07 DIAGNOSIS — J98.4 RESTRICTIVE LUNG DISEASE: Primary | ICD-10-CM

## 2025-01-07 DIAGNOSIS — I87.2 VENOUS INSUFFICIENCY: ICD-10-CM

## 2025-01-07 DIAGNOSIS — I48.20 CHRONIC ATRIAL FIBRILLATION: ICD-10-CM

## 2025-01-07 DIAGNOSIS — E66.01 MORBID OBESITY WITH BMI OF 50.0-59.9, ADULT: Chronic | ICD-10-CM

## 2025-01-07 DIAGNOSIS — I07.1 MODERATE TRICUSPID VALVE REGURGITATION: ICD-10-CM

## 2025-01-07 DIAGNOSIS — J98.4 RESTRICTIVE LUNG DISEASE: ICD-10-CM

## 2025-01-07 DIAGNOSIS — M54.16 LUMBAR RADICULOPATHY, CHRONIC: Primary | ICD-10-CM

## 2025-01-07 DIAGNOSIS — G47.33 OSA (OBSTRUCTIVE SLEEP APNEA): ICD-10-CM

## 2025-01-07 DIAGNOSIS — I10 ESSENTIAL HYPERTENSION: Chronic | ICD-10-CM

## 2025-01-07 PROCEDURE — 3044F HG A1C LEVEL LT 7.0%: CPT | Mod: CPTII,95,, | Performed by: PHYSICIAN ASSISTANT

## 2025-01-07 PROCEDURE — 98006 SYNCH AUDIO-VIDEO EST MOD 30: CPT | Mod: 95,,, | Performed by: PHYSICIAN ASSISTANT

## 2025-01-07 RX ORDER — METHYLPREDNISOLONE 4 MG/1
TABLET ORAL
Qty: 21 TABLET | Refills: 0 | Status: SHIPPED | OUTPATIENT
Start: 2025-01-07 | End: 2025-01-28

## 2025-01-07 RX ORDER — NORTRIPTYLINE HYDROCHLORIDE 25 MG/1
25 CAPSULE ORAL NIGHTLY
Qty: 30 CAPSULE | Refills: 11 | Status: SHIPPED | OUTPATIENT
Start: 2025-01-07 | End: 2026-01-07

## 2025-01-07 RX ORDER — BUMETANIDE 2 MG/1
2 TABLET ORAL 2 TIMES DAILY
Qty: 60 TABLET | Refills: 11 | Status: SHIPPED | OUTPATIENT
Start: 2025-01-07 | End: 2026-01-07

## 2025-01-07 NOTE — PROGRESS NOTES
Subjective:   Patient ID:  Caio Ontiveros is a 59 y.o. male who presents for follow up of No chief complaint on file.      58 yo male, came in for 6 months f/u  PMH CHFpEF, chroic Afib for 7 yrs, obesity s/p gastric bypass in 2014. EVANGELIST on cpap for 5 yrs   ECHO in 2017 EF 55%  09/06/2020 went to ER at [facility] due to worsening dyspnea. BNP 1100, troponin 0.036, CXR mild edema, ekg showed afib. Had IV lasix  And good diuresis.    ChestCT w/o multiple nodule     visit states that sleeps on 2 pillows, + PND snores,  SOb, palpitation  No chest pain, dizziness  Did drink a lot of water  His wife cooks at home and f/u low sodium protocol   Lasix was d/c and Bumex 2mg tid added  Pharmacist only gave him 1 mg tid. Went to ER 2 days ago. EKG AFIB and HR 91 bpm. BNP and BMP no change  CXR no acute change. Had Lasix IV and d/c  Lost 6 pounds in 1 week     admitted for CHF exacerbation and had IV diuretics.  ECHo showed RV moderatelt dilated and RV function decreased. PAP 55 mmHG  Slight elevation of troponin to 0.036  BNPelevatde    01/04/2022 visit  BNP was 1200 in  and down to 83 on 12/16/2021. Then lasix 40 mg bid was d/c'ed by some reason.  Sleeps on 2 pillows. And NO PND  Edema elevated to the pelvic area.   SBP at 100mHG  Pulse rate at 80 to 90   Echo in  normal EF. Dilated RV chamber with mod RV failure and pulm HTN     visit  BP high. On Lasix 40 mg bid, no change of weight. GAYTAN stable. No chest pain palpitation and faint      visit  ekg AFIB and BP high  No dizziness faint chest pain dyspnea,      visit telemed  Taking Lasix 40 mg bid and remains SOB occasionally. No dizziness faint   in 06/23. Weight stable.   Some leg swelling. Sleeps on 2 pillows. On CPAP  Gained 40 lbs in 1 yr    07/23 visit telemed  On torsemide 40 mg in AM and 20 in PM and toprolol up to 100 mg daily, BP pulse controlled  Some fatigue, remain mild SOB. No orthopnea    01/24  visit  EKG afib VR C. No orthopnea PND, on cpap.no chest pain dizziness. On compression socks    06/24 visit  BP controlled at home 120/80 mmHG. AFIB VR C  Could not afford GLP-1 RA.  Weight stable/ h/o gastric sleeve       Interval history tele visit  RLE swelling and stiffness. Worse on LLE. 04/24 LE venous us done at [facility] no DVT  Some sciatic on left side  SaO2  decreased. Sleeps on wedge pillows.   04/24 echo showed RV dilation and RV dysfunction PASP 90 mmHG  F/u at pulm service for restriction lung dz.       01/10/25 Addendum  PT cancelled the RHC.                   Past Medical History:   Diagnosis Date    A-fib     CHF (congestive heart failure)     Gout, chronic     Hypertension     Sleep apnea        Past Surgical History:   Procedure Laterality Date    CHOLECYSTECTOMY      GASTRIC BYPASS  2014    INJECTION OF JOINT Left 8/23/2023    Procedure: Left IA Hip Joint injection;  Surgeon: Peña Rausch MD;  Location: [facility];  Service: Pain Management;  Laterality: Left;       Social History     Tobacco Use    Smoking status: Never    Smokeless tobacco: Never   Substance Use Topics    Alcohol use: Not Currently    Drug use: No       Family History   Problem Relation Name Age of Onset    Hypertension Mother      Stroke Father      Diabetes Father      Hypertension Father      Diabetes Sister      Diabetes Brother           ROS    Objective:   Physical Exam  Constitutional:       Appearance: He is obese.   HENT:      Head: Normocephalic.   Eyes:      Pupils: Pupils are equal, round, and reactive to light.   Neck:      Thyroid: No thyromegaly.      Vascular: Normal carotid pulses. No carotid bruit or JVD.   Cardiovascular:      Rate and Rhythm: Tachycardia present. Rhythm irregularly irregular. No extrasystoles are present.     Chest Wall: PMI is not displaced.      Pulses: Normal pulses.      Heart sounds: Normal heart sounds. No murmur heard.     No gallop. No S3 sounds.    Pulmonary:      Effort: No respiratory distress.      Breath sounds: No stridor. Rales present.   Abdominal:      General: Bowel sounds are normal.      Palpations: Abdomen is soft.      Tenderness: There is no abdominal tenderness. There is no rebound.   Musculoskeletal:         General: Swelling present. Normal range of motion.   Skin:     Findings: No rash.   Neurological:      Mental Status: He is alert and oriented to person, place, and time.   Psychiatric:         Behavior: Behavior normal.       Lab Results   Component Value Date    CHOL 185 06/11/2024    CHOL 192 05/16/2023    CHOL 186 02/16/2022     Lab Results   Component Value Date    HDL 44 06/11/2024    HDL 46 05/16/2023    HDL 49 02/16/2022     Lab Results   Component Value Date    LDLCALC 123.2 06/11/2024    LDLCALC 127.6 05/16/2023    LDLCALC 119.8 02/16/2022     Lab Results   Component Value Date    TRIG 89 06/11/2024    TRIG 92 05/16/2023    TRIG 86 02/16/2022     Lab Results   Component Value Date    CHOLHDL 23.8 06/11/2024    CHOLHDL 24.0 05/16/2023    CHOLHDL 26.3 02/16/2022       Chemistry        Component Value Date/Time     (H) 11/14/2024 1840    K 3.9 11/14/2024 1840     11/14/2024 1840    CO2 31 (H) 11/14/2024 1840    BUN 31 (H) 11/14/2024 1840    CREATININE 1.6 (H) 11/14/2024 1840    GLU 89 11/14/2024 1840        Component Value Date/Time    CALCIUM 8.6 (L) 11/14/2024 1840    ALKPHOS 60 11/14/2024 1840    AST 20 11/14/2024 1840    ALT 14 11/14/2024 1840    BILITOT 0.8 11/14/2024 1840    ESTGFRAFRICA >60.0 02/22/2022 1702    EGFRNONAA >60.0 02/22/2022 1702          Lab Results   Component Value Date    HGBA1C 5.6 06/11/2024     Lab Results   Component Value Date    TSH 3.581 06/11/2024     Lab Results   Component Value Date    INR 1.2 09/06/2020    INR 1.0 02/27/2010    INR 1.0 02/26/2010     Lab Results   Component Value Date    WBC 2.70 (L) 11/14/2024    HGB 13.8 (L) 11/14/2024    HCT 44.3 11/14/2024    MCV 98 11/14/2024      (L) 11/14/2024     BMP  Sodium   Date Value Ref Range Status   11/14/2024 146 (H) 136 - 145 mmol/L Final     Potassium   Date Value Ref Range Status   11/14/2024 3.9 3.5 - 5.1 mmol/L Final     Chloride   Date Value Ref Range Status   11/14/2024 104 95 - 110 mmol/L Final     CO2   Date Value Ref Range Status   11/14/2024 31 (H) 23 - 29 mmol/L Final     BUN   Date Value Ref Range Status   11/14/2024 31 (H) 6 - 20 mg/dL Final     Creatinine   Date Value Ref Range Status   11/14/2024 1.6 (H) 0.5 - 1.4 mg/dL Final     Calcium   Date Value Ref Range Status   11/14/2024 8.6 (L) 8.7 - 10.5 mg/dL Final     Anion Gap   Date Value Ref Range Status   11/14/2024 11 8 - 16 mmol/L Final     eGFR if    Date Value Ref Range Status   02/22/2022 >60.0 >60 mL/min/1.73 m^2 Final     eGFR if non    Date Value Ref Range Status   02/22/2022 >60.0 >60 mL/min/1.73 m^2 Final     Comment:     Calculation used to obtain the estimated glomerular filtration  rate (eGFR) is the CKD-EPI equation.        BNP  @LABRCNTIP(BNP,BNPTRIAGEBLO)@  @LABRCNTIP(troponini)@  CrCl cannot be calculated (Patient's most recent lab result is older than the maximum 7 days allowed.).  No results found in the last 24 hours.  No results found in the last 24 hours.  No results found in the last 24 hours.    Assessment:      1. Pulmonary HTN    2. Acute on chronic congestive heart failure, unspecified heart failure type    3. Chronic atrial fibrillation    4. Venous insufficiency    5. Essential hypertension    6. Moderate tricuspid valve regurgitation    7. Pulmonary hypertension    8. Morbid obesity with BMI of 50.0-59.9, adult    9. Restrictive lung disease        Plan:     Arrange RHC for severe pulm HTN  I have explained the risks, benefits, and alternatives of the procedure in detail with patient and family. The patient voices understanding and all questions have been addressed.  The patient agrees to proceed as  planned.    D/c torsemide  Add Bumex 2 mg bid and check BMP in 1 week fro PVD and CHF  D/c losartan due to low BP   Continue toprolXL and Eliquis     Fluid restriction 1.5 liters a day  Na< 2 gm  RTC after RHC       The patient location is: home  The chief complaint leading to consultation is: 18    Visit type: audiovisual    Face to Face time with patient: leg swelling minutes of total time spent on the encounter, which includes face to face time and non-face to face time preparing to see the patient (eg, review of tests), Obtaining and/or reviewing separately obtained history, Documenting clinical information in the electronic or other health record, Independently interpreting results (not separately reported) and communicating results to the patient/family/caregiver, or Care coordination (not separately reported).         Each patient to whom he or she provides medical services by telemedicine is:  (1) informed of the relationship between the physician and patient and the respective role of any other health care provider with respect to management of the patient; and (2) notified that he or she may decline to receive medical services by telemedicine and may withdraw from such care at any time.    Notes:

## 2025-01-07 NOTE — PROGRESS NOTES
"Established Patient - TeleHealth Visit    The patient location is: [place]  The chief complaint leading to consultation is: discuss medications, lower back pain follow up  Visit type: audiovisual    Face to Face time with patient: 10-15 minutes of total time spent on the encounter, which includes face to face time and non-face to face time preparing to see the patient (eg, review of tests), Obtaining and/or reviewing separately obtained history, Documenting clinical information in the electronic or other health record, Independently interpreting results (not separately reported) and communicating results to the patient/family/caregiver, or Care coordination (not separately reported).     Each patient to whom he or she provides medical services by telemedicine is:  (1) informed of the relationship between the physician and patient and the respective role of any other health care provider with respect to management of the patient; and (2) notified that he or she may decline to receive medical services by telemedicine and may withdraw from such care at any time.      Referring Physician: No ref. provider found    PCP: Dominick Allen MD       SUBJECTIVE:    Interval History (1/7/2025):Caio Ontiveros presents today for follow-up visit.  Patient was last seen on 8/12/2024. Patient reports lower back pain worse early in the morning and especially getting in/out of vehicle. He states that long walks "kill" him.   He also noticed pain radiating into LLE down the back of his leg to the knee.     Interval History (8/12/24):  Caio Ontiveros presents today for follow-up visit.  Patient was last seen on 6/24/2024. The patient reports that the medication increases/changes help but he still has lower back pain. He has discussed back procedures with several friends and is afraid to move forward with it due to their experiences (his friends informed him that the procedures did not help with pain).   He is currently taking Lyrica 150 mg BID " and Robaxin 750 gm TID. Tolerating medications well with no side effects.       Interval History (6/24/2024):  Caio Ontiveros presents today for follow-up visit.  Patient was last seen on 3/22/2024. Patient reports that he is experiencing more pain when moving around and walking. He localizes his pain across lower back and with LLE. He also c/o stiffness in his lower back. Patient reports pain as 8/10 today. Since his last visit, Lyrica was increased from 75 mg QHS to 100 mg BID. He is also taking Robaxin 500 mg.       Interval History (3/22/2024):  Caio Ontiveros presents today for follow-up visit.  Patient was last seen on 8/23/2023 for Left IA hip joint injection with 80% relief.  He reports his hip pain is doing better however he does have some lower back pain which seems to be getting worse.  The pain remains in the back and does not radiate into the legs.  Prolonged standing and extending back make the pain worse.  If he leans over and wrists with surface bending forward he will get some relief of his pain.  He rates his pain today an 8/10.  No new injury.  He is requesting to try a different medication to try to help with his pain and is leery of doing any procedures on his back at this time.  He has not done formal physical therapy because he can not afford it at this time and he also does not think he would be able to tolerate it at this time.  He has been doing home exercises and stretches and is trying to work on weight loss because he feels this will help his pain as well.  Patient denies night fever/night sweats, urinary incontinence, bowel incontinence, significant weight loss and significant motor weakness.   Patient denies any other complaints or concerns at this time.        Interval history 8/9/2023  Caio Ontiveros is a 59 y.o. male who presents to the clinic for the evaluation of chronic lower back pain for years that gradually got worse but has become severe over the past several months.  It is affecting his  ADLs significantly including the ability to walk and stand.  It is located in the left buttock and radiates to the left groin and is worse with weight-bearing.  There is no radicular pain tingling or numbness to the legs but he reports weakness in the left leg versus due to severe pain.  Denies loss of bowel or bladder control.  It is worse with walking, standing, getting up from the bed or chair and is better (slightly) with medications including Tylenol Arthritis and gabapentin which he takes at 300 mg daily.  He could not afford the co-pay for physical therapy, not well covered by his insurance. He currently rates it at 10/10.  He gives it a 6/10 when it is at best.  He has history of extreme morbid obesity and lost significant weight after gastric bypass surgery in 2014 dropping down from 500 lb 340 lb.  He managed to gain some weight back and he is around 400 lb.  He has no history of kidney stones, glaucoma or sulfa allergies.      Pain Disability Index Review:         8/9/2023     2:11 PM   Last 3 PDI Scores   Pain Disability Index (PDI) 62        report:  Not applicable    Pain Procedures:   8/23/2023 Left IA hip joint injection with 80% relief      Past Medical History:   Diagnosis Date    A-fib     CHF (congestive heart failure)     Gout, chronic     Hypertension     Sleep apnea      Past Surgical History:   Procedure Laterality Date    CHOLECYSTECTOMY      GASTRIC BYPASS  2014    INJECTION OF JOINT Left 8/23/2023    Procedure: Left IA Hip Joint injection;  Surgeon: Peña Rausch MD;  Location: [facility];  Service: Pain Management;  Laterality: Left;     Social History     Socioeconomic History    Marital status:     Number of children: 1   Tobacco Use    Smoking status: Never    Smokeless tobacco: Never   Substance and Sexual Activity    Alcohol use: Not Currently    Drug use: No    Sexual activity: Not Currently     Social Drivers of Health     Financial Resource Strain: Low Risk   (8/14/2024)    Overall Financial Resource Strain (CARDIA)     Difficulty of Paying Living Expenses: Not hard at all   Food Insecurity: No Food Insecurity (8/14/2024)    Hunger Vital Sign     Worried About Running Out of Food in the Last Year: Never true     Ran Out of Food in the Last Year: Never true   Transportation Needs: No Transportation Needs (8/14/2024)    PRAPARE - Transportation     Lack of Transportation (Medical): No     Lack of Transportation (Non-Medical): No   Physical Activity: Inactive (8/14/2024)    Exercise Vital Sign     Days of Exercise per Week: 0 days     Minutes of Exercise per Session: 0 min   Stress: Stress Concern Present (8/14/2024)    [employer] - Occupational Stress Questionnaire     Feeling of Stress : To some extent   Housing Stability: Low Risk  (1/8/2024)    Housing Stability Vital Sign     Unable to Pay for Housing in the Last Year: No     Number of Places Lived in the Last Year: 1     Unstable Housing in the Last Year: No     Family History   Problem Relation Name Age of Onset    Hypertension Mother      Stroke Father      Diabetes Father      Hypertension Father      Diabetes Sister      Diabetes Brother         Review of patient's allergies indicates:  No Known Allergies    Current Outpatient Medications   Medication Sig    apixaban (ELIQUIS) 5 mg Tab Take 1 tablet by mouth twice daily    calcipotriene (DOVONOX) 0.005 % cream Apply topically 2 (two) times daily.    colchicine (COLCRYS) 0.6 mg tablet Take 1 tablet (0.6 mg total) by mouth once daily.    ixekizumab (TALTZ AUTOINJECTOR) 80 mg/mL AtIn Inject 80 mg into the skin every 28 days.    levothyroxine (SYNTHROID) 50 MCG tablet Take 1 tablet (50 mcg total) by mouth before breakfast.    losartan (COZAAR) 100 MG tablet TAKE 1 TABLET BY MOUTH ONCE  DAILY    methocarbamoL (ROBAXIN) 750 MG Tab TAKE 1 TABLET BY MOUTH THREE TIMES DAILY AS NEEDED (MUSCLE  SPASMS)    metoprolol succinate (TOPROL-XL) 100 MG  24 hr tablet Take 1 tablet by mouth once daily    potassium chloride SA (K-DUR,KLOR-CON) 20 MEQ tablet TAKE 1 TABLET BY MOUTH TWICE  DAILY    pregabalin (LYRICA) 200 MG Cap Take 1 capsule by mouth twice daily    torsemide (DEMADEX) 20 MG Tab Take 2 tablets by mouth twice daily    venlafaxine (EFFEXOR-XR) 150 MG Cp24 TAKE 1 CAPSULE BY MOUTH ONCE  DAILY    venlafaxine (EFFEXOR-XR) 75 MG 24 hr capsule TAKE 1 CAPSULE BY MOUTH ONCE  DAILY     No current facility-administered medications for this visit.       Review of Systems     GENERAL:  No weight loss, malaise or fevers.  HEENT:   No recent changes in vision or hearing  NECK:  Negative for lumps, no difficulty with swallowing.  RESPIRATORY:  Negative for cough, wheezing or shortness of breath, patient denies any recent URI.  CARDIOVASCULAR:  Negative for chest pain, positive for fluid retention and leg swelling  GI:  Negative for abdominal discomfort, blood in stools or black stools or change in bowel habits.  MUSCULOSKELETAL:  See HPI.  SKIN:  Negative for lesions, rash, and itching.  PSYCH:  No mood disorder or recent psychosocial stressors.  Patients sleep is not disturbed secondary to pain.  HEMATOLOGY/LYMPHOLOGY:  Negative for prolonged bleeding, bruising easily or swollen nodes.  Patient is not currently taking any anti-coagulants  NEURO:   No history of headaches, syncope, paralysis, seizures or tremors.  All other reviewed and negative other than HPI.    OBJECTIVE:  Telemedicine Exam  There were no vitals filed for this visit.  There is no height or weight on file to calculate BMI.   (reviewed on 1/7/2025)     GENERAL: Well appearing, in no acute distress, alert and oriented x3.  Cooperative.  PSYCH:  Mood and affect appropriate.  SKIN: Skin color & texture with no obvious abnormalities.    HEAD/FACE:  Normocephalic, atraumatic.    PULM:  No difficulty breathing. No nasal flaring. No obvious wheezing.  EXTREMITIES: No obvious deformities. Moving all  extremities well, appears to have symmetric strength throughout.  MUSCULOSKELETAL: No obvious atrophy abnormalities are noted.   NEURO: No obvious neurologic deficit.   GAIT: sitting.     Physical Exam: last in clinic visit:      Physical Exam    GENERAL: Well appearing, in no acute distress, but in discomfort when trying to walk, alert and oriented x3.  Morbidly obese  PSYCH:  Mood and affect appropriate.  SKIN: Skin color, texture, turgor normal, no rashes or lesions.  HEAD/FACE:  Normocephalic, atraumatic.  CV: chronic edema changes in distal legs and ankles.  PULM: No evidence of respiratory difficulty, symmetric chest rise.  GI:  Abdomen soft and non-tender.    GAIT: slow antalgic.    LUMBAR SPINE:   Palpation:  Mild tenderness over left facet joints and paraspinous muscles. No trigger points.  ROM: Pain with flexion, extension and rotation.  Straight leg raising is negative.  SI JOINTS: bilateral SI joint, no tenderness  LEFT HIP:  SEVERELY RESTRICTED AND PAINFUL RANGE OF MOTION including internal, external rotation, abduction and flexion.  NEURO:   MOTOR: Bilateral lower extremity muscle strength is normal. No atrophy or tone abnormalities are noted.  SENSORY: No loss of sensation in L3 through S1 dermatomes bilaterally.  DTR's: No clonus.    Imaging:        Results for orders placed during the hospital encounter of 12/28/22    X-Ray Lumbar Spine AP And Lateral    Narrative  EXAMINATION:  XR LUMBAR SPINE AP AND LATERAL    CLINICAL HISTORY:  Low back pain, unspecified    TECHNIQUE:  AP, lateral and spot images were performed of the lumbar spine.    COMPARISON:  Radiographs from August 2020    FINDINGS:  No scoliosis.  Five lumbar type vertebral bodies noted.  Bridging syndesmophytes noted at multiple levels. There is no fracture or listhesis.  Minimal narrowing at L3-L4 and L5-S1 is unchanged.  There is also minimal lower lumbar facet arthropathy.  No significant change from prior study.      Electronically  signed by: Emory Bah MD  Date:    12/28/2022    XR SACROILIAC JOINTS 3 VIEWS     CLINICAL HISTORY:  mechanical lower back pain, psoriasis acitive.  Assess for sacroiilitis/ankylosis;  Low back pain     COMPARISON:  None     FINDINGS:  Degenerative changes noted in the included lumbosacral spine and hips.  No fracture or dislocation.  Pelvic ring is intact.  SI joints normal and symmetric in appearance bilaterally.  No sclerotic or erosive changes appreciated.  Heterogeneous increased density identified within the femoral heads.  There is spurring and buttressing of the femoral neck cortex consistent with degenerative changes.  Subchondral degenerative cyst/geodes cannot be excluded bilaterally.     Impression:  I reviewed the films which show moderate to severe degenerative and sclerotic changes of the left femoral head and acetabulum, worse than the right.  SI joints within normal limits.  Degenerative changes lumbosacral spine and hips.        Electronically signed by: Marty Lee MD  Date:                                            08/25/2020      LABS:  Lab Results   Component Value Date    WBC 2.70 (L) 11/14/2024    HGB 13.8 (L) 11/14/2024    HCT 44.3 11/14/2024    MCV 98 11/14/2024     (L) 11/14/2024       CMP  Sodium   Date Value Ref Range Status   11/14/2024 146 (H) 136 - 145 mmol/L Final     Potassium   Date Value Ref Range Status   11/14/2024 3.9 3.5 - 5.1 mmol/L Final     Chloride   Date Value Ref Range Status   11/14/2024 104 95 - 110 mmol/L Final     CO2   Date Value Ref Range Status   11/14/2024 31 (H) 23 - 29 mmol/L Final     Glucose   Date Value Ref Range Status   11/14/2024 89 70 - 110 mg/dL Final     BUN   Date Value Ref Range Status   11/14/2024 31 (H) 6 - 20 mg/dL Final     Creatinine   Date Value Ref Range Status   11/14/2024 1.6 (H) 0.5 - 1.4 mg/dL Final     Calcium   Date Value Ref Range Status   11/14/2024 8.6 (L) 8.7 - 10.5 mg/dL Final     Total Protein   Date Value Ref Range  Status   11/14/2024 7.4 6.0 - 8.4 g/dL Final     Albumin   Date Value Ref Range Status   11/14/2024 3.7 3.5 - 5.2 g/dL Final     Total Bilirubin   Date Value Ref Range Status   11/14/2024 0.8 0.1 - 1.0 mg/dL Final     Comment:     For infants and newborns, interpretation of results should be based  on gestational age, weight and in agreement with clinical  observations.    Premature Infant recommended reference ranges:  Up to 24 hours.............<8.0 mg/dL  Up to 48 hours............<12.0 mg/dL  3-5 days..................<15.0 mg/dL  6-29 days.................<15.0 mg/dL       Alkaline Phosphatase   Date Value Ref Range Status   11/14/2024 60 40 - 150 U/L Final     AST   Date Value Ref Range Status   11/14/2024 20 10 - 40 U/L Final     ALT   Date Value Ref Range Status   11/14/2024 14 10 - 44 U/L Final     Anion Gap   Date Value Ref Range Status   11/14/2024 11 8 - 16 mmol/L Final     eGFR if    Date Value Ref Range Status   02/22/2022 >60.0 >60 mL/min/1.73 m^2 Final     eGFR if non    Date Value Ref Range Status   02/22/2022 >60.0 >60 mL/min/1.73 m^2 Final     Comment:     Calculation used to obtain the estimated glomerular filtration  rate (eGFR) is the CKD-EPI equation.        Lab Results   Component Value Date    HGBA1C 5.6 06/11/2024         ASSESSMENT: 59 y.o. year old male with chronic left hip pain that has gotten severe over the past several months not improving with medications.  X-rays show advanced sclerotic changes of the left hip:    1. Lumbar radiculopathy, chronic  nortriptyline (PAMELOR) 25 MG capsule    methylPREDNISolone (MEDROL DOSEPACK) 4 mg tablet    MRI Lumbar Spine Without Contrast      2. Dorsalgia, unspecified  nortriptyline (PAMELOR) 25 MG capsule    methylPREDNISolone (MEDROL DOSEPACK) 4 mg tablet    MRI Lumbar Spine Without Contrast          PLAN:    1- Interventions:  none at this time. Will get MRI first.    2. Pharmacology:  - Anticoagulation use:  Patient is taking apixaban (Eliquis) for atrial fibrillation.    - Medications:    Continue Lyrica  200 mg BID.. We discussed potential side effects of this medication which may include drowsiness,dizziness, dry mouth, constipation or peripheral edema. He has taken gabapentin in the past without relief.      Continue Robaxin 750 mg TID.     We will start nortriptyline 25 mg nightly.  We have discussed potential common side effects of duloxetine including nausea, drowsiness, excitement or anxiety, dry mouth, constipation or changes in appetite or weight.  Patient expresses understanding.    - Will prescribe Medrol Dose pack with instructions - for acute pain flare:  Day 1: 2 tablets before breakfast, 1 tablet after lunch, 1 tablet after dinner, 2 tablets at bedtime   Day 2: 1 tablet before breakfast, 1 tablet after lunch, 1 tablet after dinner, 2 tablets at bedtime   Day 3: 1 tablet before breakfast, 1 tablet after lunch, 1 tablet after dinner, 1 tablet at bedtime   Day 4: 1 tablet before breakfast, 1 tablet after lunch, 1 tablet at bedtime   Day 5: 1 tablet before breakfast, 1 tablet at bedtime   Day 6: 1 tablet before breakfast.      - Topamax discontinued in the past- not effective.  - Not a candidate for oral NSAIDs due to Eliquis.    - Can take Tylenol (acetaminophen) 1000mg (two tablets of 500mg) 3x per day as needed for pain (to take up to max dose of 3000mg per day).       report:  Reviewed and consistent with medication use as prescribed.      - Therapy:  Continue at home exercises and stretches as tolerated.  - Imaging:  Consider more advanced imaging in the future.   - Consults/Referrals: none at this time.    - Follow up: return to clinic after MRI to determine future plan of care    - Counseled patient regarding the importance of activity modification    - Patient Questions: Answered all of the patient's questions regarding diagnosis, therapy, and treatment    The above plan and management options  were discussed at length with patient. Patient is in agreement with the above and verbalized understanding.          Cliff Medley PA-C  Interventional Pain Management  [facility]

## 2025-01-07 NOTE — PROGRESS NOTES
Pulmonary Outpatient Follow Up Visit     Subjective:    The patient location is: Home  The chief complaint leading to consultation is: sleep problems  Visit type: Virtual visit with synchronous audio and video  Total time spent with patient:  20 min   Each patient to whom he or she provides medical services by telemedicine is:  (1) informed of the relationship between the physician and patient and the respective role of any other health care provider with respect to management of the patient; and (2) notified that he or she may decline to receive medical services by telemedicine and may withdraw from such care at any time.  Total time spent in face to face counseling and coordination of care -  15minutes over 50% of time was used in discussion of prognosis, risks, benefits of treatment, instructions and compliance with regimen .      Patient ID: Caio Ontiveros is a 59 y.o. male.    Chief Complaint: Apnea        HPI          58 yo M patient presenting for 1 year follow-up.      01/07/2024 chronic hypoxic and hypoxemic respiratory failure.  Does have currently a [place] ventilator at home which replaced a recalled [facility] ventilator.      Requesting recertification for ventilator use.            Known with history of  Congestive Heart failure and chornic hypoxic respiratory failure on nocturnal ventilation.  9/21/2022  no hospital admission over the last year.  SOB on exertion.  Compliant with trilogy plus O2 during sleep.  Obtained a replacement but he is in touch with  [facility] regarding a power cord for his machine and supplies.    Weight gain 30 lb compared to December 2021   Home ventilator use monitored through durable medical equipment company.  Patient is benefiting from use of nocturnal mechanical ventilation and reports no problems.  [employer]  [address]  [place], [address] [phone]  Fax [phone]      Admitted to the  hospital ICU November 2021 for acute on chronic hypoxemic and hypercapnic respiratory failure.  He is known  with history of CHF, A fib, pulmonary hypertension,   He was discharged on Lasix 40 mg daily, O2 during sleep with trilogy provided by [facility].  Compliant with trilogy  Never smoker.  Disabled currently.    Review of Systems   Constitutional:  Positive for fatigue and weakness.        30 lb weight gain   Respiratory:  Positive for cough, shortness of breath, previous hospitalization due to pulmonary problems, dyspnea on extertion and somnolence.    Cardiovascular:  Positive for leg swelling.   Psychiatric/Behavioral:  Positive for sleep disturbance.        Outpatient Encounter Medications as of 1/7/2025   Medication Sig Dispense Refill    apixaban (ELIQUIS) 5 mg Tab Take 1 tablet by mouth twice daily 60 tablet 5    calcipotriene (DOVONOX) 0.005 % cream Apply topically 2 (two) times daily. 30 g 1    colchicine (COLCRYS) 0.6 mg tablet Take 1 tablet (0.6 mg total) by mouth once daily. 90 tablet 0    ixekizumab (TALTZ AUTOINJECTOR) 80 mg/mL AtIn Inject 80 mg into the skin every 28 days. 3 mL 3    levothyroxine (SYNTHROID) 50 MCG tablet Take 1 tablet (50 mcg total) by mouth before breakfast. 90 tablet 3    methocarbamoL (ROBAXIN) 750 MG Tab TAKE 1 TABLET BY MOUTH THREE TIMES DAILY AS NEEDED (MUSCLE  SPASMS) 60 tablet 0    methylPREDNISolone (MEDROL DOSEPACK) 4 mg tablet use as directed 21 tablet 0    metoprolol succinate (TOPROL-XL) 100 MG 24 hr tablet Take 1 tablet by mouth once daily 90 tablet 3    nortriptyline (PAMELOR) 25 MG capsule Take 1 capsule (25 mg total) by mouth every evening. 30 capsule 11    potassium chloride SA (K-DUR,KLOR-CON) 20 MEQ tablet TAKE 1 TABLET BY MOUTH TWICE  DAILY 180 tablet 3    pregabalin (LYRICA) 200 MG Cap Take 1 capsule by mouth twice daily 60 capsule 0    venlafaxine (EFFEXOR-XR) 150 MG Cp24 TAKE 1 CAPSULE BY MOUTH ONCE  DAILY 90 capsule 3    venlafaxine (EFFEXOR-XR) 75 MG 24 hr  capsule TAKE 1 CAPSULE BY MOUTH ONCE  DAILY 90 capsule 3    [DISCONTINUED] losartan (COZAAR) 100 MG tablet TAKE 1 TABLET BY MOUTH ONCE  DAILY 90 tablet 3    [DISCONTINUED] torsemide (DEMADEX) 20 MG Tab Take 2 tablets by mouth twice daily 120 tablet 2     No facility-administered encounter medications on file as of 1/7/2025.       Objective:     Vital Signs (Most Recent)   ]  Wt Readings from Last 2 Encounters:   11/14/24 (!) 200 kg (441 lb)   06/20/24 (!) 193.3 kg (426 lb 2.4 oz)       Physical Exam   Constitutional: He is oriented to person, place, and time. He appears well-developed and well-nourished. He is obese.   HENT:   Head: Normocephalic.   Pulmonary/Chest: No stridor. No respiratory distress.   Neurological: He is alert and oriented to person, place, and time.   Psychiatric: He has a normal mood and affect. His behavior is normal. Judgment and thought content normal.   Nursing note and vitals reviewed.      Laboratory  Lab Results   Component Value Date    WBC 2.70 (L) 11/14/2024    RBC 4.53 (L) 11/14/2024    HGB 13.8 (L) 11/14/2024    HCT 44.3 11/14/2024    MCV 98 11/14/2024    MCH 30.5 11/14/2024    MCHC 31.2 (L) 11/14/2024    RDW 16.4 (H) 11/14/2024     (L) 11/14/2024    MPV 12.8 11/14/2024    GRAN 1.8 11/14/2024    GRAN 67.0 11/14/2024    LYMPH 0.5 (L) 11/14/2024    LYMPH 17.8 (L) 11/14/2024    MONO 0.3 11/14/2024    MONO 12.6 11/14/2024    EOS 0.0 11/14/2024    BASO 0.02 11/14/2024    EOSINOPHIL 1.5 11/14/2024    BASOPHIL 0.7 11/14/2024       BMP  Lab Results   Component Value Date     (H) 11/14/2024    K 3.9 11/14/2024     11/14/2024    CO2 31 (H) 11/14/2024    BUN 31 (H) 11/14/2024    CREATININE 1.6 (H) 11/14/2024    CALCIUM 8.6 (L) 11/14/2024    ANIONGAP 11 11/14/2024    ESTGFRAFRICA >60.0 02/22/2022    EGFRNONAA >60.0 02/22/2022    AST 20 11/14/2024    ALT 14 11/14/2024    PROT 7.4 11/14/2024       Lab Results   Component Value Date     (H) 11/14/2024     (H)  "04/24/2024     (H) 04/22/2024    BNP 67 01/11/2024     (H) 06/16/2023     (H) 02/22/2022       Lab Results   Component Value Date    TSH 3.581 06/11/2024       Lab Results   Component Value Date    SEDRATE 4 02/20/2020       Lab Results   Component Value Date    CRP 49.3 (H) 04/22/2024     No results found for: "IGE"     No results found for: "ASPERGILLUS"  No results found for: "AFUMIGATUSCL"     Lab Results   Component Value Date    ACE 23 11/22/2021       Latest Reference Range & Units 11/20/21 18:06 02/23/22 11:54   Sample  ARTERIAL ARTERIAL   POC PH 7.35 - 7.45  7.320 (L) 7.370   POC PCO2 35 - 45 mmHg 77.8 (HH) 50.7 (H)   POC PO2 80 - 100 mmHg 59 (LL) 68 (L)   POC HCO3 24 - 28 mmol/L 40.1 (H) 29.3 (H)   POC SATURATED O2 95 - 100 % 86 (L) 92 (L)   Allens Test  Pass Pass   POC BE -2 to 2 mmol/L 14 4   FiO2  40 0.21   Vt  550    DelSys  CPAP/BiPAP Room Air   Site  RR LR   Mode  AVAPS SPONT   Rate  20    (HH): Data is critically high  (LL): Data is critically low  (L): Data is abnormally low  (H): Data is abnormally high  EKG 11/20/2021    Atrial fibrillation   Incomplete right bundle branch block   Possible Anteroseptal infarct ,age undetermined   Abnormal ECG       Diagnostic Results:  I have personally reviewed today the following studies:    2D echo 11/21/2021  The left ventricle is mildly enlarged with moderate concentric hypertrophy and normal systolic function.  The estimated ejection fraction is 60%.  Normal left ventricular diastolic function.  Severe right ventricular enlargement with moderately reduced right ventricular systolic function.  Moderate left atrial enlargement.  Moderate right atrial enlargement.  Moderate tricuspid regurgitation.  There is abnormal septal wall motion. There is systolic flattening of the interventricular septum consistent with right ventricle pressure overload.            PFT 2/2022      Spirometry data is acceptable and reproducibleModerate restriction " based on reduced Forced Vital Capacity (FVC). Consider lung volumes if clinically indicated.Mild restriction vital capacity. (VC< LLN and > or equal to 70% of predicted).Overall there is   moderate ventilatory impairment. (FEV1 > or equal to 60% of predicted and < or equal to 69% predicted ).Flow volume loops demonstrate a restrictive defect.Maximum voluntary ventilation is moderately reduced. (MVV% predicted is 51% to 65% of   predicted)Mild reduction in diffusion capacity -unadjusted for hemoglobin (DLCO > or equal to 60% predicted and < LLN) . This interpretation of diffusing capacity does not take into account the patient's hemoglobin level (Unavailable at the time of   testing - hemoglobin assumed to be normal).       SIX MIN2/2022      Severe capacity reduction , needed O2 2 LPM         PFT September 2020     Spirometry reveals normal airflow. Lung volumes reveal severe restriction. Single breath diffusion capacity is moderately reduced.Â  This interpretation of diffusing capacity is adjusted for patient's hemoglobin level    Chest x-ray November 2021  FINDINGS:  Stable cardiomegaly.     Mild pulmonary vascular congestion.  This appears improved.  No pulmonary edema.  No effusion.     Right IJ catheter unchanged.     Impression:     Interval improvement       CT chest September 2020      EXAMINATION:  CT CHEST WITHOUT CONTRAST     CLINICAL HISTORY:  Shortness of breath; Dyspnea, unspecified     TECHNIQUE:  Low dose axial images, sagittal and coronal reformations were obtained from the thoracic inlet to the lung bases. Contrast was not administered.     COMPARISON:  Chest x-ray 09/15/2020.     FINDINGS:  Limited imaging through the upper abdomen demonstrates cholecystectomy changes.  Hypertrophy of the caudate is seen, nonspecific but could be seen with cirrhosis.  Postsurgical changes of the stomach.  Possible tiny adenoma left adrenal gland image 105 series 2.     Heart size is slightly increased.  No anne  cardiac decompensation no pericardial effusion.  Trachea and mainstem bronchi remain patent.  Thyroid gland appears normal.  There is a superior right mediastinal lymph node image 15 of series 2 which measures 10 mm in short axis, borderline enlarged additional 10 mm short axis lymph node in the level 4 station, image 8 series 2.  These are favored reactive although attention on follow-up could be considered.  Multiple additional small scattered shotty appearing lymph nodes in the mediastinum.     Mild respiratory motion artifact.  Probable juxta fissural lymph node image 203 of series 4 right lung.  There is a 3 mm nodular opacity right lung image 151 of series 4.  There is a juxtapleural nodule measuring 3 mm left lung image 210 series 4.  A few other scattered tiny pulmonary nodules noted as well.  No pneumothorax or pleural effusion or pulmonic infiltrate.  Main pulmonary artery is dilated measuring 4.8 cm, greater than the adjacent aorta.  Recommend correlation for pulmonary hypertension.  Habitus limits detail.     Review of bone windows demonstrate the osseous structures to be intact.     Impression:     Bilateral small scattered pulmonary nodules all measuring less than 6 mm.  Would recommend follow-up as per Fleischner society guidelines.  Borderline enlarged right intrathoracic lymph nodes, favored reactive in etiology although.  Stable cardiomegaly and other incidental findings as noted above.                PSG 10/2020    The diagnostic polysomnography revealed a severe obstructive sleep apnea / hypopnea syndrome (A + H Index = 104.6 events / hr asleep with 17.4 - 0.0 respiratory event - arousals / hr asleep, and no RERAs (respiratory effort - related arousals) for the study. The mean SpO2 value was 75.3 %, severe, minimum oxygen saturation during sleep was 74.0 %, and waking baseline SpO2 was 96.0 %. Persistent, moderately loud snoring         Assessment/Plan:   Restrictive lung disease  -     Complete  PFT with bronchodilator; Future  -     Stress test, pulmonary; Future  -     PFT - related Arterial Blood Gas; Future    Obesity hypoventilation syndrome  -     Complete PFT with bronchodilator; Future  -     Stress test, pulmonary; Future  -     PFT - related Arterial Blood Gas; Future    EVANGELIST (obstructive sleep apnea)/ Obesity Hypoventilation syndrome   -     Complete PFT with bronchodilator; Future  -     Stress test, pulmonary; Future  -     PFT - related Arterial Blood Gas; Future    Dependence on home ventilator  -     Complete PFT with bronchodilator; Future  -     Stress test, pulmonary; Future  -     PFT - related Arterial Blood Gas; Future          Reassess need for O2 on exertion with 6 minute walking test.      Repeat ABG to assess for hypercapnia on [place] ventilator.      Check PFT.      MA to obtain ventilator usage download from [employer].  During the video visit today the patient showed me the ventilator which is a [place] ventilator.        Follow up in about 6 weeks (around 2/18/2025).    This note was prepared using voice recognition system and is likely to have sound alike errors that may have been overlooked even after proof reading.  Please call me with any questions    Discussed diagnosis, its evaluation, treatment and usual course. All questions answered.      Erick Bateman MD

## 2025-01-08 ENCOUNTER — PATIENT MESSAGE (OUTPATIENT)
Dept: CARDIOLOGY | Facility: CLINIC | Age: 60
End: 2025-01-08
Payer: MEDICARE

## 2025-01-09 ENCOUNTER — TELEPHONE (OUTPATIENT)
Dept: CARDIOLOGY | Facility: CLINIC | Age: 60
End: 2025-01-09
Payer: MEDICARE

## 2025-01-09 NOTE — TELEPHONE ENCOUNTER
Patient called in regards to his RHC procedure patient stated he has to reschedule due to him not having a ride.\\    ----- Message from Charito sent at 1/9/2025 11:02 AM CST -----  Contact: Patient, [phone]  Calling to speak with the nurse regarding the procedure. Please call him. Thanks.

## 2025-01-10 ENCOUNTER — TELEPHONE (OUTPATIENT)
Dept: CARDIOLOGY | Facility: CLINIC | Age: 60
End: 2025-01-10
Payer: MEDICARE

## 2025-01-10 ENCOUNTER — PATIENT MESSAGE (OUTPATIENT)
Dept: CARDIOLOGY | Facility: HOSPITAL | Age: 60
End: 2025-01-10
Payer: MEDICARE

## 2025-01-10 DIAGNOSIS — I87.2 VENOUS INSUFFICIENCY: Primary | ICD-10-CM

## 2025-01-13 ENCOUNTER — TELEPHONE (OUTPATIENT)
Dept: RHEUMATOLOGY | Facility: CLINIC | Age: 60
End: 2025-01-13
Payer: MEDICARE

## 2025-01-13 ENCOUNTER — TELEPHONE (OUTPATIENT)
Dept: PULMONOLOGY | Facility: CLINIC | Age: 60
End: 2025-01-13
Payer: MEDICARE

## 2025-01-13 ENCOUNTER — PATIENT MESSAGE (OUTPATIENT)
Dept: PULMONOLOGY | Facility: CLINIC | Age: 60
End: 2025-01-13
Payer: MEDICARE

## 2025-01-13 NOTE — TELEPHONE ENCOUNTER
Rinvoq prior auth    ajith santiaog sr (Key: [id number])  PA Case ID #: [id number]  Need Help? Call us at [phone]  Outcome  Additional Information Required  This medication or product was previously approved on [id number] from 2025-01-01 to 2025-12-31. **Please note: This request was submitted electronically. Formulary lowering, tiering exception, cost reduction and/or pre-benefit determination review (including prospective Medicare hospice reviews) requests cannot be requested using this method of submission. Providers contact us at [phone] for further assistance.  Drug  Rinvoq 15MG er tablets    Form  [employer] Medicare Part D Electronic Prior Authori

## 2025-01-13 NOTE — TELEPHONE ENCOUNTER
----- Message from Robert sent at 1/13/2025  1:04 PM CST -----  Contact: Denise/ Jaison  .Type:  Needs Medical Advice    Who Called: Denise     Would the patient rather a call back or a response via [facility]? Call back   Best Call Back Number:  [phone]  Additional Information:  Denise is calling in regards to checking on the status of the order that was faxed over on 01/07 for provider to sign off on and also the request for clinic notes       Thanks

## 2025-01-14 ENCOUNTER — HOSPITAL ENCOUNTER (OUTPATIENT)
Dept: RADIOLOGY | Facility: HOSPITAL | Age: 60
Discharge: HOME | End: 2025-01-14
Attending: PHYSICIAN ASSISTANT
Payer: MEDICARE

## 2025-01-14 ENCOUNTER — TELEPHONE (OUTPATIENT)
Dept: RHEUMATOLOGY | Facility: CLINIC | Age: 60
End: 2025-01-14
Payer: MEDICARE

## 2025-01-14 DIAGNOSIS — M54.16 LUMBAR RADICULOPATHY, CHRONIC: ICD-10-CM

## 2025-01-14 DIAGNOSIS — M54.9 DORSALGIA, UNSPECIFIED: ICD-10-CM

## 2025-01-15 ENCOUNTER — PATIENT MESSAGE (OUTPATIENT)
Dept: PAIN MEDICINE | Facility: CLINIC | Age: 60
End: 2025-01-15
Payer: MEDICARE

## 2025-01-20 ENCOUNTER — PATIENT MESSAGE (OUTPATIENT)
Dept: PAIN MEDICINE | Facility: CLINIC | Age: 60
End: 2025-01-20
Payer: MEDICARE

## 2025-01-20 DIAGNOSIS — M51.379 DDD (DEGENERATIVE DISC DISEASE), LUMBOSACRAL: ICD-10-CM

## 2025-01-20 DIAGNOSIS — M47.817 FACET ARTHRITIS OF LUMBOSACRAL REGION: ICD-10-CM

## 2025-01-20 DIAGNOSIS — M54.50 DORSALGIA OF LUMBAR REGION: ICD-10-CM

## 2025-01-20 RX ORDER — PREGABALIN 200 MG/1
200 CAPSULE ORAL 2 TIMES DAILY
Qty: 60 CAPSULE | Refills: 0 | Status: SHIPPED | OUTPATIENT
Start: 2025-01-20

## 2025-01-20 RX ORDER — METHOCARBAMOL 750 MG/1
750 TABLET, FILM COATED ORAL
Qty: 60 TABLET | Refills: 0 | Status: SHIPPED | OUTPATIENT
Start: 2025-01-20

## 2025-01-24 ENCOUNTER — HOSPITAL ENCOUNTER (INPATIENT)
Facility: HOSPITAL | Age: 60
LOS: 1 days | Discharge: HOME | DRG: 193 | End: 2025-01-26
Attending: EMERGENCY MEDICINE | Admitting: SPECIALIST
Payer: MEDICARE

## 2025-01-24 DIAGNOSIS — R09.02 HYPOXIA: ICD-10-CM

## 2025-01-24 DIAGNOSIS — R06.02 SHORTNESS OF BREATH: ICD-10-CM

## 2025-01-24 DIAGNOSIS — Z13.6 SCREENING FOR CARDIOVASCULAR CONDITION: ICD-10-CM

## 2025-01-24 DIAGNOSIS — J10.1 INFLUENZA A: Primary | ICD-10-CM

## 2025-01-24 LAB
ALBUMIN SERPL BCP-MCNC: 3.5 G/DL (ref 3.5–5.2)
ALP SERPL-CCNC: 58 U/L (ref 40–150)
ALT SERPL W/O P-5'-P-CCNC: 12 U/L (ref 10–44)
ANION GAP SERPL CALC-SCNC: 12 MMOL/L (ref 8–16)
AST SERPL-CCNC: 22 U/L (ref 10–40)
BACTERIA #/AREA URNS AUTO: ABNORMAL /HPF
BASOPHILS # BLD AUTO: 0.02 K/UL (ref 0–0.2)
BASOPHILS NFR BLD: 0.8 % (ref 0–1.9)
BILIRUB SERPL-MCNC: 1.1 MG/DL (ref 0.1–1)
BILIRUB UR QL STRIP: NEGATIVE
BNP SERPL-MCNC: 893 PG/ML (ref 0–99)
BUN SERPL-MCNC: 14 MG/DL (ref 6–20)
CALCIUM SERPL-MCNC: 8.2 MG/DL (ref 8.7–10.5)
CHLORIDE SERPL-SCNC: 104 MMOL/L (ref 95–110)
CLARITY UR REFRACT.AUTO: CLEAR
CO2 SERPL-SCNC: 26 MMOL/L (ref 23–29)
COLOR UR AUTO: YELLOW
CREAT SERPL-MCNC: 1.2 MG/DL (ref 0.5–1.4)
CTP QC/QA: YES
DIFFERENTIAL METHOD BLD: ABNORMAL
EOSINOPHIL # BLD AUTO: 0 K/UL (ref 0–0.5)
EOSINOPHIL NFR BLD: 0.4 % (ref 0–8)
ERYTHROCYTE [DISTWIDTH] IN BLOOD BY AUTOMATED COUNT: 18.3 % (ref 11.5–14.5)
EST. GFR  (NO RACE VARIABLE): >60 ML/MIN/1.73 M^2
GIANT PLATELETS BLD QL SMEAR: PRESENT
GLUCOSE SERPL-MCNC: 104 MG/DL (ref 70–110)
GLUCOSE UR QL STRIP: NEGATIVE
HCT VFR BLD AUTO: 44 % (ref 40–54)
HGB BLD-MCNC: 13.8 G/DL (ref 14–18)
HGB UR QL STRIP: ABNORMAL
HYALINE CASTS UR QL AUTO: 5 /LPF
IMM GRANULOCYTES # BLD AUTO: 0.02 K/UL (ref 0–0.04)
IMM GRANULOCYTES NFR BLD AUTO: 0.8 % (ref 0–0.5)
KETONES UR QL STRIP: NEGATIVE
LACTATE SERPL-SCNC: 1.3 MMOL/L (ref 0.5–2.2)
LACTATE SERPL-SCNC: 1.8 MMOL/L (ref 0.5–2.2)
LEUKOCYTE ESTERASE UR QL STRIP: NEGATIVE
LIPASE SERPL-CCNC: 19 U/L (ref 4–60)
LYMPHOCYTES # BLD AUTO: 0.3 K/UL (ref 1–4.8)
LYMPHOCYTES NFR BLD: 13.2 % (ref 18–48)
MAGNESIUM SERPL-MCNC: 1.8 MG/DL (ref 1.6–2.6)
MCH RBC QN AUTO: 27.8 PG (ref 27–31)
MCHC RBC AUTO-ENTMCNC: 31.4 G/DL (ref 32–36)
MCV RBC AUTO: 89 FL (ref 82–98)
MICROSCOPIC COMMENT: ABNORMAL
MONOCYTES # BLD AUTO: 0.5 K/UL (ref 0.3–1)
MONOCYTES NFR BLD: 20.6 % (ref 4–15)
NEUTROPHILS # BLD AUTO: 1.7 K/UL (ref 1.8–7.7)
NEUTROPHILS NFR BLD: 64.2 % (ref 38–73)
NITRITE UR QL STRIP: NEGATIVE
NRBC BLD-RTO: 0 /100 WBC
PH UR STRIP: 7 [PH] (ref 5–8)
PLATELET # BLD AUTO: 84 K/UL (ref 150–450)
PLATELET BLD QL SMEAR: ABNORMAL
PMV BLD AUTO: ABNORMAL FL (ref 9.2–12.9)
POC MOLECULAR INFLUENZA A AGN: POSITIVE
POC MOLECULAR INFLUENZA B AGN: NEGATIVE
POTASSIUM SERPL-SCNC: 3.6 MMOL/L (ref 3.5–5.1)
PROCALCITONIN SERPL IA-MCNC: 0.19 NG/ML
PROT SERPL-MCNC: 7.3 G/DL (ref 6–8.4)
PROT UR QL STRIP: ABNORMAL
RBC # BLD AUTO: 4.96 M/UL (ref 4.6–6.2)
RBC #/AREA URNS AUTO: 10 /HPF (ref 0–4)
SODIUM SERPL-SCNC: 142 MMOL/L (ref 136–145)
SP GR UR STRIP: 1.02 (ref 1–1.03)
TROPONIN I SERPL DL<=0.01 NG/ML-MCNC: 0.04 NG/ML (ref 0–0.03)
URN SPEC COLLECT METH UR: ABNORMAL
UROBILINOGEN UR STRIP-ACNC: >=8 EU/DL
WBC # BLD AUTO: 2.57 K/UL (ref 3.9–12.7)
WBC #/AREA URNS AUTO: 1 /HPF (ref 0–5)

## 2025-01-24 PROCEDURE — 83690 ASSAY OF LIPASE: CPT | Mod: ER | Performed by: EMERGENCY MEDICINE

## 2025-01-24 PROCEDURE — 96374 THER/PROPH/DIAG INJ IV PUSH: CPT | Mod: ER

## 2025-01-24 PROCEDURE — 83880 ASSAY OF NATRIURETIC PEPTIDE: CPT | Mod: ER | Performed by: EMERGENCY MEDICINE

## 2025-01-24 PROCEDURE — 63600175 PHARM REV CODE 636 W HCPCS: Mod: JZ,TB,ER | Performed by: EMERGENCY MEDICINE

## 2025-01-24 PROCEDURE — G0378 HOSPITAL OBSERVATION PER HR: HCPCS | Mod: ER

## 2025-01-24 PROCEDURE — 93005 ELECTROCARDIOGRAM TRACING: CPT | Mod: ER

## 2025-01-24 PROCEDURE — 84145 PROCALCITONIN (PCT): CPT | Mod: ER | Performed by: EMERGENCY MEDICINE

## 2025-01-24 PROCEDURE — 81000 URINALYSIS NONAUTO W/SCOPE: CPT | Mod: ER | Performed by: EMERGENCY MEDICINE

## 2025-01-24 PROCEDURE — 83605 ASSAY OF LACTIC ACID: CPT | Mod: ER | Performed by: EMERGENCY MEDICINE

## 2025-01-24 PROCEDURE — 25000003 PHARM REV CODE 250: Mod: ER | Performed by: EMERGENCY MEDICINE

## 2025-01-24 PROCEDURE — 87502 INFLUENZA DNA AMP PROBE: CPT | Mod: ER

## 2025-01-24 PROCEDURE — 96374 THER/PROPH/DIAG INJ IV PUSH: CPT | Mod: 59

## 2025-01-24 PROCEDURE — 83735 ASSAY OF MAGNESIUM: CPT | Performed by: NURSE PRACTITIONER

## 2025-01-24 PROCEDURE — 85025 COMPLETE CBC W/AUTO DIFF WBC: CPT | Mod: ER | Performed by: EMERGENCY MEDICINE

## 2025-01-24 PROCEDURE — 80053 COMPREHEN METABOLIC PANEL: CPT | Mod: ER | Performed by: EMERGENCY MEDICINE

## 2025-01-24 PROCEDURE — 83036 HEMOGLOBIN GLYCOSYLATED A1C: CPT | Performed by: NURSE PRACTITIONER

## 2025-01-24 PROCEDURE — 93010 ELECTROCARDIOGRAM REPORT: CPT | Mod: ,,, | Performed by: INTERNAL MEDICINE

## 2025-01-24 PROCEDURE — G0378 HOSPITAL OBSERVATION PER HR: HCPCS

## 2025-01-24 PROCEDURE — 63600175 PHARM REV CODE 636 W HCPCS: Mod: JZ,TB | Performed by: NURSE PRACTITIONER

## 2025-01-24 PROCEDURE — 99285 EMERGENCY DEPT VISIT HI MDM: CPT | Mod: 25,ER

## 2025-01-24 PROCEDURE — 36415 COLL VENOUS BLD VENIPUNCTURE: CPT | Performed by: EMERGENCY MEDICINE

## 2025-01-24 PROCEDURE — 84484 ASSAY OF TROPONIN QUANT: CPT | Mod: ER | Performed by: EMERGENCY MEDICINE

## 2025-01-24 PROCEDURE — 83605 ASSAY OF LACTIC ACID: CPT | Mod: 91 | Performed by: EMERGENCY MEDICINE

## 2025-01-24 PROCEDURE — 87040 BLOOD CULTURE FOR BACTERIA: CPT | Mod: 59 | Performed by: EMERGENCY MEDICINE

## 2025-01-24 RX ORDER — SODIUM CHLORIDE 0.9 % (FLUSH) 0.9 %
10 SYRINGE (ML) INJECTION
Status: DISCONTINUED | OUTPATIENT
Start: 2025-01-24 | End: 2025-01-26 | Stop reason: HOSPADM

## 2025-01-24 RX ORDER — BUMETANIDE 0.25 MG/ML
1 INJECTION, SOLUTION INTRAMUSCULAR; INTRAVENOUS
Status: COMPLETED | OUTPATIENT
Start: 2025-01-24 | End: 2025-01-24

## 2025-01-24 RX ORDER — BUMETANIDE 0.25 MG/ML
6 INJECTION, SOLUTION INTRAMUSCULAR; INTRAVENOUS ONCE
Status: COMPLETED | OUTPATIENT
Start: 2025-01-24 | End: 2025-01-24

## 2025-01-24 RX ORDER — OSELTAMIVIR PHOSPHATE 75 MG/1
75 CAPSULE ORAL 2 TIMES DAILY
Status: DISCONTINUED | OUTPATIENT
Start: 2025-01-25 | End: 2025-01-26 | Stop reason: HOSPADM

## 2025-01-24 RX ORDER — ONDANSETRON HYDROCHLORIDE 2 MG/ML
4 INJECTION, SOLUTION INTRAVENOUS EVERY 8 HOURS PRN
Status: DISCONTINUED | OUTPATIENT
Start: 2025-01-24 | End: 2025-01-26 | Stop reason: HOSPADM

## 2025-01-24 RX ORDER — OSELTAMIVIR PHOSPHATE 75 MG/1
75 CAPSULE ORAL
Status: COMPLETED | OUTPATIENT
Start: 2025-01-24 | End: 2025-01-24

## 2025-01-24 RX ORDER — BUMETANIDE 0.25 MG/ML
2 INJECTION, SOLUTION INTRAMUSCULAR; INTRAVENOUS 2 TIMES DAILY
Status: DISCONTINUED | OUTPATIENT
Start: 2025-01-25 | End: 2025-01-26 | Stop reason: HOSPADM

## 2025-01-24 RX ORDER — ACETAMINOPHEN 325 MG/1
650 TABLET ORAL EVERY 6 HOURS PRN
Status: DISCONTINUED | OUTPATIENT
Start: 2025-01-24 | End: 2025-01-26 | Stop reason: HOSPADM

## 2025-01-24 RX ADMIN — BUMETANIDE 1 MG: 0.25 INJECTION INTRAMUSCULAR; INTRAVENOUS at 03:01

## 2025-01-24 RX ADMIN — BUMETANIDE 6 MG: 0.25 INJECTION INTRAMUSCULAR; INTRAVENOUS at 10:01

## 2025-01-24 RX ADMIN — OSELTAMIVIR PHOSPHATE 75 MG: 75 CAPSULE ORAL at 03:01

## 2025-01-24 NOTE — ED NOTES
Patient refused [facility] transport. States they will go POV. Oxygen remained in 90's on RA while discharging.

## 2025-01-24 NOTE — ED PROVIDER NOTES
"Encounter Date: 1/24/2025       History     Chief Complaint   Patient presents with    Nasal Congestion     "Head cold" x a few days. Sob gradual onset. Worse since last night.      The history is provided by the patient.   Cough  The current episode started several days ago. The problem occurs constantly. The problem has been unchanged. Pertinent negatives include no chills and no shortness of breath.     Review of patient's allergies indicates:  No Known Allergies  Past Medical History:   Diagnosis Date    A-fib     CHF (congestive heart failure)     Gout, chronic     Hypertension     Sleep apnea      Past Surgical History:   Procedure Laterality Date    CHOLECYSTECTOMY      GASTRIC BYPASS  2014    INJECTION OF JOINT Left 8/23/2023    Procedure: Left IA Hip Joint injection;  Surgeon: Peña Rausch MD;  Location: [facility];  Service: Pain Management;  Laterality: Left;     Family History   Problem Relation Name Age of Onset    Hypertension Mother      Stroke Father      Diabetes Father      Hypertension Father      Diabetes Sister      Diabetes Brother       Social History     Tobacco Use    Smoking status: Never    Smokeless tobacco: Never   Substance Use Topics    Alcohol use: Not Currently    Drug use: No     Review of Systems   Constitutional:  Negative for chills, fatigue and fever.   HENT:  Positive for congestion.    Respiratory:  Positive for cough. Negative for shortness of breath.    Gastrointestinal:  Negative for diarrhea, nausea and vomiting.   Genitourinary:  Negative for dysuria.   Neurological:  Negative for weakness and numbness.       Physical Exam     Initial Vitals   BP Pulse Resp Temp SpO2   01/24/25 1305 01/24/25 1305 01/24/25 1305 01/24/25 1308 01/24/25 1305   121/68 94 (!) 24 98.3 °F (36.8 °C) (!) 88 %      MAP       --                Physical Exam    Constitutional: He appears well-developed and well-nourished. No distress.   HENT:   Head: Normocephalic and atraumatic.   Eyes: " Conjunctivae are normal. Pupils are equal, round, and reactive to light.   Neck: Neck supple.   Normal range of motion.  Cardiovascular:  Normal rate and normal heart sounds. An irregularly irregular rhythm present.           Pulmonary/Chest: Breath sounds normal.   Abdominal: Abdomen is soft. Bowel sounds are normal.   Musculoskeletal:         General: Normal range of motion.      Cervical back: Normal range of motion and neck supple.     Neurological: He is alert and oriented to person, place, and time. No cranial nerve deficit.   Skin: Skin is warm and dry.   Psychiatric: He has a normal mood and affect.         ED Course   Critical Care    Date/Time: 1/24/2025 3:37 PM    Performed by: Joseph Owen MD  Authorized by: Joseph Owen MD  Direct patient critical care time: 25 minutes  Additional history critical care time: 12 minutes  Ordering / reviewing critical care time: 15 minutes  Documentation critical care time: 10 minutes  Consulting other physicians critical care time: 5 minutes  Total critical care time (exclusive of procedural time) : 67 minutes  Critical care was necessary to treat or prevent imminent or life-threatening deterioration of the following conditions: respiratory failure.        Labs Reviewed   CBC W/ AUTO DIFFERENTIAL - Abnormal       Result Value    WBC 2.57 (*)     RBC 4.96      Hemoglobin 13.8 (*)     Hematocrit 44.0      MCV 89      MCH 27.8      MCHC 31.4 (*)     RDW 18.3 (*)     Platelets 84 (*)     MPV SEE COMMENT      Immature Granulocytes 0.8 (*)     Gran # (ANC) 1.7 (*)     Immature Grans (Abs) 0.02      Lymph # 0.3 (*)     Mono # 0.5      Eos # 0.0      Baso # 0.02      nRBC 0      Gran % 64.2      Lymph % 13.2 (*)     Mono % 20.6 (*)     Eosinophil % 0.4      Basophil % 0.8      Platelet Estimate Decreased (*)     Large/Giant Platelets Present      Differential Method Automated     COMPREHENSIVE METABOLIC PANEL - Abnormal    Sodium 142      Potassium 3.6       Chloride 104      CO2 26      Glucose 104      BUN 14      Creatinine 1.2      Calcium 8.2 (*)     Total Protein 7.3      Albumin 3.5      Total Bilirubin 1.1 (*)     Alkaline Phosphatase 58      AST 22      ALT 12      eGFR >60.0      Anion Gap 12     TROPONIN I - Abnormal    Troponin I 0.044 (*)    B-TYPE NATRIURETIC PEPTIDE - Abnormal     (*)    POCT INFLUENZA A/B MOLECULAR - Abnormal    POC Molecular Influenza A Ag Positive (*)     POC Molecular Influenza B Ag Negative       Acceptable Yes     CULTURE, BLOOD   CULTURE, BLOOD   LACTIC ACID, PLASMA    Lactate (Lactic Acid) 1.3     PROCALCITONIN    Procalcitonin 0.19     LIPASE    Lipase 19     URINALYSIS, REFLEX TO URINE CULTURE   SARS-COV-2 RDRP GENE     EKG Readings: (Independently Interpreted)   Rhythm: Atrial Fibrillation. Heart Rate: 89. Ectopy: No Ectopy. Conduction: Normal. ST Segments: Normal ST Segments. T Waves: Normal. Clinical Impression: Atrial Fibrillation     ECG Results              EKG 12-lead (In process)        Collection Time Result Time QRS Duration OHS QTC Calculation    01/24/25 13:10:23 01/24/25 13:46:53 94 455                     In process by Interface, Lab In [facility] (01/24/25 13:47:02)                   Narrative:    Test Reason : Z13.6,    Vent. Rate :  89 BPM     Atrial Rate :    BPM     P-R Int :    ms          QRS Dur :  94 ms      QT Int : 374 ms       P-R-T Axes :     23 -25 degrees    QTcB Int : 455 ms    Atrial fibrillation  ST and T wave abnormality, consider inferior ischemia  ST and T wave abnormality, consider anterior ischemia  Abnormal ECG  No previous ECGs available    Referred By: AAAREFERRAL SELF           Confirmed By:                                   Imaging Results              X-Ray Chest AP Portable (Final result)  Result time 01/24/25 13:57:05      Final result by Albert House MD (01/24/25 13:57:05)                   Impression:      Borderline cardiac enlargement with no evidence  of acute disease seen.      Electronically signed by: Albert House  Date:    01/24/2025  Time:    13:57               Narrative:    EXAMINATION:  XR CHEST AP PORTABLE    CLINICAL HISTORY:  Sepsis;    TECHNIQUE:  Single frontal view of the chest was performed.    COMPARISON:  11/14/2024    FINDINGS:  The patient is in a lordotic position.  The heart is borderline enlarged but stable.  The left lateral costophrenic angle is outside the field of view.  Visualized lung parenchyma is clear.  Pulmonary vasculature is normal.                                       Medications   oseltamivir capsule 75 mg (75 mg Oral Given 1/24/25 1516)   bumetanide injection 1 mg (1 mg Intravenous Given 1/24/25 1518)     Medical Decision Making  DDx: COvid, flu, pneumonia    Amount and/or Complexity of Data Reviewed  Labs: ordered.     Details: Flu +, Troponin 0.044, bnp 893  Radiology: ordered.     Details: wnl  Discussion of management or test interpretation with external provider(s): Discussed with Ricky Renee () Dr. Jaramillo will place in observation.    Risk  Prescription drug management.                                      Clinical Impression:  Final diagnoses:  [Z13.6] Screening for cardiovascular condition  [J10.1] Influenza A (Primary)  [R09.02] Hypoxia          ED Disposition Condition    Observation Stable                Joseph Owen MD  01/24/25 [id number]

## 2025-01-25 PROBLEM — D70.8 CHRONIC BENIGN NEUTROPENIA: Chronic | Status: ACTIVE | Noted: 2023-05-17

## 2025-01-25 PROBLEM — J10.1 INFLUENZA A: Status: ACTIVE | Noted: 2025-01-25

## 2025-01-25 PROBLEM — E03.9 HYPOTHYROIDISM: Chronic | Status: ACTIVE | Noted: 2025-01-25

## 2025-01-25 PROBLEM — J98.4 RESTRICTIVE LUNG DISEASE: Chronic | Status: ACTIVE | Noted: 2021-11-22

## 2025-01-25 PROBLEM — D69.6 THROMBOCYTOPENIA: Status: ACTIVE | Noted: 2025-01-25

## 2025-01-25 PROBLEM — D69.6 THROMBOCYTOPENIA: Chronic | Status: ACTIVE | Noted: 2025-01-25

## 2025-01-25 PROBLEM — M1A.09X0 IDIOPATHIC CHRONIC GOUT OF MULTIPLE SITES WITHOUT TOPHUS: Chronic | Status: ACTIVE | Noted: 2017-10-24

## 2025-01-25 PROBLEM — E03.9 HYPOTHYROIDISM: Status: ACTIVE | Noted: 2025-01-25

## 2025-01-25 PROBLEM — R06.02 SHORTNESS OF BREATH: Status: ACTIVE | Noted: 2025-01-25

## 2025-01-25 PROBLEM — I50.33 ACUTE ON CHRONIC DIASTOLIC CONGESTIVE HEART FAILURE: Status: ACTIVE | Noted: 2025-01-25

## 2025-01-25 LAB
ANION GAP SERPL CALC-SCNC: 13 MMOL/L (ref 8–16)
BUN SERPL-MCNC: 14 MG/DL (ref 6–20)
CALCIUM SERPL-MCNC: 8.3 MG/DL (ref 8.7–10.5)
CHLORIDE SERPL-SCNC: 103 MMOL/L (ref 95–110)
CO2 SERPL-SCNC: 27 MMOL/L (ref 23–29)
CREAT SERPL-MCNC: 1 MG/DL (ref 0.5–1.4)
EST. GFR  (NO RACE VARIABLE): >60 ML/MIN/1.73 M^2
ESTIMATED AVG GLUCOSE: 117 MG/DL (ref 68–131)
GLUCOSE SERPL-MCNC: 77 MG/DL (ref 70–110)
HBA1C MFR BLD: 5.7 % (ref 4–5.6)
MAGNESIUM SERPL-MCNC: 1.7 MG/DL (ref 1.6–2.6)
POTASSIUM SERPL-SCNC: 3.2 MMOL/L (ref 3.5–5.1)
SODIUM SERPL-SCNC: 143 MMOL/L (ref 136–145)

## 2025-01-25 PROCEDURE — 5A09357 ASSISTANCE WITH RESPIRATORY VENTILATION, LESS THAN 24 CONSECUTIVE HOURS, CONTINUOUS POSITIVE AIRWAY PRESSURE: ICD-10-PCS | Performed by: FAMILY MEDICINE

## 2025-01-25 PROCEDURE — 94640 AIRWAY INHALATION TREATMENT: CPT

## 2025-01-25 PROCEDURE — 36415 COLL VENOUS BLD VENIPUNCTURE: CPT | Performed by: NURSE PRACTITIONER

## 2025-01-25 PROCEDURE — 83735 ASSAY OF MAGNESIUM: CPT | Performed by: NURSE PRACTITIONER

## 2025-01-25 PROCEDURE — 21400001 HC TELEMETRY ROOM

## 2025-01-25 PROCEDURE — 11000001 HC ACUTE MED/SURG PRIVATE ROOM

## 2025-01-25 PROCEDURE — 99900035 HC TECH TIME PER 15 MIN (STAT)

## 2025-01-25 PROCEDURE — 80048 BASIC METABOLIC PNL TOTAL CA: CPT | Performed by: NURSE PRACTITIONER

## 2025-01-25 PROCEDURE — 63600175 PHARM REV CODE 636 W HCPCS: Mod: JZ,TB | Performed by: NURSE PRACTITIONER

## 2025-01-25 PROCEDURE — 27100171 HC OXYGEN HIGH FLOW UP TO 24 HOURS

## 2025-01-25 PROCEDURE — 96376 TX/PRO/DX INJ SAME DRUG ADON: CPT

## 2025-01-25 PROCEDURE — 25000242 PHARM REV CODE 250 ALT 637 W/ HCPCS: Performed by: FAMILY MEDICINE

## 2025-01-25 PROCEDURE — 27000207 HC ISOLATION

## 2025-01-25 PROCEDURE — 25000003 PHARM REV CODE 250: Performed by: FAMILY MEDICINE

## 2025-01-25 PROCEDURE — 94799 UNLISTED PULMONARY SVC/PX: CPT

## 2025-01-25 PROCEDURE — 25000003 PHARM REV CODE 250: Performed by: NURSE PRACTITIONER

## 2025-01-25 PROCEDURE — 94660 CPAP INITIATION&MGMT: CPT

## 2025-01-25 PROCEDURE — 27000190 HC CPAP FULL FACE MASK W/VALVE

## 2025-01-25 PROCEDURE — 25000003 PHARM REV CODE 250: Performed by: HOSPITALIST

## 2025-01-25 RX ORDER — PREGABALIN 100 MG/1
200 CAPSULE ORAL 2 TIMES DAILY
Status: DISCONTINUED | OUTPATIENT
Start: 2025-01-25 | End: 2025-01-26 | Stop reason: HOSPADM

## 2025-01-25 RX ORDER — METHOCARBAMOL 750 MG/1
750 TABLET, FILM COATED ORAL 3 TIMES DAILY PRN
Status: DISCONTINUED | OUTPATIENT
Start: 2025-01-25 | End: 2025-01-26 | Stop reason: HOSPADM

## 2025-01-25 RX ORDER — POTASSIUM CHLORIDE 20 MEQ/1
40 TABLET, EXTENDED RELEASE ORAL 2 TIMES DAILY
Status: DISCONTINUED | OUTPATIENT
Start: 2025-01-25 | End: 2025-01-26 | Stop reason: HOSPADM

## 2025-01-25 RX ORDER — LEVOTHYROXINE SODIUM 50 UG/1
50 TABLET ORAL
Status: DISCONTINUED | OUTPATIENT
Start: 2025-01-25 | End: 2025-01-26 | Stop reason: HOSPADM

## 2025-01-25 RX ORDER — METOPROLOL SUCCINATE 50 MG/1
100 TABLET, EXTENDED RELEASE ORAL DAILY
Status: DISCONTINUED | OUTPATIENT
Start: 2025-01-25 | End: 2025-01-26 | Stop reason: HOSPADM

## 2025-01-25 RX ORDER — ALBUTEROL SULFATE 0.83 MG/ML
2.5 SOLUTION RESPIRATORY (INHALATION)
Status: DISCONTINUED | OUTPATIENT
Start: 2025-01-25 | End: 2025-01-26 | Stop reason: HOSPADM

## 2025-01-25 RX ORDER — FLUTICASONE PROPIONATE 50 MCG
2 SPRAY, SUSPENSION (ML) NASAL DAILY
Status: DISCONTINUED | OUTPATIENT
Start: 2025-01-25 | End: 2025-01-26 | Stop reason: HOSPADM

## 2025-01-25 RX ADMIN — LEVOTHYROXINE SODIUM 50 MCG: 50 TABLET ORAL at 06:01

## 2025-01-25 RX ADMIN — ALBUTEROL SULFATE 2.5 MG: 2.5 SOLUTION RESPIRATORY (INHALATION) at 07:01

## 2025-01-25 RX ADMIN — METOPROLOL SUCCINATE 100 MG: 50 TABLET, EXTENDED RELEASE ORAL at 09:01

## 2025-01-25 RX ADMIN — OSELTAMIVIR PHOSPHATE 75 MG: 75 CAPSULE ORAL at 09:01

## 2025-01-25 RX ADMIN — BUMETANIDE 2 MG: 0.25 INJECTION INTRAMUSCULAR; INTRAVENOUS at 08:01

## 2025-01-25 RX ADMIN — BUMETANIDE 2 MG: 0.25 INJECTION INTRAMUSCULAR; INTRAVENOUS at 10:01

## 2025-01-25 RX ADMIN — OSELTAMIVIR PHOSPHATE 75 MG: 75 CAPSULE ORAL at 08:01

## 2025-01-25 RX ADMIN — POTASSIUM CHLORIDE 40 MEQ: 1500 TABLET, EXTENDED RELEASE ORAL at 09:01

## 2025-01-25 RX ADMIN — POTASSIUM CHLORIDE 40 MEQ: 1500 TABLET, EXTENDED RELEASE ORAL at 08:01

## 2025-01-25 RX ADMIN — PREGABALIN 200 MG: 100 CAPSULE ORAL at 08:01

## 2025-01-25 RX ADMIN — APIXABAN 5 MG: 2.5 TABLET, FILM COATED ORAL at 08:01

## 2025-01-25 RX ADMIN — METHOCARBAMOL 750 MG: 750 TABLET ORAL at 08:01

## 2025-01-25 RX ADMIN — FLUTICASONE PROPIONATE 100 MCG: 50 SPRAY, METERED NASAL at 12:01

## 2025-01-25 RX ADMIN — APIXABAN 5 MG: 2.5 TABLET, FILM COATED ORAL at 09:01

## 2025-01-25 NOTE — ASSESSMENT & PLAN NOTE
Chronic, controlled.  Latest blood pressure and vitals reviewed-   Temp:  [98.3 °F (36.8 °C)-99.1 °F (37.3 °C)]   Pulse:  [67-94]   Resp:  [15-42]   BP: (113-157)/(63-98)   SpO2:  [88 %-99 %] .   Home meds for hypertension were reviewed and noted below.   Hypertension Medications               bumetanide (BUMEX) 2 MG tablet Take 1 tablet (2 mg total) by mouth 2 (two) times a day.    metoprolol succinate (TOPROL-XL) 100 MG 24 hr tablet Take 1 tablet by mouth once daily     While in the hospital, will manage blood pressure as follows; Continue home antihypertensive regimen    Will utilize p.r.n. blood pressure medication only if patient's blood pressure greater than  180/110 and he develops symptoms such as worsening chest pain or shortness of breath.

## 2025-01-25 NOTE — PLAN OF CARE
Discussed poc with pt, pt verbalized understanding    Purposeful rounding every 2hours    VS wnl  Cardiac monitoring in use, pt is NSR, tele monitor #[id number]  Fall precautions in place, remains injury free  Pt denies c/o pain and nausea at this time.  Pain and nausea under control with PRN meds    Accurate I&Os  Abx given as prescribed  Bed locked at lowest position  Call light within reach    Chart check complete  Will cont with POC

## 2025-01-25 NOTE — ASSESSMENT & PLAN NOTE
"Patient's FSGs are controlled on current medication regimen.  Last A1c reviewed-   Lab Results   Component Value Date    HGBA1C 5.7 (H) 01/24/2025     Most recent fingerstick glucose reviewed- No results for input(s): "POCTGLUCOSE" in the last 24 hours.  Current correctional scale  Low  Titrate as needed  anti-hyperglycemic dose as follows-   Antihyperglycemics (From admission, onward)    None      Plan:  -SSI  -A1c  -Accu-checks  -Hold oral hypoglycemics while patient is in the hospital  -Continue home long-acting insulin at 20% decrease, titrate up as needed  -Hypoglycemic protocol      "

## 2025-01-25 NOTE — ASSESSMENT & PLAN NOTE
Patient has chronic hypothyroidism. TFTs reviewed-   Lab Results   Component Value Date    TSH 3.581 06/11/2024   Plan:  -Will continue chronic levothyroxine and adjust for and clinical changes

## 2025-01-25 NOTE — SUBJECTIVE & OBJECTIVE
Interval History: See hospital course for today      Review of Systems   Constitutional:  Positive for activity change and fatigue.   HENT:  Positive for congestion.    Respiratory:  Positive for cough and shortness of breath.    Cardiovascular:  Positive for leg swelling.   Genitourinary:  Positive for frequency.   Musculoskeletal:  Positive for back pain (chronic).   Skin:  Positive for wound.   Neurological:  Positive for weakness.   Psychiatric/Behavioral:  Negative for agitation, behavioral problems, confusion, decreased concentration and dysphoric mood. The patient is not nervous/anxious.      Objective:     Vital Signs (Most Recent):  Temp: 98.8 °F (37.1 °C) (01/25/25 0716)  Pulse: 82 (01/25/25 0752)  Resp: 18 (01/25/25 0716)  BP: 131/80 (01/25/25 1001)  SpO2: (!) 92 % (01/25/25 0716) Vital Signs (24h Range):  Temp:  [98.3 °F (36.8 °C)-99.1 °F (37.3 °C)] 98.8 °F (37.1 °C)  Pulse:  [67-97] 82  Resp:  [15-42] 18  SpO2:  [88 %-99 %] 92 %  BP: (113-157)/(63-98) 131/80     Weight: (!) 192.8 kg (425 lb 0.8 oz)  Body mass index is 51.74 kg/m².    Intake/Output Summary (Last 24 hours) at 1/25/2025 1211  Last data filed at 1/25/2025 0608  Gross per 24 hour   Intake --   Output 3075 ml   Net -3075 ml         Physical Exam  Vitals and nursing note reviewed.   Constitutional:       General: He is not in acute distress.     Appearance: He is morbidly obese. He is ill-appearing. He is not toxic-appearing.   HENT:      Head: Normocephalic and atraumatic.   Cardiovascular:      Rate and Rhythm: Normal rate.   Pulmonary:      Effort: Accessory muscle usage present. No respiratory distress.      Breath sounds: Decreased air movement present. Wheezing (expiratory) present.   Abdominal:      Palpations: Abdomen is soft.      Tenderness: There is no abdominal tenderness.   Musculoskeletal:      Right lower leg: Edema present.      Left lower leg: Edema present.   Skin:     General: Skin is warm.      Findings: Lesion present.    Neurological:      Mental Status: He is alert and oriented to person, place, and time.      Motor: Weakness present.             Significant Labs: All pertinent labs within the past 24 hours have been reviewed.  A1C:   Recent Labs   Lab 01/24/25 2037   HGBA1C 5.7*     Blood Culture:   Recent Labs   Lab 01/24/25  1335   LABBLOO No Growth to date  No Growth to date     CBC:   Recent Labs   Lab 01/24/25  1334   WBC 2.57*   HGB 13.8*   HCT 44.0   PLT 84*     CMP:   Recent Labs   Lab 01/24/25  1334 01/25/25  0522    143   K 3.6 3.2*    103   CO2 26 27    77   BUN 14 14   CREATININE 1.2 1.0   CALCIUM 8.2* 8.3*   PROT 7.3  --    ALBUMIN 3.5  --    BILITOT 1.1*  --    ALKPHOS 58  --    AST 22  --    ALT 12  --    ANIONGAP 12 13     Cardiac Markers:   Recent Labs   Lab 01/24/25  1334   *     Lactic Acid:   Recent Labs   Lab 01/24/25  1334 01/24/25 2034   LACTATE 1.3 1.8     Magnesium:   Recent Labs   Lab 01/24/25 2037 01/25/25  0522   MG 1.8 1.7     Troponin:   Recent Labs   Lab 01/24/25  1334   TROPONINI 0.044*     Urine Studies:   Recent Labs   Lab 01/24/25  1521   COLORU Yellow   APPEARANCEUA Clear   PHUR 7.0   SPECGRAV 1.020   PROTEINUA 3+*   GLUCUA Negative   KETONESU Negative   BILIRUBINUA Negative   OCCULTUA 2+*   NITRITE Negative   UROBILINOGEN >=8.0*   LEUKOCYTESUR Negative   RBCUA 10*   WBCUA 1   BACTERIA Rare   HYALINECASTS 5*     Procalcitonin 0.19    Significant Imaging: I have reviewed all pertinent imaging results/findings within the past 24 hours.  CXR: I have reviewed all pertinent results/findings within the past 24 hours and my personal findings are:  no evidence of acute disease

## 2025-01-25 NOTE — PLAN OF CARE
Discussed poc with pt, pt verbalized understanding    Purposeful rounding every 2hours    VS monitored as ordered  Cardiac monitoring in use, atrial rhythm on tele monitor # [id number]    Fall precautions in place, remains injury free      IV saline locked  Accurate I&Os, fluid restriction    Bed locked at lowest position  Call light within reach    Chart check complete  Will cont with POC

## 2025-01-25 NOTE — ASSESSMENT & PLAN NOTE
Patient has  chronic  atrial fibrillation. Patient is currently in atrial fibrillation. [id number] Score: The patient doesn't have any registry metric data available. The patients heart rate in the last 24 hours is as follows:  Pulse  Min: 67  Max: 94     Antiarrhythmics       Anticoagulants       Plan  - Replete lytes with a goal of K>4, Mg >2  - Patient is anticoagulated, see medications listed above.  - Patient's afib is currently  rate controlled  -Continue home medications  -f/u cardiology

## 2025-01-25 NOTE — H&P
"[facility]  History & Physical    Patient Name: Caio Ontiveros  MRN: [id number]  Patient Class: OP- Observation  Admission Date: 1/24/2025  Attending Physician: Yannick Ruiz MD   Primary Care Provider: Dominick Allen MD         Patient information was obtained from patient, past medical records, and ER records.     Subjective:     Principal Problem:Shortness of breath    Chief Complaint:   Chief Complaint   Patient presents with    Nasal Congestion     "Head cold" x a few days. Sob gradual onset. Worse since last night.         HPI: Caio Ontiveros is a 59 y.o. male with a PMH  has a past medical history of A-fib, CHF (congestive heart failure), Gout, chronic, Hypertension, and Sleep apnea. who presented as a transfer from [facility] for higher level of care and continued treatment of influenza a and CHF exacerbation.  Patient presented to outside facility complaining of worsening head cold, flu-like symptoms, and dyspnea/shortness of breath over the past few days which acutely worsened overnight.  He reported exposure to recent ill contacts with known flu and did not seek medical attention until now.  He denied endorsing any lightheadedness, dizziness, headache, visual changes, fever, chills, sweats, nausea, vomiting, chest pain, abdominal pain, dysuria, hematuria, melena, hematochezia, diarrhea, or onset neurological deficits.  He reports compliance with home medications as well as dietary/fluid restrictions and denied receiving flu vaccine this year.  He reported no known alleviating or aggravating factors noted with only other associated symptoms including chronic bilateral lower extremity edema.  All other review of systems negative except as noted above.  Initial workup at outside facility revealed patient to be influenza A positive, afebrile without leukocytosis, BNP elevated at 893, troponin 0.044, lactic acid/procalcitonin within normal limits, UA negative for UTI, and chest x-ray " negative for acute findings.  Patient admitted to Hospital Medicine under observation for continued medical management of influenza a and CHF exacerbation.      PCP: Dominick Allen      Past Medical History:   Diagnosis Date    A-fib     CHF (congestive heart failure)     Gout, chronic     Hypertension     Sleep apnea        Past Surgical History:   Procedure Laterality Date    CHOLECYSTECTOMY      GASTRIC BYPASS  2014    INJECTION OF JOINT Left 8/23/2023    Procedure: Left IA Hip Joint injection;  Surgeon: Peña Rausch MD;  Location: [facility];  Service: Pain Management;  Laterality: Left;       Review of patient's allergies indicates:  No Known Allergies    No current facility-administered medications on file prior to encounter.     Current Outpatient Medications on File Prior to Encounter   Medication Sig    apixaban (ELIQUIS) 5 mg Tab Take 1 tablet by mouth twice daily    bumetanide (BUMEX) 2 MG tablet Take 1 tablet (2 mg total) by mouth 2 (two) times a day.    calcipotriene (DOVONOX) 0.005 % cream Apply topically 2 (two) times daily.    colchicine (COLCRYS) 0.6 mg tablet Take 1 tablet (0.6 mg total) by mouth once daily.    ixekizumab (TALTZ AUTOINJECTOR) 80 mg/mL AtIn Inject 80 mg into the skin every 28 days.    levothyroxine (SYNTHROID) 50 MCG tablet Take 1 tablet (50 mcg total) by mouth before breakfast.    methocarbamoL (ROBAXIN) 750 MG Tab Take 1 tablet by mouth three times daily as needed for muscle spasm    methylPREDNISolone (MEDROL DOSEPACK) 4 mg tablet use as directed    metoprolol succinate (TOPROL-XL) 100 MG 24 hr tablet Take 1 tablet by mouth once daily    nortriptyline (PAMELOR) 25 MG capsule Take 1 capsule (25 mg total) by mouth every evening.    potassium chloride SA (K-DUR,KLOR-CON) 20 MEQ tablet TAKE 1 TABLET BY MOUTH TWICE  DAILY    pregabalin (LYRICA) 200 MG Cap Take 1 capsule by mouth twice daily    venlafaxine (EFFEXOR-XR) 150 MG Cp24 TAKE 1 CAPSULE BY MOUTH ONCE  DAILY     venlafaxine (EFFEXOR-XR) 75 MG 24 hr capsule TAKE 1 CAPSULE BY MOUTH ONCE  DAILY     Family History       Problem Relation (Age of Onset)    Diabetes Father, Sister, Brother    Hypertension Mother, Father    Stroke Father          Tobacco Use    Smoking status: Never    Smokeless tobacco: Never   Substance and Sexual Activity    Alcohol use: Not Currently    Drug use: No    Sexual activity: Not Currently     Review of Systems   All other systems reviewed and are negative.    Objective:     Vital Signs (Most Recent):  Temp: 99.1 °F (37.3 °C) (01/25/25 0030)  Pulse: 73 (01/25/25 0306)  Resp: (!) 26 (01/25/25 0156)  BP: 113/63 (01/25/25 0030)  SpO2: (!) 93 % (01/25/25 0156) Vital Signs (24h Range):  Temp:  [98.3 °F (36.8 °C)-99.1 °F (37.3 °C)] 99.1 °F (37.3 °C)  Pulse:  [67-94] 73  Resp:  [15-42] 26  SpO2:  [88 %-99 %] 93 %  BP: (113-157)/(63-98) 113/63     Weight: (!) 195.4 kg (430 lb 12.5 oz)  Body mass index is 52.44 kg/m².     Physical Exam  Vitals reviewed.   Constitutional:       General: He is not in acute distress.     Appearance: Normal appearance. He is obese. He is ill-appearing. He is not toxic-appearing or diaphoretic.      Comments: Ill-appearing but nontoxic.   HENT:      Head: Normocephalic and atraumatic.      Right Ear: External ear normal.      Left Ear: External ear normal.      Nose: Nose normal. No congestion or rhinorrhea.      Mouth/Throat:      Mouth: Mucous membranes are moist.      Pharynx: Oropharynx is clear. No oropharyngeal exudate or posterior oropharyngeal erythema.   Eyes:      General: No scleral icterus.     Extraocular Movements: Extraocular movements intact.      Conjunctiva/sclera: Conjunctivae normal.      Pupils: Pupils are equal, round, and reactive to light.   Neck:      Vascular: No carotid bruit.   Cardiovascular:      Rate and Rhythm: Normal rate. Rhythm irregular.      Pulses: Normal pulses.      Heart sounds: Normal heart sounds. No murmur heard.     No friction rub. No  gallop.   Pulmonary:      Effort: No respiratory distress.      Breath sounds: No stridor. Rales present. No wheezing or rhonchi.      Comments: Patient tachypneic without evidence of wheezes foot scant crackles noted bilaterally.  Chest:      Chest wall: No tenderness.   Abdominal:      General: Abdomen is flat. Bowel sounds are normal. There is no distension.      Palpations: Abdomen is soft. There is no mass.      Tenderness: There is no abdominal tenderness. There is no guarding or rebound.      Hernia: No hernia is present.   Musculoskeletal:         General: Swelling present. No tenderness, deformity or signs of injury. Normal range of motion.      Cervical back: Normal range of motion and neck supple. No rigidity or tenderness.      Right lower leg: Edema present.      Left lower leg: Edema present.   Lymphadenopathy:      Cervical: No cervical adenopathy.   Skin:     General: Skin is warm and dry.      Capillary Refill: Capillary refill takes less than 2 seconds.      Coloration: Skin is not jaundiced or pale.      Findings: Rash present. No bruising, erythema or lesion.      Comments: Bilateral chronic venous stasis noted   Neurological:      General: No focal deficit present.      Mental Status: He is alert and oriented to person, place, and time. Mental status is at baseline.      Cranial Nerves: No cranial nerve deficit.      Sensory: No sensory deficit.      Motor: No weakness.      Coordination: Coordination normal.   Psychiatric:         Mood and Affect: Mood normal.         Behavior: Behavior normal.         Thought Content: Thought content normal.         Judgment: Judgment normal.              CRANIAL NERVES     CN III, IV, VI   Pupils are equal, round, and reactive to light.       Significant Labs: All pertinent labs within the past 24 hours have been reviewed.    Significant Imaging: I have reviewed all pertinent imaging results/findings within the past 24 hours.    LABS:  Recent Results (from  the past 24 hours)   EKG 12-lead    Collection Time: 01/24/25  1:10 PM   Result Value Ref Range    QRS Duration 94 ms    [facility] QTC Calculation 455 ms   CBC auto differential    Collection Time: 01/24/25  1:34 PM   Result Value Ref Range    WBC 2.57 (L) 3.90 - 12.70 K/uL    RBC 4.96 4.60 - 6.20 M/uL    Hemoglobin 13.8 (L) 14.0 - 18.0 g/dL    Hematocrit 44.0 40.0 - 54.0 %    MCV 89 82 - 98 fL    MCH 27.8 27.0 - 31.0 pg    MCHC 31.4 (L) 32.0 - 36.0 g/dL    RDW 18.3 (H) 11.5 - 14.5 %    Platelets 84 (L) 150 - 450 K/uL    MPV SEE COMMENT 9.2 - 12.9 fL    Immature Granulocytes 0.8 (H) 0.0 - 0.5 %    Gran # (ANC) 1.7 (L) 1.8 - 7.7 K/uL    Immature Grans (Abs) 0.02 0.00 - 0.04 K/uL    Lymph # 0.3 (L) 1.0 - 4.8 K/uL    Mono # 0.5 0.3 - 1.0 K/uL    Eos # 0.0 0.0 - 0.5 K/uL    Baso # 0.02 0.00 - 0.20 K/uL    nRBC 0 0 /100 WBC    Gran % 64.2 38.0 - 73.0 %    Lymph % 13.2 (L) 18.0 - 48.0 %    Mono % 20.6 (H) 4.0 - 15.0 %    Eosinophil % 0.4 0.0 - 8.0 %    Basophil % 0.8 0.0 - 1.9 %    Platelet Estimate Decreased (A)     Large/Giant Platelets Present     Differential Method Automated    Comprehensive metabolic panel    Collection Time: 01/24/25  1:34 PM   Result Value Ref Range    Sodium 142 136 - 145 mmol/L    Potassium 3.6 3.5 - 5.1 mmol/L    Chloride 104 95 - 110 mmol/L    CO2 26 23 - 29 mmol/L    Glucose 104 70 - 110 mg/dL    BUN 14 6 - 20 mg/dL    Creatinine 1.2 0.5 - 1.4 mg/dL    Calcium 8.2 (L) 8.7 - 10.5 mg/dL    Total Protein 7.3 6.0 - 8.4 g/dL    Albumin 3.5 3.5 - 5.2 g/dL    Total Bilirubin 1.1 (H) 0.1 - 1.0 mg/dL    Alkaline Phosphatase 58 40 - 150 U/L    AST 22 10 - 40 U/L    ALT 12 10 - 44 U/L    eGFR >60.0 >60 mL/min/1.73 m^2    Anion Gap 12 8 - 16 mmol/L   Lactic acid, plasma #1    Collection Time: 01/24/25  1:34 PM   Result Value Ref Range    Lactate (Lactic Acid) 1.3 0.5 - 2.2 mmol/L   Procalcitonin    Collection Time: 01/24/25  1:34 PM   Result Value Ref Range    Procalcitonin 0.19 <0.25 ng/mL   Troponin I     Collection Time: 01/24/25  1:34 PM   Result Value Ref Range    Troponin I 0.044 (H) 0.000 - 0.026 ng/mL   Lipase    Collection Time: 01/24/25  1:34 PM   Result Value Ref Range    Lipase 19 4 - 60 U/L   Brain natriuretic peptide    Collection Time: 01/24/25  1:34 PM   Result Value Ref Range     (H) 0 - 99 pg/mL   POCT Influenza A/B Molecular    Collection Time: 01/24/25  2:05 PM   Result Value Ref Range    POC Molecular Influenza A Ag Positive (A) Negative    POC Molecular Influenza B Ag Negative Negative     Acceptable Yes    Urinalysis, Reflex to Urine Culture Urine, Clean Catch    Collection Time: 01/24/25  3:21 PM    Specimen: Urine   Result Value Ref Range    Specimen UA Urine, Clean Catch     Color, UA Yellow Yellow, Straw, Adry    Appearance, UA Clear Clear    pH, UA 7.0 5.0 - 8.0    Specific Gravity, UA 1.020 1.005 - 1.030    Protein, UA 3+ (A) Negative    Glucose, UA Negative Negative    Ketones, UA Negative Negative    Bilirubin (UA) Negative Negative    Occult Blood UA 2+ (A) Negative    Nitrite, UA Negative Negative    Urobilinogen, UA >=8.0 (A) <2.0 EU/dL    Leukocytes, UA Negative Negative   Urinalysis Microscopic    Collection Time: 01/24/25  3:21 PM   Result Value Ref Range    RBC, UA 10 (H) 0 - 4 /hpf    WBC, UA 1 0 - 5 /hpf    Bacteria Rare None-Occ /hpf    Hyaline Casts, UA 5 (A) 0-1/lpf /lpf    Microscopic Comment SEE COMMENT    Lactic acid, plasma #2    Collection Time: 01/24/25  8:34 PM   Result Value Ref Range    Lactate (Lactic Acid) 1.8 0.5 - 2.2 mmol/L   Hemoglobin A1c    Collection Time: 01/24/25  8:37 PM   Result Value Ref Range    Hemoglobin A1C 5.7 (H) 4.0 - 5.6 %    Estimated Avg Glucose 117 68 - 131 mg/dL   Magnesium    Collection Time: 01/24/25  8:37 PM   Result Value Ref Range    Magnesium 1.8 1.6 - 2.6 mg/dL       RADIOLOGY  X-Ray Chest AP Portable    Result Date: 1/24/2025  EXAMINATION: XR CHEST AP PORTABLE CLINICAL HISTORY: Sepsis; TECHNIQUE: Single frontal  view of the chest was performed. COMPARISON: 11/14/2024 FINDINGS: The patient is in a lordotic position.  The heart is borderline enlarged but stable.  The left lateral costophrenic angle is outside the field of view.  Visualized lung parenchyma is clear.  Pulmonary vasculature is normal.     Borderline cardiac enlargement with no evidence of acute disease seen. Electronically signed by: Albert House Date:    01/24/2025 Time:    13:57      EKG    MICROBIOLOGY    [facility]    Assessment/Plan:     * Shortness of breath    Influenza A    Acute on chronic diastolic congestive heart failure  Patient has Diastolic (HFpEF) heart failure that is Acute on chronic. On presentation their CHF was decompensated. Evidence of decompensated CHF on presentation includes: edema, crackles on lung auscultation, orthopnea, paroxysmal nocturnal dyspnea (PND), dyspnea on exertion (GAYTAN), and shortness of breath. The etiology of their decompensation is likely influenza A . Most recent BNP and echo results are listed below.  Recent Labs     01/24/25  1334   *     Latest ECHO  Results for orders placed during the hospital encounter of 04/22/24    Echo    Interpretation Summary    Left Ventricle: The left ventricle is normal in size. Moderately increased wall thickness. There is moderate concentric hypertrophy. Septal motion is abnormal. Septal flattening in diastole and systole consistent with right ventricular volume and pressure overload. There is normal systolic function with a visually estimated ejection fraction of 60 - 65%. Grade II diastolic dysfunction.    Right Ventricle: Right ventricle was not well visualized due to poor acoustic window. Severe right ventricular enlargement. There is moderate hypertrophy. Right ventricle wall motion has global hypokinesis. Systolic function is moderately reduced.    Left Atrium: Left atrium is moderately dilated.    Right Atrium: Right atrium is severely dilated.    Tricuspid Valve: There is  moderate to severe regurgitation.    Pulmonic Valve: There is mild to moderate regurgitation.    Pulmonary Artery: There is pulmonary hypertension. The estimated pulmonary artery systolic pressure is 90 mmHg.    IVC/SVC: Elevated venous pressure at 15 mmHg.    Current Heart Failure Medications  bumetanide injection 2 mg, 2 times daily, Intravenous    Plan  -Monitor strict I&Os and daily weights.    -Place on telemetry  -Low sodium diet  -Place on fluid restriction of 1.5 L.   -Cardiology has been consulted  -The patient's volume status is  currently undergoing medical management  -Continue home medications  -Continue treatment of influenza   -f/u cardiology       Chronic atrial fibrillation  Patient has  chronic  atrial fibrillation. Patient is currently in atrial fibrillation. [id number] Score: The patient doesn't have any registry metric data available. The patients heart rate in the last 24 hours is as follows:  Pulse  Min: 67  Max: 94     Antiarrhythmics  -Toprolol    Anticoagulants  -Eliquis    Plan  - Replete lytes with a goal of K>4, Mg >2  - Patient is anticoagulated, see medications listed above.  - Patient's afib is currently  rate controlled  -Continue home medications  -f/u cardiology       Essential hypertension  Chronic, controlled.  Latest blood pressure and vitals reviewed-   Temp:  [98.3 °F (36.8 °C)-99.1 °F (37.3 °C)]   Pulse:  [67-94]   Resp:  [15-42]   BP: (113-157)/(63-98)   SpO2:  [88 %-99 %] .   Home meds for hypertension were reviewed and noted below.   Hypertension Medications               bumetanide (BUMEX) 2 MG tablet Take 1 tablet (2 mg total) by mouth 2 (two) times a day.    metoprolol succinate (TOPROL-XL) 100 MG 24 hr tablet Take 1 tablet by mouth once daily     While in the hospital, will manage blood pressure as follows; Continue home antihypertensive regimen    Will utilize p.r.n. blood pressure medication only if patient's blood pressure greater than  180/110 and he develops  "symptoms such as worsening chest pain or shortness of breath.      Hypothyroidism  Patient has chronic hypothyroidism. TFTs reviewed-   Lab Results   Component Value Date    TSH 3.581 06/11/2024   Plan:  -Will continue chronic levothyroxine and adjust for and clinical changes      Prediabetes  Patient's FSGs are controlled on current medication regimen.  Last A1c reviewed-   Lab Results   Component Value Date    HGBA1C 5.7 (H) 01/24/2025     Most recent fingerstick glucose reviewed- No results for input(s): "POCTGLUCOSE" in the last 24 hours.  Current correctional scale  Low  Titrate as needed  anti-hyperglycemic dose as follows-   Antihyperglycemics (From admission, onward)     Plan:  -SSI  -A1c  -Accu-checks  -Hold oral hypoglycemics while patient is in the hospital  -Continue home long-acting insulin at 20% decrease, titrate up as needed  -Hypoglycemic protocol        Pulmonary hypertension    Restrictive lung disease  Chronic.  Follows pulmonology outpatient in his currently on CPAP q.h.s..  Plan:  -titrate oxygen therapy as needed  -duo nebs p.r.n.  -incentive spirometry  -continue CPAP q.h.s.  -continued treatment of CHF and influenza  -f/u outpatient with pulmonology as directed      Chronic benign neutropenia  Chronic.  Near baseline.  Plan:  -continue to monitor  -f/u outpatient with provider as directed      Thrombocytopenia  Chronic.  Stable.  The patients 3 most recent labs are listed below.  Recent Labs     01/24/25  1334   PLT 84*     Plan  -Will transfuse if platelet count is <10k.  -Continue to monitor      Moderate episode of recurrent major depressive disorder  Chronic. Stable. Not in acute exacerbation and currently denies endorsing any suicidal/homicidal ideations.   Plan:  -Continue home medications       Idiopathic chronic gout of multiple sites without tophus  Chronic.  Not in acute flare.  Plan:  -continue to monitor      EVANGELIST (obstructive sleep apnea)/ Obesity Hypoventilation syndrome "   Currently on CPAP outpatient.  Plan:  -continue CPAP q.h.s.       Morbid obesity with BMI of 50.0-59.9, adult  Body mass index is 51.74 kg/m². Morbid obesity complicates all aspects of disease management from diagnostic modalities to treatment. Weight loss encouraged and health benefits explained to patient.         VTE Risk Mitigation (From admission, onward)           Ordered     apixaban tablet 5 mg  2 times daily         01/25/25 0456     IP VTE HIGH RISK PATIENT  Once         01/24/25 2034     Place sequential compression device  Until discontinued         01/24/25 2034                  //Core Measures   -DVT proph: SCDs, Eliquis  -Code status: Full    -Surrogate: none provided       Components of this note were documented using a voice recognition system and are subject to errors not corrected at the time the document was proof read. Please contact the author for any clarifications.       On 01/24/2025, patient should be placed in hospital observation services under my care.       Nelson Gotti MD  Department of Hospital Medicine  [facility]

## 2025-01-25 NOTE — ASSESSMENT & PLAN NOTE
Body mass index is 51.74 kg/m². Morbid obesity complicates all aspects of disease management from diagnostic modalities to treatment. Weight loss encouraged and health benefits explained to patient.

## 2025-01-25 NOTE — ASSESSMENT & PLAN NOTE
Chronic.  Near baseline.  Plan:  -continue to monitor  -f/u outpatient with provider as directed

## 2025-01-25 NOTE — SUBJECTIVE & OBJECTIVE
Past Medical History:   Diagnosis Date    A-fib     CHF (congestive heart failure)     Gout, chronic     Hypertension     Sleep apnea        Past Surgical History:   Procedure Laterality Date    CHOLECYSTECTOMY      GASTRIC BYPASS  2014    INJECTION OF JOINT Left 8/23/2023    Procedure: Left IA Hip Joint injection;  Surgeon: Peña Rausch MD;  Location: [facility];  Service: Pain Management;  Laterality: Left;       Review of patient's allergies indicates:  No Known Allergies    No current facility-administered medications on file prior to encounter.     Current Outpatient Medications on File Prior to Encounter   Medication Sig    apixaban (ELIQUIS) 5 mg Tab Take 1 tablet by mouth twice daily    bumetanide (BUMEX) 2 MG tablet Take 1 tablet (2 mg total) by mouth 2 (two) times a day.    calcipotriene (DOVONOX) 0.005 % cream Apply topically 2 (two) times daily.    colchicine (COLCRYS) 0.6 mg tablet Take 1 tablet (0.6 mg total) by mouth once daily.    ixekizumab (TALTZ AUTOINJECTOR) 80 mg/mL AtIn Inject 80 mg into the skin every 28 days.    levothyroxine (SYNTHROID) 50 MCG tablet Take 1 tablet (50 mcg total) by mouth before breakfast.    methocarbamoL (ROBAXIN) 750 MG Tab Take 1 tablet by mouth three times daily as needed for muscle spasm    methylPREDNISolone (MEDROL DOSEPACK) 4 mg tablet use as directed    metoprolol succinate (TOPROL-XL) 100 MG 24 hr tablet Take 1 tablet by mouth once daily    nortriptyline (PAMELOR) 25 MG capsule Take 1 capsule (25 mg total) by mouth every evening.    potassium chloride SA (K-DUR,KLOR-CON) 20 MEQ tablet TAKE 1 TABLET BY MOUTH TWICE  DAILY    pregabalin (LYRICA) 200 MG Cap Take 1 capsule by mouth twice daily    venlafaxine (EFFEXOR-XR) 150 MG Cp24 TAKE 1 CAPSULE BY MOUTH ONCE  DAILY    venlafaxine (EFFEXOR-XR) 75 MG 24 hr capsule TAKE 1 CAPSULE BY MOUTH ONCE  DAILY     Family History       Problem Relation (Age of Onset)    Diabetes Father, Sister, Brother    Hypertension Mother,  Father    Stroke Father          Tobacco Use    Smoking status: Never    Smokeless tobacco: Never   Substance and Sexual Activity    Alcohol use: Not Currently    Drug use: No    Sexual activity: Not Currently     Review of Systems   All other systems reviewed and are negative.    Objective:     Vital Signs (Most Recent):  Temp: 99.1 °F (37.3 °C) (01/25/25 0030)  Pulse: 73 (01/25/25 0306)  Resp: (!) 26 (01/25/25 0156)  BP: 113/63 (01/25/25 0030)  SpO2: (!) 93 % (01/25/25 0156) Vital Signs (24h Range):  Temp:  [98.3 °F (36.8 °C)-99.1 °F (37.3 °C)] 99.1 °F (37.3 °C)  Pulse:  [67-94] 73  Resp:  [15-42] 26  SpO2:  [88 %-99 %] 93 %  BP: (113-157)/(63-98) 113/63     Weight: (!) 195.4 kg (430 lb 12.5 oz)  Body mass index is 52.44 kg/m².     Physical Exam  Vitals reviewed.   Constitutional:       General: He is not in acute distress.     Appearance: Normal appearance. He is obese. He is ill-appearing. He is not toxic-appearing or diaphoretic.      Comments: Ill-appearing but nontoxic.   HENT:      Head: Normocephalic and atraumatic.      Right Ear: External ear normal.      Left Ear: External ear normal.      Nose: Nose normal. No congestion or rhinorrhea.      Mouth/Throat:      Mouth: Mucous membranes are moist.      Pharynx: Oropharynx is clear. No oropharyngeal exudate or posterior oropharyngeal erythema.   Eyes:      General: No scleral icterus.     Extraocular Movements: Extraocular movements intact.      Conjunctiva/sclera: Conjunctivae normal.      Pupils: Pupils are equal, round, and reactive to light.   Neck:      Vascular: No carotid bruit.   Cardiovascular:      Rate and Rhythm: Normal rate. Rhythm irregular.      Pulses: Normal pulses.      Heart sounds: Normal heart sounds. No murmur heard.     No friction rub. No gallop.   Pulmonary:      Effort: No respiratory distress.      Breath sounds: No stridor. Rales present. No wheezing or rhonchi.      Comments: Patient tachypneic without evidence of wheezes foot  scant crackles noted bilaterally.  Chest:      Chest wall: No tenderness.   Abdominal:      General: Abdomen is flat. Bowel sounds are normal. There is no distension.      Palpations: Abdomen is soft. There is no mass.      Tenderness: There is no abdominal tenderness. There is no guarding or rebound.      Hernia: No hernia is present.   Musculoskeletal:         General: Swelling present. No tenderness, deformity or signs of injury. Normal range of motion.      Cervical back: Normal range of motion and neck supple. No rigidity or tenderness.      Right lower leg: Edema present.      Left lower leg: Edema present.   Lymphadenopathy:      Cervical: No cervical adenopathy.   Skin:     General: Skin is warm and dry.      Capillary Refill: Capillary refill takes less than 2 seconds.      Coloration: Skin is not jaundiced or pale.      Findings: Rash present. No bruising, erythema or lesion.      Comments: Bilateral chronic venous stasis noted   Neurological:      General: No focal deficit present.      Mental Status: He is alert and oriented to person, place, and time. Mental status is at baseline.      Cranial Nerves: No cranial nerve deficit.      Sensory: No sensory deficit.      Motor: No weakness.      Coordination: Coordination normal.   Psychiatric:         Mood and Affect: Mood normal.         Behavior: Behavior normal.         Thought Content: Thought content normal.         Judgment: Judgment normal.              CRANIAL NERVES     CN III, IV, VI   Pupils are equal, round, and reactive to light.       Significant Labs: All pertinent labs within the past 24 hours have been reviewed.    Significant Imaging: I have reviewed all pertinent imaging results/findings within the past 24 hours.    LABS:  Recent Results (from the past 24 hours)   EKG 12-lead    Collection Time: 01/24/25  1:10 PM   Result Value Ref Range    QRS Duration 94 ms    [facility] QTC Calculation 455 ms   CBC auto differential    Collection Time:  01/24/25  1:34 PM   Result Value Ref Range    WBC 2.57 (L) 3.90 - 12.70 K/uL    RBC 4.96 4.60 - 6.20 M/uL    Hemoglobin 13.8 (L) 14.0 - 18.0 g/dL    Hematocrit 44.0 40.0 - 54.0 %    MCV 89 82 - 98 fL    MCH 27.8 27.0 - 31.0 pg    MCHC 31.4 (L) 32.0 - 36.0 g/dL    RDW 18.3 (H) 11.5 - 14.5 %    Platelets 84 (L) 150 - 450 K/uL    MPV SEE COMMENT 9.2 - 12.9 fL    Immature Granulocytes 0.8 (H) 0.0 - 0.5 %    Gran # (ANC) 1.7 (L) 1.8 - 7.7 K/uL    Immature Grans (Abs) 0.02 0.00 - 0.04 K/uL    Lymph # 0.3 (L) 1.0 - 4.8 K/uL    Mono # 0.5 0.3 - 1.0 K/uL    Eos # 0.0 0.0 - 0.5 K/uL    Baso # 0.02 0.00 - 0.20 K/uL    nRBC 0 0 /100 WBC    Gran % 64.2 38.0 - 73.0 %    Lymph % 13.2 (L) 18.0 - 48.0 %    Mono % 20.6 (H) 4.0 - 15.0 %    Eosinophil % 0.4 0.0 - 8.0 %    Basophil % 0.8 0.0 - 1.9 %    Platelet Estimate Decreased (A)     Large/Giant Platelets Present     Differential Method Automated    Comprehensive metabolic panel    Collection Time: 01/24/25  1:34 PM   Result Value Ref Range    Sodium 142 136 - 145 mmol/L    Potassium 3.6 3.5 - 5.1 mmol/L    Chloride 104 95 - 110 mmol/L    CO2 26 23 - 29 mmol/L    Glucose 104 70 - 110 mg/dL    BUN 14 6 - 20 mg/dL    Creatinine 1.2 0.5 - 1.4 mg/dL    Calcium 8.2 (L) 8.7 - 10.5 mg/dL    Total Protein 7.3 6.0 - 8.4 g/dL    Albumin 3.5 3.5 - 5.2 g/dL    Total Bilirubin 1.1 (H) 0.1 - 1.0 mg/dL    Alkaline Phosphatase 58 40 - 150 U/L    AST 22 10 - 40 U/L    ALT 12 10 - 44 U/L    eGFR >60.0 >60 mL/min/1.73 m^2    Anion Gap 12 8 - 16 mmol/L   Lactic acid, plasma #1    Collection Time: 01/24/25  1:34 PM   Result Value Ref Range    Lactate (Lactic Acid) 1.3 0.5 - 2.2 mmol/L   Procalcitonin    Collection Time: 01/24/25  1:34 PM   Result Value Ref Range    Procalcitonin 0.19 <0.25 ng/mL   Troponin I    Collection Time: 01/24/25  1:34 PM   Result Value Ref Range    Troponin I 0.044 (H) 0.000 - 0.026 ng/mL   Lipase    Collection Time: 01/24/25  1:34 PM   Result Value Ref Range    Lipase 19 4 - 60  U/L   Brain natriuretic peptide    Collection Time: 01/24/25  1:34 PM   Result Value Ref Range     (H) 0 - 99 pg/mL   POCT Influenza A/B Molecular    Collection Time: 01/24/25  2:05 PM   Result Value Ref Range    POC Molecular Influenza A Ag Positive (A) Negative    POC Molecular Influenza B Ag Negative Negative     Acceptable Yes    Urinalysis, Reflex to Urine Culture Urine, Clean Catch    Collection Time: 01/24/25  3:21 PM    Specimen: Urine   Result Value Ref Range    Specimen UA Urine, Clean Catch     Color, UA Yellow Yellow, Straw, Adry    Appearance, UA Clear Clear    pH, UA 7.0 5.0 - 8.0    Specific Gravity, UA 1.020 1.005 - 1.030    Protein, UA 3+ (A) Negative    Glucose, UA Negative Negative    Ketones, UA Negative Negative    Bilirubin (UA) Negative Negative    Occult Blood UA 2+ (A) Negative    Nitrite, UA Negative Negative    Urobilinogen, UA >=8.0 (A) <2.0 EU/dL    Leukocytes, UA Negative Negative   Urinalysis Microscopic    Collection Time: 01/24/25  3:21 PM   Result Value Ref Range    RBC, UA 10 (H) 0 - 4 /hpf    WBC, UA 1 0 - 5 /hpf    Bacteria Rare None-Occ /hpf    Hyaline Casts, UA 5 (A) 0-1/lpf /lpf    Microscopic Comment SEE COMMENT    Lactic acid, plasma #2    Collection Time: 01/24/25  8:34 PM   Result Value Ref Range    Lactate (Lactic Acid) 1.8 0.5 - 2.2 mmol/L   Hemoglobin A1c    Collection Time: 01/24/25  8:37 PM   Result Value Ref Range    Hemoglobin A1C 5.7 (H) 4.0 - 5.6 %    Estimated Avg Glucose 117 68 - 131 mg/dL   Magnesium    Collection Time: 01/24/25  8:37 PM   Result Value Ref Range    Magnesium 1.8 1.6 - 2.6 mg/dL       RADIOLOGY  X-Ray Chest AP Portable    Result Date: 1/24/2025  EXAMINATION: XR CHEST AP PORTABLE CLINICAL HISTORY: Sepsis; TECHNIQUE: Single frontal view of the chest was performed. COMPARISON: 11/14/2024 FINDINGS: The patient is in a lordotic position.  The heart is borderline enlarged but stable.  The left lateral costophrenic angle is  outside the field of view.  Visualized lung parenchyma is clear.  Pulmonary vasculature is normal.     Borderline cardiac enlargement with no evidence of acute disease seen. Electronically signed by: Albert House Date:    01/24/2025 Time:    13:57      EKG    MICROBIOLOGY    MDM

## 2025-01-25 NOTE — ASSESSMENT & PLAN NOTE
Chronic, controlled.  Latest blood pressure and vitals reviewed-   Temp:  [98.3 °F (36.8 °C)-99.1 °F (37.3 °C)]   Pulse:  [67-97]   Resp:  [15-42]   BP: (113-157)/(63-98)   SpO2:  [88 %-99 %] .   Home meds for hypertension were reviewed and noted below.   Hypertension Medications               bumetanide (BUMEX) 2 MG tablet Take 1 tablet (2 mg total) by mouth 2 (two) times a day.    metoprolol succinate (TOPROL-XL) 100 MG 24 hr tablet Take 1 tablet by mouth once daily     While in the hospital, will manage blood pressure as follows; Adjust home antihypertensive regimen as follows- on intravenous bumex    Will utilize p.r.n. blood pressure medication only if patient's blood pressure greater than  180/110 and he develops symptoms such as worsening chest pain or shortness of breath.

## 2025-01-25 NOTE — ASSESSMENT & PLAN NOTE
Patient has Diastolic (HFpEF) heart failure that is Acute on chronic. On presentation their CHF was decompensated. Evidence of decompensated CHF on presentation includes: edema, crackles on lung auscultation, orthopnea, paroxysmal nocturnal dyspnea (PND), dyspnea on exertion (GAYTAN), and shortness of breath. The etiology of their decompensation is likely influenza A . Most recent BNP and echo results are listed below.  Recent Labs     01/24/25  1334   *     Latest ECHO  Results for orders placed during the hospital encounter of 04/22/24    Echo    Interpretation Summary    Left Ventricle: The left ventricle is normal in size. Moderately increased wall thickness. There is moderate concentric hypertrophy. Septal motion is abnormal. Septal flattening in diastole and systole consistent with right ventricular volume and pressure overload. There is normal systolic function with a visually estimated ejection fraction of 60 - 65%. Grade II diastolic dysfunction.    Right Ventricle: Right ventricle was not well visualized due to poor acoustic window. Severe right ventricular enlargement. There is moderate hypertrophy. Right ventricle wall motion has global hypokinesis. Systolic function is moderately reduced.    Left Atrium: Left atrium is moderately dilated.    Right Atrium: Right atrium is severely dilated.    Tricuspid Valve: There is moderate to severe regurgitation.    Pulmonic Valve: There is mild to moderate regurgitation.    Pulmonary Artery: There is pulmonary hypertension. The estimated pulmonary artery systolic pressure is 90 mmHg.    IVC/SVC: Elevated venous pressure at 15 mmHg.    Current Heart Failure Medications  bumetanide injection 2 mg, 2 times daily, Intravenous    Plan  -Monitor strict I&Os and daily weights.    -Place on telemetry  -Low sodium diet  -Place on fluid restriction of 1.5 L.   -Cardiology has been consulted  -The patient's volume status is  currently undergoing medical  management  -Continue home medications  -Continue treatment of influenza   -f/u cardiology

## 2025-01-25 NOTE — PROGRESS NOTES
[facility] Medicine  Progress Note    Patient Name: Caio Ontiveros  MRN: [id number]  Patient Class: OP- Observation   Admission Date: 1/24/2025  Length of Stay: 0 days  Attending Physician: Yannick Ruiz MD  Primary Care Provider: Dominick Allen MD        Subjective     Principal Problem:Shortness of breath        HPI:  Caio Ontiveros is a 59 y.o. male with a PMH  has a past medical history of A-fib, CHF (congestive heart failure), Gout, chronic, Hypertension, and Sleep apnea. who presented as a transfer from [facility] for higher level of care and continued treatment of influenza a and CHF exacerbation.  Patient presented to outside facility complaining of worsening head cold, flu-like symptoms, and dyspnea/shortness of breath over the past few days which acutely worsened overnight.  He reported exposure to recent ill contacts with known flu and did not seek medical attention until now.  He denied endorsing any lightheadedness, dizziness, headache, visual changes, fever, chills, sweats, nausea, vomiting, chest pain, abdominal pain, dysuria, hematuria, melena, hematochezia, diarrhea, or onset neurological deficits.  He reports compliance with home medications as well as dietary/fluid restrictions and denied receiving flu vaccine this year.  He reported no known alleviating or aggravating factors noted with only other associated symptoms including chronic bilateral lower extremity edema.  All other review of systems negative except as noted above.  Initial workup at outside facility revealed patient to be influenza A positive, afebrile without leukocytosis, BNP elevated at 893, troponin 0.044, lactic acid/procalcitonin within normal limits, UA negative for UTI, and chest x-ray negative for acute findings.  Patient admitted to Hospital Medicine under observation for continued medical management of influenza a and CHF exacerbation.      PCP: Dominick Allen      Overview/Hospital Course:  1/25 admitted for  dyspnea 2/2 influenza. Associated with myalgias and congestion. Reports using albuterol inhalers. Adjust treatment regimen to include scheduled albuterol and flonase. Replace k. Ready to go home. Pmh chronic back pain.    Interval History: See hospital course for today      Review of Systems   Constitutional:  Positive for activity change and fatigue.   HENT:  Positive for congestion.    Respiratory:  Positive for cough and shortness of breath.    Cardiovascular:  Positive for leg swelling.   Genitourinary:  Positive for frequency.   Musculoskeletal:  Positive for back pain (chronic).   Skin:  Positive for wound.   Neurological:  Positive for weakness.   Psychiatric/Behavioral:  Negative for agitation, behavioral problems, confusion, decreased concentration and dysphoric mood. The patient is not nervous/anxious.      Objective:     Vital Signs (Most Recent):  Temp: 98.8 °F (37.1 °C) (01/25/25 0716)  Pulse: 82 (01/25/25 0752)  Resp: 18 (01/25/25 0716)  BP: 131/80 (01/25/25 1001)  SpO2: (!) 92 % (01/25/25 0716) Vital Signs (24h Range):  Temp:  [98.3 °F (36.8 °C)-99.1 °F (37.3 °C)] 98.8 °F (37.1 °C)  Pulse:  [67-97] 82  Resp:  [15-42] 18  SpO2:  [88 %-99 %] 92 %  BP: (113-157)/(63-98) 131/80     Weight: (!) 192.8 kg (425 lb 0.8 oz)  Body mass index is 51.74 kg/m².    Intake/Output Summary (Last 24 hours) at 1/25/2025 1211  Last data filed at 1/25/2025 0608  Gross per 24 hour   Intake --   Output 3075 ml   Net -3075 ml         Physical Exam  Vitals and nursing note reviewed.   Constitutional:       General: He is not in acute distress.     Appearance: He is morbidly obese. He is ill-appearing. He is not toxic-appearing.   HENT:      Head: Normocephalic and atraumatic.   Cardiovascular:      Rate and Rhythm: Normal rate.   Pulmonary:      Effort: Accessory muscle usage present. No respiratory distress.      Breath sounds: Decreased air movement present. Wheezing (expiratory) present.   Abdominal:      Palpations: Abdomen  is soft.      Tenderness: There is no abdominal tenderness.   Musculoskeletal:      Right lower leg: Edema present.      Left lower leg: Edema present.   Skin:     General: Skin is warm.      Findings: Lesion present.   Neurological:      Mental Status: He is alert and oriented to person, place, and time.      Motor: Weakness present.             Significant Labs: All pertinent labs within the past 24 hours have been reviewed.  A1C:   Recent Labs   Lab 01/24/25 2037   HGBA1C 5.7*     Blood Culture:   Recent Labs   Lab 01/24/25  1335   LABBLOO No Growth to date  No Growth to date     CBC:   Recent Labs   Lab 01/24/25  1334   WBC 2.57*   HGB 13.8*   HCT 44.0   PLT 84*     CMP:   Recent Labs   Lab 01/24/25  1334 01/25/25  0522    143   K 3.6 3.2*    103   CO2 26 27    77   BUN 14 14   CREATININE 1.2 1.0   CALCIUM 8.2* 8.3*   PROT 7.3  --    ALBUMIN 3.5  --    BILITOT 1.1*  --    ALKPHOS 58  --    AST 22  --    ALT 12  --    ANIONGAP 12 13     Cardiac Markers:   Recent Labs   Lab 01/24/25  1334   *     Lactic Acid:   Recent Labs   Lab 01/24/25  1334 01/24/25  2034   LACTATE 1.3 1.8     Magnesium:   Recent Labs   Lab 01/24/25 2037 01/25/25  0522   MG 1.8 1.7     Troponin:   Recent Labs   Lab 01/24/25  1334   TROPONINI 0.044*     Urine Studies:   Recent Labs   Lab 01/24/25  1521   COLORU Yellow   APPEARANCEUA Clear   PHUR 7.0   SPECGRAV 1.020   PROTEINUA 3+*   GLUCUA Negative   KETONESU Negative   BILIRUBINUA Negative   OCCULTUA 2+*   NITRITE Negative   UROBILINOGEN >=8.0*   LEUKOCYTESUR Negative   RBCUA 10*   WBCUA 1   BACTERIA Rare   HYALINECASTS 5*     Procalcitonin 0.19    Significant Imaging: I have reviewed all pertinent imaging results/findings within the past 24 hours.  CXR: I have reviewed all pertinent results/findings within the past 24 hours and my personal findings are:  no evidence of acute disease     Assessment and Plan     * Shortness of breath  On room air  Wheezing on  auscultation  Flu positive  Albuterol and flonase      Hypothyroidism  Patient has chronic hypothyroidism. TFTs reviewed-   Lab Results   Component Value Date    TSH 3.581 06/11/2024   Plan:  -Will continue chronic levothyroxine and adjust for and clinical changes      Thrombocytopenia  Chronic.  Stable.  The patients 3 most recent labs are listed below.  Recent Labs     01/24/25  1334   PLT 84*       Plan  -Will transfuse if platelet count is <10k.  -Continue to monitor      Acute on chronic diastolic congestive heart failure  Patient has Diastolic (HFpEF) heart failure that is Acute on chronic. On presentation their CHF was decompensated. Evidence of decompensated CHF on presentation includes: edema, crackles on lung auscultation, orthopnea, paroxysmal nocturnal dyspnea (PND), dyspnea on exertion (GAYTAN), and shortness of breath. The etiology of their decompensation is likely influenza A . Most recent BNP and echo results are listed below.  Recent Labs     01/24/25  1334   *       Latest ECHO  Results for orders placed during the hospital encounter of 04/22/24    Echo    Interpretation Summary    Left Ventricle: The left ventricle is normal in size. Moderately increased wall thickness. There is moderate concentric hypertrophy. Septal motion is abnormal. Septal flattening in diastole and systole consistent with right ventricular volume and pressure overload. There is normal systolic function with a visually estimated ejection fraction of 60 - 65%. Grade II diastolic dysfunction.    Right Ventricle: Right ventricle was not well visualized due to poor acoustic window. Severe right ventricular enlargement. There is moderate hypertrophy. Right ventricle wall motion has global hypokinesis. Systolic function is moderately reduced.    Left Atrium: Left atrium is moderately dilated.    Right Atrium: Right atrium is severely dilated.    Tricuspid Valve: There is moderate to severe regurgitation.    Pulmonic Valve:  "There is mild to moderate regurgitation.    Pulmonary Artery: There is pulmonary hypertension. The estimated pulmonary artery systolic pressure is 90 mmHg.    IVC/SVC: Elevated venous pressure at 15 mmHg.    Current Heart Failure Medications  bumetanide injection 2 mg, 2 times daily, Intravenous  metoprolol succinate (TOPROL-XL) 24 hr tablet 100 mg, Daily, Oral    Plan  -Monitor strict I&Os and daily weights.    -Place on telemetry  -Low sodium diet  -Place on fluid restriction of 1.5 L.   -Cardiology has been consulted  -The patient's volume status is  currently undergoing medical management  -Continue home medications  -Continue treatment of influenza   -f/u cardiology       Influenza A  Continue tamiflu      Chronic benign neutropenia  Chronic.  Near baseline.  Plan:  -continue to monitor  -f/u outpatient with provider as directed      Prediabetes  Patient's FSGs are controlled on current medication regimen.  Last A1c reviewed-   Lab Results   Component Value Date    HGBA1C 5.7 (H) 01/24/2025     Most recent fingerstick glucose reviewed- No results for input(s): "POCTGLUCOSE" in the last 24 hours.  Current correctional scale  Low  Titrate as needed  anti-hyperglycemic dose as follows-   Antihyperglycemics (From admission, onward)      None        Plan:  -SSI  -A1c  -Accu-checks  -Hold oral hypoglycemics while patient is in the hospital  -Continue home long-acting insulin at 20% decrease, titrate up as needed  -Hypoglycemic protocol        Restrictive lung disease  Chronic.  Follows pulmonology outpatient in his currently on CPAP q.h.s..  Plan:  -titrate oxygen therapy as needed  -duo nebs p.r.n.  -incentive spirometry  -continue CPAP q.h.s.  -continued treatment of CHF and influenza  -f/u outpatient with pulmonology as directed    Add albuterol nebs      Moderate episode of recurrent major depressive disorder  Chronic. Stable. Not in acute exacerbation and currently denies endorsing any suicidal/homicidal " ideations.   Plan:  -Continue home medications       Idiopathic chronic gout of multiple sites without tophus  Chronic.  Not in acute flare.  Plan:  -continue to monitor      Chronic atrial fibrillation  Patient has  chronic  atrial fibrillation. Patient is currently in atrial fibrillation. [id number] Score: The patient doesn't have any registry metric data available. The patients heart rate in the last 24 hours is as follows:  Pulse  Min: 67  Max: 97     Antiarrhythmics  metoprolol succinate (TOPROL-XL) 24 hr tablet 100 mg, Daily, Oral    Anticoagulants  apixaban tablet 5 mg, 2 times daily, Oral    Plan  - Replete lytes with a goal of K>4, Mg >2  - Patient is anticoagulated, see medications listed above.  - Patient's afib is currently  rate controlled  -Continue home medications  -f/u cardiology       Pulmonary hypertension  Diurese       Essential hypertension  Chronic, controlled.  Latest blood pressure and vitals reviewed-   Temp:  [98.3 °F (36.8 °C)-99.1 °F (37.3 °C)]   Pulse:  [67-97]   Resp:  [15-42]   BP: (113-157)/(63-98)   SpO2:  [88 %-99 %] .   Home meds for hypertension were reviewed and noted below.   Hypertension Medications               bumetanide (BUMEX) 2 MG tablet Take 1 tablet (2 mg total) by mouth 2 (two) times a day.    metoprolol succinate (TOPROL-XL) 100 MG 24 hr tablet Take 1 tablet by mouth once daily     While in the hospital, will manage blood pressure as follows; Adjust home antihypertensive regimen as follows- on intravenous bumex    Will utilize p.r.n. blood pressure medication only if patient's blood pressure greater than  180/110 and he develops symptoms such as worsening chest pain or shortness of breath.      Morbid obesity with BMI of 50.0-59.9, adult  Body mass index is 51.74 kg/m². Morbid obesity complicates all aspects of disease management from diagnostic modalities to treatment. Weight loss encouraged and health benefits explained to patient.       EVANGELIST (obstructive sleep  apnea)/ Obesity Hypoventilation syndrome   Currently on CPAP outpatient.  Plan:  -continue CPAP q.h.s.         VTE Risk Mitigation (From admission, onward)           Ordered     apixaban tablet 5 mg  2 times daily         01/25/25 0456     IP VTE HIGH RISK PATIENT  Once         01/24/25 2034     Place sequential compression device  Until discontinued         01/24/25 2034                    Discharge Planning   TREVON:      Code Status: Full Code   Medical Readiness for Discharge Date:                          Yannick Ruiz MD  Department of Hospital Medicine   [facility] - [facility]

## 2025-01-25 NOTE — ASSESSMENT & PLAN NOTE
Chronic.  Follows pulmonology outpatient in his currently on CPAP q.h.s..  Plan:  -titrate oxygen therapy as needed  -duo nebs p.r.n.  -incentive spirometry  -continue CPAP q.h.s.  -continued treatment of CHF and influenza  -f/u outpatient with pulmonology as directed

## 2025-01-25 NOTE — HOSPITAL COURSE
1/25 admitted for dyspnea 2/2 influenza. Associated with myalgias and congestion. Reports using albuterol inhalers. Adjust treatment regimen to include scheduled albuterol and flonase. Replace k. Ready to go home. Pmh chronic back pain.    1/26  No acute events overnight. Endorses improvement in symptoms of dyspnea. Denies fever. Complains of chronic back pain. Compliant with nippv.    Physical/occupational therapy consulted with no therapy indicated.    No acute distress. No respiratory distress. On room air. Decreased air movement. No wheezing on auscultation. Irregular rhythm. Morbidly obese. AO3. Lesions. Lower extremity edema     Patient seen and evaluated by me. Patient was determined to be suitable for discharge. Patient deemed stable for discharge to home with nurse practitioner to visit home program to monitor oxygen sats and follow up appointments with primary care provider, pulmonology and cardiology. Advised to continue inhaler bid x 2 days while on tamiflu treatment for flu.

## 2025-01-25 NOTE — ASSESSMENT & PLAN NOTE
Chronic.  Stable.  The patients 3 most recent labs are listed below.  Recent Labs     01/24/25  1334   PLT 84*       Plan  -Will transfuse if platelet count is <10k.  -Continue to monitor

## 2025-01-25 NOTE — PLAN OF CARE
[facility]  Initial Discharge Assessment       Primary Care Provider: Dominick Allen MD    Admission Diagnosis: Screening for cardiovascular condition [Z13.6]  Influenza A [J10.1]  Hypoxia [R09.02]  Shortness of breath [R06.02]    Admission Date: 1/24/2025  Expected Discharge Date:     Transition of Care Barriers: None    Payor: [employer] [facility] / Plan: [employer] CHOICES / Product Type: Medicare Advantage /     Extended Emergency Contact Information  Primary Emergency Contact: Rc Ontiveros   [place]  Mobile Phone: [phone]  Relation: Spouse    Discharge Plan A: Home with family  Discharge Plan B: Home Health      [facility] - [place] - [address]  [address] [address]  Phone: [phone] Fax: [phone]    [facility] - [place], [place] - [address]  [address]   [facility] [address]  Phone: [phone] Fax: [phone]    [facility] - [place], [place] - [address]  [address]  [facility] [address]  Phone: [phone] Fax: [phone]    [facility] ([facility]) - [address]  [address] [address]  Phone: [phone] Fax: [phone]      Initial Assessment (most recent)       Adult Discharge Assessment - 01/25/25 1543          Discharge Assessment    Assessment Type Discharge Planning Assessment     Confirmed/corrected address, phone number and insurance Yes     Confirmed Demographics Correct on Facesheet     Source of Information patient     Communicated TREVON with patient/caregiver Date not available/Unable to determine     People in Home spouse     Do you expect to return to your current living situation? Yes     Do you have help at home or someone to help you manage your care at home? Yes     Who are your caregiver(s) and their phone number(s)? spouse      Prior to hospitilization cognitive status: Alert/Oriented     Current cognitive status: Alert/Oriented     Walking or Climbing Stairs Difficulty no     Dressing/Bathing Difficulty no     Equipment Currently Used at Home CPAP;oxygen     Readmission within 30 days? No     Patient currently being followed by outpatient case management? No     Do you currently have service(s) that help you manage your care at home? No     Do you take prescription medications? Yes     Do you have prescription coverage? Yes     Do you have any problems affording any of your prescribed medications? No     Is the patient taking medications as prescribed? yes     Who is going to help you get home at discharge? spouse     How do you get to doctors appointments? car, drives self     Are you on dialysis? No     Do you take coumadin? No     Discharge Plan A Home with family     Discharge Plan B Home Health     DME Needed Upon Discharge  none     Discharge Plan discussed with: Patient     Transition of Care Barriers None                      CPAP and O2 with [employer].  Patient only uses oxygen at night.

## 2025-01-25 NOTE — HPI
Caio Ontiveros is a 59 y.o. male with a PMH  has a past medical history of A-fib, CHF (congestive heart failure), Gout, chronic, Hypertension, and Sleep apnea. who presented as a transfer from [facility] for higher level of care and continued treatment of influenza a and CHF exacerbation.  Patient presented to outside facility complaining of worsening head cold, flu-like symptoms, and dyspnea/shortness of breath over the past few days which acutely worsened overnight.  He reported exposure to recent ill contacts with known flu and did not seek medical attention until now.  He denied endorsing any lightheadedness, dizziness, headache, visual changes, fever, chills, sweats, nausea, vomiting, chest pain, abdominal pain, dysuria, hematuria, melena, hematochezia, diarrhea, or onset neurological deficits.  He reports compliance with home medications as well as dietary/fluid restrictions and denied receiving flu vaccine this year.  He reported no known alleviating or aggravating factors noted with only other associated symptoms including chronic bilateral lower extremity edema.  All other review of systems negative except as noted above.  Initial workup at outside facility revealed patient to be influenza A positive, afebrile without leukocytosis, BNP elevated at 893, troponin 0.044, lactic acid/procalcitonin within normal limits, UA negative for UTI, and chest x-ray negative for acute findings.  Patient admitted to Hospital Medicine under observation for continued medical management of influenza a and CHF exacerbation.      PCP: Dominick Allen

## 2025-01-25 NOTE — ASSESSMENT & PLAN NOTE
Patient has  chronic  atrial fibrillation. Patient is currently in atrial fibrillation. [id number] Score: The patient doesn't have any registry metric data available. The patients heart rate in the last 24 hours is as follows:  Pulse  Min: 67  Max: 97     Antiarrhythmics  metoprolol succinate (TOPROL-XL) 24 hr tablet 100 mg, Daily, Oral    Anticoagulants  apixaban tablet 5 mg, 2 times daily, Oral    Plan  - Replete lytes with a goal of K>4, Mg >2  - Patient is anticoagulated, see medications listed above.  - Patient's afib is currently  rate controlled  -Continue home medications  -f/u cardiology

## 2025-01-25 NOTE — ASSESSMENT & PLAN NOTE
Patient has Diastolic (HFpEF) heart failure that is Acute on chronic. On presentation their CHF was decompensated. Evidence of decompensated CHF on presentation includes: edema, crackles on lung auscultation, orthopnea, paroxysmal nocturnal dyspnea (PND), dyspnea on exertion (GAYTAN), and shortness of breath. The etiology of their decompensation is likely influenza A . Most recent BNP and echo results are listed below.  Recent Labs     01/24/25  1334   *       Latest ECHO  Results for orders placed during the hospital encounter of 04/22/24    Echo    Interpretation Summary    Left Ventricle: The left ventricle is normal in size. Moderately increased wall thickness. There is moderate concentric hypertrophy. Septal motion is abnormal. Septal flattening in diastole and systole consistent with right ventricular volume and pressure overload. There is normal systolic function with a visually estimated ejection fraction of 60 - 65%. Grade II diastolic dysfunction.    Right Ventricle: Right ventricle was not well visualized due to poor acoustic window. Severe right ventricular enlargement. There is moderate hypertrophy. Right ventricle wall motion has global hypokinesis. Systolic function is moderately reduced.    Left Atrium: Left atrium is moderately dilated.    Right Atrium: Right atrium is severely dilated.    Tricuspid Valve: There is moderate to severe regurgitation.    Pulmonic Valve: There is mild to moderate regurgitation.    Pulmonary Artery: There is pulmonary hypertension. The estimated pulmonary artery systolic pressure is 90 mmHg.    IVC/SVC: Elevated venous pressure at 15 mmHg.    Current Heart Failure Medications  bumetanide injection 2 mg, 2 times daily, Intravenous  metoprolol succinate (TOPROL-XL) 24 hr tablet 100 mg, Daily, Oral    Plan  -Monitor strict I&Os and daily weights.    -Place on telemetry  -Low sodium diet  -Place on fluid restriction of 1.5 L.   -Cardiology has been  consulted  -The patient's volume status is  currently undergoing medical management  -Continue home medications  -Continue treatment of influenza   -f/u cardiology

## 2025-01-25 NOTE — ASSESSMENT & PLAN NOTE
Chronic.  Follows pulmonology outpatient in his currently on CPAP q.h.s..  Plan:  -titrate oxygen therapy as needed  -duo kaylie p.r.n.  -incentive spirometry  -continue CPAP q.h.s.  -continued treatment of CHF and influenza  -f/u outpatient with pulmonology as directed    Add albuterol kaylie

## 2025-01-26 VITALS
TEMPERATURE: 98 F | RESPIRATION RATE: 14 BRPM | SYSTOLIC BLOOD PRESSURE: 120 MMHG | HEART RATE: 74 BPM | HEIGHT: 76 IN | DIASTOLIC BLOOD PRESSURE: 73 MMHG | BODY MASS INDEX: 38.36 KG/M2 | WEIGHT: 315 LBS | OXYGEN SATURATION: 94 %

## 2025-01-26 LAB
ANION GAP SERPL CALC-SCNC: 13 MMOL/L (ref 8–16)
BUN SERPL-MCNC: 14 MG/DL (ref 6–20)
CALCIUM SERPL-MCNC: 8.4 MG/DL (ref 8.7–10.5)
CHLORIDE SERPL-SCNC: 102 MMOL/L (ref 95–110)
CO2 SERPL-SCNC: 26 MMOL/L (ref 23–29)
CREAT SERPL-MCNC: 0.9 MG/DL (ref 0.5–1.4)
EST. GFR  (NO RACE VARIABLE): >60 ML/MIN/1.73 M^2
GLUCOSE SERPL-MCNC: 83 MG/DL (ref 70–110)
OHS QRS DURATION: 94 MS
OHS QTC CALCULATION: 455 MS
POTASSIUM SERPL-SCNC: 3.5 MMOL/L (ref 3.5–5.1)
SODIUM SERPL-SCNC: 141 MMOL/L (ref 136–145)

## 2025-01-26 PROCEDURE — 80048 BASIC METABOLIC PNL TOTAL CA: CPT | Performed by: NURSE PRACTITIONER

## 2025-01-26 PROCEDURE — 27100171 HC OXYGEN HIGH FLOW UP TO 24 HOURS

## 2025-01-26 PROCEDURE — 94761 N-INVAS EAR/PLS OXIMETRY MLT: CPT

## 2025-01-26 PROCEDURE — 94799 UNLISTED PULMONARY SVC/PX: CPT

## 2025-01-26 PROCEDURE — 25000242 PHARM REV CODE 250 ALT 637 W/ HCPCS: Performed by: FAMILY MEDICINE

## 2025-01-26 PROCEDURE — 94660 CPAP INITIATION&MGMT: CPT

## 2025-01-26 PROCEDURE — 36415 COLL VENOUS BLD VENIPUNCTURE: CPT | Performed by: NURSE PRACTITIONER

## 2025-01-26 PROCEDURE — 25000003 PHARM REV CODE 250: Performed by: NURSE PRACTITIONER

## 2025-01-26 PROCEDURE — 94640 AIRWAY INHALATION TREATMENT: CPT

## 2025-01-26 PROCEDURE — 63600175 PHARM REV CODE 636 W HCPCS: Mod: JZ,TB | Performed by: NURSE PRACTITIONER

## 2025-01-26 PROCEDURE — 97162 PT EVAL MOD COMPLEX 30 MIN: CPT

## 2025-01-26 PROCEDURE — 25000003 PHARM REV CODE 250: Performed by: HOSPITALIST

## 2025-01-26 PROCEDURE — 25000003 PHARM REV CODE 250: Performed by: FAMILY MEDICINE

## 2025-01-26 PROCEDURE — 97116 GAIT TRAINING THERAPY: CPT

## 2025-01-26 PROCEDURE — 99900035 HC TECH TIME PER 15 MIN (STAT)

## 2025-01-26 RX ORDER — OSELTAMIVIR PHOSPHATE 75 MG/1
75 CAPSULE ORAL 2 TIMES DAILY
Qty: 6 CAPSULE | Refills: 0 | Status: SHIPPED | OUTPATIENT
Start: 2025-01-26 | End: 2025-01-29

## 2025-01-26 RX ADMIN — POTASSIUM CHLORIDE 40 MEQ: 1500 TABLET, EXTENDED RELEASE ORAL at 08:01

## 2025-01-26 RX ADMIN — PREGABALIN 200 MG: 100 CAPSULE ORAL at 08:01

## 2025-01-26 RX ADMIN — OSELTAMIVIR PHOSPHATE 75 MG: 75 CAPSULE ORAL at 08:01

## 2025-01-26 RX ADMIN — FLUTICASONE PROPIONATE 100 MCG: 50 SPRAY, METERED NASAL at 09:01

## 2025-01-26 RX ADMIN — BUMETANIDE 2 MG: 0.25 INJECTION INTRAMUSCULAR; INTRAVENOUS at 09:01

## 2025-01-26 RX ADMIN — LEVOTHYROXINE SODIUM 50 MCG: 50 TABLET ORAL at 05:01

## 2025-01-26 RX ADMIN — ALBUTEROL SULFATE 2.5 MG: 2.5 SOLUTION RESPIRATORY (INHALATION) at 07:01

## 2025-01-26 RX ADMIN — METOPROLOL SUCCINATE 100 MG: 50 TABLET, EXTENDED RELEASE ORAL at 08:01

## 2025-01-26 RX ADMIN — APIXABAN 5 MG: 2.5 TABLET, FILM COATED ORAL at 08:01

## 2025-01-26 NOTE — DISCHARGE SUMMARY
[facility] Medicine  Discharge Summary      Patient Name: Caio Ontiveros  MRN: [id number]  TRINIDAD: [id number]  Patient Class: IP- Inpatient  Admission Date: 1/24/2025  Hospital Length of Stay: 1 days  Discharge Date and Time:  01/26/2025 11:07 AM  Attending Physician: Yannick Ruiz MD   Discharging Provider: Yannick Ruiz MD  Primary Care Provider: Dominick Allen MD    Primary Care Team: Networked reference to record PCT     HPI:   Caio Ontiveros is a 59 y.o. male with a PMH  has a past medical history of A-fib, CHF (congestive heart failure), Gout, chronic, Hypertension, and Sleep apnea. who presented as a transfer from [facility] for higher level of care and continued treatment of influenza a and CHF exacerbation.  Patient presented to outside facility complaining of worsening head cold, flu-like symptoms, and dyspnea/shortness of breath over the past few days which acutely worsened overnight.  He reported exposure to recent ill contacts with known flu and did not seek medical attention until now.  He denied endorsing any lightheadedness, dizziness, headache, visual changes, fever, chills, sweats, nausea, vomiting, chest pain, abdominal pain, dysuria, hematuria, melena, hematochezia, diarrhea, or onset neurological deficits.  He reports compliance with home medications as well as dietary/fluid restrictions and denied receiving flu vaccine this year.  He reported no known alleviating or aggravating factors noted with only other associated symptoms including chronic bilateral lower extremity edema.  All other review of systems negative except as noted above.  Initial workup at outside facility revealed patient to be influenza A positive, afebrile without leukocytosis, BNP elevated at 893, troponin 0.044, lactic acid/procalcitonin within normal limits, UA negative for UTI, and chest x-ray negative for acute findings.  Patient admitted to Hospital Medicine under observation for continued medical management of  influenza a and CHF exacerbation.      PCP: Dominick Allen.      * No surgery found *      Hospital Course:   1/25 admitted for dyspnea 2/2 influenza. Associated with myalgias and congestion. Reports using albuterol inhalers. Adjust treatment regimen to include scheduled albuterol and flonase. Replace k. Ready to go home. Pmh chronic back pain.    1/26  No acute events overnight. Endorses improvement in symptoms of dyspnea. Denies fever. Complains of chronic back pain. Compliant with nippv.    Physical/occupational therapy consulted with no therapy indicated.    No acute distress. No respiratory distress. On room air. Decreased air movement. No wheezing on auscultation. Irregular rhythm. Morbidly obese. AO3. Lesions. Lower extremity edema     Patient seen and evaluated by me. Patient was determined to be suitable for discharge. Patient deemed stable for discharge to home with nurse practitioner to visit home program to monitor oxygen sats and follow up appointments with primary care provider, pulmonology and cardiology. Advised to continue inhaler bid x 2 days while on tamiflu treatment for flu.       Goals of Care Treatment Preferences:  Code Status: Full Code      SDOH Screening:  The patient was screened for utility difficulties, food insecurity, transport difficulties, housing insecurity, and interpersonal safety and there were no concerns identified this admission.     Consults:   Consults (From admission, onward)          Status Ordering Provider     Inpatient consult to Social Work/Case Management  Once        Provider:  (Not yet assigned)    Completed SAYRA HUNT            * Shortness of breath  On room air  Wheezing on auscultation  Flu positive  Albuterol and flonase      Hypothyroidism  Patient has chronic hypothyroidism. TFTs reviewed-   Lab Results   Component Value Date    TSH 3.581 06/11/2024   Plan:  -Will continue chronic levothyroxine and adjust for and clinical  changes      Thrombocytopenia  Chronic.  Stable.  The patients 3 most recent labs are listed below.  Recent Labs     01/24/25  1334   PLT 84*       Plan  -Will transfuse if platelet count is <10k.  -Continue to monitor      Acute on chronic diastolic congestive heart failure  Patient has Diastolic (HFpEF) heart failure that is Acute on chronic. On presentation their CHF was decompensated. Evidence of decompensated CHF on presentation includes: edema, crackles on lung auscultation, orthopnea, paroxysmal nocturnal dyspnea (PND), dyspnea on exertion (GAYTAN), and shortness of breath. The etiology of their decompensation is likely influenza A . Most recent BNP and echo results are listed below.  Recent Labs     01/24/25  1334   *       Latest ECHO  Results for orders placed during the hospital encounter of 04/22/24    Echo    Interpretation Summary    Left Ventricle: The left ventricle is normal in size. Moderately increased wall thickness. There is moderate concentric hypertrophy. Septal motion is abnormal. Septal flattening in diastole and systole consistent with right ventricular volume and pressure overload. There is normal systolic function with a visually estimated ejection fraction of 60 - 65%. Grade II diastolic dysfunction.    Right Ventricle: Right ventricle was not well visualized due to poor acoustic window. Severe right ventricular enlargement. There is moderate hypertrophy. Right ventricle wall motion has global hypokinesis. Systolic function is moderately reduced.    Left Atrium: Left atrium is moderately dilated.    Right Atrium: Right atrium is severely dilated.    Tricuspid Valve: There is moderate to severe regurgitation.    Pulmonic Valve: There is mild to moderate regurgitation.    Pulmonary Artery: There is pulmonary hypertension. The estimated pulmonary artery systolic pressure is 90 mmHg.    IVC/SVC: Elevated venous pressure at 15 mmHg.    Current Heart Failure Medications  bumetanide  "injection 2 mg, 2 times daily, Intravenous  metoprolol succinate (TOPROL-XL) 24 hr tablet 100 mg, Daily, Oral    Plan  -Monitor strict I&Os and daily weights.    -Place on telemetry  -Low sodium diet  -Place on fluid restriction of 1.5 L.   -Cardiology has been consulted  -The patient's volume status is  currently undergoing medical management  -Continue home medications  -Continue treatment of influenza   -f/u cardiology       Influenza A  Continue tamiflu      Chronic benign neutropenia  Chronic.  Near baseline.  Plan:  -continue to monitor  -f/u outpatient with provider as directed      Prediabetes  Patient's FSGs are controlled on current medication regimen.  Last A1c reviewed-   Lab Results   Component Value Date    HGBA1C 5.7 (H) 01/24/2025     Most recent fingerstick glucose reviewed- No results for input(s): "POCTGLUCOSE" in the last 24 hours.  Current correctional scale  Low  Titrate as needed  anti-hyperglycemic dose as follows-   Antihyperglycemics (From admission, onward)      None        Plan:  -SSI  -A1c  -Accu-checks  -Hold oral hypoglycemics while patient is in the hospital  -Continue home long-acting insulin at 20% decrease, titrate up as needed  -Hypoglycemic protocol        Restrictive lung disease  Chronic.  Follows pulmonology outpatient in his currently on CPAP q.h.s..  Plan:  -titrate oxygen therapy as needed  -duo nebs p.r.n.  -incentive spirometry  -continue CPAP q.h.s.  -continued treatment of CHF and influenza  -f/u outpatient with pulmonology as directed    Add albuterol kaylie      Moderate episode of recurrent major depressive disorder  Chronic. Stable. Not in acute exacerbation and currently denies endorsing any suicidal/homicidal ideations.   Plan:  -Continue home medications       Idiopathic chronic gout of multiple sites without tophus  Chronic.  Not in acute flare.  Plan:  -continue to monitor      Chronic atrial fibrillation  Patient has  chronic  atrial fibrillation. Patient is " currently in atrial fibrillation. QVCUA5CEEs Score: The patient doesn't have any registry metric data available. The patients heart rate in the last 24 hours is as follows:  Pulse  Min: 67  Max: 97     Antiarrhythmics  metoprolol succinate (TOPROL-XL) 24 hr tablet 100 mg, Daily, Oral    Anticoagulants  apixaban tablet 5 mg, 2 times daily, Oral    Plan  - Replete lytes with a goal of K>4, Mg >2  - Patient is anticoagulated, see medications listed above.  - Patient's afib is currently  rate controlled  -Continue home medications  -f/u cardiology       Pulmonary hypertension  Diurese       Essential hypertension  Chronic, controlled.  Latest blood pressure and vitals reviewed-   Temp:  [98.3 °F (36.8 °C)-99.1 °F (37.3 °C)]   Pulse:  [67-97]   Resp:  [15-42]   BP: (113-157)/(63-98)   SpO2:  [88 %-99 %] .   Home meds for hypertension were reviewed and noted below.   Hypertension Medications               bumetanide (BUMEX) 2 MG tablet Take 1 tablet (2 mg total) by mouth 2 (two) times a day.    metoprolol succinate (TOPROL-XL) 100 MG 24 hr tablet Take 1 tablet by mouth once daily     While in the hospital, will manage blood pressure as follows; Adjust home antihypertensive regimen as follows- on intravenous bumex    Will utilize p.r.n. blood pressure medication only if patient's blood pressure greater than  180/110 and he develops symptoms such as worsening chest pain or shortness of breath.      Morbid obesity with BMI of 50.0-59.9, adult  Body mass index is 51.74 kg/m². Morbid obesity complicates all aspects of disease management from diagnostic modalities to treatment. Weight loss encouraged and health benefits explained to patient.       EVANGELIST (obstructive sleep apnea)/ Obesity Hypoventilation syndrome   Currently on CPAP outpatient.  Plan:  -continue CPAP q.h.s.         Final Active Diagnoses:    Diagnosis Date Noted POA    PRINCIPAL PROBLEM:  Shortness of breath [R06.02] 01/25/2025 Yes    Influenza A [J10.1]  01/25/2025 Yes    Acute on chronic diastolic congestive heart failure [I50.33] 01/25/2025 Yes    Thrombocytopenia [D69.6] 01/25/2025 Yes     Chronic    Hypothyroidism [E03.9] 01/25/2025 Yes     Chronic    Chronic benign neutropenia [D70.8] 05/17/2023 Yes     Chronic    Prediabetes [R73.03] 05/16/2022 Yes     Chronic    Restrictive lung disease [J98.4] 11/22/2021 Yes     Chronic    Moderate episode of recurrent major depressive disorder [F33.1] 03/10/2021 Yes     Chronic    Idiopathic chronic gout of multiple sites without tophus [M1A.09X0] 10/24/2017 Yes     Chronic    Chronic atrial fibrillation [I48.20] 12/20/2012 Yes     Chronic    Essential hypertension [I10] 12/20/2012 Yes     Chronic    Morbid obesity with BMI of 50.0-59.9, adult [E66.01, Z68.43] 12/20/2012 Not Applicable     Chronic    EVANGELIST (obstructive sleep apnea)/ Obesity Hypoventilation syndrome  [G47.33] 12/20/2012 Yes     Chronic    Pulmonary hypertension [I27.20] 12/20/2012 Yes     Chronic      Problems Resolved During this Admission:       Discharged Condition: stable    Disposition:     Follow Up:   Follow-up Information       Dominick Allen MD. Schedule an appointment as soon as possible for a visit in 3 day(s).    Specialty: Family Medicine  Why: hospital follow up  Contact information:  [address]  [place] [address] [phone]               Ricky Jeffrey MD. Schedule an appointment as soon as possible for a visit in 1 week(s).    Specialties: Cardiology, Cardiovascular Disease  Why: hospital follow up  Contact information:  [address]  [place] [address] [phone]               Erick Bateman MD. Schedule an appointment as soon as possible for a visit in 1 week(s).    Specialty: Pulmonary Disease  Why: hospital follow up  Contact information:  [id number] [place] [address] [address] Patrice [address] [address] [phone]                           Patient Instructions:      Activity as tolerated       Significant Diagnostic  Studies: Labs: CMP   Recent Labs   Lab 01/24/25  1334 01/25/25  0522 01/26/25  0552    143 141   K 3.6 3.2* 3.5    103 102   CO2 26 27 26    77 83   BUN 14 14 14   CREATININE 1.2 1.0 0.9   CALCIUM 8.2* 8.3* 8.4*   PROT 7.3  --   --    ALBUMIN 3.5  --   --    BILITOT 1.1*  --   --    ALKPHOS 58  --   --    AST 22  --   --    ALT 12  --   --    ANIONGAP 12 13 13   , CBC   Recent Labs   Lab 01/24/25  1334   WBC 2.57*   HGB 13.8*   HCT 44.0   PLT 84*   , Troponin   Recent Labs   Lab 01/24/25  1334   TROPONINI 0.044*   , A1C:   Recent Labs   Lab 01/24/25 2037   HGBA1C 5.7*   , and All labs within the past 24 hours have been reviewed  Microbiology: Blood Culture   Lab Results   Component Value Date    LABBLOO No Growth to date 01/24/2025    LABBLOO No Growth to date 01/24/2025    LABBLOO No Growth to date 01/24/2025    LABBLOO No Growth to date 01/24/2025     Radiology: X-Ray: CXR: portable with no acute disease seen    Pending Diagnostic Studies:       None           Medications:  Reconciled Home Medications:      Medication List        START taking these medications      oseltamivir 75 MG capsule  Commonly known as: TAMIFLU  Take 1 capsule (75 mg total) by mouth 2 (two) times daily. for 3 days            CHANGE how you take these medications      venlafaxine 150 MG Cp24  Commonly known as: EFFEXOR-XR  TAKE 1 CAPSULE BY MOUTH ONCE  DAILY  What changed: Another medication with the same name was removed. Continue taking this medication, and follow the directions you see here.            CONTINUE taking these medications      bumetanide 2 MG tablet  Commonly known as: BUMEX  Take 1 tablet (2 mg total) by mouth 2 (two) times a day.     calcipotriene 0.005 % cream  Commonly known as: DOVONOX  Apply topically 2 (two) times daily.     colchicine 0.6 mg tablet  Commonly known as: COLCRYS  Take 1 tablet (0.6 mg total) by mouth once daily.     ELIQUIS 5 mg Tab  Generic drug: apixaban  Take 1 tablet by mouth  twice daily     levothyroxine 50 MCG tablet  Commonly known as: SYNTHROID  Take 1 tablet (50 mcg total) by mouth before breakfast.     methocarbamoL 750 MG Tab  Commonly known as: ROBAXIN  Take 1 tablet by mouth three times daily as needed for muscle spasm     methylPREDNISolone 4 mg tablet  Commonly known as: MEDROL DOSEPACK  use as directed     metoprolol succinate 100 MG 24 hr tablet  Commonly known as: TOPROL-XL  Take 1 tablet by mouth once daily     nortriptyline 25 MG capsule  Commonly known as: PAMELOR  Take 1 capsule (25 mg total) by mouth every evening.     potassium chloride SA 20 MEQ tablet  Commonly known as: K-DUR,KLOR-CON  TAKE 1 TABLET BY MOUTH TWICE  DAILY     pregabalin 200 MG Cap  Commonly known as: LYRICA  Take 1 capsule by mouth twice daily     TALTZ AUTOINJECTOR 80 mg/mL Atin  Generic drug: ixekizumab  Inject 80 mg into the skin every 28 days.              Indwelling Lines/Drains at time of discharge:   Lines/Drains/Airways       None                   Time spent on the discharge of patient: 35 minutes         Yannick Ruiz MD  Department of Hospital Medicine  '[place] [facility]

## 2025-01-26 NOTE — DISCHARGE INSTRUCTIONS
You have been started on new medication(s) from this hospitalization. Please take as directed and schedule hospital follow up appointments with your primary providers.

## 2025-01-26 NOTE — PT/OT/SLP EVAL
"Physical Therapy Evaluation and Discharge Note    Patient Name:  Caio Ontiveros   MRN:  [id number]    Recommendations:     Discharge Recommendations: No Therapy Indicated  Discharge Equipment Recommendations: None  Barriers to discharge: None    Assessment:     Caio Ontiveros is a 59 y.o. male admitted with a medical diagnosis of Shortness of breath. .  At this time, patient is functioning at their prior level of function and does not require further acute PT services.     Recent Surgery: * No surgery found *      Plan:     During this hospitalization, patient does not require further acute PT services.  Please re-consult if situation changes.      Subjective     Chief Complaint: "My back hurts from being in this bed."  Patient/Family Comments/goals: Get better and return home.   Pain/Comfort:  Pain Rating 1: 8/10  Location - Side 1: Bilateral  Location - Orientation 1: lower  Location 1: back  Pain Addressed 1: Cessation of Activity, Distraction, Reposition  Pain Rating Post-Intervention 1: 8/10    Patients cultural, spiritual, Faith conflicts given the current situation: no    Living Environment:    Patient lives with his spouse in a single level home with no steps to enter.   Prior to admission, patients level of function was disabled, driving, ambulating with no AD, independent with ADLs.  Equipment used at home: CPAP, oxygen.  DME owned (not currently used): none.  Upon discharge, patient will have assistance from spouse.    Objective:     Communicated with KIKI Lynn prior to session.  Patient found supine with peripheral IV upon PT entry to room.    General Precautions: Standard, airborne, contact, droplet, respiratory    Orthopedic Precautions:N/A   Braces: N/A  Respiratory Status: Room air    Exams:  Cognitive Exam:  Patient is oriented to Person, Place, Time, and Situation  RLE ROM: WFL  RLE Strength: WFL  LLE ROM: WFL  LLE Strength: WFL    Functional Mobility:  Bed Mobility:     Rolling Right: stand by " assistance  Supine to Sit: minimum assistance  Sit to Supine: minimum assistance  Transfers:     Sit to Stand:  stand by assistance with no AD  Gait: 10 feet x2, SBA with no AD    AM-PAC 6 CLICK MOBILITY  Total Score:20       Treatment and Education:    Patient found supine in bed upon PT arrival to room. PT provided education on role of PT and POC.    Patient completed:     Bed mobility: Rolling right with SBA with utilization of bed rail for support. Sup>sit: Min A due to air mattress making it difficult to get to a seated position. Sup>sit: Min A with BLE support due to air mattress and height of bed.     STS: SBA with no AD. Patient reported slight dizziness with initially standing but cleared quickly.    Gait: SBA 10 x2 with no AD. Patient ambulated with slow cheng but no LOB        AM-PAC 6 CLICK MOBILITY  Total Score:20     Patient left supine with all lines intact and spouse present.    GOALS:   Multidisciplinary Problems       Physical Therapy Goals          Problem: Physical Therapy    Goal Priority Disciplines Outcome Interventions   Physical Therapy Goal     PT, PT/OT                         DME Justifications:  No DME recommended requiring DME justifications    History:     Past Medical History:   Diagnosis Date    A-fib     CHF (congestive heart failure)     Gout, chronic     Hypertension     Sleep apnea        Past Surgical History:   Procedure Laterality Date    CHOLECYSTECTOMY      GASTRIC BYPASS  2014    INJECTION OF JOINT Left 8/23/2023    Procedure: Left IA Hip Joint injection;  Surgeon: Peña Rausch MD;  Location: [facility];  Service: Pain Management;  Laterality: Left;       Time Tracking:     PT Received On: 01/26/25  PT Start Time: 0725     PT Stop Time: 0748  PT Total Time (min): 23 min     Billable Minutes: Evaluation 12 and Gait Training 11      01/26/2025

## 2025-01-26 NOTE — PLAN OF CARE
Problem: Adult Inpatient Plan of Care  Goal: Plan of Care Review  Outcome: Progressing  Goal: Patient-Specific Goal (Individualized)  Outcome: Progressing  Goal: Absence of Hospital-Acquired Illness or Injury  Outcome: Progressing  Goal: Optimal Comfort and Wellbeing  Outcome: Progressing  Goal: Readiness for Transition of Care  Outcome: Progressing     Problem: Bariatric Environmental Safety  Goal: Safety Maintained with Care  Outcome: Progressing     Problem: Fall Injury Risk  Goal: Absence of Fall and Fall-Related Injury  Outcome: Progressing     Problem: Skin Injury Risk Increased  Goal: Skin Health and Integrity  Outcome: Progressing     Problem: Infection  Goal: Absence of Infection Signs and Symptoms  Outcome: Progressing

## 2025-01-26 NOTE — PLAN OF CARE
[facility] - Med Surg  Discharge Final Note    Primary Care Provider: Dominick Allen MD    Expected Discharge Date: 1/26/2025    Final Discharge Note (most recent)       Final Note - 01/26/25 1142          Final Note    Assessment Type Final Discharge Note     Anticipated Discharge Disposition Home or Self Care        Post-Acute Status    Discharge Delays None known at this time                     Important Message from Medicare             Contact Info       Dominick Allen MD   Specialty: Family Medicine   Relationship: PCP - General    [address] [facility] [address] [facility] [facility] [place] [address]   Phone: [phone]       Next Steps: Schedule an appointment as soon as possible for a visit in 3 day(s)    Instructions: hospital follow up    Ricky Jeffrey MD   Specialty: Cardiology, Cardiovascular Disease   Relationship: Consulting Physician    [address]  [place] [address]   Phone: [phone]       Next Steps: Schedule an appointment as soon as possible for a visit in 1 week(s)    Instructions: hospital follow up    Erick Bateman MD   Specialty: Pulmonary Disease   Relationship: Consulting Physician    [address] [place] [address] [address] [address]   Phone: [phone]       Next Steps: Schedule an appointment as soon as possible for a visit in 1 week(s)    Instructions: hospital follow up          Discharge home, no home health or dme orders noted.

## 2025-01-26 NOTE — PLAN OF CARE
Initial PT eval completed. Patient is at prior level of function and will not require skilled therapy services at this time. Recommend discharge home.

## 2025-01-26 NOTE — PT/OT/SLP PROGRESS
Occupational Therapy      Patient Name:  Caio Ontiveros   MRN:  [id number]    Eval attempted via chart review. Patient not seen today secondary to pt is discharged from facility.    1/26/2025  Rhiannon Perez OT

## 2025-01-28 ENCOUNTER — PATIENT OUTREACH (OUTPATIENT)
Dept: ADMINISTRATIVE | Facility: CLINIC | Age: 60
End: 2025-01-28
Payer: MEDICARE

## 2025-01-28 NOTE — PROGRESS NOTES
C3 nurse spoke with Caio Ontiveros for a TCC post hospital discharge follow up call. Scheduled hospital follow up with Mary Jay NP on 02/06/25 @ 0800. Pt informed that this is a placeholder time and that he would be contacted a day or so prior with an appt time.

## 2025-01-29 ENCOUNTER — PATIENT MESSAGE (OUTPATIENT)
Dept: PAIN MEDICINE | Facility: CLINIC | Age: 60
End: 2025-01-29
Payer: MEDICARE

## 2025-01-29 RX ORDER — POTASSIUM CHLORIDE 20 MEQ/1
20 TABLET, EXTENDED RELEASE ORAL 2 TIMES DAILY
Qty: 180 TABLET | Refills: 3 | Status: SHIPPED | OUTPATIENT
Start: 2025-01-29

## 2025-01-30 ENCOUNTER — HOSPITAL ENCOUNTER (OUTPATIENT)
Dept: CARDIOLOGY | Facility: HOSPITAL | Age: 60
Discharge: HOME | End: 2025-01-30
Attending: INTERNAL MEDICINE
Payer: MEDICARE

## 2025-01-30 VITALS
DIASTOLIC BLOOD PRESSURE: 75 MMHG | BODY MASS INDEX: 38.36 KG/M2 | DIASTOLIC BLOOD PRESSURE: 75 MMHG | WEIGHT: 315 LBS | HEIGHT: 76 IN | SYSTOLIC BLOOD PRESSURE: 127 MMHG | WEIGHT: 315 LBS | HEIGHT: 76 IN | BODY MASS INDEX: 38.36 KG/M2 | SYSTOLIC BLOOD PRESSURE: 127 MMHG

## 2025-01-30 DIAGNOSIS — I87.2 VENOUS INSUFFICIENCY: ICD-10-CM

## 2025-01-30 LAB
BACTERIA BLD CULT: NORMAL
BACTERIA BLD CULT: NORMAL
LEFT ABI: 1.18
LEFT ARM BP: 127 MMHG
LEFT DORSALIS PEDIS: 134 MMHG
LEFT POSTERIOR TIBIAL: 150 MMHG
LEFT TBI: 1.06
LEFT TOE PRESSURE: 135 MMHG
RIGHT ABI: 1.28
RIGHT ARM BP: 114 MMHG
RIGHT DORSALIS PEDIS: 156 MMHG
RIGHT POSTERIOR TIBIAL: 162 MMHG
RIGHT TBI: 1.17
RIGHT TOE PRESSURE: 148 MMHG

## 2025-01-30 PROCEDURE — 93922 UPR/L XTREMITY ART 2 LEVELS: CPT | Mod: 26,,, | Performed by: INTERNAL MEDICINE

## 2025-01-30 PROCEDURE — 93970 EXTREMITY STUDY: CPT | Mod: 26,,, | Performed by: INTERNAL MEDICINE

## 2025-01-30 PROCEDURE — 93970 EXTREMITY STUDY: CPT | Mod: PO

## 2025-01-30 PROCEDURE — 93922 UPR/L XTREMITY ART 2 LEVELS: CPT | Mod: PO

## 2025-01-31 ENCOUNTER — TELEPHONE (OUTPATIENT)
Dept: CARDIOLOGY | Facility: CLINIC | Age: 60
End: 2025-01-31
Payer: MEDICARE

## 2025-01-31 ENCOUNTER — TELEPHONE (OUTPATIENT)
Dept: PHARMACY | Facility: CLINIC | Age: 60
End: 2025-01-31
Payer: MEDICARE

## 2025-01-31 NOTE — TELEPHONE ENCOUNTER
Contacted patient regarding results patient stated understanding had no further questions or concerns.  ----- Message from Ricky Jeffrey MD sent at 1/31/2025  4:02 PM CST -----  ABIs normal, suggesting no significant leg arterial blockage

## 2025-01-31 NOTE — TELEPHONE ENCOUNTER
We have reached out to Mr. Ontiveros via portal message  and voicemail  to inform him of the [employer]  application process for Eliquis. A follow-up phone call will be made in 5 business days.    Juli King  Pharmacy Patient Assistance Team

## 2025-01-31 NOTE — TELEPHONE ENCOUNTER
-Contacted patient regarding results patient stated understanding had no further questions or concerns.  ---- Message from Ricky Jeffrey MD sent at 1/31/2025  4:01 PM CST -----  The leg venous US showed no DVT

## 2025-02-04 ENCOUNTER — TELEPHONE (OUTPATIENT)
Dept: PAIN MEDICINE | Facility: CLINIC | Age: 60
End: 2025-02-04
Payer: MEDICARE

## 2025-02-04 NOTE — TELEPHONE ENCOUNTER
Called patient in regards to seeing if he got his MRI's completed for his appt tomorrow but patient didn't answer the phone LVM to call back at earliest convenience.    Isabel SOLIZ

## 2025-02-06 ENCOUNTER — OFFICE VISIT (OUTPATIENT)
Dept: HOME HEALTH SERVICES | Facility: CLINIC | Age: 60
End: 2025-02-06
Payer: MEDICARE

## 2025-02-06 VITALS
TEMPERATURE: 98 F | SYSTOLIC BLOOD PRESSURE: 102 MMHG | OXYGEN SATURATION: 95 % | HEART RATE: 58 BPM | RESPIRATION RATE: 18 BRPM | DIASTOLIC BLOOD PRESSURE: 70 MMHG

## 2025-02-06 DIAGNOSIS — J10.1 INFLUENZA A: Primary | ICD-10-CM

## 2025-02-06 DIAGNOSIS — I50.43 ACUTE ON CHRONIC COMBINED SYSTOLIC AND DIASTOLIC CHF (CONGESTIVE HEART FAILURE): ICD-10-CM

## 2025-02-06 PROCEDURE — 3078F DIAST BP <80 MM HG: CPT | Mod: CPTII,S$GLB,, | Performed by: NURSE PRACTITIONER

## 2025-02-06 PROCEDURE — 99495 TRANSJ CARE MGMT MOD F2F 14D: CPT | Mod: S$GLB,,, | Performed by: NURSE PRACTITIONER

## 2025-02-06 PROCEDURE — 3074F SYST BP LT 130 MM HG: CPT | Mod: CPTII,S$GLB,, | Performed by: NURSE PRACTITIONER

## 2025-02-06 PROCEDURE — 1159F MED LIST DOCD IN RCRD: CPT | Mod: CPTII,S$GLB,, | Performed by: NURSE PRACTITIONER

## 2025-02-06 PROCEDURE — 1160F RVW MEDS BY RX/DR IN RCRD: CPT | Mod: CPTII,S$GLB,, | Performed by: NURSE PRACTITIONER

## 2025-02-06 PROCEDURE — 1111F DSCHRG MED/CURRENT MED MERGE: CPT | Mod: CPTII,S$GLB,, | Performed by: NURSE PRACTITIONER

## 2025-02-06 PROCEDURE — 3044F HG A1C LEVEL LT 7.0%: CPT | Mod: CPTII,S$GLB,, | Performed by: NURSE PRACTITIONER

## 2025-02-07 NOTE — ASSESSMENT & PLAN NOTE
"Patient completed all medications   States that he is "much better"  Return to PCP or ER for any worsening  "

## 2025-02-07 NOTE — PROGRESS NOTES
"[facility]  Transitional Care Management (TCM) Home Visit    Encounter Provider: Mary Bowman   PCP: Dominick Allen MD  Consult Requested By: No ref. provider found  Admit Date: 1/24/25   IP Discharge Date: 1/26/25  Hospital Length of Stay:RRHLOS@ days  Days since discharge (from IP or SNF): 11 days    [facility] Contact Note: 01/28/2025  Hospital Diagnosis: No admission diagnoses are documented for this encounter.     HISTORY OF PRESENT ILLNESS      Patient ID: Caio Ontiveros is a 59 y.o. male was recently admitted to the hospital, this is their TCM encounter.    Hospital Course Synopsis:    1) 59 y.o. male with a PMH has a past medical history of A-fib, CHF (congestive heart failure), Gout, chronic, Hypertension, and Sleep apnea. who presented as a transfer from [facility] for higher level of care and continued treatment of influenza a and CHF exacerbation. Patient presented to outside facility complaining of worsening head cold, flu-like symptoms, and dyspnea/shortness of breath over the past few days which acutely worsened overnight. He reported exposure to recent ill contacts with known flu and did not seek medical attention until now. Patient tested positive for flu  2) Developments since hospitalization and current needs  3) Please confirm dates of admit, discharge, LOS above are correct: verified with the patient and MR    DECISION MAKING TODAY       Assessment & Plan:  1. Influenza A  Assessment & Plan:  Patient completed all medications   States that he is "much better"  Return to PCP or ER for any worsening      2. Acute on chronic combined systolic and diastolic CHF (congestive heart failure)  Assessment & Plan:  Continue all medications as prescribed   Follow up with cardiology as instructed            Medication List on Discharge:     Medication List            Accurate as of February 6, 2025  6:54 PM. If you have any questions, ask your nurse or doctor.                CONTINUE taking " these medications      bumetanide 2 MG tablet  Commonly known as: BUMEX  Take 1 tablet (2 mg total) by mouth 2 (two) times a day.     calcipotriene 0.005 % cream  Commonly known as: DOVONOX  Apply topically 2 (two) times daily.     colchicine 0.6 mg tablet  Commonly known as: COLCRYS  Take 1 tablet (0.6 mg total) by mouth once daily.     ELIQUIS 5 mg Tab  Generic drug: apixaban  Take 1 tablet by mouth twice daily     levothyroxine 50 MCG tablet  Commonly known as: SYNTHROID  Take 1 tablet (50 mcg total) by mouth before breakfast.     methocarbamoL 750 MG Tab  Commonly known as: ROBAXIN  Take 1 tablet by mouth three times daily as needed for muscle spasm     metoprolol succinate 100 MG 24 hr tablet  Commonly known as: TOPROL-XL  Take 1 tablet by mouth once daily     nortriptyline 25 MG capsule  Commonly known as: PAMELOR  Take 1 capsule (25 mg total) by mouth every evening.     potassium chloride SA 20 MEQ tablet  Commonly known as: K-DUR,KLOR-CON  Take 1 tablet (20 mEq total) by mouth 2 (two) times daily.     pregabalin 200 MG Cap  Commonly known as: LYRICA  Take 1 capsule by mouth twice daily     TALTZ AUTOINJECTOR 80 mg/mL Atin  Generic drug: ixekizumab  Inject 80 mg into the skin every 28 days.     venlafaxine 150 MG Cp24  Commonly known as: EFFEXOR-XR  TAKE 1 CAPSULE BY MOUTH ONCE  DAILY              Medication Reconciliation:  Were medications changed on discharge? Yes  Were medications in the home? Yes  Is the patient taking the medications as directed? Yes  Does the patient understand the medications and changes? Yes  Does updated med list accurately reflects meds patient is currently taking? Yes    ENVIRONMENT OF CARE      Family and/or Caregiver present at visit?  Yes  Name of Caregiver: Rc  History provided by: patient    Advance Care Planning   Advanced Care Planning Status:  Patient has had an ACP conversation  Living Will: No  Power of : No  [place]: No    Does Caregiver have HCPoA:  No  Changes today: None  Is patient hospice appropriate: No  (If needed, use PPS <30 or FAST score >7)  Was referral to hospice placed: No       Impression upon entering the home:  Physical Dwelling: apartment/condo   Appearance of home environment: cleaniness: clean, walking pathways: clear, lighting: adequate, and home structure: sound structure  Functional Status: minimal assistance  Mobility: ambulatory  Nutritional access: adequate intake and access  Home Health: No, and does not need it at this time   DME/Supplies: none     Diagnostic tests reviewed/disposition: No diagnosic tests pending after this hospitalization.  Disease/illness education:  flu  Establishment or re-establishment of referral orders for community resources: No other necessary community resources.   Discussion with other health care providers: No discussion with other health care providers necessary.   Does patient have a PCP at [facility]? Yes   Repatriation plan with PCP? follow-up with PCP within 30d   Does patient have an ostomy (ileostomy, colostomy, suprapubic catheter, nephrostomy tube, tracheostomy, PEG tube, pleurex catheter, cholecystostomy, etc)? No  Were BPAs reviewed? Yes    Social History     Socioeconomic History    Marital status:     Number of children: 1   Tobacco Use    Smoking status: Never    Smokeless tobacco: Never   Substance and Sexual Activity    Alcohol use: Not Currently    Drug use: No    Sexual activity: Not Currently     Social Drivers of Health     Financial Resource Strain: Low Risk  (1/25/2025)    Overall Financial Resource Strain (CARDIA)     Difficulty of Paying Living Expenses: Not hard at all   Food Insecurity: No Food Insecurity (1/25/2025)    Hunger Vital Sign     Worried About Running Out of Food in the Last Year: Never true     Ran Out of Food in the Last Year: Never true   Transportation Needs: No Transportation Needs (1/25/2025)    TRANSPORTATION NEEDS     Transportation : No   Physical Activity:  Inactive (1/7/2025)    Exercise Vital Sign     Days of Exercise per Week: 0 days     Minutes of Exercise per Session: 0 min   Stress: No Stress Concern Present (1/25/2025)    [employer] - Occupational Stress Questionnaire     Feeling of Stress : Not at all   Recent Concern: Stress - Stress Concern Present (1/7/2025)    [employer] - Occupational Stress Questionnaire     Feeling of Stress : To some extent   Housing Stability: Low Risk  (1/25/2025)    Housing Stability Vital Sign     Unable to Pay for Housing in the Last Year: No     Homeless in the Last Year: No       OBJECTIVE:     Vital Signs:  Vitals:    02/06/25 1208   BP: 102/70   Pulse: (!) 58   Resp: 18   Temp: 97.9 °F (36.6 °C)       Review of Systems   Constitutional: Negative.    HENT: Negative.     Eyes: Negative.    Respiratory: Negative.     Cardiovascular: Negative.    Gastrointestinal: Negative.    Endocrine: Negative.    Genitourinary: Negative.    Musculoskeletal:  Positive for arthralgias.   Skin: Negative.    Allergic/Immunologic: Negative.    Neurological: Negative.    Hematological: Negative.    Psychiatric/Behavioral: Negative.         Physical Exam:  Physical Exam  Vitals reviewed.   Constitutional:       General: He is not in acute distress.     Appearance: He is obese. He is not ill-appearing.   HENT:      Head: Normocephalic and atraumatic.      Nose: Nose normal.      Mouth/Throat:      Mouth: Mucous membranes are moist.      Pharynx: Oropharynx is clear.   Eyes:      Pupils: Pupils are equal, round, and reactive to light.   Cardiovascular:      Rate and Rhythm: Normal rate and regular rhythm.      Heart sounds: Normal heart sounds.   Pulmonary:      Breath sounds: Examination of the right-lower field reveals decreased breath sounds. Examination of the left-lower field reveals decreased breath sounds. Decreased breath sounds present.   Abdominal:      General: Abdomen is  protuberant.   Musculoskeletal:      Cervical back: Neck supple.      Right lower leg: No edema.      Left lower leg: No edema.   Skin:     General: Skin is warm and dry.   Neurological:      Mental Status: He is alert and oriented to person, place, and time.   Psychiatric:         Mood and Affect: Mood normal.         Behavior: Behavior normal.         Thought Content: Thought content normal.         Judgment: Judgment normal.         INSTRUCTIONS FOR PATIENT:     Scheduled Follow-up, Appts Reviewed with Modifications if Needed: Yes  Future Appointments   Date Time Provider Department Center   2/18/2025  2:30 PM PULMONARY LAB 2, [facility] [place]   2/18/2025  3:15 PM PULMONARY LAB 2, [facility] [place]   2/18/2025  3:45 PM PULMONARY LAB 2, [facility] [place]   2/18/2025  4:00 PM Erick Bateman MD [facility] [place]   2/24/2025  3:00 PM [facility]   2/26/2025  3:20 PM Cliff Medley PA-C [facility]   2/27/2025 10:30 AM BARIATRICS FIN COORD, [facility] GENS[facility] [place]   4/25/2025 11:00 AM [facility] [place]   5/2/2025  7:30 AM Constantine Raphael MD [facility] R[facility]       Encounter for Medical Follow-Up and Medication Review  - [facility] to schedule follow-up visit with patient in 4 weeks or PRN     Patient Instructions Given:  - Continue all medications, treatments and therapies as ordered.   - Follow all instructions, recommendations as discussed.  - Maintain Safety Precautions at all times.  - Attend all medical appointments as scheduled.  - For worsening symptoms: call Primary Care Physician or Nurse Practitioner.  - For emergencies, call 911 or immediately report to the nearest emergency room        Signature: Mary Bowman NP    Transition of Care Visit:  I have reviewed and updated the history and problem list.  I have reconciled the medication list.  I have discussed the hospitalization and  current medical issues, prognosis and plans with the patient/family.

## 2025-02-17 ENCOUNTER — PATIENT MESSAGE (OUTPATIENT)
Dept: PULMONOLOGY | Facility: CLINIC | Age: 60
End: 2025-02-17
Payer: MEDICARE

## 2025-02-18 ENCOUNTER — CLINICAL SUPPORT (OUTPATIENT)
Dept: PULMONOLOGY | Facility: CLINIC | Age: 60
End: 2025-02-18
Payer: MEDICARE

## 2025-02-18 ENCOUNTER — OFFICE VISIT (OUTPATIENT)
Dept: PULMONOLOGY | Facility: CLINIC | Age: 60
End: 2025-02-18
Payer: MEDICARE

## 2025-02-18 VITALS
DIASTOLIC BLOOD PRESSURE: 82 MMHG | SYSTOLIC BLOOD PRESSURE: 137 MMHG | WEIGHT: 315 LBS | WEIGHT: 315 LBS | RESPIRATION RATE: 17 BRPM | HEIGHT: 75 IN | OXYGEN SATURATION: 96 % | HEART RATE: 80 BPM | BODY MASS INDEX: 39.17 KG/M2 | BODY MASS INDEX: 39.17 KG/M2 | HEIGHT: 75 IN

## 2025-02-18 DIAGNOSIS — E66.2 OBESITY HYPOVENTILATION SYNDROME: ICD-10-CM

## 2025-02-18 DIAGNOSIS — J98.4 RESTRICTIVE LUNG DISEASE: ICD-10-CM

## 2025-02-18 DIAGNOSIS — Z99.11 DEPENDENCE ON HOME VENTILATOR: ICD-10-CM

## 2025-02-18 DIAGNOSIS — G47.33 OSA (OBSTRUCTIVE SLEEP APNEA): ICD-10-CM

## 2025-02-18 DIAGNOSIS — I50.42 CHRONIC COMBINED SYSTOLIC AND DIASTOLIC HRT FAIL: ICD-10-CM

## 2025-02-18 DIAGNOSIS — G47.33 SEVERE OBSTRUCTIVE SLEEP APNEA: Primary | ICD-10-CM

## 2025-02-18 LAB
ALLENS TEST: ABNORMAL
BRPFT: NORMAL
DELSYS: ABNORMAL
DLCO ADJ PRE: 19.16 ML/(MIN*MMHG)
DLCO SINGLE BREATH LLN: 26.61
DLCO SINGLE BREATH PRE REF: 55.8 %
DLCO SINGLE BREATH REF: 33.53
DLCOC SBVA LLN: 3.07
DLCOC SBVA PRE REF: 131.3 %
DLCOC SBVA REF: 4.1
DLCOC SINGLE BREATH LLN: 26.61
DLCOC SINGLE BREATH PRE REF: 57.1 %
DLCOC SINGLE BREATH REF: 33.53
DLCOVA LLN: 3.07
DLCOVA PRE REF: 128.2 %
DLCOVA PRE: 5.25 ML/(MIN*MMHG*L)
DLCOVA REF: 4.1
DLVAADJ PRE: 5.38 ML/(MIN*MMHG*L)
ERVN2 LLN: -16448.67
ERVN2 PRE REF: 15.3 %
ERVN2 PRE: 0.2 L
ERVN2 REF: 1.33
FEF 25 75 CHG: 0.8 %
FEF 25 75 LLN: 2.11
FEF 25 75 POST REF: 64.4 %
FEF 25 75 PRE REF: 63.9 %
FEF 25 75 REF: 3.83
FET100 CHG: -0.3 %
FEV1 CHG: -5.8 %
FEV1 FVC CHG: -3.6 %
FEV1 FVC LLN: 66
FEV1 FVC POST REF: 110.4 %
FEV1 FVC PRE REF: 114.5 %
FEV1 FVC REF: 77
FEV1 LLN: 2.59
FEV1 POST REF: 49.1 %
FEV1 PRE REF: 52.1 %
FEV1 REF: 3.58
FIO2: 21
FRCN2 LLN: 2.93
FRCN2 PRE REF: 41.1 %
FRCN2 REF: 3.92
FVC CHG: -2.3 %
FVC LLN: 3.45
FVC POST REF: 44.3 %
FVC PRE REF: 45.4 %
FVC REF: 4.65
HCO3 UR-SCNC: 28.7 MMOL/L (ref 24–28)
IVC PRE: 1.93 L
IVC SINGLE BREATH LLN: 3.45
IVC SINGLE BREATH PRE REF: 41.5 %
IVC SINGLE BREATH REF: 4.65
MODE: ABNORMAL
MVV LLN: 135
MVV PRE REF: 49 %
MVV REF: 159
PCO2 BLDA: 41.9 MMHG (ref 35–45)
PEF CHG: 3.8 %
PEF LLN: 6.69
PEF POST REF: 82 %
PEF PRE REF: 79 %
PEF REF: 9.75
PH SMN: 7.44 [PH] (ref 7.35–7.45)
PO2 BLDA: 69 MMHG (ref 80–100)
POC BE: 5 MMOL/L
POC SATURATED O2: 94 % (ref 95–100)
POST FEF 25 75: 2.46 L/S
POST FET 100: 2.6 SEC
POST FEV1 FVC: 85.15 %
POST FEV1: 1.75 L
POST FVC: 2.06 L
POST PEF: 7.99 L/S
PRE DLCO: 18.71 ML/(MIN*MMHG)
PRE FEF 25 75: 2.45 L/S
PRE FET 100: 2.61 SEC
PRE FEV1 FVC: 88.34 %
PRE FEV1: 1.86 L
PRE FRC N2: 1.61 L
PRE FVC: 2.11 L
PRE MVV: 78 L/MIN
PRE PEF: 7.7 L/S
RVN2 LLN: 1.92
RVN2 PRE REF: 57.5 %
RVN2 PRE: 1.49 L
RVN2 REF: 2.59
RVN2TLCN2 LLN: 28.38
RVN2TLCN2 PRE REF: 101.5 %
RVN2TLCN2 PRE: 37.91 %
RVN2TLCN2 REF: 37.36
SAMPLE: ABNORMAL
SITE: ABNORMAL
TLCN2 LLN: 7.03
TLCN2 PRE REF: 48 %
TLCN2 PRE: 3.93 L
TLCN2 REF: 8.18
VA PRE: 3.56 L
VA SINGLE BREATH LLN: 8.03
VA SINGLE BREATH PRE REF: 44.4 %
VA SINGLE BREATH REF: 8.03
VCMAXN2 LLN: 3.45
VCMAXN2 PRE REF: 52.5 %
VCMAXN2 PRE: 2.44 L
VCMAXN2 REF: 4.65

## 2025-02-18 RX ORDER — TIRZEPATIDE 5 MG/.5ML
5 INJECTION, SOLUTION SUBCUTANEOUS
Qty: 2 ML | Refills: 2 | Status: SHIPPED | OUTPATIENT
Start: 2025-02-18

## 2025-02-18 NOTE — PROCEDURES
ABG completed. See ABG Below.     Latest Reference Range & Units 02/18/25 14:35   POC PH 7.35 - 7.45  7.443   POC PCO2 35 - 45 mmHg 41.9   POC PO2 80 - 100 mmHg 69 (L)   POC HCO3 24 - 28 mmol/L 28.7 (H)   POC SATURATED O2 95 - 100 % 94   Sample  ARTERIAL   POC BE -2 to 2 mmol/L 5 (H)   FiO2  21   DelSys  Room Air   Site  LR   Mode  SPONT   (L): Data is abnormally low  (H): Data is abnormally high      Interpretation:  [] Arterial blood gases on room air demonstrate normal pCO2 and pO2  [] Arterial blood gases on room air are abnormal demonstrating hypercarbia (pCO2 >45 mmHg)  [] Arterial blood gases on room air are abnormal demonstrating hypocarbia (pCO2 < 35 mmHg)  [x] Arterial blood gases on room air are abnormal demonstrating hypoxemia (pO2 < 80 mmHg)  [] Arterial blood gases on room air are abnormal demonstrating hyperoxemia (pO2 >120 mmHg)  [] Arterial blood gases on room air are abnormal demonstrating hypoxemia (pO2 < 80 mmHg) and hypercarbia (pCO2>45 mmHg)    The table below summarizes the main interpretations of the relationship between the arterial blood gases, pH, pCO2 and HCO-3    []    I

## 2025-02-18 NOTE — PROCEDURES
"[facility]  Six Minute Walk     SUMMARY     Ordering Provider: Erick Bateman MD   Interpreting Provider: Erick Bateman MD  Performing nurse/tech/RT: VT, RT  Diagnosis:  (Restrictive lung disease;  Obesity hypoventilation syndrome;  EVANGELIST (obstructive sleep apnea)/ Obesity Hypoventilation syndrome;  Dependence on home ventilator)  Height: 6' 3" (190.5 cm)  Weight: (!) 194.4 kg (428 lb 9.2 oz)  BMI (Calculated): 53.6                Phase Oxygen Assessment Supplemental O2 Heart   Rate Blood Pressure Coleen Dyspnea Scale Rating   Resting 95 % Room Air 78 bpm (!) 154/93 5-6   Exercise        Minute        1 88 % (place on 3L NC to ks >=90%) Room Air 97 bpm     2 92 % 3 L/M 94 bpm     3 89 % 3 L/M 98 bpm     4 92 % 3 L/M 111 bpm     5 91 % 3 L/M 118 bpm     6  91 % 3 L/M 108 bpm 133/87 5-6   Recovery        Minute        1 91 % 3 L/M 88 bpm     2 93 % 3 L/M 83 bpm     3 91 % 3 L/M 78 bpm     4 96 % 3 L/M 80 bpm 137/82 3     Six Minute Walk Summary  6MWT Status: completed with stops  Patient Reported: Dyspnea, Other (Comment) (back and leg pain)     Interpretation:  Did the patient stop or pause?: Yes  How many times did the patient stop or pause?: 2  Stop Time 1: 230  Restart Time 1: 424  Pause Time 1: 12 seconds  Stop Time 2: 288  Restart Time 2: 308  Pause Time 2: 20 seconds                    Total Time Walked (Calculated): 328 seconds  Final Partial Lap Distance (feet): 75 feet  Total Distance Meters (Calculated): 83.82 meters  Predicted Distance Meters (Calculated): 489.74 meters  Percentage of Predicted (Calculated): 17.12  Peak VO2 (Calculated): 6.49  Mets: 1.85  Has The Patient Had a Previous Six Minute Walk Test?: Yes       Previous 6MWT Results  Has The Patient Had a Previous Six Minute Walk Test?: Yes  Date of Previous Test: 02/23/22  Total Time Walked: 360 seconds  Total Distance (meters): 182.88  Predicted Distance (meters): 513.87 meters  Percentage of Predicted: " 35.59  Percent Change (Calculated): 0.54

## 2025-02-18 NOTE — PROGRESS NOTES
Pulmonary Outpatient Follow Up Visit     Subjective:         Patient ID: Caio Ontiveros is a 59 y.o. male.    Chief Complaint: Shortness of Breath, Pulmonary Hypertension, restrictive lung disease, and Sleep Apnea        HPI          60 yo M patient presenting for follow up .    2/18/205 admitted to the hospital with flu A January 2025 did not need ICU.      Hospital NIV was applied.  Needs recertification for his Dodie ventilator.  Patient known with obstructive sleep apnea  O2 events per hour, obesity hypoventilation based on normal FEV1/FVC ratio, daytime hypercapnia when he was not on and a BMI of 53.      He states that he is adhering and using his home ventilator.          01/07/2024 chronic hypoxic and hypoxemic respiratory failure.  Does have currently a [place] ventilator at home which replaced a recalled [facility] ventilator.      Requesting recertification for ventilator use.            Known with history of  Congestive Heart failure and chornic hypoxic respiratory failure on nocturnal ventilation.  9/21/2022  no hospital admission over the last year.  SOB on exertion.  Compliant with trilogy plus O2 during sleep.  Obtained a replacement but he is in touch with  [employer] regarding a power cord for his machine and supplies.    Weight gain 30 lb compared to December 2021   Home ventilator use monitored through durable medical equipment company.  Patient is benefiting from use of nocturnal mechanical ventilation and reports no problems.  [email or web address]  [address], [address] [phone]  Fax [phone]      Admitted to the hospital ICU November 2021 for acute on chronic hypoxemic and hypercapnic respiratory failure.  He is known  with history of CHF, A fib, pulmonary hypertension,   He was discharged on Lasix 40 mg daily, O2 during sleep with trilogy provided by [employer].  Compliant with trilogy  Never smoker.  Disabled  currently.    Review of Systems   Constitutional:  Positive for fatigue and weakness.        30 lb weight gain   Respiratory:  Positive for cough, shortness of breath, previous hospitalization due to pulmonary problems, dyspnea on extertion and somnolence.    Cardiovascular:  Positive for leg swelling.   Psychiatric/Behavioral:  Positive for sleep disturbance.        Outpatient Encounter Medications as of 2025   Medication Sig Dispense Refill    apixaban (ELIQUIS) 5 mg Tab Take 1 tablet by mouth twice daily 60 tablet 5    bumetanide (BUMEX) 2 MG tablet Take 1 tablet (2 mg total) by mouth 2 (two) times a day. 60 tablet 11    calcipotriene (DOVONOX) 0.005 % cream Apply topically 2 (two) times daily. 30 g 1    colchicine (COLCRYS) 0.6 mg tablet Take 1 tablet (0.6 mg total) by mouth once daily. 90 tablet 0    ixekizumab (TALTZ AUTOINJECTOR) 80 mg/mL AtIn Inject 80 mg into the skin every 28 days. 3 mL 3    methocarbamoL (ROBAXIN) 750 MG Tab Take 1 tablet by mouth three times daily as needed for muscle spasm 60 tablet 0    metoprolol succinate (TOPROL-XL) 100 MG 24 hr tablet Take 1 tablet by mouth once daily 90 tablet 3    nortriptyline (PAMELOR) 25 MG capsule Take 1 capsule (25 mg total) by mouth every evening. 30 capsule 11    potassium chloride SA (K-DUR,KLOR-CON) 20 MEQ tablet Take 1 tablet (20 mEq total) by mouth 2 (two) times daily. 180 tablet 3    pregabalin (LYRICA) 200 MG Cap Take 1 capsule by mouth twice daily 60 capsule 0    venlafaxine (EFFEXOR-XR) 150 MG Cp24 TAKE 1 CAPSULE BY MOUTH ONCE  DAILY 90 capsule 3    levothyroxine (SYNTHROID) 50 MCG tablet Take 1 tablet (50 mcg total) by mouth before breakfast. 90 tablet 3    [] methylPREDNISolone (MEDROL DOSEPACK) 4 mg tablet use as directed 21 tablet 0    [] oseltamivir (TAMIFLU) 75 MG capsule Take 1 capsule (75 mg total) by mouth 2 (two) times daily. for 3 days 6 capsule 0    tirzepatide, weight loss, (ZEPBOUND) 5 mg/0.5 mL PnIj Inject 5 mg  "into the skin every 7 days. 2 mL 2    [DISCONTINUED] potassium chloride SA (K-DUR,KLOR-CON) 20 MEQ tablet TAKE 1 TABLET BY MOUTH TWICE  DAILY 180 tablet 3    [DISCONTINUED] venlafaxine (EFFEXOR-XR) 75 MG 24 hr capsule TAKE 1 CAPSULE BY MOUTH ONCE  DAILY 90 capsule 3    [DISCONTINUED] acetaminophen tablet 650 mg       [DISCONTINUED] albuterol nebulizer solution 2.5 mg       [DISCONTINUED] apixaban tablet 5 mg       [DISCONTINUED] bumetanide injection 2 mg       [DISCONTINUED] fluticasone propionate 50 mcg/actuation nasal spray 100 mcg       [DISCONTINUED] levothyroxine tablet 50 mcg       [DISCONTINUED] methocarbamoL tablet 750 mg       [DISCONTINUED] metoprolol succinate (TOPROL-XL) 24 hr tablet 100 mg       [DISCONTINUED] ondansetron injection 4 mg       [DISCONTINUED] oseltamivir capsule 75 mg       [DISCONTINUED] potassium chloride SA CR tablet 40 mEq       [DISCONTINUED] pregabalin capsule 200 mg       [DISCONTINUED] sodium chloride 0.9% flush 10 mL        No facility-administered encounter medications on file as of 2/18/2025.       Objective:     Vital Signs (Most Recent)  Vital Signs  Pulse: 80  Resp: 17  SpO2: 96 %  BP: 137/82  Pain Score:   8  Pain Loc: Back  Height and Weight  Height: 6' 3" (190.5 cm)  Weight: (!) 194.4 kg (428 lb 9.2 oz)  BSA (Calculated - sq m): 3.21 sq meters  BMI (Calculated): 53.6  Weight in (lb) to have BMI = 25: 199.6]  Wt Readings from Last 2 Encounters:   02/18/25 (!) 194.4 kg (428 lb 9.2 oz)   02/18/25 (!) 194.4 kg (428 lb 9.2 oz)       Physical Exam   Constitutional: He is oriented to person, place, and time. He appears well-developed and well-nourished. He is obese.   HENT:   Head: Normocephalic.   Pulmonary/Chest: No stridor. No respiratory distress.   Neurological: He is alert and oriented to person, place, and time.   Psychiatric: He has a normal mood and affect. His behavior is normal. Judgment and thought content normal.   Nursing note and vitals " "reviewed.      Laboratory  Lab Results   Component Value Date    WBC 2.57 (L) 01/24/2025    RBC 4.96 01/24/2025    HGB 13.8 (L) 01/24/2025    HCT 44.0 01/24/2025    MCV 89 01/24/2025    MCH 27.8 01/24/2025    MCHC 31.4 (L) 01/24/2025    RDW 18.3 (H) 01/24/2025    PLT 84 (L) 01/24/2025    MPV SEE COMMENT 01/24/2025    GRAN 1.7 (L) 01/24/2025    GRAN 64.2 01/24/2025    LYMPH 0.3 (L) 01/24/2025    LYMPH 13.2 (L) 01/24/2025    MONO 0.5 01/24/2025    MONO 20.6 (H) 01/24/2025    EOS 0.0 01/24/2025    BASO 0.02 01/24/2025    EOSINOPHIL 0.4 01/24/2025    BASOPHIL 0.8 01/24/2025       BMP  Lab Results   Component Value Date     01/26/2025    K 3.5 01/26/2025     01/26/2025    CO2 26 01/26/2025    BUN 14 01/26/2025    CREATININE 0.9 01/26/2025    CALCIUM 8.4 (L) 01/26/2025    ANIONGAP 13 01/26/2025    ESTGFRAFRICA >60.0 02/22/2022    EGFRNONAA >60.0 02/22/2022    AST 22 01/24/2025    ALT 12 01/24/2025    PROT 7.3 01/24/2025       Lab Results   Component Value Date     (H) 01/24/2025     (H) 11/14/2024     (H) 04/24/2024     (H) 04/22/2024    BNP 67 01/11/2024     (H) 06/16/2023       Lab Results   Component Value Date    TSH 3.581 06/11/2024       Lab Results   Component Value Date    SEDRATE 4 02/20/2020       Lab Results   Component Value Date    CRP 49.3 (H) 04/22/2024     No results found for: "IGE"     No results found for: "ASPERGILLUS"  No results found for: "AFUMIGATUSCL"     Lab Results   Component Value Date    ACE 23 11/22/2021       Latest Reference Range & Units 11/20/21 18:06 02/23/22 11:54   Sample  ARTERIAL ARTERIAL   POC PH 7.35 - 7.45  7.320 (L) 7.370   POC PCO2 35 - 45 mmHg 77.8 (HH) 50.7 (H)   POC PO2 80 - 100 mmHg 59 (LL) 68 (L)   POC HCO3 24 - 28 mmol/L 40.1 (H) 29.3 (H)   POC SATURATED O2 95 - 100 % 86 (L) 92 (L)   Allens Test  Pass Pass   POC BE -2 to 2 mmol/L 14 4   FiO2  40 0.21   Vt  550    DelSys  CPAP/BiPAP Room Air   Site  RR LR   Mode  AVAPS SPONT "   Rate  20    (HH): Data is critically high  (LL): Data is critically low  (L): Data is abnormally low  (H): Data is abnormally high     Latest Reference Range & Units 04/23/24 09:03 04/23/24 10:50   POC PH 7.35 - 7.45  7.137 (LL) 7.108 (LL)   POC PCO2 35 - 45 mmHg 90.9 (HH) 97.6 (HH)   POC PO2 80 - 100 mmHg 92 71 (L)   POC HCO3 24 - 28 mmol/L 30.7 (H) 30.8 (H)   POC SATURATED O2 95 - 100 % 93 85   Sample  ARTERIAL ARTERIAL   POC BE -2 to 2 mmol/L 2 1   FiO2   60   Vt   500   PiP   17   DelSys  CPAP/BiPAP CPAP/BiPAP   Site  RR RR   Mode  BiPAP AVAPS   Rate   16   (LL): Data is critically low  (HH): Data is critically high  (L): Data is abnormally low  (H): Data is abnormally high     Latest Reference Range & Units Most Recent   POC PH 7.35 - 7.45  7.443  2/18/25 14:35   POC PCO2 35 - 45 mmHg 41.9  2/18/25 14:35   POC PO2 80 - 100 mmHg 69 (L)  2/18/25 14:35   POC HCO3 24 - 28 mmol/L 28.7 (H)  2/18/25 14:35   POC SATURATED O2 95 - 100 % 94  2/18/25 14:35   POCT Glucose 70 - 110 mg/dL 91  4/27/24 20:51   Sample  ARTERIAL  2/18/25 14:35   POC BE -2 to 2 mmol/L 5 (H)  2/18/25 14:35   FiO2  21  2/18/25 14:35   Vt  500  4/24/24 04:00   PiP  16  4/24/24 04:00   Flow  3  4/24/24 13:46   DelSys  Room Air  2/18/25 14:35   Site  LR  2/18/25 14:35   Mode  SPONT  2/18/25 14:35   Rate  20  4/24/24 04:00   (L): Data is abnormally low  (H): Data is abnormally high  EKG 11/20/2021    Atrial fibrillation   Incomplete right bundle branch block   Possible Anteroseptal infarct ,age undetermined   Abnormal ECG       Diagnostic Results:  I have personally reviewed today the following studies:    2D echo 11/21/2021  The left ventricle is mildly enlarged with moderate concentric hypertrophy and normal systolic function.  The estimated ejection fraction is 60%.  Normal left ventricular diastolic function.  Severe right ventricular enlargement with moderately reduced right ventricular systolic function.  Moderate left atrial  enlargement.  Moderate right atrial enlargement.  Moderate tricuspid regurgitation.  There is abnormal septal wall motion. There is systolic flattening of the interventricular septum consistent with right ventricle pressure overload.            PFT 2/2022      Spirometry data is acceptable and reproducibleModerate restriction based on reduced Forced Vital Capacity (FVC). Consider lung volumes if clinically indicated.Mild restriction vital capacity. (VC< LLN and > or equal to 70% of predicted).Overall there is   moderate ventilatory impairment. (FEV1 > or equal to 60% of predicted and < or equal to 69% predicted ).Flow volume loops demonstrate a restrictive defect.Maximum voluntary ventilation is moderately reduced. (MVV% predicted is 51% to 65% of   predicted)Mild reduction in diffusion capacity -unadjusted for hemoglobin (DLCO > or equal to 60% predicted and < LLN) . This interpretation of diffusing capacity does not take into account the patient's hemoglobin level (Unavailable at the time of   testing - hemoglobin assumed to be normal).       SIX MIN2/2022      Severe capacity reduction , needed O2 2 LPM         PFT September 2020     Spirometry reveals normal airflow. Lung volumes reveal severe restriction. Single breath diffusion capacity is moderately reduced.Â  This interpretation of diffusing capacity is adjusted for patient's hemoglobin level    Chest x-ray November 2021  FINDINGS:  Stable cardiomegaly.     Mild pulmonary vascular congestion.  This appears improved.  No pulmonary edema.  No effusion.     Right IJ catheter unchanged.     Impression:     Interval improvement       CT chest September 2020      EXAMINATION:  CT CHEST WITHOUT CONTRAST     CLINICAL HISTORY:  Shortness of breath; Dyspnea, unspecified     TECHNIQUE:  Low dose axial images, sagittal and coronal reformations were obtained from the thoracic inlet to the lung bases. Contrast was not administered.     COMPARISON:  Chest x-ray  09/15/2020.     FINDINGS:  Limited imaging through the upper abdomen demonstrates cholecystectomy changes.  Hypertrophy of the caudate is seen, nonspecific but could be seen with cirrhosis.  Postsurgical changes of the stomach.  Possible tiny adenoma left adrenal gland image 105 series 2.     Heart size is slightly increased.  No anne cardiac decompensation no pericardial effusion.  Trachea and mainstem bronchi remain patent.  Thyroid gland appears normal.  There is a superior right mediastinal lymph node image 15 of series 2 which measures 10 mm in short axis, borderline enlarged additional 10 mm short axis lymph node in the level 4 station, image 8 series 2.  These are favored reactive although attention on follow-up could be considered.  Multiple additional small scattered shotty appearing lymph nodes in the mediastinum.     Mild respiratory motion artifact.  Probable juxta fissural lymph node image 203 of series 4 right lung.  There is a 3 mm nodular opacity right lung image 151 of series 4.  There is a juxtapleural nodule measuring 3 mm left lung image 210 series 4.  A few other scattered tiny pulmonary nodules noted as well.  No pneumothorax or pleural effusion or pulmonic infiltrate.  Main pulmonary artery is dilated measuring 4.8 cm, greater than the adjacent aorta.  Recommend correlation for pulmonary hypertension.  Habitus limits detail.     Review of bone windows demonstrate the osseous structures to be intact.     Impression:     Bilateral small scattered pulmonary nodules all measuring less than 6 mm.  Would recommend follow-up as per Fleischner society guidelines.  Borderline enlarged right intrathoracic lymph nodes, favored reactive in etiology although.  Stable cardiomegaly and other incidental findings as noted above.                PSG 10/2020    The diagnostic polysomnography revealed a severe obstructive sleep apnea / hypopnea syndrome (A + H Index = 104.6 events / hr asleep with 17.4 - 0.0  respiratory event - arousals / hr asleep, and no RERAs (respiratory effort - related arousals) for the study. The mean SpO2 value was 75.3 %, severe, minimum oxygen saturation during sleep was 74.0 %, and waking baseline SpO2 was 96.0 %. Persistent, moderately loud snoring         Assessment/Plan:   Severe obstructive sleep apnea  -     tirzepatide, weight loss, (ZEPBOUND) 5 mg/0.5 mL PnIj; Inject 5 mg into the skin every 7 days.  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      Discussed Zepbound  Drug profile provided.      Continue nocturnal volume ventilation p.r.n. during the day.      ABG shows hypercapnia with a CO2 level 41 mm Hg today.      Bipap has been considered and ruled out as volume requirements are not met by BiLevel devices.  BiLevel has been considered and ruled out as patient requires continuous alarms, backup battery and portability which are not possible with BiLevel devices.  Interruption or failure to provide NIV would quickly lead to exacerbation of the patient's condition, hospital admission and likely harm to patient.              Packs oxygen order and today's visit report to [facility]                                                                         Follow up in about 3 months (around 5/18/2025).    This note was prepared using voice recognition system and is likely to have sound alike errors that may have been overlooked even after proof reading.  Please call me with any questions    Discussed diagnosis, its evaluation, treatment and usual course. All questions answered.      Erick Bateman MD

## 2025-02-24 ENCOUNTER — HOSPITAL ENCOUNTER (OUTPATIENT)
Dept: RADIOLOGY | Facility: HOSPITAL | Age: 60
Discharge: HOME | End: 2025-02-24
Attending: PHYSICIAN ASSISTANT
Payer: MEDICARE

## 2025-02-24 PROCEDURE — 72148 MRI LUMBAR SPINE W/O DYE: CPT | Mod: 26,,, | Performed by: RADIOLOGY

## 2025-02-24 PROCEDURE — 72148 MRI LUMBAR SPINE W/O DYE: CPT | Mod: TC

## 2025-02-25 ENCOUNTER — OFFICE VISIT (OUTPATIENT)
Dept: PAIN MEDICINE | Facility: CLINIC | Age: 60
End: 2025-02-25
Payer: MEDICARE

## 2025-02-25 DIAGNOSIS — M51.379 DDD (DEGENERATIVE DISC DISEASE), LUMBOSACRAL: ICD-10-CM

## 2025-02-25 DIAGNOSIS — M54.16 LUMBAR RADICULOPATHY, CHRONIC: ICD-10-CM

## 2025-02-25 DIAGNOSIS — M51.370 DEGENERATION OF INTERVERTEBRAL DISC OF LUMBOSACRAL REGION WITH DISCOGENIC BACK PAIN: ICD-10-CM

## 2025-02-25 DIAGNOSIS — M54.9 DORSALGIA, UNSPECIFIED: Primary | ICD-10-CM

## 2025-02-25 DIAGNOSIS — M54.50 DORSALGIA OF LUMBAR REGION: ICD-10-CM

## 2025-02-25 DIAGNOSIS — M47.817 FACET ARTHRITIS OF LUMBOSACRAL REGION: ICD-10-CM

## 2025-02-25 DIAGNOSIS — M47.817 FACET ARTHRITIS OF LUMBOSACRAL REGION: Primary | ICD-10-CM

## 2025-02-25 PROCEDURE — 3044F HG A1C LEVEL LT 7.0%: CPT | Mod: CPTII,95,, | Performed by: PHYSICIAN ASSISTANT

## 2025-02-25 PROCEDURE — 98006 SYNCH AUDIO-VIDEO EST MOD 30: CPT | Mod: 95,,, | Performed by: PHYSICIAN ASSISTANT

## 2025-02-25 RX ORDER — PREGABALIN 200 MG/1
200 CAPSULE ORAL 2 TIMES DAILY
Qty: 60 CAPSULE | Refills: 2 | Status: SHIPPED | OUTPATIENT
Start: 2025-02-25

## 2025-02-25 RX ORDER — METHOCARBAMOL 750 MG/1
750 TABLET, FILM COATED ORAL EVERY 8 HOURS PRN
Qty: 90 TABLET | Refills: 2 | Status: SHIPPED | OUTPATIENT
Start: 2025-02-25

## 2025-02-25 RX ORDER — NORTRIPTYLINE HYDROCHLORIDE 25 MG/1
25 CAPSULE ORAL NIGHTLY
Qty: 30 CAPSULE | Refills: 2 | Status: SHIPPED | OUTPATIENT
Start: 2025-02-25 | End: 2026-02-25

## 2025-02-25 RX ORDER — HYDROCODONE BITARTRATE AND ACETAMINOPHEN 5; 325 MG/1; MG/1
TABLET ORAL
Qty: 14 TABLET | Refills: 0 | Status: SHIPPED | OUTPATIENT
Start: 2025-02-25

## 2025-02-25 NOTE — PROGRESS NOTES
"Established Patient - TeleHealth Visit    The patient location is: home  The chief complaint leading to consultation is: "follow up after MRI"     Visit type: audiovisual  Encounter which includes face to face time and non-face to face time preparing to see the patient (eg, review of tests), Obtaining and/or reviewing separately obtained history, Documenting clinical information in the electronic or other health record, Independently interpreting results (not separately reported) and communicating results to the patient/family/caregiver, or Care coordination (not separately reported).      Each patient to whom he or she provides medical services by telemedicine is:  (1) informed of the relationship between the physician and patient and the respective role of any other health care provider with respect to management of the patient; and (2) notified that he or she may decline to receive medical services by telemedicine and may withdraw from such care at any time.        Referring Physician: No ref. provider found    PCP: Dominick Allen MD       SUBJECTIVE:    Interval History (2/25/2025):  Caio Ontiveros presents today for follow-up visit.  Patient was last seen on 1/7/2025. At that visit, the plan was to complete imaging studies. Patient reports he cannot complete long walks due to pain back. It is constant. He has noticed improvement with the stinging pain in LLE .    Interval History (1/7/2025):Caio Ontiveros presents today for follow-up visit.  Patient was last seen on 1/7/2025. Patient reports lower back pain worse early in the morning and especially getting in/out of vehicle. He states that long walks "kill" him.   He also noticed pain radiating into LLE down the back of his leg to the knee.     Interval History (8/12/24):  Caio Ontiveros presents today for follow-up visit.  Patient was last seen on 6/24/2024. The patient reports that the medication increases/changes help but he still has lower back pain. He has " discussed back procedures with several friends and is afraid to move forward with it due to their experiences (his friends informed him that the procedures did not help with pain).   He is currently taking Lyrica 150 mg BID and Robaxin 750 gm TID. Tolerating medications well with no side effects.       Interval History (6/24/2024):  Caio Ontiveros presents today for follow-up visit.  Patient was last seen on 3/22/2024. Patient reports that he is experiencing more pain when moving around and walking. He localizes his pain across lower back and with LLE. He also c/o stiffness in his lower back. Patient reports pain as 8/10 today. Since his last visit, Lyrica was increased from 75 mg QHS to 100 mg BID. He is also taking Robaxin 500 mg.       Interval History (3/22/2024):  Caio Ontiveros presents today for follow-up visit.  Patient was last seen on 8/23/2023 for Left IA hip joint injection with 80% relief.  He reports his hip pain is doing better however he does have some lower back pain which seems to be getting worse.  The pain remains in the back and does not radiate into the legs.  Prolonged standing and extending back make the pain worse.  If he leans over and wrists with surface bending forward he will get some relief of his pain.  He rates his pain today an 8/10.  No new injury.  He is requesting to try a different medication to try to help with his pain and is leery of doing any procedures on his back at this time.  He has not done formal physical therapy because he can not afford it at this time and he also does not think he would be able to tolerate it at this time.  He has been doing home exercises and stretches and is trying to work on weight loss because he feels this will help his pain as well.  Patient denies night fever/night sweats, urinary incontinence, bowel incontinence, significant weight loss and significant motor weakness.   Patient denies any other complaints or concerns at this time.        Interval  history 8/9/2023  Caio Ontiveros is a 59 y.o. male who presents to the clinic for the evaluation of chronic lower back pain for years that gradually got worse but has become severe over the past several months.  It is affecting his ADLs significantly including the ability to walk and stand.  It is located in the left buttock and radiates to the left groin and is worse with weight-bearing.  There is no radicular pain tingling or numbness to the legs but he reports weakness in the left leg versus due to severe pain.  Denies loss of bowel or bladder control.  It is worse with walking, standing, getting up from the bed or chair and is better (slightly) with medications including Tylenol Arthritis and gabapentin which he takes at 300 mg daily.  He could not afford the co-pay for physical therapy, not well covered by his insurance. He currently rates it at 10/10.  He gives it a 6/10 when it is at best.  He has history of extreme morbid obesity and lost significant weight after gastric bypass surgery in 2014 dropping down from 500 lb 340 lb.  He managed to gain some weight back and he is around 400 lb.  He has no history of kidney stones, glaucoma or sulfa allergies.      Pain Disability Index Review:         8/9/2023     2:11 PM   Last 3 PDI Scores   Pain Disability Index (PDI) 62        report:  Not applicable    Pain Procedures:   8/23/2023 Left IA hip joint injection with 80% relief      Past Medical History:   Diagnosis Date    A-fib     CHF (congestive heart failure)     Gout, chronic     Hypertension     Sleep apnea      Past Surgical History:   Procedure Laterality Date    CHOLECYSTECTOMY      GASTRIC BYPASS  2014    INJECTION OF JOINT Left 8/23/2023    Procedure: Left IA Hip Joint injection;  Surgeon: Peña Rausch MD;  Location: [facility];  Service: Pain Management;  Laterality: Left;     Social History     Socioeconomic History    Marital status:     Number of children: 1   Tobacco Use    Smoking  status: Never    Smokeless tobacco: Never   Substance and Sexual Activity    Alcohol use: Not Currently    Drug use: No    Sexual activity: Not Currently     Social Drivers of Health     Financial Resource Strain: Low Risk  (1/25/2025)    Overall Financial Resource Strain (CARDIA)     Difficulty of Paying Living Expenses: Not hard at all   Food Insecurity: No Food Insecurity (1/25/2025)    Hunger Vital Sign     Worried About Running Out of Food in the Last Year: Never true     Ran Out of Food in the Last Year: Never true   Transportation Needs: No Transportation Needs (1/25/2025)    TRANSPORTATION NEEDS     Transportation : No   Physical Activity: Inactive (1/7/2025)    Exercise Vital Sign     Days of Exercise per Week: 0 days     Minutes of Exercise per Session: 0 min   Stress: No Stress Concern Present (1/25/2025)    [employer] [place] [employer] - Occupational Stress Questionnaire     Feeling of Stress : Not at all   Recent Concern: Stress - Stress Concern Present (1/7/2025)    [employer] [place] [employer] - Occupational Stress Questionnaire     Feeling of Stress : To some extent   Housing Stability: Unknown (1/25/2025)    Housing Stability Vital Sign     Unable to Pay for Housing in the Last Year: No     Homeless in the Last Year: No     Family History   Problem Relation Name Age of Onset    Hypertension Mother      Stroke Father      Diabetes Father      Hypertension Father      Diabetes Sister      Diabetes Brother         Review of patient's allergies indicates:  No Known Allergies    Current Outpatient Medications   Medication Sig    apixaban (ELIQUIS) 5 mg Tab Take 1 tablet by mouth twice daily    bumetanide (BUMEX) 2 MG tablet Take 1 tablet (2 mg total) by mouth 2 (two) times a day.    calcipotriene (DOVONOX) 0.005 % cream Apply topically 2 (two) times daily.    colchicine (COLCRYS) 0.6 mg tablet Take 1 tablet (0.6 mg total) by mouth once daily.    ixekizumab (TALTZ AUTOINJECTOR) 80  mg/mL AtIn Inject 80 mg into the skin every 28 days.    levothyroxine (SYNTHROID) 50 MCG tablet Take 1 tablet (50 mcg total) by mouth before breakfast.    methocarbamoL (ROBAXIN) 750 MG Tab Take 1 tablet by mouth three times daily as needed for muscle spasm    metoprolol succinate (TOPROL-XL) 100 MG 24 hr tablet Take 1 tablet by mouth once daily    nortriptyline (PAMELOR) 25 MG capsule Take 1 capsule (25 mg total) by mouth every evening.    potassium chloride SA (K-DUR,KLOR-CON) 20 MEQ tablet Take 1 tablet (20 mEq total) by mouth 2 (two) times daily.    pregabalin (LYRICA) 200 MG Cap Take 1 capsule by mouth twice daily    tirzepatide, weight loss, (ZEPBOUND) 5 mg/0.5 mL PnIj Inject 5 mg into the skin every 7 days.    venlafaxine (EFFEXOR-XR) 150 MG Cp24 TAKE 1 CAPSULE BY MOUTH ONCE  DAILY     No current facility-administered medications for this visit.       Review of Systems     GENERAL:  No weight loss, malaise or fevers.  HEENT:   No recent changes in vision or hearing  NECK:  Negative for lumps, no difficulty with swallowing.  RESPIRATORY:  Negative for cough, wheezing or shortness of breath, patient denies any recent URI.  CARDIOVASCULAR:  Negative for chest pain, positive for fluid retention and leg swelling  GI:  Negative for abdominal discomfort, blood in stools or black stools or change in bowel habits.  MUSCULOSKELETAL:  See HPI.  SKIN:  Negative for lesions, rash, and itching.  PSYCH:  No mood disorder or recent psychosocial stressors.  Patients sleep is not disturbed secondary to pain.  HEMATOLOGY/LYMPHOLOGY:  Negative for prolonged bleeding, bruising easily or swollen nodes.  Patient is not currently taking any anti-coagulants  NEURO:   No history of headaches, syncope, paralysis, seizures or tremors.  All other reviewed and negative other than HPI.    OBJECTIVE:  Telemedicine Exam  There were no vitals filed for this visit.  There is no height or weight on file to calculate BMI.   (reviewed on  2/25/2025)     GENERAL: Well appearing, in no acute distress, alert and oriented x3.  Cooperative.  PSYCH:  Mood and affect appropriate.  SKIN: Skin color & texture with no obvious abnormalities.    HEAD/FACE:  Normocephalic, atraumatic.    PULM:  No difficulty breathing. No nasal flaring. No obvious wheezing.  EXTREMITIES: No obvious deformities. Moving all extremities well, appears to have symmetric strength throughout.  MUSCULOSKELETAL: No obvious atrophy abnormalities are noted.   NEURO: No obvious neurologic deficit.   GAIT: sitting.     Physical Exam: last in clinic visit:      Physical Exam    GENERAL: Well appearing, in no acute distress, but in discomfort when trying to walk, alert and oriented x3.  Morbidly obese  PSYCH:  Mood and affect appropriate.  SKIN: Skin color, texture, turgor normal, no rashes or lesions.  HEAD/FACE:  Normocephalic, atraumatic.  CV: chronic edema changes in distal legs and ankles.  PULM: No evidence of respiratory difficulty, symmetric chest rise.  GI:  Abdomen soft and non-tender.    GAIT: slow antalgic.    LUMBAR SPINE:   Palpation:  Mild tenderness over left facet joints and paraspinous muscles. No trigger points.  ROM: Pain with flexion, extension and rotation.  Straight leg raising is negative.  SI JOINTS: bilateral SI joint, no tenderness  LEFT HIP:  SEVERELY RESTRICTED AND PAINFUL RANGE OF MOTION including internal, external rotation, abduction and flexion.  NEURO:   MOTOR: Bilateral lower extremity muscle strength is normal. No atrophy or tone abnormalities are noted.  SENSORY: No loss of sensation in L3 through S1 dermatomes bilaterally.  DTR's: No clonus.    Imaging:     Results for orders placed during the hospital encounter of 01/14/25    MRI Lumbar Spine Without Contrast    Narrative  EXAMINATION:  MRI LUMBAR SPINE WITHOUT CONTRAST    CLINICAL HISTORY:  Dorsalgia, unspecifiedLow back pain, symptoms persist with > 6wks conservative treatment;Lumbar radiculopathy,  symptoms persist with conservative treatment;    TECHNIQUE:  Standard multiplanar noncontrast MRI sequences of the lumbar spine.    COMPARISON:  X-ray December 28, 2022    FINDINGS:  The distal cord and conus reveal normal signal and morphology.    There is straightening of the normal lumbar lordosis.    There appears to be fusion of the L2-3, L3-4, L4-5 and L5-S1 disc spaces with retrolisthesis of L2 with respect L3.    No acute marrow edema or fracture.    T12-L1: Minor disc bulge and facet arthrosis.    L1-2:     Mild disc bulge with minor facet arthrosis.    L2-3:     Intradiscal calcification with fusion.  Disc osteophyte complex with ventral sac impression.  Hypertrophic facet arthrosis left greater than right.  Mild central and left lateral recess stenosis with mild left foraminal stenosis.    L3-4:     Intradiscal calcification with fusion of the disc space.  Minor disc osteophyte complex.  Mild left foraminal stenosis.    L4-5:     Intradiscal calcification with probable fusion of the disc space.  Disc osteophyte complex with mild facet arthrosis.  Central canal is patent.  Mild foraminal stenosis.    L5-S1:    Intradiscal calcification with fusion.  Disc bulging osteophyte with minor facet arthrosis.  Central canal is patent.  Moderate left and mild right foraminal stenosis.    Impression  Intra discal calcification with probable fusion of the L2-3 through L5-S1 disc spaces.  Retropulsion of L2 with respect L3 with mild central and left lateral recess stenosis.    Mild multilevel foraminal stenosis as above.      Electronically signed by: Walter Valle MD  Date:    02/24/2025  Time:    15:36            Results for orders placed during the hospital encounter of 12/28/22    X-Ray Lumbar Spine AP And Lateral    Narrative  EXAMINATION:  XR LUMBAR SPINE AP AND LATERAL    CLINICAL HISTORY:  Low back pain, unspecified    TECHNIQUE:  AP, lateral and spot images were performed of the lumbar  spine.    COMPARISON:  Radiographs from August 2020    FINDINGS:  No scoliosis.  Five lumbar type vertebral bodies noted.  Bridging syndesmophytes noted at multiple levels. There is no fracture or listhesis.  Minimal narrowing at L3-L4 and L5-S1 is unchanged.  There is also minimal lower lumbar facet arthropathy.  No significant change from prior study.      Electronically signed by: Emory Bah MD  Date:    12/28/2022    XR SACROILIAC JOINTS 3 VIEWS     CLINICAL HISTORY:  mechanical lower back pain, psoriasis acitive.  Assess for sacroiilitis/ankylosis;  Low back pain     COMPARISON:  None     FINDINGS:  Degenerative changes noted in the included lumbosacral spine and hips.  No fracture or dislocation.  Pelvic ring is intact.  SI joints normal and symmetric in appearance bilaterally.  No sclerotic or erosive changes appreciated.  Heterogeneous increased density identified within the femoral heads.  There is spurring and buttressing of the femoral neck cortex consistent with degenerative changes.  Subchondral degenerative cyst/geodes cannot be excluded bilaterally.     Impression:  I reviewed the films which show moderate to severe degenerative and sclerotic changes of the left femoral head and acetabulum, worse than the right.  SI joints within normal limits.  Degenerative changes lumbosacral spine and hips.        Electronically signed by: Marty Lee MD  Date:                                            08/25/2020      LABS:  Lab Results   Component Value Date    WBC 2.57 (L) 01/24/2025    HGB 13.8 (L) 01/24/2025    HCT 44.0 01/24/2025    MCV 89 01/24/2025    PLT 84 (L) 01/24/2025       CMP  Sodium   Date Value Ref Range Status   01/26/2025 141 136 - 145 mmol/L Final     Potassium   Date Value Ref Range Status   01/26/2025 3.5 3.5 - 5.1 mmol/L Final     Chloride   Date Value Ref Range Status   01/26/2025 102 95 - 110 mmol/L Final     CO2   Date Value Ref Range Status   01/26/2025 26 23 - 29 mmol/L Final      Glucose   Date Value Ref Range Status   01/26/2025 83 70 - 110 mg/dL Final     BUN   Date Value Ref Range Status   01/26/2025 14 6 - 20 mg/dL Final     Creatinine   Date Value Ref Range Status   01/26/2025 0.9 0.5 - 1.4 mg/dL Final     Calcium   Date Value Ref Range Status   01/26/2025 8.4 (L) 8.7 - 10.5 mg/dL Final     Total Protein   Date Value Ref Range Status   01/24/2025 7.3 6.0 - 8.4 g/dL Final     Albumin   Date Value Ref Range Status   01/24/2025 3.5 3.5 - 5.2 g/dL Final     Total Bilirubin   Date Value Ref Range Status   01/24/2025 1.1 (H) 0.1 - 1.0 mg/dL Final     Comment:     For infants and newborns, interpretation of results should be based  on gestational age, weight and in agreement with clinical  observations.    Premature Infant recommended reference ranges:  Up to 24 hours.............<8.0 mg/dL  Up to 48 hours............<12.0 mg/dL  3-5 days..................<15.0 mg/dL  6-29 days.................<15.0 mg/dL       Alkaline Phosphatase   Date Value Ref Range Status   01/24/2025 58 40 - 150 U/L Final     AST   Date Value Ref Range Status   01/24/2025 22 10 - 40 U/L Final     ALT   Date Value Ref Range Status   01/24/2025 12 10 - 44 U/L Final     Anion Gap   Date Value Ref Range Status   01/26/2025 13 8 - 16 mmol/L Final     eGFR if    Date Value Ref Range Status   02/22/2022 >60.0 >60 mL/min/1.73 m^2 Final     eGFR if non    Date Value Ref Range Status   02/22/2022 >60.0 >60 mL/min/1.73 m^2 Final     Comment:     Calculation used to obtain the estimated glomerular filtration  rate (eGFR) is the CKD-EPI equation.        Lab Results   Component Value Date    HGBA1C 5.7 (H) 01/24/2025         ASSESSMENT: 59 y.o. year old male with chronic left hip pain that has gotten severe over the past several months not improving with medications.  X-rays show advanced sclerotic changes of the left hip:    1. Dorsalgia, unspecified  methocarbamoL (ROBAXIN) 750 MG Tab     pregabalin (LYRICA) 200 MG Cap    nortriptyline (PAMELOR) 25 MG capsule      2. Facet arthritis of lumbosacral region  methocarbamoL (ROBAXIN) 750 MG Tab    pregabalin (LYRICA) 200 MG Cap    nortriptyline (PAMELOR) 25 MG capsule      3. DDD (degenerative disc disease), lumbosacral  methocarbamoL (ROBAXIN) 750 MG Tab    pregabalin (LYRICA) 200 MG Cap    nortriptyline (PAMELOR) 25 MG capsule      4. Dorsalgia of lumbar region  methocarbamoL (ROBAXIN) 750 MG Tab    pregabalin (LYRICA) 200 MG Cap    nortriptyline (PAMELOR) 25 MG capsule      5. Lumbar radiculopathy, chronic  nortriptyline (PAMELOR) 25 MG capsule            PLAN:    1- Interventions:  Consider Bilateral Lumbar L4/5, L5/S1 MBB.    2. Pharmacology:  - Anticoagulation use: Patient is taking apixaban (Eliquis) for atrial fibrillation.    - Medications:    - Continue Lyrica  200 mg BID.. We discussed potential side effects of this medication which may include drowsiness,dizziness, dry mouth, constipation or peripheral edema. He has taken gabapentin in the past without relief.  Refill provided.    - Continue Robaxin 750 mg TID. Refill provided.    - Continue nortriptyline 25 mg nightly.  We have discussed potential common side effects of duloxetine including nausea, drowsiness, excitement or anxiety, dry mouth, constipation or changes in appetite or weight.  Patient expresses understanding.Refill provided.    - Will give bridge of Norco 5/325 mg , #14, 0 refills --to take 1/2 to 1 tab BID for severe pain.       - Topamax discontinued in the past- not effective.  - Not a candidate for oral NSAIDs due to Eliquis.    - Can take Tylenol (acetaminophen) 1000mg (two tablets of 500mg) 3x per day as needed for pain (to take up to max dose of 3000mg per day).       report:  Reviewed and consistent with medication use as prescribed.      - Therapy:  Continue at home exercises and stretches as tolerated.    - Imaging:  previous imaging studies reviewed. Discussed MRI  lumbar spine with patient today. Findings reveal degenerative changes at multiple levels including facet arthropathy.    - Consults/Referrals: none at this time.    - Follow up: return to clinic in 6-8 weeks , will schedule with Dr. Patterson to establish care (former Dr. Rausch patient).    - Counseled patient regarding the importance of activity modification    - Patient Questions: Answered all of the patient's questions regarding diagnosis, therapy, and treatment    The above plan and management options were discussed at length with patient. Patient is in agreement with the above and verbalized understanding.          Cliff Medley PA-C  Interventional Pain Management  [facility]

## 2025-03-06 ENCOUNTER — PATIENT MESSAGE (OUTPATIENT)
Dept: RHEUMATOLOGY | Facility: CLINIC | Age: 60
End: 2025-03-06
Payer: MEDICARE

## 2025-03-13 DIAGNOSIS — M10.9 ACUTE GOUT, UNSPECIFIED CAUSE, UNSPECIFIED SITE: Primary | ICD-10-CM

## 2025-03-13 RX ORDER — PREDNISONE 50 MG/1
50 TABLET ORAL DAILY
Qty: 10 TABLET | Refills: 0 | Status: SHIPPED | OUTPATIENT
Start: 2025-03-13 | End: 2025-03-23

## 2025-03-13 NOTE — TELEPHONE ENCOUNTER
Called patient to offer an appointment for tomorrow 3/14/25 at 8:30, patient denied and said the pain is not as bad as it was last week so he will call if needs an appointment before his scheduled appointment in may.

## 2025-04-01 ENCOUNTER — TELEPHONE (OUTPATIENT)
Dept: PAIN MEDICINE | Facility: CLINIC | Age: 60
End: 2025-04-01
Payer: MEDICARE

## 2025-04-01 NOTE — TELEPHONE ENCOUNTER
I called the patient and reschedule him and added him to the waiting list as well.    Zoey HOLT ([facility])

## 2025-04-01 NOTE — TELEPHONE ENCOUNTER
Reschedule 06/25/2025 1:15pm with Dr. Patterson. patient can't come tomorrow 04/02/2025. he has a toothache. ONLC in person with Dr. Patterson please

## 2025-04-01 NOTE — TELEPHONE ENCOUNTER
----- Message from Jesi sent at 4/1/2025  1:58 PM CDT -----  Contact: [phone]  Type:  Sooner Apoointment RequestCaller is requesting a sooner appointment.  Caller declined first available appointment listed below.  Caller will not accept being placed on the waitlist and is requesting a message be sent to doctor.Name of Caller:MEGHANA LOWE [[id number]]When is the first available appointment?RESCHEDULE APPOINTMENT SICKSymptoms: Follow up/ pain contractWould the patient rather a call back or a response via [employer]? CALL [facility] Call Back Number: [phone] Information: [id number]

## 2025-04-22 ENCOUNTER — PATIENT MESSAGE (OUTPATIENT)
Dept: CARDIOLOGY | Facility: CLINIC | Age: 60
End: 2025-04-22
Payer: MEDICARE

## 2025-04-23 RX ORDER — BUMETANIDE 2 MG/1
2 TABLET ORAL 3 TIMES DAILY
Qty: 270 TABLET | Refills: 3 | Status: SHIPPED | OUTPATIENT
Start: 2025-04-23 | End: 2026-04-23

## 2025-04-29 ENCOUNTER — LAB VISIT (OUTPATIENT)
Dept: LAB | Facility: HOSPITAL | Age: 60
End: 2025-04-29
Attending: INTERNAL MEDICINE
Payer: MEDICARE

## 2025-04-29 DIAGNOSIS — M1A.09X0 IDIOPATHIC CHRONIC GOUT OF MULTIPLE SITES WITHOUT TOPHUS: ICD-10-CM

## 2025-04-29 DIAGNOSIS — D84.821 IMMUNOSUPPRESSION DUE TO DRUG THERAPY: ICD-10-CM

## 2025-04-29 DIAGNOSIS — Z51.81 MEDICATION MONITORING ENCOUNTER: ICD-10-CM

## 2025-04-29 DIAGNOSIS — L40.50 PSORIATIC ARTHRITIS: ICD-10-CM

## 2025-04-29 DIAGNOSIS — Z79.899 IMMUNOSUPPRESSION DUE TO DRUG THERAPY: ICD-10-CM

## 2025-04-29 LAB
ABSOLUTE EOSINOPHIL (OHS): 0.09 K/UL
ABSOLUTE MONOCYTE (OHS): 0.39 K/UL (ref 0.3–1)
ABSOLUTE NEUTROPHIL COUNT (OHS): 1.69 K/UL (ref 1.8–7.7)
ALBUMIN SERPL BCP-MCNC: 3.9 G/DL (ref 3.5–5.2)
ALP SERPL-CCNC: 69 UNIT/L (ref 40–150)
ALT SERPL W/O P-5'-P-CCNC: 7 UNIT/L (ref 10–44)
ANION GAP (OHS): 9 MMOL/L (ref 8–16)
AST SERPL-CCNC: 15 UNIT/L (ref 11–45)
BASOPHILS # BLD AUTO: 0.02 K/UL
BASOPHILS NFR BLD AUTO: 0.7 %
BILIRUB SERPL-MCNC: 1.2 MG/DL (ref 0.1–1)
BUN SERPL-MCNC: 18 MG/DL (ref 6–20)
CALCIUM SERPL-MCNC: 9 MG/DL (ref 8.7–10.5)
CHLORIDE SERPL-SCNC: 106 MMOL/L (ref 95–110)
CO2 SERPL-SCNC: 30 MMOL/L (ref 23–29)
CREAT SERPL-MCNC: 1.2 MG/DL (ref 0.5–1.4)
CRP SERPL-MCNC: 6 MG/L
ERYTHROCYTE [DISTWIDTH] IN BLOOD BY AUTOMATED COUNT: 17 % (ref 11.5–14.5)
GFR SERPLBLD CREATININE-BSD FMLA CKD-EPI: >60 ML/MIN/1.73/M2
GLUCOSE SERPL-MCNC: 93 MG/DL (ref 70–110)
HCT VFR BLD AUTO: 48 % (ref 40–54)
HGB BLD-MCNC: 15 GM/DL (ref 14–18)
IMM GRANULOCYTES # BLD AUTO: 0.01 K/UL (ref 0–0.04)
IMM GRANULOCYTES NFR BLD AUTO: 0.4 % (ref 0–0.5)
LYMPHOCYTES # BLD AUTO: 0.65 K/UL (ref 1–4.8)
MCH RBC QN AUTO: 28.6 PG (ref 27–31)
MCHC RBC AUTO-ENTMCNC: 31.3 G/DL (ref 32–36)
MCV RBC AUTO: 92 FL (ref 82–98)
NUCLEATED RBC (/100WBC) (OHS): 0 /100 WBC
PLATELET # BLD AUTO: 117 K/UL (ref 150–450)
PMV BLD AUTO: 11.9 FL (ref 9.2–12.9)
POTASSIUM SERPL-SCNC: 4.4 MMOL/L (ref 3.5–5.1)
PROT SERPL-MCNC: 8.4 GM/DL (ref 6–8.4)
RBC # BLD AUTO: 5.24 M/UL (ref 4.6–6.2)
RELATIVE EOSINOPHIL (OHS): 3.2 %
RELATIVE LYMPHOCYTE (OHS): 22.8 % (ref 18–48)
RELATIVE MONOCYTE (OHS): 13.7 % (ref 4–15)
RELATIVE NEUTROPHIL (OHS): 59.2 % (ref 38–73)
SODIUM SERPL-SCNC: 145 MMOL/L (ref 136–145)
URATE SERPL-MCNC: 11.9 MG/DL (ref 3.4–7)
WBC # BLD AUTO: 2.85 K/UL (ref 3.9–12.7)

## 2025-04-29 PROCEDURE — 85025 COMPLETE CBC W/AUTO DIFF WBC: CPT

## 2025-04-29 PROCEDURE — 36415 COLL VENOUS BLD VENIPUNCTURE: CPT | Mod: PO

## 2025-04-29 PROCEDURE — 84550 ASSAY OF BLOOD/URIC ACID: CPT

## 2025-04-29 PROCEDURE — 80053 COMPREHEN METABOLIC PANEL: CPT

## 2025-04-29 PROCEDURE — 86140 C-REACTIVE PROTEIN: CPT

## 2025-05-02 ENCOUNTER — OFFICE VISIT (OUTPATIENT)
Dept: RHEUMATOLOGY | Facility: CLINIC | Age: 60
End: 2025-05-02
Payer: MEDICARE

## 2025-05-02 ENCOUNTER — TELEPHONE (OUTPATIENT)
Dept: RHEUMATOLOGY | Facility: CLINIC | Age: 60
End: 2025-05-02
Payer: MEDICARE

## 2025-05-02 DIAGNOSIS — M1A.09X0 IDIOPATHIC CHRONIC GOUT OF MULTIPLE SITES WITHOUT TOPHUS: Primary | ICD-10-CM

## 2025-05-02 DIAGNOSIS — Z71.89 COUNSELING ON HEALTH PROMOTION AND DISEASE PREVENTION: ICD-10-CM

## 2025-05-02 DIAGNOSIS — L40.9 PSORIASIS: ICD-10-CM

## 2025-05-02 DIAGNOSIS — R73.03 PREDIABETES: Chronic | ICD-10-CM

## 2025-05-02 DIAGNOSIS — M10.9 ACUTE GOUT, UNSPECIFIED CAUSE, UNSPECIFIED SITE: ICD-10-CM

## 2025-05-02 DIAGNOSIS — Z91.199 NONADHERENCE TO MEDICAL TREATMENT: ICD-10-CM

## 2025-05-02 DIAGNOSIS — Z51.81 MEDICATION MONITORING ENCOUNTER: ICD-10-CM

## 2025-05-02 RX ORDER — PREDNISONE 20 MG/1
20 TABLET ORAL DAILY PRN
Qty: 10 TABLET | Refills: 0 | Status: SHIPPED | OUTPATIENT
Start: 2025-05-02 | End: 2025-05-12

## 2025-05-02 RX ORDER — FEBUXOSTAT 40 MG/1
40 TABLET, FILM COATED ORAL DAILY
Qty: 30 TABLET | Refills: 3 | Status: SHIPPED | OUTPATIENT
Start: 2025-05-02 | End: 2025-06-01

## 2025-05-02 RX ORDER — COLCHICINE 0.6 MG/1
0.6 TABLET ORAL DAILY
Qty: 30 TABLET | Refills: 3 | Status: SHIPPED | OUTPATIENT
Start: 2025-05-02 | End: 2025-06-01

## 2025-05-02 NOTE — PROGRESS NOTES
The patient location is: [place]  The chief complaint leading to consultation is: gout    Visit type: audiovisual    Face to Face time with patient: 30  30 minutes of total time spent on the encounter, which includes face to face time and non-face to face time preparing to see the patient (eg, review of tests), Obtaining and/or reviewing separately obtained history, Documenting clinical information in the electronic or other health record, Independently interpreting results (not separately reported) and communicating results to the patient/family/caregiver, or Care coordination (not separately reported).         Each patient to whom he or she provides medical services by telemedicine is:  (1) informed of the relationship between the physician and patient and the respective role of any other health care provider with respect to management of the patient; and (2) notified that he or she may decline to receive medical services by telemedicine and may withdraw from such care at any time.    Notes:      RHEUMATOLOGY OUTPATIENT CLINIC NOTE    5/2/2025    Attending Rheumatologist: Constantine Raphael  Primary Care Provider/Physician Requesting Consultation: Dominick Allen MD   Chief Complaint/Reason For Consultation:  No chief complaint on file.      Subjective:     Caio Ontiveros is a 59 y.o. Black or  male with PsA, Gout    Adherence w/ Taltz per Dermatology.  Not taking Uloric (prescribed).  Refers recurrent gout flares since last visit.    Review of Systems   Eyes:  Negative for pain.   Gastrointestinal:  Negative for melena.   Genitourinary:  Negative for dysuria and urgency.   Musculoskeletal:  Positive for joint pain.   Neurological:  Negative for focal weakness.       Chronic comorbid conditions affecting medical decision making today:  Past Medical History:   Diagnosis Date    A-fib     CHF (congestive heart failure)     Gout, chronic     Hypertension     Sleep apnea      Past Surgical History:   Procedure  Laterality Date    CHOLECYSTECTOMY      GASTRIC BYPASS  2014    INJECTION OF JOINT Left 8/23/2023    Procedure: Left IA Hip Joint injection;  Surgeon: Peña Rausch MD;  Location: [facility];  Service: Pain Management;  Laterality: Left;     Family History   Problem Relation Name Age of Onset    Hypertension Mother      Stroke Father      Diabetes Father      Hypertension Father      Diabetes Sister      Diabetes Brother       Tobacco Use History[1]  Current Medications[2]     Objective:     There were no vitals filed for this visit.  Physical Exam   Eyes: Conjunctivae are normal.   Pulmonary/Chest: Effort normal. No respiratory distress.   Musculoskeletal:         General: No swelling.       Reviewed available old and all outside pertinent medical records available.    All lab results personally reviewed and interpreted by me.       ASSESSMENT / PLAN     1. Idiopathic chronic gout of multiple sites without tophus  In-adherence with urate lowering therapy.  Per patient, instructed by Cardiology against allopurinol.  Resume Uloric at 40mg per day, trend UrA at 4m and close to f/u.  C/ colchicine until UrA on target for 2 consecutive readings.  Uric Acid      2. Acute gout, unspecified cause, unspecified site  predniSONE (DELTASONE) 20 MG tablet      3. Medication monitoring encounter  Comprehensive Metabolic Panel      4. Prediabetes  Caution advised w/ systemic steroid use      5. Psoriasis  On Taltz per Dermatology      6. Counseling on health promotion and disease prevention  Low purine diet.  Adherence w/ uloric reinforced.              Visit today included increased complexity associated with the care of the episodic problem Idiopathic chronic gout of multiple sites without tophus addressed and managing the longitudinal care of the patient due to the serious and/or complex managed problem(s) non-adherence to medical treatment, Medication monitoring encounter.    Constantine Raphael M.D.           [1]   Social  History  Tobacco Use   Smoking Status Never   Smokeless Tobacco Never   [2]   Current Outpatient Medications:     apixaban (ELIQUIS) 5 mg Tab, Take 1 tablet by mouth twice daily, Disp: 60 tablet, Rfl: 5    bumetanide (BUMEX) 2 MG tablet, Take 1 tablet (2 mg total) by mouth 3 (three) times daily., Disp: 270 tablet, Rfl: 3    calcipotriene (DOVONOX) 0.005 % cream, Apply topically 2 (two) times daily., Disp: 30 g, Rfl: 1    colchicine (COLCRYS) 0.6 mg tablet, Take 1 tablet (0.6 mg total) by mouth once daily., Disp: 90 tablet, Rfl: 0    HYDROcodone-acetaminophen (NORCO) 5-325 mg per tablet, Take 1/2 to 1 tab BID PRN pain., Disp: 14 tablet, Rfl: 0    ixekizumab (TALTZ AUTOINJECTOR) 80 mg/mL AtIn, Inject 80 mg into the skin every 28 days., Disp: 3 mL, Rfl: 3    levothyroxine (SYNTHROID) 50 MCG tablet, Take 1 tablet (50 mcg total) by mouth before breakfast., Disp: 90 tablet, Rfl: 3    methocarbamoL (ROBAXIN) 750 MG Tab, Take 1 tablet (750 mg total) by mouth every 8 (eight) hours as needed (take to help relieve muscle pain/spasms)., Disp: 90 tablet, Rfl: 2    metoprolol succinate (TOPROL-XL) 100 MG 24 hr tablet, Take 1 tablet by mouth once daily, Disp: 90 tablet, Rfl: 3    nortriptyline (PAMELOR) 25 MG capsule, Take 1 capsule (25 mg total) by mouth every evening., Disp: 30 capsule, Rfl: 2    potassium chloride SA (K-DUR,KLOR-CON) 20 MEQ tablet, Take 1 tablet (20 mEq total) by mouth 2 (two) times daily., Disp: 180 tablet, Rfl: 3    pregabalin (LYRICA) 200 MG Cap, Take 1 capsule (200 mg total) by mouth 2 (two) times daily., Disp: 60 capsule, Rfl: 2    tirzepatide, weight loss, (ZEPBOUND) 5 mg/0.5 mL PnIj, Inject 5 mg into the skin every 7 days., Disp: 2 mL, Rfl: 2    venlafaxine (EFFEXOR-XR) 150 MG Cp24, TAKE 1 CAPSULE BY MOUTH ONCE  DAILY, Disp: 90 capsule, Rfl: 3

## 2025-05-02 NOTE — TELEPHONE ENCOUNTER
----- Message from Nicole sent at 5/2/2025  7:05 AM CDT -----  Contact: pt                                                          CHANGE VIRTUAL TIME TODAY'S APPTType:  Later time today Appointment RequestCaller is requesting a sooner appointment.  Caller declined first available appointment listed below.  Caller will not accept being placed on the waitlist and is requesting a message be sent to doctor.Name of Caller: ptWhen is the first available appointment? Pt is candi for 7:30am today but need an appt around 10 or 11am this morningSymptoms: GOUT--PSORIATICWould the patient rather a call back or a response via [facility]?  [facility] hospital Call Back Number: Additional Information:

## 2025-05-13 DIAGNOSIS — I10 ESSENTIAL HYPERTENSION: Chronic | ICD-10-CM

## 2025-05-14 RX ORDER — LOSARTAN POTASSIUM 100 MG/1
100 TABLET ORAL
Qty: 90 TABLET | Refills: 3 | OUTPATIENT
Start: 2025-05-14

## 2025-05-14 NOTE — TELEPHONE ENCOUNTER
losartan (COZAAR) 100 MG tablet     The original prescription was discontinued on 1/7/2025 by Ricky Jeffrey MD. Renewing this prescription may not be appropriate.

## 2025-05-14 NOTE — TELEPHONE ENCOUNTER
Care Due:                  Date            Visit Type   Department     Provider  --------------------------------------------------------------------------------                                MYCHART                              FOLLOWUP/OF  [facility] INTERNAL  Last Visit: 06-      FICE VISIT   [facility] [place]       Dominick Allen                              MYCHART                              FOLLOWUP/OF  [facility] INTERNAL  Next Visit: 06-      FICE VISIT   [facility] [place]       Dominick Allen                                                            Last  Test          Frequency    Reason                     Performed    Due Date  --------------------------------------------------------------------------------    TSH.........  12 months..  levothyroxine............  06- 06-    Health [facility] Embedded Care Due Messages. Reference number: [id number].   5/13/2025 9:59:13 PM CDT

## 2025-05-15 ENCOUNTER — PATIENT MESSAGE (OUTPATIENT)
Dept: PULMONOLOGY | Facility: CLINIC | Age: 60
End: 2025-05-15
Payer: MEDICARE

## 2025-05-20 ENCOUNTER — OFFICE VISIT (OUTPATIENT)
Dept: PULMONOLOGY | Facility: CLINIC | Age: 60
End: 2025-05-20
Payer: MEDICARE

## 2025-05-20 DIAGNOSIS — J98.4 RESTRICTIVE LUNG DISEASE: ICD-10-CM

## 2025-05-20 DIAGNOSIS — I50.42 CHRONIC COMBINED SYSTOLIC AND DIASTOLIC HRT FAIL: ICD-10-CM

## 2025-05-20 DIAGNOSIS — G47.33 SEVERE OBSTRUCTIVE SLEEP APNEA: ICD-10-CM

## 2025-05-20 DIAGNOSIS — E66.2 OBESITY HYPOVENTILATION SYNDROME: ICD-10-CM

## 2025-05-20 DIAGNOSIS — I27.20 PULMONARY HYPERTENSION: ICD-10-CM

## 2025-05-20 DIAGNOSIS — Z99.11 DEPENDENCE ON HOME VENTILATOR: Primary | ICD-10-CM

## 2025-05-20 NOTE — PROGRESS NOTES
Pulmonary Outpatient Follow Up Visit     Subjective:       Patient ID: Caio Ontiveros is a 59 y.o. male.    Chief Complaint:  Dependent on home ventilator  The patient location is: Home  The chief complaint leading to consultation is: sleep problems  Visit type: Virtual visit with synchronous audio and video  Total time spent with patient: 15 min  Each patient to whom he or she provides medical services by telemedicine is:  (1) informed of the relationship between the physician and patient and the respective role of any other health care provider with respect to management of the patient; and (2) notified that he or she may decline to receive medical services by telemedicine and may withdraw from such care at any time.  Total time spent in face to face counseling and coordination of care -  15minutes over 50% of time was used in discussion of prognosis, risks, benefits of treatment, instructions and compliance with regimen .      History of Present Illness    CHIEF COMPLAINT:  Patient presents today for three month follow-up regarding insurance denial of Zepbound medication.    RESPIRATORY:  He reports stable breathing but experiences hyperventilation with mental component when walking long distances from vehicle. He uses a Dodie ventilator with supplemental oxygen nightly for sleep apnea management.    MUSCULOSKELETAL:  He reports lower back pain as a primary concern.    SOCIAL HISTORY:  He denies history of smoking.                   Review of Systems   Constitutional:  Positive for fatigue and weakness.        30 lb weight gain   Respiratory:  Positive for cough, shortness of breath, previous hospitalization due to pulmonary problems, dyspnea on extertion and somnolence.    Cardiovascular:  Positive for leg swelling.   Psychiatric/Behavioral:  Positive for sleep disturbance.        Encounter Medications[1]    Objective:     Vital Signs (Most Recent)   ]  Wt Readings from Last  "2 Encounters:   02/18/25 (!) 194.4 kg (428 lb 9.2 oz)   02/18/25 (!) 194.4 kg (428 lb 9.2 oz)       Physical Exam    Laboratory  Lab Results   Component Value Date    WBC 2.85 (L) 04/29/2025    RBC 5.24 04/29/2025    HGB 15.0 04/29/2025    HCT 48.0 04/29/2025    MCV 92 04/29/2025    MCH 28.6 04/29/2025    MCHC 31.3 (L) 04/29/2025    RDW 17.0 (H) 04/29/2025     (L) 04/29/2025    MPV 11.9 04/29/2025    GRAN 1.7 (L) 01/24/2025    GRAN 64.2 01/24/2025    LYMPH 22.8 04/29/2025    LYMPH 0.65 (L) 04/29/2025    MONO 13.7 04/29/2025    MONO 0.39 04/29/2025    EOS 3.2 04/29/2025    EOS 0.09 04/29/2025    BASO 0.02 01/24/2025    EOSINOPHIL 0.4 01/24/2025    BASOPHIL 0.7 04/29/2025    BASOPHIL 0.02 04/29/2025       BMP  Lab Results   Component Value Date     04/29/2025    K 4.4 04/29/2025     04/29/2025    CO2 30 (H) 04/29/2025    BUN 18 04/29/2025    CREATININE 1.2 04/29/2025    CALCIUM 9.0 04/29/2025    ANIONGAP 9 04/29/2025    ESTGFRAFRICA >60.0 02/22/2022    EGFRNONAA >60.0 02/22/2022    AST 15 04/29/2025    ALT 7 (L) 04/29/2025    PROT 8.4 04/29/2025       Lab Results   Component Value Date     (H) 01/24/2025     (H) 11/14/2024     (H) 04/24/2024     (H) 04/22/2024    BNP 67 01/11/2024     (H) 06/16/2023       Lab Results   Component Value Date    TSH 3.581 06/11/2024       Lab Results   Component Value Date    SEDRATE 4 02/20/2020       Lab Results   Component Value Date    CRP 6.0 04/29/2025     No results found for: "IGE"     No results found for: "ASPERGILLUS"  No results found for: "AFUMIGATUSCL"     Lab Results   Component Value Date    ACE 23 11/22/2021        Diagnostic Results:  I have personally reviewed today the following studies:  As above    Assessment/Plan:   Dependence on home ventilator    Severe obstructive sleep apnea    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    OBESITY HYPOVENTILATION SYNDROME:  - Recommend continued use of oxygen therapy (3 L) during ambulation based on walking test results and when feeling sleepy, tired, or short of breath.  - Advised continued use of Dodie ventilator at night and as needed during the day for symptoms of sleepiness, fatigue, or shortness of breath.    DEPENDENCE ON HOME VENTILATOR:  - Advised continued use of Dodie ventilator at night and as needed during the day for symptoms of sleepiness, fatigue, or shortness of breath.  - Ordered ventilator data download from [employer] for review.    CHRONIC COMBINED SYSTOLIC AND DIASTOLIC HRT FAIL:  - Recommend continued use of oxygen therapy (3 L) during ambulation based on walking test results and when feeling sleepy, tired, or short of breath.  - Determined need for in-person follow-up to better assess condition.    PULMONARY HYPERTENSION:  - Recommend continued use of oxygen therapy (3 L) during ambulation based on walking test results and when feeling sleepy, tired, or short of breath.  - Determined need for in-person follow-up to better assess condition.    SEVERE OBSTRUCTIVE SLEEP APNEA:  - Advised continued use of Dodie ventilator at night and as needed during the day for symptoms of sleepiness, fatigue, or shortness of breath.  - Ordered ventilator data download from [employer] for review.    RESTRICTIVE LUNG DISEASE:  - Recommend continued use of oxygen therapy (3 L) during ambulation based on walking test results and when feeling sleepy, tired, or short of breath.  - Determined need for in-person  follow-up to better assess condition.       Continue home ventilation.      Obtain data review from [facility]    O2 on exertion and during sleep via ventilator.      In-person visit next time      Follow up in about 6 months (around 11/20/2025).    This note was prepared using voice recognition system and is likely to have sound alike errors that may have been overlooked even after proof reading.  Please call me with any questions    Discussed diagnosis, its evaluation, treatment and usual course. All questions answered.      Erick Bateman MD         [1]   Outpatient Encounter Medications as of 5/20/2025   Medication Sig Dispense Refill    apixaban (ELIQUIS) 5 mg Tab Take 1 tablet by mouth twice daily 60 tablet 5    bumetanide (BUMEX) 2 MG tablet Take 1 tablet (2 mg total) by mouth 3 (three) times daily. 270 tablet 3    calcipotriene (DOVONOX) 0.005 % cream Apply topically 2 (two) times daily. 30 g 1    colchicine (COLCRYS) 0.6 mg tablet Take 1 tablet (0.6 mg total) by mouth once daily. 30 tablet 3    febuxostat (ULORIC) 40 mg Tab Take 1 tablet (40 mg total) by mouth once daily. 30 tablet 3    HYDROcodone-acetaminophen (NORCO) 5-325 mg per tablet Take 1/2 to 1 tab BID PRN pain. 14 tablet 0    ixekizumab (TALTZ AUTOINJECTOR) 80 mg/mL AtIn Inject 80 mg into the skin every 28 days. 3 mL 3    levothyroxine (SYNTHROID) 50 MCG tablet Take 1 tablet (50 mcg total) by mouth before breakfast. 90 tablet 3    methocarbamoL (ROBAXIN) 750 MG Tab Take 1 tablet (750 mg total) by mouth every 8 (eight) hours as needed (take to help relieve muscle pain/spasms). 90 tablet 2    metoprolol succinate (TOPROL-XL) 100 MG 24 hr tablet Take 1 tablet by mouth once daily 90 tablet 3    nortriptyline (PAMELOR) 25 MG capsule Take 1 capsule (25 mg total) by mouth every evening. 30 capsule 2    potassium chloride SA (K-DUR,KLOR-CON) 20 MEQ tablet Take 1 tablet (20 mEq total) by mouth 2 (two) times daily. 180 tablet 3    pregabalin (LYRICA) 200 MG  Cap Take 1 capsule (200 mg total) by mouth 2 (two) times daily. 60 capsule 2    tirzepatide, weight loss, (ZEPBOUND) 5 mg/0.5 mL PnIj Inject 5 mg into the skin every 7 days. 2 mL 2    venlafaxine (EFFEXOR-XR) 150 MG Cp24 TAKE 1 CAPSULE BY MOUTH ONCE  DAILY 90 capsule 3     No facility-administered encounter medications on file as of 5/20/2025.

## 2025-05-27 DIAGNOSIS — M54.50 DORSALGIA OF LUMBAR REGION: ICD-10-CM

## 2025-05-27 DIAGNOSIS — M47.817 FACET ARTHRITIS OF LUMBOSACRAL REGION: ICD-10-CM

## 2025-05-27 DIAGNOSIS — M54.9 DORSALGIA, UNSPECIFIED: ICD-10-CM

## 2025-05-27 DIAGNOSIS — M51.379 DDD (DEGENERATIVE DISC DISEASE), LUMBOSACRAL: ICD-10-CM

## 2025-05-27 RX ORDER — PREGABALIN 200 MG/1
200 CAPSULE ORAL 2 TIMES DAILY
Qty: 60 CAPSULE | Refills: 2 | Status: SHIPPED | OUTPATIENT
Start: 2025-05-27

## 2025-05-27 NOTE — TELEPHONE ENCOUNTER
Good afternoon,    Pt is requesting for a refill of: pregabalin (LYRICA) 200 MG Cap   Last filled: 2/25/25  Last encounter: 2/25/25  Upcoming apt: 6/25/25  [facility]   [facility] [place], [place] - [address] [address]

## 2025-06-06 DIAGNOSIS — M54.9 DORSALGIA, UNSPECIFIED: ICD-10-CM

## 2025-06-06 DIAGNOSIS — M54.50 DORSALGIA OF LUMBAR REGION: ICD-10-CM

## 2025-06-06 DIAGNOSIS — M51.379 DDD (DEGENERATIVE DISC DISEASE), LUMBOSACRAL: ICD-10-CM

## 2025-06-06 DIAGNOSIS — M47.817 FACET ARTHRITIS OF LUMBOSACRAL REGION: ICD-10-CM

## 2025-06-06 RX ORDER — METHOCARBAMOL 750 MG/1
750 TABLET, FILM COATED ORAL EVERY 8 HOURS PRN
Qty: 90 TABLET | Refills: 2 | Status: SHIPPED | OUTPATIENT
Start: 2025-06-06

## 2025-06-25 ENCOUNTER — TELEPHONE (OUTPATIENT)
Dept: PAIN MEDICINE | Facility: CLINIC | Age: 60
End: 2025-06-25
Payer: MEDICARE

## 2025-06-25 NOTE — TELEPHONE ENCOUNTER
Returned call to patient, he missed appt and wants to reschedule with Dr. Patterson.  Informed patient that we will call and when we have him rescheduled with Dr. Patterson in  a few days

## 2025-06-27 ENCOUNTER — TELEPHONE (OUTPATIENT)
Dept: PAIN MEDICINE | Facility: CLINIC | Age: 60
End: 2025-06-27
Payer: MEDICARE

## 2025-06-27 NOTE — TELEPHONE ENCOUNTER
Copied from CRM #[id number]. Topic: General Inquiry - Return Call  >> Jun 27, 2025  1:54 PM Umang wrote:  .Type:  Patient Returning Call    Who Called:.Caio Ontiveros  Who Left Message for Patient:  Does the patient know what this is regarding?:yes  Would the patient rather a call back or a response via [facility]? Call back  Best Call Back Number:.[phone]  Additional Information: pt states he is returning a call

## 2025-06-27 NOTE — TELEPHONE ENCOUNTER
Reached out to patient to schedule appointment from messages. Apt has been made.   Pt understand. All questions answered.     Alberto Coleman Medical Assistant

## 2025-06-30 ENCOUNTER — PATIENT OUTREACH (OUTPATIENT)
Dept: ADMINISTRATIVE | Facility: HOSPITAL | Age: 60
End: 2025-06-30
Payer: MEDICARE

## 2025-06-30 NOTE — PROGRESS NOTES
[facility] OUTREACH: per chart review pt is DUE for Annual PCP visit, and PSA, attempted to contact pt to offer scheduling no ans, lvm. Will send portal message

## 2025-07-03 ENCOUNTER — TELEPHONE (OUTPATIENT)
Dept: INTERNAL MEDICINE | Facility: CLINIC | Age: 60
End: 2025-07-03
Payer: MEDICARE

## 2025-07-14 DIAGNOSIS — R45.4 IRRITABILITY AND ANGER: ICD-10-CM

## 2025-07-14 DIAGNOSIS — F51.04 PSYCHOPHYSIOLOGICAL INSOMNIA: ICD-10-CM

## 2025-07-14 DIAGNOSIS — F33.1 MODERATE EPISODE OF RECURRENT MAJOR DEPRESSIVE DISORDER: ICD-10-CM

## 2025-07-15 NOTE — TELEPHONE ENCOUNTER
Care Due:                  Date            Visit Type   Department     Provider  --------------------------------------------------------------------------------                                MYCHART                              FOLLOWUP/OF  [facility] INTERNAL  Last Visit: 06-      FICE VISIT   MEDICINE       Dominick Allen                              EP -                              PRIMARY      [facility] INTER[facility]  Next Visit: 08-      CARE ([facility])   MEDICINE       Dominick Allen                                                            Last  Test          Frequency    Reason                     Performed    Due Date  --------------------------------------------------------------------------------    TSH.........  12 months..  levothyroxine............  06- 06-    Health [facility] Embedded Care Due Messages. Reference number: [id number].   7/14/2025 10:04:21 PM CDT

## 2025-07-17 RX ORDER — METOPROLOL SUCCINATE 100 MG/1
100 TABLET, EXTENDED RELEASE ORAL
Qty: 30 TABLET | Refills: 5 | Status: SHIPPED | OUTPATIENT
Start: 2025-07-17

## 2025-07-21 RX ORDER — VENLAFAXINE HYDROCHLORIDE 150 MG/1
150 CAPSULE, EXTENDED RELEASE ORAL
Qty: 90 CAPSULE | Refills: 3 | Status: SHIPPED | OUTPATIENT
Start: 2025-07-21

## 2025-07-24 ENCOUNTER — PATIENT MESSAGE (OUTPATIENT)
Dept: CARDIOLOGY | Facility: CLINIC | Age: 60
End: 2025-07-24
Payer: MEDICARE

## 2025-07-24 DIAGNOSIS — I50.32 CHRONIC DIASTOLIC CONGESTIVE HEART FAILURE: Primary | ICD-10-CM

## 2025-07-27 RX ORDER — BUMETANIDE 2 MG/1
3 TABLET ORAL 3 TIMES DAILY
Qty: 405 TABLET | Refills: 3 | Status: SHIPPED | OUTPATIENT
Start: 2025-07-27 | End: 2026-07-27

## 2025-07-27 NOTE — TELEPHONE ENCOUNTER
Increase Bumex from 2 mg tid to 3 mg tif  NT BNP and BMP ordered  Please schedule a f/u with me. ASAP Ok for overbook.   Thx

## 2025-07-28 ENCOUNTER — OFFICE VISIT (OUTPATIENT)
Dept: PAIN MEDICINE | Facility: CLINIC | Age: 60
End: 2025-07-28
Payer: MEDICARE

## 2025-07-28 VITALS
HEART RATE: 75 BPM | BODY MASS INDEX: 53.57 KG/M2 | HEIGHT: 75 IN | SYSTOLIC BLOOD PRESSURE: 125 MMHG | RESPIRATION RATE: 17 BRPM | DIASTOLIC BLOOD PRESSURE: 62 MMHG

## 2025-07-28 DIAGNOSIS — M47.817 FACET ARTHRITIS OF LUMBOSACRAL REGION: ICD-10-CM

## 2025-07-28 DIAGNOSIS — M54.9 DORSALGIA, UNSPECIFIED: ICD-10-CM

## 2025-07-28 DIAGNOSIS — M54.50 DORSALGIA OF LUMBAR REGION: ICD-10-CM

## 2025-07-28 DIAGNOSIS — M54.16 LUMBAR RADICULOPATHY, CHRONIC: ICD-10-CM

## 2025-07-28 DIAGNOSIS — M51.370 DEGENERATION OF INTERVERTEBRAL DISC OF LUMBOSACRAL REGION WITH DISCOGENIC BACK PAIN: ICD-10-CM

## 2025-07-28 PROCEDURE — 4010F ACE/ARB THERAPY RXD/TAKEN: CPT | Mod: CPTII,S$GLB,, | Performed by: PHYSICAL MEDICINE & REHABILITATION

## 2025-07-28 PROCEDURE — 99214 OFFICE O/P EST MOD 30 MIN: CPT | Mod: S$GLB,,, | Performed by: PHYSICAL MEDICINE & REHABILITATION

## 2025-07-28 PROCEDURE — 3044F HG A1C LEVEL LT 7.0%: CPT | Mod: CPTII,S$GLB,, | Performed by: PHYSICAL MEDICINE & REHABILITATION

## 2025-07-28 PROCEDURE — 3074F SYST BP LT 130 MM HG: CPT | Mod: CPTII,S$GLB,, | Performed by: PHYSICAL MEDICINE & REHABILITATION

## 2025-07-28 PROCEDURE — 1159F MED LIST DOCD IN RCRD: CPT | Mod: CPTII,S$GLB,, | Performed by: PHYSICAL MEDICINE & REHABILITATION

## 2025-07-28 PROCEDURE — 3078F DIAST BP <80 MM HG: CPT | Mod: CPTII,S$GLB,, | Performed by: PHYSICAL MEDICINE & REHABILITATION

## 2025-07-28 PROCEDURE — G2211 COMPLEX E/M VISIT ADD ON: HCPCS | Mod: S$GLB,,, | Performed by: PHYSICAL MEDICINE & REHABILITATION

## 2025-07-28 PROCEDURE — 3008F BODY MASS INDEX DOCD: CPT | Mod: CPTII,S$GLB,, | Performed by: PHYSICAL MEDICINE & REHABILITATION

## 2025-07-28 PROCEDURE — 99999 PR PBB SHADOW E&M-EST. PATIENT-LVL III: CPT | Mod: PBBFAC,,, | Performed by: PHYSICAL MEDICINE & REHABILITATION

## 2025-07-28 RX ORDER — GABAPENTIN 800 MG/1
800 TABLET ORAL 3 TIMES DAILY
Qty: 90 TABLET | Refills: 11 | Status: SHIPPED | OUTPATIENT
Start: 2025-07-28 | End: 2026-07-28

## 2025-07-28 RX ORDER — NORTRIPTYLINE HYDROCHLORIDE 50 MG/1
50 CAPSULE ORAL NIGHTLY
Qty: 30 CAPSULE | Refills: 2 | Status: SHIPPED | OUTPATIENT
Start: 2025-07-28 | End: 2025-10-26

## 2025-07-28 RX ORDER — METHOCARBAMOL 750 MG/1
750 TABLET, FILM COATED ORAL EVERY 8 HOURS PRN
Qty: 90 TABLET | Refills: 2 | Status: SHIPPED | OUTPATIENT
Start: 2025-07-28

## 2025-07-28 NOTE — PROGRESS NOTES
Established Patient - TeleHealth Visit    The patient location is: home  The chief complaint leading to consultation is:  Chronic pain    Visit type: audiovisual  Encounter which includes face to face time and non-face to face time preparing to see the patient (eg, review of tests), Obtaining and/or reviewing separately obtained history, Documenting clinical information in the electronic or other health record, Independently interpreting results (not separately reported) and communicating results to the patient/family/caregiver, or Care coordination (not separately reported).      Each patient to whom he or she provides medical services by telemedicine is:  (1) informed of the relationship between the physician and patient and the respective role of any other health care provider with respect to management of the patient; and (2) notified that he or she may decline to receive medical services by telemedicine and may withdraw from such care at any time.         SUBJECTIVE:    Interval History (7/28/2025):    Caio Ontiveros is a 59 y.o. male presents today for follow up for chronic back pain.  Current pain intensity is 9/10.  He continues his medication regimen consisting of Robaxin, Lyrica, and Pamelor.  He reports this medication regimen is providing limited benefit this time unfortunately.  Patient is still not overly interested in interventions for his lumbar pain due to fear.  He continues to have cardiopulmonary issues and increased edema in his lower extremities.  He is working on setting up an appointment with his cardiologist for further workup and recommendations.      Interval HPI 02/25/2025:  Caio Ontiveros presents today for follow-up visit.  Patient was last seen on 2/25/2025. At that visit, the plan was to complete imaging studies. Patient reports he cannot complete long walks due to pain back. It is constant. He has noticed improvement with the stinging pain in LLE .    Interval History (1/7/2025):Caio Ontiveros  "presents today for follow-up visit.  Patient was last seen on 2/25/2025. Patient reports lower back pain worse early in the morning and especially getting in/out of vehicle. He states that long walks "kill" him.   He also noticed pain radiating into LLE down the back of his leg to the knee.     Interval History (8/12/24):  Caio Ontiveros presents today for follow-up visit.  Patient was last seen on 6/24/2024. The patient reports that the medication increases/changes help but he still has lower back pain. He has discussed back procedures with several friends and is afraid to move forward with it due to their experiences (his friends informed him that the procedures did not help with pain).   He is currently taking Lyrica 150 mg BID and Robaxin 750 gm TID. Tolerating medications well with no side effects.       Interval History (6/24/2024):  Caio Ontiveros presents today for follow-up visit.  Patient was last seen on 3/22/2024. Patient reports that he is experiencing more pain when moving around and walking. He localizes his pain across lower back and with LLE. He also c/o stiffness in his lower back. Patient reports pain as 8/10 today. Since his last visit, Lyrica was increased from 75 mg QHS to 100 mg BID. He is also taking Robaxin 500 mg.       Interval History (3/22/2024):  Caio Ontiveros presents today for follow-up visit.  Patient was last seen on 8/23/2023 for Left IA hip joint injection with 80% relief.  He reports his hip pain is doing better however he does have some lower back pain which seems to be getting worse.  The pain remains in the back and does not radiate into the legs.  Prolonged standing and extending back make the pain worse.  If he leans over and wrists with surface bending forward he will get some relief of his pain.  He rates his pain today an 8/10.  No new injury.  He is requesting to try a different medication to try to help with his pain and is leery of doing any procedures on his back at this " time.  He has not done formal physical therapy because he can not afford it at this time and he also does not think he would be able to tolerate it at this time.  He has been doing home exercises and stretches and is trying to work on weight loss because he feels this will help his pain as well.  Patient denies night fever/night sweats, urinary incontinence, bowel incontinence, significant weight loss and significant motor weakness.   Patient denies any other complaints or concerns at this time.        Interval history 8/9/2023  Caio Ontiveros is a 59 y.o. male who presents to the clinic for the evaluation of chronic lower back pain for years that gradually got worse but has become severe over the past several months.  It is affecting his ADLs significantly including the ability to walk and stand.  It is located in the left buttock and radiates to the left groin and is worse with weight-bearing.  There is no radicular pain tingling or numbness to the legs but he reports weakness in the left leg versus due to severe pain.  Denies loss of bowel or bladder control.  It is worse with walking, standing, getting up from the bed or chair and is better (slightly) with medications including Tylenol Arthritis and gabapentin which he takes at 300 mg daily.  He could not afford the co-pay for physical therapy, not well covered by his insurance. He currently rates it at 10/10.  He gives it a 6/10 when it is at best.  He has history of extreme morbid obesity and lost significant weight after gastric bypass surgery in 2014 dropping down from 500 lb 340 lb.  He managed to gain some weight back and he is around 400 lb.  He has no history of kidney stones, glaucoma or sulfa allergies.      Pain Disability Index Review:         7/28/2025    10:54 AM 8/9/2023     2:11 PM   Last 3 PDI Scores   Pain Disability Index (PDI) 45 62         Pain Procedures:   8/23/2023 Left IA hip joint injection with 80% relief      Past Medical History:    Diagnosis Date    A-fib     CHF (congestive heart failure)     Gout, chronic     Hypertension     Sleep apnea      Past Surgical History:   Procedure Laterality Date    CHOLECYSTECTOMY      GASTRIC BYPASS  2014    INJECTION OF JOINT Left 8/23/2023    Procedure: Left IA Hip Joint injection;  Surgeon: Peña Rausch MD;  Location: [facility];  Service: Pain Management;  Laterality: Left;     Social History     Socioeconomic History    Marital status:     Number of children: 1   Tobacco Use    Smoking status: Never    Smokeless tobacco: Never   Substance and Sexual Activity    Alcohol use: Not Currently    Drug use: No    Sexual activity: Not Currently     Social Drivers of Health     Financial Resource Strain: High Risk (6/6/2025)    Received from [facility] SDOH Screening     In the past year, have you been unable to get any of the following when you really needed them? choose all that apply.: Medicine or health care   Food Insecurity: No Food Insecurity (1/25/2025)    Hunger Vital Sign     Worried About Running Out of Food in the Last Year: Never true     Ran Out of Food in the Last Year: Never true   Transportation Needs: No Transportation Needs (1/25/2025)    TRANSPORTATION NEEDS     Transportation : No   Physical Activity: Inactive (1/7/2025)    Exercise Vital Sign     Days of Exercise per Week: 0 days     Minutes of Exercise per Session: 0 min   Stress: No Stress Concern Present (1/25/2025)    [employer] - Occupational Stress Questionnaire     Feeling of Stress : Not at all   Recent Concern: Stress - Stress Concern Present (1/7/2025)    [employer] - Occupational Stress Questionnaire     Feeling of Stress : To some extent   Housing Stability: Unknown (1/25/2025)    Housing Stability Vital Sign     Unable to Pay for Housing in the Last Year: No     Homeless in the Last Year: No     Family History   Problem Relation Name Age of  Onset    Hypertension Mother      Stroke Father      Diabetes Father      Hypertension Father      Diabetes Sister      Diabetes Brother         Review of patient's allergies indicates:  No Known Allergies    Current Outpatient Medications   Medication Sig    apixaban (ELIQUIS) 5 mg Tab Take 1 tablet by mouth twice daily    bumetanide (BUMEX) 2 MG tablet Take 1.5 tablets (3 mg total) by mouth 3 (three) times daily.    calcipotriene (DOVONOX) 0.005 % cream Apply topically 2 (two) times daily.    febuxostat (ULORIC) 40 mg Tab Take 40 mg by mouth once daily.    ixekizumab (TALTZ AUTOINJECTOR) 80 mg/mL AtIn Inject 80 mg into the skin every 28 days.    losartan (COZAAR) 100 MG tablet Take 100 mg by mouth once daily.    metoprolol succinate (TOPROL-XL) 100 MG 24 hr tablet Take 1 tablet by mouth once daily    potassium chloride SA (K-DUR,KLOR-CON) 20 MEQ tablet Take 1 tablet (20 mEq total) by mouth 2 (two) times daily.    tirzepatide, weight loss, (ZEPBOUND) 5 mg/0.5 mL PnIj Inject 5 mg into the skin every 7 days.    venlafaxine (EFFEXOR-XR) 150 MG Cp24 TAKE 1 CAPSULE BY MOUTH ONCE  DAILY    colchicine (COLCRYS) 0.6 mg tablet Take 1 tablet (0.6 mg total) by mouth once daily.    gabapentin (NEURONTIN) 800 MG tablet Take 1 tablet (800 mg total) by mouth 3 (three) times daily.    HYDROcodone-acetaminophen (NORCO) 5-325 mg per tablet Take 1/2 to 1 tab BID PRN pain. (Patient not taking: Reported on 7/28/2025)    levothyroxine (SYNTHROID) 50 MCG tablet Take 1 tablet (50 mcg total) by mouth before breakfast.    methocarbamoL (ROBAXIN) 750 MG Tab Take 1 tablet (750 mg total) by mouth every 8 (eight) hours as needed (take to help relieve muscle pain/spasms).    nortriptyline (PAMELOR) 50 MG capsule Take 1 capsule (50 mg total) by mouth every evening.     No current facility-administered medications for this visit.       Review of Systems     GENERAL:  No weight loss, malaise or fevers.  HEENT:   No recent changes in vision or  "hearing  NECK:  Negative for lumps, no difficulty with swallowing.  RESPIRATORY:  Negative for cough, wheezing or shortness of breath, patient denies any recent URI.  CARDIOVASCULAR:  Negative for chest pain, positive for fluid retention and leg swelling  GI:  Negative for abdominal discomfort, blood in stools or black stools or change in bowel habits.  MUSCULOSKELETAL:  See HPI.  SKIN:  Negative for lesions, rash, and itching.  PSYCH:  No mood disorder or recent psychosocial stressors.  Patients sleep is not disturbed secondary to pain.  HEMATOLOGY/LYMPHOLOGY:  Negative for prolonged bleeding, bruising easily or swollen nodes.  Patient is not currently taking any anti-coagulants  NEURO:   No history of headaches, syncope, paralysis, seizures or tremors.  All other reviewed and negative other than HPI.    OBJECTIVE:    Vitals:    07/28/25 1052   BP: 125/62   Pulse: 75   Resp: 17   Weight: Comment: unable to stand   Height: 6' 3" (1.905 m)     Body mass index is 53.57 kg/m².   (reviewed on 7/28/2025)         Physical Exam    GENERAL: Well appearing, in no acute distress, but in discomfort when trying to walk, alert and oriented x3.  Morbidly obese  PSYCH:  Mood and affect appropriate.  SKIN: Skin color, texture, turgor normal, no rashes or lesions.  HEAD/FACE:  Normocephalic, atraumatic.  CV: chronic edema changes in distal legs and ankles.  PULM: No evidence of respiratory difficulty, symmetric chest rise.  GI:  Abdomen soft and non-tender.    GAIT: slow antalgic.    LUMBAR SPINE:   Palpation:  Mild tenderness over left facet joints and paraspinous muscles. No trigger points.  ROM: Pain with flexion, extension and rotation.  Straight leg raising is negative.  SI JOINTS: bilateral SI joint, no tenderness  LEFT HIP:  SEVERELY RESTRICTED AND PAINFUL RANGE OF MOTION including internal, external rotation, abduction and flexion.  NEURO:   MOTOR: Bilateral lower extremity muscle strength is normal. No atrophy or tone " abnormalities are noted.  SENSORY: No loss of sensation in L3 through S1 dermatomes bilaterally.  DTR's: No clonus.    Imaging:     Results for orders placed during the hospital encounter of 01/14/25    MRI Lumbar Spine Without Contrast    Narrative  EXAMINATION:  MRI LUMBAR SPINE WITHOUT CONTRAST    CLINICAL HISTORY:  Dorsalgia, unspecifiedLow back pain, symptoms persist with > 6wks conservative treatment;Lumbar radiculopathy, symptoms persist with conservative treatment;    TECHNIQUE:  Standard multiplanar noncontrast MRI sequences of the lumbar spine.    COMPARISON:  X-ray December 28, 2022    FINDINGS:  The distal cord and conus reveal normal signal and morphology.    There is straightening of the normal lumbar lordosis.    There appears to be fusion of the L2-3, L3-4, L4-5 and L5-S1 disc spaces with retrolisthesis of L2 with respect L3.    No acute marrow edema or fracture.    T12-L1: Minor disc bulge and facet arthrosis.    L1-2:     Mild disc bulge with minor facet arthrosis.    L2-3:     Intradiscal calcification with fusion.  Disc osteophyte complex with ventral sac impression.  Hypertrophic facet arthrosis left greater than right.  Mild central and left lateral recess stenosis with mild left foraminal stenosis.    L3-4:     Intradiscal calcification with fusion of the disc space.  Minor disc osteophyte complex.  Mild left foraminal stenosis.    L4-5:     Intradiscal calcification with probable fusion of the disc space.  Disc osteophyte complex with mild facet arthrosis.  Central canal is patent.  Mild foraminal stenosis.    L5-S1:    Intradiscal calcification with fusion.  Disc bulging osteophyte with minor facet arthrosis.  Central canal is patent.  Moderate left and mild right foraminal stenosis.    Impression  Intra discal calcification with probable fusion of the L2-3 through L5-S1 disc spaces.  Retropulsion of L2 with respect L3 with mild central and left lateral recess stenosis.    Mild multilevel  foraminal stenosis as above.      Electronically signed by: Walter Valle MD  Date:    02/24/2025  Time:    15:36            Results for orders placed during the hospital encounter of 12/28/22    X-Ray Lumbar Spine AP And Lateral    Narrative  EXAMINATION:  XR LUMBAR SPINE AP AND LATERAL    CLINICAL HISTORY:  Low back pain, unspecified    TECHNIQUE:  AP, lateral and spot images were performed of the lumbar spine.    COMPARISON:  Radiographs from August 2020    FINDINGS:  No scoliosis.  Five lumbar type vertebral bodies noted.  Bridging syndesmophytes noted at multiple levels. There is no fracture or listhesis.  Minimal narrowing at L3-L4 and L5-S1 is unchanged.  There is also minimal lower lumbar facet arthropathy.  No significant change from prior study.      Electronically signed by: Emoyr Bah MD  Date:    12/28/2022    XR SACROILIAC JOINTS 3 VIEWS     CLINICAL HISTORY:  mechanical lower back pain, psoriasis acitive.  Assess for sacroiilitis/ankylosis;  Low back pain     COMPARISON:  None     FINDINGS:  Degenerative changes noted in the included lumbosacral spine and hips.  No fracture or dislocation.  Pelvic ring is intact.  SI joints normal and symmetric in appearance bilaterally.  No sclerotic or erosive changes appreciated.  Heterogeneous increased density identified within the femoral heads.  There is spurring and buttressing of the femoral neck cortex consistent with degenerative changes.  Subchondral degenerative cyst/geodes cannot be excluded bilaterally.     Impression:  I reviewed the films which show moderate to severe degenerative and sclerotic changes of the left femoral head and acetabulum, worse than the right.  SI joints within normal limits.  Degenerative changes lumbosacral spine and hips.        Electronically signed by: Marty Lee MD  Date:                                            08/25/2020      LABS:  Lab Results   Component Value Date    WBC 2.85 (L) 04/29/2025    HGB 15.0  04/29/2025    HCT 48.0 04/29/2025    MCV 92 04/29/2025     (L) 04/29/2025       CMP  Sodium   Date Value Ref Range Status   04/29/2025 145 136 - 145 mmol/L Final   01/26/2025 141 136 - 145 mmol/L Final     Potassium   Date Value Ref Range Status   04/29/2025 4.4 3.5 - 5.1 mmol/L Final   01/26/2025 3.5 3.5 - 5.1 mmol/L Final     Chloride   Date Value Ref Range Status   04/29/2025 106 95 - 110 mmol/L Final   01/26/2025 102 95 - 110 mmol/L Final     CO2   Date Value Ref Range Status   04/29/2025 30 (H) 23 - 29 mmol/L Final   01/26/2025 26 23 - 29 mmol/L Final     Glucose   Date Value Ref Range Status   04/29/2025 93 70 - 110 mg/dL Final   01/26/2025 83 70 - 110 mg/dL Final     BUN   Date Value Ref Range Status   04/29/2025 18 6 - 20 mg/dL Final     Creatinine   Date Value Ref Range Status   04/29/2025 1.2 0.5 - 1.4 mg/dL Final     Calcium   Date Value Ref Range Status   04/29/2025 9.0 8.7 - 10.5 mg/dL Final   01/26/2025 8.4 (L) 8.7 - 10.5 mg/dL Final     Protein Total   Date Value Ref Range Status   04/29/2025 8.4 6.0 - 8.4 gm/dL Final     Total Protein   Date Value Ref Range Status   01/24/2025 7.3 6.0 - 8.4 g/dL Final     Albumin   Date Value Ref Range Status   04/29/2025 3.9 3.5 - 5.2 g/dL Final   01/24/2025 3.5 3.5 - 5.2 g/dL Final     Total Bilirubin   Date Value Ref Range Status   01/24/2025 1.1 (H) 0.1 - 1.0 mg/dL Final     Comment:     For infants and newborns, interpretation of results should be based  on gestational age, weight and in agreement with clinical  observations.    Premature Infant recommended reference ranges:  Up to 24 hours.............<8.0 mg/dL  Up to 48 hours............<12.0 mg/dL  3-5 days..................<15.0 mg/dL  6-29 days.................<15.0 mg/dL       Bilirubin Total   Date Value Ref Range Status   04/29/2025 1.2 (H) 0.1 - 1.0 mg/dL Final     Comment:     For infants and newborns, interpretation of results should be based   on gestational age, weight and in agreement with  clinical   observations.    Premature Infant recommended reference ranges:   0-24 hours:  <8.0 mg/dL   24-48 hours: <12.0 mg/dL   3-5 days:    <15.0 mg/dL   6-29 days:   <15.0 mg/dL     Alkaline Phosphatase   Date Value Ref Range Status   01/24/2025 58 40 - 150 U/L Final     ALP   Date Value Ref Range Status   04/29/2025 69 40 - 150 unit/L Final     AST   Date Value Ref Range Status   04/29/2025 15 11 - 45 unit/L Final   01/24/2025 22 10 - 40 U/L Final     ALT   Date Value Ref Range Status   04/29/2025 7 (L) 10 - 44 unit/L Final   01/24/2025 12 10 - 44 U/L Final     Anion Gap   Date Value Ref Range Status   04/29/2025 9 8 - 16 mmol/L Final     eGFR if    Date Value Ref Range Status   02/22/2022 >60.0 >60 mL/min/1.73 m^2 Final     eGFR if non    Date Value Ref Range Status   02/22/2022 >60.0 >60 mL/min/1.73 m^2 Final     Comment:     Calculation used to obtain the estimated glomerular filtration  rate (eGFR) is the CKD-EPI equation.        Lab Results   Component Value Date    HGBA1C 5.7 (H) 01/24/2025         ASSESSMENT: 59 y.o. year old male with chronic left hip pain that has gotten severe over the past several months not improving with medications.  X-rays show advanced sclerotic changes of the left hip:    1. Facet arthritis of lumbosacral region  methocarbamoL (ROBAXIN) 750 MG Tab    nortriptyline (PAMELOR) 50 MG capsule      2. DDD (degenerative disc disease), lumbosacral  methocarbamoL (ROBAXIN) 750 MG Tab    nortriptyline (PAMELOR) 50 MG capsule      3. Dorsalgia of lumbar region  methocarbamoL (ROBAXIN) 750 MG Tab    nortriptyline (PAMELOR) 50 MG capsule      4. Dorsalgia, unspecified  methocarbamoL (ROBAXIN) 750 MG Tab    nortriptyline (PAMELOR) 50 MG capsule      5. Lumbar radiculopathy, chronic  nortriptyline (PAMELOR) 50 MG capsule              PLAN:    1- Interventions:  Consider Bilateral Lumbar TFESI at L5-S1-patient not overly interested at this time    2.  Pharmacology:  - Anticoagulation use: Patient is taking apixaban (Eliquis) for atrial fibrillation.    - Medications:    - we will discontinue Lyrica and replaced with gabapentin 800 mg t.i.d.    - Continue Robaxin 750 mg TID. Refill provided.    - Continue nortriptyline, but will increase to 50mg nightly.  We have discussed potential common side effects of duloxetine including nausea, drowsiness, excitement or anxiety, dry mouth, constipation or changes in appetite or weight.  Patient expresses understanding.Refill provided.    -I do not believe opioids are in the best interest of this patient due to its cardiovascular effects    - Topamax discontinued in the past- not effective.  - Not a candidate for oral NSAIDs due to Eliquis.    - Can take Tylenol (acetaminophen) 1000mg (two tablets of 500mg) 3x per day as needed for pain (to take up to max dose of 3000mg per day).       report:  Reviewed and consistent with medication use as prescribed.          - Therapy:  Continue at home exercises and stretches as tolerated.    - Imaging:  previous imaging studies reviewed. Discussed MRI lumbar spine with patient today. Findings reveal degenerative changes at multiple levels including facet arthropathy.    - Consults/Referrals: none at this time.    - Follow up: return to clinic in 10-12 weeks    - Counseled patient regarding the importance of activity modification    - Patient Questions: Answered all of the patient's questions regarding diagnosis, therapy, and treatment    The above plan and management options were discussed at length with patient. Patient is in agreement with the above and verbalized understanding.      Visit today included increased complexity associated with the care of the episodic problem of chronic pain which was addressed and continue to manage the longitudinal care of the patient due to the serious and/or complex managed problem(s) listed above.      Rickey Patterson MD  Interventional Pain  Management  [facility]

## 2025-08-01 ENCOUNTER — LAB VISIT (OUTPATIENT)
Dept: LAB | Facility: HOSPITAL | Age: 60
End: 2025-08-01
Attending: INTERNAL MEDICINE
Payer: MEDICARE

## 2025-08-01 DIAGNOSIS — I50.32 CHRONIC DIASTOLIC CONGESTIVE HEART FAILURE: ICD-10-CM

## 2025-08-01 PROCEDURE — 80048 BASIC METABOLIC PNL TOTAL CA: CPT

## 2025-08-01 PROCEDURE — 36415 COLL VENOUS BLD VENIPUNCTURE: CPT

## 2025-08-01 PROCEDURE — 83880 ASSAY OF NATRIURETIC PEPTIDE: CPT

## 2025-08-02 ENCOUNTER — OCHSNER VIRTUAL EMERGENCY DEPARTMENT (OUTPATIENT)
Facility: CLINIC | Age: 60
End: 2025-08-02
Payer: MEDICARE

## 2025-08-02 ENCOUNTER — NURSE TRIAGE (OUTPATIENT)
Dept: ADMINISTRATIVE | Facility: CLINIC | Age: 60
End: 2025-08-02
Payer: MEDICARE

## 2025-08-02 ENCOUNTER — PATIENT OUTREACH (OUTPATIENT)
Facility: OTHER | Age: 60
End: 2025-08-02
Payer: MEDICARE

## 2025-08-02 LAB
ANION GAP (OHS): 10 MMOL/L (ref 8–16)
BUN SERPL-MCNC: 23 MG/DL (ref 6–20)
CALCIUM SERPL-MCNC: 8.6 MG/DL (ref 8.7–10.5)
CHLORIDE SERPL-SCNC: 103 MMOL/L (ref 95–110)
CO2 SERPL-SCNC: 30 MMOL/L (ref 23–29)
CREAT SERPL-MCNC: 1.5 MG/DL (ref 0.5–1.4)
GFR SERPLBLD CREATININE-BSD FMLA CKD-EPI: 53 ML/MIN/1.73/M2
GLUCOSE SERPL-MCNC: 100 MG/DL (ref 70–110)
NT-PROBNP SERPL-MCNC: 2967 PG/ML
POTASSIUM SERPL-SCNC: 4.7 MMOL/L (ref 3.5–5.1)
SODIUM SERPL-SCNC: 143 MMOL/L (ref 136–145)

## 2025-08-02 NOTE — PLAN OF CARE-OVED
[facility] Plan of Care Note  Referral Source: Nurse On-Call                               Chief Complaint   Patient presents with    Dehydration     Patient had labs drawn by his cardiology, BUN/Cr slightly elevated.  Has CHF.  Concerned about labs.  Feels dehydrated.       Recommendation: Treat in place                       Recommendation comment: 2 extra glasses of water today, normal sized.  Back to usual intake tomorrow.  Contact cardiologist for next steps.

## 2025-08-02 NOTE — TELEPHONE ENCOUNTER
Speaking with pt who reports that he has mild to moderate lower abdominal swelling that has been present for a month or two. HE also reports having mild swellign to bilateral legs with more swelling to right than left. he states that he also has SOB with exertion as well. He has portable oxygen that he uses but stats that he feels at times it is not enough to help with SO with exertion. He denies that symptoms are worsening. But called in because he had labs drawn yesterday that he was concerned about. Dispo is to be seen within 4 hours/pcp triage.    Dr. Wilson advised he should contact his doctor to discuss next steps .He should weigh himself daily and note the weight where he feels good. If it goes up, he may get more short of breath. If it goes down, he may get weak.this is hard to manage at home, weight is the best way. Only 2 glasses today, back to normal tomorrow. The glasses should be normal,     Pt informed of advise, and verbalized understanding    Reason for Disposition   [1] MILD SWELLING of abdomen (e.g., looks distended or swollen) AND [2] new-onset or getting worse   [1] Thigh, calf, or ankle swelling AND [2] bilateral AND [3] 1 side is more swollen    Additional Information   Negative: SEVERE difficulty breathing (e.g., struggling for each breath, speaks in single words)   Negative: Looks like a broken bone or dislocated joint (e.g., crooked or deformed)   Negative: Sounds like a life-threatening emergency to the triager   Negative: Difficulty breathing at rest   Negative: Entire foot is cool or blue in comparison to other side   Negative: [1] Can't walk or can barely walk AND [2] new-onset   Negative: Sounds like a life-threatening emergency to the triager   Negative: [1] MODERATE-SEVERE SWELLING of abdomen (e.g., looks very distended or swollen) AND [2] NEW-onset or much worse AND [3] vomiting   Negative: Blood in bowel movements  (Exception: Blood on surface of BM with constipation.)   Negative:  Black or tarry bowel movements  (Exception: Chronic-unchanged black-grey BMs AND is taking iron pills or Pepto-Bismol.)   Negative: [1] MODERATE-SEVERE SWELLING of abdomen (e.g., looks very distended or swollen) AND [2] NEW-onset or much worse   Negative: [1] MILD SWELLING of abdomen (e.g., looks distended or swollen) AND [2] new-onset or getting worse AND [3] fever   Negative: [1] MILD-MODERATE abdomen pain AND [2] constant AND [3] present > 2 hours   Negative: [1] Vomiting AND [2] contains bile (green color)   Negative: [1] MILD abdomen pain (e.g., does not interfere with normal activities) AND [2] pain comes and goes (cramps) AND [3] present > 48 hours  (Exception: Mild abdomen pain is a chronic symptom, recurrent or ongoing AND present > 4 weeks.)   Negative: White of the eyes have turned yellow (i.e., jaundice)   Negative: [1] Difficulty breathing with exertion (e.g., walking) and [2] NEW or getting WORSE   Negative: [1] Red area or streak AND [2] fever   Negative: [1] Swelling is painful to touch AND [2] fever   Negative: [1] Cast on leg or ankle AND [2] now increased pain   Negative: Patient sounds very sick or weak to the triager   Negative: SEVERE leg swelling (e.g., swelling extends above knee, entire leg is swollen, weeping fluid)   Negative: [1] Thigh or calf pain AND [2] only 1 side AND [3] present > 1 hour   Negative: [1] Thigh, calf, or ankle swelling AND [2] only 1 side    Protocols used: Abdomen Bloating and Swelling-A-AH, Leg Swelling and Edema-A-AH

## 2025-08-04 ENCOUNTER — TELEPHONE (OUTPATIENT)
Dept: CARDIOLOGY | Facility: CLINIC | Age: 60
End: 2025-08-04
Payer: MEDICARE

## 2025-08-04 NOTE — TELEPHONE ENCOUNTER
----- Message from Ricky Jeffrey MD sent at 8/3/2025  9:03 PM CDT -----  The lab showed chf  Continue bumex 3 mg tid]  Repeat BMP in 1 week  ----- Message -----  From: Lab, Background User  Sent: 8/2/2025   6:02 PM CDT  To: Ricky Jeffrey MD

## 2025-08-04 NOTE — TELEPHONE ENCOUNTER
Lvm for pt to return call to clinic in regards to results:      The lab showed chf  Continue bumex 3 mg tid]  Repeat BMP in 1 week  NT-Pro Natriuretic Peptide; Basic Metabolic Panel

## 2025-08-04 NOTE — TELEPHONE ENCOUNTER
Pt was contacted about results:       The lab showed chf  Continue bumex 3 mg tid]  Repeat BMP in 1 week  NT-Pro Natriuretic Peptide; Basic Metabolic Panel      Pt verbalized understanding of results and medication frequency with no questions or concerns.

## 2025-08-05 ENCOUNTER — PATIENT MESSAGE (OUTPATIENT)
Dept: CARDIOLOGY | Facility: CLINIC | Age: 60
End: 2025-08-05
Payer: MEDICARE

## 2025-08-13 ENCOUNTER — OFFICE VISIT (OUTPATIENT)
Dept: INTERNAL MEDICINE | Facility: CLINIC | Age: 60
End: 2025-08-13
Payer: MEDICARE

## 2025-08-13 ENCOUNTER — LAB VISIT (OUTPATIENT)
Dept: LAB | Facility: HOSPITAL | Age: 60
End: 2025-08-13
Attending: INTERNAL MEDICINE
Payer: MEDICARE

## 2025-08-13 VITALS
DIASTOLIC BLOOD PRESSURE: 80 MMHG | BODY MASS INDEX: 39.17 KG/M2 | WEIGHT: 315 LBS | SYSTOLIC BLOOD PRESSURE: 122 MMHG | HEART RATE: 83 BPM | OXYGEN SATURATION: 86 % | HEIGHT: 75 IN

## 2025-08-13 DIAGNOSIS — F33.1 MODERATE EPISODE OF RECURRENT MAJOR DEPRESSIVE DISORDER: ICD-10-CM

## 2025-08-13 DIAGNOSIS — M54.41 CHRONIC BILATERAL LOW BACK PAIN WITH SCIATICA, SCIATICA LATERALITY UNSPECIFIED: ICD-10-CM

## 2025-08-13 DIAGNOSIS — R45.4 IRRITABILITY AND ANGER: ICD-10-CM

## 2025-08-13 DIAGNOSIS — R73.03 PREDIABETES: ICD-10-CM

## 2025-08-13 DIAGNOSIS — Z00.00 ANNUAL PHYSICAL EXAM: Primary | ICD-10-CM

## 2025-08-13 DIAGNOSIS — E03.8 SUBCLINICAL HYPOTHYROIDISM: ICD-10-CM

## 2025-08-13 DIAGNOSIS — I50.812 CHRONIC RIGHT-SIDED HEART FAILURE: ICD-10-CM

## 2025-08-13 DIAGNOSIS — D69.6 THROMBOCYTOPENIA, UNSPECIFIED: ICD-10-CM

## 2025-08-13 DIAGNOSIS — I50.32 CHRONIC DIASTOLIC CONGESTIVE HEART FAILURE: ICD-10-CM

## 2025-08-13 DIAGNOSIS — M47.817 FACET ARTHRITIS OF LUMBOSACRAL REGION: ICD-10-CM

## 2025-08-13 DIAGNOSIS — M1A.09X0 IDIOPATHIC CHRONIC GOUT OF MULTIPLE SITES WITHOUT TOPHUS: ICD-10-CM

## 2025-08-13 DIAGNOSIS — D70.8 CHRONIC BENIGN NEUTROPENIA: ICD-10-CM

## 2025-08-13 DIAGNOSIS — M51.372 DEGENERATION OF INTERVERTEBRAL DISC OF LUMBOSACRAL REGION WITH DISCOGENIC BACK PAIN AND LOWER EXTREMITY PAIN: ICD-10-CM

## 2025-08-13 DIAGNOSIS — I48.20 CHRONIC ATRIAL FIBRILLATION: ICD-10-CM

## 2025-08-13 DIAGNOSIS — I10 ESSENTIAL HYPERTENSION: ICD-10-CM

## 2025-08-13 DIAGNOSIS — M54.42 CHRONIC BILATERAL LOW BACK PAIN WITH SCIATICA, SCIATICA LATERALITY UNSPECIFIED: ICD-10-CM

## 2025-08-13 DIAGNOSIS — G47.33 OSA ON CPAP: ICD-10-CM

## 2025-08-13 DIAGNOSIS — Z12.5 SCREENING FOR PROSTATE CANCER: ICD-10-CM

## 2025-08-13 DIAGNOSIS — Z79.01 CHRONIC ANTICOAGULATION: ICD-10-CM

## 2025-08-13 DIAGNOSIS — L40.50 PSORIATIC ARTHRITIS: ICD-10-CM

## 2025-08-13 DIAGNOSIS — G89.29 CHRONIC BILATERAL LOW BACK PAIN WITH SCIATICA, SCIATICA LATERALITY UNSPECIFIED: ICD-10-CM

## 2025-08-13 DIAGNOSIS — F51.04 PSYCHOPHYSIOLOGICAL INSOMNIA: ICD-10-CM

## 2025-08-13 DIAGNOSIS — Z00.00 ANNUAL PHYSICAL EXAM: ICD-10-CM

## 2025-08-13 LAB
ERYTHROCYTE [DISTWIDTH] IN BLOOD BY AUTOMATED COUNT: 18.4 % (ref 11.5–14.5)
HCT VFR BLD AUTO: 45.9 % (ref 40–54)
HGB BLD-MCNC: 14.3 GM/DL (ref 14–18)
MCH RBC QN AUTO: 27.9 PG (ref 27–31)
MCHC RBC AUTO-ENTMCNC: 31.2 G/DL (ref 32–36)
MCV RBC AUTO: 90 FL (ref 82–98)
PLATELET # BLD AUTO: 122 K/UL (ref 150–450)
PMV BLD AUTO: ABNORMAL FL
RBC # BLD AUTO: 5.12 M/UL (ref 4.6–6.2)
WBC # BLD AUTO: 2.5 K/UL (ref 3.9–12.7)

## 2025-08-13 PROCEDURE — 99999 PR PBB SHADOW E&M-EST. PATIENT-LVL III: CPT | Mod: PBBFAC,,, | Performed by: FAMILY MEDICINE

## 2025-08-13 PROCEDURE — 4010F ACE/ARB THERAPY RXD/TAKEN: CPT | Mod: CPTII,S$GLB,, | Performed by: FAMILY MEDICINE

## 2025-08-13 PROCEDURE — 82248 BILIRUBIN DIRECT: CPT

## 2025-08-13 PROCEDURE — 1159F MED LIST DOCD IN RCRD: CPT | Mod: CPTII,S$GLB,, | Performed by: FAMILY MEDICINE

## 2025-08-13 PROCEDURE — 82310 ASSAY OF CALCIUM: CPT

## 2025-08-13 PROCEDURE — 80061 LIPID PANEL: CPT

## 2025-08-13 PROCEDURE — 83880 ASSAY OF NATRIURETIC PEPTIDE: CPT

## 2025-08-13 PROCEDURE — 84443 ASSAY THYROID STIM HORMONE: CPT

## 2025-08-13 PROCEDURE — 3044F HG A1C LEVEL LT 7.0%: CPT | Mod: CPTII,S$GLB,, | Performed by: FAMILY MEDICINE

## 2025-08-13 PROCEDURE — 36415 COLL VENOUS BLD VENIPUNCTURE: CPT

## 2025-08-13 PROCEDURE — 83036 HEMOGLOBIN GLYCOSYLATED A1C: CPT

## 2025-08-13 PROCEDURE — 84153 ASSAY OF PSA TOTAL: CPT

## 2025-08-13 PROCEDURE — 84439 ASSAY OF FREE THYROXINE: CPT

## 2025-08-13 PROCEDURE — 3008F BODY MASS INDEX DOCD: CPT | Mod: CPTII,S$GLB,, | Performed by: FAMILY MEDICINE

## 2025-08-13 PROCEDURE — 99396 PREV VISIT EST AGE 40-64: CPT | Mod: S$GLB,,, | Performed by: FAMILY MEDICINE

## 2025-08-13 PROCEDURE — 99214 OFFICE O/P EST MOD 30 MIN: CPT | Mod: 25,S$GLB,, | Performed by: FAMILY MEDICINE

## 2025-08-13 PROCEDURE — 1160F RVW MEDS BY RX/DR IN RCRD: CPT | Mod: CPTII,S$GLB,, | Performed by: FAMILY MEDICINE

## 2025-08-13 PROCEDURE — 3079F DIAST BP 80-89 MM HG: CPT | Mod: CPTII,S$GLB,, | Performed by: FAMILY MEDICINE

## 2025-08-13 PROCEDURE — 3074F SYST BP LT 130 MM HG: CPT | Mod: CPTII,S$GLB,, | Performed by: FAMILY MEDICINE

## 2025-08-13 PROCEDURE — 85027 COMPLETE CBC AUTOMATED: CPT

## 2025-08-14 LAB
ALBUMIN SERPL BCP-MCNC: 3.6 G/DL (ref 3.5–5.2)
ALP SERPL-CCNC: 64 UNIT/L (ref 40–150)
ALT SERPL W/O P-5'-P-CCNC: 11 UNIT/L (ref 0–55)
ANION GAP (OHS): 11 MMOL/L (ref 8–16)
AST SERPL-CCNC: 26 UNIT/L (ref 0–50)
BILIRUB DIRECT SERPL-MCNC: 0.7 MG/DL (ref 0.1–0.3)
BILIRUB SERPL-MCNC: 1.2 MG/DL (ref 0.1–1)
BUN SERPL-MCNC: 17 MG/DL (ref 6–20)
CALCIUM SERPL-MCNC: 9 MG/DL (ref 8.7–10.5)
CHLORIDE SERPL-SCNC: 106 MMOL/L (ref 95–110)
CHOLEST SERPL-MCNC: 107 MG/DL (ref 120–199)
CHOLEST/HDLC SERPL: 3.1 {RATIO} (ref 2–5)
CO2 SERPL-SCNC: 24 MMOL/L (ref 23–29)
CREAT SERPL-MCNC: 1.2 MG/DL (ref 0.5–1.4)
EAG (OHS): 120 MG/DL (ref 68–131)
GFR SERPLBLD CREATININE-BSD FMLA CKD-EPI: >60 ML/MIN/1.73/M2
GLUCOSE SERPL-MCNC: 79 MG/DL (ref 70–110)
HBA1C MFR BLD: 5.8 % (ref 4–5.6)
HDLC SERPL-MCNC: 34 MG/DL (ref 40–75)
HDLC SERPL: 31.8 % (ref 20–50)
LDLC SERPL CALC-MCNC: 63 MG/DL (ref 63–159)
NONHDLC SERPL-MCNC: 73 MG/DL
NT-PROBNP SERPL-MCNC: 1626 PG/ML
POTASSIUM SERPL-SCNC: 4.2 MMOL/L (ref 3.5–5.1)
PROT SERPL-MCNC: 7.6 GM/DL (ref 6–8.4)
PSA SERPL-MCNC: 0.32 NG/ML
SODIUM SERPL-SCNC: 141 MMOL/L (ref 136–145)
T4 FREE SERPL-MCNC: 0.91 NG/DL (ref 0.71–1.51)
TRIGL SERPL-MCNC: 50 MG/DL (ref 30–150)
TSH SERPL-ACNC: 4.13 UIU/ML (ref 0.4–4)

## 2025-08-15 ENCOUNTER — RESULTS FOLLOW-UP (OUTPATIENT)
Dept: CARDIOLOGY | Facility: HOSPITAL | Age: 60
End: 2025-08-15
Payer: MEDICARE

## 2025-08-15 DIAGNOSIS — I50.32 CHRONIC DIASTOLIC CONGESTIVE HEART FAILURE: Primary | ICD-10-CM

## 2025-08-28 ENCOUNTER — LAB VISIT (OUTPATIENT)
Dept: LAB | Facility: HOSPITAL | Age: 60
End: 2025-08-28
Attending: INTERNAL MEDICINE
Payer: MEDICARE

## 2025-08-28 DIAGNOSIS — I50.32 CHRONIC DIASTOLIC CONGESTIVE HEART FAILURE: ICD-10-CM

## 2025-08-28 LAB
ANION GAP (OHS): 10 MMOL/L (ref 8–16)
BUN SERPL-MCNC: 25 MG/DL (ref 6–20)
CALCIUM SERPL-MCNC: 9.3 MG/DL (ref 8.7–10.5)
CHLORIDE SERPL-SCNC: 105 MMOL/L (ref 95–110)
CO2 SERPL-SCNC: 27 MMOL/L (ref 23–29)
CREAT SERPL-MCNC: 1.3 MG/DL (ref 0.5–1.4)
GFR SERPLBLD CREATININE-BSD FMLA CKD-EPI: >60 ML/MIN/1.73/M2
GLUCOSE SERPL-MCNC: 90 MG/DL (ref 70–110)
POTASSIUM SERPL-SCNC: 4.1 MMOL/L (ref 3.5–5.1)
SODIUM SERPL-SCNC: 142 MMOL/L (ref 136–145)

## 2025-08-28 PROCEDURE — 80048 BASIC METABOLIC PNL TOTAL CA: CPT

## 2025-08-28 PROCEDURE — 36415 COLL VENOUS BLD VENIPUNCTURE: CPT

## 2025-09-02 DIAGNOSIS — I27.20 PULMONARY HYPERTENSION: ICD-10-CM

## 2025-09-02 RX ORDER — APIXABAN 5 MG/1
5 TABLET, FILM COATED ORAL 2 TIMES DAILY
Qty: 60 TABLET | Refills: 5 | Status: SHIPPED | OUTPATIENT
Start: 2025-09-02

## 2025-09-05 ENCOUNTER — OFFICE VISIT (OUTPATIENT)
Dept: PODIATRY | Facility: CLINIC | Age: 60
End: 2025-09-05
Payer: MEDICARE

## 2025-09-05 DIAGNOSIS — I83.11 VARICOSE VEINS OF BOTH LOWER EXTREMITIES WITH INFLAMMATION: ICD-10-CM

## 2025-09-05 DIAGNOSIS — Z79.01 CHRONIC ANTICOAGULATION: ICD-10-CM

## 2025-09-05 DIAGNOSIS — I87.2 VENOUS INSUFFICIENCY: ICD-10-CM

## 2025-09-05 DIAGNOSIS — I83.12 VARICOSE VEINS OF BOTH LOWER EXTREMITIES WITH INFLAMMATION: ICD-10-CM

## 2025-09-05 DIAGNOSIS — I50.32 CHRONIC DIASTOLIC CONGESTIVE HEART FAILURE: ICD-10-CM

## 2025-09-05 DIAGNOSIS — M72.2 PLANTAR FASCIITIS: Primary | ICD-10-CM

## 2025-09-05 DIAGNOSIS — E66.01 MORBID OBESITY: ICD-10-CM

## 2025-09-05 PROCEDURE — 99999 PR PBB SHADOW E&M-EST. PATIENT-LVL III: CPT | Mod: PBBFAC,,, | Performed by: PODIATRIST
